# Patient Record
Sex: MALE | Race: WHITE | NOT HISPANIC OR LATINO | Employment: OTHER | URBAN - METROPOLITAN AREA
[De-identification: names, ages, dates, MRNs, and addresses within clinical notes are randomized per-mention and may not be internally consistent; named-entity substitution may affect disease eponyms.]

---

## 2018-07-22 ENCOUNTER — HOSPITAL ENCOUNTER (EMERGENCY)
Facility: HOSPITAL | Age: 73
Discharge: HOME | End: 2018-07-22
Attending: INTERNAL MEDICINE | Admitting: INTERNAL MEDICINE
Payer: MEDICARE

## 2018-07-22 VITALS
DIASTOLIC BLOOD PRESSURE: 76 MMHG | HEART RATE: 64 BPM | SYSTOLIC BLOOD PRESSURE: 147 MMHG | BODY MASS INDEX: 17.18 KG/M2 | RESPIRATION RATE: 18 BRPM | HEIGHT: 70 IN | TEMPERATURE: 98.1 F | OXYGEN SATURATION: 98 % | WEIGHT: 120 LBS

## 2018-07-22 DIAGNOSIS — L23.7 POISON IVY: Primary | ICD-10-CM

## 2018-07-22 PROCEDURE — 99282 EMERGENCY DEPT VISIT SF MDM: CPT

## 2018-07-22 PROCEDURE — 96372 THER/PROPH/DIAG INJ SC/IM: CPT

## 2018-07-22 RX ORDER — DEXAMETHASONE SODIUM PHOSPHATE 4 MG/ML
8 INJECTION, SOLUTION INTRA-ARTICULAR; INTRALESIONAL; INTRAMUSCULAR; INTRAVENOUS; SOFT TISSUE ONCE
Status: COMPLETED | OUTPATIENT
Start: 2018-07-22 | End: 2018-07-22

## 2018-07-22 RX ORDER — METHYLPREDNISOLONE 4 MG/1
TABLET ORAL
Qty: 21 TABLET | Refills: 0 | Status: SHIPPED | OUTPATIENT
Start: 2018-07-22 | End: 2018-07-29

## 2018-07-22 RX ADMIN — DEXAMETHASONE SODIUM PHOSPHATE 8 MG: 4 INJECTION, SOLUTION INTRA-ARTICULAR; INTRALESIONAL; INTRAMUSCULAR; INTRAVENOUS; SOFT TISSUE at 18:35

## 2018-07-22 NOTE — DISCHARGE INSTRUCTIONS
Acute Rash   [place]ano H & Shree TL: Approach to the Adult Rash  In: Patrick Hughs, ed  Emergency Medicine: Clinical Essentials, 2nd ed  [address], Tez Spivey, 2013  LEX Hunter, TRAV Hunter, & RaulT: Diagnosis and treatment of atopic dermatitis in children and adults:  Academy of Allergology and Clinical Immunology/American Academy of Allergy, Asthma and Immunology/PRACTALL Consensus Report  J Allergy Clin Immunol, 2006; 118(1):152-169  American Academy of Allergy Asthma and Immunology: Contact dermatitis: a practice parameter  Jessica Allergy Asthma Immunol, 2006; 97(3 Suppl 2):S1-38  NANDA Bailey & CHARITO James: Atopic and non-atopic eczema  BMJ, 2006; 332(8466):714-998  DESTINEE CastanedaJr: Urticaria: selected highlights and recent advances  Med Clin [place] Am, 2006; 90(1):187-209  KIA Perez, VickieF, ТатьянаP, et al: Efficacy of crisis intervention treatment with topical corticosteroid prednicarbat with and without partial wet-wrap dressing in atopic dermatitis  Dermatology, 2006; 212(1):66-69  QASIM Mancia & TRAV Morejon: Clinical practice Antibiotic allergy  [address], 2006; 354(6):601-609  MEL Arevalo, CARRIE Barrientos, SEAN Fuchs, et al: A randomised study of "wet wraps" versus conventional treatment for atopic eczema  Arch Dis Child, 2006; 91(2):164-168  CESAR Jean & OsmanRG: Allergic contact dermatitis  Med Clin [place] Am, 2006; 90(1):169-185  Silvia Vaughn & TAE Chandler: Seborrheic dermatitis: an overview  Am Fam Physician, 2006; 74(1):125-130  EMILIANO Perales & COLEMAN Bolton: Atopic dermatitis  Med Clin [place] Am, 2006; 90(1):149-67, ix  MELANIE Robles, IRON Kaba, ScarlettW, et al: EAACI/GA2LEN/EDF guideline: definition, classification and diagnosis of urticaria  Allergy, 2006; 61(3):316-320  Holten,KB: How should we care for atopic dermatitis  J Fam Pract, 2005; 54(5):426-427    CARRIE Robb, JING Eason, CHLOÉ Nunes, et al: Emollients in a propanol-based hand rub can significantly decrease irritant contact dermatitis  Contact Dermatitis, 2005; 53(6):344-349  TRAV Ma, Kris,S, & CatyT: Childhood eczema  BMJ, 2005; [id number]  FRANCINE Arana: Clinical practice  Atopic dermatitis  [address] [facility] , 2005; 352(22):2707-0790  Emilia,SW, Kalia,N, & JeseRA: Treatment of pemphigus vulgaris: current and emerging options  Am J Clin Dermatol, 2005; 6(5):327-342  KIA Wong, SUSAN Parker, KAUR Alberto, et al: Life-threatening acute adverse cutaneous drug reactions  Clin Dermatol, 2005; 23(2):171-181  CAROLYNE Odom: Urticaria and angioedema: a practical approach  [facility] Physician, 2004; 69(5):0102-3888  TRAV Parker & IRON Taylor: Do steroids help children with acute urticaria  Arch Dis Child, 2004; 89(1):85-86  SADI Eller & SEAN Crowley: Pityriasis rosea  [facility] Physician, 2004; 69(1):87-91  [address] [facility]: Twice-weekly topical corticosteroid therapy may reduce atopic dermatitis relapses  Arch Dermatol, 2004; 140(9):7402-1014  COLEMAN Washburn & Tania,AS: Atopic dermatitis and the atopic march  J Allergy Clin Immunol , 2003; 112(6 Suppl):X846-V616  AB Rao & Grey,RD: Allergic and nonallergic drug reactions  [address] [place] [address], 2003; 96(11):3863-7879  CHANNING Corea, Maryanne,K, & Jennifer,CJ: Atopic dermatitis  [facility], 2003; 96(11):1929-2399  TRAV Hernandez, JESSENIA Enriquez, & Casandra,T: Current management of atopic dermatitis and interruption of the atopic march  J Allergy Clin Immunol, 2003; 112(6 Suppl):L628-H963  AURELIA Kamara: Autoimmune bullous dermatoses in the elderly: diagnosis and management  Drugs Aging, 2003; 20(9):663-681  DAPHNEY Anderson: Xerosis and pruritus in the elderly: recognition and management  Dermatol Ther, 2003; 16(3):254-259  MESERET Morgan: Occupational skin disease  Am Fam Physician, 2002; 66(6):3383-7541  TRAV Holden: Rates of cutaneous reactions to drugs  Arch Dermatol, 2001; 137(6):765-770  SEAN Hunter, MARIBEL Corbin, & SEAN Land: Guidelines for care of contact dermatitis   Br J Dermatol, 2001; 145(6):877-885  TRAV Rubio, JING Ruano, & CARRIE Garcia: Increased risk of bullous pemphigoid in male and very old patients: A population-based study on incidence  [address] [facility]; 41(2 Pt 1):266-268  YUE Gonsales & LIZETH Gonsales: Contact dermatitis  [address] [facility] [address]; 78(2):160-173  MARISSA Edmonds & JonoSF: Allergic reactions to drugs and biologic agents  [facility]; 278(22):2576-0592  ED Colindres: Contact dermatitis and occupational skin disease  Med Dirk Amelia, 1996; 165(1):47-52  RandyS: Managing contact dermatitis  Practitioner, 1996; 240(7552):16-56  ARNULFO Dao & TRAV Stinson: Eczematous dermatitis: a practical review  Am Fam Physician, 6484; 20(0):8914-8610  © 2017 [facility] Information is for End User's use only and may not be sold, redistributed or otherwise used for commercial purposes  All illustrations and images included in CareNotes® are the copyrighted property of A D A M , Inc  or Rainer Barger  The above information is an  only  It is not intended as medical advice for individual conditions or treatments  Talk to your doctor, nurse or pharmacist before following any medical regimen to see if it is safe and effective for you

## 2018-07-22 NOTE — ED PROVIDER NOTES
History  Chief Complaint   Patient presents with   [facility]     According to the patient, he has had a rash for about 1 week and believes it may be poison IVY  71-year-old male comes in complaining of on outbreak of poison ivy on his arms and face  This is day 3 of this outbreak  Complaining of pruritus and spreading poison ivy  None       History reviewed  No pertinent past medical history  History reviewed  No pertinent surgical history  History reviewed  No pertinent family history  I have reviewed and agree with the history as documented  Social History   Substance Use Topics    Smoking status: Never Smoker    Smokeless tobacco: Never Used    Alcohol use No        Review of Systems   Constitutional: Negative  HENT: Negative  Respiratory: Negative  Cardiovascular: Negative  Skin: Positive for rash  Neurological: Negative  Psychiatric/Behavioral: Negative  Physical Exam  Physical Exam   Constitutional: He appears well-developed and well-nourished  Cardiovascular: Normal rate, regular rhythm and normal heart sounds  Pulmonary/Chest: Effort normal and breath sounds normal    Skin: Rash noted  Diffuse rash on both arms up to about the bicep area, and over his left eye  Patient does not appear looks uncomfortable         Vital Signs  ED Triage Vitals [07/22/18 1816]   Temperature Pulse Respirations Blood Pressure SpO2   97 9 °F (36 6 °C) 70 18 157/84 98 %      Temp Source Heart Rate Source Patient Position - Orthostatic VS BP Location FiO2 (%)   Tympanic Monitor Lying Left arm --      Pain Score       No Pain           Vitals:    07/22/18 1816   BP: 157/84   Pulse: 70   Patient Position - Orthostatic VS: Lying       Visual Acuity      ED Medications  Medications   dexamethasone (DECADRON) injection 8 mg (not administered)       Diagnostic Studies  Results Reviewed     None                 No orders to display              Procedures  Procedures Phone Contacts  ED Phone Contact    ED Course                               MDM  CritCare Time    Disposition  Final diagnoses:   Poison ivy     Time reflects when diagnosis was documented in both MDM as applicable and the Disposition within this note     Time User Action Codes Description Comment    7/22/2018  6:28 PM Kayla Lemus Rachele [L23 7] Poison ivy       ED Disposition     ED Disposition Condition Comment    Discharge  Luisa Rivera  discharge to home/self care  Condition at discharge: Good        Follow-up Information    None         Patient's Medications   Discharge Prescriptions    METHYLPREDNISOLONE 4 MG TBPK    Use as directed on package       Start Date: 7/22/2018 End Date: --       Order Dose: --       Quantity: 21 tablet    Refills: 0     No discharge procedures on file      ED Provider  Electronically Signed by           Leticia Rome MD  07/22/18 2889       Leticia Rome MD  07/22/18 6152

## 2018-07-29 ENCOUNTER — HOSPITAL ENCOUNTER (EMERGENCY)
Facility: HOSPITAL | Age: 73
Discharge: HOME | End: 2018-07-29
Attending: EMERGENCY MEDICINE | Admitting: EMERGENCY MEDICINE
Payer: MEDICARE

## 2018-07-29 ENCOUNTER — APPOINTMENT (EMERGENCY)
Dept: CT IMAGING | Facility: HOSPITAL | Age: 73
End: 2018-07-29
Payer: MEDICARE

## 2018-07-29 VITALS
HEART RATE: 58 BPM | OXYGEN SATURATION: 100 % | HEIGHT: 70 IN | WEIGHT: 140 LBS | DIASTOLIC BLOOD PRESSURE: 77 MMHG | SYSTOLIC BLOOD PRESSURE: 160 MMHG | RESPIRATION RATE: 18 BRPM | BODY MASS INDEX: 20.04 KG/M2 | TEMPERATURE: 98.1 F

## 2018-07-29 DIAGNOSIS — R42 DIZZINESS: ICD-10-CM

## 2018-07-29 DIAGNOSIS — T50.905A ADVERSE EFFECT OF DRUG, INITIAL ENCOUNTER: Primary | ICD-10-CM

## 2018-07-29 DIAGNOSIS — L25.9 CONTACT DERMATITIS: ICD-10-CM

## 2018-07-29 DIAGNOSIS — R11.0 NAUSEA: ICD-10-CM

## 2018-07-29 LAB
ALBUMIN SERPL BCP-MCNC: 4.4 G/DL (ref 3.5–5.7)
ALP SERPL-CCNC: 62 U/L (ref 55–165)
ALT SERPL W P-5'-P-CCNC: 15 U/L (ref 7–52)
ANION GAP SERPL CALCULATED.3IONS-SCNC: 9 MMOL/L (ref 4–13)
AST SERPL W P-5'-P-CCNC: 15 U/L (ref 13–39)
ATRIAL RATE: 51 BPM
BACTERIA UR QL AUTO: ABNORMAL /HPF
BASOPHILS # BLD AUTO: 0 THOUSANDS/ΜL (ref 0–0.1)
BASOPHILS NFR BLD AUTO: 0 % (ref 0–1)
BILIRUB SERPL-MCNC: 1.2 MG/DL (ref 0.2–1)
BILIRUB UR QL STRIP: NEGATIVE
BUN SERPL-MCNC: 25 MG/DL (ref 7–25)
CALCIUM SERPL-MCNC: 9.7 MG/DL (ref 8.6–10.5)
CHLORIDE SERPL-SCNC: 102 MMOL/L (ref 98–107)
CLARITY UR: ABNORMAL
CO2 SERPL-SCNC: 28 MMOL/L (ref 21–31)
COLOR UR: YELLOW
CREAT SERPL-MCNC: 0.96 MG/DL (ref 0.7–1.3)
EOSINOPHIL # BLD AUTO: 0.1 THOUSAND/ΜL (ref 0–0.61)
EOSINOPHIL NFR BLD AUTO: 1 % (ref 0–6)
ERYTHROCYTE [DISTWIDTH] IN BLOOD BY AUTOMATED COUNT: 13.9 % (ref 11.6–15.1)
GFR SERPL CREATININE-BSD FRML MDRD: 79 ML/MIN/1.73SQ M
GLUCOSE SERPL-MCNC: 120 MG/DL (ref 65–140)
GLUCOSE UR STRIP-MCNC: NEGATIVE MG/DL
HCT VFR BLD AUTO: 45.5 % (ref 42–52)
HGB BLD-MCNC: 15.7 G/DL (ref 12–17)
HGB UR QL STRIP.AUTO: ABNORMAL
KETONES UR STRIP-MCNC: NEGATIVE MG/DL
LEUKOCYTE ESTERASE UR QL STRIP: ABNORMAL
LYMPHOCYTES # BLD AUTO: 1.3 THOUSANDS/ΜL (ref 0.6–4.47)
LYMPHOCYTES NFR BLD AUTO: 14 % (ref 14–44)
MCH RBC QN AUTO: 30.3 PG (ref 26.8–34.3)
MCHC RBC AUTO-ENTMCNC: 34.5 G/DL (ref 31.4–37.4)
MCV RBC AUTO: 88 FL (ref 82–98)
MONOCYTES # BLD AUTO: 0.8 THOUSAND/ΜL (ref 0.17–1.22)
MONOCYTES NFR BLD AUTO: 9 % (ref 4–12)
MUCOUS THREADS UR QL AUTO: ABNORMAL
NEUTROPHILS # BLD AUTO: 7.6 THOUSANDS/ΜL (ref 1.85–7.62)
NEUTS SEG NFR BLD AUTO: 77 % (ref 43–75)
NITRITE UR QL STRIP: NEGATIVE
NON-SQ EPI CELLS URNS QL MICRO: ABNORMAL /HPF
NRBC BLD AUTO-RTO: 0 /100 WBCS
OTHER STN SPEC: ABNORMAL
P AXIS: 72 DEGREES
PH UR STRIP.AUTO: 5.5 [PH] (ref 5–8)
PLATELET # BLD AUTO: 188 THOUSANDS/UL (ref 149–390)
PMV BLD AUTO: 8.9 FL (ref 8.9–12.7)
POTASSIUM SERPL-SCNC: 4.4 MMOL/L (ref 3.5–5.5)
PR INTERVAL: 160 MS
PROT SERPL-MCNC: 6.6 G/DL (ref 6.4–8.9)
PROT UR STRIP-MCNC: ABNORMAL MG/DL
QRS AXIS: 73 DEGREES
QRSD INTERVAL: 82 MS
QT INTERVAL: 434 MS
QTC INTERVAL: 400 MS
RBC # BLD AUTO: 5.17 MILLION/UL (ref 3.88–5.62)
RBC #/AREA URNS AUTO: ABNORMAL /HPF
SODIUM SERPL-SCNC: 139 MMOL/L (ref 134–143)
SP GR UR STRIP.AUTO: >=1.03 (ref 1–1.03)
T WAVE AXIS: 72 DEGREES
UROBILINOGEN UR QL STRIP.AUTO: 1 E.U./DL
VENTRICULAR RATE: 51 BPM
WBC # BLD AUTO: 9.9 THOUSAND/UL (ref 4.31–10.16)
WBC #/AREA URNS AUTO: ABNORMAL /HPF

## 2018-07-29 PROCEDURE — 80053 COMPREHEN METABOLIC PANEL: CPT | Performed by: EMERGENCY MEDICINE

## 2018-07-29 PROCEDURE — 85025 COMPLETE CBC W/AUTO DIFF WBC: CPT | Performed by: EMERGENCY MEDICINE

## 2018-07-29 PROCEDURE — 36415 COLL VENOUS BLD VENIPUNCTURE: CPT | Performed by: EMERGENCY MEDICINE

## 2018-07-29 PROCEDURE — 96374 THER/PROPH/DIAG INJ IV PUSH: CPT

## 2018-07-29 PROCEDURE — 81001 URINALYSIS AUTO W/SCOPE: CPT | Performed by: EMERGENCY MEDICINE

## 2018-07-29 PROCEDURE — 99284 EMERGENCY DEPT VISIT MOD MDM: CPT

## 2018-07-29 PROCEDURE — 70450 CT HEAD/BRAIN W/O DYE: CPT

## 2018-07-29 PROCEDURE — 96361 HYDRATE IV INFUSION ADD-ON: CPT

## 2018-07-29 PROCEDURE — 93005 ELECTROCARDIOGRAM TRACING: CPT

## 2018-07-29 RX ORDER — SODIUM CHLORIDE 9 MG/ML
125 INJECTION, SOLUTION INTRAVENOUS CONTINUOUS
Status: DISCONTINUED | OUTPATIENT
Start: 2018-07-29 | End: 2018-07-29 | Stop reason: HOSPADM

## 2018-07-29 RX ORDER — DIAPER,BRIEF,INFANT-TODD,DISP
EACH MISCELLANEOUS
Qty: 15 G | Refills: 0 | Status: SHIPPED | OUTPATIENT
Start: 2018-07-29 | End: 2019-10-20 | Stop reason: ALTCHOICE

## 2018-07-29 RX ORDER — ONDANSETRON 4 MG/1
4 TABLET, FILM COATED ORAL EVERY 6 HOURS
Qty: 12 TABLET | Refills: 0 | Status: SHIPPED | OUTPATIENT
Start: 2018-07-29 | End: 2019-10-20 | Stop reason: ALTCHOICE

## 2018-07-29 RX ORDER — ONDANSETRON 2 MG/ML
4 INJECTION INTRAMUSCULAR; INTRAVENOUS ONCE
Status: COMPLETED | OUTPATIENT
Start: 2018-07-29 | End: 2018-07-29

## 2018-07-29 RX ADMIN — SODIUM CHLORIDE 125 ML/HR: 0.9 INJECTION, SOLUTION INTRAVENOUS at 09:29

## 2018-07-29 RX ADMIN — ONDANSETRON 4 MG: 2 INJECTION INTRAMUSCULAR; INTRAVENOUS at 09:29

## 2018-07-29 NOTE — DISCHARGE INSTRUCTIONS
Acute Nausea and Vomiting   WHAT YOU NEED TO KNOW:   Acute nausea and vomiting start suddenly, worsen quickly, and last a short time  DISCHARGE INSTRUCTIONS:   Return to the emergency department if:   · You see blood in your vomit or your bowel movements  · You have sudden, severe pain in your chest and upper abdomen after hard vomiting or retching  · You have swelling in your neck and chest      · You are dizzy, cold, and thirsty and your eyes and mouth are dry  · You are urinating very little or not at all  · You have muscle weakness, leg cramps, and trouble breathing  · Your heart is beating much faster than normal      · You continue to vomit for more than 48 hours  Contact your healthcare provider if:   · You have frequent dry heaves (vomiting but nothing comes out)  · Your nausea and vomiting does not get better or go away after you use medicine  · You have questions or concerns about your condition or treatment  Medicines: You may need any of the following:  · Medicines  may be given to calm your stomach and stop your vomiting  You may also need medicines to help you feel more relaxed or to stop nausea and vomiting caused by motion sickness  · Gastrointestinal stimulants  are used to help empty your stomach and bowels  This may help decrease nausea and vomiting  · Take your medicine as directed  Contact your healthcare provider if you think your medicine is not helping or if you have side effects  Tell him or her if you are allergic to any medicine  Keep a list of the medicines, vitamins, and herbs you take  Include the amounts, and when and why you take them  Bring the list or the pill bottles to follow-up visits  Carry your medicine list with you in case of an emergency  Prevent or manage acute nausea and vomiting:   · Do not drink alcohol  Alcohol may upset or irritate your stomach  Too much alcohol can also cause acute nausea and vomiting  · Control stress    Headaches due to stress may cause nausea and vomiting  Find ways to relax and manage your stress  Get more rest and sleep  · Drink more liquids as directed  Vomiting can lead to dehydration  It is important to drink more liquids to help replace lost body fluids  Ask your healthcare provider how much liquid to drink each day and which liquids are best for you  Your provider may recommend that you drink an oral rehydration solution (ORS)  ORS contains water, salts, and sugar that are needed to replace the lost body fluids  Ask what kind of ORS to use, how much to drink, and where to get it  · Eat smaller meals, more often  Eat small amounts of food every 2 to 3 hours, even if you are not hungry  Food in your stomach may decrease your nausea  · Talk to your healthcare provider before you take over-the-counter (OTC) medicines  These medicines can cause serious problems if you use certain other medicines, or you have a medical condition  You may have problems if you use too much or use them for longer than the label says  Follow directions on the label carefully  Follow up with your healthcare provider as directed:  Write down your questions so you remember to ask them during your follow-up visits  © 2017 [address] Information is for End User's use only and may not be sold, redistributed or otherwise used for commercial purposes  All illustrations and images included in CareNotes® are the copyrighted property of A D A [employer] , [employer]iddig  or Rainer Barger  The above information is an  only  It is not intended as medical advice for individual conditions or treatments  Talk to your doctor, nurse or pharmacist before following any medical regimen to see if it is safe and effective for you  Contact Dermatitis   WHAT YOU NEED TO KNOW:   Contact dermatitis is a skin rash  It develops when you touch something that irritates your skin or causes an allergic reaction          DISCHARGE INSTRUCTIONS:   Call 911 for any of the following:   · You have sudden trouble breathing  · Your throat swells and you have trouble eating  · Your face is swollen  Contact your healthcare provider if:   · You have a fever  · Your blisters are draining pus  · Your rash spreads or does not get better, even after treatment  · You have questions or concerns about your condition or care  Medicines:   · Medicines  help decrease itching and swelling  They will be given as a topical medicine to apply to your rash or as a pill  · Take your medicine as directed  Contact your healthcare provider if you think your medicine is not helping or if you have side effects  Tell him or her if you are allergic to any medicine  Keep a list of the medicines, vitamins, and herbs you take  Include the amounts, and when and why you take them  Bring the list or the pill bottles to follow-up visits  Carry your medicine list with you in case of an emergency  Manage contact dermatitis:   · Take short baths or showers in cool water  Use mild soap or soap-free cleansers  Add oatmeal, baking soda, or cornstarch to the bath water to help decrease skin irritation  · Avoid skin irritants , such as makeup, hair products, soaps, and cleansers  Use products that do not contain perfume or dye  · Apply a cool compress to your rash  This will help soothe your skin  · Keep your skin moist   Rub unscented cream or lotion on your skin to prevent dryness and itching  Do this right after a bath or shower when your skin is still damp  Follow up with your healthcare provider or dermatologist in 2 to 3 days:  Write down your questions so you remember to ask them during your visits  © 2017 [employer] Information is for End User's use only and may not be sold, redistributed or otherwise used for commercial purposes   All illustrations and images included in CareNotes® are the copyrighted property of A D A [employer] , Inc  or Rainer Barger  The above information is an  only  It is not intended as medical advice for individual conditions or treatments  Talk to your doctor, nurse or pharmacist before following any medical regimen to see if it is safe and effective for you

## 2018-07-29 NOTE — ED PROVIDER NOTES
History  Chief Complaint   Patient presents with    Medication Problem     pt feels he may be having side effects from medrol dose pack      Patient is a 73-year-old man who was started on a prednisone taper for treatment of poison ivy  Since that time patient says he has felt lightheaded and dizzy  He also reports some nausea and vomiting  He has had no fevers chills weight is been stable  He has not struck his head patient has this dizziness is more vertigo with the feeling of motion  He is here for further evaluation            None       Past Medical History:   Diagnosis Date    Poison ivy        History reviewed  No pertinent surgical history  History reviewed  No pertinent family history  I have reviewed and agree with the history as documented  Social History   Substance Use Topics    Smoking status: Never Smoker    Smokeless tobacco: Never Used    Alcohol use No        Review of Systems   Constitutional: Negative for chills, fatigue, fever and unexpected weight change  HENT: Negative for congestion and nosebleeds  Eyes: Negative for visual disturbance  Respiratory: Negative for chest tightness and shortness of breath  Cardiovascular: Negative for chest pain, palpitations and leg swelling  Gastrointestinal: Negative for abdominal pain, blood in stool, diarrhea, nausea and vomiting  Endocrine: Negative for cold intolerance and heat intolerance  Genitourinary: Negative for difficulty urinating  Musculoskeletal: Negative for arthralgias, back pain, gait problem, joint swelling and myalgias  Skin: Negative for rash  Neurological: Negative for dizziness, speech difficulty, weakness and headaches  Psychiatric/Behavioral: Negative for behavioral problems, confusion, self-injury and suicidal ideas  All other systems reviewed and are negative  Physical Exam  Physical Exam   Constitutional: He is oriented to person, place, and time   He appears well-developed and well-nourished  HENT:   Head: Normocephalic and atraumatic  Nose: Nose normal    Eyes: EOM are normal  Pupils are equal, round, and reactive to light  Neck: Normal range of motion  Neck supple  Cardiovascular: Normal rate, regular rhythm and normal heart sounds  Exam reveals no gallop and no friction rub  No murmur heard  Pulmonary/Chest: Effort normal and breath sounds normal  No respiratory distress  He has no wheezes  He has no rales  Abdominal: Soft  He exhibits no distension  There is no tenderness  There is no rebound and no guarding  Musculoskeletal: Normal range of motion  He exhibits no edema  Neurological: He is alert and oriented to person, place, and time  Patient did not have any nystagmus   Skin: Skin is warm and dry  Psychiatric: He has a normal mood and affect  His behavior is normal  Judgment and thought content normal    Nursing note and vitals reviewed        Vital Signs  ED Triage Vitals   Temperature Pulse Respirations Blood Pressure SpO2   07/29/18 0851 07/29/18 0851 07/29/18 0851 07/29/18 0851 07/29/18 0851   97 6 °F (36 4 °C) (!) 52 16 164/81 100 %      Temp Source Heart Rate Source Patient Position - Orthostatic VS BP Location FiO2 (%)   07/29/18 1047 07/29/18 0922 07/29/18 0922 07/29/18 0922 --   Tympanic Monitor Lying Left arm       Pain Score       07/29/18 0851       No Pain           Vitals:    07/29/18 0922 07/29/18 0923 07/29/18 0924 07/29/18 1047   BP: 164/74 166/75 148/86 160/77   Pulse: (!) 54 (!) 54 74 58   Patient Position - Orthostatic VS: Lying Sitting Standing Lying       Visual Acuity      ED Medications  Medications   sodium chloride 0 9 % infusion (0 mL/hr Intravenous Stopped 7/29/18 1049)   ondansetron (ZOFRAN) injection 4 mg (4 mg Intravenous Given 7/29/18 0929)       Diagnostic Studies  Results Reviewed     Procedure Component Value Units Date/Time    Urine Microscopic [[id number]]  (Abnormal) Collected:  07/29/18 0940    Lab Status:  Final result Specimen:  Urine from Urine, Clean Catch Updated:  07/29/18 1030     RBC, UA 1-2 (A) /hpf      WBC, UA 30-50 (A) /hpf      Epithelial Cells Moderate (A) /hpf      Bacteria, UA Moderate (A) /hpf      OTHER OBSERVATIONS WBCs Clumped     MUCOUS THREADS Moderate (A)    UA w Reflex to Microscopic [[id number]]  (Abnormal) Collected:  07/29/18 0940    Lab Status:  Final result Specimen:  Urine from Urine, Clean Catch Updated:  07/29/18 1017     Color, UA Yellow     Clarity, UA Cloudy (A)     Specific Gravity, UA >=1 030     pH, UA 5 5     Leukocytes, UA Trace (A)     Nitrite, UA Negative     Protein, UA Trace (A) mg/dl      Glucose, UA Negative mg/dl      Ketones, UA Negative mg/dl      Urobilinogen, UA 1 0 E U /dl      Bilirubin, UA Negative     Blood, UA Trace-Intact (A)    Comprehensive metabolic panel [[id number]]  (Abnormal) Collected:  07/29/18 0914    Lab Status:  Final result Specimen:  Blood from Arm, Right Updated:  07/29/18 0958     Sodium 139 mmol/L      Potassium 4 4 mmol/L      Chloride 102 mmol/L      CO2 28 mmol/L      Anion Gap 9 mmol/L      BUN 25 mg/dL      Creatinine 0 96 mg/dL      Glucose 120 mg/dL      Calcium 9 7 mg/dL      AST 15 U/L      ALT 15 U/L      Alkaline Phosphatase 62 U/L      Total Protein 6 6 g/dL      Albumin 4 4 g/dL      Total Bilirubin 1 20 (H) mg/dL      eGFR 79 ml/min/1 73sq m     Narrative:         National Kidney Disease Education Program recommendations are as follows:  GFR calculation is accurate only with a steady state creatinine  Chronic Kidney disease less than 60 ml/min/1 73 sq  meters  Kidney failure less than 15 ml/min/1 73 sq  meters      CBC and differential [[id number]]  (Abnormal) Collected:  07/29/18 0914    Lab Status:  Final result Specimen:  Blood from Arm, Right Updated:  07/29/18 0931     WBC 9 90 Thousand/uL      RBC 5 17 Million/uL      Hemoglobin 15 7 g/dL      Hematocrit 45 5 %      MCV 88 fL      MCH 30 3 pg      MCHC 34 5 g/dL      RDW 13 9 %      MPV 8 9 fL Platelets 794 Thousands/uL      nRBC 0 /100 WBCs      Neutrophils Relative 77 (H) %      Lymphocytes Relative 14 %      Monocytes Relative 9 %      Eosinophils Relative 1 %      Basophils Relative 0 %      Neutrophils Absolute 7 60 Thousands/µL      Lymphocytes Absolute 1 30 Thousands/µL      Monocytes Absolute 0 80 Thousand/µL      Eosinophils Absolute 0 10 Thousand/µL      Basophils Absolute 0 00 Thousands/µL                  CT head without contrast   Final Result by Antoni Hardy (07/29 0939)   No acute intracranial process  Signed by Stephanie Cobos MD                 Procedures  Procedures       Phone Contacts  ED Phone Contact    ED Course  ED Course as of Jul 29 1240   Sun Jul 29, 2018   1024 Patient says he is feeling better                                MDM  CritCare Time    Disposition  Final diagnoses: Adverse effect of drug, initial encounter   Dizziness   Nausea   Contact dermatitis     Time reflects when diagnosis was documented in both MDM as applicable and the Disposition within this note     Time User Action Codes Description Comment    7/29/2018 10:24 AM Edvin Grit Add [T88  7XXA] Adverse effect of drug, initial encounter     7/29/2018 10:25 AM Edvin Grit Add [R42] Dizziness     7/29/2018 10:25 AM Edvin Grit Add [R11 0] Nausea     7/29/2018 10:26 AM Edvin Grit Add [L25 9] Contact dermatitis       ED Disposition     ED Disposition Condition Comment    Discharge  Robert Fortjenna  discharge to home/self care  Condition at discharge: Good        Follow-up Information    None         Discharge Medication List as of 7/29/2018 10:26 AM      START taking these medications    Details   hydrocortisone 1 % cream Apply to affected area 2 times daily, Print      ondansetron (ZOFRAN) 4 mg tablet Take 1 tablet (4 mg total) by mouth every 6 (six) hours, Starting Sun 7/29/2018, Print           No discharge procedures on file      ED Provider  Electronically Signed by           Porfirio Montes Chencho Burks MD  07/29/18 7437

## 2019-10-20 ENCOUNTER — APPOINTMENT (INPATIENT)
Dept: NON INVASIVE DIAGNOSTICS | Facility: HOSPITAL | Age: 74
DRG: 247 | End: 2019-10-20
Payer: MEDICARE

## 2019-10-20 ENCOUNTER — HOSPITAL ENCOUNTER (INPATIENT)
Facility: HOSPITAL | Age: 74
LOS: 4 days | Discharge: HOME | DRG: 247 | End: 2019-10-24
Attending: EMERGENCY MEDICINE | Admitting: INTERNAL MEDICINE
Payer: MEDICARE

## 2019-10-20 ENCOUNTER — APPOINTMENT (EMERGENCY)
Dept: RADIOLOGY | Facility: HOSPITAL | Age: 74
DRG: 247 | End: 2019-10-20
Payer: MEDICARE

## 2019-10-20 DIAGNOSIS — R05.9 COUGH: ICD-10-CM

## 2019-10-20 DIAGNOSIS — I21.4 NSTEMI (NON-ST ELEVATED MYOCARDIAL INFARCTION) (HCC): ICD-10-CM

## 2019-10-20 DIAGNOSIS — R07.9 CHEST PAIN: Primary | ICD-10-CM

## 2019-10-20 DIAGNOSIS — I31.9 PERICARDITIS: ICD-10-CM

## 2019-10-20 LAB
ALBUMIN SERPL BCP-MCNC: 3.4 G/DL (ref 3.5–5)
ALP SERPL-CCNC: 78 U/L (ref 46–116)
ALT SERPL W P-5'-P-CCNC: 22 U/L (ref 12–78)
ANION GAP SERPL CALCULATED.3IONS-SCNC: 7 MMOL/L (ref 4–13)
APTT PPP: 26 SECONDS (ref 23–37)
APTT PPP: 45 SECONDS (ref 23–37)
AST SERPL W P-5'-P-CCNC: 59 U/L (ref 5–45)
ATRIAL RATE: 69 BPM
BASOPHILS # BLD AUTO: 0.05 THOUSANDS/ΜL (ref 0–0.1)
BASOPHILS NFR BLD AUTO: 0 % (ref 0–1)
BILIRUB DIRECT SERPL-MCNC: 0.16 MG/DL (ref 0–0.2)
BILIRUB SERPL-MCNC: 0.6 MG/DL (ref 0.2–1)
BUN SERPL-MCNC: 22 MG/DL (ref 5–25)
CALCIUM SERPL-MCNC: 9.1 MG/DL (ref 8.3–10.1)
CHLORIDE SERPL-SCNC: 102 MMOL/L (ref 100–108)
CHOLEST SERPL-MCNC: 162 MG/DL (ref 50–200)
CO2 SERPL-SCNC: 28 MMOL/L (ref 21–32)
CREAT SERPL-MCNC: 0.92 MG/DL (ref 0.6–1.3)
D DIMER PPP FEU-MCNC: 0.28 UG/ML FEU
EOSINOPHIL # BLD AUTO: 0.06 THOUSAND/ΜL (ref 0–0.61)
EOSINOPHIL NFR BLD AUTO: 1 % (ref 0–6)
ERYTHROCYTE [DISTWIDTH] IN BLOOD BY AUTOMATED COUNT: 13.1 % (ref 11.6–15.1)
GFR SERPL CREATININE-BSD FRML MDRD: 82 ML/MIN/1.73SQ M
GLUCOSE SERPL-MCNC: 109 MG/DL (ref 65–140)
GLUCOSE SERPL-MCNC: 79 MG/DL (ref 65–140)
HCT VFR BLD AUTO: 45 % (ref 36.5–49.3)
HDLC SERPL-MCNC: 54 MG/DL (ref 40–60)
HGB BLD-MCNC: 14.8 G/DL (ref 12–17)
IMM GRANULOCYTES # BLD AUTO: 0.06 THOUSAND/UL (ref 0–0.2)
IMM GRANULOCYTES NFR BLD AUTO: 1 % (ref 0–2)
INR PPP: 1.08 (ref 0.84–1.19)
LDLC SERPL CALC-MCNC: 96 MG/DL (ref 0–100)
LYMPHOCYTES # BLD AUTO: 1.23 THOUSANDS/ΜL (ref 0.6–4.47)
LYMPHOCYTES NFR BLD AUTO: 11 % (ref 14–44)
MAGNESIUM SERPL-MCNC: 2.3 MG/DL (ref 1.6–2.6)
MCH RBC QN AUTO: 29.1 PG (ref 26.8–34.3)
MCHC RBC AUTO-ENTMCNC: 32.9 G/DL (ref 31.4–37.4)
MCV RBC AUTO: 88 FL (ref 82–98)
MONOCYTES # BLD AUTO: 1.46 THOUSAND/ΜL (ref 0.17–1.22)
MONOCYTES NFR BLD AUTO: 13 % (ref 4–12)
NEUTROPHILS # BLD AUTO: 8.38 THOUSANDS/ΜL (ref 1.85–7.62)
NEUTS SEG NFR BLD AUTO: 74 % (ref 43–75)
NRBC BLD AUTO-RTO: 0 /100 WBCS
NT-PROBNP SERPL-MCNC: 2395 PG/ML
P AXIS: 77 DEGREES
PLATELET # BLD AUTO: 199 THOUSANDS/UL (ref 149–390)
PMV BLD AUTO: 11.1 FL (ref 8.9–12.7)
POTASSIUM SERPL-SCNC: 3.7 MMOL/L (ref 3.5–5.3)
PR INTERVAL: 158 MS
PROT SERPL-MCNC: 7.2 G/DL (ref 6.4–8.2)
PROTHROMBIN TIME: 14.1 SECONDS (ref 11.6–14.5)
QRS AXIS: 92 DEGREES
QRSD INTERVAL: 84 MS
QT INTERVAL: 390 MS
QTC INTERVAL: 417 MS
RBC # BLD AUTO: 5.09 MILLION/UL (ref 3.88–5.62)
SODIUM SERPL-SCNC: 137 MMOL/L (ref 136–145)
T WAVE AXIS: 89 DEGREES
TRIGL SERPL-MCNC: 60 MG/DL
TROPONIN I SERPL-MCNC: 8.37 NG/ML
TROPONIN I SERPL-MCNC: 8.79 NG/ML
TROPONIN I SERPL-MCNC: 9.08 NG/ML
TROPONIN I SERPL-MCNC: 9.92 NG/ML
VENTRICULAR RATE: 69 BPM
WBC # BLD AUTO: 11.24 THOUSAND/UL (ref 4.31–10.16)

## 2019-10-20 PROCEDURE — 99285 EMERGENCY DEPT VISIT HI MDM: CPT

## 2019-10-20 PROCEDURE — 99223 1ST HOSP IP/OBS HIGH 75: CPT | Performed by: FAMILY MEDICINE

## 2019-10-20 PROCEDURE — 83735 ASSAY OF MAGNESIUM: CPT | Performed by: EMERGENCY MEDICINE

## 2019-10-20 PROCEDURE — 80076 HEPATIC FUNCTION PANEL: CPT | Performed by: EMERGENCY MEDICINE

## 2019-10-20 PROCEDURE — 96360 HYDRATION IV INFUSION INIT: CPT

## 2019-10-20 PROCEDURE — 71046 X-RAY EXAM CHEST 2 VIEWS: CPT

## 2019-10-20 PROCEDURE — 93306 TTE W/DOPPLER COMPLETE: CPT

## 2019-10-20 PROCEDURE — 36415 COLL VENOUS BLD VENIPUNCTURE: CPT | Performed by: EMERGENCY MEDICINE

## 2019-10-20 PROCEDURE — 85025 COMPLETE CBC W/AUTO DIFF WBC: CPT | Performed by: EMERGENCY MEDICINE

## 2019-10-20 PROCEDURE — 99222 1ST HOSP IP/OBS MODERATE 55: CPT | Performed by: INTERNAL MEDICINE

## 2019-10-20 PROCEDURE — 93010 ELECTROCARDIOGRAM REPORT: CPT | Performed by: INTERNAL MEDICINE

## 2019-10-20 PROCEDURE — 85730 THROMBOPLASTIN TIME PARTIAL: CPT | Performed by: EMERGENCY MEDICINE

## 2019-10-20 PROCEDURE — 1124F ACP DISCUSS-NO DSCNMKR DOCD: CPT | Performed by: INTERNAL MEDICINE

## 2019-10-20 PROCEDURE — 93005 ELECTROCARDIOGRAM TRACING: CPT

## 2019-10-20 PROCEDURE — 99285 EMERGENCY DEPT VISIT HI MDM: CPT | Performed by: EMERGENCY MEDICINE

## 2019-10-20 PROCEDURE — 84484 ASSAY OF TROPONIN QUANT: CPT | Performed by: EMERGENCY MEDICINE

## 2019-10-20 PROCEDURE — 85379 FIBRIN DEGRADATION QUANT: CPT | Performed by: EMERGENCY MEDICINE

## 2019-10-20 PROCEDURE — 93306 TTE W/DOPPLER COMPLETE: CPT | Performed by: INTERNAL MEDICINE

## 2019-10-20 PROCEDURE — 83880 ASSAY OF NATRIURETIC PEPTIDE: CPT | Performed by: EMERGENCY MEDICINE

## 2019-10-20 PROCEDURE — 80048 BASIC METABOLIC PNL TOTAL CA: CPT | Performed by: EMERGENCY MEDICINE

## 2019-10-20 PROCEDURE — 82948 REAGENT STRIP/BLOOD GLUCOSE: CPT

## 2019-10-20 PROCEDURE — 80061 LIPID PANEL: CPT | Performed by: PHYSICIAN ASSISTANT

## 2019-10-20 PROCEDURE — 84484 ASSAY OF TROPONIN QUANT: CPT | Performed by: FAMILY MEDICINE

## 2019-10-20 PROCEDURE — 85730 THROMBOPLASTIN TIME PARTIAL: CPT | Performed by: FAMILY MEDICINE

## 2019-10-20 PROCEDURE — 85610 PROTHROMBIN TIME: CPT | Performed by: EMERGENCY MEDICINE

## 2019-10-20 RX ORDER — HEPARIN SODIUM 10000 [USP'U]/100ML
3-20 INJECTION, SOLUTION INTRAVENOUS
Status: DISCONTINUED | OUTPATIENT
Start: 2019-10-20 | End: 2019-10-21

## 2019-10-20 RX ORDER — HEPARIN SODIUM 1000 [USP'U]/ML
3600 INJECTION, SOLUTION INTRAVENOUS; SUBCUTANEOUS ONCE
Status: COMPLETED | OUTPATIENT
Start: 2019-10-20 | End: 2019-10-20

## 2019-10-20 RX ORDER — HEPARIN SODIUM 1000 [USP'U]/ML
1800 INJECTION, SOLUTION INTRAVENOUS; SUBCUTANEOUS AS NEEDED
Status: DISCONTINUED | OUTPATIENT
Start: 2019-10-20 | End: 2019-10-21

## 2019-10-20 RX ORDER — NITROGLYCERIN 0.4 MG/1
0.4 TABLET SUBLINGUAL
Status: DISCONTINUED | OUTPATIENT
Start: 2019-10-20 | End: 2019-10-20 | Stop reason: SDUPTHER

## 2019-10-20 RX ORDER — HEPARIN SODIUM 1000 [USP'U]/ML
3600 INJECTION, SOLUTION INTRAVENOUS; SUBCUTANEOUS AS NEEDED
Status: DISCONTINUED | OUTPATIENT
Start: 2019-10-20 | End: 2019-10-21

## 2019-10-20 RX ORDER — ATORVASTATIN CALCIUM 40 MG/1
40 TABLET, FILM COATED ORAL
Status: DISCONTINUED | OUTPATIENT
Start: 2019-10-20 | End: 2019-10-20

## 2019-10-20 RX ORDER — ASPIRIN 81 MG/1
324 TABLET, CHEWABLE ORAL ONCE
Status: COMPLETED | OUTPATIENT
Start: 2019-10-20 | End: 2019-10-20

## 2019-10-20 RX ORDER — NITROGLYCERIN 0.4 MG/1
0.4 TABLET SUBLINGUAL
Status: DISCONTINUED | OUTPATIENT
Start: 2019-10-20 | End: 2019-10-24 | Stop reason: HOSPADM

## 2019-10-20 RX ORDER — ACETAMINOPHEN 325 MG/1
650 TABLET ORAL EVERY 6 HOURS PRN
Status: DISCONTINUED | OUTPATIENT
Start: 2019-10-20 | End: 2019-10-24 | Stop reason: HOSPADM

## 2019-10-20 RX ORDER — ATORVASTATIN CALCIUM 40 MG/1
80 TABLET, FILM COATED ORAL
Status: DISCONTINUED | OUTPATIENT
Start: 2019-10-20 | End: 2019-10-24 | Stop reason: HOSPADM

## 2019-10-20 RX ORDER — ONDANSETRON 2 MG/ML
4 INJECTION INTRAMUSCULAR; INTRAVENOUS EVERY 6 HOURS PRN
Status: DISCONTINUED | OUTPATIENT
Start: 2019-10-20 | End: 2019-10-24 | Stop reason: HOSPADM

## 2019-10-20 RX ADMIN — HEPARIN SODIUM 3600 UNITS: 1000 INJECTION INTRAVENOUS; SUBCUTANEOUS at 11:07

## 2019-10-20 RX ADMIN — METOPROLOL TARTRATE 25 MG: 25 TABLET, FILM COATED ORAL at 11:06

## 2019-10-20 RX ADMIN — HEPARIN SODIUM AND DEXTROSE 12 UNITS/KG/HR: 10000; 5 INJECTION INTRAVENOUS at 11:09

## 2019-10-20 RX ADMIN — HEPARIN SODIUM 1800 UNITS: 1000 INJECTION, SOLUTION INTRAVENOUS; SUBCUTANEOUS at 17:03

## 2019-10-20 RX ADMIN — SODIUM CHLORIDE 500 ML: 0.9 INJECTION, SOLUTION INTRAVENOUS at 09:49

## 2019-10-20 RX ADMIN — ASPIRIN 81 MG 324 MG: 81 TABLET ORAL at 09:50

## 2019-10-20 RX ADMIN — METOPROLOL TARTRATE 25 MG: 25 TABLET, FILM COATED ORAL at 21:47

## 2019-10-20 RX ADMIN — ATORVASTATIN CALCIUM 80 MG: 40 TABLET, FILM COATED ORAL at 17:05

## 2019-10-20 NOTE — ED NOTES
1  CC - left sided chest pain x's 3 days  2  Admission related to injury? - NO  3  Orientation status - a/o x's 3  4  Abnormal labs/abnormal focused assessment/vitals - Trop 9 92; BNP 2,395  5  Medication/drips - Heparin ACS Low @ 12 units/kg/hr  6  Last time narcotics given - N/A  7  IV lines/drains/etc - 20 gauge in left FA  8  Isolation status - none  9  Skin - intact  10  Ambulation -?independent  11   ED nurse's name and phone number - Mulugeta Rosado ext  [phone]     Lizzie Coles, MERE  10/20/19 9860

## 2019-10-20 NOTE — ED PROVIDER NOTES
History  Chief Complaint   Patient presents with    Chest Pain     Patient c/o left-sided chest pain radiating to left arm x's 3 days  Patient notes nausea and lightheadedness  Patient is a 61-year-old male with no significant past medical surgical history, presents to the emergency department for evaluation of chest pain, intermittent, starting Thursday approximately 3-4 days ago  Patient reports he feels pain in the left side of his chest that he describes as pressure  The pain radiates into both of his arms but mostly the left arm  Denies ever having this type of pain before  He denies any worsening with exertion and states the pain does come on at rest as well  He does report feeling more short of breath and that seems to be worsened with exertion  He also reports palpitations, nausea and intermittent lightheadedness especially when walking or standing  He admits that he does not see doctors and although he has no known medical problems he has not been evaluated for any medical issues in many years  He denies ever having a cardiac workup  Patient denies any recent fever, chills, headache, vertigo, neck or jaw pain, back pain, abdominal pain, cough, URI symptoms, vomiting, diarrhea, constipation, blood per rectum or melena, urinary symptoms, skin rash or color change, extremity swelling or pain, lateralizing extremity weakness or paresthesia or other focal neurologic deficits  Patient quit smoking 34 years ago  History provided by:  Patient   used: No    Chest Pain   Associated symptoms: nausea, palpitations and shortness of breath    Associated symptoms: no abdominal pain, no back pain, no cough, no diaphoresis, no dizziness, no fever, no headache, no numbness, not vomiting and no weakness        None       Past Medical History:   Diagnosis Date    Poison ivy        History reviewed  No pertinent surgical history      Family History   Problem Relation Age of Onset    No Known Problems Mother     No Known Problems Father      I have reviewed and agree with the history as documented  Social History     Tobacco Use    Smoking status: Never Smoker    Smokeless tobacco: Never Used   Substance Use Topics    Alcohol use: Never     Frequency: Never     Drinks per session: 1 or 2     Binge frequency: Never    Drug use: No        Review of Systems   Constitutional: Negative for chills, diaphoresis and fever  HENT: Negative for congestion, ear pain, rhinorrhea and sore throat  Respiratory: Positive for shortness of breath  Negative for cough, chest tightness and wheezing  Cardiovascular: Positive for chest pain and palpitations  Negative for leg swelling  Gastrointestinal: Positive for nausea  Negative for abdominal distention, abdominal pain, blood in stool, constipation, diarrhea and vomiting  Genitourinary: Negative for dysuria, flank pain, frequency and hematuria  Musculoskeletal: Negative for back pain, neck pain and neck stiffness  Skin: Negative for color change, rash and wound  Allergic/Immunologic: Negative for immunocompromised state  Neurological: Positive for light-headedness  Negative for dizziness, seizures, syncope, weakness, numbness and headaches  Hematological: Negative for adenopathy  Psychiatric/Behavioral: Negative for confusion and decreased concentration  All other systems reviewed and are negative  Physical Exam  Physical Exam   Constitutional: He is oriented to person, place, and time  He appears well-developed and well-nourished  No distress  HENT:   Head: Normocephalic and atraumatic  Mouth/Throat: Oropharynx is clear and moist    Eyes: Pupils are equal, round, and reactive to light  Conjunctivae and EOM are normal    Neck: Normal range of motion  Neck supple  No JVD present  Cardiovascular: Normal rate, regular rhythm, normal heart sounds and intact distal pulses  Exam reveals no gallop and no friction rub     No murmur heard  Pulmonary/Chest: Effort normal and breath sounds normal  No respiratory distress  He has no wheezes  He has no rales  He exhibits no tenderness  Abdominal: Soft  Bowel sounds are normal  He exhibits no distension  There is no tenderness  There is no rebound and no guarding  Musculoskeletal: Normal range of motion  He exhibits no edema or tenderness  Neurological: He is alert and oriented to person, place, and time  No gross motor or sensory deficits  Skin: Skin is warm and dry  No rash noted  He is not diaphoretic  No pallor  Psychiatric: He has a normal mood and affect  His behavior is normal    Nursing note and vitals reviewed        Vital Signs  ED Triage Vitals   Temperature Pulse Respirations Blood Pressure SpO2   10/20/19 0917 10/20/19 0917 10/20/19 0917 10/20/19 0917 10/20/19 0926   98 1 °F (36 7 °C) 66 18 144/78 100 %      Temp Source Heart Rate Source Patient Position - Orthostatic VS BP Location FiO2 (%)   10/20/19 0917 10/20/19 0917 10/20/19 0917 10/20/19 0917 --   Oral Monitor Lying Right arm       Pain Score       10/20/19 0917       No Pain           Vitals:    10/20/19 1106 10/20/19 1130 10/20/19 1200 10/20/19 1308   BP: 125/68 119/69 114/67 111/64   Pulse: 71 72 64 63   Patient Position - Orthostatic VS:  Lying Lying          Visual Acuity      ED Medications  Medications   heparin (porcine) 25,000 units in 250 mL infusion (premix) (12 Units/kg/hr × 60 kg (Order-Specific) Intravenous Handoff 10/20/19 1221)   heparin (porcine) injection 3,600 Units (has no administration in time range)   heparin (porcine) injection 1,800 Units (has no administration in time range)   metoprolol tartrate (LOPRESSOR) tablet 25 mg (25 mg Oral Given 10/20/19 1106)   atorvastatin (LIPITOR) tablet 80 mg (has no administration in time range)   ondansetron (ZOFRAN) injection 4 mg (has no administration in time range)   acetaminophen (TYLENOL) tablet 650 mg (has no administration in time range) nitroglycerin (NITROSTAT) SL tablet 0 4 mg (has no administration in time range)   sodium chloride 0 9 % bolus 500 mL (0 mL Intravenous Stopped 10/20/19 1105)   aspirin chewable tablet 324 mg (324 mg Oral Given 10/20/19 0950)   heparin (porcine) injection 3,600 Units (3,600 Units Intravenous Given 10/20/19 1107)       Diagnostic Studies  Results Reviewed     Procedure Component Value Units Date/Time    Lipid Panel with Direct LDL reflex [[id number]]  (Normal) Collected:  10/20/19 0936    Lab Status:  Final result Specimen:  Blood from Arm, Left Updated:  10/20/19 1139     Cholesterol 162 mg/dL      Triglycerides 60 mg/dL      HDL, Direct 54 mg/dL      LDL Calculated 96 mg/dL     APTT [[id number]]  (Normal) Collected:  10/20/19 0936    Lab Status:  Final result Specimen:  Blood from Arm, Left Updated:  10/20/19 1041     PTT 26 seconds     Protime-INR [[id number]]  (Normal) Collected:  10/20/19 0936    Lab Status:  Final result Specimen:  Blood from Arm, Left Updated:  10/20/19 1041     Protime 14 1 seconds      INR 1 08    APTT six (6) hours after Heparin bolus/drip initiation or dosing change [[id number]]     Lab Status:  No result Specimen:  Blood     Hepatic function panel [[id number]]  (Abnormal) Collected:  10/20/19 0936    Lab Status:  Final result Specimen:  Blood from Arm, Left Updated:  10/20/19 1006     Total Bilirubin 0 60 mg/dL      Bilirubin, Direct 0 16 mg/dL      Alkaline Phosphatase 78 U/L      AST 59 U/L      ALT 22 U/L      Total Protein 7 2 g/dL      Albumin 3 4 g/dL     Magnesium [[id number]]  (Normal) Collected:  10/20/19 0936    Lab Status:  Final result Specimen:  Blood from Arm, Left Updated:  10/20/19 1006     Magnesium 2 3 mg/dL     Troponin I [[id number]]  (Abnormal) Collected:  10/20/19 0936    Lab Status:  Final result Specimen:  Blood from Arm, Left Updated:  10/20/19 1006     Troponin I 9 92 ng/mL     NT-BNP PRO [[id number]]  (Abnormal) Collected:  10/20/19 0936    Lab Status:  Final result Specimen:  Blood from Arm, Left Updated:  10/20/19 1006     NT-proBNP 2,395 pg/mL     Basic metabolic panel [[id number]] Collected:  10/20/19 0936    Lab Status:  Final result Specimen:  Blood from Arm, Left Updated:  10/20/19 0957     Sodium 137 mmol/L      Potassium 3 7 mmol/L      Chloride 102 mmol/L      CO2 28 mmol/L      ANION GAP 7 mmol/L      BUN 22 mg/dL      Creatinine 0 92 mg/dL      Glucose 109 mg/dL      Calcium 9 1 mg/dL      eGFR 82 ml/min/1 73sq m     Narrative:       [place] guidelines for Chronic Kidney Disease (CKD):     Stage 1 with normal or high GFR (GFR > 90 mL/min/1 73 square meters)    Stage 2 Mild CKD (GFR = 60-89 mL/min/1 73 square meters)    Stage 3A Moderate CKD (GFR = 45-59 mL/min/1 73 square meters)    Stage 3B Moderate CKD (GFR = 30-44 mL/min/1 73 square meters)    Stage 4 Severe CKD (GFR = 15-29 mL/min/1 73 square meters)    Stage 5 End Stage CKD (GFR <15 mL/min/1 73 square meters)  Note: GFR calculation is accurate only with a steady state creatinine    D-dimer, quantitative [[id number]]  (Normal) Collected:  10/20/19 0936    Lab Status:  Final result Specimen:  Blood from Arm, Left Updated:  10/20/19 0956     D-Dimer, Quant 0 28 ug/ml FEU     CBC and differential [[id number]]  (Abnormal) Collected:  10/20/19 0936    Lab Status:  Final result Specimen:  Blood from Arm, Left Updated:  10/20/19 0942     WBC 11 24 Thousand/uL      RBC 5 09 Million/uL      Hemoglobin 14 8 g/dL      Hematocrit 45 0 %      MCV 88 fL      MCH 29 1 pg      MCHC 32 9 g/dL      RDW 13 1 %      MPV 11 1 fL      Platelets 373 Thousands/uL      nRBC 0 /100 WBCs      Neutrophils Relative 74 %      Immat GRANS % 1 %      Lymphocytes Relative 11 %      Monocytes Relative 13 %      Eosinophils Relative 1 %      Basophils Relative 0 %      Neutrophils Absolute 8 38 Thousands/µL      Immature Grans Absolute 0 06 Thousand/uL      Lymphocytes Absolute 1 23 Thousands/µL      Monocytes Absolute 1 46 Thousand/µL      Eosinophils Absolute 0 06 Thousand/µL      Basophils Absolute 0 05 Thousands/µL                  XR chest 2 views   ED Interpretation by Sherryle Mound, DO (10/20 0957)   No acute abnormality in the chest  Possible COPD  Procedures  ECG 12 Lead Documentation Only  Date/Time: 10/20/2019 9:30 AM  Performed by: Sherryle Mound, DO  Authorized by: Sherryle Mound, DO     ECG reviewed by me, the ED Provider: yes    Patient location:  ED  Previous ECG:     Previous ECG:  Compared to current    Comparison ECG info:  7-; T wave inversion now present in lateral leads; Axis has shifted right    Similarity:  Changes noted  Rate:     ECG rate:  69    ECG rate assessment: normal    Rhythm:     Rhythm: sinus rhythm    Ectopy:     Ectopy: none    QRS:     QRS axis:  Right    QRS intervals:  Normal  Conduction:     Conduction: normal    ST segments:     ST segments:  Normal  T waves:     T waves: inverted      Inverted:  I and aVL           ED Course  ED Course as of Oct 20 1532   Sun Oct 20, 2019   1027 Updated patient and daughter about significant elevation in cardiac enzymes the need for admission  Will start heparin drip for NSTEMI        1028 Patient once again denies active pain  Identification of Seniors at Risk      Most Recent Value   (ISAR) Identification of Seniors at Risk   Before the illness or injury that brought you to the Emergency, did you need someone to help you on a regular basis? 0 Filed at: 10/20/2019 0919   In the last 24 hours, have you needed more help than usual?  0 Filed at: 10/20/2019 4937   Have you been hospitalized for one or more nights during the past 6 months? 0 Filed at: 10/20/2019 0919   In general, do you see well?  0 Filed at: 10/20/2019 0919   In general, do you have serious problems with your memory?   0 Filed at: 10/20/2019 2786   Do you take more than three different medications every day?  0 Filed at: 10/20/2019 6737   ISAR Score  0 Filed at: 10/20/2019 9032                          [facility]  Number of Diagnoses or Management Options  Diagnosis management comments: 71-year-old male presents with 3 days of intermittent chest pain radiating to the arms associated with nausea, lightheadedness and dyspnea  He admits he has not seen a doctor in many years  No known medical problems  Patient does have EKG changes  Will do full cardiac workup including D-dimer to rule out PE  If workup unremarkable, will admit for cardiac observation, serial troponin plus or minus inpatient stress test        Amount and/or Complexity of Data Reviewed  Clinical lab tests: ordered and reviewed  Tests in the radiology section of CPT®: ordered and reviewed  Tests in the medicine section of CPT®: ordered and reviewed  Independent visualization of images, tracings, or specimens: yes        Disposition  Final diagnoses:   Chest pain   NSTEMI (non-ST elevated myocardial infarction) ([address] )     Time reflects when diagnosis was documented in both MDM as applicable and the Disposition within this note     Time User Action Codes Description Comment    10/20/2019 10:28 AM Baron Marcella Jeffrey [R07 9] Chest pain     10/20/2019 10:28 AM Baron Marcella Jeffrey [I21 4] NSTEMI (non-ST elevated myocardial infarction) [facility])       ED Disposition     ED Disposition Condition Date/Time Comment    Admit Stable Sun Oct 20, 2019 10:28 AM Case was discussed with JESSENIA and the patient's admission status was agreed to be Admission Status: inpatient status to the service of Dr Rosendo Fuller   Follow-up Information    None         There are no discharge medications for this patient  No discharge procedures on file      ED Provider  Electronically Signed by           Patricia Ribeiro DO  10/20/19 8496

## 2019-10-20 NOTE — ED NOTES
Patient transported to [facility] [facility] [address], [facility]  10/20/19 7074

## 2019-10-20 NOTE — ASSESSMENT & PLAN NOTE
Patient has a troponin of 9 with some EKG changes  Continue on the heparin drip for now  The patient does not have any active chest pain at this time but we will continue to trend troponins  Patient will be NPO after midnight  Continue telemetry peer  Aspirin beta-blocker and statin

## 2019-10-20 NOTE — CONSULTS
Consultation - Cardiology   Jose Carr  69 y o  male MRN: [id number]  Unit/Bed#: ED 15 Encounter: [id number]  10/20/19  11:12 AM    Assessment/ Plan:  1- NSTEMI type 1, troponins 9 92; continue trending  Check TTE, FLP, A1c  Place on IV heparin at this time  Start Lopressor 25 mg BID, atorvastatin 80 mg, ASA 81mg and SL NTG PRN  Serial EKG PRN  Will arrange for cardiac catheterization tomorrow AM to assess coronary anatomy  Spoke to patient and patient's wife regarding risks versus benefits of procedure to include 1% chance of bleeding, infection, stroke and/or MI  Patient is agreeable to proceed  NPO after midnight  Further recommendations will based upon catheterization results  History of Present Illness   Physician Requesting Consult: Ella Hamm DO  Reason for Consult / Principal Problem: Chest pain, NSTEMI    HPI: Jose Carr  is a 69y o  year old male who presents with intermittent centralized chest pain radiating down to his left arm starting since Thursday  Never had this type of pain before  States that he has associated dyspnea, palpitations, lightheadedness and diaphoresis  Reports increased shortness of breath with exertion however denies chest pain or palpitations  Denies any recent illness, fevers or chills  He denies any personal family history of cardiac disease  Has not followed up with PCP in some time  Nonsmoker, very rare EtOH use  Inpatient workup consists of EKG which initially showed nonspecific T-wave abnormalities but on serial EKGs show T-wave inversions in lateral leads  His initial troponin was 9 92  He was given full aspirin with resolution of his symptoms  He denies any active chest pain at this time  Inpatient consult to Cardiology  Consult performed by: Naomie Lema PA-C  Consult ordered by: Ella Hamm DO        Review of Systems   Constitutional: Negative for appetite change, chills, diaphoresis, fatigue and fever     Respiratory: Negative for cough, chest tightness and shortness of breath  Cardiovascular: Positive for chest pain  Negative for palpitations and leg swelling  Gastrointestinal: Negative for diarrhea, nausea and vomiting  Endocrine: Negative for cold intolerance and heat intolerance  Genitourinary: Negative for difficulty urinating, dysuria and enuresis  Musculoskeletal: Negative for arthralgias, back pain and gait problem  Allergic/Immunologic: Negative for environmental allergies and food allergies  Neurological: Negative for dizziness, facial asymmetry and headaches  Hematological: Negative for adenopathy  Does not bruise/bleed easily  Psychiatric/Behavioral: Negative for agitation, behavioral problems and confusion  Historical Information   Past Medical History:   Diagnosis Date    Poison ivy      History reviewed  No pertinent surgical history    Social History     Substance and Sexual Activity   Alcohol Use No     Social History     Substance and Sexual Activity   Drug Use No     Social History     Tobacco Use   Smoking Status Never Smoker   Smokeless Tobacco Never Used       Family History:   Family History   Problem Relation Age of Onset    No Known Problems Mother     No Known Problems Father        Meds/Allergies   current meds:   Current Facility-Administered Medications   Medication Dose Route Frequency    atorvastatin (LIPITOR) tablet 80 mg  80 mg Oral Daily With Dinner    heparin (porcine) 25,000 units in 250 mL infusion (premix)  3-20 Units/kg/hr (Order-Specific) Intravenous Titrated    heparin (porcine) injection 1,800 Units  1,800 Units Intravenous PRN    heparin (porcine) injection 3,600 Units  3,600 Units Intravenous PRN    metoprolol tartrate (LOPRESSOR) tablet 25 mg  25 mg Oral Q12H [address]    nitroglycerin (NITROSTAT) SL tablet 0 4 mg  0 4 mg Sublingual Q5 Min PRN     No Known Allergies    Objective   Vitals: Blood pressure 125/68, pulse 71, temperature 98 1 °F (36 7 °C), temperature source Oral, resp  rate 18, height 5' 10" (1 778 m), weight 60 7 kg (133 lb 13 1 oz), SpO2 99 %  , Body mass index is 19 2 kg/m² ,   Orthostatic Blood Pressures      Most Recent Value   Blood Pressure  125/68 filed at 10/20/2019 1106   Patient Position - Orthostatic VS  Lying filed at 10/20/2019 2494          Systolic (28UNP), REDDY:900 , Min:118 , WZT:906     Diastolic (24YME), ILP:44, Min:63, Max:78        Intake/Output Summary (Last 24 hours) at 10/20/2019 1112  Last data filed at 10/20/2019 1105  Gross per 24 hour   Intake 500 ml   Output    Net 500 ml       Invasive Devices     Peripheral Intravenous Line            Peripheral IV 10/20/19 Left Forearm less than 1 day                    Physical Exam:  GEN: Alert and oriented x 3, in no acute distress  Well appearing and well nourished  HEENT: Sclera anicteric, conjunctivae pink, mucous membranes moist  Oropharynx clear  NECK: Supple, no carotid bruits, no significant JVD  Trachea midline, no thyromegaly  HEART: Regular rhythm, normal S1 and S2, no murmurs, clicks, gallops or rubs  PMI nondisplaced, no thrills  LUNGS: Clear to auscultation bilaterally; no wheezes, rales, or rhonchi  No increased work of breathing or signs of respiratory distress  ABDOMEN: Soft, nontender, nondistended, normoactive bowel sounds  EXTREMITIES: Skin warm and well perfused, no clubbing, cyanosis, or edema  NEURO: No focal findings  Normal speech  Mood and affect normal    SKIN: Normal without suspicious lesions on exposed skin        Lab Results:     Troponins:   Results from last 7 days   Lab Units 10/20/19  0936   TROPONIN I ng/mL 9 92*       CBC with diff:   Results from last 7 days   Lab Units 10/20/19  0936   WBC Thousand/uL 11 24*   HEMOGLOBIN g/dL 14 8   HEMATOCRIT % 45 0   MCV fL 88   PLATELETS Thousands/uL 199   MCH pg 29 1   MCHC g/dL 32 9   RDW % 13 1   MPV fL 11 1   NRBC AUTO /100 WBCs 0         CMP:   Results from last 7 days   Lab Units 10/20/19  0936   POTASSIUM mmol/L 3 7   CHLORIDE mmol/L 102   CO2 mmol/L 28   BUN mg/dL 22   CREATININE mg/dL 0 92   CALCIUM mg/dL 9 1   AST U/L 59*   ALT U/L 22   ALK PHOS U/L 78   EGFR ml/min/1 73sq m 82

## 2019-10-20 NOTE — H&P
H&P- Migdalia Fontaine  [birth year], 69 y o  male MRN: [id number]    Unit/Bed#: ED 15 Encounter: [id number]    Primary Care Provider: No primary care provider on file  Date and time admitted to hospital: 10/20/2019  9:11 AM        Chest pain  Assessment & Plan  Secondary to above  Patient does have some shortness of breath associated with it  This is likely secondary to the NSTEMI  Echocardiogram to be ordered today  * NSTEMI (non-ST elevated myocardial infarction) [facility])  Assessment & Plan  Patient has a troponin of 9 with some EKG changes  Continue on the heparin drip for now  The patient does not have any active chest pain at this time but we will continue to trend troponins  Patient will be NPO after midnight  Continue telemetry peer  Aspirin beta-blocker and statin  VTE Prophylaxis: Heparin Drip  / sequential compression device   Code Status:  DNR level 3  POLST: POLST is not applicable to this patient  Discussion with family:  Daughter at the bedside    Anticipated Length of Stay:  Patient will be admitted on an Inpatient basis with an anticipated length of stay of  greater than 2 midnights  Justification for Hospital Stay:  Patient is going to require greater than 2 midnights secondary to having a probable non ST elevation MI with a troponin of 9 being on heparin drip needing a cardiac catheterization and likely intervention  Total Time for Visit, including Counseling / Coordination of Care: 1 hour  Greater than 50% of this total time spent on direct patient counseling and coordination of care  Chief Complaint:   Chest pain    History of Present Illness:    Migdalia Fontaine  is a 69 y o  male who presents with chest pain  This is a 43-year-old male patient with no past medical history who came in with chest pain  The patient has not seen a family doctor in years    He states on Thursday he started having some chest pain which was going down both arms but more towards the left arm   It was a pressure-like sensation and the patient did have some shortness of breath associated with it  He had no palpitations or diaphoresis     Patient has not seen a family doctor near so he does not know if he has any hyperlipidemia or any other medical problems  The patient thought he may have pulled a muscle since he does heavy lifting at home  Patient was noted to have a troponin of 9  He says the pain was intermittent and comes and goes  Patient had intense pain this morning and brought him to the emergency department  Currently the patient is pain-free  Patient was seen by Cardiology in the emergency department as well  Review of Systems:    Review of Systems   Respiratory: Positive for chest tightness and shortness of breath  Cardiovascular: Positive for chest pain  All other systems reviewed and are negative  Past Medical and Surgical History:     Past Medical History:   Diagnosis Date    Poison ivy        History reviewed  No pertinent surgical history  Meds/Allergies:    Prior to Admission medications    Medication Sig Start Date End Date Taking? Authorizing Provider   hydrocortisone 1 % cream Apply to affected area 2 times daily 7/29/18 10/20/19  Velma Morales MD   ondansetron [facility] 4 mg tablet Take 1 tablet (4 mg total) by mouth every 6 (six) hours 7/29/18 10/20/19  Velma Morales MD     I have reviewed home medications with patient personally  Allergies: No Known Allergies    Social History:     Marital Status:     Occupation:  Manual labor  Patient Pre-hospital Living Situation:  Independent  Patient Pre-hospital Level of Mobility:  Independent  Patient Pre-hospital Diet Restrictions:  None  Substance Use History:   Social History     Substance and Sexual Activity   Alcohol Use No     Social History     Tobacco Use   Smoking Status Never Smoker   Smokeless Tobacco Never Used     Social History     Substance and Sexual Activity   Drug Use No       Family History:    Family History   Problem Relation Age of Onset    No Known Problems Mother     No Known Problems Father        Physical Exam:     Vitals:   Blood Pressure: 125/68 (10/20/19 1106)  Pulse: 71 (10/20/19 1106)  Temperature: 98 1 °F (36 7 °C) (10/20/19 0917)  Temp Source: Oral (10/20/19 0917)  Respirations: 18 (10/20/19 1100)  Height: 5' 10" (177 8 cm) (10/20/19 0917)  Weight - Scale: 60 7 kg (133 lb 13 1 oz) (10/20/19 0917)  SpO2: 99 % (10/20/19 1100)    Physical Exam    (   General Appearance:    Alert, cooperative, no distress, appears stated age   Head:    Normocephalic, without obvious abnormality, atraumatic   Eyes:    PERRL, conjunctiva/corneas clear, EOM's intact,             Nose:   Nares normal, septum midline, mucosa normal   Throat:   Lips, mucosa, and tongue normal; teeth and gums normal   Neck:   Supple, symmetrical, no adenopathy;        thyroid:  No enlargement/tenderness/nodules; no carotid    bruit or JVD   Back:     Symmetric, no curvature, ROM normal, no CVA tenderness   Lungs:     Clear to auscultation bilaterally, respirations unlabored       Heart:    Regular rate and rhythm, S1 and S2 normal, no murmur, rub    or gallop   Abdomen:     Soft, non-tender, bowel sounds active all four quadrants,     no masses, no organomegaly           Extremities:   Extremities normal, atraumatic, no cyanosis or edema   Pulses:   2+ and symmetric all extremities   Skin:   Skin color, texture, turgor normal, no rashes or lesions   Lymph nodes:   Cervical, supraclavicular, and axillary nodes normal   Neurologic:   CNII-XII intact  Normal strength, sensation and reflexes       throughout        Additional Data:     Lab Results: I have personally reviewed pertinent reports        Results from last 7 days   Lab Units 10/20/19  0936   WBC Thousand/uL 11 24*   HEMOGLOBIN g/dL 14 8   HEMATOCRIT % 45 0   PLATELETS Thousands/uL 199   NEUTROS PCT % 74   LYMPHS PCT % 11*   MONOS PCT % 13*   EOS PCT % 1     Results from last 7 days   Lab Units 10/20/19  0936   SODIUM mmol/L 137   POTASSIUM mmol/L 3 7   CHLORIDE mmol/L 102   CO2 mmol/L 28   BUN mg/dL 22   CREATININE mg/dL 0 92   ANION GAP mmol/L 7   CALCIUM mg/dL 9 1   ALBUMIN g/dL 3 4*   TOTAL BILIRUBIN mg/dL 0 60   ALK PHOS U/L 78   ALT U/L 22   AST U/L 59*   GLUCOSE RANDOM mg/dL 109     Results from last 7 days   Lab Units 10/20/19  0936   INR  1 08                   Imaging: I have personally reviewed pertinent reports  XR chest 2 views   ED Interpretation by Dinesh Walker DO (10/20 0957)   No acute abnormality in the chest  Possible COPD           EKG, Pathology, and Other Studies Reviewed on Admission:   · EKG:  Ischemic changes    Allscripts / Epic Records Reviewed: Yes     ** Please Note: This note has been constructed using a voice recognition system   **

## 2019-10-20 NOTE — ASSESSMENT & PLAN NOTE
Secondary to above  Patient does have some shortness of breath associated with it  This is likely secondary to the NSTEMI  Echocardiogram to be ordered today

## 2019-10-20 NOTE — PLAN OF CARE
Problem: PAIN - ADULT  Goal: Verbalizes/displays adequate comfort level or baseline comfort level  Description  Interventions:  - Encourage patient to monitor pain and request assistance  - Assess pain using appropriate pain scale  - Administer analgesics based on type and severity of pain and evaluate response  - Implement non-pharmacological measures as appropriate and evaluate response  - Consider cultural and social influences on pain and pain management  - Notify physician/advanced practitioner if interventions unsuccessful or patient reports new pain  Outcome: Progressing     Problem: INFECTION - ADULT  Goal: Absence or prevention of progression during hospitalization  Description  INTERVENTIONS:  - Assess and monitor for signs and symptoms of infection  - Monitor lab/diagnostic results  - Monitor all insertion sites, i e  indwelling lines, tubes, and drains  - Monitor endotracheal if appropriate and nasal secretions for changes in amount and color  - [place] appropriate cooling/warming therapies per order  - Administer medications as ordered  - Instruct and encourage patient and family to use good hand hygiene technique  - Identify and instruct in appropriate isolation precautions for identified infection/condition  Outcome: Progressing  Goal: Absence of fever/infection during neutropenic period  Description  INTERVENTIONS:  - Monitor WBC    Outcome: Progressing     Problem: SAFETY ADULT  Goal: Patient will remain free of falls  Description  INTERVENTIONS:  - Assess patient frequently for physical needs  -  Identify cognitive and physical deficits and behaviors that affect risk of falls    -  [place] fall precautions as indicated by assessment   - Educate patient/family on patient safety including physical limitations  - Instruct patient to call for assistance with activity based on assessment  - Modify environment to reduce risk of injury  - Consider OT/PT consult to assist with strengthening/mobility  Outcome: Progressing  Goal: Maintain or return to baseline ADL function  Description  INTERVENTIONS:  -  Assess patient's ability to carry out ADLs; assess patient's baseline for ADL function and identify physical deficits which impact ability to perform ADLs (bathing, care of mouth/teeth, toileting, grooming, dressing, etc )  - Assess/evaluate cause of self-care deficits   - Assess range of motion  - Assess patient's mobility; develop plan if impaired  - Assess patient's need for assistive devices and provide as appropriate  - Encourage maximum independence but intervene and supervise when necessary  - Involve family in performance of ADLs  - Assess for home care needs following discharge   - Consider OT consult to assist with ADL evaluation and planning for discharge  - Provide patient education as appropriate  Outcome: Progressing  Goal: Maintain or return mobility status to optimal level  Description  INTERVENTIONS:  - Assess patient's baseline mobility status (ambulation, transfers, stairs, etc )    - Identify cognitive and physical deficits and behaviors that affect mobility  - Identify mobility aids required to assist with transfers and/or ambulation (gait belt, sit-to-stand, lift, walker, cane, etc )  - [place] fall precautions as indicated by assessment  - Record patient progress and toleration of activity level on Mobility SBAR; progress patient to next Phase/Stage  - Instruct patient to call for assistance with activity based on assessment  - Consider rehabilitation consult to assist with strengthening/weightbearing, etc   Outcome: Progressing     Problem: DISCHARGE PLANNING  Goal: Discharge to home or other facility with appropriate resources  Description  INTERVENTIONS:  - Identify barriers to discharge w/patient and caregiver  - Arrange for needed discharge resources and transportation as appropriate  - Identify discharge learning needs (meds, wound care, etc )  - Arrange for interpretive services to assist at discharge as needed  - Refer to Case Management Department for coordinating discharge planning if the patient needs post-hospital services based on physician/advanced practitioner order or complex needs related to functional status, cognitive ability, or social support system  Outcome: Progressing     Problem: Knowledge Deficit  Goal: Patient/family/caregiver demonstrates understanding of disease process, treatment plan, medications, and discharge instructions  Description  Complete learning assessment and assess knowledge base    Interventions:  - Provide teaching at level of understanding  - Provide teaching via preferred learning methods  Outcome: Progressing

## 2019-10-21 ENCOUNTER — APPOINTMENT (INPATIENT)
Dept: INTERVENTIONAL RADIOLOGY/VASCULAR | Facility: HOSPITAL | Age: 74
DRG: 247 | End: 2019-10-21
Payer: MEDICARE

## 2019-10-21 LAB
ANION GAP SERPL CALCULATED.3IONS-SCNC: 10 MMOL/L (ref 4–13)
APTT PPP: 49 SECONDS (ref 23–37)
APTT PPP: 65 SECONDS (ref 23–37)
BASOPHILS # BLD AUTO: 0.04 THOUSANDS/ΜL (ref 0–0.1)
BASOPHILS NFR BLD AUTO: 1 % (ref 0–1)
BUN SERPL-MCNC: 18 MG/DL (ref 5–25)
CALCIUM SERPL-MCNC: 8.8 MG/DL (ref 8.3–10.1)
CHLORIDE SERPL-SCNC: 104 MMOL/L (ref 100–108)
CO2 SERPL-SCNC: 25 MMOL/L (ref 21–32)
CREAT SERPL-MCNC: 0.85 MG/DL (ref 0.6–1.3)
EOSINOPHIL # BLD AUTO: 0.07 THOUSAND/ΜL (ref 0–0.61)
EOSINOPHIL NFR BLD AUTO: 1 % (ref 0–6)
ERYTHROCYTE [DISTWIDTH] IN BLOOD BY AUTOMATED COUNT: 13.2 % (ref 11.6–15.1)
EST. AVERAGE GLUCOSE BLD GHB EST-MCNC: 117 MG/DL
GFR SERPL CREATININE-BSD FRML MDRD: 86 ML/MIN/1.73SQ M
GLUCOSE SERPL-MCNC: 99 MG/DL (ref 65–140)
HBA1C MFR BLD: 5.7 % (ref 4.2–6.3)
HCT VFR BLD AUTO: 41.9 % (ref 36.5–49.3)
HGB BLD-MCNC: 13.2 G/DL (ref 12–17)
IMM GRANULOCYTES # BLD AUTO: 0.04 THOUSAND/UL (ref 0–0.2)
IMM GRANULOCYTES NFR BLD AUTO: 1 % (ref 0–2)
KCT BLD-ACNC: 424 SEC (ref 89–137)
KCT BLD-ACNC: 437 SEC (ref 89–137)
LYMPHOCYTES # BLD AUTO: 1.46 THOUSANDS/ΜL (ref 0.6–4.47)
LYMPHOCYTES NFR BLD AUTO: 18 % (ref 14–44)
MCH RBC QN AUTO: 28.9 PG (ref 26.8–34.3)
MCHC RBC AUTO-ENTMCNC: 31.5 G/DL (ref 31.4–37.4)
MCV RBC AUTO: 92 FL (ref 82–98)
MONOCYTES # BLD AUTO: 0.94 THOUSAND/ΜL (ref 0.17–1.22)
MONOCYTES NFR BLD AUTO: 12 % (ref 4–12)
NEUTROPHILS # BLD AUTO: 5.5 THOUSANDS/ΜL (ref 1.85–7.62)
NEUTS SEG NFR BLD AUTO: 67 % (ref 43–75)
NRBC BLD AUTO-RTO: 0 /100 WBCS
PLATELET # BLD AUTO: 164 THOUSANDS/UL (ref 149–390)
PMV BLD AUTO: 10.9 FL (ref 8.9–12.7)
POTASSIUM SERPL-SCNC: 3.8 MMOL/L (ref 3.5–5.3)
RBC # BLD AUTO: 4.56 MILLION/UL (ref 3.88–5.62)
SODIUM SERPL-SCNC: 139 MMOL/L (ref 136–145)
SPECIMEN SOURCE: ABNORMAL
SPECIMEN SOURCE: ABNORMAL
WBC # BLD AUTO: 8.05 THOUSAND/UL (ref 4.31–10.16)

## 2019-10-21 PROCEDURE — C1725 CATH, TRANSLUMIN NON-LASER: HCPCS | Performed by: PHYSICIAN ASSISTANT

## 2019-10-21 PROCEDURE — C1874 STENT, COATED/COV W/DEL SYS: HCPCS

## 2019-10-21 PROCEDURE — C1894 INTRO/SHEATH, NON-LASER: HCPCS | Performed by: PHYSICIAN ASSISTANT

## 2019-10-21 PROCEDURE — 02713ZZ DILATION OF CORONARY ARTERY, TWO ARTERIES, PERCUTANEOUS APPROACH: ICD-10-PCS | Performed by: INTERNAL MEDICINE

## 2019-10-21 PROCEDURE — C1887 CATHETER, GUIDING: HCPCS | Performed by: PHYSICIAN ASSISTANT

## 2019-10-21 PROCEDURE — 85347 COAGULATION TIME ACTIVATED: CPT

## 2019-10-21 PROCEDURE — 85730 THROMBOPLASTIN TIME PARTIAL: CPT | Performed by: FAMILY MEDICINE

## 2019-10-21 PROCEDURE — 027034Z DILATION OF CORONARY ARTERY, ONE ARTERY WITH DRUG-ELUTING INTRALUMINAL DEVICE, PERCUTANEOUS APPROACH: ICD-10-PCS | Performed by: INTERNAL MEDICINE

## 2019-10-21 PROCEDURE — 83036 HEMOGLOBIN GLYCOSYLATED A1C: CPT | Performed by: FAMILY MEDICINE

## 2019-10-21 PROCEDURE — C9600 PERC DRUG-EL COR STENT SING: HCPCS | Performed by: PHYSICIAN ASSISTANT

## 2019-10-21 PROCEDURE — 92928 PRQ TCAT PLMT NTRAC ST 1 LES: CPT | Performed by: INTERNAL MEDICINE

## 2019-10-21 PROCEDURE — C9601 PERC DRUG-EL COR STENT BRAN: HCPCS | Performed by: PHYSICIAN ASSISTANT

## 2019-10-21 PROCEDURE — 85025 COMPLETE CBC W/AUTO DIFF WBC: CPT | Performed by: INTERNAL MEDICINE

## 2019-10-21 PROCEDURE — 99153 MOD SED SAME PHYS/QHP EA: CPT | Performed by: PHYSICIAN ASSISTANT

## 2019-10-21 PROCEDURE — 99232 SBSQ HOSP IP/OBS MODERATE 35: CPT | Performed by: FAMILY MEDICINE

## 2019-10-21 PROCEDURE — 93005 ELECTROCARDIOGRAM TRACING: CPT

## 2019-10-21 PROCEDURE — C1769 GUIDE WIRE: HCPCS | Performed by: PHYSICIAN ASSISTANT

## 2019-10-21 PROCEDURE — 93458 L HRT ARTERY/VENTRICLE ANGIO: CPT | Performed by: PHYSICIAN ASSISTANT

## 2019-10-21 PROCEDURE — 93458 L HRT ARTERY/VENTRICLE ANGIO: CPT | Performed by: INTERNAL MEDICINE

## 2019-10-21 PROCEDURE — 80048 BASIC METABOLIC PNL TOTAL CA: CPT | Performed by: FAMILY MEDICINE

## 2019-10-21 PROCEDURE — 99152 MOD SED SAME PHYS/QHP 5/>YRS: CPT | Performed by: PHYSICIAN ASSISTANT

## 2019-10-21 PROCEDURE — B2111ZZ FLUOROSCOPY OF MULTIPLE CORONARY ARTERIES USING LOW OSMOLAR CONTRAST: ICD-10-PCS | Performed by: INTERNAL MEDICINE

## 2019-10-21 PROCEDURE — 99232 SBSQ HOSP IP/OBS MODERATE 35: CPT | Performed by: INTERNAL MEDICINE

## 2019-10-21 PROCEDURE — 4A023N7 MEASUREMENT OF CARDIAC SAMPLING AND PRESSURE, LEFT HEART, PERCUTANEOUS APPROACH: ICD-10-PCS | Performed by: INTERNAL MEDICINE

## 2019-10-21 PROCEDURE — 99152 MOD SED SAME PHYS/QHP 5/>YRS: CPT | Performed by: INTERNAL MEDICINE

## 2019-10-21 RX ORDER — HEPARIN SODIUM 1000 [USP'U]/ML
INJECTION, SOLUTION INTRAVENOUS; SUBCUTANEOUS CODE/TRAUMA/SEDATION MEDICATION
Status: COMPLETED | OUTPATIENT
Start: 2019-10-21 | End: 2019-10-21

## 2019-10-21 RX ORDER — NITROGLYCERIN 20 MG/100ML
INJECTION INTRAVENOUS CODE/TRAUMA/SEDATION MEDICATION
Status: COMPLETED | OUTPATIENT
Start: 2019-10-21 | End: 2019-10-21

## 2019-10-21 RX ORDER — ASPIRIN 81 MG/1
81 TABLET, CHEWABLE ORAL DAILY
Status: DISCONTINUED | OUTPATIENT
Start: 2019-10-21 | End: 2019-10-21

## 2019-10-21 RX ORDER — LIDOCAINE WITH 8.4% SOD BICARB 0.9%(10ML)
SYRINGE (ML) INJECTION CODE/TRAUMA/SEDATION MEDICATION
Status: COMPLETED | OUTPATIENT
Start: 2019-10-21 | End: 2019-10-21

## 2019-10-21 RX ORDER — VERAPAMIL HCL 2.5 MG/ML
AMPUL (ML) INTRAVENOUS CODE/TRAUMA/SEDATION MEDICATION
Status: COMPLETED | OUTPATIENT
Start: 2019-10-21 | End: 2019-10-21

## 2019-10-21 RX ORDER — ASPIRIN 81 MG/1
81 TABLET ORAL DAILY
Status: DISCONTINUED | OUTPATIENT
Start: 2019-10-21 | End: 2019-10-24 | Stop reason: HOSPADM

## 2019-10-21 RX ORDER — MIDAZOLAM HYDROCHLORIDE 1 MG/ML
INJECTION INTRAMUSCULAR; INTRAVENOUS CODE/TRAUMA/SEDATION MEDICATION
Status: COMPLETED | OUTPATIENT
Start: 2019-10-21 | End: 2019-10-21

## 2019-10-21 RX ORDER — SODIUM CHLORIDE 9 MG/ML
100 INJECTION, SOLUTION INTRAVENOUS CONTINUOUS
Status: DISCONTINUED | OUTPATIENT
Start: 2019-10-21 | End: 2019-10-22

## 2019-10-21 RX ORDER — FENTANYL CITRATE 50 UG/ML
INJECTION, SOLUTION INTRAMUSCULAR; INTRAVENOUS CODE/TRAUMA/SEDATION MEDICATION
Status: COMPLETED | OUTPATIENT
Start: 2019-10-21 | End: 2019-10-21

## 2019-10-21 RX ORDER — EPTIFIBATIDE 20 MG/10ML
INJECTION INTRAVENOUS CODE/TRAUMA/SEDATION MEDICATION
Status: COMPLETED | OUTPATIENT
Start: 2019-10-21 | End: 2019-10-21

## 2019-10-21 RX ADMIN — FENTANYL CITRATE 25 MCG: 50 INJECTION, SOLUTION INTRAMUSCULAR; INTRAVENOUS at 14:01

## 2019-10-21 RX ADMIN — MIDAZOLAM HYDROCHLORIDE 0.5 MG: 1 INJECTION, SOLUTION INTRAMUSCULAR; INTRAVENOUS at 13:25

## 2019-10-21 RX ADMIN — MIDAZOLAM HYDROCHLORIDE 0.5 MG: 1 INJECTION, SOLUTION INTRAMUSCULAR; INTRAVENOUS at 12:48

## 2019-10-21 RX ADMIN — ASPIRIN 81 MG: 81 TABLET, COATED ORAL at 10:29

## 2019-10-21 RX ADMIN — EPTIFIBATIDE 5.6 ML: 2 INJECTION, SOLUTION INTRAVENOUS at 14:16

## 2019-10-21 RX ADMIN — NITROGLYCERIN 0.4 MG: 0.4 TABLET SUBLINGUAL at 10:34

## 2019-10-21 RX ADMIN — HEPARIN SODIUM 2000 UNITS: 1000 INJECTION INTRAVENOUS; SUBCUTANEOUS at 12:52

## 2019-10-21 RX ADMIN — ONDANSETRON 4 MG: 2 INJECTION INTRAMUSCULAR; INTRAVENOUS at 18:47

## 2019-10-21 RX ADMIN — IOHEXOL 375 ML: 350 INJECTION, SOLUTION INTRAVENOUS at 14:20

## 2019-10-21 RX ADMIN — ATORVASTATIN CALCIUM 80 MG: 40 TABLET, FILM COATED ORAL at 16:25

## 2019-10-21 RX ADMIN — ACETAMINOPHEN 650 MG: 325 TABLET, FILM COATED ORAL at 20:33

## 2019-10-21 RX ADMIN — NITROGLYCERIN 0.4 MG: 0.4 TABLET SUBLINGUAL at 10:29

## 2019-10-21 RX ADMIN — SODIUM CHLORIDE 100 ML/HR: 0.9 INJECTION, SOLUTION INTRAVENOUS at 14:51

## 2019-10-21 RX ADMIN — FENTANYL CITRATE 25 MCG: 50 INJECTION, SOLUTION INTRAMUSCULAR; INTRAVENOUS at 12:48

## 2019-10-21 RX ADMIN — MIDAZOLAM HYDROCHLORIDE 0.5 MG: 1 INJECTION, SOLUTION INTRAMUSCULAR; INTRAVENOUS at 14:01

## 2019-10-21 RX ADMIN — HEPARIN SODIUM 2000 UNITS: 1000 INJECTION INTRAVENOUS; SUBCUTANEOUS at 13:53

## 2019-10-21 RX ADMIN — HEPARIN SODIUM 3000 UNITS: 1000 INJECTION INTRAVENOUS; SUBCUTANEOUS at 13:25

## 2019-10-21 RX ADMIN — HEPARIN SODIUM 3000 UNITS: 1000 INJECTION INTRAVENOUS; SUBCUTANEOUS at 12:46

## 2019-10-21 RX ADMIN — NITROGLYCERIN 200 MCG: 20 INJECTION INTRAVENOUS at 12:39

## 2019-10-21 RX ADMIN — FENTANYL CITRATE 25 MCG: 50 INJECTION, SOLUTION INTRAMUSCULAR; INTRAVENOUS at 12:23

## 2019-10-21 RX ADMIN — Medication 2 ML: at 12:30

## 2019-10-21 RX ADMIN — HEPARIN SODIUM 2000 UNITS: 1000 INJECTION INTRAVENOUS; SUBCUTANEOUS at 12:38

## 2019-10-21 RX ADMIN — HEPARIN SODIUM 1800 UNITS: 1000 INJECTION, SOLUTION INTRAVENOUS; SUBCUTANEOUS at 00:43

## 2019-10-21 RX ADMIN — VERAPAMIL HYDROCHLORIDE 200 MCG: 2.5 INJECTION, SOLUTION INTRAVENOUS at 14:02

## 2019-10-21 RX ADMIN — MIDAZOLAM HYDROCHLORIDE 1 MG: 1 INJECTION, SOLUTION INTRAMUSCULAR; INTRAVENOUS at 12:23

## 2019-10-21 RX ADMIN — TICAGRELOR 180 MG: 90 TABLET ORAL at 14:18

## 2019-10-21 RX ADMIN — FENTANYL CITRATE 50 MCG: 50 INJECTION, SOLUTION INTRAMUSCULAR; INTRAVENOUS at 13:25

## 2019-10-21 RX ADMIN — NITROGLYCERIN 0.4 MG: 0.4 TABLET SUBLINGUAL at 10:39

## 2019-10-21 RX ADMIN — METOPROLOL TARTRATE 25 MG: 25 TABLET, FILM COATED ORAL at 20:33

## 2019-10-21 RX ADMIN — METOPROLOL TARTRATE 25 MG: 25 TABLET, FILM COATED ORAL at 08:31

## 2019-10-21 RX ADMIN — NITROGLYCERIN 400 MCG: 20 INJECTION INTRAVENOUS at 14:02

## 2019-10-21 RX ADMIN — VERAPAMIL HYDROCHLORIDE 200 MCG: 2.5 INJECTION, SOLUTION INTRAVENOUS at 14:01

## 2019-10-21 RX ADMIN — VERAPAMIL HYDROCHLORIDE 2.5 MG: 2.5 INJECTION, SOLUTION INTRAVENOUS at 12:39

## 2019-10-21 NOTE — PROGRESS NOTES
Progress Note - Gulshan Ellisocks , 69 y o  male MRN: [id number]    Unit/Bed#: -01 Encounter: [id number]    Primary Care Provider: No primary care provider on file  Date and time admitted to hospital: 10/20/2019  9:11 AM        Chest pain  Assessment & Plan  Secondary to above  Patient does have some shortness of breath associated with it  This is likely secondary to the NSTEMI  Patient for cardiac catheterization today  Continue heparin drip and medical management for now  * NSTEMI (non-ST elevated myocardial infarction) [facility])  Assessment & Plan  Patient has a troponin of 9 with some EKG changes  Continue on the heparin drip for now  Patient still has chest pain on off  Going for a cardiac catheterization this morning  The rest of plan will depend on the cardiac catheterization  VTE Pharmacologic Prophylaxis:   Pharmacologic: Heparin Drip  Mechanical VTE Prophylaxis in Place: Yes    Patient Centered Rounds: I have performed bedside rounds with nursing staff today  Discussions with Specialists or Other Care Team Provider:  Not anticipated    Education and Discussions with Family / Patient:  Patient    Time Spent for Care: 20 minutes  More than 50% of total time spent on counseling and coordination of care as described above  Current Length of Stay: 1 day(s)    Current Patient Status: Inpatient   Certification Statement: The patient will continue to require additional inpatient hospital stay due to Needing a cardiac catheterization    Discharge Plan:  Discharge will depend on cardiac catheterization but likely will not be today secondary to having ongoing chest pain in need of cardiac catheterization    Code Status: Level 3 - DNAR and DNI      Subjective:   Patient seen examined    Still having intermittent chest pain    Objective:     Vitals:   Temp (24hrs), Av 4 °F (36 9 °C), Min:97 9 °F (36 6 °C), Max:98 8 °F (37 1 °C)    Temp:  [97 9 °F (36 6 °C)-98 8 °F (37 1 °C)] 98 5 °F (36 9 °C)  HR:  [63-78] 72  Resp:  [16-19] 18  BP: ()/(57-69) 112/68  SpO2:  [97 %-100 %] 98 %  Body mass index is 19 2 kg/m²  Input and Output Summary (last 24 hours): Intake/Output Summary (Last 24 hours) at 10/21/2019 0918  Last data filed at 10/20/2019 1911  Gross per 24 hour   Intake 740 ml   Output    Net 740 ml       Physical Exam:     Physical Exam  (   General Appearance:    Alert, cooperative, no distress, appears stated age                               Lungs:     Clear to auscultation bilaterally, respirations unlabored       Heart:    Regular rate and rhythm, S1 and S2 normal, no murmur, rub    or gallop   Abdomen:     Soft, non-tender, bowel sounds active all four quadrants,     no masses, no organomegaly           Extremities:   Extremities normal, atraumatic, no cyanosis or edema       Additional Data:     Labs:    Results from last 7 days   Lab Units 10/20/19  0936   WBC Thousand/uL 11 24*   HEMOGLOBIN g/dL 14 8   HEMATOCRIT % 45 0   PLATELETS Thousands/uL 199   NEUTROS PCT % 74   LYMPHS PCT % 11*   MONOS PCT % 13*   EOS PCT % 1     Results from last 7 days   Lab Units 10/21/19  0638 10/20/19  0936   SODIUM mmol/L 139 137   POTASSIUM mmol/L 3 8 3 7   CHLORIDE mmol/L 104 102   CO2 mmol/L 25 28   BUN mg/dL 18 22   CREATININE mg/dL 0 85 0 92   ANION GAP mmol/L 10 7   CALCIUM mg/dL 8 8 9 1   ALBUMIN g/dL  --  3 4*   TOTAL BILIRUBIN mg/dL  --  0 60   ALK PHOS U/L  --  78   ALT U/L  --  22   AST U/L  --  59*   GLUCOSE RANDOM mg/dL 99 109     Results from last 7 days   Lab Units 10/20/19  0936   INR  1 08     Results from last 7 days   Lab Units 10/20/19  1600   POC GLUCOSE mg/dl 79                   * I Have Reviewed All Lab Data Listed Above  * Additional Pertinent Lab Tests Reviewed:  All [place] Admission Reviewed        Recent Cultures (last 7 days):           Last 24 Hours Medication List:     Current Facility-Administered Medications:  acetaminophen 650 mg Oral Q6H PRN Kristy Ro MD    atorvastatin 80 mg Oral Daily With Gomez Posey MD    heparin (porcine) 3-20 Units/kg/hr (Order-Specific) Intravenous Titrated Kristy Ro MD Last Rate: 16 Units/kg/hr (10/21/19 0043)   heparin (porcine) 1,800 Units Intravenous PRN Kristy Ro MD    heparin (porcine) 3,600 Units Intravenous PRN Kristy Ro MD    metoprolol tartrate 25 mg Oral Q12H [facility] Kristy Ro MD    nitroglycerin 0 4 mg Sublingual Q5 Min PRN Kristy Ro MD    ondansetron 4 mg Intravenous Q6H PRN Kristy Ro MD         Today, Patient Was Seen By: Kristy Ro MD    ** Please Note: Dictation voice to text software may have been used in the creation of this document   **

## 2019-10-21 NOTE — PROGRESS NOTES
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, with EF 40% found on TTE with inferior, apex, apical septum and lateral wall which are hypokinetic, trace MR, trace TR  Continue above medications  Plan to initiate low-dose ACEi tomorrow if able  Subjective:   Patient seen and examined  Having mild diffuse chest pain, currently NSR on telemetry  SL NTG given, ordered PRN morphine for additional pain relief  Scheduled for cardiac cath this AM      Objective:     Vitals: Blood pressure 112/68, pulse 72, temperature 98 5 °F (36 9 °C), resp  rate 18, height 5' 10" (1 778 m), weight 60 7 kg (133 lb 13 1 oz), SpO2 98 %  , Body mass index is 19 2 kg/m² ,   Orthostatic Blood Pressures      Most Recent Value   Blood Pressure  112/68 filed at 10/21/2019 7009   Patient Position - Orthostatic VS  Lying filed at 10/20/2019 1911            Intake/Output Summary (Last 24 hours) at 10/21/2019 1332  Last data filed at 10/20/2019 1911  Gross per 24 hour   Intake 240 ml   Output    Net 240 ml         Physical Exam:    GEN: Parvin Valdivia   appears well, alert and oriented x 3, pleasant and cooperative   HEENT: pupils equal, round, and reactive to light; extraocular muscles intact  NECK: supple, no carotid bruits   HEART: regular rhythm, normal S1 and S2, no murmurs, clicks, gallops or rubs   LUNGS: clear to auscultation bilaterally; no wheezes, rales, or rhonchi   ABDOMEN: normal bowel sounds, soft, no tenderness, no distention  EXTREMITIES: peripheral pulses normal; no clubbing, cyanosis, or edema      Medications:      Current Facility-Administered Medications:     acetaminophen (TYLENOL) tablet 650 mg, 650 mg, Oral, Q6H PRN, MD Bridger Rose aspirin (ECOTRIN LOW STRENGTH) EC tablet 81 mg, 81 mg, Oral, Daily, Milton Oliveira PA-C, 81 mg at 10/21/19 1029    atorvastatin (LIPITOR) tablet 80 mg, 80 mg, Oral, Daily With Contreras Haley MD, 80 mg at 10/20/19 1705    fentanyl citrate (PF) 100 MCG/2ML, , Intravenous, Code/Trauma/Sedation Med, Kelin Cosby MD, 50 mcg at 10/21/19 1325    heparin (porcine) 25,000 units in 250 mL infusion (premix), 3-20 Units/kg/hr (Order-Specific), Intravenous, Titrated, Cammie Cantu MD, Last Rate: 9 6 mL/hr at 10/21/19 0043, 16 Units/kg/hr at 10/21/19 0043    heparin (porcine) injection 1,800 Units, 1,800 Units, Intravenous, PRN, Cammie Cantu MD, 1,800 Units at 10/21/19 0043    heparin (porcine) injection 3,600 Units, 3,600 Units, Intravenous, PRN, Cammie Cantu MD    heparin (porcine) injection, , Intra-arterial, Code/Trauma/Sedation Med, Kelin Cosby MD, 2,000 Units at 10/21/19 1238    heparin (porcine) injection, , , Code/Trauma/Sedation Med, Kelin Cosby MD, 3,000 Units at 10/21/19 1325    lidocaine 1% buffered, , , Code/Trauma/Sedation Med, Kelin Cosby MD, 2 mL at 10/21/19 1230    metoprolol tartrate (LOPRESSOR) tablet 25 mg, 25 mg, Oral, Q12H [address], Cammie Cantu MD, 25 mg at 10/21/19 0831    midazolam (VERSED) injection, , , Code/Trauma/Sedation Med, Kelin Cosby MD, 0 5 mg at 10/21/19 1325    morphine injection 2 mg, 2 mg, Intravenous, Q4H PRN, Milton Oliveira PA-C    nitroglycerin (NITROSTAT) SL tablet 0 4 mg, 0 4 mg, Sublingual, Q5 Min PRN, Cammie Cantu MD, 0 4 mg at 10/21/19 1039    nitroGLYcerin (TRIDIL) 50 mg in 250 mL infusion (premix), , Intra-arterial, Code/Trauma/Sedation Med, Kelin Cosby MD, 200 mcg at 10/21/19 1239    ondansetron (ZOFRAN) injection 4 mg, 4 mg, Intravenous, Q6H PRN, Cammie Cantu MD    verapamil (ISOPTIN) injection, , Intra-arterial, Code/Trauma/Sedation Med, Kelin Cosby MD, 2 5 mg at 10/21/19 1239     Labs & Results:    Results from last 7 days   Lab Units 10/20/19  1951 10/20/19  1627 10/20/19  1401   TROPONIN I ng/mL 9 08* 8 37* 8 79*     Results from last 7 days   Lab Units 10/20/19  0936   WBC Thousand/uL 11 24*   HEMOGLOBIN g/dL 14 8   HEMATOCRIT % 45 0   PLATELETS Thousands/uL 199     Results from last 7 days   Lab Units 10/20/19  0936   TRIGLYCERIDES mg/dL 60   HDL mg/dL 54     Results from last 7 days   Lab Units 10/21/19  0638 10/20/19  0936   POTASSIUM mmol/L 3 8 3 7   CHLORIDE mmol/L 104 102   CO2 mmol/L 25 28   BUN mg/dL 18 22   CREATININE mg/dL 0 85 0 92   CALCIUM mg/dL 8 8 9 1   ALK PHOS U/L  --  78   ALT U/L  --  22   AST U/L  --  59*     Results from last 7 days   Lab Units 10/21/19  0638 10/21/19  0004 10/20/19  1627 10/20/19  0936   INR   --   --   --  1 08   PTT seconds 65* 49* 45* 26     Results from last 7 days   Lab Units 10/20/19  0936   MAGNESIUM mg/dL 2 3

## 2019-10-21 NOTE — SOCIAL WORK
CM met with pt and daughter at bedside  Pt lives in [facility] with his daughter and grandchildren  Pt lives in mobile home that has 2 [address] without railings  Pt denies DME and completes ADL's independently  Pt denies hx of rehab and [facility]  Pt denies hx of mh and sa  Pt reports hcp is daughterZenia  Pt works as  and drives  CM reviewed discharge planning process including the following: identifying help at home, patient preference for discharge planning needs, pharmacy preference, and availability of treatment team to discuss questions or concerns patient and/or family may have regarding understanding medications and recognizing signs and symptoms once discharged  CM also encouraged patient to follow up with all recommended appointments after discharge  Patient advised of importance for patient and family to participate in managing patients medical well being  CM discussed dcp  Pt requested CM Dept cost check medications at [facility] prior to dc  CM provided warm hand off to Yelitza-ADILENE  Pt has no other needs at this time  CM dept to follow pt through dc

## 2019-10-21 NOTE — ASSESSMENT & PLAN NOTE
Patient has a troponin of 9 with some EKG changes  Continue on the heparin drip for now  Patient still has chest pain on off  Going for a cardiac catheterization this morning  The rest of plan will depend on the cardiac catheterization

## 2019-10-21 NOTE — ASSESSMENT & PLAN NOTE
Secondary to above  Patient does have some shortness of breath associated with it  This is likely secondary to the NSTEMI  Patient for cardiac catheterization today  Continue heparin drip and medical management for now

## 2019-10-22 ENCOUNTER — APPOINTMENT (INPATIENT)
Dept: NON INVASIVE DIAGNOSTICS | Facility: HOSPITAL | Age: 74
DRG: 247 | End: 2019-10-22
Payer: MEDICARE

## 2019-10-22 ENCOUNTER — APPOINTMENT (INPATIENT)
Dept: RADIOLOGY | Facility: HOSPITAL | Age: 74
DRG: 247 | End: 2019-10-22
Payer: MEDICARE

## 2019-10-22 LAB
ATRIAL RATE: 72 BPM
ATRIAL RATE: 75 BPM
P AXIS: 78 DEGREES
P AXIS: 81 DEGREES
PR INTERVAL: 162 MS
PR INTERVAL: 162 MS
QRS AXIS: 92 DEGREES
QRS AXIS: 93 DEGREES
QRSD INTERVAL: 78 MS
QRSD INTERVAL: 86 MS
QT INTERVAL: 374 MS
QT INTERVAL: 390 MS
QTC INTERVAL: 417 MS
QTC INTERVAL: 427 MS
T WAVE AXIS: 84 DEGREES
T WAVE AXIS: 87 DEGREES
TROPONIN I SERPL-MCNC: 5.69 NG/ML
TROPONIN I SERPL-MCNC: 5.72 NG/ML
TROPONIN I SERPL-MCNC: 6.24 NG/ML
VENTRICULAR RATE: 72 BPM
VENTRICULAR RATE: 75 BPM

## 2019-10-22 PROCEDURE — 99233 SBSQ HOSP IP/OBS HIGH 50: CPT | Performed by: NURSE PRACTITIONER

## 2019-10-22 PROCEDURE — 93010 ELECTROCARDIOGRAM REPORT: CPT | Performed by: INTERNAL MEDICINE

## 2019-10-22 PROCEDURE — 84484 ASSAY OF TROPONIN QUANT: CPT | Performed by: PHYSICIAN ASSISTANT

## 2019-10-22 PROCEDURE — 99232 SBSQ HOSP IP/OBS MODERATE 35: CPT | Performed by: INTERNAL MEDICINE

## 2019-10-22 PROCEDURE — 93308 TTE F-UP OR LMTD: CPT | Performed by: INTERNAL MEDICINE

## 2019-10-22 PROCEDURE — 84484 ASSAY OF TROPONIN QUANT: CPT | Performed by: NURSE PRACTITIONER

## 2019-10-22 PROCEDURE — 71045 X-RAY EXAM CHEST 1 VIEW: CPT

## 2019-10-22 PROCEDURE — 93005 ELECTROCARDIOGRAM TRACING: CPT

## 2019-10-22 PROCEDURE — 93308 TTE F-UP OR LMTD: CPT

## 2019-10-22 PROCEDURE — 93325 DOPPLER ECHO COLOR FLOW MAPG: CPT | Performed by: INTERNAL MEDICINE

## 2019-10-22 PROCEDURE — 93321 DOPPLER ECHO F-UP/LMTD STD: CPT | Performed by: INTERNAL MEDICINE

## 2019-10-22 RX ORDER — COLCHICINE 0.6 MG/1
0.6 TABLET ORAL 2 TIMES DAILY
Status: DISCONTINUED | OUTPATIENT
Start: 2019-10-22 | End: 2019-10-24 | Stop reason: HOSPADM

## 2019-10-22 RX ORDER — CLOPIDOGREL BISULFATE 75 MG/1
75 TABLET ORAL DAILY
Status: DISCONTINUED | OUTPATIENT
Start: 2019-10-23 | End: 2019-10-24 | Stop reason: HOSPADM

## 2019-10-22 RX ADMIN — METOPROLOL TARTRATE 25 MG: 25 TABLET, FILM COATED ORAL at 20:53

## 2019-10-22 RX ADMIN — ASPIRIN 81 MG: 81 TABLET, COATED ORAL at 08:24

## 2019-10-22 RX ADMIN — TICAGRELOR 90 MG: 90 TABLET ORAL at 08:28

## 2019-10-22 RX ADMIN — ATORVASTATIN CALCIUM 80 MG: 40 TABLET, FILM COATED ORAL at 15:51

## 2019-10-22 RX ADMIN — MORPHINE SULFATE 2 MG: 2 INJECTION, SOLUTION INTRAMUSCULAR; INTRAVENOUS at 15:48

## 2019-10-22 RX ADMIN — COLCHICINE 0.6 MG: 0.6 TABLET, FILM COATED ORAL at 20:53

## 2019-10-22 RX ADMIN — COLCHICINE 0.6 MG: 0.6 TABLET, FILM COATED ORAL at 13:07

## 2019-10-22 RX ADMIN — SODIUM CHLORIDE 100 ML/HR: 0.9 INJECTION, SOLUTION INTRAVENOUS at 11:13

## 2019-10-22 RX ADMIN — ACETAMINOPHEN 650 MG: 325 TABLET, FILM COATED ORAL at 05:47

## 2019-10-22 RX ADMIN — MORPHINE SULFATE 2 MG: 2 INJECTION, SOLUTION INTRAMUSCULAR; INTRAVENOUS at 00:15

## 2019-10-22 NOTE — PROGRESS NOTES
Cardiology Progress Note - Migdalia Fontaine  69 y o  male MRN: [id number]    Unit/Bed#: -01 Encounter: [id number]      Assessment/Plan:  1- Small to moderate pericardial effusion/focal pericarditis  Patient was started on colchicine today and Les Blue was discontinued for Plavix starting tomorrow  We will continue to monitor effusion with limited TTE tomorrow morning  Continue IVF per [facility]  2- CAD s/p LUDIVINA x2 to CFx  Switched Brillinta with Plavix  Continue ASA, statin and BB  Official cath report pending  3- Ischemic cardiomyopathy, EF 40%  Continue ASA, plavix, BB, statin  ACEi/ARB when BP allows  Subjective:   Patient continued to have chest pain overnight, serial EKGs taken overnight did show gradual ST elevation to inferior leads without reciprocal changes  STAT troponin was ordered and found to be consistent with downtrending prior to PCI  Limited STAT TTE was ordered and showed small to moderate circumferential pericardial effusion without hemodynamic compromise  Objective:     Vitals: Blood pressure 124/78, pulse 86, temperature 98 5 °F (36 9 °C), resp  rate 16, height 5' 10" (1 778 m), weight 60 7 kg (133 lb 13 1 oz), SpO2 99 %  , Body mass index is 19 2 kg/m² ,   Orthostatic Blood Pressures      Most Recent Value   Blood Pressure  124/78 filed at 10/22/2019 1242   Patient Position - Orthostatic VS  Lying filed at 10/22/2019 0725            Intake/Output Summary (Last 24 hours) at 10/22/2019 1440  Last data filed at 10/21/2019 2001  Gross per 24 hour   Intake 100 ml   Output    Net 100 ml         Physical Exam:    GEN: Migdalia Fontaine  appears well, alert and oriented x 3, pleasant and cooperative   HEENT: pupils equal, round, and reactive to light; extraocular muscles intact  NECK: +JVD   supple, no carotid bruits   HEART: regular rhythm, normal S1 and S2, no murmurs, clicks, gallops or rubs   LUNGS: clear to auscultation bilaterally; no wheezes, rales, or rhonchi ABDOMEN: normal bowel sounds, soft, no tenderness, no distention  EXTREMITIES: peripheral pulses normal; no clubbing, cyanosis, or edema      Medications:      Current Facility-Administered Medications:     acetaminophen (TYLENOL) tablet 650 mg, 650 mg, Oral, Q6H PRN, Renae Pierre MD, 650 mg at 10/22/19 0547    aspirin (ECOTRIN LOW STRENGTH) EC tablet 81 mg, 81 mg, Oral, Daily, Milton Oliveira PA-C, 81 mg at 10/22/19 4191    atorvastatin (LIPITOR) tablet 80 mg, 80 mg, Oral, Daily With Dwayne Padilla MD, 80 mg at 10/21/19 1625    [START ON 10/23/2019] clopidogrel (PLAVIX) tablet 75 mg, 75 mg, Oral, Daily, Milton Oliveira PA-C    colchicine (COLCRYS) tablet 0 6 mg, 0 6 mg, Oral, BID, An Millard MD, 0 6 mg at 10/22/19 1307    metoprolol tartrate (LOPRESSOR) tablet 25 mg, 25 mg, Oral, Q12H [facility], Renae Pierre MD, 25 mg at 10/21/19 2033    morphine injection 2 mg, 2 mg, Intravenous, Q4H PRN, Kayla Shannon PA-C, 2 mg at 10/22/19 0015    nitroglycerin (NITROSTAT) SL tablet 0 4 mg, 0 4 mg, Sublingual, Q5 Min PRN, Renae Pierre MD, 0 4 mg at 10/21/19 1039    ondansetron (ZOFRAN) injection 4 mg, 4 mg, Intravenous, Q6H PRN, Renae Pierre MD, 4 mg at 10/21/19 1847    sodium chloride 0 9 % infusion, 100 mL/hr, Intravenous, Continuous, Gladys Jimenez MD, Last Rate: 100 mL/hr at 10/22/19 1113, 100 mL/hr at 10/22/19 1113     Labs & Results:    Results from last 7 days   Lab Units 10/22/19  1407 10/22/19  0952 10/20/19  1951   TROPONIN I ng/mL 5 72* 5 69* 9 08*     Results from last 7 days   Lab Units 10/21/19  1505 10/20/19  0936   WBC Thousand/uL 8 05 11 24*   HEMOGLOBIN g/dL 13 2 14 8   HEMATOCRIT % 41 9 45 0   PLATELETS Thousands/uL 164 199     Results from last 7 days   Lab Units 10/20/19  0936   TRIGLYCERIDES mg/dL 60   HDL mg/dL 54     Results from last 7 days   Lab Units 10/21/19  0638 10/20/19  0936   POTASSIUM mmol/L 3 8 3 7   CHLORIDE mmol/L 104 102   CO2 mmol/L 25 28   BUN mg/dL 18 22   CREATININE mg/dL 0 85 0 92   CALCIUM mg/dL 8 8 9 1   ALK PHOS U/L  --  78   ALT U/L  --  22   AST U/L  --  59*     Results from last 7 days   Lab Units 10/21/19  0638 10/21/19  0004 10/20/19  1627 10/20/19  0936   INR   --   --   --  1 08   PTT seconds 65* 49* 45* 26     Results from last 7 days   Lab Units 10/20/19  0936   MAGNESIUM mg/dL 2 3

## 2019-10-22 NOTE — ASSESSMENT & PLAN NOTE
· Secondary to above  Patient does have some shortness of breath associated with it      · This is likely secondary to the NSTEMI type 1

## 2019-10-22 NOTE — PROGRESS NOTES
Pt c/o mid chest pain 7/10 which increased with inspiration  Pt states that this a little different than when he arrived to the hospital  EKG done, /65, HR 79, O2 98%  Morphine given for the pain  SLIM notified  Orders to repeat EKG at 0200am, and do hourly vitals sign x2  Will continue to monitor     Augustine Conklin RN  12:55 AM   10/22/19

## 2019-10-22 NOTE — PROGRESS NOTES
Progress Note - Moustapha Ramirez [birth year], 69 y o  male MRN: [id number]    Unit/Bed#: -01 Encounter: [id number]    Primary Care Provider: No primary care provider on file  Date and time admitted to hospital: 10/20/2019  9:11 AM        * NSTEMI (non-ST elevated myocardial infarction) [facility])  Assessment & Plan  · Patient has a troponin of 9 with some EKG changes  · Patient went for cardiac catheterization that was extensive  · Patient had some telemetry changes today nursing notify Cardiology there was concern for ongoing T-wave changes  · Stat limited echo was obtained that showed evidence of consolidated pericarditis and a moderate pericardial infusion without evidence of tamponade  · Recommendations per Cardiology continue to trend troponins which have been clinically for the most part trending down  · Patient would show any systolic drop in blood pressure to 70 a stat echocardiogram and notification to cardiology should be done  · Patient reporting with a deep significant breath that he is having left chest wall pain will obtain a chest x-ray    Chest pain  Assessment & Plan  · Secondary to above  Patient does have some shortness of breath associated with it  · This is likely secondary to the NSTEMI type 1      VTE Pharmacologic Prophylaxis:   Pharmacologic: Currently off anticoagulation status post cardiac catheterization  Mechanical VTE Prophylaxis in Place: Yes    Patient Centered Rounds: I have performed bedside rounds with nursing staff today  Discussions with Specialists or Other Care Team Provider:  Cardiology    Education and Discussions with Family / Patient:  Patient, daughter, and granddaughter at bedside    Time Spent for Care: 30 minutes  More than 50% of total time spent on counseling and coordination of care as described above      Current Length of Stay: 2 day(s)    Current Patient Status: Inpatient   Certification Statement: The patient will continue to require additional inpatient hospital stay due to Ongoing treatment in setting of NSTEMI type 1 now with pericarditis and moderate pericardial effusion    Discharge Plan:  Not medically cleared    Code Status: Level 3 - DNAR and DNI      Subjective:   Patient reports his only complaint is having some left chest wall pain with a deep breath  Patient denies any chest pain at rest was not reproduced with palpitation of the left chest wall  Patient denies headache dizziness abdominal pain and has no other generalized complaints at this time  Objective:     Vitals:   Temp (24hrs), Av 3 °F (36 8 °C), Min:97 9 °F (36 6 °C), Max:98 8 °F (37 1 °C)    Temp:  [97 9 °F (36 6 °C)-98 8 °F (37 1 °C)] 98 8 °F (37 1 °C)  HR:  [66-91] 91  Resp:  [16-19] 18  BP: ()/(57-82) 127/82  SpO2:  [93 %-100 %] 99 %  Body mass index is 19 2 kg/m²  Input and Output Summary (last 24 hours): Intake/Output Summary (Last 24 hours) at 10/22/2019 1524  Last data filed at 10/21/2019 2001  Gross per 24 hour   Intake 100 ml   Output    Net 100 ml       Physical Exam:     Physical Exam   Constitutional: He is oriented to person, place, and time  HENT:   Head: Normocephalic and atraumatic  Eyes: Conjunctivae and EOM are normal    Neck: Normal range of motion  Cardiovascular: Normal rate, regular rhythm and normal heart sounds  Pulmonary/Chest: Effort normal and breath sounds normal    Abdominal: Soft  Bowel sounds are normal    Musculoskeletal: Normal range of motion  Neurological: He is alert and oriented to person, place, and time  Skin: Skin is warm and dry  Psychiatric: He has a normal mood and affect   His behavior is normal          Additional Data:     Labs:    Results from last 7 days   Lab Units 10/21/19  1505   WBC Thousand/uL 8 05   HEMOGLOBIN g/dL 13 2   HEMATOCRIT % 41 9   PLATELETS Thousands/uL 164   NEUTROS PCT % 67   LYMPHS PCT % 18   MONOS PCT % 12   EOS PCT % 1     Results from last 7 days   Lab Units 10/21/19  3091 10/20/19  0936   SODIUM mmol/L 139 137   POTASSIUM mmol/L 3 8 3 7   CHLORIDE mmol/L 104 102   CO2 mmol/L 25 28   BUN mg/dL 18 22   CREATININE mg/dL 0 85 0 92   ANION GAP mmol/L 10 7   CALCIUM mg/dL 8 8 9 1   ALBUMIN g/dL  --  3 4*   TOTAL BILIRUBIN mg/dL  --  0 60   ALK PHOS U/L  --  78   ALT U/L  --  22   AST U/L  --  59*   GLUCOSE RANDOM mg/dL 99 109     Results from last 7 days   Lab Units 10/20/19  0936   INR  1 08     Results from last 7 days   Lab Units 10/20/19  1600   POC GLUCOSE mg/dl 79     Results from last 7 days   Lab Units 10/21/19  0638   HEMOGLOBIN A1C % 5 7               * I Have Reviewed All Lab Data Listed Above  * Additional [facility]ertinent Lab Tests Reviewed: [facility]        Recent Cultures (last 7 days):           Last 24 Hours Medication List:     Current Facility-Administered Medications:  acetaminophen 650 mg Oral Q6H PRN Chris Harris MD    aspirin 81 mg Oral Daily Milton Oliveira PA-C    atorvastatin 80 mg Oral Daily With Mohini Bar MD    [START ON 10/23/2019] clopidogrel 75 mg Oral Daily Milton Oliveira PA-C    colchicine 0 6 mg Oral BID Bev Sepulveda MD    metoprolol tartrate 25 mg Oral Q12H [address] Chris Harris MD    morphine injection 2 mg Intravenous Q4H PRN Milton Oliveira PA-C    nitroglycerin 0 4 mg Sublingual Q5 Min PRN Chris Harris MD    ondansetron 4 mg Intravenous Q6H PRN Chris Harris MD    sodium chloride 100 mL/hr Intravenous Continuous Erlin Rendon MD Last Rate: 100 mL/hr (10/22/19 1113)        Today, Patient Was Seen By: HUSSAIN Vasquez    ** Please Note: Dictation voice to text software may have been used in the creation of this document   **

## 2019-10-22 NOTE — ASSESSMENT & PLAN NOTE
· Patient has a troponin of 9 with some EKG changes  · Patient went for cardiac catheterization that was extensive  · Patient had some telemetry changes today nursing notify Cardiology there was concern for ongoing T-wave changes  · Stat limited echo was obtained that showed evidence of consolidated pericarditis and a moderate pericardial infusion without evidence of tamponade  · Recommendations per Cardiology continue to trend troponins which have been clinically for the most part trending down  · Patient would show any systolic drop in blood pressure to 70 a stat echocardiogram and notification to cardiology should be done    · Patient reporting with a deep significant breath that he is having left chest wall pain will obtain a chest x-ray

## 2019-10-23 ENCOUNTER — APPOINTMENT (INPATIENT)
Dept: NON INVASIVE DIAGNOSTICS | Facility: HOSPITAL | Age: 74
DRG: 247 | End: 2019-10-23
Payer: MEDICARE

## 2019-10-23 PROBLEM — R05.9 COUGH: Status: ACTIVE | Noted: 2019-10-23

## 2019-10-23 PROCEDURE — 99232 SBSQ HOSP IP/OBS MODERATE 35: CPT | Performed by: NURSE PRACTITIONER

## 2019-10-23 PROCEDURE — 93308 TTE F-UP OR LMTD: CPT | Performed by: INTERNAL MEDICINE

## 2019-10-23 PROCEDURE — 99232 SBSQ HOSP IP/OBS MODERATE 35: CPT | Performed by: INTERNAL MEDICINE

## 2019-10-23 PROCEDURE — 93005 ELECTROCARDIOGRAM TRACING: CPT

## 2019-10-23 PROCEDURE — 93308 TTE F-UP OR LMTD: CPT

## 2019-10-23 PROCEDURE — 84484 ASSAY OF TROPONIN QUANT: CPT | Performed by: INTERNAL MEDICINE

## 2019-10-23 PROCEDURE — 93321 DOPPLER ECHO F-UP/LMTD STD: CPT | Performed by: INTERNAL MEDICINE

## 2019-10-23 PROCEDURE — 93325 DOPPLER ECHO COLOR FLOW MAPG: CPT | Performed by: INTERNAL MEDICINE

## 2019-10-23 RX ORDER — BENZONATATE 100 MG/1
100 CAPSULE ORAL 3 TIMES DAILY PRN
Status: DISCONTINUED | OUTPATIENT
Start: 2019-10-23 | End: 2019-10-24 | Stop reason: HOSPADM

## 2019-10-23 RX ADMIN — MORPHINE SULFATE 2 MG: 2 INJECTION, SOLUTION INTRAMUSCULAR; INTRAVENOUS at 23:31

## 2019-10-23 RX ADMIN — CLOPIDOGREL BISULFATE 75 MG: 75 TABLET ORAL at 09:38

## 2019-10-23 RX ADMIN — NITROGLYCERIN 0.4 MG: 0.4 TABLET SUBLINGUAL at 23:20

## 2019-10-23 RX ADMIN — ATORVASTATIN CALCIUM 80 MG: 40 TABLET, FILM COATED ORAL at 17:46

## 2019-10-23 RX ADMIN — METOPROLOL TARTRATE 25 MG: 25 TABLET, FILM COATED ORAL at 21:10

## 2019-10-23 RX ADMIN — BENZONATATE 100 MG: 100 CAPSULE ORAL at 17:47

## 2019-10-23 RX ADMIN — COLCHICINE 0.6 MG: 0.6 TABLET, FILM COATED ORAL at 21:10

## 2019-10-23 RX ADMIN — METOPROLOL TARTRATE 25 MG: 25 TABLET, FILM COATED ORAL at 09:38

## 2019-10-23 RX ADMIN — COLCHICINE 0.6 MG: 0.6 TABLET, FILM COATED ORAL at 09:37

## 2019-10-23 RX ADMIN — ASPIRIN 81 MG: 81 TABLET, COATED ORAL at 09:37

## 2019-10-23 NOTE — PROGRESS NOTES
Progress Note - Denisse Palmer [birth year], 69 y o  male MRN: [id number]    Unit/Bed#: -01 Encounter: [id number]    Primary Care Provider: No primary care provider on file  Date and time admitted to hospital: 10/20/2019  9:11 AM        * NSTEMI (non-ST elevated myocardial infarction) [facility])  Assessment & Plan  · Patient went for cardiac catheterization that was extensive  · Patient had some telemetry changes yesterday  Nursing notified Cardiology that there was concern for ongoing T-wave changes  · Stat limited echo yesterday was obtained that showed evidence of consolidated pericarditis and a moderate pericardial infusion without evidence of tamponade  · Repeat echo today pending  · Continue telemetry monitoring  · Troponins trended yesterday trended up to 6 42 with repeat trended down to 6 24  · Chest x-ray with no acute consolidation or finding  · Appreciate ongoing recommendations from Cardiology for repeat echo in the morning, patient with new complaint of cough  Will initiate Tessalon Perles if no relief can add Robitussin    Chest pain  Assessment & Plan  · Secondary to above  Patient does have some shortness of breath associated with it  · This is likely secondary to the NSTEMI type 1 and pericardial effusion  VTE Pharmacologic Prophylaxis:   Pharmacologic: Plavix 75 milligrams  Mechanical VTE Prophylaxis in Place: Yes    Patient Centered Rounds: I have performed bedside rounds with nursing staff today  Discussions with Specialists or Other Care Team Provider:  Cardiology    Education and Discussions with Family / Patient:  Chest x-ray result and plans for repeat echo    Time Spent for Care: 30 minutes  More than 50% of total time spent on counseling and coordination of care as described above      Current Length of Stay: 3 day(s)    Current Patient Status: Inpatient   Certification Statement: The patient will continue to require additional inpatient hospital stay due to Pericardial effusions    Discharge Plan:  Not medically cleared    Code Status: Level 3 - DNAR and DNI      Subjective:   Patient reports continued left chest wall pain with deep breath  Patient denies chest pain at rest and is not reproducible with palpation to left chest wall  Patient complains of new onset of cough  Patient denies headache dizziness abdominal pain and has no other generalized complaints of the start  Objective:       Vitals:   Temp (24hrs), Av °F (37 2 °C), Min:98 °F (36 7 °C), Max:100 6 °F (38 1 °C)    Temp:  [98 °F (36 7 °C)-100 6 °F (38 1 °C)] 99 8 °F (37 7 °C)  HR:  [71-95] 87  Resp:  [16-18] 18  BP: ()/(57-82) 124/76  SpO2:  [94 %-100 %] 97 %  Body mass index is 19 2 kg/m²  Input and Output Summary (last 24 hours): Intake/Output Summary (Last 24 hours) at 10/23/2019 1100  Last data filed at 10/23/2019 0936  Gross per 24 hour   Intake 360 ml   Output 300 ml   Net 60 ml       Physical Exam:     Physical Exam   Constitutional: He is oriented to person, place, and time  He appears well-developed and well-nourished  No distress  HENT:   Head: Normocephalic and atraumatic  Eyes: Pupils are equal, round, and reactive to light  Neck: Normal range of motion  Cardiovascular: Normal rate, regular rhythm, normal heart sounds and intact distal pulses  Pulmonary/Chest: Effort normal and breath sounds normal    Nonproductive cough present   Abdominal: Soft  Bowel sounds are normal    Musculoskeletal: Normal range of motion  Neurological: He is oriented to person, place, and time  Skin: Skin is warm and dry  Psychiatric: He has a normal mood and affect   His behavior is normal  Thought content normal      Additional Data:       Labs:    Results from last 7 days   Lab Units 10/21/19  1505   WBC Thousand/uL 8 05   HEMOGLOBIN g/dL 13 2   HEMATOCRIT % 41 9   PLATELETS Thousands/uL 164   NEUTROS PCT % 67   LYMPHS PCT % 18   MONOS PCT % 12   EOS PCT % 1     Results from last 7 days   Lab Units 10/21/19  0638 10/20/19  0936   SODIUM mmol/L 139 137   POTASSIUM mmol/L 3 8 3 7   CHLORIDE mmol/L 104 102   CO2 mmol/L 25 28   BUN mg/dL 18 22   CREATININE mg/dL 0 85 0 92   ANION GAP mmol/L 10 7   CALCIUM mg/dL 8 8 9 1   ALBUMIN g/dL  --  3 4*   TOTAL BILIRUBIN mg/dL  --  0 60   ALK PHOS U/L  --  78   ALT U/L  --  22   AST U/L  --  59*   GLUCOSE RANDOM mg/dL 99 109     Results from last 7 days   Lab Units 10/20/19  0936   INR  1 08     Results from last 7 days   Lab Units 10/20/19  1600   POC GLUCOSE mg/dl 79     Results from last 7 days   Lab Units 10/21/19  0638   HEMOGLOBIN A1C % 5 7               * I Have Reviewed All Lab Data Listed Above  * Additional Pertinent Lab Tests Reviewed: Chris Harris Admission Reviewed    Imaging:    Imaging Reports Reviewed Today Include:  None  Imaging Personally Reviewed by Myself Includes:  None    Recent Cultures (last 7 days):           Last 24 Hours Medication List:     Current Facility-Administered Medications:  acetaminophen 650 mg Oral Q6H PRN Ivy Chaparro MD   aspirin 81 mg Oral Daily Milton Oliveira PA-C   atorvastatin 80 mg Oral Daily With Anaid Elkins MD   benzonatate 100 mg Oral TID PRN HUSSAIN Escobar   clopidogrel 75 mg Oral Daily Milton Oliveira PA-C   colchicine 0 6 mg Oral BID Isidoro Villarreal MD   metoprolol tartrate 25 mg Oral Q12H [address] Amilcar Dutta MD   morphine injection 2 mg Intravenous Q4H PRN Leepiedad Philippe PA-C   nitroglycerin 0 4 mg Sublingual Q5 Min PRN Ivy Chaparro MD   ondansetron 4 mg Intravenous Q6H PRN Ivy Chaparro MD        Today, Patient Was Seen By: HUSSAIN Escobar    ** Please Note: Dictation voice to text software may have been used in the creation of this document   **

## 2019-10-23 NOTE — ASSESSMENT & PLAN NOTE
· Secondary to above  Patient does have some shortness of breath associated with it  · This is likely secondary to the NSTEMI type 1 and pericardial effusion

## 2019-10-23 NOTE — ASSESSMENT & PLAN NOTE
· Patient went for cardiac catheterization that was extensive  · Patient had some telemetry changes yesterday  Nursing notified Cardiology that there was concern for ongoing T-wave changes  · Stat limited echo yesterday was obtained that showed evidence of consolidated pericarditis and a moderate pericardial infusion without evidence of tamponade  · Repeat echo today pending  · Continue telemetry monitoring  · Troponins trended yesterday trended up to 6 42 with repeat trended down to 6 24  · Chest x-ray with no acute consolidation or finding  · Appreciate ongoing recommendations from Cardiology for repeat echo in the morning, patient with new complaint of cough    Will initiate Tessalon Perles if no relief can add Robitussin

## 2019-10-23 NOTE — PROGRESS NOTES
General Cardiology   Progress Note   Osmani Pimentel  69 y o  male MRN: [id number]  Unit/Bed#: -01 Encounter: [id number]      Assessment/Plan:    1  Small to moderate pericardial effusion with pericarditis  -continue colchicine and Plavix  -repeat limited TTE today and tomorrow  -IV fluid discontinued    2  CAD S/P LUDIVINA x1 to distal LAD and angioplasty to mid LAD  -continue Brilinta, aspirin, statin and beta-blocker  -cardiac diet    3  Ischemic cardiomyopathy with an EF 40%  -continue aspirin, beta-blocker, statin  -will add Ace/ARB likely tomorrow    Subjective:   Patient seen and examined  No significant events since the last encounter  REVIEW OF SYSTEMS:  Constitutional:  Denies fever or chills   Eyes:  Denies change in visual acuity   HENT:  Denies nasal congestion or sore throat   Respiratory:  Denies cough or shortness of breath   Cardiovascular:  Denies chest pain or edema   GI:  Denies abdominal pain, nausea, vomiting, bloody stools, constipation or diarrhea   :  Denies dysuria, frequency, difficulty in urination or nocturia  Musculoskeletal:  Denies back pain or joint pain   Neurologic:  Denies headache, focal weakness or sensory changes   Endocrine:  Denies polyuria or polydipsia   Lymphatic:  Denies swollen glands   Psychiatric:  Denies depression or anxiety     Objective:   Vitals:  Vitals:    10/23/19 0936   BP: 124/76   Pulse: 87   Resp:    Temp:    SpO2: 97%       Body mass index is 19 2 kg/m²    Systolic (26QOA), JMA:640 , Min:89 , JMR:949     Diastolic (28MAV), ORX:73, Min:57, Max:82      Intake/Output Summary (Last 24 hours) at 10/23/2019 0939  Last data filed at 10/23/2019 0554  Gross per 24 hour   Intake    Output 300 ml   Net -300 ml     Weight (last 2 days)     None          Telemetry Review: No significant arrhythmias seen on telemetry review,  Sinus rhythm in the 80s      PHYSICAL EXAMS  General:  Patient is not in acute distress, laying in the bed comfortably, awake, alert responding to commands  Head: Normocephalic, Atraumatic     HEENT: White sclera, pink conjunctiva  Neck:  Supple, no neck vein distention  Respiratory: clear to P/A  Cardiovascular: S1-S2 normal, no murmurs, thrills, gallops, rubs, regular rhythm  GI:  Abdomen soft, nontender, non-distended  Extremities: No edema, normal pulses  Integument:  No skin rashes or ulceration  Neurologic:  Patient is awake alert, responding to command, oriented to person, place and time    Nd time   LABORATORY RESULTS:  Results from last 7 days   Lab Units 10/22/19  1743 10/22/19  1407 10/22/19  0952   TROPONIN I ng/mL 6 24* 5 72* 5 69*     CBC with diff:   Results from last 7 days   Lab Units 10/21/19  1505 10/20/19  0936   WBC Thousand/uL 8 05 11 24*   HEMOGLOBIN g/dL 13 2 14 8   HEMATOCRIT % 41 9 45 0   MCV fL 92 88   PLATELETS Thousands/uL 164 199   MCH pg 28 9 29 1   MCHC g/dL 31 5 32 9   RDW % 13 2 13 1   MPV fL 10 9 11 1   NRBC AUTO /100 WBCs 0 0       CMP:  Results from last 7 days   Lab Units 10/21/19  0638 10/20/19  0936   POTASSIUM mmol/L 3 8 3 7   CHLORIDE mmol/L 104 102   CO2 mmol/L 25 28   BUN mg/dL 18 22   CREATININE mg/dL 0 85 0 92   CALCIUM mg/dL 8 8 9 1   AST U/L  --  59*   ALT U/L  --  22   ALK PHOS U/L  --  78   EGFR ml/min/1 73sq m 86 82       BMP:  Results from last 7 days   Lab Units 10/21/19  0638 10/20/19  0936   POTASSIUM mmol/L 3 8 3 7   CHLORIDE mmol/L 104 102   CO2 mmol/L 25 28   BUN mg/dL 18 22   CREATININE mg/dL 0 85 0 92   CALCIUM mg/dL 8 8 9 1         Results from last 7 days   Lab Units 10/20/19  0936   NT-PRO BNP pg/mL 2,395*      Results from last 7 days   Lab Units 10/20/19  0936   MAGNESIUM mg/dL 2 3     Results from last 7 days   Lab Units 10/21/19  0638   HEMOGLOBIN A1C % 5 7         Results from last 7 days   Lab Units 10/20/19  0936   INR  1 08       Lipid Profile:   No results found for: CHOL  Lab Results   Component Value Date    HDL 54 10/20/2019     Lab Results   Component Value Date    LDLCALC 96 10/20/2019     Lab Results   Component Value Date    TRIG 60 10/20/2019       Cardiac testing:   Results for orders placed during the hospital encounter of 10/20/19   Echo complete with contrast if indicated    Narrative [facility] [place], [place] [address]  [phone]    Transthoracic Echocardiogram  2D, M-mode, Doppler, and Color Doppler    Study date:  20-Oct-2019    Patient: Genesis Lacey  MR number: [id number]  Account number: [de-identified]  : 1945  Age: 69 years  Gender: Male  Status: Inpatient  Location: Bedside  Height: 70 in  Weight: 133 lb  BP: 119/ 69 mmHg    Indications: Evaluate suspected myocardial infarction  Diagnoses: I21 4 - Non-ST elevation (NSTEMI) myocardial infarction    Sonographer:  Hannah Jc RDCS  Referring Physician:  Mara Kayser, PA-C  Group:  [facility]  Interpreting Physician:  Maximino Hernandez MD    SUMMARY    LEFT VENTRICLE:  Ejection fraction was estimated to be 40 %  Inferior wall,apex, apical septum and lateral wall are hypokinetic  MITRAL VALVE:  There was trace regurgitation  TRICUSPID VALVE:  There was trace regurgitation  HISTORY: Symptoms: chest pain  PRIOR HISTORY: Shortness of breath, NSTEMI,    PROCEDURE: The procedure was performed at the bedside  This was a routine study  The transthoracic approach was used  The study included complete 2D imaging, M-mode, complete spectral Doppler, and color Doppler  The heart rate was 58 bpm,  at the start of the study  Images were obtained from the parasternal, apical, subcostal, and suprasternal notch acoustic windows  Echocardiographic views were limited due to low windows and lung interference  Image quality was adequate  LEFT VENTRICLE: Size was normal  Ejection fraction was estimated to be 40 %  Inferior wall,apex, apical septum and lateral wall are hypokinetic      RIGHT VENTRICLE: The size was normal  Systolic function was normal  Wall thickness was normal     LEFT ATRIUM: Size was normal     RIGHT ATRIUM: Size was normal     MITRAL VALVE: There was annular calcification  DOPPLER: There was trace regurgitation  AORTIC VALVE: The valve was not well visualized  DOPPLER: There was no evidence for stenosis  TRICUSPID VALVE: The valve structure was normal  There was normal leaflet separation  DOPPLER: The transtricuspid velocity was within the normal range  There was no evidence for stenosis  There was trace regurgitation  PULMONIC VALVE: Leaflets exhibited normal thickness, no calcification, and normal cuspal separation  DOPPLER: The transpulmonic velocity was within the normal range  There was no regurgitation  PERICARDIUM: There was no pericardial effusion  The pericardium was normal in appearance  AORTA: The root exhibited upper limit of normal size  SYSTEM MEASUREMENT TABLES    2D  %FS: 20 8 %  Ao Diam: 3 8 cm  EDV(Teich): 85 5 ml  EF(Teich): 42 6 %  ESV(Teich): 49 1 ml  IVSd: 0 8 cm  LA Area: 13 4 cm2  LA Diam: 3 5 cm  LVEDV MOD A4C: 104 ml  LVEF MOD A4C: 45 6 %  LVESV MOD A4C: 56 6 ml  LVIDd: 4 4 cm  LVIDs: 3 4 cm  LVLd A4C: 8 7 cm  LVLs A4C: 7 cm  LVPWd: 0 8 cm  RA Area: 12 cm2  RVIDd: 3 1 cm  SV MOD A4C: 47 5 ml  SV(Teich): 36 5 ml    MM  TAPSE: 1 6 cm    PW  E': 0 1 m/s  E/E': 6 6  MV A Antolin: 0 6 m/s  MV Dec [place]: 2 4 m/s2  MV DecT: 230 4 ms  MV E Antolin: 0 6 m/s  MV E/A Ratio: 0 9  MV PHT: 66 8 ms  MVA By PHT: 3 3 cm2    [employer] Accredited Echocardiography Laboratory    Prepared and electronically signed by    Wade Holliday MD  Signed 20-Oct-2019 19:40:30         Meds/Allergies   all current active meds have been reviewed  No medications prior to admission  Counseling / Coordination of Care  Total floor / unit time spent today 20 minutes  Greater than 50% of total time was spent with the patient and / or family counseling and / or coordination of care        ** Please Note: Dragon 360 Dictation voice to text software may have been used in the creation of this document   **

## 2019-10-24 ENCOUNTER — APPOINTMENT (INPATIENT)
Dept: NON INVASIVE DIAGNOSTICS | Facility: HOSPITAL | Age: 74
DRG: 247 | End: 2019-10-24
Payer: MEDICARE

## 2019-10-24 VITALS
HEART RATE: 78 BPM | DIASTOLIC BLOOD PRESSURE: 74 MMHG | OXYGEN SATURATION: 99 % | SYSTOLIC BLOOD PRESSURE: 138 MMHG | RESPIRATION RATE: 18 BRPM | TEMPERATURE: 97.7 F | BODY MASS INDEX: 19.16 KG/M2 | HEIGHT: 70 IN | WEIGHT: 133.82 LBS

## 2019-10-24 PROBLEM — I31.9 PERICARDITIS: Status: ACTIVE | Noted: 2019-10-24

## 2019-10-24 LAB
ATRIAL RATE: 79 BPM
ATRIAL RATE: 83 BPM
P AXIS: 65 DEGREES
P AXIS: 69 DEGREES
PR INTERVAL: 152 MS
PR INTERVAL: 160 MS
QRS AXIS: 87 DEGREES
QRS AXIS: 93 DEGREES
QRSD INTERVAL: 84 MS
QRSD INTERVAL: 88 MS
QT INTERVAL: 358 MS
QT INTERVAL: 360 MS
QTC INTERVAL: 412 MS
QTC INTERVAL: 420 MS
T WAVE AXIS: 81 DEGREES
T WAVE AXIS: 86 DEGREES
TROPONIN I SERPL-MCNC: 3.18 NG/ML
TROPONIN I SERPL-MCNC: 3.77 NG/ML
VENTRICULAR RATE: 79 BPM
VENTRICULAR RATE: 83 BPM

## 2019-10-24 PROCEDURE — 93308 TTE F-UP OR LMTD: CPT | Performed by: INTERNAL MEDICINE

## 2019-10-24 PROCEDURE — 99239 HOSP IP/OBS DSCHRG MGMT >30: CPT | Performed by: NURSE PRACTITIONER

## 2019-10-24 PROCEDURE — 84484 ASSAY OF TROPONIN QUANT: CPT | Performed by: INTERNAL MEDICINE

## 2019-10-24 PROCEDURE — 99232 SBSQ HOSP IP/OBS MODERATE 35: CPT | Performed by: INTERNAL MEDICINE

## 2019-10-24 PROCEDURE — 93010 ELECTROCARDIOGRAM REPORT: CPT | Performed by: INTERNAL MEDICINE

## 2019-10-24 PROCEDURE — 93308 TTE F-UP OR LMTD: CPT

## 2019-10-24 PROCEDURE — 93321 DOPPLER ECHO F-UP/LMTD STD: CPT | Performed by: INTERNAL MEDICINE

## 2019-10-24 PROCEDURE — 93325 DOPPLER ECHO COLOR FLOW MAPG: CPT | Performed by: INTERNAL MEDICINE

## 2019-10-24 RX ORDER — CLOPIDOGREL BISULFATE 75 MG/1
300 TABLET ORAL ONCE
Status: COMPLETED | OUTPATIENT
Start: 2019-10-24 | End: 2019-10-24

## 2019-10-24 RX ORDER — CLOPIDOGREL BISULFATE 75 MG/1
75 TABLET ORAL DAILY
Qty: 30 TABLET | Refills: 0 | Status: ON HOLD | OUTPATIENT
Start: 2019-10-25 | End: 2019-11-19 | Stop reason: SDUPTHER

## 2019-10-24 RX ORDER — NITROGLYCERIN 0.4 MG/1
0.4 TABLET SUBLINGUAL
Qty: 30 TABLET | Refills: 0 | Status: ON HOLD | OUTPATIENT
Start: 2019-10-24 | End: 2019-11-19 | Stop reason: SDUPTHER

## 2019-10-24 RX ORDER — LOPERAMIDE HYDROCHLORIDE 2 MG/1
2 CAPSULE ORAL EVERY 4 HOURS PRN
Status: DISCONTINUED | OUTPATIENT
Start: 2019-10-24 | End: 2019-10-24 | Stop reason: HOSPADM

## 2019-10-24 RX ORDER — ATORVASTATIN CALCIUM 80 MG/1
80 TABLET, FILM COATED ORAL
Qty: 30 TABLET | Refills: 0 | Status: ON HOLD | OUTPATIENT
Start: 2019-10-24 | End: 2019-11-19 | Stop reason: SDUPTHER

## 2019-10-24 RX ORDER — CLOPIDOGREL BISULFATE 75 MG/1
75 TABLET ORAL DAILY
Qty: 30 TABLET | Refills: 0 | Status: SHIPPED | OUTPATIENT
Start: 2019-10-25 | End: 2019-10-24

## 2019-10-24 RX ORDER — COLCHICINE 0.6 MG/1
0.6 TABLET ORAL 2 TIMES DAILY
Qty: 90 TABLET | Refills: 0 | Status: SHIPPED | OUTPATIENT
Start: 2019-10-24 | End: 2019-10-24

## 2019-10-24 RX ORDER — BENZONATATE 100 MG/1
100 CAPSULE ORAL 3 TIMES DAILY PRN
Qty: 20 CAPSULE | Refills: 0 | Status: ON HOLD | OUTPATIENT
Start: 2019-10-24 | End: 2019-11-19 | Stop reason: SDUPTHER

## 2019-10-24 RX ORDER — NITROGLYCERIN 0.4 MG/1
0.4 TABLET SUBLINGUAL
Qty: 30 TABLET | Refills: 0 | Status: SHIPPED | OUTPATIENT
Start: 2019-10-24 | End: 2019-10-24

## 2019-10-24 RX ORDER — ATORVASTATIN CALCIUM 80 MG/1
80 TABLET, FILM COATED ORAL
Qty: 30 TABLET | Refills: 0 | Status: SHIPPED | OUTPATIENT
Start: 2019-10-24 | End: 2019-10-24

## 2019-10-24 RX ORDER — ASPIRIN 81 MG/1
81 TABLET ORAL DAILY
Refills: 0
Start: 2019-10-25 | End: 2019-10-24

## 2019-10-24 RX ORDER — COLCHICINE 0.6 MG/1
0.6 TABLET ORAL 2 TIMES DAILY
Qty: 90 TABLET | Refills: 0 | Status: SHIPPED | OUTPATIENT
Start: 2019-10-24 | End: 2019-12-06

## 2019-10-24 RX ORDER — ASPIRIN 81 MG/1
81 TABLET ORAL DAILY
Refills: 0 | Status: ON HOLD
Start: 2019-10-25 | End: 2019-11-19 | Stop reason: SDUPTHER

## 2019-10-24 RX ORDER — BENZONATATE 100 MG/1
100 CAPSULE ORAL 3 TIMES DAILY PRN
Qty: 20 CAPSULE | Refills: 0 | Status: SHIPPED | OUTPATIENT
Start: 2019-10-24 | End: 2019-10-24

## 2019-10-24 RX ADMIN — CLOPIDOGREL BISULFATE 75 MG: 75 TABLET ORAL at 09:23

## 2019-10-24 RX ADMIN — ASPIRIN 81 MG: 81 TABLET, COATED ORAL at 09:23

## 2019-10-24 RX ADMIN — CLOPIDOGREL BISULFATE 300 MG: 75 TABLET ORAL at 16:49

## 2019-10-24 RX ADMIN — BENZONATATE 100 MG: 100 CAPSULE ORAL at 09:23

## 2019-10-24 RX ADMIN — METOPROLOL TARTRATE 25 MG: 25 TABLET, FILM COATED ORAL at 09:24

## 2019-10-24 RX ADMIN — COLCHICINE 0.6 MG: 0.6 TABLET, FILM COATED ORAL at 09:23

## 2019-10-24 RX ADMIN — ATORVASTATIN CALCIUM 80 MG: 40 TABLET, FILM COATED ORAL at 16:49

## 2019-10-24 RX ADMIN — LOPERAMIDE HYDROCHLORIDE 2 MG: 2 CAPSULE ORAL at 14:29

## 2019-10-24 NOTE — DISCHARGE SUMMARY
Discharge- Partha Trejo [birth year], 69 y o  male MRN: [id number]    Unit/Bed#: -01 Encounter: [id number]    Primary Care Provider: No primary care provider on file  Date and time admitted to hospital: 10/20/2019  9:11 AM        * NSTEMI (non-ST elevated myocardial infarction) [facility])  Assessment & Plan  · Patient went for cardiac catheterization that was extensive disease receive stent x2  · Patient  will require ASA, statin, beta-blocker, Plavix, and colchicine  · P r n  Nitro chest pain  · Patient status post catheterization with the pericarditis and pericardial effusion daily echos have been completed showing improvement  · Patient cleared from cardiology perspective for discharge      Pericarditis  Assessment & Plan  · Status post cardiac catheterization with mild to moderate pericardial effusion  · Patient showing clinical improvement via echocardiogram  · Patient require 6 weeks of treatment with colchicine 0 6 mg b i d  Chest pain  Assessment & Plan  · Secondary to above  · Patient showing improvement of his symptoms only a mild discomfort of 1/10  · Discussion with Cardiology they felt patient was stable from their perspective given his improvement and minimal symptoms  · This is likely secondary to the NSTEMI type 1    Cough  Assessment & Plan  · Presented a little over 24 hours ago  · Improved with the addition of Tessalon Perles  · Chest x-ray negative         Discharging Physician / Practitioner: HUSSAIN Blood  PCP: No primary care provider on file    Admission Date:   Admission Orders (From admission, onward)     Ordered        10/20/19 1029  Inpatient Admission  Once                   Discharge Date: 10/24/19    Resolved Problems  Date Reviewed: 10/24/2019    None          Consultations During Hospital Stay:  · Cardiology    Procedures Performed:   · Cardiac catheterization    Significant Findings / Test Results:   · Small to moderate pericardial effusion with pericarditis, angioplasty to mid LAD with stent to distal LAD, and ischemic cardiomyopathy with an EF of 40%    Incidental Findings:   · None    Test Results Pending at Discharge (will require follow up): · None     Outpatient Tests Requested:  · None    Complications:  Pericardial effusion    Reason for Admission:  Chest pain, NSTEMI, and post catheterization pericardial effusion  Hospital Course:     Wade Rausch  is a 69 y o  male patient who originally presented to the hospital on 10/20/2019 due to chest pain showed evidence of NSTEMI had a peak troponin of 9 that trended down to 3 by the end of the admission  Patient had a complicated cardiac catheterization with significant disease found throughout his vascular patient is Mandaen approximately 1 hours status post catheterization patient started having chest pain a stat echocardiogram given no changes in his EKG and was still showing ST abnormalities was obtained showing the patient had evidence of pericarditis and mild to moderate pericardial effusion  Patient's admission was complicated by the pericarditis and pericardial effusion needing serial echocardiogram monitoring daily for 3 days on the 3rd day he was showing improvement of his effusion resolution of his chest pain  Patient was overall medically cleared by Cardiology for discharge home patient with need to complete a 6 week course of colchicine 0 6 mg b i d  If he was started on further metoprolol, aspirin daily, atorvastatin 80 mg and Plavix  Patient's discharge was complicated by the fact that he has no prescription drug coverage pharmacy worked diligently to get him at the lowest cost and patient agreeable and had a total cause of 122 dilators for his prescriptions which patient was agreeable upon discharge  Patient understands the need for follow-up of cardiology and was overall cleared for discharge home  Please see above list of diagnoses and related plan for additional information  Condition at Discharge: stable     Discharge Day Visit / Exam:     Subjective:  Patient reporting some minimal chest discomfort and 1 attendant reports no worsening of the pain with walking and overall generally states he feels well to go home  Daughter at bedside understands  Vitals: Blood Pressure: 138/74 (10/24/19 1500)  Pulse: 78 (10/24/19 1500)  Temperature: 97 7 °F (36 5 °C) (10/24/19 1500)  Temp Source: Oral (10/24/19 1500)  Respirations: 18 (10/24/19 1500)  Height: 5' 10" (177 8 cm) (10/20/19 0917)  Weight - Scale: 60 7 kg (133 lb 13 1 oz) (10/20/19 0917)  SpO2: 99 % (10/24/19 1500)  Exam:   Physical Exam   Constitutional: He is oriented to person, place, and time  He appears well-developed and well-nourished  HENT:   Head: Normocephalic and atraumatic  Eyes: EOM are normal    Neck: Normal range of motion  Cardiovascular: Normal rate, regular rhythm, normal heart sounds and intact distal pulses  Pulmonary/Chest: Effort normal and breath sounds normal    Abdominal: Soft  Bowel sounds are normal    Musculoskeletal: Normal range of motion  Neurological: He is alert and oriented to person, place, and time  Skin: Skin is warm and dry  Capillary refill takes less than 2 seconds  Psychiatric: He has a normal mood and affect  His behavior is normal  Judgment and thought content normal      Discussion with Family:  Regarding follow-up care, appointments, and obtaining prescriptions    Discharge instructions/Information to patient and family:   See after visit summary for information provided to patient and family  Provisions for Follow-Up Care:  See after visit summary for information related to follow-up care and any pertinent home health orders  Disposition:     Home      Planned Readmission:  None     Discharge Statement:  I spent 30 minutes discharging the patient  This time was spent on the day of discharge  I had direct contact with the patient on the day of discharge   Greater than 50% of the total time was spent examining patient, answering all patient questions, arranging and discussing plan of care with patient as well as directly providing post-discharge instructions  Additional time then spent on discharge activities  Discharge Medications:  See after visit summary for reconciled discharge medications provided to patient and family        ** Please Note: This note has been constructed using a voice recognition

## 2019-10-24 NOTE — ASSESSMENT & PLAN NOTE
Progress Notes by Denys Mares MD at 06/04/18 11:09 AM     Author:  Denys Mares MD Service:  (none) Author Type:  Physician     Filed:  06/04/18 01:31 PM Encounter Date:  6/4/2018 Status:  Signed     :  Denys Mares MD (Physician)            HERIBERTO Merchant Sr. is a 73year FAMILY HISTORY:   Father passed away at the age of 80 Patient went for cardiac catheterization that was extensive disease receive stent x2  · Patient  will require ASA, statin, beta-blocker, Plavix, and colchicine  · P r n   Nitro chest pain  · Patient status post catheterization with the pericarditis and pericardial effusion daily echos have been completed showing improvement  · Patient cleared from cardiology perspective for discharge 

## 2019-10-24 NOTE — NURSING NOTE
Pt is  c/o mid sternal chest pain that  is radiating to both upper arms and rates his pain as a 3/10  Nitro given at 2320,Ekg obtained and Dr Dimitry Hooker notified  Dr  ordered a one time troponin and morphine q4hr for chest pain  Will cont to monitor

## 2019-10-24 NOTE — ASSESSMENT & PLAN NOTE
· Secondary to above      · Patient showing improvement of his symptoms only a mild discomfort of 1/10  · Discussion with Cardiology they felt patient was stable from their perspective given his improvement and minimal symptoms  · This is likely secondary to the NSTEMI type 1

## 2019-10-24 NOTE — SOCIAL WORK
CM price checking medications as pt [place] snot have Medicare D prescription coverage  CM faxed scripts to [facility]  All scripts but colchicine were affordable for pt to pay out of pocket at $67  Colchicine costs $225 out of pocket  Pt unable to afford  CM applied for discount card through medication manufacturers website  Faxed to [facility]  Per pharmacist pt not eligible for same after running card  CM printed Good Rx discount card for [facility] as same was lowest price at $122 for one month supply  Faxed script and discount card to [address] [facility] to check  Per pharmacist discount coupon works  Pt aware and is able to pay same  Aware to  all other medications at [facility]  Fiona RUELAS aware and will discharge  Pt will follow up with cardiology going forward  Family to transport  No other needs

## 2019-10-24 NOTE — PROGRESS NOTES
Cardiology Progress Note - Alexus Brown  69 y o  male MRN: [id number]    Unit/Bed#: -01 Encounter: [id number]      Assessment:  Small to moderate pericardial effusion  Coronary artery disease status post PCI  Ischemic cardiomyopathy  Plan:  I reviewed the echocardiogram today  Further reduction in pericardial effusion  Mild chest discomfort pleuritic  In nature continues  Continue dual anti-platelet treatment patient is ambulating well  Discussed with the family patient had any chest pain shortness of breath worsening of the symptom patient will be brought back to the  Hospital continue medical management  As pericardial effusion is stable am going to give extra dose of Plavix today    Subjective:   Patient seen and examined  No significant events overnight   negative,    Objective:     Vitals: Blood pressure 122/62, pulse 86, temperature 98 4 °F (36 9 °C), resp  rate 18, height 5' 10" (1 778 m), weight 60 7 kg (133 lb 13 1 oz), SpO2 99 %  , Body mass index is 19 2 kg/m² ,   Orthostatic Blood Pressures      Most Recent Value   Blood Pressure  122/62 filed at 10/24/2019 0915   Patient Position - Orthostatic VS  Lying filed at 10/23/2019 1900            Intake/Output Summary (Last 24 hours) at 10/24/2019 1425  Last data filed at 10/24/2019 0915  Gross per 24 hour   Intake 520 ml   Output    Net 520 ml         Physical Exam:    GEN: Alexus Brown   appears well, alert and oriented x 3, pleasant and cooperative   HEENT: pupils equal, round, and reactive to light; extraocular muscles intact  NECK: supple, no carotid bruits   HEART: regular rhythm, normal S1 and S2, no murmurs, clicks, gallops or rubs   LUNGS: clear to auscultation bilaterally; no wheezes, rales, or rhonchi   ABDOMEN: normal bowel sounds, soft, no tenderness, no distention  EXTREMITIES: peripheral pulses normal; no clubbing, cyanosis, or edema    Musculoskeletal examination full range of motion  In all the 4 extremity no deformity  Skin is warm  And dry Pleasant gentleman awake alert oriented x3 no focal motor sensory deficit  Medications:      Current Facility-Administered Medications:     acetaminophen (TYLENOL) tablet 650 mg, 650 mg, Oral, Q6H PRN, Renae Pierre MD, 650 mg at 10/22/19 0547    aspirin (ECOTRIN LOW STRENGTH) EC tablet 81 mg, 81 mg, Oral, Daily, Milton Oliveira PA-C, 81 mg at 10/24/19 1103    atorvastatin (LIPITOR) tablet 80 mg, 80 mg, Oral, Daily With Dwayne Padilla MD, 80 mg at 10/23/19 1746    benzonatate (TESSALON PERLES) capsule 100 mg, 100 mg, Oral, TID PRN, HUSSAIN Boyle, 100 mg at 10/24/19 0923    clopidogrel (PLAVIX) tablet 75 mg, 75 mg, Oral, Daily, Milton Oliveira PA-C, 75 mg at 10/24/19 6418    colchicine (COLCRYS) tablet 0 6 mg, 0 6 mg, Oral, BID, Gisela Ann MD, 0 6 mg at 10/24/19 7221    loperamide (IMODIUM) capsule 2 mg, 2 mg, Oral, Q4H PRN, Freddy Duttaal, CRNP    metoprolol tartrate (LOPRESSOR) tablet 25 mg, 25 mg, Oral, Q12H [address], Renae Pierre MD, 25 mg at 10/24/19 0924    morphine injection 2 mg, 2 mg, Intravenous, Q4H PRN, Mi Sutton MD, 2 mg at 10/23/19 2331    nitroglycerin (NITROSTAT) SL tablet 0 4 mg, 0 4 mg, Sublingual, Q5 Min PRN, Renae Pierre MD, 0 4 mg at 10/23/19 2320    ondansetron (ZOFRAN) injection 4 mg, 4 mg, Intravenous, Q6H PRN, Renae Pierre MD, 4 mg at 10/21/19 1847     Labs & Results:    Results from last 7 days   Lab Units 10/24/19  0310 10/23/19  2335 10/22/19  1743   TROPONIN I ng/mL 3 18* 3 77* 6 24*     Results from last 7 days   Lab Units 10/21/19  1505 10/20/19  0936   WBC Thousand/uL 8 05 11 24*   HEMOGLOBIN g/dL 13 2 14 8   HEMATOCRIT % 41 9 45 0   PLATELETS Thousands/uL 164 199     Results from last 7 days   Lab Units 10/20/19  0936   TRIGLYCERIDES mg/dL 60   HDL mg/dL 54     Results from last 7 days   Lab Units 10/21/19  0638 10/20/19  0936   POTASSIUM mmol/L 3 8 3 7   CHLORIDE mmol/L 104 102   CO2 mmol/L 25 28   BUN mg/dL 18 22   CREATININE mg/dL 0 85 0 92   CALCIUM mg/dL 8 8 9 1   ALK PHOS U/L  --  78   ALT U/L  --  22   AST U/L  --  59*     Results from last 7 days   Lab Units 10/21/19  0638 10/21/19  0004 10/20/19  1627 10/20/19  0936   INR   --   --   --  1 08   PTT seconds 65* 49* 45* 26     Results from last 7 days   Lab Units 10/20/19  0936   MAGNESIUM mg/dL 2 3

## 2019-10-24 NOTE — ASSESSMENT & PLAN NOTE
· Presented a little over 24 hours ago  · Improved with the addition of Tessalon Perles  · Chest x-ray negative

## 2019-10-24 NOTE — ASSESSMENT & PLAN NOTE
· Status post cardiac catheterization with mild to moderate pericardial effusion  · Patient showing clinical improvement via echocardiogram  · Patient require 6 weeks of treatment with colchicine 0 6 mg b i d

## 2019-10-25 ENCOUNTER — TELEPHONE (OUTPATIENT)
Dept: CARDIOLOGY CLINIC | Facility: CLINIC | Age: 74
End: 2019-10-25

## 2019-10-25 NOTE — SOCIAL WORK
LOS 4 GMLOS none  Patient's son-Salvatore and Dtr- Erendira visited this am to confirm patient's scripts are at [facility] and one script - Colchicin   6mg  Tabs is at [facility], Jett Morales CM phoned both pharmacies and they both have patient's scripts waiting to be picked up  ADILENE spoke to Hoang Hanson at [facility] who states patient's scripts are there waiting to be picked up at cost for $67 50 and CM spoke to Erendira at [address] who states the one script is there for  at cost for $122 92  Cielo Franco states they will go to  patient's scripts now  Cielo Franco came in today because they were [facility]  They went there to  scripts last night and it was too costly  They came back  here to confirm what Delmi had told them to do is to go to [facility] and [facility] in [place]        10/25/19  10:39 AM

## 2019-10-25 NOTE — PROGRESS NOTES
Charge nurse got a call from [employer] in the ED that patient's son, Rojelio Pinon, was there asking to speak to someone regarding his father's medication prescriptions  Son stated that he went to the pharmacy to  patient's medications and pricing for them all was not the price they were told by case management and they are unable to afford the medications  According to the son, some medications were to be sent electronically to [facility] and one other to [facility] in [facility], however all of them were sent to [facility] [place] (which was the pharmacy on file for the patient)    Charge nurse contacted [facility] [place] regarding the situation, spoke to the pharmacist whom explained that all 6 prescriptions were sent to [facility] in [place] and gave the prescription names (as son did not know the names of the medications) and the pricing for each medication  She also explained that she explained all of this to the family as well  Charge nurse then went to the ED waiting room where son & daughter were to explain the situation  Both son & daughter were extremely upset regarding the entire situation, stating that with those prices their father would not get his medications because they could not afford them & they they were told a certain price for all the medications and that was not the case  Unfortunately, [address] [place] [address] were both closed so charge nurse was unable to contact the pharmacist at those pharmacies  Charge nurse apologized for the situation, explained that case management was gone for the day as well as the attending physician, however she would do whatever she could to help with the situation  Son & daughter thanked charge nurse for the help but states that they will come back in the morning to speak to case management regarding of pricing of the medications  Case management will be notified in AM regarding the situation

## 2019-10-25 NOTE — TELEPHONE ENCOUNTER
Pt sched hosp f/u on 11/06  Pt daughter Madina Michaud wants to know if pt should schedule cardiac rehab after he sees Dr Anitra Laguna  Please call Erendira   Thank you

## 2019-10-25 NOTE — TELEPHONE ENCOUNTER
Pt daughter called to sched a hosp f/u appt with Dr Lorena Dior  All of his on call appts are taken for next week  It is for NSTEMI  Pt was discharged yesterday 10/24/19  Where can we schedule pt?  Thank you

## 2019-11-06 ENCOUNTER — OFFICE VISIT (OUTPATIENT)
Dept: CARDIOLOGY CLINIC | Facility: CLINIC | Age: 74
End: 2019-11-06
Payer: MEDICARE

## 2019-11-06 VITALS
SYSTOLIC BLOOD PRESSURE: 118 MMHG | DIASTOLIC BLOOD PRESSURE: 60 MMHG | BODY MASS INDEX: 18.47 KG/M2 | OXYGEN SATURATION: 97 % | HEART RATE: 74 BPM | WEIGHT: 129 LBS | HEIGHT: 70 IN

## 2019-11-06 DIAGNOSIS — I25.119 CORONARY ARTERY DISEASE INVOLVING NATIVE CORONARY ARTERY OF NATIVE HEART WITH ANGINA PECTORIS (HCC): ICD-10-CM

## 2019-11-06 DIAGNOSIS — Z95.5 S/P PRIMARY ANGIOPLASTY WITH CORONARY STENT: ICD-10-CM

## 2019-11-06 DIAGNOSIS — I25.5 ISCHEMIC CARDIOMYOPATHY: ICD-10-CM

## 2019-11-06 DIAGNOSIS — R06.00 DOE (DYSPNEA ON EXERTION): ICD-10-CM

## 2019-11-06 DIAGNOSIS — I21.4 NSTEMI (NON-ST ELEVATED MYOCARDIAL INFARCTION) (HCC): Primary | ICD-10-CM

## 2019-11-06 PROCEDURE — 99214 OFFICE O/P EST MOD 30 MIN: CPT | Performed by: INTERNAL MEDICINE

## 2019-11-06 RX ORDER — FUROSEMIDE 20 MG/1
20 TABLET ORAL 2 TIMES DAILY
Qty: 90 TABLET | Refills: 11 | Status: ON HOLD | OUTPATIENT
Start: 2019-11-06 | End: 2019-11-19 | Stop reason: SDUPTHER

## 2019-11-06 NOTE — PATIENT INSTRUCTIONS
An echocardiogram has been ordered  You will be called to schedule this appointment  Please start taking furosemide (Lasix) 20 mg daily  A prescription has been sent to pharmacy  Please have your blood drawn in 1 week to monitor your kidney function as well as her potassium levels  Please follow up with us in 1 month, sooner if any acute issues arise  We will be in contact with you regarding her laboratory results before then  You may attend assessment for cardiac rehab prior to our next visit

## 2019-11-06 NOTE — LETTER
November 6, 2019     Marisol Rendon DO  [address] [facility] [address]    Patient: Dalia Quevedo  YOB: 1945   Date of Visit: 11/6/2019       Dear Dr Barber Fernandez:    Thank you for referring Meli Koch to me for evaluation  Below are my notes for this consultation  If you have questions, please do not hesitate to call me  I look forward to following your patient along with you  Sincerely,        Satish Angulo DO        CC: No Recipients  Satish Angulo DO  11/6/2019 10:49 AM  Sign at close encounter                                             Cardiology Consultation     Dalia Quevedo   [id number]  [id number]  [address]    Dear Dr Lobito Nassar is a 73-year-old gentleman who has been referred to the [address] [place] [address] Place of Cardiology in [place] with the following issues:    1  Non ST-elevation myocardial infarction  2  Status post PCI to the distal LAD (resolute on X 2 5 x 18 LUDIVINA) and to the ostial 1st diagonal as well as the mid vessel  3  PCI complicated by difficult stent delivery requiring crush technique of the ostial diagonal stent  4  Ischemic cardiomyopathy, ejection fraction 40%  5  Post PCI pericarditis    Mr Андрей Corbett is not 1 for doctors  This is his 1st forey into the medical system  He has no other documented medical history  Point of fact, he tells me that he continue to work through his myocardial infarction  He continues to complain of some shortness of breath  This does not appear to be positional   He does have some occasional chest pain, but he does not take his nitroglycerin for it  He was monitored in the hospital after his complex PCI  Serial echocardiograms revealed stability and initial resolution of his pericardial effusion    He was started on guideline directed medical therapy and discharged  Patient Active Problem List   Diagnosis    Chest pain    NSTEMI (non-ST elevated myocardial infarction) ([facility] [address] )    Cough    Pericarditis     Past Medical History:   Diagnosis Date    Poison ivy      Social History     Socioeconomic History    Marital status:      Spouse name: Not on file    Number of children: Not on file    Years of education: Not on file    Highest education level: Not on file   Occupational History    Not on file   Social Needs    Financial resource strain: Not on file    Food insecurity:     Worry: Not on file     Inability: Not on file    Transportation needs:     Medical: Not on file     Non-medical: Not on file   Tobacco Use    Smoking status: Never Smoker    Smokeless tobacco: Never Used   Substance and Sexual Activity    Alcohol use: Never     Frequency: Never     Drinks per session: 1 or 2     Binge frequency: Never    Drug use: No    Sexual activity: Not on file   Lifestyle    Physical activity:     Days per week: Not on file     Minutes per session: Not on file    Stress: Not on file   Relationships    Social connections:     Talks on phone: Not on file     Gets together: Not on file     Attends Synagogue service: Not on file     Active member of club or organization: Not on file     Attends meetings of clubs or organizations: Not on file     Relationship status: Not on file    Intimate partner violence:     Fear of current or ex partner: Not on file     Emotionally abused: Not on file     Physically abused: Not on file     Forced sexual activity: Not on file   Other Topics Concern    Not on file   Social History Narrative    Not on file      Family History   Problem Relation Age of Onset    No Known Problems Mother     No Known Problems Father      History reviewed  No pertinent surgical history      Current Outpatient Medications:     aspirin (ECOTRIN LOW STRENGTH) 81 mg EC tablet, Take 1 tablet (81 mg total) by mouth daily Please purchase over the counter at her local pharmacy, Disp: , Rfl: 0    atorvastatin (LIPITOR) 80 mg tablet, Take 1 tablet (80 mg total) by mouth daily with dinner, Disp: 30 tablet, Rfl: 0    clopidogrel (PLAVIX) 75 mg tablet, Take 1 tablet (75 mg total) by mouth daily, Disp: 30 tablet, Rfl: 0    colchicine (COLCRYS) 0 6 mg tablet, Take 1 tablet (0 6 mg total) by mouth 2 (two) times a day, Disp: 90 tablet, Rfl: 0    metoprolol tartrate (LOPRESSOR) 25 mg tablet, Take 1 tablet (25 mg total) by mouth every 12 (twelve) hours, Disp: 60 tablet, Rfl: 0    nitroglycerin (NITROSTAT) 0 4 mg SL tablet, Place 1 tablet (0 4 mg total) under the tongue every 5 (five) minutes as needed for chest pain, Disp: 30 tablet, Rfl: 0    benzonatate (TESSALON PERLES) 100 mg capsule, Take 1 capsule (100 mg total) by mouth 3 (three) times a day as needed for cough (Patient not taking: Reported on 11/6/2019), Disp: 20 capsule, Rfl: 0    furosemide (LASIX) 20 mg tablet, Take 1 tablet (20 mg total) by mouth 2 (two) times a day, Disp: 90 tablet, Rfl: 11     No Known Allergies       Vitals:    11/06/19 0954   BP: 118/60   Pulse: 74   SpO2: 97%   Weight: 58 5 kg (129 lb)   Height: 5' 10" (1 778 m)       Labs:  Results for Sony Reid (MRN [id number]) as of 11/6/2019 10:43   10/21/2019 06:38   Sodium 139   Potassium 3 8   Chloride 104   CO2 25   Anion Gap 10   BUN 18   Creatinine 0 85   Glucose, Random 99   Calcium 8 8   eGFR 86     Results for Sony Reid (MRN [id number]) as of 11/6/2019 10:43   10/21/2019 15:05   WBC 8 05   Red Blood Cell Count 4 56   Hemoglobin 13 2   HCT 41 9   MCV 92   MCH 28 9   MCHC 31 5   RDW 13 2   Platelet Count 531       Imaging:   Limited transthoracic echocardiogram, 10/23/2019  SUMMARY  LEFT VENTRICLE:  Ejection fraction was estimated to be 40 %      PERICARDIUM:  A small to moderate pericardial effusion was identified  There was no evidence of hemodynamic compromise   When compared to previous echo done yesterday, the pericardial effusion size appears slightly reduced  Review of Systems:  Review of Systems   Constitutional: Negative  Respiratory: Positive for shortness of breath  Cardiovascular: Positive for chest pain (Intermittent)  Gastrointestinal: Negative  Endocrine: Negative  Musculoskeletal: Negative  Skin: Negative  Neurological: Negative  Hematological: Negative  Physical Exam:  Physical Exam   Constitutional: He is oriented to person, place, and time  He appears well-developed and well-nourished  HENT:   Head: Normocephalic and atraumatic  Eyes: Conjunctivae and EOM are normal    Neck: No hepatojugular reflux and no JVD present  Carotid bruit is not present  No tracheal deviation present  No thyromegaly present  Cardiovascular: Normal rate, regular rhythm, S1 normal and S2 normal  PMI is not displaced  Exam reveals no gallop  No murmur heard  Pulses:       Carotid pulses are 2+ on the right side, and 2+ on the left side  Radial pulses are 2+ on the right side, and 2+ on the left side  Femoral pulses are 2+ on the right side, and 2+ on the left side  Dorsalis pedis pulses are 2+ on the right side, and 2+ on the left side  Posterior tibial pulses are 2+ on the right side, and 2+ on the left side  Pulmonary/Chest: Breath sounds normal  No accessory muscle usage  No tachypnea  No respiratory distress  Abdominal: Soft  Bowel sounds are normal  He exhibits no distension  There is no tenderness  Genitourinary:   Genitourinary Comments: There is a large left inguinal hernia   Musculoskeletal: He exhibits no edema  Lymphadenopathy:        Head (right side): No submental, no submandibular, no tonsillar, no preauricular, no posterior auricular and no occipital adenopathy present  Head (left side): No submental, no submandibular, no tonsillar, no preauricular, no posterior auricular and no occipital adenopathy present       He has no cervical adenopathy  Neurological: He is alert and oriented to person, place, and time  Skin: Skin is warm and dry  Psychiatric: He has a normal mood and affect  His behavior is normal  Judgment and thought content normal        Discussion/Summary:  Non ST-elevation myocardial infarction  PCI of the distal LAD  PTCA of the 1st diagonal occlusion  Difficult stent delivery to the ostial diagonal, resulting in partial deployment with crush technique within the proximal LAD lumen  Persistent shortness of breath, particularly with exertion  Ischemic cardiomyopathy, ejection fraction 40%  Post PCI pericarditis    Mr Meryle Catalina had a very technically challenging case  He continues to be short of breath with an ejection fraction of 40%  He does not bear all of the hallmarks of volume overload, though his ejection fraction with shortness of breath is somewhat compelling  We will start low-dose furosemide, 20 mg daily, with repeat BMP, magnesium and phosphorus in 1 week  Repeat echocardiogram has been ordered to re-evaluate his pericardial effusion as well as systolic function  Pulmonary function testing was also ordered  He should be referred to cardiac rehab, though I would like him to be less short of breath at baseline prior to this referral     He should continue on dual antiplatelet therapy for at least 1 year, possibly longer given the complexity of the intervention  He will remain on atorvastatin as well as metoprolol  He is a need for primary care physician  He has been referred to Internal Medicine to establish care  I would like him to return to our office in 1 month, sooner if any acute issues arise  This will serve as surveillance of his cardiac status  Thank you for the opportunity to consult on this patient  If you have any questions, please feel free to call my office

## 2019-11-06 NOTE — PROGRESS NOTES
Cardiology Consultation     Masood Cordero   [id number]  0/59/8105  [address] Dennie Erika    Dear Dr Susanne Martin is a 21-year-old gentleman who has been referred to the [address] [place] [address] Place of Cardiology in [facility] with the following issues:    1  Non ST-elevation myocardial infarction  2  Status post PCI to the distal LAD (resolute on X 2 5 x 18 LUDIVINA) and to the ostial 1st diagonal as well as the mid vessel  3  PCI complicated by difficult stent delivery requiring crush technique of the ostial diagonal stent  4  Ischemic cardiomyopathy, ejection fraction 40%  5  Post PCI pericarditis    Mr Frances Riley is not 1 for doctors  This is his 1st forey into the medical system  He has no other documented medical history  Point of fact, he tells me that he continue to work through his myocardial infarction  He continues to complain of some shortness of breath  This does not appear to be positional   He does have some occasional chest pain, but he does not take his nitroglycerin for it  He was monitored in the hospital after his complex PCI  Serial echocardiograms revealed stability and initial resolution of his pericardial effusion  He was started on guideline directed medical therapy and discharged  Patient Active Problem List   Diagnosis    Chest pain    NSTEMI (non-ST elevated myocardial infarction) ([facility] [address] [facility]    Cough    Pericarditis     Past Medical History:   Diagnosis Date    Poison ivy      Social History     Socioeconomic History    Marital status:       Spouse name: Not on file    Number of children: Not on file    Years of education: Not on file    Highest education level: Not on file   Occupational History    Not on file   Social Needs    Financial resource strain: Not on file    Food insecurity:     Worry: Not on file Inability: Not on file    Transportation needs:     Medical: Not on file     Non-medical: Not on file   Tobacco Use    Smoking status: Never Smoker    Smokeless tobacco: Never Used   Substance and Sexual Activity    Alcohol use: Never     Frequency: Never     Drinks per session: 1 or 2     Binge frequency: Never    Drug use: No    Sexual activity: Not on file   Lifestyle    Physical activity:     Days per week: Not on file     Minutes per session: Not on file    Stress: Not on file   Relationships    Social connections:     Talks on phone: Not on file     Gets together: Not on file     Attends Alevism service: Not on file     Active member of club or organization: Not on file     Attends meetings of clubs or organizations: Not on file     Relationship status: Not on file    Intimate partner violence:     Fear of current or ex partner: Not on file     Emotionally abused: Not on file     Physically abused: Not on file     Forced sexual activity: Not on file   Other Topics Concern    Not on file   Social History Narrative    Not on file      Family History   Problem Relation Age of Onset    No Known Problems Mother     No Known Problems Father      History reviewed  No pertinent surgical history      Current Outpatient Medications:     aspirin (ECOTRIN LOW STRENGTH) 81 mg EC tablet, Take 1 tablet (81 mg total) by mouth daily Please purchase over the counter at her local pharmacy, Disp: , Rfl: 0    atorvastatin (LIPITOR) 80 mg tablet, Take 1 tablet (80 mg total) by mouth daily with dinner, Disp: 30 tablet, Rfl: 0    clopidogrel (PLAVIX) 75 mg tablet, Take 1 tablet (75 mg total) by mouth daily, Disp: 30 tablet, Rfl: 0    colchicine (COLCRYS) 0 6 mg tablet, Take 1 tablet (0 6 mg total) by mouth 2 (two) times a day, Disp: 90 tablet, Rfl: 0    metoprolol tartrate (LOPRESSOR) 25 mg tablet, Take 1 tablet (25 mg total) by mouth every 12 (twelve) hours, Disp: 60 tablet, Rfl: 0    nitroglycerin (NITROSTAT) 0 4 mg SL tablet, Place 1 tablet (0 4 mg total) under the tongue every 5 (five) minutes as needed for chest pain, Disp: 30 tablet, Rfl: 0    benzonatate (TESSALON PERLES) 100 mg capsule, Take 1 capsule (100 mg total) by mouth 3 (three) times a day as needed for cough (Patient not taking: Reported on 11/6/2019), Disp: 20 capsule, Rfl: 0    furosemide (LASIX) 20 mg tablet, Take 1 tablet (20 mg total) by mouth 2 (two) times a day, Disp: 90 tablet, Rfl: 11     No Known Allergies       Vitals:    11/06/19 0954   BP: 118/60   Pulse: 74   SpO2: 97%   Weight: 58 5 kg (129 lb)   Height: 5' 10" (1 778 m)       Labs:  Results for Stephanie White (MRN [id number]) as of 11/6/2019 10:43   10/21/2019 06:38   Sodium 139   Potassium 3 8   Chloride 104   CO2 25   Anion Gap 10   BUN 18   Creatinine 0 85   Glucose, Random 99   Calcium 8 8   eGFR 86     Results for Stephanie White (MRN [id number]) as of 11/6/2019 10:43   10/21/2019 15:05   WBC 8 05   Red Blood Cell Count 4 56   Hemoglobin 13 2   HCT 41 9   MCV 92   MCH 28 9   MCHC 31 5   RDW 13 2   Platelet Count 359       Imaging:   Limited transthoracic echocardiogram, 10/23/2019  SUMMARY  LEFT VENTRICLE:  Ejection fraction was estimated to be 40 %      PERICARDIUM:  A small to moderate pericardial effusion was identified  There was no evidence of hemodynamic compromise  When compared to previous echo done yesterday, the pericardial effusion size appears slightly reduced  Review of Systems:  Review of Systems   Constitutional: Negative  Respiratory: Positive for shortness of breath  Cardiovascular: Positive for chest pain (Intermittent)  Gastrointestinal: Negative  Endocrine: Negative  Musculoskeletal: Negative  Skin: Negative  Neurological: Negative  Hematological: Negative  Physical Exam:  Physical Exam   Constitutional: He is oriented to person, place, and time  He appears well-developed and well-nourished     HENT:   Head: Normocephalic and atraumatic  Eyes: Conjunctivae and EOM are normal    Neck: No hepatojugular reflux and no JVD present  Carotid bruit is not present  No tracheal deviation present  No thyromegaly present  Cardiovascular: Normal rate, regular rhythm, S1 normal and S2 normal  PMI is not displaced  Exam reveals no gallop  No murmur heard  Pulses:       Carotid pulses are 2+ on the right side, and 2+ on the left side  Radial pulses are 2+ on the right side, and 2+ on the left side  Femoral pulses are 2+ on the right side, and 2+ on the left side  Dorsalis pedis pulses are 2+ on the right side, and 2+ on the left side  Posterior tibial pulses are 2+ on the right side, and 2+ on the left side  Pulmonary/Chest: Breath sounds normal  No accessory muscle usage  No tachypnea  No respiratory distress  Abdominal: Soft  Bowel sounds are normal  He exhibits no distension  There is no tenderness  Genitourinary:   Genitourinary Comments: There is a large left inguinal hernia   Musculoskeletal: He exhibits no edema  Lymphadenopathy:        Head (right side): No submental, no submandibular, no tonsillar, no preauricular, no posterior auricular and no occipital adenopathy present  Head (left side): No submental, no submandibular, no tonsillar, no preauricular, no posterior auricular and no occipital adenopathy present  He has no cervical adenopathy  Neurological: He is alert and oriented to person, place, and time  Skin: Skin is warm and dry  Psychiatric: He has a normal mood and affect   His behavior is normal  Judgment and thought content normal        Discussion/Summary:  Non ST-elevation myocardial infarction  PCI of the distal LAD  PTCA of the 1st diagonal occlusion  Difficult stent delivery to the ostial diagonal, resulting in partial deployment with crush technique within the proximal LAD lumen  Persistent shortness of breath, particularly with exertion  Ischemic cardiomyopathy, ejection fraction 40%  Post PCI pericarditis    Mr Frances Riley had a very technically challenging case  He continues to be short of breath with an ejection fraction of 40%  He does not bear all of the hallmarks of volume overload, though his ejection fraction with shortness of breath is somewhat compelling  We will start low-dose furosemide, 20 mg daily, with repeat BMP, magnesium and phosphorus in 1 week  Repeat echocardiogram has been ordered to re-evaluate his pericardial effusion as well as systolic function  Pulmonary function testing was also ordered  He should be referred to cardiac rehab, though I would like him to be less short of breath at baseline prior to this referral     He should continue on dual antiplatelet therapy for at least 1 year, possibly longer given the complexity of the intervention  He will remain on atorvastatin as well as metoprolol  He is a need for primary care physician  He has been referred to Internal Medicine to establish care  I would like him to return to our office in 1 month, sooner if any acute issues arise  This will serve as surveillance of his cardiac status  Thank you for the opportunity to consult on this patient  If you have any questions, please feel free to call my office

## 2019-11-13 ENCOUNTER — APPOINTMENT (OUTPATIENT)
Dept: LAB | Facility: CLINIC | Age: 74
DRG: 312 | End: 2019-11-13
Payer: MEDICARE

## 2019-11-13 DIAGNOSIS — I21.4 NSTEMI (NON-ST ELEVATED MYOCARDIAL INFARCTION) (HCC): ICD-10-CM

## 2019-11-13 LAB
ANION GAP SERPL CALCULATED.3IONS-SCNC: 6 MMOL/L (ref 4–13)
BUN SERPL-MCNC: 20 MG/DL (ref 5–25)
CALCIUM SERPL-MCNC: 9.3 MG/DL (ref 8.3–10.1)
CHLORIDE SERPL-SCNC: 100 MMOL/L (ref 100–108)
CO2 SERPL-SCNC: 28 MMOL/L (ref 21–32)
CREAT SERPL-MCNC: 1.1 MG/DL (ref 0.6–1.3)
GFR SERPL CREATININE-BSD FRML MDRD: 66 ML/MIN/1.73SQ M
GLUCOSE P FAST SERPL-MCNC: 79 MG/DL (ref 65–99)
MAGNESIUM SERPL-MCNC: 2.1 MG/DL (ref 1.6–2.6)
PHOSPHATE SERPL-MCNC: 3.4 MG/DL (ref 2.3–4.1)
POTASSIUM SERPL-SCNC: 4.4 MMOL/L (ref 3.5–5.3)
SODIUM SERPL-SCNC: 134 MMOL/L (ref 136–145)

## 2019-11-13 PROCEDURE — 83735 ASSAY OF MAGNESIUM: CPT

## 2019-11-13 PROCEDURE — 80048 BASIC METABOLIC PNL TOTAL CA: CPT

## 2019-11-13 PROCEDURE — 36415 COLL VENOUS BLD VENIPUNCTURE: CPT

## 2019-11-13 PROCEDURE — 84100 ASSAY OF PHOSPHORUS: CPT

## 2019-11-14 ENCOUNTER — APPOINTMENT (EMERGENCY)
Dept: CT IMAGING | Facility: HOSPITAL | Age: 74
DRG: 312 | End: 2019-11-14
Payer: MEDICARE

## 2019-11-14 ENCOUNTER — HOSPITAL ENCOUNTER (INPATIENT)
Facility: HOSPITAL | Age: 74
LOS: 5 days | Discharge: HOME HEALTH | DRG: 312 | End: 2019-11-19
Attending: EMERGENCY MEDICINE | Admitting: INTERNAL MEDICINE
Payer: MEDICARE

## 2019-11-14 ENCOUNTER — APPOINTMENT (EMERGENCY)
Dept: RADIOLOGY | Facility: HOSPITAL | Age: 74
DRG: 312 | End: 2019-11-14
Payer: MEDICARE

## 2019-11-14 DIAGNOSIS — I25.5 ISCHEMIC CARDIOMYOPATHY: ICD-10-CM

## 2019-11-14 DIAGNOSIS — I63.9 CVA (CEREBRAL VASCULAR ACCIDENT) (HCC): Primary | ICD-10-CM

## 2019-11-14 DIAGNOSIS — I21.4 NSTEMI (NON-ST ELEVATED MYOCARDIAL INFARCTION) (HCC): ICD-10-CM

## 2019-11-14 DIAGNOSIS — R55 NEAR SYNCOPE: ICD-10-CM

## 2019-11-14 DIAGNOSIS — R20.2 PARESTHESIAS: ICD-10-CM

## 2019-11-14 DIAGNOSIS — R05.9 COUGH: ICD-10-CM

## 2019-11-14 DIAGNOSIS — R07.9 CHEST PAIN: ICD-10-CM

## 2019-11-14 DIAGNOSIS — R42 DIZZINESS: ICD-10-CM

## 2019-11-14 LAB
ALBUMIN SERPL BCP-MCNC: 3.5 G/DL (ref 3.5–5)
ALP SERPL-CCNC: 86 U/L (ref 46–116)
ALT SERPL W P-5'-P-CCNC: 12 U/L (ref 12–78)
ANION GAP SERPL CALCULATED.3IONS-SCNC: 12 MMOL/L (ref 4–13)
APTT PPP: 25 SECONDS (ref 23–37)
AST SERPL W P-5'-P-CCNC: 18 U/L (ref 5–45)
BACTERIA UR QL AUTO: ABNORMAL /HPF
BASOPHILS # BLD AUTO: 0.05 THOUSANDS/ΜL (ref 0–0.1)
BASOPHILS NFR BLD AUTO: 1 % (ref 0–1)
BILIRUB SERPL-MCNC: 0.7 MG/DL (ref 0.2–1)
BILIRUB UR QL STRIP: NEGATIVE
BUN SERPL-MCNC: 24 MG/DL (ref 5–25)
CALCIUM SERPL-MCNC: 9.1 MG/DL (ref 8.3–10.1)
CHLORIDE SERPL-SCNC: 99 MMOL/L (ref 100–108)
CLARITY UR: CLEAR
CO2 SERPL-SCNC: 24 MMOL/L (ref 21–32)
COLOR UR: YELLOW
CREAT SERPL-MCNC: 1.19 MG/DL (ref 0.6–1.3)
EOSINOPHIL # BLD AUTO: 0.1 THOUSAND/ΜL (ref 0–0.61)
EOSINOPHIL NFR BLD AUTO: 2 % (ref 0–6)
ERYTHROCYTE [DISTWIDTH] IN BLOOD BY AUTOMATED COUNT: 13.2 % (ref 11.6–15.1)
GFR SERPL CREATININE-BSD FRML MDRD: 60 ML/MIN/1.73SQ M
GLUCOSE SERPL-MCNC: 96 MG/DL (ref 65–140)
GLUCOSE UR STRIP-MCNC: NEGATIVE MG/DL
HCT VFR BLD AUTO: 43.3 % (ref 36.5–49.3)
HGB BLD-MCNC: 14.2 G/DL (ref 12–17)
HGB UR QL STRIP.AUTO: NEGATIVE
IMM GRANULOCYTES # BLD AUTO: 0.04 THOUSAND/UL (ref 0–0.2)
IMM GRANULOCYTES NFR BLD AUTO: 1 % (ref 0–2)
INR PPP: 1.11 (ref 0.84–1.19)
KETONES UR STRIP-MCNC: NEGATIVE MG/DL
LEUKOCYTE ESTERASE UR QL STRIP: ABNORMAL
LYMPHOCYTES # BLD AUTO: 1.43 THOUSANDS/ΜL (ref 0.6–4.47)
LYMPHOCYTES NFR BLD AUTO: 24 % (ref 14–44)
MCH RBC QN AUTO: 28.5 PG (ref 26.8–34.3)
MCHC RBC AUTO-ENTMCNC: 32.8 G/DL (ref 31.4–37.4)
MCV RBC AUTO: 87 FL (ref 82–98)
MONOCYTES # BLD AUTO: 0.77 THOUSAND/ΜL (ref 0.17–1.22)
MONOCYTES NFR BLD AUTO: 13 % (ref 4–12)
NEUTROPHILS # BLD AUTO: 3.49 THOUSANDS/ΜL (ref 1.85–7.62)
NEUTS SEG NFR BLD AUTO: 59 % (ref 43–75)
NITRITE UR QL STRIP: NEGATIVE
NON-SQ EPI CELLS URNS QL MICRO: ABNORMAL /HPF
NRBC BLD AUTO-RTO: 0 /100 WBCS
NT-PROBNP SERPL-MCNC: 1723 PG/ML
PH UR STRIP.AUTO: 5.5 [PH]
PLATELET # BLD AUTO: 186 THOUSANDS/UL (ref 149–390)
PMV BLD AUTO: 12.7 FL (ref 8.9–12.7)
POTASSIUM SERPL-SCNC: 3.8 MMOL/L (ref 3.5–5.3)
PROT SERPL-MCNC: 7 G/DL (ref 6.4–8.2)
PROT UR STRIP-MCNC: NEGATIVE MG/DL
PROTHROMBIN TIME: 14.3 SECONDS (ref 11.6–14.5)
RBC # BLD AUTO: 4.99 MILLION/UL (ref 3.88–5.62)
RBC #/AREA URNS AUTO: ABNORMAL /HPF
SODIUM SERPL-SCNC: 135 MMOL/L (ref 136–145)
SP GR UR STRIP.AUTO: <=1.005 (ref 1–1.03)
TROPONIN I SERPL-MCNC: <0.02 NG/ML
UROBILINOGEN UR QL STRIP.AUTO: 0.2 E.U./DL
WBC # BLD AUTO: 5.88 THOUSAND/UL (ref 4.31–10.16)
WBC #/AREA URNS AUTO: ABNORMAL /HPF

## 2019-11-14 PROCEDURE — 96374 THER/PROPH/DIAG INJ IV PUSH: CPT

## 2019-11-14 PROCEDURE — 85610 PROTHROMBIN TIME: CPT | Performed by: EMERGENCY MEDICINE

## 2019-11-14 PROCEDURE — 84484 ASSAY OF TROPONIN QUANT: CPT | Performed by: EMERGENCY MEDICINE

## 2019-11-14 PROCEDURE — 81001 URINALYSIS AUTO W/SCOPE: CPT | Performed by: EMERGENCY MEDICINE

## 2019-11-14 PROCEDURE — 85730 THROMBOPLASTIN TIME PARTIAL: CPT | Performed by: EMERGENCY MEDICINE

## 2019-11-14 PROCEDURE — 99285 EMERGENCY DEPT VISIT HI MDM: CPT

## 2019-11-14 PROCEDURE — 36415 COLL VENOUS BLD VENIPUNCTURE: CPT | Performed by: EMERGENCY MEDICINE

## 2019-11-14 PROCEDURE — 83880 ASSAY OF NATRIURETIC PEPTIDE: CPT | Performed by: EMERGENCY MEDICINE

## 2019-11-14 PROCEDURE — 71045 X-RAY EXAM CHEST 1 VIEW: CPT

## 2019-11-14 PROCEDURE — 80053 COMPREHEN METABOLIC PANEL: CPT | Performed by: EMERGENCY MEDICINE

## 2019-11-14 PROCEDURE — 99285 EMERGENCY DEPT VISIT HI MDM: CPT | Performed by: EMERGENCY MEDICINE

## 2019-11-14 PROCEDURE — 70498 CT ANGIOGRAPHY NECK: CPT

## 2019-11-14 PROCEDURE — 85025 COMPLETE CBC W/AUTO DIFF WBC: CPT | Performed by: EMERGENCY MEDICINE

## 2019-11-14 PROCEDURE — 99223 1ST HOSP IP/OBS HIGH 75: CPT | Performed by: PHYSICIAN ASSISTANT

## 2019-11-14 PROCEDURE — 70496 CT ANGIOGRAPHY HEAD: CPT

## 2019-11-14 PROCEDURE — 93005 ELECTROCARDIOGRAM TRACING: CPT

## 2019-11-14 RX ORDER — DIAZEPAM 5 MG/ML
2.5 INJECTION, SOLUTION INTRAMUSCULAR; INTRAVENOUS ONCE
Status: COMPLETED | OUTPATIENT
Start: 2019-11-14 | End: 2019-11-14

## 2019-11-14 RX ADMIN — DIAZEPAM 2.5 MG: 10 INJECTION, SOLUTION INTRAMUSCULAR; INTRAVENOUS at 21:59

## 2019-11-14 RX ADMIN — IOHEXOL 85 ML: 350 INJECTION, SOLUTION INTRAVENOUS at 22:06

## 2019-11-15 ENCOUNTER — APPOINTMENT (INPATIENT)
Dept: NEUROLOGY | Facility: HOSPITAL | Age: 74
DRG: 312 | End: 2019-11-15
Attending: PSYCHIATRY & NEUROLOGY
Payer: MEDICARE

## 2019-11-15 ENCOUNTER — APPOINTMENT (INPATIENT)
Dept: NON INVASIVE DIAGNOSTICS | Facility: HOSPITAL | Age: 74
DRG: 312 | End: 2019-11-15
Payer: MEDICARE

## 2019-11-15 ENCOUNTER — APPOINTMENT (INPATIENT)
Dept: MRI IMAGING | Facility: HOSPITAL | Age: 74
DRG: 312 | End: 2019-11-15
Payer: MEDICARE

## 2019-11-15 LAB
ANION GAP SERPL CALCULATED.3IONS-SCNC: 10 MMOL/L (ref 4–13)
BASOPHILS # BLD AUTO: 0.05 THOUSANDS/ΜL (ref 0–0.1)
BASOPHILS NFR BLD AUTO: 1 % (ref 0–1)
BUN SERPL-MCNC: 25 MG/DL (ref 5–25)
CALCIUM SERPL-MCNC: 8.7 MG/DL (ref 8.3–10.1)
CHLORIDE SERPL-SCNC: 102 MMOL/L (ref 100–108)
CO2 SERPL-SCNC: 26 MMOL/L (ref 21–32)
CREAT SERPL-MCNC: 1.26 MG/DL (ref 0.6–1.3)
EOSINOPHIL # BLD AUTO: 0.12 THOUSAND/ΜL (ref 0–0.61)
EOSINOPHIL NFR BLD AUTO: 2 % (ref 0–6)
ERYTHROCYTE [DISTWIDTH] IN BLOOD BY AUTOMATED COUNT: 13.2 % (ref 11.6–15.1)
EST. AVERAGE GLUCOSE BLD GHB EST-MCNC: 114 MG/DL
GFR SERPL CREATININE-BSD FRML MDRD: 56 ML/MIN/1.73SQ M
GLUCOSE SERPL-MCNC: 89 MG/DL (ref 65–140)
HBA1C MFR BLD: 5.6 % (ref 4.2–6.3)
HCT VFR BLD AUTO: 38.2 % (ref 36.5–49.3)
HGB BLD-MCNC: 12.7 G/DL (ref 12–17)
IMM GRANULOCYTES # BLD AUTO: 0.03 THOUSAND/UL (ref 0–0.2)
IMM GRANULOCYTES NFR BLD AUTO: 1 % (ref 0–2)
LYMPHOCYTES # BLD AUTO: 0.92 THOUSANDS/ΜL (ref 0.6–4.47)
LYMPHOCYTES NFR BLD AUTO: 14 % (ref 14–44)
MAGNESIUM SERPL-MCNC: 1.9 MG/DL (ref 1.6–2.6)
MCH RBC QN AUTO: 28.9 PG (ref 26.8–34.3)
MCHC RBC AUTO-ENTMCNC: 33.2 G/DL (ref 31.4–37.4)
MCV RBC AUTO: 87 FL (ref 82–98)
MONOCYTES # BLD AUTO: 0.84 THOUSAND/ΜL (ref 0.17–1.22)
MONOCYTES NFR BLD AUTO: 13 % (ref 4–12)
NEUTROPHILS # BLD AUTO: 4.68 THOUSANDS/ΜL (ref 1.85–7.62)
NEUTS SEG NFR BLD AUTO: 69 % (ref 43–75)
NRBC BLD AUTO-RTO: 0 /100 WBCS
PLATELET # BLD AUTO: 151 THOUSANDS/UL (ref 149–390)
PMV BLD AUTO: 12.3 FL (ref 8.9–12.7)
POTASSIUM SERPL-SCNC: 4.1 MMOL/L (ref 3.5–5.3)
RBC # BLD AUTO: 4.4 MILLION/UL (ref 3.88–5.62)
SODIUM SERPL-SCNC: 138 MMOL/L (ref 136–145)
WBC # BLD AUTO: 6.64 THOUSAND/UL (ref 4.31–10.16)

## 2019-11-15 PROCEDURE — 93308 TTE F-UP OR LMTD: CPT | Performed by: INTERNAL MEDICINE

## 2019-11-15 PROCEDURE — 83036 HEMOGLOBIN GLYCOSYLATED A1C: CPT | Performed by: PHYSICIAN ASSISTANT

## 2019-11-15 PROCEDURE — 97163 PT EVAL HIGH COMPLEX 45 MIN: CPT

## 2019-11-15 PROCEDURE — 93321 DOPPLER ECHO F-UP/LMTD STD: CPT | Performed by: INTERNAL MEDICINE

## 2019-11-15 PROCEDURE — 99223 1ST HOSP IP/OBS HIGH 75: CPT | Performed by: PSYCHIATRY & NEUROLOGY

## 2019-11-15 PROCEDURE — 83735 ASSAY OF MAGNESIUM: CPT | Performed by: PHYSICIAN ASSISTANT

## 2019-11-15 PROCEDURE — 36415 COLL VENOUS BLD VENIPUNCTURE: CPT | Performed by: PHYSICIAN ASSISTANT

## 2019-11-15 PROCEDURE — G8979 MOBILITY GOAL STATUS: HCPCS

## 2019-11-15 PROCEDURE — 95816 EEG AWAKE AND DROWSY: CPT | Performed by: PSYCHIATRY & NEUROLOGY

## 2019-11-15 PROCEDURE — 97167 OT EVAL HIGH COMPLEX 60 MIN: CPT

## 2019-11-15 PROCEDURE — 93325 DOPPLER ECHO COLOR FLOW MAPG: CPT | Performed by: INTERNAL MEDICINE

## 2019-11-15 PROCEDURE — G8988 SELF CARE GOAL STATUS: HCPCS

## 2019-11-15 PROCEDURE — 70551 MRI BRAIN STEM W/O DYE: CPT

## 2019-11-15 PROCEDURE — G8987 SELF CARE CURRENT STATUS: HCPCS

## 2019-11-15 PROCEDURE — 85025 COMPLETE CBC W/AUTO DIFF WBC: CPT | Performed by: PHYSICIAN ASSISTANT

## 2019-11-15 PROCEDURE — 99223 1ST HOSP IP/OBS HIGH 75: CPT | Performed by: INTERNAL MEDICINE

## 2019-11-15 PROCEDURE — 93308 TTE F-UP OR LMTD: CPT

## 2019-11-15 PROCEDURE — 80048 BASIC METABOLIC PNL TOTAL CA: CPT | Performed by: PHYSICIAN ASSISTANT

## 2019-11-15 PROCEDURE — G8978 MOBILITY CURRENT STATUS: HCPCS

## 2019-11-15 PROCEDURE — 92610 EVALUATE SWALLOWING FUNCTION: CPT

## 2019-11-15 PROCEDURE — 99232 SBSQ HOSP IP/OBS MODERATE 35: CPT | Performed by: INTERNAL MEDICINE

## 2019-11-15 PROCEDURE — 95816 EEG AWAKE AND DROWSY: CPT

## 2019-11-15 RX ORDER — SODIUM CHLORIDE 9 MG/ML
125 INJECTION, SOLUTION INTRAVENOUS CONTINUOUS
Status: DISPENSED | OUTPATIENT
Start: 2019-11-15 | End: 2019-11-15

## 2019-11-15 RX ORDER — ACETAMINOPHEN 325 MG/1
650 TABLET ORAL EVERY 6 HOURS PRN
Status: DISCONTINUED | OUTPATIENT
Start: 2019-11-15 | End: 2019-11-19 | Stop reason: HOSPADM

## 2019-11-15 RX ORDER — CLOPIDOGREL BISULFATE 75 MG/1
75 TABLET ORAL DAILY
Status: DISCONTINUED | OUTPATIENT
Start: 2019-11-15 | End: 2019-11-19 | Stop reason: HOSPADM

## 2019-11-15 RX ORDER — ASPIRIN 81 MG/1
81 TABLET ORAL DAILY
Status: DISCONTINUED | OUTPATIENT
Start: 2019-11-15 | End: 2019-11-19 | Stop reason: HOSPADM

## 2019-11-15 RX ORDER — LOPERAMIDE HYDROCHLORIDE 2 MG/1
2 CAPSULE ORAL EVERY 4 HOURS PRN
Status: DISCONTINUED | OUTPATIENT
Start: 2019-11-15 | End: 2019-11-19 | Stop reason: HOSPADM

## 2019-11-15 RX ORDER — ONDANSETRON 2 MG/ML
4 INJECTION INTRAMUSCULAR; INTRAVENOUS EVERY 6 HOURS PRN
Status: DISCONTINUED | OUTPATIENT
Start: 2019-11-15 | End: 2019-11-19 | Stop reason: HOSPADM

## 2019-11-15 RX ORDER — NITROGLYCERIN 0.4 MG/1
0.4 TABLET SUBLINGUAL
Status: DISCONTINUED | OUTPATIENT
Start: 2019-11-15 | End: 2019-11-19 | Stop reason: HOSPADM

## 2019-11-15 RX ORDER — ATORVASTATIN CALCIUM 40 MG/1
80 TABLET, FILM COATED ORAL
Status: DISCONTINUED | OUTPATIENT
Start: 2019-11-15 | End: 2019-11-19 | Stop reason: HOSPADM

## 2019-11-15 RX ORDER — COLCHICINE 0.6 MG/1
0.6 TABLET ORAL 2 TIMES DAILY
Status: DISCONTINUED | OUTPATIENT
Start: 2019-11-15 | End: 2019-11-19 | Stop reason: HOSPADM

## 2019-11-15 RX ADMIN — SODIUM CHLORIDE 500 ML: 0.9 INJECTION, SOLUTION INTRAVENOUS at 01:31

## 2019-11-15 RX ADMIN — LOPERAMIDE HYDROCHLORIDE 2 MG: 2 CAPSULE ORAL at 16:24

## 2019-11-15 RX ADMIN — ASPIRIN 81 MG: 81 TABLET, COATED ORAL at 11:13

## 2019-11-15 RX ADMIN — CLOPIDOGREL 75 MG: 75 TABLET, FILM COATED ORAL at 11:13

## 2019-11-15 RX ADMIN — COLCHICINE 0.6 MG: 0.6 TABLET, FILM COATED ORAL at 11:13

## 2019-11-15 RX ADMIN — ENOXAPARIN SODIUM 40 MG: 40 INJECTION SUBCUTANEOUS at 11:14

## 2019-11-15 RX ADMIN — SODIUM CHLORIDE 125 ML/HR: 0.9 INJECTION, SOLUTION INTRAVENOUS at 01:58

## 2019-11-15 RX ADMIN — LOPERAMIDE HYDROCHLORIDE 2 MG: 2 CAPSULE ORAL at 20:44

## 2019-11-15 RX ADMIN — ATORVASTATIN CALCIUM 80 MG: 40 TABLET, FILM COATED ORAL at 16:25

## 2019-11-15 RX ADMIN — COLCHICINE 0.6 MG: 0.6 TABLET, FILM COATED ORAL at 18:00

## 2019-11-15 NOTE — ED NOTES
Patient transported to [address] [place] [address] [facility]  11/14/19 8122

## 2019-11-15 NOTE — SPEECH THERAPY NOTE
Speech-Language Pathology Bedside Swallow Evaluation        Patient Name: Osmani Pimentel  Today's Date: 11/15/2019     Problem List  Patient Active Problem List   Diagnosis    Chest pain    NSTEMI (non-ST elevated myocardial infarction) ([facility] [address] [facility]    Cough    Pericarditis    Coronary artery disease involving native coronary artery of native heart with angina pectoris ([facility] [address] )    S/P primary angioplasty with coronary stent    Ischemic cardiomyopathy    Dizziness    Paresthesias    Near syncope       Past Medical History  Past Medical History:   Diagnosis Date    Non-ST elevated myocardial infarction ([facility] [address] )     Poison ivy        Past Surgical History  Past Surgical History:   Procedure Laterality Date    CORONARY STENT PLACEMENT           Current Medical Status  Pt is a 69 y o  male who presented to [facility] with dizziness with numbness and tingling to upper and lower extremities  Patient was recently hospitalized for MI with 2 stents placed 2 weeks ago  ST consult was requested to further assess swallow function  Upon my arrival patient denies dysphagia or aphasia  He admits people have a difficult time understanding him sometimes but that this is premorbid  He admits to a 20 pound weight loss over the last month due to change in diet during last admission which family has reportedly been compliant with  He reports he does not like the food that he is being given at home  He reports his dentures are loose because of the weight loss  Per RN patient has been tolerating current diet without difficulty  Family to bring dentures in today  Patient reported "I know what I can and cant eat "    Past medical history:   Please see H&P for details    Special Studies:  MRI    11/14 CTA Head/neck: 1  No evidence of acute intracranial hemorrhage  2  Focus new low-attenuation left frontal centrum semiovale measuring approximately 8 mm, age indeterminate ischemic event   Follow-up noncontrast brain MRI recommended for further evaluation  3  Old infarcts and microangiopathy  4   Right posterior apical spiculated 9 mm density could represent a nodule or scarring  Follow-up noncontrast chest CT recommended in 3 months  11/14 CXR: No acute cardiopulmonary disease  Social/Education/Vocational Hx:  Pt lives with family    Swallow Information   Current Risks for Dysphagia & Aspiration: neuro symptoms     Current Symptoms/Concerns: none reported    Current Diet: regular diet and thin liquids      Baseline Diet: regular diet and thin liquids      Baseline Assessment   Behavior/Cognition: alert    Speech/Language Status: able to participate in conversation and able to follow commands- no dysarthria noted- speech 100% intelligible    Patient Positioning: upright in bed      Swallow Mechanism Exam   Facial: symmetrical  Labial: WFL  Lingual: WFL  Velum: symmetrical  Mandible: adequate ROM  Dentition: edentulous- full dentures at home which will be brought in today  Vocal quality:reduced vocal volume   Volitional Cough: strong/productive   Resp: 2L NC  Swallow Mechanics: WFL upon dry swallow     Consistencies Assessed and Performance   Consistencies Administered: thin liquids, puree and hard solids  Specific materials administered included: open face turkey sandwich, zucchini, mashed potatoes and thin liquids via straw sips    Oral Stage: WFL  Mastication was mildly prolonged yet adequate with the materials administered today  Bolus formation and transfer were functional with nosignificant oral residue noted  No overt s/s reduced oral control  Pharyngeal Stage: [facility]  Swallowing initiation appeared prompt  Laryngeal rise was palpated and judged to be within functional limits  No coughing, throat clearing, change in vocal quality or respiratory status noted today       Esophageal Concerns: none reported    Summary   Pts oropharyngeal swallow function appears generally WFL at this time with the materials administered today  He does not appear to be at risk for aspiration at this time  Patient reported he plans to order softer items from the menu pending arrival of dentures  Recommendations: regular diet and thin liquids     Recommended Form of Meds: no restrictions     Results Reviewed with: patient, RN, family and dietician     Plan  No further ST indicated at this time      TRAV Knott S , [id number] [facility]  Speech Language Pathologist   Available via [address] [place] [address] #[id number]  [place] #[id number]

## 2019-11-15 NOTE — OCCUPATIONAL THERAPY NOTE
Occupational Therapy Evaluation        Patient Name: Geoff Silva  Today's Date: 11/15/2019         11/15/19 4096   Note Type   Note type Eval only   Restrictions/Precautions   Weight Bearing Precautions Per Order No   Braces or Orthoses Other (Comment)  (elastic back support wron PRN when working)   Other Precautions Chair Alarm; Bed Alarm;O2;Fall Risk;Telemetry  (orthostatic hypotension)   Pain Assessment   Pain Assessment No/denies pain   Pain Score No Pain   Home Living   Type of Home Mobile home   Home Layout One level; Other (Comment)  (2-3 TAI with post)   Bathroom Shower/Tub Tub/shower unit   Bathroom Toilet Standard   Bathroom Equipment Other (Comment)  (no DME at baseline)   [address] Other (Comment)  (No AD at baseline)   Prior Function   Level of [place] Independent with ADLs and functional mobility   Lives With Daughter;Family   Receives Help From Family   ADL Assistance Independent   IADLs Independent   Falls in the last 6 months 0   Vocational Retired   Comments patient usually drives   Lifestyle   Autonomy Patient reported independent with ADLs/ IADLs, lives with daughter and family in a Mobile home with 2-3 steps to enter with post  Patient ambulates independently with no AD      Reciprocal Relationships Supportive family   Service to Others Retired heavy lifting   Intrinsic Gratification Enjoys working and being active    Psychosocial   Psychosocial (WDL) X   Patient Behaviors/Mood Flat affect   Ability to Express Feelings Able to express   Ability to Express Needs Able to express   Ability to Express Thoughts Able to express   Ability to Understand Others Understands   ADL   Eating Assistance 6  Modified independent   Grooming Assistance 5  Supervision/Setup   UB Bathing Assistance 5  Supervision/Setup    [address] 5  Supervision/Setup   LB Dressing Assistance 4  Minimal Assistance Toileting Assistance  5  Supervision/Setup   Functional Assistance 4  Minimal Assistance   Additional Comments sitting- 109/ 68, standing- 99/66, supine 121/80  02 at 2 liters  Bed Mobility   Sit to Supine 5  Supervision   Additional items Assist x 1;HOB elevated;Verbal cues   Transfers   Sit to Stand 4  Minimal assistance   Additional items Assist x 1;Verbal cues   Stand to Sit 4  Minimal assistance   Additional items Assist x 1;Verbal cues   Functional Mobility   Functional Mobility 4  Minimal assistance   Additional items Hand hold assistance   Balance   Static Sitting Fair +   Dynamic Sitting Fair   Static Standing Fair   Dynamic Standing Poor +   Activity Tolerance   Activity Tolerance Patient limited by fatigue  (O2 at 2 liters, c/o of lightheadedness (+) orthostatic)   Nurse Made Aware MERE Still made aware of session outcomes  Pt supine in bed with call bell and bed alarm on at end of session  RUE Assessment   RUE Assessment WFL   LUE Assessment   LUE Assessment WFL   Hand Function   Gross Motor Coordination Impaired   Fine Motor Coordination Functional   Sensation   Light Touch Partial deficits in the RUE;Partial deficits in the RLE  (lower leg and foot all the time/ right hand comes and goes)   Proprioception   Proprioception No apparent deficits   Vision-Basic Assessment   Current Vision Wears glasses only for reading   Patient Visual Report Other (Comment)  (improved vision of left eye post stroke)   Cognition   Overall Cognitive Status [facility]   Arousal/Participation Alert; Responsive; Cooperative   Attention Within functional limits   Orientation Level Oriented X4   Memory Within functional limits   Following Commands Follows all commands and directions without difficulty   Comments Neurologist impression: Near syncope, most likely secondary related to hypotension, possibly cardiac in origin      Tsering Kim is a 69 y o  male seen for OT evaluation s/p admit to [address] [facility] on 11/14/2019 w/Dizziness  Commorbidities affecting patient's functional performance at time of assessment include: Near syncope, paresthesias to bilateral hands and feet, S/P primary angioplasty with coronary stent  Neurologist impression: Near syncope, most likely secondary related to hypotension, possibly cardiac in origin  Orders placed for OT evaluation and treatment  Performed at least two patient identifiers during session including name and wristband  Prior to admission, Patient reported independent with ADLs/ IADLs, lives with daughter and family in a Mobile home with 2-3 steps to enter with post  Patient ambulates independently with no AD  Personal factors affecting patient at time of initial evaluation include: steps to enter, limited insight into deficits, difficulty performing ADLs and difficulty performing IADLs  Upon evaluation, patient requires supervision, set up and contact guard assist for UB ADLs, minimal  assist for LB ADLs  Occupational performance is affected by the following deficits: flat affect, impulsive behavior, decreased functional use of BUEs, degenerative arthritic joint changes, impaired gross motor coordination, dynamic sit/ stand balance deficit with poor standing tolerance time for self care and functional mobility, decreased activity tolerance, decreased safety awareness, increased pain and postural control and postural alignment deficit, requiring external assistance to complete transitional movements  Therapist completed  extensive additional review of medical records and additional review of physical, cognitive or psychosocial history, clinical examination identifying 5 or more performance deficits, clinical decision making of a high complexity , consistent with a high complexity level evaluation   Patient to benefit from continued Occupational Therapy treatment while in the hospital to address deficits as defined above and maximize level of functional independence with ADLs and functional mobility  Occupational Performance areas to address include: grooming , bathing/ shower, dressing, toilet hygiene, transfer to all surfaces, functional ambulation, functional mobility, health maintenance, medication routine/ management, IADLS: Household maintenance, IADLs: safety procedures, IADLs: meal prep/ clean up, Leisure Participation and Social participation  From OT standpoint, recommendation at time of d/c would be Short Term Rehab/ consider home with services if symptoms subside and work up gives us more information on the etiology  Limitation Decreased ADL status; Decreased Safe judgement during ADL;Decreased endurance;Decreased self-care trans;Decreased high-level ADLs   Prognosis Good   Goals   Patient Goals "to not feel lightheaded"   Plan   Treatment Interventions ADL retraining;Functional transfer training; Endurance training;Patient/family training;Neuromuscular reeducation;Cardiac education; Energy conservation; Activityengagement; Compensatory technique education   Goal Expiration Date 11/22/19   OT Frequency 3-5x/wk   Recommendation   OT Discharge Recommendation Short Term Rehab   Barthel Index   Feeding 10   Bathing 0   Grooming Score 5   Dressing Score 5   Bladder Score 10   Bowels Score 10   Toilet Use Score 5   Transfers (Bed/Chair) Score 10   Mobility (Level Surface) Score 0   Stairs Score 0   Barthel Index Score 55   Modified Sinan Scale   Modified Sinan Scale 4     Occupational therapy Goals: In 2-4 days    1- Patient will verbalize and demonstrate use of energy conservation/ deep breathing technique and work simplification skills during functional activity with no verbal cues  2- Patient will verbalize and demonstrate good body mechanics and joint protection techniques during ADLs/ IADLs with no verbal cues   3- Patient will increase OOB/ sitting tolerance to 4-6 hours per day for increased participation in self care and leisure tasks with no s/s of exertion    4- Patient will identify s/s of exertion during ADL and functional mobility with no verbal cues    5-Patient will verbalize/ demonstrate compensatory strategies to recover from exertion with no verbal cues  6-Patient will increase standing tolerance time to 10 minutes with No UE support to complete sink level ADLs @ Mod I level   7- Patient will increase sitting tolerance at edge of bed to 30 minutes to complete UB ADLs @ Indep  level     8-- Patient/ Family will demonstrate competency with UE Home Exercise Program

## 2019-11-15 NOTE — PLAN OF CARE
Problem: PHYSICAL THERAPY ADULT  Goal: Performs mobility at highest level of function for planned discharge setting  See evaluation for individualized goals  Description  Treatment/Interventions: Functional transfer training, LE strengthening/ROM, Elevations, Therapeutic exercise, Endurance training, Patient/family training, Bed mobility, Gait training, Spoke to nursing, OT, Equipment eval/education          See flowsheet documentation for full assessment, interventions and recommendations  Note:   Prognosis: Good  Problem List: Decreased strength, Decreased endurance, Impaired balance, Decreased mobility  Assessment: Pt is a 69 y o  male presenting to [facility] with c/o dizziness and lightheadedness over the last few days  Pt has PMH significant for NSTEMI, paresthesias, CAD, and previous coronary stenting  Pt reports living in a mobile home with his daughter and family with 2-3 TAI and a post to hold onto for support  Pt states he is independent with ADLs/IADLs at baseline and does not use any AD in the home or in the community  He has never fallen, however states that when he gets lightheaded, he feels that he may "pass out" if he doesn't get to a chair  Pt agreeable to PT eval  PT examination findings revealed intact B/L LE sensation and gross MMT 4+/5  Pt able to perform sit>supine with supervision, however required increased time 2* to symptoms  Pt required min (A)x1 for sit<>stand transfers and ambulation tasks  Pt ambulated 3'x2 at bedside by side stepping to L/R  Pt reported feeling not as lightheaded as when he came in  Sitting BP at beginning of session was 109/68  Standing BP during session was 99/66  Supine BP at end of session was 121/80  Barthel Index 55/100 and Modified [place] 4  Pt is a good candidate for skilled PT while here to address to above deficits and facilitate return to PLOF  PT eval of high complexity due to PMH, symptomatic orthostatic hypotension, and current status affecting his POC  Upon d/c, recommend HHPT  Barriers to Discharge: [address] home environment     Recommendation: Home PT     PT - OK to Discharge: Yes(when medically cleared)    See flowsheet documentation for full assessment

## 2019-11-15 NOTE — ED PROVIDER NOTES
History  Chief Complaint   Patient presents with    Dizziness     Pt presents to the ED via EMS reporting near syncope after qetting up from bed  Pt reported dizziness while walking to the kitchen and numbness and tingling to upper and lower extremities  Pt reports an MI with 2 stents placed two weeks ago  HPI  68 yo M with PMH CAD, recent admission for NSTEMI and s/p PCI with post cath pericarditis, discharged from hospital 10/24/19, ischemic cardiomyopathy, presents with dizziness/near syncope  He was walking to kitchen today when he felt lightheaded like he was going to pass out  According to family patient has been complaining of intermittent dizziness, tingling in his arms and legs, shortness of breath and chest pain since he left the hospital   He has an echocardiogram scheduled for tomorrow  Patient is unable to describe the dizziness  Denies confusion, weakness  Reports that he did have blurred vision in left eye but had history of cataract surgery  Prior to Admission Medications   Prescriptions Last Dose Informant Patient Reported?  Taking?   aspirin (ECOTRIN LOW STRENGTH) 81 mg EC tablet  Child No No   Sig: Take 1 tablet (81 mg total) by mouth daily Please purchase over the counter at her local pharmacy   atorvastatin (LIPITOR) 80 mg tablet  Child No No   Sig: Take 1 tablet (80 mg total) by mouth daily with dinner   benzonatate (TESSALON PERLES) 100 mg capsule  Child No No   Sig: Take 1 capsule (100 mg total) by mouth 3 (three) times a day as needed for cough   Patient not taking: Reported on 11/6/2019   clopidogrel (PLAVIX) 75 mg tablet  Child No No   Sig: Take 1 tablet (75 mg total) by mouth daily   colchicine (COLCRYS) 0 6 mg tablet  Child No No   Sig: Take 1 tablet (0 6 mg total) by mouth 2 (two) times a day   furosemide (LASIX) 20 mg tablet   No No   Sig: Take 1 tablet (20 mg total) by mouth 2 (two) times a day   metoprolol tartrate (LOPRESSOR) 25 mg tablet  Child No No   Sig: Take 1 tablet (25 mg total) by mouth every 12 (twelve) hours   nitroglycerin (NITROSTAT) 0 4 mg SL tablet  Child No No   Sig: Place 1 tablet (0 4 mg total) under the tongue every 5 (five) minutes as needed for chest pain      Facility-Administered Medications: None       Past Medical History:   Diagnosis Date    Non-ST elevated myocardial infarction ([facility] [address] )     Poison ivy        Past Surgical History:   Procedure Laterality Date    CORONARY STENT PLACEMENT         Family History   Problem Relation Age of Onset    No Known Problems Mother     No Known Problems Father      I have reviewed and agree with the history as documented  Social History     Tobacco Use    Smoking status: Never Smoker    Smokeless tobacco: Never Used   Substance Use Topics    Alcohol use: Never     Frequency: Never     Drinks per session: 1 or 2     Binge frequency: Never    Drug use: No        Review of Systems   Constitutional: Negative for chills and fever  HENT: Negative for dental problem and ear pain  Eyes: Positive for visual disturbance  Negative for pain and redness  Respiratory: Positive for shortness of breath  Negative for cough  Cardiovascular: Positive for chest pain  Negative for palpitations  Gastrointestinal: Negative for abdominal pain and nausea  Endocrine: Negative for polydipsia and polyphagia  Genitourinary: Negative for dysuria and frequency  Musculoskeletal: Negative for arthralgias and joint swelling  Skin: Negative for color change and rash  Neurological: Positive for dizziness, light-headedness and numbness  Negative for headaches  Psychiatric/Behavioral: Negative for behavioral problems and confusion  All other systems reviewed and are negative  Physical Exam  Physical Exam   Constitutional: He is oriented to person, place, and time  He appears well-developed and well-nourished  No distress  HENT:   Head: Atraumatic     Right Ear: External ear normal    Left Ear: External ear normal  Nose: Nose normal    Eyes: Pupils are equal, round, and reactive to light  Conjunctivae and EOM are normal    Neck: Normal range of motion  Neck supple  No JVD present  Cardiovascular: Normal rate, regular rhythm and normal heart sounds  No murmur heard  Pulmonary/Chest: Effort normal and breath sounds normal  No respiratory distress  He has no wheezes  Abdominal: Soft  Bowel sounds are normal  He exhibits no distension  There is no tenderness  Musculoskeletal: Normal range of motion  He exhibits no edema  Neurological: He is alert and oriented to person, place, and time  No cranial nerve deficit  CN II-XII intact grossly, no focal deficits  Normal strength and sensation in b/l upper and lower extremities  Unable to complete finger to nose testing   Skin: Skin is warm and dry  Capillary refill takes less than 2 seconds  He is not diaphoretic  Psychiatric: He has a normal mood and affect  His behavior is normal    Nursing note and vitals reviewed        Vital Signs  ED Triage Vitals   Temperature Pulse Respirations Blood Pressure SpO2   11/14/19 2118 11/14/19 2118 11/14/19 2118 11/14/19 2118 11/14/19 2118   97 5 °F (36 4 °C) 60 20 109/74 100 %      Temp Source Heart Rate Source Patient Position - Orthostatic VS BP Location FiO2 (%)   11/14/19 2118 11/14/19 2118 11/14/19 2130 11/14/19 2118 --   Oral Monitor Lying Right arm       Pain Score       11/14/19 2118       1           Vitals:    11/14/19 2118 11/14/19 2130 11/14/19 2230   BP: 109/74 105/61 102/58   Pulse: 60 60 65   Patient Position - Orthostatic VS:  Lying          Visual Acuity      ED Medications  Medications   diazepam (VALIUM) injection 2 5 mg (2 5 mg Intravenous Given 11/14/19 2159)   iohexol (OMNIPAQUE) 350 MG/ML injection (MULTI-DOSE) 85 mL (85 mL Intravenous Given 11/14/19 2206)       Diagnostic Studies  Results Reviewed     Procedure Component Value Units Date/Time    UA w Reflex to Microscopic w Reflex to Culture [[id number]] Collected:  11/14/19 2256    Lab Status:  No result Specimen:  Urine, Clean Catch     NT-BNP PRO [[id number]]  (Abnormal) Collected:  11/14/19 2158    Lab Status:  Final result Specimen:  Blood from Arm, Left Updated:  11/14/19 2236     NT-proBNP 1,723 pg/mL     Troponin I [[id number]]  (Normal) Collected:  11/14/19 2158    Lab Status:  Final result Specimen:  Blood from Arm, Left Updated:  11/14/19 2231     Troponin I <0 02 ng/mL     Comprehensive metabolic panel [[id number]]  (Abnormal) Collected:  11/14/19 2158    Lab Status:  Final result Specimen:  Blood from Arm, Left Updated:  11/14/19 2229     Sodium 135 mmol/L      Potassium 3 8 mmol/L      Chloride 99 mmol/L      CO2 24 mmol/L      ANION GAP 12 mmol/L      BUN 24 mg/dL      Creatinine 1 19 mg/dL      Glucose 96 mg/dL      Calcium 9 1 mg/dL      AST 18 U/L      ALT 12 U/L      Alkaline Phosphatase 86 U/L      Total Protein 7 0 g/dL      Albumin 3 5 g/dL      Total Bilirubin 0 70 mg/dL      eGFR 60 ml/min/1 73sq m     Narrative:       [place] guidelines for Chronic Kidney Disease (CKD):     Stage 1 with normal or high GFR (GFR > 90 mL/min/1 73 square meters)    Stage 2 Mild CKD (GFR = 60-89 mL/min/1 73 square meters)    Stage 3A Moderate CKD (GFR = 45-59 mL/min/1 73 square meters)    Stage 3B Moderate CKD (GFR = 30-44 mL/min/1 73 square meters)    Stage 4 Severe CKD (GFR = 15-29 mL/min/1 73 square meters)    Stage 5 End Stage CKD (GFR <15 mL/min/1 73 square meters)  Note: GFR calculation is accurate only with a steady state creatinine    APTT [[id number]]  (Normal) Collected:  11/14/19 2158    Lab Status:  Final result Specimen:  Blood from Arm, Left Updated:  11/14/19 2224     PTT 25 seconds     Protime-INR [[id number]]  (Normal) Collected:  11/14/19 2158    Lab Status:  Final result Specimen:  Blood from Arm, Left Updated:  11/14/19 2224     Protime 14 3 seconds      INR 1 11    CBC and differential [[id number]]  (Abnormal) Collected:  11/14/19 2158    Lab Status:  Final result Specimen:  Blood from Arm, Left Updated:  11/14/19 2211     WBC 5 88 Thousand/uL      RBC 4 99 Million/uL      Hemoglobin 14 2 g/dL      Hematocrit 43 3 %      MCV 87 fL      MCH 28 5 pg      MCHC 32 8 g/dL      RDW 13 2 %      MPV 12 7 fL      Platelets 076 Thousands/uL      nRBC 0 /100 WBCs      Neutrophils Relative 59 %      Immat GRANS % 1 %      Lymphocytes Relative 24 %      Monocytes Relative 13 %      Eosinophils Relative 2 %      Basophils Relative 1 %      Neutrophils Absolute 3 49 Thousands/µL      Immature Grans Absolute 0 04 Thousand/uL      Lymphocytes Absolute 1 43 Thousands/µL      Monocytes Absolute 0 77 Thousand/µL      Eosinophils Absolute 0 10 Thousand/µL      Basophils Absolute 0 05 Thousands/µL                  CTA head and neck with and without contrast   Final Result by Vashti Cantu MD (11/14 2230)         1  No evidence of acute intracranial hemorrhage  2  Focus new low-attenuation left frontal centrum semiovale measuring approximately 8 mm, age indeterminate ischemic event  Follow-up noncontrast brain MRI recommended for further evaluation  3  Old infarcts and microangiopathy  4   Right posterior apical spiculated 9 mm density could represent a nodule or scarring  Follow-up noncontrast chest CT recommended in 3 months                       Workstation performed: [id number]         XR chest 1 view portable    (Results Pending)              Procedures  ECG 12 Lead Documentation Only  Date/Time: 11/14/2019 9:33 PM  Performed by: Summer Mullins MD  Authorized by: Summer Mullins MD     Comments:      NSR rate of 61, normal axis and intervals, t wave inversions V2 through V5 which are new compared to prior 23 october           ED Course           Identification of Seniors at Risk      Most Recent Value   (ISAR) Identification of Seniors at Risk   Before the illness or injury that brought you to the Emergency, did you need someone to help you on a regular basis? 0 Filed at: 11/14/2019 2124   In the last 24 hours, have you needed more help than usual?  0 Filed at: 11/14/2019 2124   Have you been hospitalized for one or more nights during the past 6 months? 1 Filed at: 11/14/2019 2124   In general, do you see well?  0 Filed at: 11/14/2019 2124   In general, do you have serious problems with your memory? 0 Filed at: 11/14/2019 2124   Do you take more than three different medications every day? 1 Filed at: 11/14/2019 2124   ISAR Score  2 Filed at: 11/14/2019 2124                          MDM  Patient with past medical history of CAD status post PCI less than a month ago presents with dizziness, arm and leg numbness, shortness of breath that has been ongoing since he left the hospital   Had echo scheduled for tomorrow, however family persuaded patient to come to hospital   CTA head and neck shows left-sided ischemic event, new from prior imaging  Troponin negative, EKG with T-wave inversions which are new anteriolaterally  Chest x-ray without acute changes per my read  Will admit for MRI, echo, stroke workup  He is not a tPA candidate at this time  Disposition  Final diagnoses:   CVA (cerebral vascular accident) [facility])     Time reflects when diagnosis was documented in both MDM as applicable and the Disposition within this note     Time User Action Codes Description Comment    11/14/2019 10:54 PM [place] Cluster Add [I63 9] CVA (cerebral vascular accident) [facility])       ED Disposition     ED Disposition Condition Date/Time Comment    Admit Stable Thu Nov 14, 2019 10:54 PM Case was discussed with Raheem Cooper and the patient's admission status was agreed to be Admission Status: inpatient status to the service of Dr Lanora Koyanagi   Follow-up Information    None         Patient's Medications   Discharge Prescriptions    No medications on file     No discharge procedures on file      ED Provider  Electronically Signed by           Chavez Rivera MD  11/14/19 [address] [facility]

## 2019-11-15 NOTE — PROGRESS NOTES
[facility] [facility] Internal Medicine Progress Note  Patient: Mylene Aguirre  69 y o  male   MRN: [id number]  PCP: Mary Burks DO  Unit/Bed#: -01 Encounter: [id number]  Date Of Visit: 11/15/19    Assessment:    Principal Problem:    Dizziness  Active Problems:    S/P primary angioplasty with coronary stent    Paresthesias    Near syncope      Plan:    · 1  Near syncope- possibly secondary to orthostatic hypotension  Cardiology to evaluate  Patient also had blurry vision  Neurology following  Will f/u MRI brain and EEG   · 2  History of CAD s/p 2 stent placed recently 10/21/19  · 3  HTN- on lopressor       VTE Pharmacologic Prophylaxis:   Pharmacologic: Enoxaparin (Lovenox)  Mechanical VTE Prophylaxis in Place: Yes    Patient Centered Rounds: I have performed bedside rounds with nursing staff today  Discussions with Specialists or Other Care Team Provider:     Education and Discussions with Family / Patient:     Time Spent for Care: 20 minutes  More than 50% of total time spent on counseling and coordination of care as described above  Current Length of Stay: 1 day(s)    Current Patient Status: Inpatient   Certification Statement: The patient will continue to require additional inpatient hospital stay due to dizziness    Discharge Plan / Estimated Discharge Date: once cardiology and neurology workup finished    Code Status: Level 3 - DNAR and DNI      Subjective:   Patient seen and examined at bedside  Patient has no new complaints  Objective:     Vitals:   Temp (24hrs), Av 9 °F (36 6 °C), Min:97 5 °F (36 4 °C), Max:98 1 °F (36 7 °C)    Temp:  [97 5 °F (36 4 °C)-98 1 °F (36 7 °C)] 98 1 °F (36 7 °C)  HR:  [] 76  Resp:  [15-32] 15  BP: ()/(51-74) 105/64  SpO2:  [85 %-100 %] 100 %  Body mass index is 18 51 kg/m²  Input and Output Summary (last 24 hours):        Intake/Output Summary (Last 24 hours) at 11/15/2019 1109  Last data filed at 11/15/2019 1001  Gross per 24 hour   Intake 1000 ml   Output 100 ml   Net 900 ml       Physical Exam:     Physical Exam   Constitutional: He is oriented to person, place, and time  He appears well-developed and well-nourished  HENT:   Head: Normocephalic and atraumatic  Eyes: Pupils are equal, round, and reactive to light  EOM are normal    Neck: Normal range of motion  Neck supple  No JVD present  No tracheal deviation present  No thyromegaly present  Cardiovascular: Normal rate, regular rhythm and normal heart sounds  Exam reveals no gallop and no friction rub  No murmur heard  Pulmonary/Chest: Effort normal and breath sounds normal  No stridor  No respiratory distress  He has no wheezes  Abdominal: Soft  Bowel sounds are normal  He exhibits no distension  There is no tenderness  There is no guarding  Musculoskeletal: Normal range of motion  He exhibits no edema  Neurological: He is alert and oriented to person, place, and time  Skin: Skin is warm and dry  Vitals reviewed  Additional Data:     Labs:    Results from last 7 days   Lab Units 11/15/19  0531   WBC Thousand/uL 6 64   HEMOGLOBIN g/dL 12 7   HEMATOCRIT % 38 2   PLATELETS Thousands/uL 151   NEUTROS PCT % 69   LYMPHS PCT % 14   MONOS PCT % 13*   EOS PCT % 2     Results from last 7 days   Lab Units 11/15/19  0531 11/14/19  2158   POTASSIUM mmol/L 4 1 3 8   CHLORIDE mmol/L 102 99*   CO2 mmol/L 26 24   BUN mg/dL 25 24   CREATININE mg/dL 1 26 1 19   CALCIUM mg/dL 8 7 9 1   ALK PHOS U/L  --  86   ALT U/L  --  12   AST U/L  --  18     Results from last 7 days   Lab Units 11/14/19  2158   INR  1 11       * I Have Reviewed All Lab Data Listed Above  * Additional Pertinent Lab Tests Reviewed:  [facility] Admission Reviewed    Imaging:    Imaging Reports Reviewed Today Include:   Imaging Personally Reviewed by Myself Includes:      Recent Cultures (last 7 days):           Last 24 Hours Medication List:     Current Facility-Administered Medications:  acetaminophen 650 mg Oral Q6H PRN Osmany English, CL[facility]   aspirin 81 mg Oral Daily Osmany English, [place]   atorvastatin 80 mg Oral Daily With [address], [place]   clopidogrel 75 mg Oral Daily Osmany English, [place]   colchicine 0 6 mg Oral BID Osmany English, [facility]   enoxaparin 40 mg Subcutaneous Daily Osmany English, [facility]   magnesium hydroxide 30 mL Oral Daily PRN Osmany English, CL[facility]   metoprolol tartrate 12 5 mg Oral Q12H [facility] [place]   nitroglycerin 0 4 mg Sublingual Q5 Min PRN Osmany English, CL[facility]   ondansetron 4 mg Intravenous Q6H PRN Osmany , [facility]        Today, Patient Was Seen By: Chloe Echevarria MD    ** Please Note: This note has been constructed using a voice recognition system   **

## 2019-11-15 NOTE — CONSULTS
Consultation - Neurology   Mu Juarez  69 y o  male MRN: [id number]  Unit/Bed#: ED 18 Encounter: [id number]        Assessment:  Near syncope, most likely secondary related to hypotension, possibly cardiac in origin  Posterior circulation TIA characterized by blurred vision and paresthesias affecting the right side most likely secondary to cerebrovascular disease  Plan:  Patient's CT scan of the brain and CT angiogram was reviewed by myself, showed no evidence of any significant stenosis, and a small 8 mm indeterminate focus in the left frontal centrum semiovale region was reported, an MRI of the brain will be useful  Will also get an EEG done, patient will need cardiology evaluation was noted to have orthostasis last night and his symptoms initially started off with chest pain, will advice to continue Plavix and aspirin along with lipid-lowering agents at this time, with frequent neuro checks, and will follow up this patient with you  HPI: Mu Juarez  is a 69 y o  right handed male who was in his usual state of health and claims yesterday when he woke up to go to the bathroom he started experiencing chest pains radiating to the left shoulder subsequently went back into bed, when he is woke up again for dinner the patient felt extremely lightheaded and dizzy felt he was going to faint when he decided to sit on a chair was witnessed by his daughter and apparently turn extremely pale and was brought into the emergency room  Patient describes blurriness in the right eye during that time and at other times has noted tingling and numbness of the right hand and the right foot  Patient describes these symptoms of tingling numbness have been ongoing for a few months with the dizziness and blurred vision in the right eye was bothersome, patient was brought into the emergency room and claims he had no recollection of the entire course of events till the working him up to the monitor    Subsequent to that the patient was made to  the emergency room to check for orthostatics and felt extremely lightheaded and noted to be hypotensive  Patient denies any recurrent chest pains claims he had he had an MRI 1 month ago and had 2 stents inserted and since then has been on Plavix and aspirin  Patient denies any other central neurological symptoms associated with the symptoms mentioned above such as headaches, speech dysfunction, gait dysfunction or any motor or sensory symptoms on the left side  No history of CVA or seizure disorder  Patient did have a CT scan of the brain upon arrival to the emergency room, which showed evidence of a new 8 mm focus in the left frontal centrum semiovale region suspicious for a small lacune  Review of Systems   Constitutional: Positive for fatigue  HENT: Negative  Eyes: Positive for visual disturbance  Respiratory: Negative  Cardiovascular: Positive for chest pain  Gastrointestinal: Negative  Endocrine: Negative  Genitourinary: Negative  Musculoskeletal: Negative  Allergic/Immunologic: Negative  Neurological: Positive for dizziness, light-headedness and numbness  Hematological: Negative  Psychiatric/Behavioral: Negative          Historical Information   Past Medical History:   Diagnosis Date    Non-ST elevated myocardial infarction ([facility] [address] )     Poison ivy      Past Surgical History:   Procedure Laterality Date    CORONARY STENT PLACEMENT       Social History   Social History     Substance and Sexual Activity   Alcohol Use Never    Frequency: Never    Binge frequency: Never     Social History     Substance and Sexual Activity   Drug Use No     Social History     Tobacco Use   Smoking Status Former Smoker   Smokeless Tobacco Never Used   Tobacco Comment    Quit 50 years ago     Family History: non-contributory      Meds/Allergies   all current active meds have been reviewed    No Known Allergies    Objective   Vitals:Blood pressure 93/57, pulse 85, temperature 97 5 °F (36 4 °C), temperature source Oral, resp  rate 18, weight 58 5 kg (128 lb 15 5 oz), SpO2 100 %  ,Body mass index is 18 51 kg/m²  Intake/Output Summary (Last 24 hours) at 11/15/2019 0854  Last data filed at 11/15/2019 0158  Gross per 24 hour   Intake 1000 ml   Output    Net 1000 ml       Invasive Devices: Invasive Devices     Peripheral Intravenous Line            Peripheral IV 11/14/19 Left Forearm 1 day    Peripheral IV 11/14/19 Ventral (anterior); Left;Medial Forearm less than 1 day                Physical Exam   Constitutional: He appears well-developed and well-nourished  HENT:   Head: Normocephalic and atraumatic  Eyes: Pupils are equal, round, and reactive to light  Conjunctivae and EOM are normal    Neck: Normal range of motion  Neck supple  Cardiovascular: Regular rhythm  Pulmonary/Chest: Effort normal and breath sounds normal    Abdominal: Soft  Bowel sounds are normal    Musculoskeletal: Normal range of motion  Skin: Skin is warm  Patient is alert awake oriented, high functions are intact, speech is fluent  No evidence of any aphasia or dysarthria  Cranial nerve examination reveals visual fields are full to threat, pupils equal and reactive, extraocular movements intact, fundi showed sharp disc margins, sensation in the V1 V2 V3 distribution is symmetric, no obvious facial asymmetry noted,Hearing is preserved, tongue is midline and gag is adequate, shoulder shrug is symmetric bilaterally  Motor examination reveals normal tone and bulk, no evidence of any drift to the outstretched extremities, strength is 5/5 preserved bilaterally in both upper and lower extremities, deep tendon reflexes are intact, toes are downgoing  Sensory examination to pinprick light touch proprioception and vibration is preserved bilaterally except in the dorsum of the right foot with diminished sensation to pinprick and light touch noted  , patient does not extinguish double simultaneous stimuli  Coordination no evidence of any finger-to-nose dysmetria  Gait could not be evaluated  No evidence of any spine tenderness noted, no bruits were appreciable in the neck  Lab Results:   I have personally reviewed pertinent reports  , CBC:   Results from last 7 days   Lab Units 11/15/19  0531 11/14/19  2158   WBC Thousand/uL 6 64 5 88   RBC Million/uL 4 40 4 99   HEMOGLOBIN g/dL 12 7 14 2   HEMATOCRIT % 38 2 43 3   MCV fL 87 87   PLATELETS Thousands/uL 151 186   , BMP/CMP:   Results from last 7 days   Lab Units 11/15/19  0531 11/14/19 2158 11/13/19  0753   SODIUM mmol/L 138 135* 134*   POTASSIUM mmol/L 4 1 3 8 4 4   CHLORIDE mmol/L 102 99* 100   CO2 mmol/L 26 24 28   BUN mg/dL 25 24 20   CREATININE mg/dL 1 26 1 19 1 10   CALCIUM mg/dL 8 7 9 1 9 3   AST U/L  --  18  --    ALT U/L  --  12  --    ALK PHOS U/L  --  86  --    EGFR ml/min/1 73sq m 56 60 66   , HgBA1C:   Results from last 7 days   Lab Units 11/15/19  0531   HEMOGLOBIN A1C % 5 6     Imaging Studies: I have personally reviewed pertinent films in PACS  EKG, Pathology, and Other Studies: I have personally reviewed pertinent reports      VTE Prophylaxis: Sequential compression device Aggie Ordoñez     Code Status: Level 3 - DNAR and DNI  Advance Directive and Living Will:      Power of :    POLST:      Counseling / Coordination of Care

## 2019-11-15 NOTE — ASSESSMENT & PLAN NOTE
· Reports intermittent dizziness and blurred vision to Lt eye since discharged on 10/24  Reports increased dizziness for the past 2 days with intermittent slurred speech, numbness to bilateral hands and feet Rt > Lt, and blurred vision in Rt eye on evening of admission  States that above symptoms usually occur with activity and resolve after resting  · CTA head/neck obtained in ED revealed no large vessel occlusion  Left frontal infarct, age indeterminate  · Echo10/20/19 revealed LVEF 40% with no aortic stenosis  Developed pericarditis and pericardial effusion s/p cardiac cath that was performed on 10/21/2019  F/U echocardiogram showed improved and was cleared for discharged home  · Was scheduled to have echocardiogram on 11/15/2019 due to current symptoms   Will complete while inpt  · MRI brain pending  · Check orthostatic VS

## 2019-11-15 NOTE — CONSULTS
Consultation - Cardiology Team One  Elgie Simmonds  69 y o  male MRN: [id number]  Unit/Bed#: -01 Encounter: [id number]    Consults    Physician Requesting Consult: Dutch Mason DO  Reason for Consult / Principal Problem:  Near syncope    Assessment/Plan:    1  Near-syncope  -patient with blood pressures on the lower side  -limited TTE ordered and pending  -continue telemetry monitoring  - metoprolol tartrate decreased to 12 5 mg b i d   -neurology consult  -MRI of the brain pending    2  CAD S/P LUDIVINA x2  -continue aspirin, statin, Plavix, metoprolol tartrate  -cardiac diet    3  Ischemic cardiomyopathy with an EF of 40%  -continue beta-blocker  -continue outpatient follow-up as previously planned    4  Post PCI pericarditis   -continue colchicine  -limited TTE to evaluate    HPI: Cardiologist Dr Stephan Agosto  is a 69y o  year old male who has a history of NSTEMI, CAD S/P LUDIVINA x2 to the distal LAD and ostial and mid 1st diagonal, ischemic cardiomyopathy with an EF 40%, and post PCI pericarditis who presents to the emergency room yesterday evening with complaints of severe dizziness and near-syncope  He is also complaining of intermittent bilateral hand any numbness, shoulder pain, slurred speech and blurred vision  [age] and when he got dizzy fell back into a chair and his family states that he became very pale  CT of the head revealed old infarcts, no no findings  Her pressure is on the lower side some down to the 90s over 50s, troponin less than 0 02 x 1, NT-proBNP elevated at 1723         REVIEW OF SYSTEMS:  Constitutional:  Denies fever or chills, +dizziness and weakness   Eyes:  Denies change in visual acuity   HENT:  Denies nasal congestion or sore throat   Respiratory:  Denies cough or shortness of breath   Cardiovascular:  Denies chest pain or edema   GI:  Denies abdominal pain, nausea, vomiting, bloody stools or diarrhea   :  Denies dysuria, frequency, difficulty in micturition and nocturia  Musculoskeletal:  Denies back pain or joint pain   Neurologic:  Denies headache, focal weakness or sensory changes   Endocrine:  Denies polyuria or polydipsia   Lymphatic:  Denies swollen glands   Psychiatric:  Denies depression or anxiety     Historical Information   Past Medical History:   Diagnosis Date    Non-ST elevated myocardial infarction ([address] )     Poison ivy      Past Surgical History:   Procedure Laterality Date    CORONARY STENT PLACEMENT       Social History     Substance and Sexual Activity   Alcohol Use Never    Frequency: Never    Binge frequency: Never     Social History     Substance and Sexual Activity   Drug Use No     Social History     Tobacco Use   Smoking Status Former Smoker   Smokeless Tobacco Never Used   Tobacco Comment    Quit 50 years ago       Family History:   Family History   Problem Relation Age of Onset    No Known Problems Mother     No Known Problems Father        MEDS & ALLERGIES:  all current active meds have been reviewed and current meds: Current Facility-Administered Medications   Medication Dose Route Frequency    acetaminophen (TYLENOL) tablet 650 mg  650 mg Oral Q6H PRN    aspirin (ECOTRIN LOW STRENGTH) EC tablet 81 mg  81 mg Oral Daily    atorvastatin (LIPITOR) tablet 80 mg  80 mg Oral Daily With Dinner    clopidogrel (PLAVIX) tablet 75 mg  75 mg Oral Daily    colchicine (COLCRYS) tablet 0 6 mg  0 6 mg Oral BID    enoxaparin (LOVENOX) subcutaneous injection 40 mg  40 mg Subcutaneous Daily    magnesium hydroxide (MILK OF MAGNESIA) 400 mg/5 mL oral suspension 30 mL  30 mL Oral Daily PRN    metoprolol tartrate (LOPRESSOR) tablet 25 mg  25 mg Oral Q12H [address]    nitroglycerin (NITROSTAT) SL tablet 0 4 mg  0 4 mg Sublingual Q5 Min PRN    ondansetron (ZOFRAN) injection 4 mg  4 mg Intravenous Q6H PRN    sodium chloride 0 9 % infusion  125 mL/hr Intravenous Continuous       sodium chloride 125 mL/hr Last Rate: 125 mL/hr (11/15/19 0158) No Known Allergies    OBJECTIVE:  Vitals:   Vitals:    11/15/19 0929   BP: 106/64   Pulse: 81   Resp:    Temp:    SpO2: 99%     Body mass index is 18 51 kg/m²  Systolic (56KHW), YXL:672 , Min:69 , LSQ:963     Diastolic (49ULJ), HPS:85, Min:51, Max:74      Intake/Output Summary (Last 24 hours) at 11/15/2019 0953  Last data filed at 11/15/2019 0158  Gross per 24 hour   Intake 1000 ml   Output    Net 1000 ml     Weight (last 2 days)     Date/Time   Weight    11/14/19 2118   58 5 (128 97)            Invasive Devices     Peripheral Intravenous Line            Peripheral IV 11/14/19 Left Forearm 1 day    Peripheral IV 11/14/19 Ventral (anterior); Left;Medial Forearm less than 1 day                PHYSICAL EXAMS:  General:  Patient is not in acute distress, laying in the bed comfortably, awake, alert responding to commands  Head: Normocephalic, Atraumatic     HEENT: White sclera, pink conjunctiva  Neck:  Supple, no neck vein distention  Respiratory: clear to P/A  Cardiovascular:  PMI normal, S1-S2 normal, No  Murmurs, thrills, gallops, rubs, regular rhythm  GI:  Abdomen soft, non-tender, non-distended  Extremities: No edema, normal pulses  Integument:  No skin rashes or ulceration  Lymphatic:  No cervical or inguinal lymphadenopathy  Neurologic:  Patient is awake alert, responding to command, oriented to person, place and time     LABORATORY RESULTS:  Results from last 7 days   Lab Units 11/14/19 2158   TROPONIN I ng/mL <0 02     CBC with diff: Results from last 7 days   Lab Units 11/15/19  0531 11/14/19  2158   WBC Thousand/uL 6 64 5 88   HEMOGLOBIN g/dL 12 7 14 2   HEMATOCRIT % 38 2 43 3   MCV fL 87 87   PLATELETS Thousands/uL 151 186   MCH pg 28 9 28 5   MCHC g/dL 33 2 32 8   RDW % 13 2 13 2   MPV fL 12 3 12 7   NRBC AUTO /100 WBCs 0 0       CMP:  Results from last 7 days   Lab Units 11/15/19  0531 11/14/19 2158 11/13/19  0753   POTASSIUM mmol/L 4 1 3 8 4 4   CHLORIDE mmol/L 102 99* 100   CO2 mmol/L 26 24 28 BUN mg/dL 25 24 20   CREATININE mg/dL 1 26 1 19 1 10   CALCIUM mg/dL 8 7 9 1 9 3   AST U/L  --  18  --    ALT U/L  --  12  --    ALK PHOS U/L  --  86  --    EGFR ml/min/1 73sq m 56 60 66       BMP:  Results from last 7 days   Lab Units 11/15/19  0531 19  0753   POTASSIUM mmol/L 4 1 3 8 4 4   CHLORIDE mmol/L 102 99* 100   CO2 mmol/L 26 24 28   BUN mg/dL 25 24 20   CREATININE mg/dL 1 26 1 19 1 10   CALCIUM mg/dL 8 7 9 1 9 3       Results from last 7 days   Lab Units 19   NT-PRO BNP pg/mL 1,723*      Results from last 7 days   Lab Units 11/15/19  0531 19  0753   MAGNESIUM mg/dL 1 9 2 1     Results from last 7 days   Lab Units 11/15/19  0531   HEMOGLOBIN A1C % 5 6         Results from last 7 days   Lab Units 19   INR  1 11       Lipid Profile:   No results found for: CHOL  Lab Results   Component Value Date    HDL 54 10/20/2019     Lab Results   Component Value Date    LDLCALC 96 10/20/2019     Lab Results   Component Value Date    TRIG 60 10/20/2019       Cardiac testing:   Results for orders placed during the hospital encounter of 10/20/19   Echo complete with contrast if indicated    Narrative [address] [place] [address]  [phone]    Transthoracic Echocardiogram  2D, M-mode, Doppler, and Color Doppler    Study date:  20-Oct-2019    Patient: Freeman Valerio  MR number: [id number]  Account number: [de-identified]  : 1945  Age: 69 years  Gender: Male  Status: Inpatient  Location: Bedside  Height: 70 in  Weight: 133 lb  BP: 119/ 69 mmHg    Indications: Evaluate suspected myocardial infarction  Diagnoses: I21 4 - Non-ST elevation (NSTEMI) myocardial infarction    Sonographer:  Hannah Isaac RDCS  Referring Physician:  Marisol Cobb PA-C  Group:  [facility]  Interpreting Physician:  Bertha Flower MD    SUMMARY    LEFT VENTRICLE:  Ejection fraction was estimated to be 40 %  Inferior wall,apex, apical septum and lateral wall are hypokinetic  MITRAL VALVE:  There was trace regurgitation  TRICUSPID VALVE:  There was trace regurgitation  HISTORY: Symptoms: chest pain  PRIOR HISTORY: Shortness of breath, NSTEMI,    PROCEDURE: The procedure was performed at the bedside  This was a routine study  The transthoracic approach was used  The study included complete 2D imaging, M-mode, complete spectral Doppler, and color Doppler  The heart rate was 58 bpm,  at the start of the study  Images were obtained from the parasternal, apical, subcostal, and suprasternal notch acoustic windows  Echocardiographic views were limited due to low windows and lung interference  Image quality was adequate  LEFT VENTRICLE: Size was normal  Ejection fraction was estimated to be 40 %  Inferior wall,apex, apical septum and lateral wall are hypokinetic  RIGHT VENTRICLE: The size was normal  Systolic function was normal  Wall thickness was normal     LEFT ATRIUM: Size was normal     RIGHT ATRIUM: Size was normal     MITRAL VALVE: There was annular calcification  DOPPLER: There was trace regurgitation  AORTIC VALVE: The valve was not well visualized  DOPPLER: There was no evidence for stenosis  TRICUSPID VALVE: The valve structure was normal  There was normal leaflet separation  DOPPLER: The transtricuspid velocity was within the normal range  There was no evidence for stenosis  There was trace regurgitation  PULMONIC VALVE: Leaflets exhibited normal thickness, no calcification, and normal cuspal separation  DOPPLER: The transpulmonic velocity was within the normal range  There was no regurgitation  PERICARDIUM: There was no pericardial effusion  The pericardium was normal in appearance  AORTA: The root exhibited upper limit of normal size      SYSTEM MEASUREMENT TABLES    2D  %FS: 20 8 %  Ao Diam: 3 8 cm  EDV(Teich): 85 5 ml  EF(Teich): 42 6 %  ESV(Teich): 49 1 ml  IVSd: 0 8 cm  LA Area: 13 4 cm2  LA Diam: 3 5 cm  LVEDV MOD A4C: 104 ml  LVEF MOD A4C: 45 6 %  LVESV MOD A4C: 56 6 ml  LVIDd: 4 4 cm  LVIDs: 3 4 cm  LVLd A4C: 8 7 cm  LVLs A4C: 7 cm  LVPWd: 0 8 cm  RA Area: 12 cm2  RVIDd: 3 1 cm  SV MOD A4C: 47 5 ml  SV(Teich): 36 5 ml    MM  TAPSE: 1 6 cm    PW  E': 0 1 m/s  E/E': 6 6  MV A Antolin: 0 6 m/s  MV Dec [place]: 2 4 m/s2  MV DecT: 230 4 ms  MV E Antolin: 0 6 m/s  MV E/A Ratio: 0 9  MV PHT: 66 8 ms  MVA By PHT: 3 3 cm2    [employer] Accredited Echocardiography Laboratory    Prepared and electronically signed by    Mary Rollins MD  Signed 20-Oct-2019 19:40:30         Imaging:   I have personally reviewed pertinent reports  EKG reviewed personally:  None currently available for my review    Telemetry reviewed personally:   Sinus rhythm with a rate in the 70s      Code Status: Level 3 - DNAR and DNI    Counseling / Coordination of Care  Total floor / unit time spent today 20 minutes  Greater than 50% of total time was spent with the patient and / or family counseling and / or coordination of care  A description of the counseling / coordination of care: Review of history, current assessment, development of a plan      [address] [place] [address]  11/15/2019,9:53 AM

## 2019-11-15 NOTE — ED NOTES
1  Cc: CVA  2  Admission related to injury?: no  3  Orientation status: alert and oriented x4  4  Abnormal labs/abnormal focused assessment/vitals: See CT report, proBNP 1723  BP ranging in the 90s SLIM aware  5  Medication/drips: sodium chloride 125mL/hr  6  Time of last narcotics given: none  7  IV lines, drains, tubes: 20 L lower forearm and prehospital 20 upper L forearm   8  Isolation status: standard  9  Skin check: WNL  10  Ambulation status: assist x2, pt becomes dizzy upon standing  11   ED RN name and phone number: Sanjuanita Aguilera [phone] Regina Wang RN  11/15/19 6651

## 2019-11-15 NOTE — H&P
H&P- Sasha Hunting [id number], 69 y o  male MRN: [id number]    Unit/Bed#: ED 15 Encounter: [id number]    Primary Care Provider: Reese Velez DO   Date and time admitted to hospital: 11/14/2019  9:15 PM        * Dizziness  Assessment & Plan  · Reports intermittent dizziness and blurred vision to Lt eye since discharged on 10/24  Reports increased dizziness for the past 2 days with intermittent slurred speech, numbness to bilateral hands and feet Rt > Lt, and blurred vision in Rt eye on evening of admission  States that above symptoms usually occur with activity and resolve after resting  · CTA head/neck obtained in ED revealed no large vessel occlusion  Left frontal infarct, age indeterminate  · Echo10/20/19 revealed LVEF 40% with no aortic stenosis  Developed pericarditis and pericardial effusion s/p cardiac cath that was performed on 10/21/2019  F/U echocardiogram showed improved and was cleared for discharged home  · Was scheduled to have echocardiogram on 11/15/2019 due to current symptoms  Will complete while inpt  · MRI brain pending  · Check orthostatic VS    Near syncope  Assessment & Plan  · Reports severe dizziness on evening of admission and fell back into chair 2/2 associated weakness  Family reports became pale  · See A/P above    Paresthesias  Assessment & Plan  · Reports intermittent numbness in bilateral hands and feet Rt > Lt with inability to  pencil  · See A/P above    S/P primary angioplasty with coronary stent  Assessment & Plan  · Underwent cardiac cath with stent placement x 2 on 10/21/2019   Developed pericarditis and pericardial effusion post procedure  · Continue home dose Colchicine x 6 weeks total pre cardiology recommendation  · Continue home dose ASA, Lipitor, Plavix and Lopressor  · Echo pending as above          VTE Prophylaxis: Enoxaparin (Lovenox)  / sequential compression device   Code Status: Level 3 DNR/DNI  POLST: POLST form is not discussed and not completed at this time  Discussion with family: NA    Anticipated Length of Stay:  Patient will be admitted on an Inpatient basis with an anticipated length of stay of  Greater than 2 midnights  Justification for Hospital Stay: See AP above    Total Time for Visit, including Counseling / Coordination of Care: 30 minutes  Greater than 50% of this total time spent on direct patient counseling and coordination of care  Chief Complaint:   Dizziness with near syncope  Bilateral hand and feet numbness    History of Present Illness:    Adelia Barton  is a 69 y o  male who presents with severe dizziness and near syncope at 200 tonight  Also with c/o intermittent bilateral hand and feet numbness Rt > Lt  States also has intermittent slurred speech and blurred vision in Rt ear earlier tonight  Reports that prior to cardiac cath on 10/21/2019 had been having intermittent blurred vision Lt eye, but that has resolved  States all of the above symptoms occur during periods of activity and resolve with rest  States that when he became dizzy earlier he fell back into chair and family states he became very pale  Denies HA  Denies focal weakness, but states that when Rt hand becomes numb he is unable to  a pencil  Review of Systems:    Review of Systems   Constitutional: Positive for chills  Negative for activity change, appetite change and fever  HENT: Negative for congestion, ear pain, sinus pressure and sore throat  Eyes: Positive for visual disturbance  Respiratory: Negative for cough, shortness of breath and wheezing  Cardiovascular: Positive for chest pain  Negative for palpitations and leg swelling  Gastrointestinal: Negative for abdominal pain, constipation, diarrhea, nausea and vomiting  Genitourinary: Negative for difficulty urinating, dysuria and frequency  Musculoskeletal: Negative for neck pain and neck stiffness  Neurological: Positive for dizziness, weakness and numbness   Negative for syncope and headaches  All other systems reviewed and are negative  Past Medical and Surgical History:     Past Medical History:   Diagnosis Date    Non-ST elevated myocardial infarction ([facility] [address] )     Poison ivy        Past Surgical History:   Procedure Laterality Date    CORONARY STENT PLACEMENT         Meds/Allergies:    Prior to Admission medications    Medication Sig Start Date End Date Taking? Authorizing Provider   aspirin (ECOTRIN LOW STRENGTH) 81 mg EC tablet Take 1 tablet (81 mg total) by mouth daily Please purchase over the counter at her local pharmacy 10/25/19   HUSSAIN Kent   atorvastatin (LIPITOR) 80 mg tablet Take 1 tablet (80 mg total) by mouth daily with dinner 10/24/19   HUSSAIN Kent   benzonatate (TESSALON PERLES) 100 mg capsule Take 1 capsule (100 mg total) by mouth 3 (three) times a day as needed for cough  Patient not taking: Reported on 11/6/2019 10/24/19   HUSSAIN Kent   clopidogrel (PLAVIX) 75 mg tablet Take 1 tablet (75 mg total) by mouth daily 10/25/19   HUSSAIN Kent   colchicine (COLCRYS) 0 6 mg tablet Take 1 tablet (0 6 mg total) by mouth 2 (two) times a day 10/24/19   HUSSAIN Kent   furosemide (LASIX) 20 mg tablet Take 1 tablet (20 mg total) by mouth 2 (two) times a day 11/6/19   Elena Ambrose DO   metoprolol tartrate (LOPRESSOR) 25 mg tablet Take 1 tablet (25 mg total) by mouth every 12 (twelve) hours 10/24/19   HUSSAIN Kent   nitroglycerin (NITROSTAT) 0 4 mg SL tablet Place 1 tablet (0 4 mg total) under the tongue every 5 (five) minutes as needed for chest pain 10/24/19   HUSSAIN Kent     I have reviewed home medications with patient personally  Allergies: No Known Allergies    Social History:     Marital Status:     Patient Pre-hospital Living Situation: Lives with daughter  Patient Pre-hospital Level of Mobility: Ambulatory w/o assistive device  Patient Pre-hospital Diet Restrictions: Low Na  Substance Use History: Social History     Substance and Sexual Activity   Alcohol Use Never    Frequency: Never    Binge frequency: Never     Social History     Tobacco Use   Smoking Status Former Smoker   Smokeless Tobacco Never Used   Tobacco Comment    Quit 50 years ago     Social History     Substance and Sexual Activity   Drug Use No       Family History:    Family History   Problem Relation Age of Onset    No Known Problems Mother     No Known Problems Father        Physical Exam:     Vitals:   Blood Pressure: 107/60 (11/15/19 0230)  Pulse: 72 (11/15/19 0230)  Temperature: 97 5 °F (36 4 °C) (11/14/19 2118)  Temp Source: Oral (11/14/19 2118)  Respirations: 20 (11/15/19 0230)  Weight - Scale: 58 5 kg (128 lb 15 5 oz) (11/14/19 2118)  SpO2: 98 % (11/15/19 0230)    Physical Exam   Constitutional: He is oriented to person, place, and time  He appears well-developed and well-nourished  No distress  HENT:   Head: Normocephalic and atraumatic  Eyes: Pupils are equal, round, and reactive to light  EOM are normal    Neck: Normal range of motion  Neck supple  Cardiovascular: Normal rate, regular rhythm, normal heart sounds and intact distal pulses  Exam reveals no gallop and no friction rub  No murmur heard  Pulmonary/Chest: Effort normal and breath sounds normal  No respiratory distress  He has no wheezes  He has no rales  Abdominal: Soft  Bowel sounds are normal  There is no tenderness  There is no rebound and no guarding  Musculoskeletal: Normal range of motion  He exhibits no edema or tenderness  Neurological: He is alert and oriented to person, place, and time  Skin: Skin is warm and dry  Psychiatric: He has a normal mood and affect  His behavior is normal  Thought content normal          Additional Data:     Lab Results: I have personally reviewed pertinent reports        Results from last 7 days   Lab Units 11/14/19  2158   WBC Thousand/uL 5 88   HEMOGLOBIN g/dL 14 2   HEMATOCRIT % 43 3   PLATELETS Thousands/uL 186   NEUTROS PCT % 59   LYMPHS PCT % 24   MONOS PCT % 13*   EOS PCT % 2     Results from last 7 days   Lab Units 11/14/19  2158   SODIUM mmol/L 135*   POTASSIUM mmol/L 3 8   CHLORIDE mmol/L 99*   CO2 mmol/L 24   BUN mg/dL 24   CREATININE mg/dL 1 19   ANION GAP mmol/L 12   CALCIUM mg/dL 9 1   ALBUMIN g/dL 3 5   TOTAL BILIRUBIN mg/dL 0 70   ALK PHOS U/L 86   ALT U/L 12   AST U/L 18   GLUCOSE RANDOM mg/dL 96     Results from last 7 days   Lab Units 11/14/19  2158   INR  1 11                   Imaging: I have personally reviewed pertinent reports  and I have personally reviewed pertinent films in PACS    CTA head and neck with and without contrast   Final Result by Mary Davalos MD (11/14 2230)         1  No evidence of acute intracranial hemorrhage  2  Focus new low-attenuation left frontal centrum semiovale measuring approximately 8 mm, age indeterminate ischemic event  Follow-up noncontrast brain MRI recommended for further evaluation  3  Old infarcts and microangiopathy  4   Right posterior apical spiculated 9 mm density could represent a nodule or scarring  Follow-up noncontrast chest CT recommended in 3 months  Workstation performed: [id number]         XR chest 1 view portable    (Results Pending)   MRI Inpatient Order    (Results Pending)       EKG, Pathology, and Other Studies Reviewed on Admission:   · EKG: NA    Allscripts / Epic Records Reviewed: Yes     ** Please Note: This note has been constructed using a voice recognition system   **

## 2019-11-15 NOTE — ED NOTES
Pt provided incentive spirometry and re-educated on proper use       Mathieu Montejo RN  11/15/19 1208

## 2019-11-15 NOTE — ASSESSMENT & PLAN NOTE
· Reports severe dizziness on evening of admission and fell back into chair 2/2 associated weakness  Family reports became pale    · See A/P above

## 2019-11-15 NOTE — ASSESSMENT & PLAN NOTE
· Underwent cardiac cath with stent placement x 2 on 10/21/2019   Developed pericarditis and pericardial effusion post procedure  · Continue home dose Colchicine x 6 weeks total pre cardiology recommendation  · Continue home dose ASA, Lipitor, Plavix and Lopressor  · Echo pending as above

## 2019-11-15 NOTE — PLAN OF CARE
Problem: Potential for Falls  Goal: Patient will remain free of falls  Description  INTERVENTIONS:  - Assess patient frequently for physical needs  -  Identify cognitive and physical deficits and behaviors that affect risk of falls    -  Price fall precautions as indicated by assessment   - Educate patient/family on patient safety including physical limitations  - Instruct patient to call for assistance with activity based on assessment  - Modify environment to reduce risk of injury  - Consider OT/PT consult to assist with strengthening/mobility  Outcome: Progressing

## 2019-11-15 NOTE — PHYSICAL THERAPY NOTE
PT Evaluation  09:06-09:21 (15 min)    Past Medical History:   Diagnosis Date    Non-ST elevated myocardial infarction [facility])     Poison ivy           11/15/19 0906   Note Type   Note type Eval only   Pain Assessment   Pain Assessment No/denies pain   Pain Score No Pain   Home Living   Type of Home Mobile home   Home Layout One level  (2-3 TAI with post)   Bathroom Shower/Tub Tub/shower unit   Bathroom Toilet Standard   Bathroom Equipment   (None)   Bathroom Accessibility Accessible   Additional Comments Sitting BP at beginning of session 109/68  Standing BP 99/106  Supine BP at end of session 121/80   Prior Function   Level of [place] Independent with ADLs and functional mobility   Lives With Daughter;Family   Receives Help From Family   ADL Assistance Independent   IADLs Independent   Falls in the last 6 months 0   Vocational Retired   Comments (+) drives sometimes; dtr doesn't want him to   Restrictions/Precautions   Weight Bearing Precautions Per Order No   Other Precautions Bed Alarm;Multiple lines;O2;Fall Risk   General   Additional Pertinent History Pt is a 69 y o  male presenting to [facility] with c/o dizziness and lightheadedness  Pt states that he has been feeling this way over the last few days and states he "feels like he could passout" if he doesn't reach a chair to sit down in time  PT consulted for mobility + d/c planning   Pt agreeable to PT eval     Family/Caregiver Present No   Cognition   Orientation Level Oriented to person;Oriented to place;Oriented to situation;Disoriented to time  (knows month/year, not day)   RUE Assessment   RUE Assessment   (defer to OT eval)   LUE Assessment   LUE Assessment   (defer to OT eval)   RLE Assessment   RLE Assessment WFL  (gross MMT 4+/5)   LLE Assessment   LLE Assessment WFL  (gross MMT 4+/5)   Light Touch   RLE Light Touch Grossly intact   LLE Light Touch Grossly intact   Bed Mobility   Supine to Sit Unable to assess  (Pt seated at EOB upon arrival)   Sit to Supine 5  Supervision   Additional items Assist x 1; Increased time required;Verbal cues   Transfers   Sit to Stand 4  Minimal assistance   Additional items Assist x 1; Increased time required;Verbal cues   Stand to Sit 4  Minimal assistance   Additional items Assist x 1; Increased time required;Verbal cues   Ambulation/Elevation   Gait pattern Forward Flexion;Decreased foot clearance; Short stride; Excessively slow   Gait Assistance 4  Minimal assist   Additional items Assist x 1;Verbal cues   Assistive Device None   Distance 3'x2   (side stepping at bedside; limited by dizziness)   Balance   Static Sitting Fair +   Dynamic Sitting Fair   Static Standing Fair   Dynamic Standing Poor +   Ambulatory Poor +   Higher level balance Side stepping   Higher level balance rep range to the L and to the R  (3')   Endurance Deficit   Endurance Deficit Yes   Activity Tolerance   Activity Tolerance Patient limited by fatigue   Medical Staff Made Aware OT Zuleima Joy made aware of session outcomes  Pt supine in bed with call bell and bed alarm on at end of session  Assessment   Prognosis Good   Problem List Decreased strength;Decreased endurance; Impaired balance;Decreased mobility   Assessment Pt is a 69 y o  male presenting to [facility] with c/o dizziness and lightheadedness over the last few days  Pt has PMH significant for NSTEMI, paresthesias, CAD, and previous coronary stenting  Pt reports living in a mobile home with his daughter and family with 2-3 TAI and a post to hold onto for support  Pt states he is independent with ADLs/IADLs at baseline and does not use any AD in the home or in the community  He has never fallen, however states that when he gets lightheaded, he feels that he may "pass out" if he doesn't get to a chair  Pt agreeable to PT eval  PT examination findings revealed intact B/L LE sensation and gross MMT 4+/5   Pt able to perform sit>supine with supervision, however required increased time 2* to symptoms  Pt required min (A)x1 for sit<>stand transfers and ambulation tasks  Pt ambulated 3'x2 at bedside by side stepping to L/R  Pt reported feeling not as lightheaded as when he came in  Sitting BP at beginning of session was 109/68  Standing BP during session was 99/66  Supine BP at end of session was 121/80  Barthel Index 55/100 and Modified [place] 4  Pt is a good candidate for skilled PT while here to address to above deficits and facilitate return to PLOF  PT eval of high complexity due to PMH, symptomatic orthostatic hypotension, and current status affecting his POC  Upon d/c, recommend HHPT   Barriers to Discharge Inaccessible home environment   Goals   Patient Goals "to not feel lightheaded"   STG Expiration Date 11/25/19   Short Term Goal #1 1  increase strength 1/2 grade to assist functional mobility  2  perform bed mobility and transfer tasks mod (I)/(I) with no or least restrictive device to decrease caregiver burden  3  ambulate 300' mod (I)/(I) with no or least restrictive device for household and community ambulation  4  navigate 3 stairs mod (I)/(I) with no or least restrictive device to safely enter and exit the home    PT Treatment Day 0   Plan   Treatment/Interventions Functional transfer training;LE strengthening/ROM; Elevations; Therapeutic exercise; Endurance training;Patient/family training;Bed mobility;Gait training;Spoke to nursing;OT;Equipment eval/education   PT Frequency Other (Comment)  (3-5x/week)   Recommendation   Recommendation Home PT   PT - OK to Discharge Yes  (when medically cleared)   Modified [place] Scale   Modified [place] Scale 4   Barthel Index   Feeding 10   Bathing 0   Grooming Score 5   Dressing Score 5   Bladder Score 10   Bowels Score 10   Toilet Use Score 5   Transfers (Bed/Chair) Score 10   Mobility (Level Surface) Score 0   Stairs Score 0   Barthel Index Score 55       Shahla Nava,SPT

## 2019-11-15 NOTE — ED NOTES
Pt became lightheaded and dizzy while obtaining standing orthostatic vitals        Pily Osuna RN  11/15/19 9076

## 2019-11-15 NOTE — ASSESSMENT & PLAN NOTE
· Reports intermittent numbness in bilateral hands and feet Rt > Lt with inability to  pencil    · See A/P above

## 2019-11-15 NOTE — PLAN OF CARE
Problem: OCCUPATIONAL THERAPY ADULT  Goal: Performs self-care activities at highest level of function for planned discharge setting  See evaluation for individualized goals  Description  Treatment Interventions: ADL retraining, Functional transfer training, Endurance training, Patient/family training, Neuromuscular reeducation, Cardiac education, Energy conservation, Activityengagement, Compensatory technique education          See flowsheet documentation for full assessment, interventions and recommendations  Note:   Limitation: Decreased ADL status, Decreased Safe judgement during ADL, Decreased endurance, Decreased self-care trans, Decreased high-level ADLs  Prognosis: Good  Assessment: Patient is a 69 y o  male seen for OT evaluation s/p admit to [address] [place] [address] on 11/14/2019 w/Dizziness  Commorbidities affecting patient's functional performance at time of assessment include: Near syncope, paresthesias to bilateral hands and feet, S/P primary angioplasty with coronary stent  Neurologist impression: Near syncope, most likely secondary related to hypotension, possibly cardiac in origin  Orders placed for OT evaluation and treatment  Performed at least two patient identifiers during session including name and wristband  Prior to admission, Patient reported independent with ADLs/ IADLs, lives with daughter and family in a Mobile home with 2-3 steps to enter with post  Patient ambulates independently with no AD  Personal factors affecting patient at time of initial evaluation include: steps to enter, limited insight into deficits, difficulty performing ADLs and difficulty performing IADLs  Upon evaluation, patient requires supervision, set up and contact guard assist for UB ADLs, minimal  assist for LB ADLs   Occupational performance is affected by the following deficits: flat affect, impulsive behavior, decreased functional use of BUEs, degenerative arthritic joint changes, impaired gross motor coordination, dynamic sit/ stand balance deficit with poor standing tolerance time for self care and functional mobility, decreased activity tolerance, decreased safety awareness, increased pain and postural control and postural alignment deficit, requiring external assistance to complete transitional movements  Therapist completed  extensive additional review of medical records and additional review of physical, cognitive or psychosocial history, clinical examination identifying 5 or more performance deficits, clinical decision making of a high complexity , consistent with a high complexity level evaluation  Patient to benefit from continued Occupational Therapy treatment while in the hospital to address deficits as defined above and maximize level of functional independence with ADLs and functional mobility  Occupational Performance areas to address include: grooming , bathing/ shower, dressing, toilet hygiene, transfer to all surfaces, functional ambulation, functional mobility, health maintenance, medication routine/ management, IADLS: Household maintenance, IADLs: safety procedures, IADLs: meal prep/ clean up, Leisure Participation and Social participation        OT Discharge Recommendation: Short Term Rehab

## 2019-11-16 LAB
ANION GAP SERPL CALCULATED.3IONS-SCNC: 7 MMOL/L (ref 4–13)
ATRIAL RATE: 61 BPM
BASOPHILS # BLD AUTO: 0.02 THOUSANDS/ΜL (ref 0–0.1)
BASOPHILS NFR BLD AUTO: 0 % (ref 0–1)
BUN SERPL-MCNC: 24 MG/DL (ref 5–25)
CALCIUM SERPL-MCNC: 8.9 MG/DL (ref 8.3–10.1)
CHLORIDE SERPL-SCNC: 103 MMOL/L (ref 100–108)
CHOLEST SERPL-MCNC: 68 MG/DL (ref 50–200)
CO2 SERPL-SCNC: 27 MMOL/L (ref 21–32)
CREAT SERPL-MCNC: 1.1 MG/DL (ref 0.6–1.3)
EOSINOPHIL # BLD AUTO: 0.27 THOUSAND/ΜL (ref 0–0.61)
EOSINOPHIL NFR BLD AUTO: 6 % (ref 0–6)
ERYTHROCYTE [DISTWIDTH] IN BLOOD BY AUTOMATED COUNT: 13.5 % (ref 11.6–15.1)
GFR SERPL CREATININE-BSD FRML MDRD: 66 ML/MIN/1.73SQ M
GLUCOSE SERPL-MCNC: 88 MG/DL (ref 65–140)
HCT VFR BLD AUTO: 36.3 % (ref 36.5–49.3)
HDLC SERPL-MCNC: 31 MG/DL
HGB BLD-MCNC: 11.8 G/DL (ref 12–17)
IMM GRANULOCYTES # BLD AUTO: 0.01 THOUSAND/UL (ref 0–0.2)
IMM GRANULOCYTES NFR BLD AUTO: 0 % (ref 0–2)
LDLC SERPL CALC-MCNC: 22 MG/DL (ref 0–100)
LYMPHOCYTES # BLD AUTO: 0.77 THOUSANDS/ΜL (ref 0.6–4.47)
LYMPHOCYTES NFR BLD AUTO: 17 % (ref 14–44)
MCH RBC QN AUTO: 28.4 PG (ref 26.8–34.3)
MCHC RBC AUTO-ENTMCNC: 32.5 G/DL (ref 31.4–37.4)
MCV RBC AUTO: 88 FL (ref 82–98)
MONOCYTES # BLD AUTO: 0.69 THOUSAND/ΜL (ref 0.17–1.22)
MONOCYTES NFR BLD AUTO: 15 % (ref 4–12)
NEUTROPHILS # BLD AUTO: 2.79 THOUSANDS/ΜL (ref 1.85–7.62)
NEUTS SEG NFR BLD AUTO: 62 % (ref 43–75)
NONHDLC SERPL-MCNC: 37 MG/DL
NRBC BLD AUTO-RTO: 0 /100 WBCS
P AXIS: 69 DEGREES
PLATELET # BLD AUTO: 113 THOUSANDS/UL (ref 149–390)
PMV BLD AUTO: 12.2 FL (ref 8.9–12.7)
POTASSIUM SERPL-SCNC: 4.1 MMOL/L (ref 3.5–5.3)
PR INTERVAL: 164 MS
QRS AXIS: 88 DEGREES
QRSD INTERVAL: 80 MS
QT INTERVAL: 424 MS
QTC INTERVAL: 426 MS
RBC # BLD AUTO: 4.15 MILLION/UL (ref 3.88–5.62)
SODIUM SERPL-SCNC: 137 MMOL/L (ref 136–145)
T WAVE AXIS: 108 DEGREES
TRIGL SERPL-MCNC: 76 MG/DL
VENTRICULAR RATE: 61 BPM
WBC # BLD AUTO: 4.55 THOUSAND/UL (ref 4.31–10.16)

## 2019-11-16 PROCEDURE — 80061 LIPID PANEL: CPT | Performed by: PSYCHIATRY & NEUROLOGY

## 2019-11-16 PROCEDURE — 99232 SBSQ HOSP IP/OBS MODERATE 35: CPT | Performed by: INTERNAL MEDICINE

## 2019-11-16 PROCEDURE — 85025 COMPLETE CBC W/AUTO DIFF WBC: CPT | Performed by: INTERNAL MEDICINE

## 2019-11-16 PROCEDURE — 93010 ELECTROCARDIOGRAM REPORT: CPT | Performed by: INTERNAL MEDICINE

## 2019-11-16 PROCEDURE — 80048 BASIC METABOLIC PNL TOTAL CA: CPT | Performed by: INTERNAL MEDICINE

## 2019-11-16 PROCEDURE — 99232 SBSQ HOSP IP/OBS MODERATE 35: CPT | Performed by: PSYCHIATRY & NEUROLOGY

## 2019-11-16 RX ADMIN — CLOPIDOGREL 75 MG: 75 TABLET, FILM COATED ORAL at 09:44

## 2019-11-16 RX ADMIN — COLCHICINE 0.6 MG: 0.6 TABLET, FILM COATED ORAL at 17:06

## 2019-11-16 RX ADMIN — ENOXAPARIN SODIUM 40 MG: 40 INJECTION SUBCUTANEOUS at 09:46

## 2019-11-16 RX ADMIN — COLCHICINE 0.6 MG: 0.6 TABLET, FILM COATED ORAL at 09:45

## 2019-11-16 RX ADMIN — ATORVASTATIN CALCIUM 80 MG: 40 TABLET, FILM COATED ORAL at 17:06

## 2019-11-16 RX ADMIN — ASPIRIN 81 MG: 81 TABLET, COATED ORAL at 09:45

## 2019-11-16 NOTE — PROGRESS NOTES
Progress Note - Neurology   Osmani Prude  69 y o  male MRN: [id number]  Unit/Bed#: -01 Encounter: [id number]      Subjective:   Patient seen at bedside, alert awake oriented, family at bedside, patient describes feeling extremely fatigued and wiped out had 2 bouts of diarrhea last night, feels he has nasal stuffiness and has difficulty breathing, and denies any recurrent episodes of slurred speech, blurred vision or right-sided numbness  MRI of the brain was done which reveals evidence of chronic to subacute left frontal subcortical CVA, but no evidence of any acute CVA  EEG done was also normal     ROS: 12 system cued query was unchanged from org  consult note  Vitals:   Vitals:    11/16/19 1115   BP: 105/59   Pulse:    Resp:    Temp:    SpO2:    ,Body mass index is 18 69 kg/m²      MEDS:    Current Facility-Administered Medications:     acetaminophen (TYLENOL) tablet 650 mg, 650 mg, Oral, Q6H PRN, Swapna Powell PA-C    aspirin (ECOTRIN LOW STRENGTH) EC tablet 81 mg, 81 mg, Oral, Daily, Maximilian Evans PA-C, 81 mg at 11/16/19 0945    atorvastatin (LIPITOR) tablet 80 mg, 80 mg, Oral, Daily With Dinner, Swapna Powell PA-C, 80 mg at 11/15/19 1625    clopidogrel (PLAVIX) tablet 75 mg, 75 mg, Oral, Daily, Maximilian Evans PA-C, 75 mg at 11/16/19 0944    colchicine (COLCRYS) tablet 0 6 mg, 0 6 mg, Oral, BID, Maximilian Evans PA-C, 0 6 mg at 11/16/19 0945    enoxaparin (LOVENOX) subcutaneous injection 40 mg, 40 mg, Subcutaneous, Daily, Maximilian Evans PA-C, 40 mg at 11/16/19 4727    loperamide (IMODIUM) capsule 2 mg, 2 mg, Oral, Q4H PRN, Chuy Pagan MD, 2 mg at 11/15/19 2044    magnesium hydroxide (MILK OF MAGNESIA) 400 mg/5 mL oral suspension 30 mL, 30 mL, Oral, Daily PRN, Swapna Powell PA-C    metoprolol tartrate (LOPRESSOR) partial tablet 12 5 mg, 12 5 mg, Oral, Q12H Nora YANEZ CRNP    nitroglycerin (NITROSTAT) SL tablet 0 4 mg, 0 4 mg, Sublingual, Q5 Min PRN, Trenda Moder Thad Reynolds PA-C    ondansetron [facility] injection 4 mg, 4 mg, Intravenous, Q6H PRN, Luis Unger PA-C    Physical Exam:  General appearance: alert, appears stated age and cooperative  Head: Normocephalic, without obvious abnormality, atraumatic    Neurologic:  On examination patient has no evidence of any speech dysfunction, cranial nerves 2-12 intact, motor and sensory exam reveals no evidence of any drift, or focal weakness in the upper or lower extremities, deep tendon reflexes are bilaterally 1+, toes are silent, and on sensory exam patient has diminished sensation in the right hand to pinprick and light touch as compared to the left  No evidence of any dysmetria, gait was normal based  No bruits were appreciable in the neck  Lab Results: I have personally reviewed pertinent reports  Imaging Studies: I have personally reviewed pertinent reports  Assessment:  1  Near syncope with hypotension, probably related to his medications, patient also was seen by Cardiology and metoprolol is on hold at this time  2  Posterior circulation TIA  Plan: Will advised to continue Plavix and aspirin at this time, along with lipid-lowering agents, and case was discussed with [facility] team regarding his severe fatigue, and hypotension  Will follow up this patient with you  11/16/2019,11:48 AM    Dictation voice to text software has been used in the creation of this document  Please consider this in light of any contextual or grammatical errors

## 2019-11-16 NOTE — PROGRESS NOTES
[facility] Progress Note  Patient: Wade Rausch  69 y o  male   MRN: [id number]  PCP: Mayra Paula DO  Unit/Bed#: MS 209[facility] Encounter: [id number]  Date Of Visit: 19    Assessment:    Principal Problem:    Dizziness  Active Problems:    S/P primary angioplasty with coronary stent    Paresthesias    Near syncope      Plan:    · 1  Near syncope- possibly secondary to orthostatic hypotension  Metoprolol was decreased to 12 5mg BID as per cardiology  Patient blood pressure still on lower side  Will f/u with cardiology recommendation  · 2  Blurry vision and right sided numbness- neurology following  Patient MRI brain shows chronic to subacute left frontal subcortical CVA  No evidence of acute CVA  Patient advised to continue to continue aspirin and plavix  · 3  History of CAD s/p 2 stent placed recently 10/21/19  · 4  Borderline low bp- patient on metoprolol for recent CAD s/p stent  Will f/u with cardiology recommendations  VTE Pharmacologic Prophylaxis:   Pharmacologic: Enoxaparin (Lovenox)  Mechanical VTE Prophylaxis in Place: Yes    Patient Centered Rounds: I have performed bedside rounds with nursing staff today  Discussions with Specialists or Other Care Team Provider:     Education and Discussions with Family / Patient:     Time Spent for Care: 20 minutes  More than 50% of total time spent on counseling and coordination of care as described above  Current Length of Stay: 2 day(s)    Current Patient Status: Inpatient   Certification Statement: The patient will continue to require additional inpatient hospital stay due to dizziness    Discharge Plan / Estimated Discharge Date: once cardiology and neurology workup finished    Code Status: Level 3 - DNAR and DNI      Subjective:   Patient seen and examined at bedside  Patient has no new complaints      Objective:     Vitals:   Temp (24hrs), Av 3 °F (36 8 °C), Min:97 8 °F (36 6 °C), Max:99 1 °F (37 3 °C)    Temp:  [97 8 °F (36 6 °C)-99 1 °F (37 3 °C)] 97 8 °F (36 6 °C)  HR:  [75-95] 75  Resp:  [16-18] 18  BP: ()/(55-80) 105/59  SpO2:  [98 %-99 %] 99 %  Body mass index is 18 69 kg/m²  Input and Output Summary (last 24 hours): Intake/Output Summary (Last 24 hours) at 11/16/2019 1338  Last data filed at 11/16/2019 0201  Gross per 24 hour   Intake    Output 600 ml   Net -600 ml       Physical Exam:     Physical Exam   Constitutional: He is oriented to person, place, and time  He appears well-developed and well-nourished  HENT:   Head: Normocephalic and atraumatic  Eyes: Pupils are equal, round, and reactive to light  EOM are normal    Neck: Normal range of motion  Neck supple  No JVD present  No tracheal deviation present  No thyromegaly present  Cardiovascular: Normal rate, regular rhythm and normal heart sounds  Exam reveals no gallop and no friction rub  No murmur heard  Pulmonary/Chest: Effort normal and breath sounds normal  No stridor  No respiratory distress  He has no wheezes  Abdominal: Soft  Bowel sounds are normal  He exhibits no distension  There is no tenderness  There is no guarding  Musculoskeletal: Normal range of motion  He exhibits no edema  Neurological: He is alert and oriented to person, place, and time  Skin: Skin is warm and dry  Vitals reviewed            Additional Data:     Labs:    Results from last 7 days   Lab Units 11/16/19  0454   WBC Thousand/uL 4 55   HEMOGLOBIN g/dL 11 8*   HEMATOCRIT % 36 3*   PLATELETS Thousands/uL 113*   NEUTROS PCT % 62   LYMPHS PCT % 17   MONOS PCT % 15*   EOS PCT % 6     Results from last 7 days   Lab Units 11/16/19  0454  11/14/19  2158   POTASSIUM mmol/L 4 1   < > 3 8   CHLORIDE mmol/L 103   < > 99*   CO2 mmol/L 27   < > 24   BUN mg/dL 24   < > 24   CREATININE mg/dL 1 10   < > 1 19   CALCIUM mg/dL 8 9   < > 9 1   ALK PHOS U/L  --   --  86   ALT U/L  --   --  12   AST U/L  --   --  18    < > = values in this interval not displayed  Results from last 7 days   Lab Units 11/14/19  2158   INR  1 11       * I Have Reviewed All Lab Data Listed Above  * Additional Pertinent Lab Tests Reviewed: Chris [facility] Admission Reviewed    Imaging:    Imaging Reports Reviewed Today Include:   Imaging Personally Reviewed by Myself Includes:      Recent Cultures (last 7 days):           Last 24 Hours Medication List:     Current Facility-Administered Medications:  acetaminophen 650 mg Oral Q6H PRN Francis Carlson PA-C   aspirin 81 mg Oral Daily DAVID Redd-IRON   atorvastatin 80 mg Oral Daily With [address], [facility]   clopidogrel 75 mg Oral Daily Francis Carlson PA-C   colchicine 0 6 mg Oral BID Maximilian Evans PA-C   enoxaparin 40 mg Subcutaneous Daily Francis Carlson PA-C   loperamide 2 mg Oral Q4H PRN Elida Dobbins MD   magnesium hydroxide 30 mL Oral Daily PRN Francis Carlson PA-C   metoprolol tartrate 12 5 mg Oral Q12H [address] [place]   nitroglycerin 0 4 mg Sublingual Q5 Min PRN Francis Carlson PA-C   ondansetron 4 mg Intravenous Q6H PRN Francis Carlson PA-C        Today, Patient Was Seen By: Elida Dobbins MD    ** Please Note: This note has been constructed using a voice recognition system   **

## 2019-11-16 NOTE — PROGRESS NOTES
General Cardiology   Progress Note   Jenae Dale  69 y o  male MRN: [id number]  Unit/Bed#: -01 Encounter: [id number]    Assessment/Plan:  1  Near syncope  -patient continues to blood pressures on the lower side and still with complaints of double vision and dizziness  -limited TTE revealed an improved EF of 50-55%, pericardial effusion noted to be trace  -continue telemetry monitoring  -continue metoprolol tartrate at decreased dosage of 12 5 mg b i d   -Neurology on board    2  CAD S/P LUDIVINA x2 to distal LAD and ostial 1st diagonal branch  -continue aspirin, statin, Plavix, metoprolol tartrate  -cardiac diet  -telemetry monitoring    3  Ischemic cardiomyopathy with a recovered EF of 50-55%  -continue beta-blocker  -continue outpatient follow-up as previously planned    4  Post PCI pericarditis  -TTE yesterday with trace pericardial effusion  -continue colchicine  -continue outpatient follow-up as previously planned    Subjective:   Patient seen and examined  No significant events since the last encounter  He is still complaining of double vision and dizziness both with lying at and moving    REVIEW OF SYSTEMS:  Constitutional:  Denies fever or chills, + dizziness   Eyes:  Denies change in visual acuity   HENT:  Denies nasal congestion or sore throat   Respiratory:  Denies cough or shortness of breath   Cardiovascular:  Denies chest pain or edema   GI:  Denies abdominal pain, nausea, vomiting, bloody stools, constipation or diarrhea   :  Denies dysuria, frequency, difficulty in urination or nocturia  Musculoskeletal:  Denies back pain or joint pain   Neurologic:  Denies headache, focal weakness or sensory changes , +blurred vision  Endocrine:  Denies polyuria or polydipsia   Lymphatic:  Denies swollen glands   Psychiatric:  Denies depression or anxiety     Objective:   Vitals:  Vitals:    11/16/19 1115   BP: 105/59   Pulse:    Resp:    Temp:    SpO2:        Body mass index is 18 69 kg/m²    Systolic (24hrs), Av , Min:99 , [id number]     Diastolic (08ZOX), CDU:06, Min:55, Max:99      Intake/Output Summary (Last 24 hours) at 2019 1231  Last data filed at 2019 0201  Gross per 24 hour   Intake    Output 900 ml   Net -900 ml     Weight (last 2 days)     Date/Time   Weight    19 0600   57 4 (126 54)    19 2118   58 5 (128 97)              Telemetry Review: No significant arrhythmias seen on telemetry review  Sinus rhythm in the 70s to 80s        PHYSICAL EXAMS  General:  Patient is not in acute distress, laying in the bed comfortably, awake, alert responding to commands  Head: Normocephalic, Atraumatic     HEENT: White sclera, pink conjunctiva  Neck:  Supple, no neck vein distention  Respiratory: clear to P/A  Cardiovascular: S1-S2 normal, no murmurs, thrills, gallops, rubs, regular rhythm  GI:  Abdomen soft, nontender, non-distended  Extremities: No edema, normal pulses  Integument:  No skin rashes or ulceration  Neurologic:  Patient is awake alert, responding to command, oriented to person, place and time    Nd time   LABORATORY RESULTS:  Results from last 7 days   Lab Units 19   TROPONIN I ng/mL <0 02     CBC with diff:   Results from last 7 days   Lab Units 11/16/19  0454 11/15/19  0531 11/14/19  2158   WBC Thousand/uL 4 55 6 64 5 88   HEMOGLOBIN g/dL 11 8* 12 7 14 2   HEMATOCRIT % 36 3* 38 2 43 3   MCV fL 88 87 87   PLATELETS Thousands/uL 113* 151 186   MCH pg 28 4 28 9 28 5   MCHC g/dL 32 5 33 2 32 8   RDW % 13 5 13 2 13 2   MPV fL 12 2 12 3 12 7   NRBC AUTO /100 WBCs 0 0 0       CMP:  Results from last 7 days   Lab Units 11/16/19  0454 11/15/19  0531 11/14/19  2158   POTASSIUM mmol/L 4 1 4 1 3 8   CHLORIDE mmol/L 103 102 99*   CO2 mmol/L 27 26 24   BUN mg/dL 24 25 24   CREATININE mg/dL 1 10 1 26 1 19   CALCIUM mg/dL 8 9 8 7 9 1   AST U/L  --   --  18   ALT U/L  --   --  12   ALK PHOS U/L  --   --  86   EGFR ml/min/1 73sq m 66 56 60       BMP:  Results from last 7 days Lab Units 19  0454 11/15/19  0531 19  2158   POTASSIUM mmol/L 4 1 4 1 3 8   CHLORIDE mmol/L 103 102 99*   CO2 mmol/L 27 26 24   BUN mg/dL 24 25 24   CREATININE mg/dL 1 10 1 26 1 19   CALCIUM mg/dL 8 9 8 7 9 1         Results from last 7 days   Lab Units 19  2158   NT-PRO BNP pg/mL 1,723*      Results from last 7 days   Lab Units 11/15/19  0531 19  0753   MAGNESIUM mg/dL 1 9 2 1     Results from last 7 days   Lab Units 11/15/19  0531   HEMOGLOBIN A1C % 5 6         Results from last 7 days   Lab Units 19  2158   INR  1 11       Lipid Profile:   No results found for: CHOL  Lab Results   Component Value Date    HDL 31 (L) 2019    HDL 54 10/20/2019     Lab Results   Component Value Date    LDLCALC 22 2019    LDLCALC 96 10/20/2019     Lab Results   Component Value Date    TRIG 76 2019    TRIG 60 10/20/2019       Cardiac testing:   Results for orders placed during the hospital encounter of 10/20/19   Echo complete with contrast if indicated    Narrative [address] [place] [address]  [phone]    Transthoracic Echocardiogram  2D, M-mode, Doppler, and Color Doppler    Study date:  20-Oct-2019    Patient: Serene Brumfield  MR number: [id number]  Account number: [de-identified]  : 1945  Age: 69 years  Gender: Male  Status: Inpatient  Location: Bedside  Height: 70 in  Weight: 133 lb  BP: 119/ 69 mmHg    Indications: Evaluate suspected myocardial infarction  Diagnoses: I21 4 - Non-ST elevation (NSTEMI) myocardial infarction    Sonographer:  Hannah Phillip RDCS  Referring Physician:  Leeanne Plata PA-C  Group:  [facility]  Interpreting Physician:  Yung Steen MD    SUMMARY    LEFT VENTRICLE:  Ejection fraction was estimated to be 40 %  Inferior wall,apex, apical septum and lateral wall are hypokinetic  MITRAL VALVE:  There was trace regurgitation      TRICUSPID VALVE:  There was trace regurgitation  HISTORY: Symptoms: chest pain  PRIOR HISTORY: Shortness of breath, NSTEMI,    PROCEDURE: The procedure was performed at the bedside  This was a routine study  The transthoracic approach was used  The study included complete 2D imaging, M-mode, complete spectral Doppler, and color Doppler  The heart rate was 58 bpm,  at the start of the study  Images were obtained from the parasternal, apical, subcostal, and suprasternal notch acoustic windows  Echocardiographic views were limited due to low windows and lung interference  Image quality was adequate  LEFT VENTRICLE: Size was normal  Ejection fraction was estimated to be 40 %  Inferior wall,apex, apical septum and lateral wall are hypokinetic  RIGHT VENTRICLE: The size was normal  Systolic function was normal  Wall thickness was normal     LEFT ATRIUM: Size was normal     RIGHT ATRIUM: Size was normal     MITRAL VALVE: There was annular calcification  DOPPLER: There was trace regurgitation  AORTIC VALVE: The valve was not well visualized  DOPPLER: There was no evidence for stenosis  TRICUSPID VALVE: The valve structure was normal  There was normal leaflet separation  DOPPLER: The transtricuspid velocity was within the normal range  There was no evidence for stenosis  There was trace regurgitation  PULMONIC VALVE: Leaflets exhibited normal thickness, no calcification, and normal cuspal separation  DOPPLER: The transpulmonic velocity was within the normal range  There was no regurgitation  PERICARDIUM: There was no pericardial effusion  The pericardium was normal in appearance  AORTA: The root exhibited upper limit of normal size      SYSTEM MEASUREMENT TABLES    2D  %FS: 20 8 %  Ao Diam: 3 8 cm  EDV(Teich): 85 5 ml  EF(Teich): 42 6 %  ESV(Teich): 49 1 ml  IVSd: 0 8 cm  LA Area: 13 4 cm2  LA Diam: 3 5 cm  LVEDV MOD A4C: 104 ml  LVEF MOD A4C: 45 6 %  LVESV MOD A4C: 56 6 ml  LVIDd: 4 4 cm  LVIDs: 3 4 cm  LVLd A4C: 8 7 cm  LVLs A4C: 7 cm  LVPWd: 0 8 cm  RA Area: 12 cm2  RVIDd: 3 1 cm  SV MOD A4C: 47 5 ml  SV(Teich): 36 5 ml    MM  TAPSE: 1 6 cm    PW  E': 0 1 m/s  E/E': 6 6  MV A Antolin: 0 6 m/s  MV Dec Rosebud: 2 4 m/s2  MV DecT: 230 4 ms  MV E Antolin: 0 6 m/s  MV E/A Ratio: 0 9  MV PHT: 66 8 ms  MVA By PHT: 3 3 cm2    [facility] Accredited Echocardiography [facility]    Prepared and electronically signed by    Ya Chavez MD  Signed 20-Oct-2019 19:40:30       No results found for this or any previous visit  No results found for this or any previous visit  No procedure found  No results found for this or any previous visit  Meds/Allergies   all current active meds have been reviewed  Medications Prior to Admission   Medication    aspirin (ECOTRIN LOW STRENGTH) 81 mg EC tablet    atorvastatin (LIPITOR) 80 mg tablet    benzonatate (TESSALON PERLES) 100 mg capsule    clopidogrel (PLAVIX) 75 mg tablet    colchicine (COLCRYS) 0 6 mg tablet    furosemide (LASIX) 20 mg tablet    metoprolol tartrate (LOPRESSOR) 25 mg tablet    nitroglycerin (NITROSTAT) 0 4 mg SL tablet            Counseling / Coordination of Care  Total floor / unit time spent today 15 minutes  Greater than 50% of total time was spent with the patient and / or family counseling and / or coordination of care  ** Please Note: Dragon 360 Dictation voice to text software may have been used in the creation of this document   **

## 2019-11-17 ENCOUNTER — APPOINTMENT (INPATIENT)
Dept: CT IMAGING | Facility: HOSPITAL | Age: 74
DRG: 312 | End: 2019-11-17
Payer: MEDICARE

## 2019-11-17 PROCEDURE — 99232 SBSQ HOSP IP/OBS MODERATE 35: CPT | Performed by: PSYCHIATRY & NEUROLOGY

## 2019-11-17 PROCEDURE — 71275 CT ANGIOGRAPHY CHEST: CPT

## 2019-11-17 PROCEDURE — 99232 SBSQ HOSP IP/OBS MODERATE 35: CPT | Performed by: INTERNAL MEDICINE

## 2019-11-17 RX ADMIN — ENOXAPARIN SODIUM 40 MG: 40 INJECTION SUBCUTANEOUS at 09:39

## 2019-11-17 RX ADMIN — COLCHICINE 0.6 MG: 0.6 TABLET, FILM COATED ORAL at 09:39

## 2019-11-17 RX ADMIN — ATORVASTATIN CALCIUM 80 MG: 40 TABLET, FILM COATED ORAL at 17:01

## 2019-11-17 RX ADMIN — ASPIRIN 81 MG: 81 TABLET, COATED ORAL at 09:39

## 2019-11-17 RX ADMIN — CLOPIDOGREL 75 MG: 75 TABLET, FILM COATED ORAL at 09:39

## 2019-11-17 RX ADMIN — COLCHICINE 0.6 MG: 0.6 TABLET, FILM COATED ORAL at 17:01

## 2019-11-17 RX ADMIN — IOHEXOL 85 ML: 350 INJECTION, SOLUTION INTRAVENOUS at 09:58

## 2019-11-17 NOTE — PROGRESS NOTES
Progress Note - Neurology   Angelo Espinoza  69 y o  male MRN: [id number]  Unit/Bed#: -01 Encounter: [id number]      Subjective:   Patient seen at bedside, alert awake oriented, denies any new neurological symptoms, MRI of the brain showed subacute chronic lacunar CVA, patient complains of shortness of breath upon ambulation, otherwise denies any new neurological symptoms  His symptoms were discussed with Cardiology and a CTA to rule out pulmonary embolism was recommended  ROS: 12 system cued query was unchanged from org  consult note  Vitals:   Vitals:    11/17/19 0720   BP: 105/68   Pulse:    Resp: 18   Temp: 97 8 °F (36 6 °C)   SpO2:    ,Body mass index is 18 65 kg/m²      MEDS:    Current Facility-Administered Medications:     acetaminophen (TYLENOL) tablet 650 mg, 650 mg, Oral, Q6H PRN, Janey Garcia PA-C    aspirin (ECOTRIN LOW STRENGTH) EC tablet 81 mg, 81 mg, Oral, Daily, MAURISIO ZamanC, 81 mg at 11/17/19 3471    atorvastatin (LIPITOR) tablet 80 mg, 80 mg, Oral, Daily With Dinner, Janey Garcia PA-C, 80 mg at 11/16/19 1706    clopidogrel (PLAVIX) tablet 75 mg, 75 mg, Oral, Daily, DAVID Zaman-C, 75 mg at 11/17/19 9222    colchicine (COLCRYS) tablet 0 6 mg, 0 6 mg, Oral, BID, DAVID Zaman-C, 0 6 mg at 11/17/19 3900    enoxaparin (LOVENOX) subcutaneous injection 40 mg, 40 mg, Subcutaneous, Daily, DAVID Zaman-C, 40 mg at 11/17/19 9725    loperamide (IMODIUM) capsule 2 mg, 2 mg, Oral, Q4H PRN, Sonny Ron MD, 2 mg at 11/15/19 2044    magnesium hydroxide (MILK OF MAGNESIA) 400 mg/5 mL oral suspension 30 mL, 30 mL, Oral, Daily PRN, Janey Radha, PA-C    metoprolol tartrate (LOPRESSOR) partial tablet 12 5 mg, 12 5 mg, Oral, Q12H BEAU, Nora A Timmons, CRNP    nitroglycerin (NITROSTAT) SL tablet 0 4 mg, 0 4 mg, Sublingual, Q5 Min PRN, Maximilian Evans PA-C    ondansetron [facility]) injection 4 mg, 4 mg, Intravenous, Q6H PRN, Janey Garcia, CLIVE    Physical Exam:  General appearance: alert, appears stated age and cooperative  Head: Normocephalic, without obvious abnormality, atraumatic    Neurologic:  On examination patient is alert awake oriented, speech is fluent, cranial nerves 2-12 intact, on motor and sensory exam there is no evidence of any focal weakness or sensory loss, no evidence of any dysmetria was noted and his gait is normal based  Lab Results: I have personally reviewed pertinent reports  Imaging Studies: I have personally reviewed pertinent reports  Assessment:  1  Near syncope, secondary to hypertension  2  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

## 2019-11-17 NOTE — PROGRESS NOTES
[facility] Progress Note  Patient: Padmini Snow  69 y o  male   MRN: [id number]  PCP: Pratima Barrientos DO  Unit/Bed#: -01 Encounter: [id number]  Date Of Visit: 19    Assessment:    Principal Problem:    Dizziness  Active Problems:    S/P primary angioplasty with coronary stent    Paresthesias    Near syncope      Plan:    · 1  Near syncope- possibly secondary to orthostatic hypotension  Metoprolol was decreased to 12 5mg BID as per cardiology  Patient blood pressure still on lower side  · 2  Blurry vision and right sided numbness- neurology following  Patient MRI brain shows chronic to subacute left frontal subcortical CVA  No evidence of acute CVA  Patient advised to continue to continue aspirin and plavix  · 3  History of CAD s/p 2 stent placed recently 10/21/19  · 4  Borderline low bp- patient on metoprolol for recent CAD s/p stent  Will f/u with cardiology recommendations  · 5  Patient still SOB- CTA chest was ordered  No PE was seen  VTE Pharmacologic Prophylaxis:   Pharmacologic: Enoxaparin (Lovenox)  Mechanical VTE Prophylaxis in Place: Yes    Patient Centered Rounds: I have performed bedside rounds with nursing staff today  Discussions with Specialists or Other Care Team Provider:     Education and Discussions with Family / Patient:     Time Spent for Care: 20 minutes  More than 50% of total time spent on counseling and coordination of care as described above  Current Length of Stay: 3 day(s)    Current Patient Status: Inpatient   Certification Statement: The patient will continue to require additional inpatient hospital stay due to dizziness    Discharge Plan / Estimated Discharge Date: once cardiology and neurology workup finished    Code Status: Level 3 - DNAR and DNI      Subjective:   Patient seen and examined at bedside  Patient has no new complaints      Objective:     Vitals:   Temp (24hrs), Av °F (36 7 °C), Min:97 6 °F (36 4 °C), Max:99 2 °F (37 3 °C)    Temp:  [97 6 °F (36 4 °C)-99 2 °F (37 3 °C)] 97 6 °F (36 4 °C)  HR:  [] 88  Resp:  [17-19] 17  BP: ()/(59-68) 108/66  SpO2:  [97 %-98 %] 98 %  Body mass index is 18 65 kg/m²  Input and Output Summary (last 24 hours): Intake/Output Summary (Last 24 hours) at 11/17/2019 1311  Last data filed at 11/17/2019 0701  Gross per 24 hour   Intake 240 ml   Output 700 ml   Net -460 ml       Physical Exam:     Physical Exam   Constitutional: He is oriented to person, place, and time  He appears well-developed and well-nourished  HENT:   Head: Normocephalic and atraumatic  Eyes: Pupils are equal, round, and reactive to light  EOM are normal    Neck: Normal range of motion  Neck supple  No JVD present  No tracheal deviation present  No thyromegaly present  Cardiovascular: Normal rate, regular rhythm and normal heart sounds  Exam reveals no gallop and no friction rub  No murmur heard  Pulmonary/Chest: Effort normal and breath sounds normal  No stridor  No respiratory distress  He has no wheezes  Abdominal: Soft  Bowel sounds are normal  He exhibits no distension  There is no tenderness  There is no guarding  Musculoskeletal: Normal range of motion  He exhibits no edema  Neurological: He is alert and oriented to person, place, and time  Skin: Skin is warm and dry  Vitals reviewed            Additional Data:     Labs:    Results from last 7 days   Lab Units 11/16/19  0454   WBC Thousand/uL 4 55   HEMOGLOBIN g/dL 11 8*   HEMATOCRIT % 36 3*   PLATELETS Thousands/uL 113*   NEUTROS PCT % 62   LYMPHS PCT % 17   MONOS PCT % 15*   EOS PCT % 6     Results from last 7 days   Lab Units 11/16/19  0454  11/14/19  2158   POTASSIUM mmol/L 4 1   < > 3 8   CHLORIDE mmol/L 103   < > 99*   CO2 mmol/L 27   < > 24   BUN mg/dL 24   < > 24   CREATININE mg/dL 1 10   < > 1 19   CALCIUM mg/dL 8 9   < > 9 1   ALK PHOS U/L  --   --  86   ALT U/L  --   --  12   AST U/L  --   --  18    < > = values in this interval not displayed  Results from last 7 days   Lab Units 11/14/19  2158   INR  1 11       * I Have Reviewed All Lab Data Listed Above  * Additional Pertinent Lab Tests Reviewed: Chris 66 Admission Reviewed    Imaging:    Imaging Reports Reviewed Today Include:   Imaging Personally Reviewed by Myself Includes:      Recent Cultures (last 7 days):           Last 24 Hours Medication List:     Current Facility-Administered Medications:  acetaminophen 650 mg Oral Q6H PRN Unique Corea PA-C   aspirin 81 mg Oral Daily Unique Corea PA-C   atorvastatin 80 mg Oral [facility]   clopidogrel 75 mg Oral Daily Unique Corea PA-C   colchicine 0 6 mg Oral BID Maximilian Evans PA-C   enoxaparin 40 mg Subcutaneous Daily Unique Corea PA-C   loperamide 2 mg Oral Q4H PRN Sheilda Soulier, MD   magnesium hydroxide 30 mL Oral Daily PRN Unique Corea PA-C   metoprolol tartrate 12 5 mg Oral Q12H [facility] Nora Timmons, CRNP   nitroglycerin 0 4 mg Sublingual Q5 Min PRN Unique Corea PA-C   ondansetron 4 mg Intravenous Q6H PRN Unique Corea PA-C        Today, Patient Was Seen By: Sheilda Soulier, MD    ** Please Note: This note has been constructed using a voice recognition system   **

## 2019-11-17 NOTE — PROGRESS NOTES
General Cardiology   Progress Note   Alexus Brown  69 y o  male MRN: [id number]  Unit/Bed#: -01 Encounter: [id number]    Assessment/Plan:    1  Near syncope  -patient continues with blood pressures on the lower side  -patient with complaints of significant shortness of breath upon ambulation to the bathroom this morning, CT of the chest to rule out PE ordered and pending  -limited TTE revealed improved EF of 50-55%, pericardial effusion noted to be trace  -continue telemetry monitor  -continue metoprolol tartrate decreased to 12 5 mg b i d     2  CAD S/P LUDIVINA x2 to distal LAD and ostial 1st diagonal branch  -continue aspirin, statin, Plavix, metoprolol tartrate  -cardiac diet  -patient denies any chest pain    3  Ischemic cardiomyopathy with recovered EF of 50-55%  -continue beta-blocker  -continue outpatient follow-up as previously planned    4  Post PCI pericarditis  -TTE done yesterday showed only trace pericardial effusion  -continue colchicine   -continue outpatient follow-up as previously planned      Subjective:   Patient seen and examined  No significant events since the last encounter  REVIEW OF SYSTEMS:  Constitutional:  Denies fever or chills   Eyes:  Denies change in visual acuity   HENT:  Denies nasal congestion or sore throat   Respiratory:  Denies cough, + shortness of breath   Cardiovascular:  Denies chest pain or edema   GI:  Denies abdominal pain, nausea, vomiting, bloody stools, constipation or diarrhea   :  Denies dysuria, frequency, difficulty in urination or nocturia  Musculoskeletal:  Denies back pain or joint pain   Neurologic:  Denies headache, focal weakness or sensory changes   Endocrine:  Denies polyuria or polydipsia   Lymphatic:  Denies swollen glands   Psychiatric:  Denies depression or anxiety     Objective:   Vitals:  Vitals:    11/17/19 0720   BP: 105/68   Pulse:    Resp: 18   Temp: 97 8 °F (36 6 °C)   SpO2:        Body mass index is 18 65 kg/m²    Systolic (24UQJ), Av , Min:102 , MFS:637     Diastolic (14FAA), FSW:64, Min:59, Max:68      Intake/Output Summary (Last 24 hours) at 2019 0938  Last data filed at 2019 0701  Gross per 24 hour   Intake 240 ml   Output 700 ml   Net -460 ml     Weight (last 2 days)     Date/Time   Weight    19 0600   57 3 (126 32)    19 0600   57 4 (126 54)              Telemetry Review: No significant arrhythmias seen on telemetry review  Sinus rhythm with a rate in the 80s        PHYSICAL EXAMS  General:  Patient is not in acute distress, laying in the bed comfortably, awake, alert responding to commands  Head: Normocephalic, Atraumatic     HEENT: White sclera, pink conjunctiva  Neck:  Supple, no neck vein distention  Respiratory: clear to P/A  Cardiovascular: S1-S2 normal, no murmurs, thrills, gallops, rubs, regular rhythm  GI:  Abdomen soft, nontender, non-distended  Extremities: No edema, normal pulses  Integument:  No skin rashes or ulceration  Neurologic:  Patient is awake alert, responding to command, oriented to person, place and time    Nd time   LABORATORY RESULTS:  Results from last 7 days   Lab Units 19   TROPONIN I ng/mL <0 02     CBC with diff:   Results from last 7 days   Lab Units 11/16/19  0454 11/15/19  0531 11/14/19  2158   WBC Thousand/uL 4 55 6 64 5 88   HEMOGLOBIN g/dL 11 8* 12 7 14 2   HEMATOCRIT % 36 3* 38 2 43 3   MCV fL 88 87 87   PLATELETS Thousands/uL 113* 151 186   MCH pg 28 4 28 9 28 5   MCHC g/dL 32 5 33 2 32 8   RDW % 13 5 13 2 13 2   MPV fL 12 2 12 3 12 7   NRBC AUTO /100 WBCs 0 0 0       CMP:  Results from last 7 days   Lab Units 11/16/19  0454 11/15/19  0531 11/14/19  2158   POTASSIUM mmol/L 4 1 4 1 3 8   CHLORIDE mmol/L 103 102 99*   CO2 mmol/L 27 26 24   BUN mg/dL 24 25 24   CREATININE mg/dL 1 10 1 26 1 19   CALCIUM mg/dL 8 9 8 7 9 1   AST U/L  --   --  18   ALT U/L  --   --  12   ALK PHOS U/L  --   --  86   EGFR ml/min/1 73sq m 66 56 60       BMP:  Results from last 7 days Lab Units 19  0454 11/15/19  0531 19  2158   POTASSIUM mmol/L 4 1 4 1 3 8   CHLORIDE mmol/L 103 102 99*   CO2 mmol/L 27 26 24   BUN mg/dL 24 25 24   CREATININE mg/dL 1 10 1 26 1 19   CALCIUM mg/dL 8 9 8 7 9 1         Results from last 7 days   Lab Units 19  2158   NT-PRO BNP pg/mL 1,723*      Results from last 7 days   Lab Units 11/15/19  0531 19  0753   MAGNESIUM mg/dL 1 9 2 1     Results from last 7 days   Lab Units 11/15/19  0531   HEMOGLOBIN A1C % 5 6         Results from last 7 days   Lab Units 19  2158   INR  1 11       Lipid Profile:   No results found for: CHOL  Lab Results   Component Value Date    HDL 31 (L) 2019    HDL 54 10/20/2019     Lab Results   Component Value Date    LDLCALC 22 2019    LDLCALC 96 10/20/2019     Lab Results   Component Value Date    TRIG 76 2019    TRIG 60 10/20/2019       Cardiac testing:   Results for orders placed during the hospital encounter of 10/20/19   Echo complete with contrast if indicated    Narrative [address] [place], [place] [address] [phone]    Transthoracic Echocardiogram  2D, M-mode, Doppler, and Color Doppler    Study date:  20-Oct-2019    Patient: Brielle Polanco  MR number: [id number]  Account number: [de-identified]  : 1945  Age: 69 years  Gender: Male  Status: Inpatient  Location: Bedside  Height: 70 in  Weight: 133 lb  BP: 119/ 69 mmHg    Indications: Evaluate suspected myocardial infarction  Diagnoses: I21 4 - Non-ST elevation (NSTEMI) myocardial infarction    Sonographer:   [facility] LAUREL Neumann  Referring Physician:  Tabatha Montgomery PA-C  Group:  [facility]  Interpreting Physician:  Puma Vargas MD    SUMMARY    LEFT VENTRICLE:  Ejection fraction was estimated to be 40 %  Inferior wall,apex, apical septum and lateral wall are hypokinetic  MITRAL VALVE:  There was trace regurgitation      TRICUSPID VALVE:  There was trace regurgitation  HISTORY: Symptoms: chest pain  PRIOR HISTORY: Shortness of breath, NSTEMI,    PROCEDURE: The procedure was performed at the bedside  This was a routine study  The transthoracic approach was used  The study included complete 2D imaging, M-mode, complete spectral Doppler, and color Doppler  The heart rate was 58 bpm,  at the start of the study  Images were obtained from the parasternal, apical, subcostal, and suprasternal notch acoustic windows  Echocardiographic views were limited due to low windows and lung interference  Image quality was adequate  LEFT VENTRICLE: Size was normal  Ejection fraction was estimated to be 40 %  Inferior wall,apex, apical septum and lateral wall are hypokinetic  RIGHT VENTRICLE: The size was normal  Systolic function was normal  Wall thickness was normal     LEFT ATRIUM: Size was normal     RIGHT ATRIUM: Size was normal     MITRAL VALVE: There was annular calcification  DOPPLER: There was trace regurgitation  AORTIC VALVE: The valve was not well visualized  DOPPLER: There was no evidence for stenosis  TRICUSPID VALVE: The valve structure was normal  There was normal leaflet separation  DOPPLER: The transtricuspid velocity was within the normal range  There was no evidence for stenosis  There was trace regurgitation  PULMONIC VALVE: Leaflets exhibited normal thickness, no calcification, and normal cuspal separation  DOPPLER: The transpulmonic velocity was within the normal range  There was no regurgitation  PERICARDIUM: There was no pericardial effusion  The pericardium was normal in appearance  AORTA: The root exhibited upper limit of normal size      SYSTEM MEASUREMENT TABLES    2D  %FS: 20 8 %  Ao Diam: 3 8 cm  EDV(Teich): 85 5 ml  EF(Teich): 42 6 %  ESV(Teich): 49 1 ml  IVSd: 0 8 cm  LA Area: 13 4 cm2  LA Diam: 3 5 cm  LVEDV MOD A4C: 104 ml  LVEF MOD A4C: 45 6 %  LVESV MOD A4C: 56 6 ml  LVIDd: 4 4 cm  LVIDs: 3 4 cm  LVLd A4C: 8 7 cm  LVLs A4C: 7 cm  LVPWd: 0 8 cm  RA Area: 12 cm2  RVIDd: 3 1 cm  SV MOD A4C: 47 5 ml  SV(Teich): 36 5 ml    MM  TAPSE: 1 6 cm    PW  E': 0 1 m/s  E/E': 6 6  MV A Antolin: 0 6 m/s  MV Dec [place]: 2 4 m/s2  MV DecT: 230 4 ms  MV E Antolin: 0 6 m/s  MV E/A Ratio: 0 9  MV PHT: 66 8 ms  MVA By PHT: 3 3 cm2    [employer] Accredited Echocardiography Laboratory    Prepared and electronically signed by    Yung Steen MD  Signed 20-Oct-2019 19:40:30       No results found for this or any previous visit  No results found for this or any previous visit  No procedure found  No results found for this or any previous visit  Meds/Allergies   all current active meds have been reviewed  Medications Prior to Admission   Medication    aspirin (ECOTRIN LOW STRENGTH) 81 mg EC tablet    atorvastatin (LIPITOR) 80 mg tablet    benzonatate (TESSALON PERLES) 100 mg capsule    clopidogrel (PLAVIX) 75 mg tablet    colchicine (COLCRYS) 0 6 mg tablet    furosemide (LASIX) 20 mg tablet    metoprolol tartrate (LOPRESSOR) 25 mg tablet    nitroglycerin (NITROSTAT) 0 4 mg SL tablet            Counseling / Coordination of Care  Total floor / unit time spent today 15 minutes  Greater than 50% of total time was spent with the patient and / or family counseling and / or coordination of care  ** Please Note: Dragon 360 Dictation voice to text software may have been used in the creation of this document   **

## 2019-11-18 PROCEDURE — 99232 SBSQ HOSP IP/OBS MODERATE 35: CPT | Performed by: INTERNAL MEDICINE

## 2019-11-18 PROCEDURE — 97535 SELF CARE MNGMENT TRAINING: CPT

## 2019-11-18 PROCEDURE — 97530 THERAPEUTIC ACTIVITIES: CPT

## 2019-11-18 RX ORDER — MIDODRINE HYDROCHLORIDE 5 MG/1
5 TABLET ORAL
Status: DISCONTINUED | OUTPATIENT
Start: 2019-11-18 | End: 2019-11-19 | Stop reason: HOSPADM

## 2019-11-18 RX ADMIN — COLCHICINE 0.6 MG: 0.6 TABLET, FILM COATED ORAL at 18:26

## 2019-11-18 RX ADMIN — ENOXAPARIN SODIUM 40 MG: 40 INJECTION SUBCUTANEOUS at 09:07

## 2019-11-18 RX ADMIN — MIDODRINE HYDROCHLORIDE 5 MG: 5 TABLET ORAL at 16:51

## 2019-11-18 RX ADMIN — COLCHICINE 0.6 MG: 0.6 TABLET, FILM COATED ORAL at 09:07

## 2019-11-18 RX ADMIN — MIDODRINE HYDROCHLORIDE 5 MG: 5 TABLET ORAL at 13:00

## 2019-11-18 RX ADMIN — ATORVASTATIN CALCIUM 80 MG: 40 TABLET, FILM COATED ORAL at 16:51

## 2019-11-18 RX ADMIN — CLOPIDOGREL 75 MG: 75 TABLET, FILM COATED ORAL at 09:06

## 2019-11-18 RX ADMIN — METOPROLOL TARTRATE 12.5 MG: 25 TABLET ORAL at 09:07

## 2019-11-18 RX ADMIN — ASPIRIN 81 MG: 81 TABLET, COATED ORAL at 09:06

## 2019-11-18 NOTE — PLAN OF CARE
Problem: Potential for Falls  Goal: Patient will remain free of falls  Description  INTERVENTIONS:  - Assess patient frequently for physical needs  -  Identify cognitive and physical deficits and behaviors that affect risk of falls    -  [place] fall precautions as indicated by assessment   - Educate patient/family on patient safety including physical limitations  - Instruct patient to call for assistance with activity based on assessment  - Modify environment to reduce risk of injury  - Consider OT/PT consult to assist with strengthening/mobility  Outcome: Progressing

## 2019-11-18 NOTE — PROGRESS NOTES
General Cardiology   Progress Note   Dalia Quevedo  69 y o  male MRN: [id number]  Unit/Bed#: -01 Encounter: [id number]    Assessment/Plan:  1  Near syncope  -likely secondary to orthostatic hypertension  -patient continues with blood pressures on the lower side  -start midodrine 5 mg t i d , discontinued metoprolol tartrate  -limited TTE revealed improved EF of 50-55%, pericardial effusion noted to be trace  -continue telemetry monitoring  -MRI revealed chronic to subacute left frontal lacunar CVA with no residual deficits  -stable from neurology standpoint    2  CAD s/p LUDIVINA x2 to distal LAD and ostial 1st diagonal branch  -continue aspirin, statin, Plavix  -cardiac diet    3  Ischemic cardiomyopathy with recovered EF 50-55%  -discontinue beta-blocker secondary to low blood pressure  -continue outpatient follow-up as previously planned    4  Post PCI pericarditis  -continue colchicine  -ETT done today showed only trace pericardial effusion  -continue outpatient follow-up as previously planned    Subjective:   Patient seen and examined  No significant events since the last encounter  REVIEW OF SYSTEMS:  Constitutional:  Denies fever or chills   Eyes:  Denies change in visual acuity   HENT:  Denies nasal congestion or sore throat   Respiratory:  Denies cough or shortness of breath   Cardiovascular:  Denies chest pain or edema   GI:  Denies abdominal pain, nausea, vomiting, bloody stools, constipation or diarrhea   :  Denies dysuria, frequency, difficulty in urination or nocturia  Musculoskeletal:  Denies back pain or joint pain   Neurologic:  Denies headache, focal weakness or sensory changes   Endocrine:  Denies polyuria or polydipsia   Lymphatic:  Denies swollen glands   Psychiatric:  Denies depression or anxiety     Objective:   Vitals:  Vitals:    11/18/19 0859   BP: 112/67   Pulse:    Resp:    Temp:    SpO2:        Body mass index is 18 65 kg/m²    Systolic (01RCD), MELIDA:076 , Min:101 , Max:112 Diastolic (85OGX), JZW:57, Min:55, Max:68      Intake/Output Summary (Last 24 hours) at 11/18/2019 1136  Last data filed at 11/17/2019 2239  Gross per 24 hour   Intake 360 ml   Output 750 ml   Net -390 ml     Weight (last 2 days)     Date/Time   Weight    11/17/19 0600   57 3 (126 32)    11/16/19 0600   57 4 (126 54)              Telemetry Review: No significant arrhythmias seen on telemetry review  Sinus rhythm with a rate in the 70s        PHYSICAL EXAMS  General:  Patient is not in acute distress, laying in the bed comfortably, awake, alert responding to commands  Head: Normocephalic, Atraumatic     HEENT: White sclera, pink conjunctiva  Neck:  Supple, no neck vein distention  Respiratory: clear to P/A  Cardiovascular: S1-S2 normal, no murmurs, thrills, gallops, rubs, regular rhythm  GI:  Abdomen soft, nontender, non-distended  Extremities: No edema, normal pulses  Integument:  No skin rashes or ulceration  Neurologic:  Patient is awake alert, responding to command, oriented to person, place and time    Nd time   LABORATORY RESULTS:  Results from last 7 days   Lab Units 11/14/19 2158   TROPONIN I ng/mL <0 02     CBC with diff:   Results from last 7 days   Lab Units 11/16/19  0454 11/15/19  0531 11/14/19  2158   WBC Thousand/uL 4 55 6 64 5 88   HEMOGLOBIN g/dL 11 8* 12 7 14 2   HEMATOCRIT % 36 3* 38 2 43 3   MCV fL 88 87 87   PLATELETS Thousands/uL 113* 151 186   MCH pg 28 4 28 9 28 5   MCHC g/dL 32 5 33 2 32 8   RDW % 13 5 13 2 13 2   MPV fL 12 2 12 3 12 7   NRBC AUTO /100 WBCs 0 0 0       CMP:  Results from last 7 days   Lab Units 11/16/19  0454 11/15/19  0531 11/14/19  2158   POTASSIUM mmol/L 4 1 4 1 3 8   CHLORIDE mmol/L 103 102 99*   CO2 mmol/L 27 26 24   BUN mg/dL 24 25 24   CREATININE mg/dL 1 10 1 26 1 19   CALCIUM mg/dL 8 9 8 7 9 1   AST U/L  --   --  18   ALT U/L  --   --  12   ALK PHOS U/L  --   --  86   EGFR ml/min/1 73sq m 66 56 60       BMP:  Results from last 7 days   Lab Units 11/16/19  4533 11/15/19  0531 198   POTASSIUM mmol/L 4 1 4 1 3 8   CHLORIDE mmol/L 103 102 99*   CO2 mmol/L 27 26 24   BUN mg/dL 24 25 24   CREATININE mg/dL 1 10 1 26 1 19   CALCIUM mg/dL 8 9 8 7 9 1         Results from last 7 days   Lab Units 198   NT-PRO BNP pg/mL 1,723*      Results from last 7 days   Lab Units 11/15/19  0531 19  0753   MAGNESIUM mg/dL 1 9 2 1     Results from last 7 days   Lab Units 11/15/19  0531   HEMOGLOBIN A1C % 5 6         Results from last 7 days   Lab Units 19  2158   INR  1 11       Lipid Profile:   No results found for: CHOL  Lab Results   Component Value Date    HDL 31 (L) 2019    HDL 54 10/20/2019     Lab Results   Component Value Date    LDLCALC 22 2019    LDLCALC 96 10/20/2019     Lab Results   Component Value Date    TRIG 76 2019    TRIG 60 10/20/2019       Cardiac testing:   Results for orders placed during the hospital encounter of 10/20/19   Echo complete with contrast if indicated    Narrative [address] [place], [place] [id number] [phone]    Transthoracic Echocardiogram  2D, M-mode, Doppler, and Color Doppler    Study date:  20-Oct-2019    Patient: Loree Herrera  MR number: [id number]  Account number: [de-identified]  : 1945  Age: 69 years  Gender: Male  Status: Inpatient  Location: Bedside  Height: 70 in  Weight: 133 lb  BP: 119/ 69 mmHg    Indications: Evaluate suspected myocardial infarction  Diagnoses: I21 4 - Non-ST elevation (NSTEMI) myocardial infarction    Sonographer:  Hannah Lawson RDCS  Referring Physician:  Monie Mariano PA-C  Group:  [facility]  Interpreting Physician:  Jacob Johns MD    SUMMARY    LEFT VENTRICLE:  Ejection fraction was estimated to be 40 %  Inferior wall,apex, apical septum and lateral wall are hypokinetic  MITRAL VALVE:  There was trace regurgitation  TRICUSPID VALVE:  There was trace regurgitation      HISTORY: Symptoms: chest pain  PRIOR HISTORY: Shortness of breath, NSTEMI,    PROCEDURE: The procedure was performed at the bedside  This was a routine study  The transthoracic approach was used  The study included complete 2D imaging, M-mode, complete spectral Doppler, and color Doppler  The heart rate was 58 bpm,  at the start of the study  Images were obtained from the parasternal, apical, subcostal, and suprasternal notch acoustic windows  Echocardiographic views were limited due to low windows and lung interference  Image quality was adequate  LEFT VENTRICLE: Size was normal  Ejection fraction was estimated to be 40 %  Inferior wall,apex, apical septum and lateral wall are hypokinetic  RIGHT VENTRICLE: The size was normal  Systolic function was normal  Wall thickness was normal     LEFT ATRIUM: Size was normal     RIGHT ATRIUM: Size was normal     MITRAL VALVE: There was annular calcification  DOPPLER: There was trace regurgitation  AORTIC VALVE: The valve was not well visualized  DOPPLER: There was no evidence for stenosis  TRICUSPID VALVE: The valve structure was normal  There was normal leaflet separation  DOPPLER: The transtricuspid velocity was within the normal range  There was no evidence for stenosis  There was trace regurgitation  PULMONIC VALVE: Leaflets exhibited normal thickness, no calcification, and normal cuspal separation  DOPPLER: The transpulmonic velocity was within the normal range  There was no regurgitation  PERICARDIUM: There was no pericardial effusion  The pericardium was normal in appearance  AORTA: The root exhibited upper limit of normal size      SYSTEM MEASUREMENT TABLES    2D  %FS: 20 8 %  Ao Diam: 3 8 cm  EDV(Teich): 85 5 ml  EF(Teich): 42 6 %  ESV(Teich): 49 1 ml  IVSd: 0 8 cm  LA Area: 13 4 cm2  LA Diam: 3 5 cm  LVEDV MOD A4C: 104 ml  LVEF MOD A4C: 45 6 %  LVESV MOD A4C: 56 6 ml  LVIDd: 4 4 cm  LVIDs: 3 4 cm  LVLd A4C: 8 7 cm  LVLs A4C: 7 cm  LVPWd: 0 8 cm  RA Area: 12 cm2  RVIDd: 3 1 cm  SV MOD A4C: 47 5 ml  SV(Teich): 36 5 ml    MM  TAPSE: 1 6 cm    PW  E': 0 1 m/s  E/E': 6 6  MV A Antolin: 0 6 m/s  MV Dec [place]: 2 4 m/s2  MV DecT: 230 4 ms  MV E Antolin: 0 6 m/s  MV E/A Ratio: 0 9  MV PHT: 66 8 ms  MVA By PHT: 3 3 cm2    [address] Accredited Echocardiography Laboratory    Prepared and electronically signed by    Wade Holliday MD  Signed 20-Oct-2019 19:40:30         Meds/Allergies   all current active meds have been reviewed  Medications Prior to Admission   Medication    aspirin (ECOTRIN LOW STRENGTH) 81 mg EC tablet    atorvastatin (LIPITOR) 80 mg tablet    benzonatate (TESSALON PERLES) 100 mg capsule    clopidogrel (PLAVIX) 75 mg tablet    colchicine (COLCRYS) 0 6 mg tablet    furosemide (LASIX) 20 mg tablet    metoprolol tartrate (LOPRESSOR) 25 mg tablet    nitroglycerin (NITROSTAT) 0 4 mg SL tablet            Counseling / Coordination of Care  Total floor / unit time spent today 15 minutes  Greater than 50% of total time was spent with the patient and / or family counseling and / or coordination of care  ** Please Note: Dragon 360 Dictation voice to text software may have been used in the creation of this document   **

## 2019-11-18 NOTE — PROGRESS NOTES
[address] Internal Medicine Progress Note  Patient: Alana Blake  69 y o  male   MRN: [id number]  PCP: Marv Ryan DO  Unit/Bed#: [facility] [place] Encounter: [id number]  Date Of Visit: 11/18/19    Assessment:    Principal Problem:    Dizziness  Active Problems:    S/P primary angioplasty with coronary stent    Paresthesias    Near syncope      Plan:    · 1  Near syncope- possibly secondary to orthostatic hypotension  Metoprolol was decreased to 12 5mg BID as per cardiology  Patient blood pressure still on lower side  · 2  Blurry vision and right sided numbness- neurology following  Patient MRI brain shows chronic to subacute left frontal subcortical CVA  No evidence of acute CVA  Patient advised to continue to continue aspirin and plavix  · 3  History of CAD s/p 2 stent placed recently 10/21/19  · 4  Borderline low bp- patient on metoprolol for recent CAD s/p stent  Will f/u with cardiology recommendations  · 5  Patient still SOB- CTA chest was ordered  No PE was seen  · 6  Dispo- patient for home with PT once SOB and bp improves  Cardiology still evaluating  VTE Pharmacologic Prophylaxis:   Pharmacologic: Enoxaparin (Lovenox)  Mechanical VTE Prophylaxis in Place: Yes    Patient Centered Rounds: I have performed bedside rounds with nursing staff today  Discussions with Specialists or Other Care Team Provider:     Education and Discussions with Family / Patient:     Time Spent for Care: 20 minutes  More than 50% of total time spent on counseling and coordination of care as described above  Current Length of Stay: 4 day(s)    Current Patient Status: Inpatient   Certification Statement: The patient will continue to require additional inpatient hospital stay due to dizziness    Discharge Plan / Estimated Discharge Date: once cardiology and neurology workup finished    Code Status: Level 3 - DNAR and DNI      Subjective:   Patient seen and examined at bedside   Patient has no new complaints  Objective:     Vitals:   Temp (24hrs), Av 8 °F (36 6 °C), Min:97 5 °F (36 4 °C), Max:98 1 °F (36 7 °C)    Temp:  [97 5 °F (36 4 °C)-98 1 °F (36 7 °C)] 98 1 °F (36 7 °C)  HR:  [72-88] 80  Resp:  [17-18] 18  BP: (101-112)/(55-68) 112/67  SpO2:  [98 %-99 %] 98 %  Body mass index is 18 65 kg/m²  Input and Output Summary (last 24 hours): Intake/Output Summary (Last 24 hours) at 2019 1133  Last data filed at 2019 2239  Gross per 24 hour   Intake 360 ml   Output 750 ml   Net -390 ml       Physical Exam:     Physical Exam   Constitutional: He is oriented to person, place, and time  He appears well-developed and well-nourished  HENT:   Head: Normocephalic and atraumatic  Eyes: Pupils are equal, round, and reactive to light  EOM are normal    Neck: Normal range of motion  Neck supple  No JVD present  No tracheal deviation present  No thyromegaly present  Cardiovascular: Normal rate, regular rhythm and normal heart sounds  Exam reveals no gallop and no friction rub  No murmur heard  Pulmonary/Chest: Effort normal and breath sounds normal  No stridor  No respiratory distress  He has no wheezes  Abdominal: Soft  Bowel sounds are normal  He exhibits no distension  There is no tenderness  There is no guarding  Musculoskeletal: Normal range of motion  He exhibits no edema  Neurological: He is alert and oriented to person, place, and time  Skin: Skin is warm and dry  Vitals reviewed            Additional Data:     Labs:    Results from last 7 days   Lab Units 19  0454   WBC Thousand/uL 4 55   HEMOGLOBIN g/dL 11 8*   HEMATOCRIT % 36 3*   PLATELETS Thousands/uL 113*   NEUTROS PCT % 62   LYMPHS PCT % 17   MONOS PCT % 15*   EOS PCT % 6     Results from last 7 days   Lab Units 19  0454  19  2158   POTASSIUM mmol/L 4 1   < > 3 8   CHLORIDE mmol/L 103   < > 99*   CO2 mmol/L 27   < > 24   BUN mg/dL 24   < > 24   CREATININE mg/dL 1 10   < > 1 19   CALCIUM mg/dL 8 9 < > 9 1   ALK PHOS U/L  --   --  86   ALT U/L  --   --  12   AST U/L  --   --  18    < > = values in this interval not displayed  Results from last 7 days   Lab Units 11/14/19  2158   INR  1 11       * I Have Reviewed All Lab Data Listed Above  * Additional Pertinent Lab Tests Reviewed: [address] Admission Reviewed    Imaging:    Imaging Reports Reviewed Today Include:   Imaging Personally Reviewed by Myself Includes:      Recent Cultures (last 7 days):           Last 24 Hours Medication List:     Current Facility-Administered Medications:  acetaminophen 650 mg Oral Q6H PRN Apolinaryle Brittle, PA-C   aspirin 81 mg Oral Daily Beryle Brittle, PA-C   atorvastatin 80 mg Oral Daily With [facility], PA-IRON   clopidogrel 75 mg Oral Daily Beryle Brittle, PA-IRON   colchicine 0 6 mg Oral BID DAVID Zaman-IRON   enoxaparin 40 mg Subcutaneous Daily Beryle Britvanessa, PA-IRON   loperamide 2 mg Oral Q4H PRN Anthony Gonzalez MD   magnesium hydroxide 30 mL Oral Daily PRN Beryle Brittle, PA-IRON   metoprolol tartrate 12 5 mg Oral Q12H [address] [place]   nitroglycerin 0 4 mg Sublingual Q5 Min PRN Beryle Brittle, PA-IRON   ondansetron 4 mg Intravenous Q6H PRN Beryle Brittle, PA-C        Today, Patient Was Seen By: Anthony Gonzalez MD    ** Please Note: This note has been constructed using a voice recognition system   **

## 2019-11-18 NOTE — PLAN OF CARE
Problem: OCCUPATIONAL THERAPY ADULT  Goal: Performs self-care activities at highest level of function for planned discharge setting  See evaluation for individualized goals  Description  Treatment Interventions: ADL retraining, Functional transfer training, Endurance training, Patient/family training, Neuromuscular reeducation, Cardiac education, Energy conservation, Activityengagement, Compensatory technique education          See flowsheet documentation for full assessment, interventions and recommendations  Outcome: Progressing  Note:   Limitation: Decreased ADL status, Decreased Safe judgement during ADL, Decreased endurance, Decreased self-care trans, Decreased high-level ADLs  Prognosis: Good  Assessment: Pt cooperative and agreeable to skilled OT services with focus on improving safety and independence during functional mobility and self care skills  Pt supine in bed upon start of session with no report of pain  Pt stated that his BP had been flucuating earlier in day  Pt educated on and performed log roll technique requiring S and vc's fro technique  Pt supine >sit with S  Pt sitting EOB approx 10 reporting with c/o slight lightheadedness  Pt BP @ 113 /66   Pt performed simulated ADL seated EOB  See note for details  Ed  provided on use of LH AE for LB ADL  Pt demonstrated good activity tolerance requiring few rest breaks during presented tasks  Pt STS with S, and no AD needed  Pt  educated in energy conservation techniques and bed mobility/ log roll  technique  Pt performance demonstrated fair carryover of learned techniques and strategies to facilitate safety during self care and daily routine, however pt continues to demonstrate deficits in the areas of self care, safety awareness and functional mobility  Pt would continue to benefit from skilled OT POC to facilitate return to PLOF        OT Discharge Recommendation: Short Term Rehab  OT - OK to Discharge: Yes(when medically cleared)

## 2019-11-18 NOTE — OCCUPATIONAL THERAPY NOTE
OCCUPATIONAL THERAPY TREATMENT  NOTE         11/18/19 1250   Restrictions/Precautions   Weight Bearing Precautions Per Order No   Other Precautions Bed Alarm;Multiple lines; Fall Risk   Pain Assessment   Pain Assessment No/denies pain   Pain Score No Pain   ADL   Where Assessed Edge of bed  (all ADL assessed through OT observation )   Grooming Assistance 5  Supervision/Setup  (simulation of ADLs)   Grooming Deficit Setup;Verbal cueing;Supervision/safety; Increased time to complete   UB Bathing Assistance 5  Supervision/Setup   UB Bathing Deficit Setup;Steadying;Verbal cueing;Supervision/safety; Increased time to complete   LB Bathing Assistance 4  Minimal Assistance   LB Bathing Deficit Setup;Steadying;Verbal cueing;Supervision/safety; Increased time to complete   Functional Standing Tolerance   Time 2  mins   Activity txfer training   Comments no AD   Bed Mobility   Rolling R 5  Supervision   Additional items Bedrails; Increased time required;Verbal cues   Rolling L 5  Supervision   Additional items Increased time required;Verbal cues   Supine to Sit 5  Supervision   Additional items Assist x 1; Increased time required;Verbal cues   Sit to Supine 5  Supervision   Additional items Assist x 1; Increased time required;Verbal cues   Transfers   Sit to Stand 4  Minimal assistance   Additional items Assist x 1; Increased time required;Verbal cues  (CGA)   Stand to Sit 5  Supervision   Additional items Assist x 1; Increased time required;Verbal cues   Functional Mobility   Functional Mobility 4  Minimal assistance   Additional Comments CGA   Cognition   Overall Cognitive Status WFL   Arousal/Participation Alert; Cooperative   Attention Within functional limits   Orientation Level Oriented X4   Memory Within functional limits   Following Commands Follows all commands and directions without difficulty   Additional Activities   Additional Activities Other (Comment)  (Ed  log roll technique) Additional Activities Comments Ed  provided on EC/ use of [address] AE for LB ADL   Activity Tolerance   Activity Tolerance Patient tolerated treatment well   Medical Staff Made Aware Yes  RN Josie made aware   Assessment   Assessment Pt cooperative and agreeable to skilled OT services with focus on improving safety and independence during functional mobility and self care skills  Pt supine in bed upon start of session with no report of pain  Pt stated that his BP had been flucuating earlier in day  Pt educated on and performed log roll technique requiring S and vc's fro technique  Pt supine >sit with S  Pt sitting EOB approx 10 reporting with c/o slight lightheadedness  Pt BP @ 113 /66   Pt performed simulated ADL seated EOB  See note for details  Ed  provided on use of LH AE for LB ADL  Pt demonstrated good activity tolerance requiring few rest breaks during presented tasks  Pt STS with S, and no AD needed  Pt  educated in energy conservation techniques and bed mobility/ log roll  technique  Pt performance demonstrated fair carryover of learned techniques and strategies to facilitate safety during self care and daily routine, however pt continues to demonstrate deficits in the areas of self care, safety awareness and functional mobility  Pt would continue to benefit from skilled OT POC to facilitate return to PLOF  Plan   Treatment Interventions ADL retraining;Visual perceptual retraining;Functional transfer training;UE strengthening/ROM; Endurance training;Patient/family training;Equipment evaluation/education; Fine motor coordination activities; Compensatory technique education;Continued evaluation; Energy conservation; Activityengagement   Goal Expiration Date 11/22/19   OT Frequency 3-5x/wk   Recommendation   OT Discharge Recommendation Short Term Rehab   OT - OK to Discharge Yes  (when medically cleared)                                                     ESTEBAN Mederos/ARNULFO

## 2019-11-19 VITALS
SYSTOLIC BLOOD PRESSURE: 113 MMHG | BODY MASS INDEX: 17.9 KG/M2 | TEMPERATURE: 97.9 F | DIASTOLIC BLOOD PRESSURE: 65 MMHG | HEIGHT: 70 IN | OXYGEN SATURATION: 99 % | WEIGHT: 125 LBS | HEART RATE: 71 BPM | RESPIRATION RATE: 16 BRPM

## 2019-11-19 PROBLEM — H53.8 BLURRY VISION: Status: RESOLVED | Noted: 2019-11-19 | Resolved: 2019-11-19

## 2019-11-19 PROBLEM — R42 DIZZINESS: Status: RESOLVED | Noted: 2019-11-14 | Resolved: 2019-11-19

## 2019-11-19 PROBLEM — H53.8 BLURRY VISION: Status: ACTIVE | Noted: 2019-11-19

## 2019-11-19 PROBLEM — Z95.5 S/P PRIMARY ANGIOPLASTY WITH CORONARY STENT: Status: RESOLVED | Noted: 2019-11-06 | Resolved: 2019-11-19

## 2019-11-19 PROBLEM — R20.2 PARESTHESIAS: Status: RESOLVED | Noted: 2019-11-14 | Resolved: 2019-11-19

## 2019-11-19 PROBLEM — R55 NEAR SYNCOPE: Status: RESOLVED | Noted: 2019-11-14 | Resolved: 2019-11-19

## 2019-11-19 PROCEDURE — 99232 SBSQ HOSP IP/OBS MODERATE 35: CPT | Performed by: INTERNAL MEDICINE

## 2019-11-19 PROCEDURE — 99239 HOSP IP/OBS DSCHRG MGMT >30: CPT | Performed by: STUDENT IN AN ORGANIZED HEALTH CARE EDUCATION/TRAINING PROGRAM

## 2019-11-19 RX ORDER — BLOOD PRESSURE TEST KIT
KIT MISCELLANEOUS DAILY
Qty: 1 EACH | Refills: 0 | Status: SHIPPED | OUTPATIENT
Start: 2019-11-19

## 2019-11-19 RX ORDER — NITROGLYCERIN 0.4 MG/1
0.4 TABLET SUBLINGUAL
Qty: 30 TABLET | Refills: 0 | Status: SHIPPED | OUTPATIENT
Start: 2019-11-19 | End: 2020-09-30

## 2019-11-19 RX ORDER — MIDODRINE HYDROCHLORIDE 5 MG/1
5 TABLET ORAL
Qty: 90 TABLET | Refills: 1 | Status: SHIPPED | OUTPATIENT
Start: 2019-11-19 | End: 2019-12-06

## 2019-11-19 RX ORDER — ATORVASTATIN CALCIUM 80 MG/1
80 TABLET, FILM COATED ORAL
Qty: 30 TABLET | Refills: 1 | Status: SHIPPED | OUTPATIENT
Start: 2019-11-19 | End: 2019-12-06 | Stop reason: SDUPTHER

## 2019-11-19 RX ORDER — FUROSEMIDE 20 MG/1
20 TABLET ORAL 2 TIMES DAILY
Qty: 90 TABLET | Refills: 0 | Status: SHIPPED | OUTPATIENT
Start: 2019-11-19 | End: 2019-12-06

## 2019-11-19 RX ORDER — CLOPIDOGREL BISULFATE 75 MG/1
75 TABLET ORAL DAILY
Qty: 30 TABLET | Refills: 0 | Status: SHIPPED | OUTPATIENT
Start: 2019-11-19 | End: 2019-12-06 | Stop reason: SDUPTHER

## 2019-11-19 RX ORDER — BENZONATATE 100 MG/1
100 CAPSULE ORAL 3 TIMES DAILY PRN
Qty: 20 CAPSULE | Refills: 0 | Status: SHIPPED | OUTPATIENT
Start: 2019-11-19 | End: 2019-11-20 | Stop reason: ALTCHOICE

## 2019-11-19 RX ORDER — ASPIRIN 81 MG/1
81 TABLET ORAL DAILY
Qty: 30 TABLET | Refills: 1 | Status: SHIPPED | OUTPATIENT
Start: 2019-11-19 | End: 2021-10-14

## 2019-11-19 RX ADMIN — MIDODRINE HYDROCHLORIDE 5 MG: 5 TABLET ORAL at 06:15

## 2019-11-19 RX ADMIN — CLOPIDOGREL 75 MG: 75 TABLET, FILM COATED ORAL at 10:48

## 2019-11-19 RX ADMIN — ASPIRIN 81 MG: 81 TABLET, COATED ORAL at 10:49

## 2019-11-19 RX ADMIN — ATORVASTATIN CALCIUM 80 MG: 40 TABLET, FILM COATED ORAL at 16:18

## 2019-11-19 RX ADMIN — MIDODRINE HYDROCHLORIDE 5 MG: 5 TABLET ORAL at 16:18

## 2019-11-19 RX ADMIN — COLCHICINE 0.6 MG: 0.6 TABLET, FILM COATED ORAL at 10:49

## 2019-11-19 RX ADMIN — ENOXAPARIN SODIUM 40 MG: 40 INJECTION SUBCUTANEOUS at 10:48

## 2019-11-19 RX ADMIN — MIDODRINE HYDROCHLORIDE 5 MG: 5 TABLET ORAL at 10:51

## 2019-11-19 NOTE — ASSESSMENT & PLAN NOTE
Likely orthostatic secondary to metoprolol   -Metoprolol discontinued per cardiology, continue with midodrine 10 tid

## 2019-11-19 NOTE — ASSESSMENT & PLAN NOTE
Likely multifactorial from subacute frontal subcortical CVA with orthostatic hypotension   Now improved  Will need home PT on discharge

## 2019-11-19 NOTE — ASSESSMENT & PLAN NOTE
With right sided numbness, MRI brain showing chronic to subacute frontal subcortical CVA   -Continue with aspirin/plavix, status post PT evaluation recommending home PT

## 2019-11-19 NOTE — DISCHARGE SUMMARY
Discharge- Gulshan Carranza [birth year], 69 y o  male MRN: [id number]    Unit/Bed#: -01 Encounter: [id number]    Primary Care Provider: Boris Elliott DO   Date and time admitted to hospital: 11/14/2019  9:15 PM        Near syncope  Assessment & Plan  Likely orthostatic secondary to metoprolol   -Metoprolol discontinued per cardiology, continue with midodrine 10 tid         * Dizziness  Assessment & Plan  Likely multifactorial from subacute frontal subcortical CVA with orthostatic hypotension   Now improved  Will need home PT on discharge    Blurry vision  Assessment & Plan  With right sided numbness, MRI brain showing chronic to subacute frontal subcortical CVA   -Continue with aspirin/plavix, status post PT evaluation recommending home PT       Pericarditis  Assessment & Plan  Status post recent cardiac cath on 10/21/19, pericarditis likely post procedural complication  On 6 week course of colchicine, complete as outaptient          Paresthesiasresolved as of 11/19/2019  Assessment & Plan  Neurology consulted, see note for recommendations  MRI brain confirming subacute CVA  Continue aspirin/plavix    S/P primary angioplasty with coronary stentresolved as of 11/19/2019  Assessment & Plan  EF 50-55%, improved from prior studies  On aspirin, plavix, lipitor  On colchicine for post procedural pericarditis   Lopressor discontinued due to intermittent hypotension   Cardiology was consulted, see recommendations               Discharging Physician / Practitioner: Annabelle Naidu MD  PCP: Boris Elliott DO  Admission Date:   Admission Orders (From admission, onward)     Ordered        11/14/19 2253  Inpatient Admission (expected length of stay for this patient Order details is greater than two midnights)  Once                   Discharge Date: 11/19/19    Disposition:      Other: Home    For Discharges to [facility]:   · Not Applicable to this Patient - Not Applicable to this Patient    Reason for Admission: Dizziness, near syncope    Discharge Diagnoses:     Please see assessment and plan section above for further details regarding discharge diagnoses  Resolved Problems  Date Reviewed: 11/14/2019          Resolved    S/P primary angioplasty with coronary stent 11/19/2019     Resolved by  Pawan Enriquez MD    Paresthesias 11/19/2019     Resolved by  Pawan Enriquez MD          Consultations During Hospital Stay:  · Cardiology    Procedures Performed:   · None     Medication Adjustments and Discharge Medications:  · Summary of Medication Adjustments made as a result of this hospitalization: See medication reconciliation- discontinued metoprolol, started midodrine   · Medication Dosing Tapers - Please refer to Discharge Medication List for details on any medication dosing tapers (if applicable to patient)  · Medications being temporarily held (include recommended restart time): See above  · Discharge Medication List: See after visit summary for reconciled discharge medications  Wound Care Recommendations:  When applicable, please see wound care section of After Visit Summary  Diet Recommendations at Discharge:  Diet -        Diet Orders   (From admission, onward)             Start     Ordered    11/15/19 0046  Diet Cardiovascular; Sodium 2 GM  Diet effective now     Question Answer Comment   Diet Type Cardiovascular    Cardiac Sodium 2 GM    RD to adjust diet per protocol? Yes        11/15/19 0046                Instructions for any Catheters / Lines Present at Discharge (including removal date, if applicable): NA    Significant Findings / Test Results:   · See MRI Brain, See TTE    Incidental Findings:   · None     Test Results Pending at Discharge (will require follow up): · None     Outpatient Tests Requested:  · None    Complications:  None    Hospital Course:     John Fishman  is a 69 y o  male patient who originally presented to the hospital on 11/14/2019 due to near syncope   Found to have subacute cva but not in window for TPA  Near syncope attributed to low blood pressure and orthostatic hypotension, medications adjusted  Recommended for home PT  Shortness of breath secondary to underlying pericarditis and deconditioning  Plan for discharge with outpatient cardiology and primary care follow up  Condition at Discharge: good     Discharge Day Visit / Exam:     Subjective:  Patient feels well today  Vitals: Blood Pressure: 113/65 (11/19/19 1546)  Pulse: 71 (11/19/19 1546)  Temperature: 97 9 °F (36 6 °C) (11/19/19 1546)  Temp Source: Oral (11/19/19 1100)  Respirations: 16 (11/19/19 1546)  Height: 5' 10" (177 8 cm) (11/15/19 1000)  Weight - Scale: 56 7 kg (125 lb) (11/19/19 1100)  SpO2: 99 % (11/19/19 1546)  Exam:   Physical Exam   Constitutional: He is oriented to person, place, and time  He appears well-developed and well-nourished  Cardiovascular: Normal rate and regular rhythm  Pulmonary/Chest: Effort normal and breath sounds normal    Abdominal: Soft  Bowel sounds are normal    Neurological: He is alert and oriented to person, place, and time  Skin: Skin is warm and dry  Psychiatric: He has a normal mood and affect  His behavior is normal        Discussion with Family: Dizziness, near syncope     Goals of Care Discussions:  · Code Status at Discharge: Level 3 - DNAR and DNI  · Were there any Goals of Care Discussions during Hospitalization?: No  · Results of any General Goals of Care Discussions: NA   · POLST Completed: No   · If POLST Completed, Summary of POLST Agreement Provided Here: NA   · OK to Rehospitalize if Needed? No    Discharge instructions/Information to patient and family:   See after visit summary section titled Discharge Instructions for information provided to patient and family  Planned Readmission: None      Discharge Statement:  I spent 30 minutes discharging the patient  This time was spent on the day of discharge   I had direct contact with the patient on the day of discharge  Greater than 50% of the total time was spent examining patient, answering all patient questions, arranging and discussing plan of care with patient as well as directly providing post-discharge instructions  Additional time then spent on discharge activities      ** Please Note: This note has been constructed using a voice recognition system **

## 2019-11-19 NOTE — SOCIAL WORK
LOS 5 GMLOS 2 3 CM met with patient at bedside  Patient states he lives with his dtr-Hanane, Baron Mayen- Rosalia Cordero and grand-sons willie in a mobile home  There are three stairs to enter from outside  Patient states he gets assistance from his dtr-Hanane for ADLs  Patient denies rehab and hhc hx  A post acute care recommendation was made by your care team for [facility] 79  Discussed Freedom of Choice with both patient and caregiver  List of agencies given to both patient and caregiver via in person  both patient and caregiver aware the list is custom filtered for them by preference  and that [facility] post acute providers are designated  Patient states he fills prescriptions with [facility]  Patient deinies mental health dx hx  Patient states his dtr-hanane is his health representative  Patient does not drive  Upon clearance for discharge patient will be transported home via [address]  [facility] and IMM is in the chart  Hanane also states she will transport patient home today between 4:30Pm and 5PM    Nurse and SLIM informed  CM reviewed discharge planning process including the following: identifying help at home, patient preference for discharge planning needs, pharmacy preference, and availability of treatment team to discuss questions or concerns patient and/or family may have regarding understanding medications and recognizing signs and symptoms once discharged  CM also encouraged patient to follow up with all recommended appointments after discharge  Patient advised of importance for patient and family to participate in managing patients medical well being  CM name and role reviewed  Discharge Checklist reviewed and CM will continue to monitor for progress toward discharge goals in nursing and provider rounds

## 2019-11-19 NOTE — ASSESSMENT & PLAN NOTE
Status post recent cardiac cath on 10/21/19, pericarditis likely post procedural complication  On 6 week course of colchicine, complete as outaptient

## 2019-11-19 NOTE — PLAN OF CARE
Problem: Potential for Falls  Goal: Patient will remain free of falls  Description  INTERVENTIONS:  - Assess patient frequently for physical needs  -  Identify cognitive and physical deficits and behaviors that affect risk of falls    -  [place] fall precautions as indicated by assessment   - Educate patient/family on patient safety including physical limitations  - Instruct patient to call for assistance with activity based on assessment  - Modify environment to reduce risk of injury  - Consider OT/PT consult to assist with strengthening/mobility  Outcome: Progressing     Problem: Prexisting or High Potential for Compromised Skin Integrity  Goal: Skin integrity is maintained or improved  Description  INTERVENTIONS:  - Identify patients at risk for skin breakdown  - Assess and monitor skin integrity  - Assess and monitor nutrition and hydration status  - Monitor labs   - Assess for incontinence   - Turn and reposition patient  - Assist with mobility/ambulation  - Relieve pressure over bony prominences  - Avoid friction and shearing  - Provide appropriate hygiene as needed including keeping skin clean and dry  - Evaluate need for skin moisturizer/barrier cream  - Collaborate with interdisciplinary team   - Patient/family teaching  - Consider wound care consult   Outcome: Progressing

## 2019-11-19 NOTE — ASSESSMENT & PLAN NOTE
Neurology consulted, see note for recommendations  MRI brain confirming subacute CVA  Continue aspirin/plavix

## 2019-11-19 NOTE — PLAN OF CARE
Problem: Potential for Falls  Goal: Patient will remain free of falls  Description  INTERVENTIONS:  - Assess patient frequently for physical needs  -  Identify cognitive and physical deficits and behaviors that affect risk of falls    -  [place] fall precautions as indicated by assessment   - Educate patient/family on patient safety including physical limitations  - Instruct patient to call for assistance with activity based on assessment  - Modify environment to reduce risk of injury  - Consider OT/PT consult to assist with strengthening/mobility  11/19/2019 1619 by John Peralta RN  Outcome: Adequate for Discharge  11/19/2019 1321 by John Peralta RN  Outcome: Progressing     Problem: Prexisting or High Potential for Compromised Skin Integrity  Goal: Skin integrity is maintained or improved  Description  INTERVENTIONS:  - Identify patients at risk for skin breakdown  - Assess and monitor skin integrity  - Assess and monitor nutrition and hydration status  - Monitor labs   - Assess for incontinence   - Turn and reposition patient  - Assist with mobility/ambulation  - Relieve pressure over bony prominences  - Avoid friction and shearing  - Provide appropriate hygiene as needed including keeping skin clean and dry  - Evaluate need for skin moisturizer/barrier cream  - Collaborate with interdisciplinary team   - Patient/family teaching  - Consider wound care consult   11/19/2019 1619 by John Peralta RN  Outcome: Adequate for Discharge  11/19/2019 1321 by John Peralta RN  Outcome: Progressing

## 2019-11-19 NOTE — ASSESSMENT & PLAN NOTE
EF 50-55%, improved from prior studies  On aspirin, plavix, lipitor  On colchicine for post procedural pericarditis   Lopressor discontinued due to intermittent hypotension   Cardiology was consulted, see recommendations

## 2019-11-19 NOTE — PROGRESS NOTES
Progress Note - Cardiology     Dalila Sherwood  69 y o  male MRN: [id number]  Unit/Bed#: -01 Encounter: [id number]      Assessment and Plan:  1  Lightheadedness  -likely secondary to orthostatic hypertension  BP (and symptoms) have improved with Midodrine and WENDY stockings  Patient asked to keep himself well hydrated all the time and to changes postures slowly  -Metoprolol tartrate had to be d/adrienne  -no arrhythmia on telemetry     2  CAD s/p LUDIVINA x 2 to distal LAD and ostial 1st diagonal branch  -stable   -continue aspirin, statin, Plavix  -cardiac diet     3  Ischemic cardiomyopathy with recovered EF 50-55%  -no BB or ACE-I sec to low BP    4  Post PCI pericarditis  -no more pain  Continue colchicine  -latest 2D echo showed only trace pericardial effusion  -continue outpatient follow-up as previously planned    Stable to be discharged from cardiac standpoint    Subjective:   Patient feeling better today  No lightheadedness  He also denies chest pain, SOB, palpitations      REVIEW OF SYSTEMS:  Constitutional: Denies fever or chills  Eyes: Denies eye discharge or change in visual acuity   HENT: Denies sneezing, nasal congestion or sore throat   Respiratory: Denies cough, expectoration or shortness of breath   Cardiovascular: Denies chest pain, palpitations or lower extremity swelling   GI: Denies abdominal pain, nausea, vomiting, bloody stools or diarrhea   : Denies dysuria, frequency, difficulty in micturition or nocturia  Musculoskeletal: Denies back pain or joint pain   Neurologic: Denies syncope, headache, sensory changes   Endocrine: Denies polyuria or polydipsia   Lymphatic: Denies swollen glands   Psychiatric: Denies depression, anxiety or panic       Objective:   Vitals:  Vitals:    11/19/19 1100   BP: 113/64   Pulse: 75   Resp: 18   Temp: 98 °F (36 7 °C)   SpO2: 97%       Body mass index is 18 46 kg/m²    Systolic (75MAQ), YGY:409 , Min:96 , CNA:835     Diastolic (04XRX), UWJ:41, Min:54, Max:75      Intake/Output Summary (Last 24 hours) at 11/19/2019 1213  Last data filed at 11/19/2019 0300  Gross per 24 hour   Intake 200 ml   Output 1100 ml   Net -900 ml     Weight (last 2 days)     Date/Time   Weight    11/19/19 1100   56 7 (125)    11/19/19 0600   56 (123 46)    11/17/19 0600   57 3 (126 32)              PHYSICAL EXAM:  General: Patient is not in acute distress  Laying comfortably in the bed  Awake, alert, responding to commands  Head: Normocephalic  Atraumatic  HEENT: Both pupils normal sized, round and reactive to light  Nonicteric  Neck: JVP not raised  Trachea central  No thyromegaly  Respiratory: Bilateral bronchovascular breath sounds with no crackles or rhonchi  Cardiovascular: RRR  S1 and S2 normal  No murmur, rub or gallop  GI: Abdomen soft, nontender  No guarding or rigidity  Liver and spleen not palpable  Bowel sounds present  Neurologic: Patient is awake, alert, responding to command   Moving all extremities  Integumentary:  No skin rash  Lymphatic: No cervical lymphadenopathy  Extremities: No clubbing, cyanosis or edema      LABORATORY RESULTS:  Results from last 7 days   Lab Units 11/14/19  2158   TROPONIN I ng/mL <0 02     CBC with diff:   Results from last 7 days   Lab Units 11/16/19  0454 11/15/19  0531 11/14/19  2158   WBC Thousand/uL 4 55 6 64 5 88   HEMOGLOBIN g/dL 11 8* 12 7 14 2   HEMATOCRIT % 36 3* 38 2 43 3   MCV fL 88 87 87   PLATELETS Thousands/uL 113* 151 186   MCH pg 28 4 28 9 28 5   MCHC g/dL 32 5 33 2 32 8   RDW % 13 5 13 2 13 2   MPV fL 12 2 12 3 12 7   NRBC AUTO /100 WBCs 0 0 0       CMP:  Results from last 7 days   Lab Units 11/16/19  0454 11/15/19  0531 11/14/19  2158   POTASSIUM mmol/L 4 1 4 1 3 8   CHLORIDE mmol/L 103 102 99*   CO2 mmol/L 27 26 24   BUN mg/dL 24 25 24   CREATININE mg/dL 1 10 1 26 1 19   CALCIUM mg/dL 8 9 8 7 9 1   AST U/L  --   --  18   ALT U/L  --   --  12   ALK PHOS U/L  --   --  86   EGFR ml/min/1 73sq m 66 56 60       BMP:  Results from last 7 days   Lab Units 19  0454 11/15/19  0531 19  2158   POTASSIUM mmol/L 4 1 4 1 3 8   CHLORIDE mmol/L 103 102 99*   CO2 mmol/L 27 26 24   BUN mg/dL 24 25 24   CREATININE mg/dL 1 10 1 26 1 19   CALCIUM mg/dL 8 9 8 7 9 1       Results from last 7 days   Lab Units 19  2158   NT-PRO BNP pg/mL 1,723*        Results from last 7 days   Lab Units 11/15/19  0531 19  0753   MAGNESIUM mg/dL 1 9 2 1       Results from last 7 days   Lab Units 11/15/19  0531   HEMOGLOBIN A1C % 5 6             Results from last 7 days   Lab Units 19  2158   INR  1 11       Lipid Profile:   No results found for: CHOL  Lab Results   Component Value Date    HDL 31 (L) 2019    HDL 54 10/20/2019     Lab Results   Component Value Date    LDLCALC 22 2019    LDLCALC 96 10/20/2019     Lab Results   Component Value Date    TRIG 76 2019    TRIG 60 10/20/2019       Cardiac testing:   Results for orders placed during the hospital encounter of 10/20/19   Echo complete with contrast if indicated    Narrative [address] [place] [id number] [phone]    Transthoracic Echocardiogram  2D, M-mode, Doppler, and Color Doppler    Study date:  20-Oct-2019    Patient: Freeman Valerio  MR number: [id number]  Account number: [de-identified]  : 1945  Age: 69 years  Gender: Male  Status: Inpatient  Location: Bedside  Height: 70 in  Weight: 133 lb  BP: 119/ 69 mmHg    Indications: Evaluate suspected myocardial infarction  Diagnoses: I21 4 - Non-ST elevation (NSTEMI) myocardial infarction    Sonographer:  Hannah Isaac RDCS  Referring Physician:  Marisol Cobb PA-C  Group:  [facility]  Interpreting Physician:  Bertha Flower MD    SUMMARY    LEFT VENTRICLE:  Ejection fraction was estimated to be 40 %  Inferior wall,apex, apical septum and lateral wall are hypokinetic  MITRAL VALVE:  There was trace regurgitation      TRICUSPID VALVE:  There was trace regurgitation  HISTORY: Symptoms: chest pain  PRIOR HISTORY: Shortness of breath, NSTEMI,    PROCEDURE: The procedure was performed at the bedside  This was a routine study  The transthoracic approach was used  The study included complete 2D imaging, M-mode, complete spectral Doppler, and color Doppler  The heart rate was 58 bpm,  at the start of the study  Images were obtained from the parasternal, apical, subcostal, and suprasternal notch acoustic windows  Echocardiographic views were limited due to low windows and lung interference  Image quality was adequate  LEFT VENTRICLE: Size was normal  Ejection fraction was estimated to be 40 %  Inferior wall,apex, apical septum and lateral wall are hypokinetic  RIGHT VENTRICLE: The size was normal  Systolic function was normal  Wall thickness was normal     LEFT ATRIUM: Size was normal     RIGHT ATRIUM: Size was normal     MITRAL VALVE: There was annular calcification  DOPPLER: There was trace regurgitation  AORTIC VALVE: The valve was not well visualized  DOPPLER: There was no evidence for stenosis  TRICUSPID VALVE: The valve structure was normal  There was normal leaflet separation  DOPPLER: The transtricuspid velocity was within the normal range  There was no evidence for stenosis  There was trace regurgitation  PULMONIC VALVE: Leaflets exhibited normal thickness, no calcification, and normal cuspal separation  DOPPLER: The transpulmonic velocity was within the normal range  There was no regurgitation  PERICARDIUM: There was no pericardial effusion  The pericardium was normal in appearance  AORTA: The root exhibited upper limit of normal size      SYSTEM MEASUREMENT TABLES    2D  %FS: 20 8 %  Ao Diam: 3 8 cm  EDV(Teich): 85 5 ml  EF(Teich): 42 6 %  ESV(Teich): 49 1 ml  IVSd: 0 8 cm  LA Area: 13 4 cm2  LA Diam: 3 5 cm  LVEDV MOD A4C: 104 ml  LVEF MOD A4C: 45 6 %  LVESV MOD A4C: 56 6 ml  LVIDd: 4 4 cm  LVIDs: 3 4 cm  LVLd A4C: 8 7 cm  LVLs A4C: 7 cm  LVPWd: 0 8 cm  RA Area: 12 cm2  RVIDd: 3 1 cm  SV MOD A4C: 47 5 ml  SV(Teich): 36 5 ml    MM  TAPSE: 1 6 cm    PW  E': 0 1 m/s  E/E': 6 6  MV A Antolin: 0 6 m/s  MV Dec Cass: 2 4 m/s2  MV DecT: 230 4 ms  MV E Antolin: 0 6 m/s  MV E/A Ratio: 0 9  MV PHT: 66 8 ms  MVA By PHT: 3 3 cm2    [employer] Accredited Echocardiography Laboratory    Prepared and electronically signed by    Nava Berry MD  Signed 20-Oct-2019 19:40:30         Imaging: I have personally reviewed pertinent reports  Procedure: Cta Chest Pe Study    Result Date: 11/17/2019  Narrative: CTA - CHEST WITH IV CONTRAST - PULMONARY ANGIOGRAM INDICATION:   Shortness of breath  COMPARISON: None  TECHNIQUE: CTA examination of the chest was performed using angiographic technique according to a protocol specifically tailored to evaluate for pulmonary embolism  Axial, sagittal, and coronal 2D reformatted images were created from the source data and  submitted for interpretation  In addition, coronal 3D MIP postprocessing was performed on the acquisition scanner  Radiation dose length product (DLP) for this visit:  174 mGy-cm   This examination, like all CT scans performed in the [facility], was performed utilizing techniques to minimize radiation dose exposure, including the use of iterative reconstruction and automated exposure control  IV Contrast:  85 mL of iohexol (OMNIPAQUE)  FINDINGS: PULMONARY ARTERIAL TREE:  No pulmonary embolus is seen  LUNGS:  Lungs are clear  There is no tracheal or endobronchial lesion  PLEURA:  Unremarkable  HEART/GREAT VESSELS:  Coronary atherosclerosis  MEDIASTINUM AND NANCY:  Unremarkable  Subcentimeter right hilar node  CHEST WALL AND LOWER NECK:   Unremarkable  VISUALIZED STRUCTURES IN THE UPPER ABDOMEN:  Unremarkable  OSSEOUS STRUCTURES:  No acute fracture or destructive osseous lesion  Impression: 1  No pulmonary artery emboli   Workstation performed: [id number] Meds/Allergies   current meds:   Current Facility-Administered Medications   Medication Dose Route Frequency    acetaminophen (TYLENOL) tablet 650 mg  650 mg Oral Q6H PRN    aspirin (ECOTRIN LOW STRENGTH) EC tablet 81 mg  81 mg Oral Daily    atorvastatin (LIPITOR) tablet 80 mg  80 mg Oral Daily With Dinner    clopidogrel (PLAVIX) tablet 75 mg  75 mg Oral Daily    colchicine (COLCRYS) tablet 0 6 mg  0 6 mg Oral BID    enoxaparin (LOVENOX) subcutaneous injection 40 mg  40 mg Subcutaneous Daily    loperamide (IMODIUM) capsule 2 mg  2 mg Oral Q4H PRN    magnesium hydroxide (MILK OF MAGNESIA) 400 mg/5 mL oral suspension 30 mL  30 mL Oral Daily PRN    midodrine (PROAMATINE) tablet 5 mg  5 mg Oral TID AC    nitroglycerin (NITROSTAT) SL tablet 0 4 mg  0 4 mg Sublingual Q5 Min PRN    ondansetron (ZOFRAN) injection 4 mg  4 mg Intravenous Q6H PRN     Medications Prior to Admission   Medication    aspirin (ECOTRIN LOW STRENGTH) 81 mg EC tablet    atorvastatin (LIPITOR) 80 mg tablet    benzonatate (TESSALON PERLES) 100 mg capsule    clopidogrel (PLAVIX) 75 mg tablet    colchicine (COLCRYS) 0 6 mg tablet    furosemide (LASIX) 20 mg tablet    metoprolol tartrate (LOPRESSOR) 25 mg tablet    nitroglycerin (NITROSTAT) 0 4 mg SL tablet       Elaine Guillory MD  11/19/2019,12:13 PM      Portions of the record may have been created with voice recognition software  Occasional wrong words or sound alike substitutions may have occurred due to the inherent limitations of voice recognition software  Please read the chart carefully and recognize, using context, where substitutions may have occurred

## 2019-11-20 ENCOUNTER — TRANSITIONAL CARE MANAGEMENT (OUTPATIENT)
Dept: INTERNAL MEDICINE CLINIC | Facility: CLINIC | Age: 74
End: 2019-11-20

## 2019-11-20 DIAGNOSIS — I31.9 PERICARDITIS: ICD-10-CM

## 2019-11-20 RX ORDER — COLCHICINE 0.6 MG/1
0.6 TABLET ORAL 2 TIMES DAILY
Qty: 90 TABLET | Refills: 0 | Status: CANCELLED | OUTPATIENT
Start: 2019-11-20

## 2019-11-20 NOTE — PROGRESS NOTES
1st attempt-Unable to LVM asking pt to return call for TCM documentation    TCM appt has been scheduled      By Anjelica Ramirez

## 2019-11-21 ENCOUNTER — HOSPITAL ENCOUNTER (OUTPATIENT)
Dept: PULMONOLOGY | Facility: HOSPITAL | Age: 74
Discharge: HOME | End: 2019-11-21
Attending: INTERNAL MEDICINE
Payer: MEDICARE

## 2019-11-21 DIAGNOSIS — I21.4 NSTEMI (NON-ST ELEVATED MYOCARDIAL INFARCTION) (HCC): ICD-10-CM

## 2019-11-21 DIAGNOSIS — R06.00 DOE (DYSPNEA ON EXERTION): ICD-10-CM

## 2019-11-21 DIAGNOSIS — R06.00 DYSPNEA ON EXERTION: Primary | ICD-10-CM

## 2019-11-21 PROCEDURE — 94726 PLETHYSMOGRAPHY LUNG VOLUMES: CPT

## 2019-11-21 PROCEDURE — 94729 DIFFUSING CAPACITY: CPT

## 2019-11-21 PROCEDURE — 94760 N-INVAS EAR/PLS OXIMETRY 1: CPT

## 2019-11-21 PROCEDURE — 94060 EVALUATION OF WHEEZING: CPT

## 2019-11-21 PROCEDURE — 94060 EVALUATION OF WHEEZING: CPT | Performed by: INTERNAL MEDICINE

## 2019-11-21 PROCEDURE — 94726 PLETHYSMOGRAPHY LUNG VOLUMES: CPT | Performed by: INTERNAL MEDICINE

## 2019-11-21 PROCEDURE — 94729 DIFFUSING CAPACITY: CPT | Performed by: INTERNAL MEDICINE

## 2019-11-21 RX ORDER — ALBUTEROL SULFATE 2.5 MG/3ML
2.5 SOLUTION RESPIRATORY (INHALATION) ONCE
Status: COMPLETED | OUTPATIENT
Start: 2019-11-21 | End: 2019-11-21

## 2019-11-21 RX ADMIN — ALBUTEROL SULFATE 2.5 MG: 2.5 SOLUTION RESPIRATORY (INHALATION) at 12:53

## 2019-11-22 ENCOUNTER — TELEPHONE (OUTPATIENT)
Dept: CARDIOLOGY CLINIC | Facility: CLINIC | Age: 74
End: 2019-11-22

## 2019-11-22 NOTE — TELEPHONE ENCOUNTER
DO GALILEO Mccoy [facility]             Please call the patient and inform him that his pulmonary function testing showed good lung volumes and good lung function  Gudelia Caballero was one abnormality in how efficiency oxygen gets from the air in his lungs into his blood stream   I would like him to see the pulmonologists and a referral has been placed  [address]    Previous Messages            Result Notes for Complete pulmonary function test     Notes recorded by Laura Espinoza MA on 11/22/2019 at 10:37 AM EST  S/w pts daughter  She verbally understood per Dr Diane Laguna- pulmonary function testing showed good lung volumes and good lung function  There was one abnormality in how efficiency oxygen gets from the air in his lungs into his blood stream  I informed her a referral has been placed in his chart to f/u with pulmonologist  Central scheduling number was provided to her

## 2019-11-26 ENCOUNTER — TELEPHONE (OUTPATIENT)
Dept: CARDIOLOGY CLINIC | Facility: CLINIC | Age: 74
End: 2019-11-26

## 2019-11-26 NOTE — TELEPHONE ENCOUNTER
Pt's daughter called (Erendira) and stated that pt was told he has to go see a pulmonologist  Pt's daughter would like to know should she still take pt to cardiac rehab tommorow or wait till pt goes to see the pulmonologist and fup after       Please give Erendira a call back at [phone]

## 2019-11-27 ENCOUNTER — CLINICAL SUPPORT (OUTPATIENT)
Dept: CARDIAC REHAB | Facility: CLINIC | Age: 74
End: 2019-11-27
Payer: MEDICARE

## 2019-11-27 DIAGNOSIS — I25.5 ISCHEMIC CARDIOMYOPATHY: ICD-10-CM

## 2019-11-27 DIAGNOSIS — I21.4 NSTEMI (NON-ST ELEVATED MYOCARDIAL INFARCTION) (HCC): ICD-10-CM

## 2019-11-27 DIAGNOSIS — I31.9 PERICARDITIS: ICD-10-CM

## 2019-11-27 PROCEDURE — 93797 PHYS/QHP OP CAR RHAB WO ECG: CPT

## 2019-11-27 NOTE — PROGRESS NOTES
CARDIAC REHAB ASSESSMENT    Today's date: 2019  Patient name: Dalia Quevedo  : 1945       MRN: [id number]  PCP: Marisol Rendon DO  Referring Physician: Luis Carlos Blanton  Cardiologist: Susan Estrada DO  Surgeon:   Dx:   Encounter Diagnoses   Name Primary?  NSTEMI (non-ST elevated myocardial infarction) ([facility] [address] )     Pericarditis     Ischemic cardiomyopathy        Date of onset: 10/20/19  Cultural needs:     Height:    Wt Readings from Last 1 Encounters:   19 56 7 kg (125 lb)      Weight:   Ht Readings from Last 1 Encounters:   11/15/19 5' 10" (1 778 m)     Medical History:   Past Medical History:   Diagnosis Date    Non-ST elevated [facility] infarction ([facility] )     Poison ivy          Physical Limitations: SOB    Risk Factors   Cholesterol: No  Smoking: Former user quit 50-60 years ago   HTN: No  DM: No  Obesity: No   Inactivity: No  Stress:  perceived  stress: 4/10   Stressors: not working    Goals for Stress Management: learn to cope with stress     Family History:  Family History   Problem Relation Age of Onset    No Known Problems Mother     No Known Problems Father        Allergies: Patient has no known allergies    ETOH:   Social History     Substance and Sexual Activity   Alcohol Use Never    Frequency: Never    Binge frequency: Never         Current Medications:   Current Outpatient Medications   Medication Sig Dispense Refill    aspirin (ECOTRIN LOW STRENGTH) 81 mg EC tablet Take 1 tablet (81 mg total) by mouth daily Please purchase over the counter at her local pharmacy 30 tablet 1    atorvastatin (LIPITOR) 80 mg tablet Take 1 tablet (80 mg total) by mouth daily with dinner 30 tablet 1    Blood Pressure KIT by Does not apply route daily (Patient not taking: Reported on 2019) 1 each 0    clopidogrel (PLAVIX) 75 mg tablet Take 1 tablet (75 mg total) by mouth daily 30 tablet 0    colchicine (COLCRYS) 0 6 mg tablet Take 1 tablet (0 6 mg total) by mouth 2 (two) times a day 90 tablet 0    furosemide (LASIX) 20 mg tablet Take 1 tablet (20 mg total) by mouth 2 (two) times a day 90 tablet 0    midodrine (PROAMATINE) 5 mg tablet Take 1 tablet (5 mg total) by mouth 3 (three) times a day before meals 90 tablet 1    nitroglycerin (NITROSTAT) 0 4 mg SL tablet Place 1 tablet (0 4 mg total) under the tongue every 5 (five) minutes as needed for chest pain 30 tablet 0     No current facility-administered medications for this visit  Functional Status Prior to Diagnosis for Treatment   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 [employer]   Recreation: like to work   [employer]: resumed all ADLs  [place]: No limitations  Exercise: none  Other:     Current Functional Status  Occupation: reports that he will never get back to what he was doing   Recreation: work around the house   ADLs:resumed all ADLs  Martin: No limitations  Exercise: none  Other:     Patient Specific Goals:  Get back to doing what he was doing, get blood pressure under control, decrease SOB,     Short Term Program Goals: dietary modifications increased strength improved energy/stamina with ADLs exercise 120-150 mins/wk    Long Term Goals: increased intial training workload  Improved [facility] Activity Status score  Improved functional capacity  Improved Quality of Life - [facility] score reduced  Improved lipid profile  Reduced body fat%  improved Rate Your Plate Score    Ability to reach goals/rehabilitation potential:  Very Good     Projected return to function: 12 weeks  Objective tests: sub-max TM ETT      Nutritional   Reviewed details of Rate your Plate  Discussed key elements of heart healthy eating  Reviewed patient goals for dietary modifications and their clinical implications  Reviewed most recent lipid profile   No added salt, low-sodium everything, decaf tea, 3 eggs/wk, jello, bread    Goals for dietary modification: choose lean cuts of meat  poultry without the skin  low fat ground meat and poultry  eliminate processed meats  reduce portions of meat to 3 oz  increase fish intake  more meatless meals  low fat dairy   reduced fat cheese  increase whole grains  increase fruits and vegetables  eliminate butter  low sodium  improved snack choices  more nuts/seeds  reduce sweets/frozen desserts  heathier choices while dining out      Emotional/Social  Patient reports feeling some depression since d/c    SOCIAL SUPPORT NETWORK    Marital status:      Rate 1-5:    Marriage: 5   Family: 5   Financial: 5   Relationships: 5   Spirituality: 5   Intellectual: 5      Domestic Violence Screening: No    Comments: Pt reports that he was working started to get pain but thought he pulled a muscle  Pt has NSTEMI PCI x2 10/20/19  Pt reports that he was turning white and blue and thought he was having a stroke went back into the hospital and was having trouble with his blood pressure dropping when he stands  Pt reports that since the heart attack he is really SOB  Grandfather  form a heart attack  Pt will begin cardiac rehab

## 2019-11-29 NOTE — PROGRESS NOTES
William Lee        Dear Dr Ford Hein,    Emotional well-being and depression is addressed in the Cardiac  rehab evaluation  To assess the severity of depression, patients are given the PHQ-9 Depression Questionnaire  This is to inform you that your patient Denilson Noel scored a 25 which is interpreted as 20-27 = Severe Depression  Your patient was provided contact information for [facility]  A repeat PHQ -9 will be administered in 30 days to assess improvement and/or need to explore other mental health resources  Please advise if you recommend additional mental health services or initiation of medical therapy  Thank you for your continued support of cardiac rehabilitation  Sincerely,      Jake Thorne MS, CEP  Exercise Physiologist

## 2019-11-29 NOTE — PROGRESS NOTES
Cardiac Rehabilitation Plan of Care   Care Plan       Today's date: 2019   Visits: 1  Patient name: Osmani Pimentel  : 1945  Age: 69 y o  MRN: [id number]  Referring Physician: Luis Carlos Rapp  Cardiologist: Mary Benitez DO  Provider: [facility]   Clinician: Juany Alicea, MS, CEP     Dx:   Encounter Diagnoses   Name Primary?  NSTEMI (non-ST elevated myocardial infarction) ([facility] [address] )     Pericarditis     Ischemic cardiomyopathy      Date of onset: 10/20/19      SUMMARY OF PROGRESS:  Today was Minesh's initial evaluation for cardiac rehab  Andrea Aguilar is coming to use due to having a NSTEMI PCI x2 10/20/19 after feeling chest pain where is thought he pulled a muscle  Andrea Aguilar reports that two weeks later he went back to the hospital and he ended up having a mini stroke that they believe was from his cardiovascular problems  Andrea Aguilar reports that since the heart attack he has a lot of shortness of breath, his O2 was monitored during testing today and stayed above 90%, he reports that he has an appointment with a pulmonologist soon  Andrea Aguilar was able to completed a sub-maximal treadmill test walking a total of 3 minutes at 2 2 METs  Minesh's test was terminated when he achieved 30 bpm above his resting HR  Andrea Aguilar had normal hemodynamic responses to exercise and his telemetry read NSR throughout rest and exercise  Andrea Aguilar reports to not have any cardiac complications during exercise  Andrea Aguilar reports form his rate your plate that he is following a heart healthy diet, but would not talk to me much about it  Andrea Aguilar reports his biggest goals for cardiac rehab include getting back to what he was doing, get his blood pressure under control, and decrease his SOB  Andrea Aguilar reports to have excellent social support from friends and family  Andrea Aguilar according to the evaluation and his PHQ-9 depression scale and CARLA-7 anxiety scale he has severe depression and severe anxiety  Minesh's PCP will be notified today due to suggesting that he thinks that he would be better off day or hurting himself nearly everyday  Toña Amelia reports that he is very frustrated with not being able to do anything and the fact that he is not working right not  Toña Cisneros reports that work is his life and he needs to get back to it  Minesh's PHQ-9 and CARLA-7 will be reassessed in 30 days to monitor his emotional health  Toña Cisneros is going to wait to start cardiac rehab until  the new year due to his insurance changing and that cardiac rehab will be fully covered  Medication compliance: Yes   Comments:   Fall Risk: Low   Comments:     EKG changes: NSR      EXERCISE ASSESSMENT and PLAN    Current Exercise Program in Rehab:       Frequency: 3 days/week        Minutes: 30-40         METS: 2-4            HR:    RPE: 4-6         Modalities: UBE, NuStep and Recumbent bike      Exercise Progression 30 Day Goals :    Frequency: 4 days/week   Minutes: 35-45   METS: 2 5-4 5   HR:     RPE: 4-6   Modalities: Treadmill, UBE, NuStep and Recumbent bike    Strength training: Will be added following at least 8 weeks post surgery and 8-10 monitored sessions   Modalities: Leg Press, Chest Press, Pull Downs, Arm Curl and Seated Row    Progressing:   In Progress    Home Exercise: None    Goals: 10% improvement in functional capacity, improved DASI score by 10%, Increase in peak CR METs by 40%, Resume ADLs with increased strength, Return to work unrestricted and Exercise 5 days/wk, >150mins/wk  Education: Benefit of exercise for CAD risk factors, home exercise guidelines, signs and sxs, RPE scale, class: Risk Factors for Heart Disease and Exercise instructions/guidelines for discharge    Plan:home exercise 30+ mins 2 days opposite CR  Readiness to change: Preparation      NUTRITION ASSESSMENT AND PLAN    Weight control:    Starting weight: 126 4 lbs   Current weight:     Waist circumference:    Startin in   Current: Diabetes: N/A  Lipid management: Discussed diet and lipid management, Last lipid profile 19  Chol 68  TRG 76  HDL 31  LDL 22 and 11/15/19 A1c%: 5 6, Fasting B  Goals:HDL >40  Education: heart healthy eating  low sodium diet  hydration  healthy choices while dining out  class: Heart Healthy Eating  class:  Label Reading  Progressing: In Progress  Plan: Increase PUFA and MUFA, Decrease SFA, Increase whole grains, increase fruits/vegs, eliminate processed meats, reduce red meat 1x/wk, swtich to low fat dairy and Reduce added sugars <25g/day  Readiness to change: Preparation      PSYCHOSOCIAL ASSESSMENT AND PLAN    Emotional: pt reports to have depression and anxiety  PHQ-9 = 20-27 = Severe Depression  Self-reported stress level: 8 - frustrated not being able to work and is extremely SOB   Social support: Excellent  Goals:  Reduce perceived stress to 1-3/10, improved [facility] QOL < 27, PHQ-9 - reduced severity by one level, behavioral health consult, Feelings in [facility] Score < 3, Physical Fitness in [facility] Score < 3, Daily Activity in [facility] Score < 3, Social Activities in [facility] Score < 3, Overall Health in [facility] Score < 3, Quality of Life in [facility] Score < 3  and Change in Health in [facility] Score < 3   Education: class:  Stress and Your Health  and class:  Relaxation  Progressing: In Progress  Plan: Refer to Miryam Goodman, PHQ-9 >5 will refer to MD and Practice relaxation techniques  Readiness to change: Preparation      OTHER CORE COMPONENTS     Tobacco:   Social History     Tobacco Use   Smoking Status Former Smoker   Smokeless Tobacco Never Used   Tobacco Comment    Quit 50 years ago       Tobacco Use Intervention: Referral to tobacco expert:   Brief counseling by cardiac rehab professional  Date: 19    Blood pressure:    Restin/60   Exercise: 110/60    Goals: consistent BP < 130/80, reduced dietary sodium <2300mg and consistent exercise >150 mins/wk  Education: understanding HTN and CAD, low sodium diet and HTN, Education class:  Common Heart Medications and Education class: Understanding Heart Disease  Progressing: In Progress  Plan: Class: Understanding Heart Disease, Class: Common Heart Medications and Monitor BP daily  Readiness to change: Preparation

## 2019-12-06 ENCOUNTER — OFFICE VISIT (OUTPATIENT)
Dept: INTERNAL MEDICINE CLINIC | Facility: CLINIC | Age: 74
End: 2019-12-06
Payer: MEDICARE

## 2019-12-06 ENCOUNTER — OFFICE VISIT (OUTPATIENT)
Dept: CARDIOLOGY CLINIC | Facility: CLINIC | Age: 74
End: 2019-12-06
Payer: MEDICARE

## 2019-12-06 VITALS
OXYGEN SATURATION: 92 % | BODY MASS INDEX: 19.13 KG/M2 | HEART RATE: 77 BPM | WEIGHT: 129.2 LBS | SYSTOLIC BLOOD PRESSURE: 134 MMHG | HEIGHT: 69 IN | DIASTOLIC BLOOD PRESSURE: 80 MMHG

## 2019-12-06 VITALS
BODY MASS INDEX: 18.18 KG/M2 | DIASTOLIC BLOOD PRESSURE: 70 MMHG | WEIGHT: 127 LBS | HEART RATE: 75 BPM | SYSTOLIC BLOOD PRESSURE: 122 MMHG | OXYGEN SATURATION: 98 % | HEIGHT: 70 IN

## 2019-12-06 DIAGNOSIS — I25.5 ISCHEMIC CARDIOMYOPATHY: ICD-10-CM

## 2019-12-06 DIAGNOSIS — I21.4 NSTEMI (NON-ST ELEVATED MYOCARDIAL INFARCTION) (HCC): ICD-10-CM

## 2019-12-06 DIAGNOSIS — Z86.73 HISTORY OF LACUNAR CEREBROVASCULAR ACCIDENT (CVA): Chronic | ICD-10-CM

## 2019-12-06 DIAGNOSIS — I25.119 CORONARY ARTERY DISEASE INVOLVING NATIVE CORONARY ARTERY OF NATIVE HEART WITH ANGINA PECTORIS (HCC): Primary | ICD-10-CM

## 2019-12-06 DIAGNOSIS — R55 NEAR SYNCOPE: Primary | ICD-10-CM

## 2019-12-06 PROBLEM — R07.9 CHEST PAIN: Status: RESOLVED | Noted: 2019-10-20 | Resolved: 2019-12-06

## 2019-12-06 PROBLEM — I31.9 PERICARDITIS: Status: RESOLVED | Noted: 2019-10-24 | Resolved: 2019-12-06

## 2019-12-06 PROBLEM — R42 DIZZINESS: Status: RESOLVED | Noted: 2019-11-14 | Resolved: 2019-12-06

## 2019-12-06 PROBLEM — R05.9 COUGH: Status: RESOLVED | Noted: 2019-10-23 | Resolved: 2019-12-06

## 2019-12-06 PROCEDURE — 99213 OFFICE O/P EST LOW 20 MIN: CPT | Performed by: INTERNAL MEDICINE

## 2019-12-06 PROCEDURE — 99204 OFFICE O/P NEW MOD 45 MIN: CPT | Performed by: INTERNAL MEDICINE

## 2019-12-06 RX ORDER — ATORVASTATIN CALCIUM 80 MG/1
80 TABLET, FILM COATED ORAL
Qty: 90 TABLET | Refills: 3 | Status: SHIPPED | OUTPATIENT
Start: 2019-12-06 | End: 2020-01-22 | Stop reason: SDUPTHER

## 2019-12-06 RX ORDER — FUROSEMIDE 20 MG/1
20 TABLET ORAL DAILY
Qty: 90 TABLET | Refills: 0 | Status: SHIPPED | OUTPATIENT
Start: 2019-12-06 | End: 2020-01-22 | Stop reason: SDUPTHER

## 2019-12-06 RX ORDER — CLOPIDOGREL BISULFATE 75 MG/1
75 TABLET ORAL DAILY
Qty: 90 TABLET | Refills: 3 | Status: SHIPPED | OUTPATIENT
Start: 2019-12-06 | End: 2020-10-19 | Stop reason: ALTCHOICE

## 2019-12-06 RX ORDER — MIDODRINE HYDROCHLORIDE 5 MG/1
2.5 TABLET ORAL
Qty: 90 TABLET | Refills: 0 | Status: SHIPPED | OUTPATIENT
Start: 2019-12-06 | End: 2020-08-12 | Stop reason: CLARIF

## 2019-12-06 NOTE — PATIENT INSTRUCTIONS
Please reduce midodrine to 2 5 mg 3 times a day  Please reduce furosemide (Lasix) to 20 mg once a day  In 8:00 a m  Cortisol level has been ordered  This is a blood test that must be drawn around a m  Sigrid Momin Please had this blood test drawn at your next earliest convenience  Please continue to take all of her other cardiac medications as previously prescribed  Please continue to attend cardiac rehab  Please follow up in our office in 3 months, sooner if any acute issues arise  We will be in contact with you regarding her blood work before then

## 2019-12-06 NOTE — PATIENT INSTRUCTIONS
Coronary Artery Disease   WHAT YOU SHOULD KNOW:   Coronary artery disease (CAD) is narrowing of the arteries to your heart caused by a buildup of plaque  Plaque is made up of cholesterol and other substances  The narrowing in your arteries decreases the amount of blood that can flow to your heart  This causes your heart to get less oxygen  INSTRUCTIONS:   Medicines: You may  need any of the following:  · Blood pressure medicines  are given to lower your blood pressure  These medicines may include ACE inhibitors and beta-blockers  ACE inhibitors help keep your blood vessels relaxed and open, which helps keep blood flowing into your heart  Beta-blockers keep your heart pumping strongly and regularly  This helps keep your heart from working too hard to get oxygen  · Cholesterol-lowering medicines  help lower high blood cholesterol levels  · Nitrates , such as nitroglycerin, relax the arteries of your heart so it gets more oxygen  They help to relieve your chest pain  · Antiplatelet medicines , such as aspirin, keep platelets from sticking to a damaged part of your artery  Platelets are a part of your blood that stick together to help heal injuries  They may cause a blockage in your artery and keep blood from flowing to your heart  · Anticoagulants    are a type of blood thinner medicine that helps prevent clots  Clots can cause strokes, heart attacks, and death  These medicines may cause you to bleed or bruise more easily  ¨ Watch for bleeding from your gums or nose  Watch for blood in your urine and bowel movements  Use a soft washcloth and a soft toothbrush  If you shave, use an electric razor  Avoid activities that can cause bruising or bleeding  ¨ Tell your healthcare provider about all medicines you take because many medicines cannot be used with anticoagulants  Do not start or stop any medicines unless your healthcare provider tells you to   Tell your dentist and other healthcare providers that you take anticoagulants  Wear a bracelet or necklace that says you take this medicine  ¨ You will need regular blood tests so your healthcare provider can decide how much medicine you need  Take anticoagulants exactly as directed  Tell your healthcare provider right away if you forget to take the medicine, or if you take too much  ¨ If you take warfarin, some foods can change how your blood clots  Do not make major changes to your diet while you take warfarin  Warfarin works best when you eat about the same amount of vitamin K every day  Vitamin K is found in green leafy vegetables, broccoli, grapes, and other foods  Ask for more information about what to eat when you take warfarin  · Take your medicine as directed  Call your healthcare provider if you think your medicine is not helping or if you have side effects  Tell him if you are allergic to any medicine  Keep a list of the medicines, vitamins, and herbs you take  Include the amounts, and when and why you take them  Bring the list or the pill bottles to follow-up visits  Carry your medicine list with you in case of an emergency  Follow up with your primary healthcare provider or cardiologist as directed: You may need to return for other tests  You may also be referred to a cardiac surgeon  Write down your questions so you remember to ask them during your visits  Cardiac rehabilitation:  Your primary healthcare provider or cardiologist may recommend that you attend cardiac rehabilitation (rehab)  This is a program run by specialists who will help you safely strengthen your heart and reduce the risk of more heart disease  The plan includes exercise, relaxation, stress management, and heart-healthy nutrition  Caregivers will also check to make sure any medicines you are taking are working  Manage CAD:   · Exercise regularly  Exercise at least 30 minutes per day, on most days of the week   Exercise helps to lower high cholesterol and high blood pressure  It can also help you to maintain a healthy weight  Ask your primary healthcare provider about the kind of exercise you should do and how to get started  · Maintain a healthy weight  If you are overweight, talk to your primary healthcare provider about how to lose weight  A weight loss of 10% can improve your heart health  · Eat heart-healthy foods  Include fresh fruits and vegetables in your meal plan  Choose low-fat foods, such as skim or 1% fat milk, low-fat cheese and yogurt, fish, chicken (without skin), and lean meats  Eat two 4-ounce servings of fish high in omega-3 fats each week, such as salmon, fresh tuna, and herring  Avoid foods that are high in sodium, such as canned foods, potato chips, salty snacks, and cold cuts  Put less table salt on your food  · Do not smoke  Smoking increases your risk of a heart attack  If you smoke, it is never too late to quit  Ask primary healthcare provider for information if you need help quitting  · Limit alcohol  Women should limit alcohol to 1 drink a day  Men should limit alcohol to 2 drinks a day  A drink of alcohol is 12 ounces of beer, 5 ounces of wine, or 1½ ounces of liquor  · Manage other health conditions  Follow your primary healthcare provider's advice on how to manage other conditions that can affect your heart health  These include diabetes, high blood pressure, and high cholesterol  You may need to take medicines for these conditions and make other lifestyle changes  Talk to your primary healthcare provider if you are depressed  He may recommend treatment for your depression  · Ask if you should have a flu vaccine  The flu can be dangerous for a person who has CAD  The flu vaccine is available every year in the fall         Contact your primary healthcare provider if:   · You have chest pain that is more frequent, or you have chest pain at rest      · You have questions or concerns about your condition or care   Return to the emergency department if:  You have any of the following signs of a heart attack:  · Squeezing, pressure, fullness, or pain in your chest that lasts longer than a few minutes or returns     · Discomfort or pain in your back, neck, jaw, stomach, or arm    · Shortness of breath or breathing problems    · A sudden cold sweat, lightheadedness, dizziness, or nausea, especially with chest pain or trouble breathing  © 2014 [address] is for End User's use only and may not be sold, redistributed or otherwise used for commercial purposes  All illustrations and images included in CareNotes® are the copyrighted property of [employer]  or [employer]ainer [employer]  The above information is an  only  It is not intended as medical advice for individual conditions or treatments  Talk to your doctor, nurse or pharmacist before following any medical regimen to see if it is safe and effective for you

## 2019-12-06 NOTE — LETTER
December 6, 2019     Frieda Valencia DO  [address] [place] [id number]    Patient: Jose Carr  YOB: 1945   Date of Visit: 12/6/2019       Dear Dr Lina Deluna:    Thank you for referring Maura Sutton to me for evaluation  Below are my notes for this consultation  If you have questions, please do not hesitate to call me  I look forward to following your patient along with you  Sincerely,        Gabby Haas DO        CC: No Recipients  Gabby Haas DO  12/6/2019 12:15 PM  Sign at close encounter                                             Cardiology Follow Up    Jose Carr  [birth year]  [id number]  [address] [place] [address]    Dear Dr Lina Deluna,  Maura Sutton is a 52-year-old gentleman who has been referred to the [address] [place] [address] Place of Cardiology in [facility] with the following issues:     1  Non ST-elevation myocardial infarction  2  Status post PCI to the distal LAD (resolute on X 2 5 x 18 LUDIVINA) and to the ostial 1st diagonal as well as the mid vessel  3  PCI complicated by difficult stent delivery requiring crush technique of the ostial diagonal stent  4  Ischemic cardiomyopathy, ejection fraction 40%  5  Post PCI pericarditis      Interval History:  Since our last visit, Mr Mathieu Echevarria was once again hospitalized with what appears to be a near syncopal event  He was [facility]  His metoprolol was discontinued and he was started on midodrine 10 mg t i d   This has been decreased to 5 mg t i d  He reports no further episodes of passing out  He does complain of occasional headaches focusing above the left eye as well as in the posterior right neck  These headaches are minor but he does notice them  His right posterior neck headache is more significant than the other  He notes an episode his hands and nose turning blue  This occurred approximately 2 days ago    He does have a history of his hands turning white, but this is the 1st time that cyanosis has been observed  He has started attending cardiac rehab  He was quite fatigued afterwards, but he was able to perform during the class  Patient Active Problem List   Diagnosis    Chest pain    NSTEMI (non-ST elevated myocardial infarction) ([facility] )    Cough    Pericarditis    Coronary artery disease involving native coronary artery of native heart with angina pectoris (HCC)    Ischemic cardiomyopathy    Dizziness    Near syncope    Blurry vision     Past Medical History:   Diagnosis Date    Non-ST elevated myocardial infarction ([facility] )     Poison ivy      Social History     Socioeconomic History    Marital status:       Spouse name: Not on file    Number of children: Not on file    Years of education: Not on file    Highest education level: Not on file   Occupational History    Not on file   Social Needs    Financial resource strain: Not on file    Food insecurity:     Worry: Not on file     Inability: Not on file    Transportation needs:     Medical: Not on file     Non-medical: Not on file   Tobacco Use    Smoking status: Former Smoker    Smokeless tobacco: Never Used    Tobacco comment: Quit 50 years ago   Substance and Sexual Activity    Alcohol use: Never     Frequency: Never     Binge frequency: Never    Drug use: No    Sexual activity: Not on file   Lifestyle    Physical activity:     Days per week: Not on file     Minutes per session: Not on file    Stress: Not on file   Relationships    Social connections:     Talks on phone: Not on file     Gets together: Not on file     Attends Orthodoxy service: Not on file     Active member of club or organization: Not on file     Attends meetings of clubs or organizations: Not on file     Relationship status: Not on file    Intimate partner violence:     Fear of current or ex partner: Not on file     Emotionally abused: Not on file     Physically abused: Not on file     Forced sexual activity: Not on file   Other Topics Concern    Not on file   Social History Narrative    Not on file      Family History   Problem Relation Age of Onset    No Known Problems Mother     No Known Problems Father      Past Surgical History:   Procedure Laterality Date    CORONARY STENT PLACEMENT         Current Outpatient Medications:     aspirin (ECOTRIN LOW STRENGTH) 81 mg EC tablet, Take 1 tablet (81 mg total) by mouth daily Please purchase over the counter at her local pharmacy, Disp: 30 tablet, Rfl: 1    atorvastatin (LIPITOR) 80 mg tablet, Take 1 tablet (80 mg total) by mouth daily with dinner, Disp: 30 tablet, Rfl: 1    clopidogrel (PLAVIX) 75 mg tablet, Take 1 tablet (75 mg total) by mouth daily, Disp: 30 tablet, Rfl: 0    colchicine (COLCRYS) 0 6 mg tablet, Take 1 tablet (0 6 mg total) by mouth 2 (two) times a day, Disp: 90 tablet, Rfl: 0    furosemide (LASIX) 20 mg tablet, Take 1 tablet (20 mg total) by mouth 2 (two) times a day, Disp: 90 tablet, Rfl: 0    midodrine (PROAMATINE) 5 mg tablet, Take 1 tablet (5 mg total) by mouth 3 (three) times a day before meals, Disp: 90 tablet, Rfl: 1    nitroglycerin (NITROSTAT) 0 4 mg SL tablet, Place 1 tablet (0 4 mg total) under the tongue every 5 (five) minutes as needed for chest pain, Disp: 30 tablet, Rfl: 0    Blood Pressure KIT, by Does not apply route daily (Patient not taking: Reported on 11/20/2019), Disp: 1 each, Rfl: 0       No Known Allergies    Labs:  Results for Gelene Clore (MRN [id number]) as of 12/6/2019 12:09   11/16/2019 04:54   Sodium 137   Potassium 4 1   Chloride 103   CO2 27   Anion Gap 7   BUN 24   Creatinine 1 10   Glucose, Random 88   Calcium 8 9   eGFR 66       Results for Gelene Clore (MRN [id number]) as of 12/6/2019 12:09   11/16/2019 04:54   WBC 4 55   Red Blood Cell Count 4 15   Hemoglobin 11 8 (L)   HCT 36 3 (L)   MCV 88   MCH 28 4   MCHC 32 5   RDW 13 5   Platelet Count 784 (L) Results for Charley Wood (MRN [id number]) as of 12/6/2019 12:09   11/16/2019 04:54   Cholesterol 68   Triglycerides 76   HDL 31 (L)   Non-HDL Cholesterol 37   LDL Direct 22         Imaging:  Limited transthoracic echocardiogram, 11/15/2019  SUMMARY  LEFT VENTRICLE:  Ejection fraction was estimated to be 50 % to 55%  When compared to previous echocardiogram, LV function appears improved  ( was 40 to 45%)      PERICARDIUM:  A trace pericardial effusion was identified  MRI brain, 11/15/2019  IMPRESSION:  1  Subacute to chronic lacunar infarcts in the left frontal parietal region  No evidence of acute infarct  2   Single nonspecific focus of chronic microhemorrhage in the left frontal region  No acute intracranial hemorrhage or mass effect  CTA head and neck, 11/14/2019  IMPRESSION:  1  No evidence of acute intracranial hemorrhage  2  Focus new low-attenuation left frontal centrum semiovale measuring approximately 8 mm, age indeterminate ischemic event  Follow-up noncontrast brain MRI recommended for further evaluation  3  Old infarcts and microangiopathy  4   Right posterior apical spiculated 9 mm density could represent a nodule or scarring  Follow-up noncontrast chest CT recommended in 3 months  Review of Systems:  Review of Systems   Constitutional: Positive for fatigue  Respiratory: Positive for shortness of breath  Cardiovascular: Negative  Gastrointestinal: Negative  Endocrine: Negative  Musculoskeletal: Negative  Skin: Negative  Neurological: Negative  Hematological: Negative  Physical Exam:  Physical Exam   Constitutional: He is oriented to person, place, and time  He appears well-developed and well-nourished  HENT:   Head: Normocephalic and atraumatic  Eyes: Conjunctivae and EOM are normal    Neck: No hepatojugular reflux and no JVD present  Carotid bruit is not present  No tracheal deviation present  No thyromegaly present     Cardiovascular: Normal rate, regular rhythm, S1 normal and S2 normal  PMI is not displaced  Exam reveals no gallop  No murmur heard  Pulses:       Carotid pulses are 2+ on the right side, and 2+ on the left side  Radial pulses are 2+ on the right side, and 2+ on the left side  Pulmonary/Chest: Breath sounds normal  No accessory muscle usage  No tachypnea  No respiratory distress  Abdominal: Soft  Bowel sounds are normal  He exhibits no distension  There is no tenderness  Musculoskeletal: He exhibits no edema  Lymphadenopathy:        Head (right side): No submental, no submandibular, no tonsillar, no preauricular, no posterior auricular and no occipital adenopathy present  Head (left side): No submental, no submandibular, no tonsillar, no preauricular, no posterior auricular and no occipital adenopathy present  He has no cervical adenopathy  Neurological: He is alert and oriented to person, place, and time  Skin: Skin is warm and dry  Psychiatric: He has a normal mood and affect  His behavior is normal  Judgment and thought content normal        Discussion/Summary:  Non ST-elevation myocardial infarction  PCI of the distal LAD  PTCA of the 1st diagonal occlusion  Difficult stent delivery to the ostial diagonal, resulting in partial deployment with crush technique within the proximal LAD lumen  Persistent shortness of breath, particularly with exertion  Ischemic cardiomyopathy, ejection fraction 40%  Post PCI pericarditis  Syncopal episode and relative hypotension  Likely Raynaud's phenomenon, possibly exacerbated by measuring      The patient is slowly recovering from his non ST-elevation myocardial infarction that was complicated by pericarditis  His syncopal event was likely due to relative hypotension  Metoprolol was removed and was started on midodrine  I feel that the midodrine is probably exacerbating Raynaud's phenomenon with his white and blue extremities  I have ordered an a m  Cortisol to rule out adrenal insufficiency in regards to his hypertension  I would like to slowly step down from managing  We will reduce it to 2 5 mg 3 times a day for the next week and then ask him to discontinue it  He was encouraged to continue going to cardiac rehab  He will continue on his atorvastatin, aspirin and clopidogrel  We will reduce furosemide to 20 mg once a day  He will follow up in our office in 3 months, sooner if any acute issues arise  We will be in contact with him regarding his laboratory results before then  Thank you for the opportunity to consult on this patient  If you have any questions, please feel free to call my office

## 2019-12-06 NOTE — PROGRESS NOTES
INTERNAL MEDICINE INITIAL OFFICE VISIT  [facility] - [facility] SUDARSHAN PERDUE    NAME: Oswaldo Lira  AGE: 69 y o  SEX: male  : 1945     DATE: 2019     Assessment and Plan:     1  Coronary artery disease involving native coronary artery of native heart with angina pectoris ([facility] )    Continue to attended cardiac rehab  Will need to continue dual anti-platelet therapy as well as high intensity statin  Cardiology is working to get him off midodrine  Discussed proper food and water intake  2  Ischemic cardiomyopathy    Ejection fraction 40%  He appears euvolemic today  His lasix dose was recently reduced  3  NSTEMI (non-ST elevated myocardial infarction) ([facility] )    S/P PCI with LUDIVINA and complicated with post-PCI pericarditis  He is on appropriate medical therapy at this time  4  History of lacunar cerebrovascular accident (CVA)    Secondary stroke prevention recommended with aspirin, plavix, and high intensity statin  Will hold off on colonoscopy at this time due to recent NSTEMI with stent placement  Return in about 3 months (around 3/6/2020) for Follow-up, Initial AWV  Chief Complaint:     Chief Complaint   Patient presents with   Angélica Morse Establish Care     new pt      History of Present Illness:     Patient presents to [facility]  He really wasn't seeing any doctors his whole life  Back in 2019 he was hospitalized for chest pain and was found to have a NSTEMI  He underwent cardiac cath with PCI with stent placement  Post cardiac cath he was diagnosed with pericarditis and treated with colchicine  He also has ischemic cardiomyopathy with an EF of 40%  More recently he was hospitalized due to near syncope  He had medication changes made and MRI brain also ended up showing prior evidence of chronic lacunar infarctions  He is currently going to cardiac rehab  He feels tired at times  He has had trouble sleeping his whole life       He was a prior smoker on and off - 15 pack year smoking history  The following portions of the patient's history were reviewed and updated as appropriate: allergies, current medications, past family history, past medical history, past social history, past surgical history and problem list      Review of Systems:     Review of Systems   Constitutional: Positive for fatigue  Negative for appetite change, chills and fever  HENT: Negative for congestion, hearing loss, postnasal drip, rhinorrhea, sore throat, tinnitus and trouble swallowing  Eyes: Negative for pain, discharge, redness and visual disturbance  Respiratory: Negative for cough, chest tightness, shortness of breath and wheezing  Cardiovascular: Negative for chest pain, palpitations and leg swelling  Gastrointestinal: Negative for abdominal distention, abdominal pain, blood in stool, constipation, diarrhea, nausea and vomiting  Endocrine: Negative for cold intolerance, heat intolerance, polydipsia, polyphagia and polyuria  Genitourinary: Negative for difficulty urinating, dysuria, frequency, hematuria and urgency  Musculoskeletal: Negative for arthralgias, back pain, gait problem, joint swelling, myalgias, neck pain and neck stiffness  Skin: Negative for color change and rash  Neurological: Negative for dizziness, tremors, seizures, syncope, speech difficulty, weakness, light-headedness, numbness and headaches  Hematological: Negative for adenopathy  Does not bruise/bleed easily  Psychiatric/Behavioral: Positive for sleep disturbance  Negative for agitation, behavioral problems, confusion, hallucinations and suicidal ideas  The patient is not nervous/anxious         Past Medical History:     Past Medical History:   Diagnosis Date    Ischemic cardiomyopathy     Lacunar infarction ([facility] )     Non-ST elevated myocardial infarction ([facility] )     Pericarditis 10/24/2019    Poison ivy       Past Surgical History:     Past Surgical History:   Procedure Laterality Date    CORONARY STENT PLACEMENT        Social History:     Social History     Socioeconomic History    Marital status:       Spouse name: None    Number of children: None    Years of education: None    Highest education level: None   Occupational History    None   Social Needs    Financial resource strain: None    Food insecurity:     Worry: None     Inability: None    Transportation needs:     Medical: None     Non-medical: None   Tobacco Use    Smoking status: Former Smoker     Packs/day: 1 00     Years: 15 00     Pack years: 15 00     Types: Cigarettes     Last attempt to quit: 1969     Years since quittin 0    Smokeless tobacco: Never Used   Substance and Sexual Activity    Alcohol use: Never     Frequency: Never     Binge frequency: Never    Drug use: No    Sexual activity: Not Currently   Lifestyle    Physical activity:     Days per week: 0 days     Minutes per session: 0 min    Stress: Not at all   Relationships    Social connections:     Talks on phone: None     Gets together: None     Attends Synagogue service: None     Active member of club or organization: None     Attends meetings of clubs or organizations: None     Relationship status: None    Intimate partner violence:     Fear of current or ex partner: None     Emotionally abused: None     Physically abused: None     Forced sexual activity: None   Other Topics Concern    None   Social History Narrative    None         Family History:     Family History   Problem Relation Age of Onset    No Known Problems Mother     Emphysema Father     Heart disease Maternal Grandfather     Heart attack Maternal Grandfather     Emphysema Paternal Grandfather       Current Medications:     Current Outpatient Medications:     aspirin (ECOTRIN LOW STRENGTH) 81 mg EC tablet, Take 1 tablet (81 mg total) by mouth daily Please purchase over the counter at her local pharmacy, Disp: 30 tablet, Rfl: 1    atorvastatin (LIPITOR) 80 mg tablet, Take 1 tablet (80 mg total) by mouth daily with dinner, Disp: 90 tablet, Rfl: 3    Blood Pressure KIT, by Does not apply route daily, Disp: 1 each, Rfl: 0    clopidogrel (PLAVIX) 75 mg tablet, Take 1 tablet (75 mg total) by mouth daily, Disp: 90 tablet, Rfl: 3    furosemide (LASIX) 20 mg tablet, Take 1 tablet (20 mg total) by mouth daily, Disp: 90 tablet, Rfl: 0    midodrine (PROAMATINE) 5 mg tablet, Take 0 5 tablets (2 5 mg total) by mouth 3 (three) times a day before meals, Disp: 90 tablet, Rfl: 0    nitroglycerin (NITROSTAT) 0 4 mg SL tablet, Place 1 tablet (0 4 mg total) under the tongue every 5 (five) minutes as needed for chest pain, Disp: 30 tablet, Rfl: 0     Allergies:   No Known Allergies     Physical Exam:     /80 (BP Location: Left arm, Patient Position: Sitting, Cuff Size: Standard)   Pulse 77   Ht 5' 8 75" (1 746 m)   Wt 58 6 kg (129 lb 3 2 oz)   SpO2 92%   BMI 19 22 kg/m²     Physical Exam   Constitutional: He is oriented to person, place, and time  He appears well-developed and well-nourished  No distress  Eyes: Conjunctivae are normal  Right eye exhibits no discharge  Left eye exhibits no discharge  No scleral icterus  Neck: Neck supple  No JVD present  No thyromegaly present  Cardiovascular: Normal rate, regular rhythm, normal heart sounds and intact distal pulses  Exam reveals no gallop and no friction rub  No murmur heard  Pulmonary/Chest: Effort normal and breath sounds normal  No respiratory distress  He has no wheezes  He has no rales  He exhibits no tenderness  Abdominal: Soft  Bowel sounds are normal  He exhibits no distension and no mass  There is no tenderness  There is no rebound and no guarding  Musculoskeletal: Normal range of motion  He exhibits no edema  Lymphadenopathy:     He has no cervical adenopathy  Neurological: He is alert and oriented to person, place, and time  Skin: Skin is warm and dry  He is not diaphoretic  Psychiatric: He has a normal mood and affect  His behavior is normal       Data:     Laboratory Results: I have personally reviewed the pertinent laboratory results/reports      Radiology/Other Diagnostic Testing Results: I have personally reviewed pertinent reports        Burgess Hiram DO  [facility]

## 2019-12-06 NOTE — PROGRESS NOTES
Cardiology Follow Up    Sana Rajput  [birth year]  [id number]  [address] [place] [address] Mathieu Hull    Dear Dr Erasto Painting is a 69-year-old gentleman who has been referred to the [address] [place] [address] of Cardiology in [place] with the following issues:     1  Non ST-elevation myocardial infarction  2  Status post PCI to the distal LAD (resolute on X 2 5 x 18 LUDVIINA) and to the ostial 1st diagonal as well as the mid vessel  3  PCI complicated by difficult stent delivery requiring crush technique of the ostial diagonal stent  4  Ischemic cardiomyopathy, ejection fraction 40%  5  Post PCI pericarditis      Interval History:  Since our last visit, Mr Sarahi Brady was once again hospitalized with what appears to be a near syncopal event  He was treated at [facility]  His metoprolol was discontinued and he was started on midodrine 10 mg t i d   This has been decreased to 5 mg t i d  He reports no further episodes of passing out  He does complain of occasional headaches focusing above the left eye as well as in the posterior right neck  These headaches are minor but he does notice them  His right posterior neck headache is more significant than the other  He notes an episode his hands and nose turning blue  This occurred approximately 2 days ago  He does have a history of his hands turning white, but this is the 1st time that cyanosis has been observed  He has started attending cardiac rehab  He was quite fatigued afterwards, but he was able to perform during the class        Patient Active Problem List   Diagnosis    Chest pain    NSTEMI (non-ST elevated myocardial infarction) ([facility] [address] )    Cough    Pericarditis    Coronary artery disease involving native coronary artery of native heart with angina pectoris (HCC)    Ischemic cardiomyopathy    Dizziness    Near syncope    Blurry vision     Past Medical History:   Diagnosis Date    Non-ST elevated myocardial infarction [facility])     Poison ivy      Social History     Socioeconomic History    Marital status:       Spouse name: Not on file    Number of children: Not on file    Years of education: Not on file    Highest education level: Not on file   Occupational History    Not on file   Social Needs    Financial resource strain: Not on file    Food insecurity:     Worry: Not on file     Inability: Not on file    Transportation needs:     Medical: Not on file     Non-medical: Not on file   Tobacco Use    Smoking status: Former Smoker    Smokeless tobacco: Never Used    Tobacco comment: Quit 50 years ago   Substance and Sexual Activity    Alcohol use: Never     Frequency: Never     Binge frequency: Never    Drug use: No    Sexual activity: Not on file   Lifestyle    Physical activity:     Days per week: Not on file     Minutes per session: Not on file    Stress: Not on file   Relationships    Social connections:     Talks on phone: Not on file     Gets together: Not on file     Attends [employer] service: Not on file     Active member of club or organization: Not on file     Attends meetings of clubs or organizations: Not on file     Relationship status: Not on file    Intimate partner violence:     Fear of current or ex partner: Not on file     Emotionally abused: Not on file     Physically abused: Not on file     Forced sexual activity: Not on file   Other Topics Concern    Not on file   Social History Narrative    Not on file      Family History   Problem Relation Age of Onset    No Known Problems Mother     No Known Problems Father      Past Surgical History:   Procedure Laterality Date    CORONARY STENT PLACEMENT         Current Outpatient Medications:     aspirin (ECOTRIN LOW STRENGTH) 81 mg EC tablet, Take 1 tablet (81 mg total) by mouth daily Please purchase over the counter at her local pharmacy, Disp: 30 tablet, Rfl: 1   atorvastatin (LIPITOR) 80 mg tablet, Take 1 tablet (80 mg total) by mouth daily with dinner, Disp: 30 tablet, Rfl: 1    clopidogrel (PLAVIX) 75 mg tablet, Take 1 tablet (75 mg total) by mouth daily, Disp: 30 tablet, Rfl: 0    colchicine (COLCRYS) 0 6 mg tablet, Take 1 tablet (0 6 mg total) by mouth 2 (two) times a day, Disp: 90 tablet, Rfl: 0    furosemide (LASIX) 20 mg tablet, Take 1 tablet (20 mg total) by mouth 2 (two) times a day, Disp: 90 tablet, Rfl: 0    midodrine (PROAMATINE) 5 mg tablet, Take 1 tablet (5 mg total) by mouth 3 (three) times a day before meals, Disp: 90 tablet, Rfl: 1    nitroglycerin (NITROSTAT) 0 4 mg SL tablet, Place 1 tablet (0 4 mg total) under the tongue every 5 (five) minutes as needed for chest pain, Disp: 30 tablet, Rfl: 0    Blood Pressure KIT, by Does not apply route daily (Patient not taking: Reported on 11/20/2019), Disp: 1 each, Rfl: 0       No Known Allergies    Labs:  Results for Erin Ellsworth (MRN [id number]) as of 12/6/2019 12:09   11/16/2019 04:54   Sodium 137   Potassium 4 1   Chloride 103   CO2 27   Anion Gap 7   BUN 24   Creatinine 1 10   Glucose, Random 88   Calcium 8 9   eGFR 66       Results for Erin Ellsworth (MRN [id number]) as of 12/6/2019 12:09   11/16/2019 04:54   WBC 4 55   Red Blood Cell Count 4 15   Hemoglobin 11 8 (L)   HCT 36 3 (L)   MCV 88   MCH 28 4   MCHC 32 5   RDW 13 5   Platelet Count 892 (L)     Results for Erin Ellsworth (MRN [id number]) as of 12/6/2019 12:09   11/16/2019 04:54   Cholesterol 68   Triglycerides 76   HDL 31 (L)   Non-HDL Cholesterol 37   LDL Direct 22         Imaging:  Limited transthoracic echocardiogram, 11/15/2019  SUMMARY  LEFT VENTRICLE:  Ejection fraction was estimated to be 50 % to 55%  When compared to previous echocardiogram, LV function appears improved  ( was 40 to 45%)      PERICARDIUM:  A trace pericardial effusion was identified  MRI brain, 11/15/2019  IMPRESSION:  1    Subacute to chronic lacunar infarcts in the left frontal parietal region  No evidence of acute infarct  2   Single nonspecific focus of chronic microhemorrhage in the left frontal region  No acute intracranial hemorrhage or mass effect  CTA head and neck, 11/14/2019  IMPRESSION:  1  No evidence of acute intracranial hemorrhage  2  Focus new low-attenuation left frontal centrum semiovale measuring approximately 8 mm, age indeterminate ischemic event  Follow-up noncontrast brain MRI recommended for further evaluation  3  Old infarcts and microangiopathy  4   Right posterior apical spiculated 9 mm density could represent a nodule or scarring  Follow-up noncontrast chest CT recommended in 3 months  Review of Systems:  Review of Systems   Constitutional: Positive for fatigue  Respiratory: Positive for shortness of breath  Cardiovascular: Negative  Gastrointestinal: Negative  Endocrine: Negative  Musculoskeletal: Negative  Skin: Negative  Neurological: Negative  Hematological: Negative  Physical Exam:  Physical Exam   Constitutional: He is oriented to person, place, and time  He appears well-developed and well-nourished  HENT:   Head: Normocephalic and atraumatic  Eyes: Conjunctivae and EOM are normal    Neck: No hepatojugular reflux and no JVD present  Carotid bruit is not present  No tracheal deviation present  No thyromegaly present  Cardiovascular: Normal rate, regular rhythm, S1 normal and S2 normal  PMI is not displaced  Exam reveals no gallop  No murmur heard  Pulses:       Carotid pulses are 2+ on the right side, and 2+ on the left side  Radial pulses are 2+ on the right side, and 2+ on the left side  Pulmonary/Chest: Breath sounds normal  No accessory muscle usage  No tachypnea  No respiratory distress  Abdominal: Soft  Bowel sounds are normal  He exhibits no distension  There is no tenderness  Musculoskeletal: He exhibits no edema     Lymphadenopathy:        Head (right side): No submental, no submandibular, no tonsillar, no preauricular, no posterior auricular and no occipital adenopathy present  Head (left side): No submental, no submandibular, no tonsillar, no preauricular, no posterior auricular and no occipital adenopathy present  He has no cervical adenopathy  Neurological: He is alert and oriented to person, place, and time  Skin: Skin is warm and dry  Psychiatric: He has a normal mood and affect  His behavior is normal  Judgment and thought content normal        Discussion/Summary:  Non ST-elevation myocardial infarction  PCI of the distal LAD  PTCA of the 1st diagonal occlusion  Difficult stent delivery to the ostial diagonal, resulting in partial deployment with crush technique within the proximal LAD lumen  Persistent shortness of breath, particularly with exertion  Ischemic cardiomyopathy, ejection fraction 40%  Post PCI pericarditis  Syncopal episode and relative hypotension  Likely Raynaud's phenomenon, possibly exacerbated by measuring      The patient is slowly recovering from his non ST-elevation myocardial infarction that was complicated by pericarditis  His syncopal event was likely due to relative hypotension  Metoprolol was removed and was started on midodrine  I feel that the midodrine is probably exacerbating Raynaud's phenomenon with his white and blue extremities  I have ordered an a m  Cortisol to rule out adrenal insufficiency in regards to his hypertension  I would like to slowly step down from managing  We will reduce it to 2 5 mg 3 times a day for the next week and then ask him to discontinue it  He was encouraged to continue going to cardiac rehab  He will continue on his atorvastatin, aspirin and clopidogrel  We will reduce furosemide to 20 mg once a day  He will follow up in our office in 3 months, sooner if any acute issues arise    We will be in contact with him regarding his laboratory results before then     Thank you for the opportunity to consult on this patient  If you have any questions, please feel free to call my office

## 2019-12-12 ENCOUNTER — OFFICE VISIT (OUTPATIENT)
Dept: PULMONOLOGY | Facility: CLINIC | Age: 74
End: 2019-12-12
Payer: MEDICARE

## 2019-12-12 VITALS
OXYGEN SATURATION: 100 % | RESPIRATION RATE: 14 BRPM | SYSTOLIC BLOOD PRESSURE: 110 MMHG | HEART RATE: 75 BPM | HEIGHT: 68 IN | WEIGHT: 125 LBS | DIASTOLIC BLOOD PRESSURE: 60 MMHG | BODY MASS INDEX: 18.94 KG/M2 | TEMPERATURE: 98.3 F

## 2019-12-12 DIAGNOSIS — R06.00 DYSPNEA ON EXERTION: ICD-10-CM

## 2019-12-12 DIAGNOSIS — R94.2 DIFFUSION CAPACITY OF LUNG (DL), DECREASED: Primary | ICD-10-CM

## 2019-12-12 PROCEDURE — 99214 OFFICE O/P EST MOD 30 MIN: CPT | Performed by: INTERNAL MEDICINE

## 2019-12-12 NOTE — PROGRESS NOTES
Pulmonary Consultation   Serena Fontaine  69 y o  male MRN: [id number]  12/12/2019      Referring provider: Dr Maegan Blake  Reason for consult: Abnormal PFT    Assessment and Plan:    Diffusion capacity of the lung decreased  - no evidence of pulmonary hypertension on echocardiogram  No evidence of ILD on imaging  At the time of the testing, perhaps he was volume overloaded with his lower rejection fraction  - I recommend repeat testing in 1 year  Dyspnea on exertion  - likely multifactorial   I suspect this is due to deconditioning as well as his low EF  His breathing is slowly improving  No evidence of obstruction or restriction on PFTs  Thank you for this referral!  I discussed the plan of care in detail with the patient and his daughter  All concerns addressed  I would like him to return in 1 year's time after he has had the above testing complete  History of Present Illness   HPI:  Serena Fontaine  is a 69 y o  male who presents for evaluation of abnormal PFT  The patient was recently having shortness of breath underwent pulmonary function testing  He was found to have a moderately decreased DLCO and is here for further evaluation  The patient was hospitalized in October with a non ST elevation MI  He states that he was at work and he had bilateral upper extremity pain with chest pain  He assumed he had strained muscle  He was hospitalized a few days later and diagnosed with a non ST elevation MI  He went to catheterization lab and had 2 stents placed  He had pericarditis post cardiac catheterization  His EF was found to be 40%  He had shortness of breath following these events and was seen by his outpatient cardiologist at the beginning of November  Testing was ordered including PFTs  Mr Elsa Shields is doing relatively well overall  He does report breathlessness on exertion  He denies breathlessness rest   His shortness of breath is improving gradually    He does report that he is much more sedentary since this event  He is planning to start cardiac rehab in January  Mr  Sobia Mackenzie does not use home inhalers  He does not have any known lung disease  He has a very remote history of tobacco use, quitting 50 years ago  Overall, he is disgruntled that he is no longer working and is unsure that he will be able to return to his job  He is here today with his daughter  Review of Systems  GEN: no fever or chills  HENT: no sinus congestion, no postnasal drip, no rhinorrhea  EYES: no visual changes  RESP: +shortness of breath, no cough, no wheezing  CARDIO: no chest pain, no leg edema  GI: no abdominal pain, no diarrhea  ENDO:  no polydipsia  : no urgency, no dysuria  MSK: no back pain  ALLERGY: not immunocompromised  NEURO: no dizziness, no seizures  HEME: does not bruise easily  PSYCH: no hallucinations    Historical Information   Past Medical History:   Diagnosis Date    Ischemic cardiomyopathy     [facility] [address] )     Non-ST elevated myocardial infarction ([facility] [address] )     Pericarditis 10/24/2019    Poison ivy      Past Surgical History:   Procedure Laterality Date    CORONARY STENT PLACEMENT       Family History   Problem Relation Age of Onset    No Known Problems Mother     Emphysema Father     Heart disease Maternal Grandfather     Heart attack Maternal Grandfather     Emphysema Paternal Grandfather        Occupational History:  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  Social History:  Remote tobacco history  Smoked 1 pack per day for 15 years and quit in 1969  Living with his daughter        Meds/Allergies     Current Outpatient Medications:     aspirin (ECOTRIN LOW STRENGTH) 81 mg EC tablet, Take 1 tablet (81 mg total) by mouth daily Please purchase over the counter at her local pharmacy, Disp: 30 tablet, Rfl: 1    atorvastatin (LIPITOR) 80 mg tablet, Take 1 tablet (80 mg total) by mouth daily with dinner, Disp: 90 tablet, Rfl: 3    Blood Pressure KIT, by Does not apply route daily, Disp: 1 each, Rfl: 0    clopidogrel (PLAVIX) 75 mg tablet, Take 1 tablet (75 mg total) by mouth daily, Disp: 90 tablet, Rfl: 3    furosemide (LASIX) 20 mg tablet, Take 1 tablet (20 mg total) by mouth daily, Disp: 90 tablet, Rfl: 0    midodrine (PROAMATINE) 5 mg tablet, Take 0 5 tablets (2 5 mg total) by mouth 3 (three) times a day before meals, Disp: 90 tablet, Rfl: 0    nitroglycerin (NITROSTAT) 0 4 mg SL tablet, Place 1 tablet (0 4 mg total) under the tongue every 5 (five) minutes as needed for chest pain, Disp: 30 tablet, Rfl: 0  No Known Allergies    Vitals:  Blood pressure 110/60; heart rate 75; respiratory rate 14; temperature 98 3°    Physical Exam  GEN: WDWN, nad, comfortable, thin  HEENT: NCAT, EOMI  CVS: Regular  LUNGS: Clear b/l b s  ABD: soft, nd  EXT: No c/c/e  NEURO: No focal deficits  MS: Moving all extremities  SKIN: warm, dry  PSYCH: calm, cooperative    Labs: I have personally reviewed pertinent lab results  Lab Results   Component Value Date    WBC 4 55 11/16/2019    HGB 11 8 (L) 11/16/2019    HCT 36 3 (L) 11/16/2019    MCV 88 11/16/2019     (L) 11/16/2019     Lab Results   Component Value Date    CALCIUM 8 9 11/16/2019    K 4 1 11/16/2019    CO2 27 11/16/2019     11/16/2019    BUN 24 11/16/2019    CREATININE 1 10 11/16/2019     No results found for: IGE  Lab Results   Component Value Date    ALT 12 11/14/2019    AST 18 11/14/2019    ALKPHOS 86 11/14/2019       Labs reveal anemia  Imaging and other studies: I have personally reviewed pertinent reports  CTA chest reviewed by me personally shows clear bilateral lungs  Pulmonary function testing:  PFTs reviewed by me personally reveal no obstruction, no restriction, moderately reduced diffusion capacity  EKG, Pathology, and Other Studies: I have personally reviewed pertinent reports  Echo November 15th EF 50-55% improved from 40-45%,     [facility]

## 2019-12-13 ENCOUNTER — APPOINTMENT (OUTPATIENT)
Dept: LAB | Facility: CLINIC | Age: 74
End: 2019-12-13
Payer: MEDICARE

## 2019-12-13 DIAGNOSIS — R55 NEAR SYNCOPE: ICD-10-CM

## 2019-12-13 LAB — CORTIS AM PEAK SERPL-MCNC: 23.7 UG/DL (ref 4.2–22.4)

## 2019-12-13 PROCEDURE — 82533 TOTAL CORTISOL: CPT

## 2019-12-16 ENCOUNTER — TELEPHONE (OUTPATIENT)
Dept: CARDIOLOGY CLINIC | Facility: CLINIC | Age: 74
End: 2019-12-16

## 2019-12-16 NOTE — TELEPHONE ENCOUNTER
----- Message from Steve Mcburney, DO sent at 12/13/2019 11:56 PM EST -----  Please call the patient and inform him that his cortisol level is normal     [address]    ----- Message -----  From: Lab, Background User  Sent: 12/13/2019   5:22 PM EST  To: Steve Mcburney, DO

## 2020-01-09 NOTE — PROGRESS NOTES
Ariela Parker      Dear Dr Diane Laguna,     Thank you for referring your patient to our cardiac rehabilitation program  The patient has completed 0  visits  However, rehab is being discontinued at this time due to:    Voluntary Dropout:  The patient has chosen to quit due to :  Co-pay for insurance is too much and patient is extremely stressed about money  I expressed to the patient that it is in his best interest to come to cardiac rehab but we understand about the co-pay  Pt was told that if he decides he wants to to cardiac rehab he is more then welcome to get another order and we will get him on the schedule  I spoke to his daughter Richard Pimentel  Please contact us at [phone] if you have any questions about this patents case or if/when it is appropriate to re-start the patients rehab program at a later date  Thank you for your continued support of cardiac rehabilitation  Sincerely,      Rose Matthews MS, CEP  Exercise Physiologist

## 2020-01-22 DIAGNOSIS — I21.4 NSTEMI (NON-ST ELEVATED MYOCARDIAL INFARCTION) (HCC): ICD-10-CM

## 2020-01-22 RX ORDER — ATORVASTATIN CALCIUM 80 MG/1
80 TABLET, FILM COATED ORAL
Qty: 90 TABLET | Refills: 3 | Status: SHIPPED | OUTPATIENT
Start: 2020-01-22 | End: 2020-01-27 | Stop reason: SDUPTHER

## 2020-01-22 RX ORDER — FUROSEMIDE 20 MG/1
20 TABLET ORAL DAILY
Qty: 90 TABLET | Refills: 3 | Status: SHIPPED | OUTPATIENT
Start: 2020-01-22 | End: 2020-09-30

## 2020-01-24 ENCOUNTER — TELEPHONE (OUTPATIENT)
Dept: CARDIOLOGY CLINIC | Facility: CLINIC | Age: 75
End: 2020-01-24

## 2020-01-27 DIAGNOSIS — I21.4 NSTEMI (NON-ST ELEVATED MYOCARDIAL INFARCTION) (HCC): ICD-10-CM

## 2020-01-27 RX ORDER — ATORVASTATIN CALCIUM 80 MG/1
80 TABLET, FILM COATED ORAL
Qty: 90 TABLET | Refills: 3 | Status: SHIPPED | OUTPATIENT
Start: 2020-01-27 | End: 2020-12-02 | Stop reason: SDUPTHER

## 2020-01-27 NOTE — TELEPHONE ENCOUNTER
Request from pharmacy for refill on Atorvastatin stating that patient would like a 90 day supply, for a lower cost   Refill pending

## 2020-03-12 ENCOUNTER — OFFICE VISIT (OUTPATIENT)
Dept: CARDIOLOGY CLINIC | Facility: CLINIC | Age: 75
End: 2020-03-12
Payer: COMMERCIAL

## 2020-03-12 VITALS
HEART RATE: 81 BPM | HEIGHT: 68 IN | DIASTOLIC BLOOD PRESSURE: 78 MMHG | SYSTOLIC BLOOD PRESSURE: 140 MMHG | WEIGHT: 138 LBS | OXYGEN SATURATION: 98 % | BODY MASS INDEX: 20.92 KG/M2

## 2020-03-12 DIAGNOSIS — I25.5 ISCHEMIC CARDIOMYOPATHY: ICD-10-CM

## 2020-03-12 DIAGNOSIS — I21.4 NSTEMI (NON-ST ELEVATED MYOCARDIAL INFARCTION) (HCC): Primary | ICD-10-CM

## 2020-03-12 DIAGNOSIS — Z86.73 HISTORY OF LACUNAR CEREBROVASCULAR ACCIDENT (CVA): Chronic | ICD-10-CM

## 2020-03-12 DIAGNOSIS — I25.119 CORONARY ARTERY DISEASE INVOLVING NATIVE CORONARY ARTERY OF NATIVE HEART WITH ANGINA PECTORIS (HCC): ICD-10-CM

## 2020-03-12 PROCEDURE — 1036F TOBACCO NON-USER: CPT | Performed by: INTERNAL MEDICINE

## 2020-03-12 PROCEDURE — 3008F BODY MASS INDEX DOCD: CPT | Performed by: INTERNAL MEDICINE

## 2020-03-12 PROCEDURE — 99213 OFFICE O/P EST LOW 20 MIN: CPT | Performed by: INTERNAL MEDICINE

## 2020-03-12 PROCEDURE — 1160F RVW MEDS BY RX/DR IN RCRD: CPT | Performed by: INTERNAL MEDICINE

## 2020-03-12 NOTE — LETTER
March 12, 2020     Yeison Timmons DO  [address] [facility] [address]    Patient: Oswaldo Lira  YOB: 1945   Date of Visit: 3/12/2020       Dear Dr Erika Rapp:    Thank you for referring Flaco Varner to me for evaluation  Below are my notes for this consultation  If you have questions, please do not hesitate to call me  I look forward to following your patient along with you  Sincerely,        Onur Noe DO        CC: No Recipients  Onur Noe DO  3/12/2020 11:47 AM  Sign at close encounter                                             Cardiology Follow Up    Oswaldo Lira  [birth year]  [id number]  [address] PHYSICIAN  [address] [address]  [phone]  [phone]    Dear Dr Marizol Lau a 20-year-old gentleman who has been referred to the [address] [place] [address] Place of Cardiology in [place] with the following issues:     1  Non ST-elevation myocardial infarction  2  Status post PCI to the distal LAD (resolute on X 2 5 x 18 LUDIVINA) and to the ostial 1st diagonal as well as the mid vessel on 10/21/2019  3  PCI complicated by difficult stent delivery requiring crush technique of the ostial diagonal stent  4  Ischemic cardiomyopathy, ejection fraction 40%  5  Post PCI pericarditis  6  History of lacunar CVA    Interval History:  Since our last visit, Mr Jose Dodson has done quite well  His breathing has significantly improved and he is able to perform all of his activities of daily living without restriction  He was unable to attend cardiac rehab due to prohibitive cost   He is currently walking several miles at a time  He is performing normal work loads around the house any does so without chest pain or shortness of breath  He would like to return to work  He continues to suffer from a left inguinal hernia  This will hopefully be resolved sometime in the near future      Patient Active Problem List   Diagnosis    NSTEMI (non-ST elevated myocardial infarction) ([facility] )    Coronary artery disease involving native coronary artery of native heart with angina pectoris ([facility] )    Ischemic cardiomyopathy    Near syncope    History of lacunar cerebrovascular accident (CVA)     Past Medical History:   Diagnosis Date    Ischemic cardiomyopathy     Lacunar infarction ([facility] )     Non-ST elevated myocardial infarction ([facility] )     Pericarditis 10/24/2019    Poison ivy      Social History     Socioeconomic History    Marital status:       Spouse name: Not on file    Number of children: Not on file    Years of education: Not on file    Highest education level: Not on file   Occupational History    Not on file   Social Needs    Financial resource strain: Not on file    Food insecurity:     Worry: Not on file     Inability: Not on file    Transportation needs:     Medical: Not on file     Non-medical: Not on file   Tobacco Use    Smoking status: Former Smoker     Packs/day: 1 00     Years: 15 00     Pack years: 15      Types: Cigarettes     Last attempt to quit: 1969     Years since quittin 2    Smokeless tobacco: Never Used   Substance and Sexual Activity    Alcohol use: Never     Frequency: Never     Binge frequency: Never    Drug use: No    Sexual activity: Not Currently   Lifestyle    Physical activity:     Days per week: 0 days     Minutes per session: 0 min    Stress: Not at all   Relationships    Social connections:     Talks on phone: Not on file     Gets together: Not on file     Attends Worship service: Not on file     Active member of club or organization: Not on file     Attends meetings of clubs or organizations: Not on file     Relationship status: Not on file    Intimate partner violence:     Fear of current or ex partner: Not on file     Emotionally abused: Not on file     Physically abused: Not on file     Forced sexual activity: Not on file   Other Topics Concern    Not on file   Social History Narrative    Not on file      Family History   Problem Relation Age of Onset    No Known Problems Mother     Emphysema Father     Heart disease Maternal Grandfather     Heart attack Maternal Grandfather     Emphysema Paternal Grandfather      Past Surgical History:   Procedure Laterality Date    CORONARY STENT PLACEMENT         Current Outpatient Medications:     aspirin (ECOTRIN LOW STRENGTH) 81 mg EC tablet, Take 1 tablet (81 mg total) by mouth daily Please purchase over the counter at her local pharmacy, Disp: 30 tablet, Rfl: 1    atorvastatin (LIPITOR) 80 mg tablet, Take 1 tablet (80 mg total) by mouth daily with dinner, Disp: 90 tablet, Rfl: 3    Blood Pressure KIT, by Does not apply route daily, Disp: 1 each, Rfl: 0    clopidogrel (PLAVIX) 75 mg tablet, Take 1 tablet (75 mg total) by mouth daily, Disp: 90 tablet, Rfl: 3    furosemide (LASIX) 20 mg tablet, Take 1 tablet (20 mg total) by mouth daily, Disp: 90 tablet, Rfl: 3    midodrine (PROAMATINE) 5 mg tablet, Take 0 5 tablets (2 5 mg total) by mouth 3 (three) times a day before meals (Patient not taking: Reported on 3/12/2020), Disp: 90 tablet, Rfl: 0    nitroglycerin (NITROSTAT) 0 4 mg SL tablet, Place 1 tablet (0 4 mg total) under the tongue every 5 (five) minutes as needed for chest pain (Patient not taking: Reported on 3/12/2020), Disp: 30 tablet, Rfl: 0       No Known Allergies    Labs:  Results for [employer] (MRN [id number]) as of 3/12/2020 11:42   11/16/2019 04:54   Sodium 137   Potassium 4 1   Chloride 103   CO2 27   Anion Gap 7   BUN 24   Creatinine 1 10   Glucose, Random 88   Calcium 8 9   eGFR 66       Results for [employer] (MRN [id number]) as of 3/12/2020 11:42   11/16/2019 04:54   WBC 4 55   Red Blood Cell Count 4 15   Hemoglobin 11 8 (L)   HCT 36 3 (L)   MCV 88   MCH 28 4   MCHC 32 5   RDW 13 5   Platelet Count 204 (L)     Results for [employer] (MRN [id number]) as of 3/12/2020 11:42 11/16/2019 04:54   Cholesterol 68   Triglycerides 76   HDL 31 (L)   Non-HDL Cholesterol 37   LDL Direct 22         Imaging:   Transthoracic echo, 11/15/2019  SUMMARY  LEFT VENTRICLE:  Ejection fraction was estimated to be 50 % to 55%  When compared to previous echocardiogram, LV function appears improved  ( was 40 to 45%)      PERICARDIUM:  A trace pericardial effusion was identified  Review of Systems:  Review of Systems   Constitutional: Negative  Respiratory: Negative  Cardiovascular: Negative  Gastrointestinal: Negative  Endocrine: Negative  Musculoskeletal: Negative  Skin: Negative  Neurological: Negative  Hematological: Negative  /78 (BP Location: Left arm, Patient Position: Sitting, Cuff Size: Standard)   Pulse 81   Ht 5' 8" (1 727 m)   Wt 62 6 kg (138 lb)   SpO2 98%   BMI 20 98 kg/m²      Physical Exam:  Physical Exam   Constitutional: He is oriented to person, place, and time  He appears well-developed and well-nourished  HENT:   Head: Normocephalic and atraumatic  Eyes: Conjunctivae and EOM are normal    Neck: No hepatojugular reflux and no JVD present  Carotid bruit is not present  No tracheal deviation present  No thyromegaly present  Cardiovascular: Normal rate, regular rhythm, S1 normal and S2 normal  PMI is not displaced  Exam reveals no gallop  No murmur heard  Pulses:       Carotid pulses are 2+ on the right side, and 2+ on the left side  Radial pulses are 2+ on the right side, and 2+ on the left side  Femoral pulses are 2+ on the right side, and 2+ on the left side  Dorsalis pedis pulses are 2+ on the right side, and 2+ on the left side  Posterior tibial pulses are 2+ on the right side, and 2+ on the left side  Pulmonary/Chest: Breath sounds normal  No accessory muscle usage  No tachypnea  No respiratory distress  Abdominal: Soft  Bowel sounds are normal  He exhibits no distension  There is no tenderness   A hernia is present  Hernia confirmed positive in the left inguinal area  Musculoskeletal: He exhibits no edema  Lymphadenopathy:        Head (right side): No submental, no submandibular, no tonsillar, no preauricular, no posterior auricular and no occipital adenopathy present  Head (left side): No submental, no submandibular, no tonsillar, no preauricular, no posterior auricular and no occipital adenopathy present  He has no cervical adenopathy  Neurological: He is alert and oriented to person, place, and time  Skin: Skin is warm and dry  Psychiatric: He has a normal mood and affect  His behavior is normal  Judgment and thought content normal          Discussion/Summary:  Non ST-elevation myocardial infarction  PCI of the distal LAD  PTCA of the 1st diagonal occlusion  Difficult stent delivery to the ostial diagonal, resulting in partial deployment with crush technique within the proximal LAD lumen  Persistent shortness of breath, particularly with exertion  Ischemic cardiomyopathy, ejection fraction 40%  Post PCI pericarditis  Syncopal episode and relative hypotension  Likely Raynaud's phenomenon  Left inguinal hernia      Mr Mathieu Echevarria is doing remarkably well from a cardiovascular standpoint  Pulses controlled  Blood pressure is mildly elevated today, but reasonable  Home blood pressures are more controlled  He will continue aspirin and clopidogrel for secondary prevention and his recent PCI  Aspirin should continue lifelong  Clopidogrel should be continued for a year past his stenting  Continue furosemide for some of his heart failure, which now appears resolved  Atorvastatin continues for secondary prevention and cholesterol management  In regards to his hernia, he is stable for surgery from a cardiovascular standpoint, but should continue his clopidogrel for at least 6 months, preferably a year before discontinuing      I would like him to return to our office in 6 months, sooner if any acute issues arise  Thank you for the opportunity to consult on this patient  If you have any questions, please feel free to call my office

## 2020-03-12 NOTE — PATIENT INSTRUCTIONS
No further testing is indicated prior to inguinal surgery  No medication changes were made today  Please continue to monitor her blood pressure on a daily basis  You may return to work  Please return to our office in 6 months, sooner if any acute issues arise

## 2020-03-12 NOTE — PROGRESS NOTES
Cardiology Follow Up    Martina Hernandez  [birth year]  [id number]  [address] PHYSICIAN  [address] [address]  [phone]  [phone]    Dear Dr Troy Pinon a 23-year-old gentleman who has been referred to the [address] [place] [address] Place of Cardiology in [facility] with the following issues:     1  Non ST-elevation myocardial infarction  2  Status post PCI to the distal LAD (resolute on X 2 5 x 18 LUDIVINA) and to the ostial 1st diagonal as well as the mid vessel on 10/21/2019  3  PCI complicated by difficult stent delivery requiring crush technique of the ostial diagonal stent  4  Ischemic cardiomyopathy, ejection fraction 40%  5  Post PCI pericarditis  6  History of lacunar CVA    Interval History:  Since our last visit, Mr Yahaira Isaac has done quite well  His breathing has significantly improved and he is able to perform all of his activities of daily living without restriction  He was unable to attend cardiac rehab due to prohibitive cost   He is currently walking several miles at a time  He is performing normal work loads around the house any does so without chest pain or shortness of breath  He would like to return to work  He continues to suffer from a left inguinal hernia  This will hopefully be resolved sometime in the near future  Patient Active Problem List   Diagnosis    [facility]non-ST elevated [facility] infarction) [facility] )    Coronary artery disease involving native coronary artery of native heart with angina pectoris ([facility] )    Ischemic cardiomyopathy    Near syncope    History of lacunar cerebrovascular accident (CVA)     Past Medical History:   Diagnosis Date    Ischemic cardiomyopathy     Lacunar infarction ([facility] )     Non-ST elevated myocardial infarction ([facility] )     Pericarditis 10/24/2019    Poison ivy      Social History     Socioeconomic History    Marital status:       Spouse name: Not on file    Number of children: Not on file    Years of education: Not on file    Highest education level: Not on file   Occupational History    Not on file   Social Needs    Financial resource strain: Not on file    Food insecurity:     Worry: Not on file     Inability: Not on file    Transportation needs:     Medical: Not on file     Non-medical: Not on file   Tobacco Use    Smoking status: Former Smoker     Packs/day: 1 00     Years: 15 00     Pack years: 15 00     Types: Cigarettes     Last attempt to quit: 1969     Years since quittin 2    Smokeless tobacco: Never Used   Substance and Sexual Activity    Alcohol use: Never     Frequency: Never     Binge frequency: Never    Drug use: No    Sexual activity: Not Currently   Lifestyle    Physical activity:     Days per week: 0 days     Minutes per session: 0 min    Stress: Not at all   Relationships    Social connections:     Talks on phone: Not on file     Gets together: Not on file     Attends Taoism service: Not on file     Active member of club or organization: Not on file     Attends meetings of clubs or organizations: Not on file     Relationship status: Not on file    Intimate partner violence:     Fear of current or ex partner: Not on file     Emotionally abused: Not on file     Physically abused: Not on file     Forced sexual activity: Not on file   Other Topics Concern    Not on file   Social History Narrative    Not on file      Family History   Problem Relation Age of Onset    No Known Problems Mother     Emphysema Father     Heart disease Maternal Grandfather     Heart attack Maternal Grandfather     Emphysema Paternal Grandfather      Past Surgical History:   Procedure Laterality Date    CORONARY STENT PLACEMENT         Current Outpatient Medications:     aspirin (ECOTRIN LOW STRENGTH) 81 mg EC tablet, Take 1 tablet (81 mg total) by mouth daily Please purchase over the counter at her local pharmacy, Disp: 30 tablet, Rfl: 1   atorvastatin (LIPITOR) 80 mg tablet, Take 1 tablet (80 mg total) by mouth daily with dinner, Disp: 90 tablet, Rfl: 3    Blood Pressure KIT, by Does not apply route daily, Disp: 1 each, Rfl: 0    clopidogrel (PLAVIX) 75 mg tablet, Take 1 tablet (75 mg total) by mouth daily, Disp: 90 tablet, Rfl: 3    furosemide (LASIX) 20 mg tablet, Take 1 tablet (20 mg total) by mouth daily, Disp: 90 tablet, Rfl: 3    midodrine (PROAMATINE) 5 mg tablet, Take 0 5 tablets (2 5 mg total) by mouth 3 (three) times a day before meals (Patient not taking: Reported on 3/12/2020), Disp: 90 tablet, Rfl: 0    nitroglycerin (NITROSTAT) 0 4 mg SL tablet, Place 1 tablet (0 4 mg total) under the tongue every 5 (five) minutes as needed for chest pain (Patient not taking: Reported on 3/12/2020), Disp: 30 tablet, Rfl: 0       No Known Allergies    Labs:  Results for [facility] (MRN [id number]) as of 3/12/2020 11:42   11/16/2019 04:54   Sodium 137   Potassium 4 1   Chloride 103   CO2 27   Anion Gap 7   BUN 24   Creatinine 1 10   Glucose, Random 88   Calcium 8 9   eGFR 66       Results for Anastasiya Resendiz (MRN [id number]) as of 3/12/2020 11:42   11/16/2019 04:54   WBC 4 55   Red Blood Cell Count 4 15   Hemoglobin 11 8 (L)   HCT 36 3 (L)   MCV 88   MCH 28 4   MCHC 32 5   RDW 13 5   Platelet Count 358 (L)     Results for Anastasiya Resendiz (MRN [id number]) as of 3/12/2020 11:42   11/16/2019 04:54   Cholesterol 68   Triglycerides 76   HDL 31 (L)   Non-HDL Cholesterol 37   LDL Direct 22         Imaging:   Transthoracic echo, 11/15/2019  SUMMARY  LEFT VENTRICLE:  Ejection fraction was estimated to be 50 % to 55%  When compared to previous echocardiogram, LV function appears improved  ( was 40 to 45%)      PERICARDIUM:  A trace pericardial effusion was identified  Review of Systems:  Review of Systems   Constitutional: Negative  Respiratory: Negative  Cardiovascular: Negative  Gastrointestinal: Negative      Endocrine: Negative  Musculoskeletal: Negative  Skin: Negative  Neurological: Negative  Hematological: Negative  /78 (BP Location: Left arm, Patient Position: Sitting, Cuff Size: Standard)   Pulse 81   Ht 5' 8" (1 727 m)   Wt 62 6 kg (138 lb)   SpO2 98%   BMI 20 98 kg/m²     Physical Exam:  Physical Exam   Constitutional: He is oriented to person, place, and time  He appears well-developed and well-nourished  HENT:   Head: Normocephalic and atraumatic  Eyes: Conjunctivae and EOM are normal    Neck: No hepatojugular reflux and no JVD present  Carotid bruit is not present  No tracheal deviation present  No thyromegaly present  Cardiovascular: Normal rate, regular rhythm, S1 normal and S2 normal  PMI is not displaced  Exam reveals no gallop  No murmur heard  Pulses:       Carotid pulses are 2+ on the right side, and 2+ on the left side  Radial pulses are 2+ on the right side, and 2+ on the left side  Femoral pulses are 2+ on the right side, and 2+ on the left side  Dorsalis pedis pulses are 2+ on the right side, and 2+ on the left side  Posterior tibial pulses are 2+ on the right side, and 2+ on the left side  Pulmonary/Chest: Breath sounds normal  No accessory muscle usage  No tachypnea  No respiratory distress  Abdominal: Soft  Bowel sounds are normal  He exhibits no distension  There is no tenderness  A hernia is present  Hernia confirmed positive in the left inguinal area  Musculoskeletal: He exhibits no edema  Lymphadenopathy:        Head (right side): No submental, no submandibular, no tonsillar, no preauricular, no posterior auricular and no occipital adenopathy present  Head (left side): No submental, no submandibular, no tonsillar, no preauricular, no posterior auricular and no occipital adenopathy present  He has no cervical adenopathy  Neurological: He is alert and oriented to person, place, and time  Skin: Skin is warm and dry  Psychiatric: He has a normal mood and affect  His behavior is normal  Judgment and thought content normal          Discussion/Summary:  Non ST-elevation myocardial infarction  PCI of the distal LAD  PTCA of the 1st diagonal occlusion  Difficult stent delivery to the ostial diagonal, resulting in partial deployment with crush technique within the proximal LAD lumen  Persistent shortness of breath, particularly with exertion  Ischemic cardiomyopathy, ejection fraction 40%  Post PCI pericarditis  Syncopal episode and relative hypotension  Likely Raynaud's phenomenon  Left inguinal hernia      Mr Yahaira Isaac is doing remarkably well from a cardiovascular standpoint  Pulses controlled  Blood pressure is mildly elevated today, but reasonable  Home blood pressures are more controlled  He will continue aspirin and clopidogrel for secondary prevention and his recent PCI  Aspirin should continue lifelong  Clopidogrel should be continued for a year past his stenting  Continue furosemide for some of his heart failure, which now appears resolved  Atorvastatin continues for secondary prevention and cholesterol management  In regards to his hernia, he is stable for surgery from a cardiovascular standpoint, but should continue his clopidogrel for at least 6 months, preferably a year before discontinuing  I would like him to return to our office in 6 months, sooner if any acute issues arise  Thank you for the opportunity to consult on this patient  If you have any questions, please feel free to call my office

## 2020-08-11 ENCOUNTER — TELEPHONE (OUTPATIENT)
Dept: INTERNAL MEDICINE CLINIC | Facility: CLINIC | Age: 75
End: 2020-08-11

## 2020-08-12 ENCOUNTER — OFFICE VISIT (OUTPATIENT)
Dept: INTERNAL MEDICINE CLINIC | Facility: CLINIC | Age: 75
End: 2020-08-12
Payer: COMMERCIAL

## 2020-08-12 VITALS
RESPIRATION RATE: 14 BRPM | BODY MASS INDEX: 20.34 KG/M2 | HEIGHT: 68 IN | DIASTOLIC BLOOD PRESSURE: 78 MMHG | HEART RATE: 86 BPM | WEIGHT: 134.2 LBS | SYSTOLIC BLOOD PRESSURE: 124 MMHG | TEMPERATURE: 98.3 F

## 2020-08-12 DIAGNOSIS — K40.90 NON-RECURRENT UNILATERAL INGUINAL HERNIA WITHOUT OBSTRUCTION OR GANGRENE: Chronic | ICD-10-CM

## 2020-08-12 DIAGNOSIS — Z11.59 NEED FOR HEPATITIS C SCREENING TEST: ICD-10-CM

## 2020-08-12 DIAGNOSIS — Z00.00 MEDICARE ANNUAL WELLNESS VISIT, INITIAL: ICD-10-CM

## 2020-08-12 DIAGNOSIS — Z86.79 HISTORY OF ISCHEMIC CARDIOMYOPATHY: Chronic | ICD-10-CM

## 2020-08-12 DIAGNOSIS — I25.119 CORONARY ARTERY DISEASE INVOLVING NATIVE CORONARY ARTERY OF NATIVE HEART WITH ANGINA PECTORIS (HCC): Primary | ICD-10-CM

## 2020-08-12 PROBLEM — R55 NEAR SYNCOPE: Status: RESOLVED | Noted: 2019-11-14 | Resolved: 2020-08-12

## 2020-08-12 PROBLEM — I21.4 NSTEMI (NON-ST ELEVATED MYOCARDIAL INFARCTION) (HCC): Status: RESOLVED | Noted: 2019-10-20 | Resolved: 2020-08-12

## 2020-08-12 PROCEDURE — 3725F SCREEN DEPRESSION PERFORMED: CPT | Performed by: INTERNAL MEDICINE

## 2020-08-12 PROCEDURE — 1101F PT FALLS ASSESS-DOCD LE1/YR: CPT | Performed by: INTERNAL MEDICINE

## 2020-08-12 PROCEDURE — 3008F BODY MASS INDEX DOCD: CPT | Performed by: INTERNAL MEDICINE

## 2020-08-12 PROCEDURE — 1036F TOBACCO NON-USER: CPT | Performed by: INTERNAL MEDICINE

## 2020-08-12 PROCEDURE — 3288F FALL RISK ASSESSMENT DOCD: CPT | Performed by: INTERNAL MEDICINE

## 2020-08-12 PROCEDURE — 1160F RVW MEDS BY RX/DR IN RCRD: CPT | Performed by: INTERNAL MEDICINE

## 2020-08-12 PROCEDURE — 1125F AMNT PAIN NOTED PAIN PRSNT: CPT | Performed by: INTERNAL MEDICINE

## 2020-08-12 PROCEDURE — 99214 OFFICE O/P EST MOD 30 MIN: CPT | Performed by: INTERNAL MEDICINE

## 2020-08-12 PROCEDURE — 1170F FXNL STATUS ASSESSED: CPT | Performed by: INTERNAL MEDICINE

## 2020-08-12 PROCEDURE — G0438 PPPS, INITIAL VISIT: HCPCS | Performed by: INTERNAL MEDICINE

## 2020-08-12 NOTE — PROGRESS NOTES
Ghazal    NAME: Anish Britt  AGE: 77 y o  SEX: male  : 1945     DATE: 2020     Assessment and Plan:     1  Coronary artery disease involving native coronary artery of native heart with angina pectoris ([address] )    Stable with prior history of stenting and NSTEMI  He should continue atorvastatin as prescribed along with aspirin and clopidogrel  He was given information on a new cardiologist   I would have follow-up with cardiology around October to discuss whether he should continue clopidogrel  He will need to remain on aspirin lifelong     - Basic metabolic panel; Future  - CBC; Future  - Lipid panel; Future    2  History of ischemic cardiomyopathy    Ejection fraction did improve after stent placement  He is euvolemic on exam today  I would hold the furosemide and only take as needed  Discussed with patient that he should monitor his weight  If gained 3 lb in 1 day or 5 lb in 1 week, he should notify the office  3  Non-recurrent unilateral inguinal hernia without obstruction or gangrene    Discussed with patient about seeing General surgery in the future  I would wait until he is finished Plavix for at least 1 year  Return in about 6 months (around 2021) for Follow-up  Chief Complaint:     Chief Complaint   Patient presents with    Follow-up     follow-up        History of Present Illness:     Patient with history of coronary artery disease status post previous stenting back in 2019 when he suffered an NSTEMI  Evidence of cardiomyopathy at that time with ejection fraction going down to 40-45%  Repeat echocardiogram had showed improvement in ejection fraction up to 50-55%  He states he feels like he does not need the furosemide anymore  He denies any recent weight gain  He states the medication just makes him go the bathroom all the time  He is tolerating aspirin, clopidogrel, and atorvastatin    He would like information on seeing a new cardiologist as Dr Mindy Eric is leaving the Network  He has a large left inguinal hernia and is wondering when he might be able to get this repaired  No change in bowel habits  Has not had a colonoscopy  Review of Systems:     Review of Systems   Constitutional: Negative for activity change, appetite change and fatigue  Respiratory: Negative for apnea, cough, chest tightness, shortness of breath and wheezing  Cardiovascular: Negative for chest pain, palpitations and leg swelling  Gastrointestinal: Negative for abdominal distention, abdominal pain, blood in stool, constipation, diarrhea, nausea and vomiting  Large inguinal hernia left   Musculoskeletal: Negative for arthralgias, back pain, gait problem, joint swelling and myalgias  Neurological: Negative for dizziness, weakness, light-headedness, numbness and headaches  Psychiatric/Behavioral: Negative for behavioral problems, confusion, hallucinations, sleep disturbance and suicidal ideas  The patient is not nervous/anxious  Problem List:     Patient Active Problem List   Diagnosis    Coronary artery disease involving native coronary artery of native heart with angina pectoris ([facility] )    History of ischemic cardiomyopathy    History of lacunar cerebrovascular accident (CVA)    Non-recurrent unilateral inguinal hernia      Objective:     /78 (BP Location: Left arm, Patient Position: Sitting, Cuff Size: Standard)   Pulse 86   Temp 98 3 °F (36 8 °C) (Temporal) Comment: no nsaids  Resp 14   Ht 5' 8" (1 727 m)   Wt 60 9 kg (134 lb 3 2 oz)   BMI 20 41 kg/m²     Physical Exam  Vitals signs reviewed  Constitutional:       General: He is not in acute distress  Appearance: He is well-developed  He is not diaphoretic  Eyes:      General: No scleral icterus  Right eye: No discharge  Left eye: No discharge        Conjunctiva/sclera: Conjunctivae normal    Neck:      Musculoskeletal: Neck supple  Thyroid: No thyromegaly  Vascular: No JVD  Cardiovascular:      Rate and Rhythm: Normal rate and regular rhythm  Heart sounds: Normal heart sounds  No murmur  Pulmonary:      Effort: Pulmonary effort is normal  No respiratory distress  Breath sounds: Normal breath sounds  No wheezing or rales  Abdominal:      General: Bowel sounds are normal  There is no distension  Palpations: Abdomen is soft  Tenderness: There is no abdominal tenderness  Hernia: A hernia (Left-sided large inguinal hernia) is present  Lymphadenopathy:      Cervical: No cervical adenopathy  Skin:     General: Skin is warm and dry  Neurological:      Mental Status: He is alert and oriented to person, place, and time     Psychiatric:         Behavior: Behavior normal          Teodoro Lauren DO  MEDICAL [id number]

## 2020-08-12 NOTE — PATIENT INSTRUCTIONS
Medicare Preventive Visit Patient Instructions  Thank you for completing your Welcome to Medicare Visit or Medicare Annual Wellness Visit today  Your next wellness visit will be due in one year (8/12/2021)  The screening/preventive services that you may require over the next 5-10 years are detailed below  Some tests may not apply to you based off risk factors and/or age  Screening tests ordered at today's visit but not completed yet may show as past due  Also, please note that scanned in results may not display below  Preventive Screenings:  Service Recommendations Previous Testing/Comments   Colorectal Cancer Screening  · Colonoscopy    · Fecal Occult Blood Test (FOBT)/Fecal Immunochemical Test (FIT)  · Fecal DNA/Cologuard Test  · Flexible Sigmoidoscopy Age: 54-65 years old   Colonoscopy: every 10 years (May be performed more frequently if at higher risk)  OR  FOBT/FIT: every 1 year  OR  Cologuard: every 3 years  OR  Sigmoidoscopy: every 5 years  Screening may be recommended earlier than age 48 if at higher risk for colorectal cancer  Also, an individualized decision between you and your healthcare provider will decide whether screening between the ages of 74-80 would be appropriate   Colonoscopy: Not on file  FOBT/FIT: Not on file  Cologuard: Not on file  Sigmoidoscopy: Not on file    Risks and Benefits Discussed; recommend colonoscopy before end of year     Prostate Cancer Screening Individualized decision between patient and health care provider in men between ages of 53-78   Medicare will cover every 12 months beginning on the day after your 50th birthday PSA: No results in last 5 years     Screening Not Indicated     Hepatitis C Screening Once for adults born between [facility]  More frequently in patients at high risk for Hepatitis C Hep C Antibody: Not on file    Risks and Benefits Discussed   Diabetes Screening 1-2 times per year if you're at risk for diabetes or have pre-diabetes Fasting glucose: 79 mg/dL   A1C: 5 6 %    Screening Current   Cholesterol Screening Once every 5 years if you don't have a lipid disorder  May order more often based on risk factors  Lipid panel: 11/16/2019    Screening Current      Other Preventive Screenings Covered by Medicare:  1  Abdominal Aortic Aneurysm (AAA) Screening: covered once if your at risk  You're considered to be at risk if you have a family history of AAA or a male between the age of 73-68 who smoking at least 100 cigarettes in your lifetime  2  Lung Cancer Screening: covers low dose CT scan once per year if you meet all of the following conditions: (1) Age 50-69; (2) No signs or symptoms of lung cancer; (3) Current smoker or have quit smoking within the last 15 years; (4) You have a tobacco smoking history of at least 30 pack years (packs per day x number of years you smoked); (5) You get a written order from a healthcare provider  3  Glaucoma Screening: covered annually if you're considered high risk: (1) You have diabetes OR (2) Family history of glaucoma OR (3)  aged 48 and older OR (3)  American aged 72 and older  3  Osteoporosis Screening: covered every 2 years if you meet one of the following conditions: (1) Have a vertebral abnormality; (2) On glucocorticoid therapy for more than 3 months; (3) Have primary hyperparathyroidism; (4) On osteoporosis medications and need to assess response to drug therapy  5  HIV Screening: covered annually if you're between the age of 12-76  Also covered annually if you are younger than 13 and older than 72 with risk factors for HIV infection  For pregnant patients, it is covered up to 3 times per pregnancy      Immunizations:  Immunization Recommendations   Influenza Vaccine Annual influenza vaccination during flu season is recommended for all persons aged >= 6 months who do not have contraindications   Pneumococcal Vaccine (Prevnar and Pneumovax)  * Prevnar = PCV13  * Pneumovax = PPSV23 Adults 19-64 years old: 1-3 doses may be recommended based on certain risk factors  Adults 72 years old: Prevnar (PCV13) vaccine recommended followed by Pneumovax (PPSV23) vaccine  If already received PPSV23 since turning 65, then PCV13 recommended at least one year after PPSV23 dose  Hepatitis B Vaccine 3 dose series if at intermediate or high risk (ex: diabetes, end stage renal disease, liver disease)   Tetanus (Td) Vaccine - COST NOT COVERED BY MEDICARE PART B Following completion of primary series, a booster dose should be given every 10 years to maintain immunity against tetanus  Td may also be given as tetanus wound prophylaxis  Tdap Vaccine - COST NOT COVERED BY MEDICARE PART B Recommended at least once for all adults  For pregnant patients, recommended with each pregnancy  Shingles Vaccine (Shingrix) - COST NOT COVERED BY MEDICARE PART B  2 shot series recommended in those aged 48 and above     Health Maintenance Due:      Topic Date Due    Hepatitis C Screening  1945     Immunizations Due:      Topic Date Due    DTaP,Tdap,and Td Vaccines (1 - Tdap) 04/14/1967    Pneumococcal Vaccine: 65+ Years (1 of 1 - PPSV23) 04/14/2011    Influenza Vaccine  07/01/2020     Advance Directives   What are advance directives? Advance directives are legal documents that state your wishes and plans for medical care  These plans are made ahead of time in case you lose your ability to make decisions for yourself  Advance directives can apply to any medical decision, such as the treatments you want, and if you want to donate organs  What are the types of advance directives? There are many types of advance directives, and each state has rules about how to use them  You may choose a combination of any of the following:  · Living will: This is a written record of the treatment you want  You can also choose which treatments you do not want, which to limit, and which to stop at a certain time   This includes surgery, medicine, IV fluid, and tube feedings  · Durable power of  for healthcare [place] [facility]): This is a written record that states who you want to make healthcare choices for you when you are unable to make them for yourself  This person, called a proxy, is usually a family member or a friend  You may choose more than 1 proxy  · Do not resuscitate (DNR) order:  A DNR order is used in case your heart stops beating or you stop breathing  It is a request not to have certain forms of treatment, such as CPR  A DNR order may be included in other types of advance directives  · Medical directive: This covers the care that you want if you are in a coma, near death, or unable to make decisions for yourself  You can list the treatments you want for each condition  Treatment may include pain medicine, surgery, blood transfusions, dialysis, IV or tube feedings, and a ventilator (breathing machine)  · Values history: This document has questions about your views, beliefs, and how you feel and think about life  This information can help others choose the care that you would choose  Why are advance directives important? An advance directive helps you control your care  Although spoken wishes may be used, it is better to have your wishes written down  Spoken wishes can be misunderstood, or not followed  Treatments may be given even if you do not want them  An advance directive may make it easier for your family to make difficult choices about your care  © Copyright [employer] 2018 Information is for End User's use only and may not be sold, redistributed or otherwise used for commercial purposes   All illustrations and images included in CareNotes® are the copyrighted property of A D A M , Inc  or [address] [place], [place] [address]

## 2020-08-12 NOTE — PROGRESS NOTES
Assessment and Plan:     1  Medicare annual wellness visit, initial    2  Need for hepatitis C screening test  - Hepatitis C antibody; Future      Preventive health issues were discussed with patient, and age appropriate screening tests were ordered as noted in patient's After Visit Summary  Personalized health advice and appropriate referrals for health education or preventive services given if needed, as noted in patient's After Visit Summary  History of Present Illness:     Patient presents for Medicare Annual Wellness visit    Patient Care Team:  Kayode Becerril DO as PCP - General (Internal Medicine)     Problem List:     Patient Active Problem List   Diagnosis    Coronary artery disease involving native coronary artery of native heart with angina pectoris [facility] )    History of ischemic cardiomyopathy    History of lacunar cerebrovascular accident (CVA)    Non-recurrent unilateral inguinal hernia      Past Medical and Surgical History:     Past Medical History:   Diagnosis Date    Ischemic cardiomyopathy     [facility]     Near syncope 11/14/2019    Non-ST elevated myocardial infarction [facility])     NSTEMI (non-ST elevated myocardial infarction) ([facility] ) 10/20/2019    Pericarditis 10/24/2019    Poison ivy      Past Surgical History:   Procedure Laterality Date    CORONARY STENT PLACEMENT        Family History:     Family History   Problem Relation Age of Onset    No Known Problems Mother     Emphysema Father     Heart disease Maternal Grandfather     Heart attack Maternal Grandfather     Emphysema Paternal Grandfather       Social History:     E-Cigarette/Vaping    E-Cigarette Use Never User      E-Cigarette/Vaping Substances    Nicotine No     THC No     CBD No     Flavoring No     Other No     Unknown No      Social History     Socioeconomic History    Marital status:       Spouse name: None    Number of children: None    Years of education: None    Highest education level: None   Occupational History    None   Social Needs    Financial resource strain: None    Food insecurity     Worry: None     Inability: None    Transportation needs     Medical: None     Non-medical: None   Tobacco Use    Smoking status: Former Smoker     Packs/day: 1 00     Years: 15 00     Pack years: 15 00     Types: Cigarettes     Last attempt to quit: 1969     Years since quittin 7    Smokeless tobacco: Never Used   Substance and Sexual Activity    Alcohol use: Not Currently    Drug use: No    Sexual activity: Not Currently     Partners: Female   Lifestyle    Physical activity     Days per week: 0 days     Minutes per session: 0 min    Stress: Not at all   Relationships    Social connections     Talks on phone: None     Gets together: None     Attends [employer] service: None     Active member of club or organization: None     Attends meetings of clubs or organizations: None     Relationship status: None    Intimate partner violence     Fear of current or ex partner: None     Emotionally abused: None     Physically abused: None     Forced sexual activity: None   Other Topics Concern    None   Social History Narrative    None      Medications and Allergies:     Current Outpatient Medications   Medication Sig Dispense Refill    aspirin (ECOTRIN LOW STRENGTH) 81 mg EC tablet Take 1 tablet (81 mg total) by mouth daily Please purchase over the counter at her local pharmacy 30 tablet 1    atorvastatin (LIPITOR) 80 mg tablet Take 1 tablet (80 mg total) by mouth daily with dinner 90 tablet 3    Blood Pressure KIT by Does not apply route daily 1 each 0    clopidogrel (PLAVIX) 75 mg tablet Take 1 tablet (75 mg total) by mouth daily 90 tablet 3    furosemide (LASIX) 20 mg tablet Take 1 tablet (20 mg total) by mouth daily 90 tablet 3    nitroglycerin (NITROSTAT) 0 4 mg SL tablet Place 1 tablet (0 4 mg total) under the tongue every 5 (five) minutes as needed for chest pain (Patient not taking: Reported on 3/12/2020) 30 tablet 0     No current facility-administered medications for this visit  No Known Allergies   Immunizations: There is no immunization history on file for this patient  Health Maintenance:         Topic Date Due    Hepatitis C Screening  1945         Topic Date Due    DTaP,Tdap,and Td Vaccines (1 - Tdap) 04/14/1967    Pneumococcal Vaccine: 65+ Years (1 of 1 - PPSV23) 04/14/2011    Influenza Vaccine  07/01/2020      Medicare Health Risk Assessment:     /78 (BP Location: Left arm, Patient Position: Sitting, Cuff Size: Standard)   Pulse 86   Temp 98 3 °F (36 8 °C) (Temporal) Comment: no nsaids  Resp 14   Ht 5' 8" (1 727 m)   Wt 60 9 kg (134 lb 3 2 oz)   BMI 20 41 kg/m²      Diogenes Diaz is here for his Initial Wellness visit  Health Risk Assessment:   Patient rates overall health as good  Patient feels that their physical health rating is slightly better  Eyesight was rated as same  Hearing was rated as same  Patient feels that their emotional and mental health rating is same  Pain experienced in the last 7 days has been none  Patient states that he has experienced no weight loss or gain in last 6 months  Depression Screening:   PHQ-2 Score: 0      Fall Risk Screening: In the past year, patient has experienced: no history of falling in past year      Home Safety:  Patient does not have trouble with stairs inside or outside of their home  Patient has working smoke alarms and has working carbon monoxide detector  Home safety hazards include: none  Nutrition:   Current diet is Regular, No Added Salt and Limited junk food  Medications:   Patient is not currently taking any over-the-counter supplements  Patient is able to manage medications       Activities of Daily Living (ADLs)/Instrumental Activities of Daily Living (IADLs):   Walk and transfer into and out of bed and chair?: Yes  Dress and groom yourself?: Yes    Bathe or shower yourself?: Yes    Feed yourself? Yes  Do your laundry/housekeeping?: Yes  Manage your money, pay your bills and track your expenses?: Yes  Make your own meals?: Yes    Do your own shopping?: Yes    Durable Medical Equipment Suppliers  None    Previous Hospitalizations:   Any hospitalizations or ED visits within the last 12 months?: Yes    How many hospitalizations have you had in the last year?: 1-2    Advance Care Planning:   Living will: No    Durable POA for healthcare: No    Advanced directive: No    Five wishes given: Yes      Cognitive Screening:   Provider or family/friend/caregiver concerned regarding cognition?: No    PREVENTIVE SCREENINGS      Cardiovascular Screening:    General: Screening Current      Diabetes Screening:     General: Screening Current      Colorectal Cancer Screening:     General: Risks and Benefits Discussed    Due for: Colonoscopy - Low Risk      Prostate Cancer Screening:    General: Screening Not Indicated      Osteoporosis Screening:    General: Screening Not Indicated      Abdominal Aortic Aneurysm (AAA) Screening:    Risk factors include: age between 73-67 yo and tobacco use        General: Patient Declines      Lung Cancer Screening:     General: Screening Not Indicated      Hepatitis C Screening:    General: Risks and Benefits Discussed    Hep C Screening Accepted: Yes      Other Counseling Topics:   Car/seat belt/driving safety, skin self-exam, sunscreen and regular weightbearing exercise         Cony Casillas, DO

## 2020-09-25 ENCOUNTER — HOSPITAL ENCOUNTER (EMERGENCY)
Facility: HOSPITAL | Age: 75
Discharge: HOME | End: 2020-09-25
Attending: EMERGENCY MEDICINE | Admitting: EMERGENCY MEDICINE
Payer: COMMERCIAL

## 2020-09-25 ENCOUNTER — APPOINTMENT (EMERGENCY)
Dept: RADIOLOGY | Facility: HOSPITAL | Age: 75
End: 2020-09-25
Payer: COMMERCIAL

## 2020-09-25 ENCOUNTER — APPOINTMENT (EMERGENCY)
Dept: CT IMAGING | Facility: HOSPITAL | Age: 75
End: 2020-09-25
Payer: COMMERCIAL

## 2020-09-25 VITALS
DIASTOLIC BLOOD PRESSURE: 93 MMHG | OXYGEN SATURATION: 99 % | BODY MASS INDEX: 21.22 KG/M2 | HEART RATE: 72 BPM | RESPIRATION RATE: 16 BRPM | SYSTOLIC BLOOD PRESSURE: 139 MMHG | WEIGHT: 140 LBS | TEMPERATURE: 97.8 F | HEIGHT: 68 IN

## 2020-09-25 DIAGNOSIS — R42 VERTIGO: Primary | ICD-10-CM

## 2020-09-25 DIAGNOSIS — I10 ACCELERATED HYPERTENSION: ICD-10-CM

## 2020-09-25 LAB
ANION GAP SERPL CALCULATED.3IONS-SCNC: 9 MMOL/L (ref 4–13)
ATRIAL RATE: 70 BPM
BASOPHILS # BLD AUTO: 0.02 THOUSANDS/ΜL (ref 0–0.1)
BASOPHILS NFR BLD AUTO: 0 % (ref 0–1)
BUN SERPL-MCNC: 14 MG/DL (ref 5–25)
CALCIUM SERPL-MCNC: 8.8 MG/DL (ref 8.3–10.1)
CHLORIDE SERPL-SCNC: 106 MMOL/L (ref 100–108)
CO2 SERPL-SCNC: 28 MMOL/L (ref 21–32)
CREAT SERPL-MCNC: 0.96 MG/DL (ref 0.6–1.3)
EOSINOPHIL # BLD AUTO: 0.07 THOUSAND/ΜL (ref 0–0.61)
EOSINOPHIL NFR BLD AUTO: 1 % (ref 0–6)
ERYTHROCYTE [DISTWIDTH] IN BLOOD BY AUTOMATED COUNT: 13.8 % (ref 11.6–15.1)
GFR SERPL CREATININE-BSD FRML MDRD: 78 ML/MIN/1.73SQ M
GLUCOSE SERPL-MCNC: 97 MG/DL (ref 65–140)
HCT VFR BLD AUTO: 44 % (ref 36.5–49.3)
HGB BLD-MCNC: 14.3 G/DL (ref 12–17)
IMM GRANULOCYTES # BLD AUTO: 0.03 THOUSAND/UL (ref 0–0.2)
IMM GRANULOCYTES NFR BLD AUTO: 0 % (ref 0–2)
LYMPHOCYTES # BLD AUTO: 1.15 THOUSANDS/ΜL (ref 0.6–4.47)
LYMPHOCYTES NFR BLD AUTO: 15 % (ref 14–44)
MCH RBC QN AUTO: 29.8 PG (ref 26.8–34.3)
MCHC RBC AUTO-ENTMCNC: 32.5 G/DL (ref 31.4–37.4)
MCV RBC AUTO: 92 FL (ref 82–98)
MONOCYTES # BLD AUTO: 0.56 THOUSAND/ΜL (ref 0.17–1.22)
MONOCYTES NFR BLD AUTO: 7 % (ref 4–12)
NEUTROPHILS # BLD AUTO: 5.83 THOUSANDS/ΜL (ref 1.85–7.62)
NEUTS SEG NFR BLD AUTO: 77 % (ref 43–75)
NRBC BLD AUTO-RTO: 0 /100 WBCS
NT-PROBNP SERPL-MCNC: 157 PG/ML
P AXIS: 80 DEGREES
PLATELET # BLD AUTO: 158 THOUSANDS/UL (ref 149–390)
PMV BLD AUTO: 10.8 FL (ref 8.9–12.7)
POTASSIUM SERPL-SCNC: 4.7 MMOL/L (ref 3.5–5.3)
PR INTERVAL: 178 MS
QRS AXIS: 91 DEGREES
QRSD INTERVAL: 78 MS
QT INTERVAL: 384 MS
QTC INTERVAL: 406 MS
RBC # BLD AUTO: 4.8 MILLION/UL (ref 3.88–5.62)
SODIUM SERPL-SCNC: 143 MMOL/L (ref 136–145)
T WAVE AXIS: 89 DEGREES
TROPONIN I SERPL-MCNC: <0.02 NG/ML
VENTRICULAR RATE: 67 BPM
WBC # BLD AUTO: 7.66 THOUSAND/UL (ref 4.31–10.16)

## 2020-09-25 PROCEDURE — 71045 X-RAY EXAM CHEST 1 VIEW: CPT

## 2020-09-25 PROCEDURE — G1004 CDSM NDSC: HCPCS

## 2020-09-25 PROCEDURE — 84484 ASSAY OF TROPONIN QUANT: CPT | Performed by: EMERGENCY MEDICINE

## 2020-09-25 PROCEDURE — 99284 EMERGENCY DEPT VISIT MOD MDM: CPT

## 2020-09-25 PROCEDURE — 85025 COMPLETE CBC W/AUTO DIFF WBC: CPT | Performed by: EMERGENCY MEDICINE

## 2020-09-25 PROCEDURE — 99204 OFFICE O/P NEW MOD 45 MIN: CPT | Performed by: INTERNAL MEDICINE

## 2020-09-25 PROCEDURE — 36415 COLL VENOUS BLD VENIPUNCTURE: CPT | Performed by: EMERGENCY MEDICINE

## 2020-09-25 PROCEDURE — 80048 BASIC METABOLIC PNL TOTAL CA: CPT | Performed by: EMERGENCY MEDICINE

## 2020-09-25 PROCEDURE — 99285 EMERGENCY DEPT VISIT HI MDM: CPT | Performed by: EMERGENCY MEDICINE

## 2020-09-25 PROCEDURE — 93005 ELECTROCARDIOGRAM TRACING: CPT

## 2020-09-25 PROCEDURE — 70498 CT ANGIOGRAPHY NECK: CPT

## 2020-09-25 PROCEDURE — 83880 ASSAY OF NATRIURETIC PEPTIDE: CPT | Performed by: EMERGENCY MEDICINE

## 2020-09-25 PROCEDURE — 70496 CT ANGIOGRAPHY HEAD: CPT

## 2020-09-25 PROCEDURE — 93010 ELECTROCARDIOGRAM REPORT: CPT | Performed by: INTERNAL MEDICINE

## 2020-09-25 RX ORDER — ONDANSETRON 2 MG/ML
4 INJECTION INTRAMUSCULAR; INTRAVENOUS ONCE
Status: DISCONTINUED | OUTPATIENT
Start: 2020-09-25 | End: 2020-09-25 | Stop reason: HOSPADM

## 2020-09-25 RX ORDER — MECLIZINE HYDROCHLORIDE 25 MG/1
12.5 TABLET ORAL 3 TIMES DAILY PRN
Qty: 30 TABLET | Refills: 0 | Status: SHIPPED | OUTPATIENT
Start: 2020-09-25 | End: 2021-04-05 | Stop reason: ALTCHOICE

## 2020-09-25 RX ORDER — SODIUM CHLORIDE 9 MG/ML
3 INJECTION INTRAVENOUS
Status: DISCONTINUED | OUTPATIENT
Start: 2020-09-25 | End: 2020-09-25 | Stop reason: HOSPADM

## 2020-09-25 RX ADMIN — IOHEXOL 85 ML: 350 INJECTION, SOLUTION INTRAVENOUS at 12:44

## 2020-09-25 NOTE — ED PROVIDER NOTES
History  Chief Complaint   Patient presents with    Hypertension     Pt reports HTN of 172/90 this am, states he was very dizzy last night when moving around in bed, states feeling lightheaded this am +nausea  Hx MI in the past       78 yo male with h/o CAD s/p stent on plavix, ICM, lacunar infarct presents with dizziness noted this morning  Pt reports normal state of health over the last several days, denies new medications/activities or exposures  Last night, pt went to bed feeling well, then was light-headed/nauseated/dizzy with head turning and position change this morning upon awakening  When asked to describe symptoms without using the work "dizzy" states "everything was moving around"  Denied associated chest pain/pressure, notable double vision or loss of vision, is unsure about changes in speech because he didn't talk to anyway  Denies ear pain/fever/chills/facial pain or pressure, runny nose or sore throat  Pt reports decreased sensation to right temple area, denies other paresthesia  Feels symptoms attributed to elevated blood pressure  History provided by:  Medical records, patient and relative   used: No    Dizziness   Quality:  Head spinning and room spinning  Severity:  Severe  Onset quality:  Sudden  Duration:  6 hours  Timing:  Constant  Progression:  Improving  Chronicity:  New  Context: head movement    Relieved by:  Nothing  Worsened by:  Nothing  Ineffective treatments:  None tried  Associated symptoms: nausea and weakness    Associated symptoms: no chest pain, no diarrhea, no headaches, no hearing loss, no palpitations, no shortness of breath, no syncope, no tinnitus, no vision changes and no vomiting    Risk factors: heart disease and multiple medications    Risk factors: no hx of vertigo and no new medications        Prior to Admission Medications   Prescriptions Last Dose Informant Patient Reported? Taking?    Blood Pressure KIT  Self No No   Sig: by Does not apply route daily   aspirin (ECOTRIN LOW STRENGTH) 81 mg EC tablet  Self No No   Sig: Take 1 tablet (81 mg total) by mouth daily Please purchase over the counter at her local pharmacy   atorvastatin (LIPITOR) 80 mg tablet  Self No No   Sig: Take 1 tablet (80 mg total) by mouth daily with dinner   clopidogrel (PLAVIX) 75 mg tablet  Self No No   Sig: Take 1 tablet (75 mg total) by mouth daily   furosemide (LASIX) 20 mg tablet Not Taking at Unknown time Self No No   Sig: Take 1 tablet (20 mg total) by mouth daily   Patient not taking: Reported on 2020   nitroglycerin (NITROSTAT) 0 4 mg SL tablet  Self No No   Sig: Place 1 tablet (0 4 mg total) under the tongue every 5 (five) minutes as needed for chest pain   Patient not taking: Reported on 3/12/2020      Facility-Administered Medications: None       Past Medical History:   Diagnosis Date    Ischemic cardiomyopathy     Lacunar infarction ([facility] [address] )     Near syncope 2019    Non-ST elevated myocardial infarction [facility])     NSTEMI (non-ST elevated myocardial infarction) ([facility] [address] ) 10/20/2019    Pericarditis 10/24/2019    Poison ivy        Past Surgical History:   Procedure Laterality Date    CORONARY STENT PLACEMENT         Family History   Problem Relation Age of Onset    No Known Problems Mother     Emphysema Father     Heart disease Maternal Grandfather     Heart attack Maternal Grandfather     Emphysema Paternal Grandfather      I have reviewed and agree with the history as documented      E-Cigarette/Vaping    E-Cigarette Use Never User      E-Cigarette/Vaping Substances    Nicotine No     THC No     CBD No     Flavoring No     Other No     Unknown No      Social History     Tobacco Use    Smoking status: Former Smoker     Packs/day: 1 00     Years: 15 00     Pack years: 15 00     Types: Cigarettes     Last attempt to quit: 1969     Years since quittin 8    Smokeless tobacco: Never Used   Substance Use Topics    Alcohol use: Not Currently    Drug use: No       Review of Systems   Constitutional: Negative for chills and fever  HENT: Negative for hearing loss and tinnitus  Respiratory: Negative for shortness of breath  Cardiovascular: Negative for chest pain, palpitations and syncope  Gastrointestinal: Positive for nausea  Negative for diarrhea and vomiting  Neurological: Positive for dizziness and weakness  Negative for headaches  All other systems reviewed and are negative  Physical Exam  Physical Exam  Vitals signs and nursing note reviewed  Constitutional:       Appearance: He is well-developed  He is ill-appearing  He is not diaphoretic  HENT:      Head: Normocephalic and atraumatic  Eyes:      Conjunctiva/sclera: Conjunctivae normal    Neck:      Musculoskeletal: Normal range of motion  Cardiovascular:      Rate and Rhythm: Normal rate and regular rhythm  Heart sounds: Normal heart sounds  No murmur  Pulmonary:      Effort: Pulmonary effort is normal  No respiratory distress  Breath sounds: Normal breath sounds  Abdominal:      Palpations: Abdomen is soft  Musculoskeletal: Normal range of motion  General: No deformity  Skin:     General: Skin is warm and dry  Neurological:      Mental Status: He is alert and oriented to person, place, and time  He is not disoriented  GCS: GCS eye subscore is 4  GCS verbal subscore is 5  GCS motor subscore is 6  Cranial Nerves: No cranial nerve deficit  Sensory: No sensory deficit  Coordination: Coordination normal       Gait: Gait normal       Deep Tendon Reflexes: Babinski sign absent on the right side  Babinski sign absent on the left side  Comments: No nystagmus   Psychiatric:         Behavior: Behavior normal          Thought Content:  Thought content normal          Judgment: Judgment normal          Vital Signs  ED Triage Vitals   Temperature Pulse Respirations Blood Pressure SpO2   09/25/20 1016 09/25/20 1016 09/25/20 1016 09/25/20 1016 09/25/20 1016   97 8 °F (36 6 °C) 68 16 170/79 100 %      Temp Source Heart Rate Source Patient Position - Orthostatic VS BP Location FiO2 (%)   09/25/20 1016 09/25/20 1016 09/25/20 1016 09/25/20 1016 --   Oral Monitor Sitting Left arm       Pain Score       09/25/20 1059       No Pain           Vitals:    09/25/20 1247 09/25/20 1300 09/25/20 1315 09/25/20 1410   BP: (!) 171/84 156/79  (!) 184/83   Pulse: 68 62 62 78   Patient Position - Orthostatic VS: Sitting   Lying         Visual Acuity      ED Medications  Medications   sodium chloride (PF) 0 9 % injection 3 mL (has no administration in time range)   ondansetron (ZOFRAN) injection 4 mg (4 mg Intravenous Not Given 9/25/20 1207)   iohexol (OMNIPAQUE) 350 MG/ML injection (SINGLE-DOSE) 100 mL (85 mL Intravenous Given 9/25/20 1244)       Diagnostic Studies  Results Reviewed     Procedure Component Value Units Date/Time    NT-BNP PRO [[id number]]  (Abnormal) Collected:  09/25/20 1115    Lab Status:  Final result Specimen:  Blood from Arm, Right Updated:  09/25/20 1149     NT-proBNP 157 pg/mL     Troponin I [[id number]]  (Normal) Collected:  09/25/20 1115    Lab Status:  Final result Specimen:  Blood from Arm, Right Updated:  09/25/20 1139     Troponin I <0 02 ng/mL     Basic metabolic panel [[id number]] Collected:  09/25/20 1115    Lab Status:  Final result Specimen:  Blood from Arm, Right Updated:  09/25/20 1137     Sodium 143 mmol/L      Potassium 4 7 mmol/L      Chloride 106 mmol/L      CO2 28 mmol/L      ANION GAP 9 mmol/L      BUN 14 mg/dL      Creatinine 0 96 mg/dL      Glucose 97 mg/dL      Calcium 8 8 mg/dL      eGFR 78 ml/min/1 73sq m     Narrative:       [place] guidelines for Chronic Kidney Disease (CKD):     Stage 1 with normal or high GFR (GFR > 90 mL/min/1 73 square meters)    Stage 2 Mild CKD (GFR = 60-89 mL/min/1 73 square meters)    Stage 3A Moderate CKD (GFR = 45-59 mL/min/1 73 square meters)    Stage 3B Moderate CKD (GFR = 30-44 mL/min/1 73 square meters)    Stage 4 Severe CKD (GFR = 15-29 mL/min/1 73 square meters)    Stage 5 End Stage CKD (GFR <15 mL/min/1 73 square meters)  Note: GFR calculation is accurate only with a steady state creatinine    CBC and differential [[id number]]  (Abnormal) Collected:  09/25/20 1115    Lab Status:  Final result Specimen:  Blood from Arm, Right Updated:  09/25/20 1127     WBC 7 66 Thousand/uL      RBC 4 80 Million/uL      Hemoglobin 14 3 g/dL      Hematocrit 44 0 %      MCV 92 fL      MCH 29 8 pg      MCHC 32 5 g/dL      RDW 13 8 %      MPV 10 8 fL      Platelets 321 Thousands/uL      nRBC 0 /100 WBCs      Neutrophils Relative 77 %      Immat GRANS % 0 %      Lymphocytes Relative 15 %      Monocytes Relative 7 %      Eosinophils Relative 1 %      Basophils Relative 0 %      Neutrophils Absolute 5 83 Thousands/µL      Immature Grans Absolute 0 03 Thousand/uL      Lymphocytes Absolute 1 15 Thousands/µL      Monocytes Absolute 0 56 Thousand/µL      Eosinophils Absolute 0 07 Thousand/µL      Basophils Absolute 0 02 Thousands/µL                  CTA head and neck with and without contrast   Final Result by Carolyn Chong MD (09/25 9439)      1  No acute intracranial CT abnormality  2   Chronic lacunar infarcts and microangiopathic changes  3   Patent major vasculature of [place] without high-grade stenosis  4   No hemodynamically significant stenosis of cervical carotid and vertebral arteries  No dissection  5   Stable 1 5 mm infundibular widening at the junction of ACOM and A2 segment of right DAMIAN  Workstation performed: [id number]         X-ray chest 1 view portable   Final Result by Estrella Gil DO (09/25 1216)   Mild pulmonary vascular congestion        Workstation performed: [id number]                    Procedures  ECG 12 Lead Documentation Only    Date/Time: 9/25/2020 3:02 PM  Performed by: Crystal Ricketts MD  Authorized by: Shannan Leary MD     Indications / Diagnosis:  Dizziness  ECG reviewed by me, the ED Provider: yes    Patient location:  ED  Previous ECG:     Previous ECG:  Compared to current    Comparison ECG info:  11/14/2019    Similarity:  Changes noted    Comparison to cardiac monitor: Yes    Interpretation:     Interpretation: abnormal    Rate:     ECG rate:  67 BPM    ECG rate assessment: normal    Rhythm:     Rhythm: sinus rhythm    Ectopy:     Ectopy: none    QRS:     QRS axis:  Right  Conduction:     Conduction: normal    ST segments:     ST segments:  Non-specific  T waves:     T waves: normal    Comments:      No STEMI             ED Course  ED Course as of Sep 25 1610   Fri Sep 25, 2020   1150 wnl   NT-proBNP(!): 157   1243    IMPRESSION:  Mild pulmonary vascular congestion       X-ray chest 1 view portable   1353 IMPRESSION:     1  No acute intracranial CT abnormality      2  Chronic lacunar infarcts and microangiopathic changes      3   Patent major vasculature of [place] without high-grade stenosis         4  No hemodynamically significant stenosis of cervical carotid and vertebral arteries  No dissection      5   Stable 1 5 mm infundibular widening at the junction of ACOM and A2 segment of right DAMIAN  CTA head and neck with and without contrast   1546 Pt initially admitted, seen by inpatient team and now has decided he would no longer like to stay  SBIRT 20yo+      Most Recent Value   SBIRT (22 yo +)   In order to provide better care to our patients, we are screening all of our patients for alcohol and drug use  Would it be okay to ask you these screening questions? Yes Filed at: 09/25/2020 1025   Initial Alcohol Screen: US AUDIT-C    1  How often do you have a drink containing alcohol?  0 Filed at: 09/25/2020 1025   2  How many drinks containing alcohol do you have on a typical day you are drinking? 0 Filed at: 09/25/2020 1025   3a   Male UNDER 65: How often do you have five or more drinks on one occasion? 0 Filed at: 09/25/2020 1025   3b  FEMALE Any Age, or MALE 65+: How often do you have 4 or more drinks on one occassion? 0 Filed at: 09/25/2020 1025   Audit-C Score  0 Filed at: 09/25/2020 1025   MILA: How many times in the past year have you    Used an illegal drug or used a prescription medication for non-medical reasons? Never Filed at: 09/25/2020 1025                  MDM  Number of Diagnoses or Management Options  Accelerated hypertension:   Vertigo:   Diagnosis management comments: Pt with episodic room spinning sensation c/w vertigo  Work up laregly reassuring, initially more comfortable with admission versus discharge however changed mind  Improved symptoms over course of ED stay  Neuro intact  Safe for dispo with neuro f/u, meclizine and low threshold to RTER for worsening symptoms or any new concerns          Amount and/or Complexity of Data Reviewed  Clinical lab tests: ordered and reviewed  Tests in the radiology section of CPT®: ordered and reviewed  Tests in the medicine section of CPT®: ordered and reviewed  Review and summarize past medical records: yes  Discuss the patient with other providers: yes  Independent visualization of images, tracings, or specimens: yes    Risk of Complications, Morbidity, and/or Mortality  Presenting problems: moderate  Diagnostic procedures: moderate  Management options: moderate    Patient Progress  Patient progress: stable      Disposition  Final diagnoses:   Vertigo   Accelerated hypertension     Time reflects when diagnosis was documented in both MDM as applicable and the Disposition within this note     Time User Action Codes Description Comment    9/25/2020  2:26 PM Mulflur, Esmer Add [R42] Vertigo     9/25/2020  2:27 PM Mulflur, Esmer Add [I10] Hypertension     9/25/2020  2:27 PM Mulflur, Esmer Remove [I10] Hypertension     9/25/2020  2:27 PM Mulflur, Esmer Add [I10] Accelerated hypertension ED Disposition     ED Disposition Condition Date/Time Comment    Admit Stable Fri Sep 25, 2020 4726 Case was discussed with Baljinder Carpenter and the patient's admission status was agreed to be Admission Status: observation status to the service of Dr Antelmo Isbell   Follow-up Information    None         Patient's Medications   Discharge Prescriptions    No medications on file     No discharge procedures on file      PDMP Review     None          ED Provider  Electronically Signed by           Dillan Timmons MD  09/25/20 114 Brea Luis MD  09/25/20 0902

## 2020-09-25 NOTE — ASSESSMENT & PLAN NOTE
· Blood pressure noted to be elevated today in the 118 systolic range when [facility]  Also noted to be elevated today when checked on our [facility] with a very small cuff  This was actually the reason he presented to the hospital  · Repeat blood pressure done manually now is noted to be 138/93  · Patient not on any antihypertensive agents  He checks his blood pressure 2 to 3 times daily and reports it to be 429-762 systolic at home all the time  He had previously been on blood pressure agents but had a syncopal event due to hypotension and was taken off of them  · I advised patient to monitor his blood pressure closely over the weekend and record them  Explained red flag symptoms to watch for as far as stroke or heart attack  Noted that he would not need to return to the hospital if he has an elevated blood pressure reading in the 170 range again but without any symptoms    Perhaps he may need the addition of well a gentle antihypertensive agent in the future but would not suggest Lasix

## 2020-09-25 NOTE — ASSESSMENT & PLAN NOTE
· Patient presented with transient episode of vertigo associated with nausea which occurred in the middle of the night  Symptoms are already completely resolved and patient is independently ambulating  He did not require any intervention whatsoever    · CTA head and neck noted to be unremarkable for any acute findings, and patient is already on appropriate stroke prevention therapy  · Noted that patient did not have any other red flag symptoms consistent with vertebrobasilar insufficiency, is already on appropriate maximal therapy for stroke prevention, and symptoms were very self limited, therefore I would not proceed with MRI of the brain  · Recommend conservative management with low dose meclizine and physical therapy IF symptoms recur

## 2020-09-25 NOTE — ASSESSMENT & PLAN NOTE
· Known history of non-STEMI with associated ischemic cardiomyopathy, with resolution as per last echocardiogram  · Continue routine cardiology follow-up  · Continue aspirin, Plavix and statin  · No recent cardiac symptoms

## 2020-09-25 NOTE — ASSESSMENT & PLAN NOTE
· Evident again on CTA done today  · Continue stroke prevention measures including aspirin, Plavix and statin

## 2020-09-25 NOTE — CONSULTS
Consult- Kelsey Carbajal  [birth year], 77 y o  male MRN: [id number]    Unit/Bed#: ED 18 Encounter: [id number]    Primary Care Provider: Jcarlos Last DO   Date and time admitted to hospital: 9/25/2020 10:34 AM  Consult and discharge from ER    * Vertigo  Assessment & Plan  · Patient presented with transient episode of vertigo associated with nausea which occurred in the middle of the night  Symptoms are already completely resolved and patient is independently ambulating  He did not require any intervention whatsoever  · CTA head and neck noted to be unremarkable for any acute findings, and patient is already on appropriate stroke prevention therapy  · Noted that patient did not have any other red flag symptoms consistent with vertebrobasilar insufficiency, is already on appropriate maximal therapy for stroke prevention, and symptoms were very self limited, therefore I would not proceed with MRI of the brain  · Recommend conservative management with low dose meclizine and physical therapy IF symptoms recur    Hypertension  Assessment & Plan  · Blood pressure noted to be elevated today in the 206 systolic range when patient checked at home and at [facility]  Also noted to be elevated today when checked on our [facility] with a very small cuff  This was actually the reason he presented to the hospital  · Repeat blood pressure done manually now is noted to be 138/93  · Patient not on any antihypertensive agents  He checks his blood pressure 2 to 3 times daily and reports it to be 799-635 systolic at home all the time  He had previously been on blood pressure agents but had a syncopal event due to hypotension and was taken off of them  · I advised patient to monitor his blood pressure closely over the weekend and record them  Explained red flag symptoms to watch for as far as stroke or heart attack    Noted that he would not need to return to the hospital if he has an elevated blood pressure reading in the 170 range again but without any symptoms  Perhaps he may need the addition of well a gentle antihypertensive agent in the future but would not suggest Lasix    History of lacunar cerebrovascular accident (CVA)  Assessment & Plan  · Evident again on CTA done today  · Continue stroke prevention measures including aspirin, Plavix and statin    Coronary artery disease involving native coronary artery of native heart with angina pectoris (HCC)  Assessment & Plan  · Known history of non-STEMI with associated ischemic cardiomyopathy, with resolution as per last echocardiogram  · Continue routine cardiology follow-up  · Continue aspirin, Plavix and statin  · No recent cardiac symptoms    Recommendations for Discharge:  Patient would like to go home as he is feeling completely back to normal   I agree that this is reasonable and daughter at bedside also agrees  Spoke with ER attending and my attending    Counseling / Coordination of Care Time: 1 hour  Greater than 50% of total time spent on patient counseling and coordination of care  Collaboration of Care: Were Recommendations Directly Discussed with Primary Treatment Team? - Yes     History of Present Illness:    Hima Mcneil  is a 77 y o  male who originally presented to the ER with complaints of elevated blood pressure higher than his normal as well as a transient episode of dizziness described as room spinning when he turned over in bed  This occurred in the middle of the night but he did not notify his daughter of the episode of dizziness until she got home from night shift the next morning  Again, his biggest concern to me was the fact that his blood pressure readings were noted to be higher than usual both confirmed at home and at his local pharmacy  These were noted to be in the 851 systolic range  He did not have any stroke-like symptoms noted by daughter nor did he have any chest pain or shortness of breath    He has not had prior history of vertigo but reports at this time the symptoms of dizziness are completely resolved and he is back to baseline  He is ambulating independently multiple times while in the emergency department  He could not identify any other explanation for why his blood pressure was higher than normal today, but reiterates on several occasions that he feels completely normal now and "could walk several miles "    Review of Systems:    Review of Systems   Constitutional: Negative for activity change, appetite change, chills, diaphoresis, fatigue, fever and unexpected weight change  HENT: Negative for trouble swallowing  Eyes: Negative for photophobia and visual disturbance  No double vision   Respiratory: Negative for apnea, cough, chest tightness, shortness of breath and wheezing  Cardiovascular: Negative for chest pain, palpitations and leg swelling  Gastrointestinal: Positive for nausea  Negative for abdominal distention, abdominal pain, blood in stool, constipation, diarrhea, rectal pain and vomiting  Genitourinary: Negative for decreased urine volume, difficulty urinating and dysuria  Musculoskeletal: Negative for arthralgias, back pain, gait problem, joint swelling, myalgias and neck pain  Skin: Negative for color change, pallor, rash and wound  Neurological: Positive for dizziness and light-headedness  Negative for tremors, seizures, syncope, facial asymmetry, speech difficulty, weakness, numbness and headaches  Psychiatric/Behavioral: Negative for agitation and confusion         Past Medical and Surgical History:     Past Medical History:   Diagnosis Date    Ischemic cardiomyopathy     [facility]ar infarction ([facility] )     Near syncope 11/14/2019    Non-ST elevated myocardial infarction [facility])     NSTEMI (non-ST elevated myocardial infarction) ([facility] ) 10/20/2019    Pericarditis 10/24/2019    Poison ivy        Past Surgical History:   Procedure Laterality Date    CORONARY STENT PLACEMENT Meds/Allergies:    all medications and allergies reviewed    Allergies: No Known Allergies    Social History:     Marital Status:   Lives with daughter who is at bedside  Ambulates independently without use of walker  No dietary restrictions    Substance Use History:   Social History     Substance and Sexual Activity   Alcohol Use Not Currently     Social History     Tobacco Use   Smoking Status Former Smoker    Packs/day: 1 00    Years: 15 00    Pack years: 15 00    Types: Cigarettes    Last attempt to quit: 1969    Years since quittin 8   Smokeless Tobacco Never Used     Social History     Substance and Sexual Activity   Drug Use No       Family History:    non-contributory    Physical Exam:     Vitals:   Blood Pressure: 139/93 (20 1526)  Pulse: 72 (20 1526)  Temperature: 97 8 °F (36 6 °C) (20 1016)  Temp Source: Oral (20 1016)  Respirations: 16 (20 1526)  Height: 5' 8" (172 7 cm) (20 1016)  Weight - Scale: 63 5 kg (140 lb) (20 1016)  SpO2: 99 % (20 1526)    Physical Exam  Vitals signs reviewed  Constitutional:       General: He is not in acute distress  Appearance: Normal appearance  He is normal weight  He is not ill-appearing, toxic-appearing or diaphoretic  Comments: Somewhat thin but overall well-appearing gentleman in no distress   HENT:      Head: Normocephalic and atraumatic  Nose: No rhinorrhea  Eyes:      General: No scleral icterus  Right eye: No discharge  Left eye: No discharge  Extraocular Movements: Extraocular movements intact  Conjunctiva/sclera: Conjunctivae normal    Neck:      Musculoskeletal: Normal range of motion and neck supple  Cardiovascular:      Rate and Rhythm: Normal rate and regular rhythm  Heart sounds: No murmur  Pulmonary:      Effort: Pulmonary effort is normal  No respiratory distress  Breath sounds: No stridor  No wheezing, rhonchi or rales  Chest:      Chest wall: No tenderness  Abdominal:      General: Bowel sounds are normal  There is no distension  Palpations: Abdomen is soft  Tenderness: There is no abdominal tenderness  There is no guarding  Musculoskeletal:         General: No swelling, tenderness, deformity or signs of injury  Right lower leg: No edema  Left lower leg: No edema  Skin:     General: Skin is warm and dry  Coloration: Skin is not jaundiced or pale  Findings: No bruising, erythema, lesion or rash  Neurological:      General: No focal deficit present  Mental Status: He is alert  Comments: Awake alert interactive  No tremor  No dysarthria  No aphasia   strength full in bilateral upper extremities  Strength full in bilateral lower extremities  no nystagmus  Patient ambulating completely independently without any difficulty   Psychiatric:      Comments: Flat affect, but pleasant and cooperative       Additional Data:     Lab Results: I have personally reviewed pertinent reports  Results from last 7 days   Lab Units 09/25/20  1115   WBC Thousand/uL 7 66   HEMOGLOBIN g/dL 14 3   HEMATOCRIT % 44 0   PLATELETS Thousands/uL 158   NEUTROS PCT % 77*   LYMPHS PCT % 15   MONOS PCT % 7   EOS PCT % 1     Results from last 7 days   Lab Units 09/25/20  1115   SODIUM mmol/L 143   POTASSIUM mmol/L 4 7   CHLORIDE mmol/L 106   CO2 mmol/L 28   BUN mg/dL 14   CREATININE mg/dL 0 96   ANION GAP mmol/L 9   CALCIUM mg/dL 8 8   GLUCOSE RANDOM mg/dL 97         Results from last 7 days   Lab Units 09/25/20  1115   TROPONIN I ng/mL <0 02     Lab Results   Component Value Date/Time    HGBA1C 5 6 11/15/2019 05:31 AM    HGBA1C 5 7 10/21/2019 06:38 AM               Imaging: I have personally reviewed pertinent reports  CTA head and neck with and without contrast   Final Result by Tristan Milton MD (09/25 9517)      1  No acute intracranial CT abnormality        2   Chronic lacunar infarcts and microangiopathic changes  3   Patent major vasculature of [place] of [place] without high-grade stenosis  4   No hemodynamically significant stenosis of cervical carotid and vertebral arteries  No dissection  5   Stable 1 5 mm infundibular widening at the junction of ACOM and A2 segment of right DAMIAN  Workstation performed: [id number]         X-ray chest 1 view portable   Final Result by Alicia Griffith DO (09/25 1216)   Mild pulmonary vascular congestion  Workstation performed: [id number]             EKG, Pathology, and Other Studies Reviewed on Admission:   · EKG:  Normal sinus rhythm with no abnormalities noted    ** Please Note: This note has been constructed using a voice recognition system   **

## 2020-09-30 ENCOUNTER — OFFICE VISIT (OUTPATIENT)
Dept: INTERNAL MEDICINE CLINIC | Facility: CLINIC | Age: 75
End: 2020-09-30
Payer: COMMERCIAL

## 2020-09-30 VITALS
TEMPERATURE: 97.4 F | DIASTOLIC BLOOD PRESSURE: 84 MMHG | BODY MASS INDEX: 21.61 KG/M2 | HEIGHT: 68 IN | WEIGHT: 142.6 LBS | RESPIRATION RATE: 16 BRPM | HEART RATE: 82 BPM | SYSTOLIC BLOOD PRESSURE: 144 MMHG

## 2020-09-30 DIAGNOSIS — I10 HYPERTENSION, UNSPECIFIED TYPE: ICD-10-CM

## 2020-09-30 DIAGNOSIS — Z12.11 SCREENING FOR COLON CANCER: Primary | ICD-10-CM

## 2020-09-30 DIAGNOSIS — I25.119 CORONARY ARTERY DISEASE INVOLVING NATIVE CORONARY ARTERY OF NATIVE HEART WITH ANGINA PECTORIS (HCC): ICD-10-CM

## 2020-09-30 DIAGNOSIS — K40.90 NON-RECURRENT UNILATERAL INGUINAL HERNIA WITHOUT OBSTRUCTION OR GANGRENE: Chronic | ICD-10-CM

## 2020-09-30 PROCEDURE — 99214 OFFICE O/P EST MOD 30 MIN: CPT | Performed by: PHYSICIAN ASSISTANT

## 2020-09-30 PROCEDURE — 3079F DIAST BP 80-89 MM HG: CPT | Performed by: PHYSICIAN ASSISTANT

## 2020-09-30 RX ORDER — LISINOPRIL 2.5 MG/1
2.5 TABLET ORAL DAILY
Qty: 30 TABLET | Refills: 5 | Status: SHIPPED | OUTPATIENT
Start: 2020-09-30 | End: 2020-10-19 | Stop reason: SDUPTHER

## 2020-09-30 NOTE — PROGRESS NOTES
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 (HCC)    Hypertension, unspecified type  -     lisinopril (ZESTRIL) 2 5 mg tablet; Take 1 tablet (2 5 mg total) by mouth daily    Non-recurrent unilateral inguinal hernia without obstruction or gangrene        No problem-specific Assessment & Plan notes found for this encounter  Subjective:      Patient ID: Heide Meckel  is a 77 y o  male  he is here for follow-up  Sudden onset of severe spinning dizziness 5 days ago  He was worked up in the ER and treated with meclizine for vertigo  He has had no further vertigo and he has not required any meclizine  His blood pressure was a bit elevated in the ER and he was advised to monitor his blood pressure  He has been taking his blood pressure multiple times per day and the readings are quite labile  Between 055 and 137 systolic 03-69 diastolic  He was on a small dose of Lasix but he stopped this 4 days ago  He feels slightly lightheaded and  Headachy today  He has a previous history of chest pain MI catheterization intervention he was hypotensive on metoprolol  Initially had some cardiomyopathy which improved ejection fraction now about 50%     He has an ongoing problem with a large inguinal hernia which she plans to have repaired in the future  Currently no shortness of breath chest pain palpitations    His appetite has been good he has been sleeping okay      The following portions of the patient's history were reviewed and updated as appropriate:   He has a past medical history of Ischemic cardiomyopathy, Lacunar infarction ([facility] ), Near syncope (11/14/2019), Non-ST elevated myocardial infarction [facility]), NSTEMI (non-ST elevated myocardial infarction) ([facility] ) (10/20/2019), Pericarditis (10/24/2019), and Poison ivy ,  does not have any pertinent problems on file  ,   has a past surgical history that includes Coronary stent placement  ,  family history includes Emphysema in his father and paternal grandfather; Heart attack in his maternal grandfather; Heart disease in his maternal grandfather; No Known Problems in his mother  ,   reports that he quit smoking about 50 years ago  His smoking use included cigarettes  He has a 15 00 pack-year smoking history  He has never used smokeless tobacco  He reports previous alcohol use  He reports that he does not use drugs  ,  has No Known Allergies     Current Outpatient Medications   Medication Sig Dispense Refill    aspirin (ECOTRIN LOW STRENGTH) 81 mg EC tablet Take 1 tablet (81 mg total) by mouth daily Please purchase over the counter at her local pharmacy 30 tablet 1    atorvastatin (LIPITOR) 80 mg tablet Take 1 tablet (80 mg total) by mouth daily with dinner 90 tablet 3    Blood Pressure KIT by Does not apply route daily 1 each 0    clopidogrel (PLAVIX) 75 mg tablet Take 1 tablet (75 mg total) by mouth daily 90 tablet 3    meclizine (ANTIVERT) 25 mg tablet Take 0 5 tablets (12 5 mg total) by mouth 3 (three) times a day as needed for dizziness 30 tablet 0    lisinopril (ZESTRIL) 2 5 mg tablet Take 1 tablet (2 5 mg total) by mouth daily 30 tablet 5     No current facility-administered medications for this visit  Review of Systems   Constitutional: Negative for activity change, appetite change, chills, diaphoresis, fatigue, fever and unexpected weight change     HENT: Negative for congestion, dental problem, drooling, ear discharge, ear pain, facial swelling, hearing loss, nosebleeds, postnasal drip, rhinorrhea, sinus pressure, sinus pain, sneezing, sore throat, tinnitus, trouble swallowing and voice change  Eyes: Negative for photophobia, pain, discharge, redness, itching and visual disturbance  Respiratory: Negative for apnea, cough, choking, chest tightness, shortness of breath, wheezing and stridor  Cardiovascular: Negative for chest pain, palpitations and leg swelling  Gastrointestinal: Negative for abdominal distention, abdominal pain, anal bleeding, blood in stool, constipation, diarrhea, nausea, rectal pain and vomiting  Endocrine: Negative for cold intolerance, heat intolerance, polydipsia, polyphagia and polyuria  Genitourinary: Negative for decreased urine volume, difficulty urinating, dysuria, enuresis, flank pain, frequency, genital sores, hematuria and urgency  Musculoskeletal: Negative for arthralgias, back pain, gait problem, joint swelling, myalgias, neck pain and neck stiffness  Skin: Negative for color change, pallor, rash and wound  Neurological: Positive for light-headedness  Negative for dizziness, tremors, seizures, syncope, facial asymmetry, speech difficulty, weakness, numbness and headaches  Hematological: Negative for adenopathy  Does not bruise/bleed easily  Psychiatric/Behavioral: Negative for agitation, behavioral problems, confusion, decreased concentration, dysphoric mood, hallucinations, self-injury, sleep disturbance and suicidal ideas  The patient is not nervous/anxious and is not hyperactive  Objective:  Vitals:    09/30/20 1240   BP: 144/84   BP Location: Left arm   Patient Position: Sitting   Cuff Size: Standard   Pulse: 82   Resp: 16   Temp: (!) 97 4 °F (36 3 °C)   TempSrc: Temporal   Weight: 64 7 kg (142 lb 9 6 oz)   Height: 5' 8" (1 727 m)     Body mass index is 21 68 kg/m²  Physical Exam  Vitals signs reviewed  Constitutional:       Appearance: He is well-developed  HENT:      Head: Normocephalic        Right Ear: Tympanic membrane and external ear normal  Left Ear: Tympanic membrane and external ear normal       Nose: Nose normal       Mouth/Throat:      Mouth: Mucous membranes are moist    Eyes:      Conjunctiva/sclera: Conjunctivae normal       Pupils: Pupils are equal, round, and reactive to light  Neck:      Musculoskeletal: Normal range of motion and neck supple  Thyroid: No thyromegaly  Cardiovascular:      Rate and Rhythm: Normal rate and regular rhythm  Pulses: Normal pulses  Heart sounds: Normal heart sounds  No murmur  Pulmonary:      Effort: Pulmonary effort is normal  No respiratory distress  Breath sounds: Normal breath sounds  No wheezing or rales  Abdominal:      General: Bowel sounds are normal       Palpations: Abdomen is soft  Hernia: A hernia ( large inguinal hernia non reducible) is present  Musculoskeletal: Normal range of motion  General: No swelling, tenderness, deformity or signs of injury  Right lower leg: No edema  Left lower leg: No edema  Skin:     General: Skin is warm and dry  Neurological:      General: No focal deficit present  Mental Status: He is alert and oriented to person, place, and time  Cranial Nerves: No cranial nerve deficit  Motor: No weakness  Gait: Gait normal       Deep Tendon Reflexes: Reflexes are normal and symmetric  Psychiatric:         Mood and Affect: Mood is anxious  Behavior: Behavior normal          Thought Content:  Thought content normal          Judgment: Judgment normal

## 2020-10-08 ENCOUNTER — TELEPHONE (OUTPATIENT)
Dept: GASTROENTEROLOGY | Facility: CLINIC | Age: 75
End: 2020-10-08

## 2020-10-19 ENCOUNTER — OFFICE VISIT (OUTPATIENT)
Dept: CARDIOLOGY CLINIC | Facility: CLINIC | Age: 75
End: 2020-10-19
Payer: COMMERCIAL

## 2020-10-19 VITALS
WEIGHT: 147 LBS | SYSTOLIC BLOOD PRESSURE: 148 MMHG | DIASTOLIC BLOOD PRESSURE: 72 MMHG | OXYGEN SATURATION: 99 % | RESPIRATION RATE: 18 BRPM | BODY MASS INDEX: 22.28 KG/M2 | HEART RATE: 73 BPM | HEIGHT: 68 IN

## 2020-10-19 DIAGNOSIS — I10 HYPERTENSION, UNSPECIFIED TYPE: ICD-10-CM

## 2020-10-19 PROCEDURE — 99214 OFFICE O/P EST MOD 30 MIN: CPT | Performed by: INTERNAL MEDICINE

## 2020-10-19 PROCEDURE — 1036F TOBACCO NON-USER: CPT | Performed by: INTERNAL MEDICINE

## 2020-10-19 PROCEDURE — 1160F RVW MEDS BY RX/DR IN RCRD: CPT | Performed by: INTERNAL MEDICINE

## 2020-10-19 RX ORDER — LISINOPRIL 2.5 MG/1
2.5 TABLET ORAL DAILY PRN
Qty: 30 TABLET | Refills: 5
Start: 2020-10-19 | End: 2021-04-05 | Stop reason: ALTCHOICE

## 2020-12-02 DIAGNOSIS — I21.4 NSTEMI (NON-ST ELEVATED MYOCARDIAL INFARCTION) (HCC): ICD-10-CM

## 2020-12-02 RX ORDER — ATORVASTATIN CALCIUM 80 MG/1
80 TABLET, FILM COATED ORAL
Qty: 90 TABLET | Refills: 3 | Status: SHIPPED | OUTPATIENT
Start: 2020-12-02 | End: 2020-12-21 | Stop reason: SDUPTHER

## 2020-12-21 DIAGNOSIS — I21.4 NSTEMI (NON-ST ELEVATED MYOCARDIAL INFARCTION) (HCC): ICD-10-CM

## 2020-12-21 RX ORDER — ATORVASTATIN CALCIUM 80 MG/1
80 TABLET, FILM COATED ORAL
Qty: 90 TABLET | Refills: 3 | Status: SHIPPED | OUTPATIENT
Start: 2020-12-21 | End: 2021-10-14 | Stop reason: SDUPTHER

## 2021-01-29 ENCOUNTER — LAB (OUTPATIENT)
Dept: LAB | Facility: CLINIC | Age: 76
End: 2021-01-29
Payer: COMMERCIAL

## 2021-01-29 DIAGNOSIS — I25.119 CORONARY ARTERY DISEASE INVOLVING NATIVE CORONARY ARTERY OF NATIVE HEART WITH ANGINA PECTORIS (HCC): ICD-10-CM

## 2021-01-29 DIAGNOSIS — Z11.59 NEED FOR HEPATITIS C SCREENING TEST: ICD-10-CM

## 2021-01-29 LAB
ANION GAP SERPL CALCULATED.3IONS-SCNC: 3 MMOL/L (ref 4–13)
BUN SERPL-MCNC: 17 MG/DL (ref 5–25)
CALCIUM SERPL-MCNC: 9.1 MG/DL (ref 8.3–10.1)
CHLORIDE SERPL-SCNC: 108 MMOL/L (ref 100–108)
CHOLEST SERPL-MCNC: 117 MG/DL (ref 50–200)
CO2 SERPL-SCNC: 30 MMOL/L (ref 21–32)
CREAT SERPL-MCNC: 1.04 MG/DL (ref 0.6–1.3)
ERYTHROCYTE [DISTWIDTH] IN BLOOD BY AUTOMATED COUNT: 13.4 % (ref 11.6–15.1)
GFR SERPL CREATININE-BSD FRML MDRD: 70 ML/MIN/1.73SQ M
GLUCOSE P FAST SERPL-MCNC: 98 MG/DL (ref 65–99)
HCT VFR BLD AUTO: 48 % (ref 36.5–49.3)
HCV AB SER QL: NORMAL
HDLC SERPL-MCNC: 49 MG/DL
HGB BLD-MCNC: 15.2 G/DL (ref 12–17)
LDLC SERPL CALC-MCNC: 49 MG/DL (ref 0–100)
MCH RBC QN AUTO: 28.7 PG (ref 26.8–34.3)
MCHC RBC AUTO-ENTMCNC: 31.7 G/DL (ref 31.4–37.4)
MCV RBC AUTO: 91 FL (ref 82–98)
NONHDLC SERPL-MCNC: 68 MG/DL
PLATELET # BLD AUTO: 175 THOUSANDS/UL (ref 149–390)
PMV BLD AUTO: 11.2 FL (ref 8.9–12.7)
POTASSIUM SERPL-SCNC: 4.2 MMOL/L (ref 3.5–5.3)
RBC # BLD AUTO: 5.29 MILLION/UL (ref 3.88–5.62)
SODIUM SERPL-SCNC: 141 MMOL/L (ref 136–145)
TRIGL SERPL-MCNC: 96 MG/DL
WBC # BLD AUTO: 7.14 THOUSAND/UL (ref 4.31–10.16)

## 2021-01-29 PROCEDURE — 36415 COLL VENOUS BLD VENIPUNCTURE: CPT

## 2021-01-29 PROCEDURE — 80048 BASIC METABOLIC PNL TOTAL CA: CPT

## 2021-01-29 PROCEDURE — 86803 HEPATITIS C AB TEST: CPT

## 2021-01-29 PROCEDURE — 85027 COMPLETE CBC AUTOMATED: CPT

## 2021-01-29 PROCEDURE — 80061 LIPID PANEL: CPT

## 2021-03-08 ENCOUNTER — OFFICE VISIT (OUTPATIENT)
Dept: INTERNAL MEDICINE CLINIC | Facility: CLINIC | Age: 76
End: 2021-03-08
Payer: COMMERCIAL

## 2021-03-08 VITALS
TEMPERATURE: 97.5 F | DIASTOLIC BLOOD PRESSURE: 82 MMHG | BODY MASS INDEX: 22.79 KG/M2 | OXYGEN SATURATION: 97 % | SYSTOLIC BLOOD PRESSURE: 140 MMHG | WEIGHT: 150.4 LBS | HEART RATE: 76 BPM | HEIGHT: 68 IN

## 2021-03-08 DIAGNOSIS — I10 ESSENTIAL HYPERTENSION: Primary | Chronic | ICD-10-CM

## 2021-03-08 DIAGNOSIS — I25.10 CAD IN NATIVE ARTERY: Chronic | ICD-10-CM

## 2021-03-08 DIAGNOSIS — K40.90 NON-RECURRENT UNILATERAL INGUINAL HERNIA WITHOUT OBSTRUCTION OR GANGRENE: Chronic | ICD-10-CM

## 2021-03-08 PROBLEM — R42 VERTIGO: Status: RESOLVED | Noted: 2020-09-25 | Resolved: 2021-03-08

## 2021-03-08 PROCEDURE — 3725F SCREEN DEPRESSION PERFORMED: CPT | Performed by: INTERNAL MEDICINE

## 2021-03-08 PROCEDURE — 99214 OFFICE O/P EST MOD 30 MIN: CPT | Performed by: INTERNAL MEDICINE

## 2021-03-08 NOTE — PATIENT INSTRUCTIONS
Inguinal Hernia   WHAT YOU NEED TO KNOW:   An inguinal hernia happens when organs or abdominal tissue push through a weak spot in the abdominal wall  The abdominal wall is made of fat and muscle  It holds the intestines in place  The hernia may contain fluid, tissue from the abdomen, or part of an organ (such as an intestine)  DISCHARGE INSTRUCTIONS:   Return to the emergency department if:   · You have severe abdominal pain with nausea and vomiting  · Your abdomen is larger than usual      · Your hernia gets bigger or is purple or blue  · You see blood in your bowel movements  · You feel weak, dizzy, or faint  Contact your healthcare provider if:   · You have a fever  · You have questions or concerns about your condition or care  Medicine: You may  need the following:  · NSAIDs , such as ibuprofen, help decrease swelling, pain, and fever  NSAIDs can cause stomach bleeding or kidney problems in certain people  If you take blood thinner medicine, always ask your healthcare provider if NSAIDs are safe for you  Always read the medicine label and follow directions  · Take your medicine as directed  Contact your healthcare provider if you think your medicine is not helping or if you have side effects  Tell him or her if you are allergic to any medicine  Keep a list of the medicines, vitamins, and herbs you take  Include the amounts, and when and why you take them  Bring the list or the pill bottles to follow-up visits  Carry your medicine list with you in case of an emergency  Follow up with your healthcare provider as directed:  Write down your questions so you remember to ask them during your visits  Manage your symptoms and prevent another hernia:   · Do not lift anything heavy  Heavy lifting can make your hernia worse or cause another hernia  Ask your healthcare provider how much is safe for you to lift  · Drink liquids as directed    Liquids may prevent constipation and straining during a bowel movement  Ask how much liquid to drink each day and which liquids are best for you  · Eat foods high in fiber  Fiber may prevent constipation and straining during a bowel movement  Foods that contain fiber include fruits, vegetables, beans, lentils, and whole grains  · Maintain a healthy weight  If you are overweight, weight loss may prevent your hernia from getting worse  It may also prevent another hernia  Talk to your healthcare provider about exercise and how to lose weight safely if you are overweight  · Do not smoke  Nicotine and other chemicals in cigarettes and cigars can weaken the abdominal wall  This may increase your risk for another hernia  Ask your healthcare provider for information if you currently smoke and need help to quit  E-cigarettes or smokeless tobacco still contain nicotine  Talk to your healthcare provider before you use these products  © Copyright [address] Information is for End User's use only and may not be sold, redistributed or otherwise used for commercial purposes  All illustrations and images included in CareNotes® are the copyrighted property of A D A M [employer] Inc  or [address] [place], [place] [address]mary   The above information is an  only  It is not intended as medical advice for individual conditions or treatments  Talk to your doctor, nurse or pharmacist before following any medical regimen to see if it is safe and effective for you

## 2021-03-08 NOTE — PROGRESS NOTES
[place]    NAME: Masood Cordero  AGE: 77 y o  SEX: male  : 1945     DATE: 3/8/2021     Assessment and Plan:     1  Essential hypertension    Continue lisinopril as needed if SBP >150  Monitor BP at home  Follow heart healthy diet  - Lipid panel; Future  - Basic metabolic panel; Future    2  CAD in native artery    His EF has recovered  It has been greater then 1 year since he had PCI to the dLAD and PTCA to D1  He is off plavix  Continue aspirin 81mg along with high intensity statin  3  Non-recurrent unilateral inguinal hernia without obstruction or gangrene    Further evaluation by general surgery is recommended to consider hernia repair     - Ambulatory referral to General Surgery; Future          Return in about 7 months (around 10/8/2021) for Follow-up  Chief Complaint:     Chief Complaint   Patient presents with    Follow-up     6 months         History of Present Illness:     Patient with CAD s/p stenting in the past  He is >1 year since PCI and is not off plavix  Had underlying ischemic CMP, but his EF has recovered  He is interested in getting COVID-19 vaccine  Hasn't been taking lisinopril because he feels very dizzy when he takes it  Has a large left inguinal hernia that he wants to get repaired  Review of Systems:     Review of Systems   Constitutional: Negative for activity change, appetite change and fatigue  Respiratory: Negative for apnea, cough, chest tightness, shortness of breath and wheezing  Cardiovascular: Negative for chest pain, palpitations and leg swelling  Gastrointestinal: Negative for abdominal distention, abdominal pain, blood in stool, constipation, diarrhea, nausea and vomiting  Left inguinal hernia   Neurological: Positive for dizziness  Negative for weakness, numbness and headaches     Psychiatric/Behavioral: Negative for behavioral problems, confusion, hallucinations, sleep disturbance and suicidal ideas  The patient is not nervous/anxious  Objective:     /82 (BP Location: Left arm, Patient Position: Sitting, Cuff Size: Standard) Comment: gdf  Pulse 76   Temp 97 5 °F (36 4 °C) (Tympanic) Comment: no nsaids  Ht 5' 8" (1 727 m)   Wt 68 2 kg (150 lb 6 4 oz)   SpO2 97%   BMI 22 87 kg/m²     Physical Exam  Vitals signs reviewed  Constitutional:       General: He is not in acute distress  Appearance: He is well-developed  He is not diaphoretic  Neck:      Musculoskeletal: Neck supple  Thyroid: No thyromegaly  Vascular: No JVD  Cardiovascular:      Rate and Rhythm: Normal rate and regular rhythm  Heart sounds: Normal heart sounds  No murmur  Pulmonary:      Effort: Pulmonary effort is normal  No respiratory distress  Breath sounds: Normal breath sounds  No wheezing or rales  Abdominal:      General: Bowel sounds are normal  There is no distension  Palpations: Abdomen is soft  Tenderness: There is no abdominal tenderness  Hernia: A hernia (large left inguinal hernia) is present  Musculoskeletal:      Right lower leg: No edema  Left lower leg: No edema  Lymphadenopathy:      Cervical: No cervical adenopathy  Skin:     General: Skin is warm and dry  Neurological:      Mental Status: He is alert     Psychiatric:         Mood and Affect: Mood normal          Behavior: Behavior normal        Shan Xiong   MEDICAL [id number]

## 2021-03-30 ENCOUNTER — TELEPHONE (OUTPATIENT)
Dept: SURGERY | Facility: CLINIC | Age: 76
End: 2021-03-30

## 2021-03-30 DIAGNOSIS — Z23 ENCOUNTER FOR IMMUNIZATION: ICD-10-CM

## 2021-03-30 NOTE — TELEPHONE ENCOUNTER
NUMBER " NOT ACCEPTING CALLS AT [address]" [phone]  UNABLE TO REACH PATIENT TO GET PHONE NUMBER TO INSURANCE TO CHECK ELIGIBILITY & BENEFITS  CARD WAS SCANNED RECENTLY ON 1/29/21  HOWEVER, BACK OF CARD WAS NOT SCANNED  I see it is [employer] - we do par with local bc     Ppo= no referral    Called second number [phone] and left msg to call us with info

## 2021-03-31 ENCOUNTER — CONSULT (OUTPATIENT)
Dept: SURGERY | Facility: CLINIC | Age: 76
End: 2021-03-31
Payer: COMMERCIAL

## 2021-03-31 VITALS
WEIGHT: 150.4 LBS | HEART RATE: 80 BPM | TEMPERATURE: 97.4 F | HEIGHT: 68 IN | BODY MASS INDEX: 22.79 KG/M2 | DIASTOLIC BLOOD PRESSURE: 74 MMHG | SYSTOLIC BLOOD PRESSURE: 130 MMHG

## 2021-03-31 DIAGNOSIS — K40.90 NON-RECURRENT UNILATERAL INGUINAL HERNIA WITHOUT OBSTRUCTION OR GANGRENE: Chronic | ICD-10-CM

## 2021-03-31 PROCEDURE — 3075F SYST BP GE 130 - 139MM HG: CPT | Performed by: SURGERY

## 2021-03-31 PROCEDURE — 1160F RVW MEDS BY RX/DR IN RCRD: CPT | Performed by: SURGERY

## 2021-03-31 PROCEDURE — 1036F TOBACCO NON-USER: CPT | Performed by: SURGERY

## 2021-03-31 PROCEDURE — 3008F BODY MASS INDEX DOCD: CPT | Performed by: SURGERY

## 2021-03-31 PROCEDURE — 3078F DIAST BP <80 MM HG: CPT | Performed by: SURGERY

## 2021-03-31 PROCEDURE — 99203 OFFICE O/P NEW LOW 30 MIN: CPT | Performed by: SURGERY

## 2021-03-31 RX ORDER — SODIUM CHLORIDE, SODIUM LACTATE, POTASSIUM CHLORIDE, CALCIUM CHLORIDE 600; 310; 30; 20 MG/100ML; MG/100ML; MG/100ML; MG/100ML
125 INJECTION, SOLUTION INTRAVENOUS
Status: CANCELLED | OUTPATIENT
Start: 2021-04-01 | End: 2021-04-02

## 2021-03-31 RX ORDER — HEPARIN SODIUM 5000 [USP'U]/ML
5000 INJECTION, SOLUTION INTRAVENOUS; SUBCUTANEOUS ONCE
Status: CANCELLED | OUTPATIENT
Start: 2021-03-31 | End: 2021-03-31

## 2021-03-31 NOTE — PROGRESS NOTES
Consult- General Surgery   Leda Fleischer  77 y o  male MRN: [id number]  Unit/Bed#:  Encounter: [id number]    Assessment/Plan     Assessment:   LARGE LEFT SCROTAL HERNIA  History of MI, status post stent placement 2019   History of CVA  Plan:   in light of the size and symptomatology I advised the patient to undergo open repair of left inguinal hernia with mesh in the near future  I discussed the operative procedure, risks, benefits, alternatives and possible complications with patient, he understood and agreed to proceed  He will need cardiac clearance prior to surgery  History of Present Illness     HPI:  Leda Fleischer  is a 77 y o  male who presents to my office for evaluation of left inguinal hernia  The patient has had left inguinal hernia  For many years, has increased in size and causing discomfort with straining and also complains of change in bowel habits  He denies any urinary symptoms except frequency  He denied having any nausea, vomiting or any other constitutional symptoms  He went to see his primary care physician and he was referred to us for surgical evaluation  Review of Systems   Constitutional: Negative for chills and fever  HENT: Negative for nosebleeds and sore throat  Eyes: Negative for pain and discharge  Respiratory: Negative for cough and shortness of breath  Cardiovascular: Negative for chest pain and palpitations  Gastrointestinal:        As per history of present illness  Endocrine: Negative for cold intolerance and heat intolerance  Genitourinary: Positive for frequency  Negative for dysuria and hematuria  Neurological: Negative for seizures and headaches  Hematological: Negative for adenopathy  Does not bruise/bleed easily  Psychiatric/Behavioral: Negative for confusion  The patient is not nervous/anxious          Historical Information   Past Medical History:   Diagnosis Date    Ischemic cardiomyopathy     Lacunar infarction ([facility] [address] )     Near syncope 2019    Non-ST elevated [facility] infarction [facility])     NSTEMI (non-ST elevated myocardial infarction) ([facility] [address] ) 10/20/2019    Pericarditis 10/24/2019    Poison ivy      Past Surgical History:   Procedure Laterality Date    CORONARY STENT PLACEMENT       Social History   Social History     Substance and Sexual Activity   Alcohol Use Not Currently     Social History     Substance and Sexual Activity   Drug Use No     Social History     Tobacco Use   Smoking Status Former Smoker    Packs/day: 1 00    Years: 15 00    Pack years: 15 00    Types: Cigarettes    Quit date: 1969    Years since quittin 3   Smokeless Tobacco Never Used     Family History: non-contributory    Meds/Allergies   all medications and allergies reviewed     Current Outpatient Medications:     atorvastatin (LIPITOR) 80 mg tablet, Take 1 tablet (80 mg total) by mouth daily with dinner, Disp: 90 tablet, Rfl: 3    Blood Pressure KIT, by Does not apply route daily, Disp: 1 each, Rfl: 0    aspirin (ECOTRIN LOW STRENGTH) 81 mg EC tablet, Take 1 tablet (81 mg total) by mouth daily Please purchase over the counter at her local pharmacy, Disp: 30 tablet, Rfl: 1    lisinopril (ZESTRIL) 2 5 mg tablet, Take 1 tablet (2 5 mg total) by mouth daily as needed (for SBP >150 mmhg) (Patient not taking: Reported on 3/8/2021), Disp: 30 tablet, Rfl: 5    meclizine (ANTIVERT) 25 mg tablet, Take 0 5 tablets (12 5 mg total) by mouth 3 (three) times a day as needed for dizziness (Patient not taking: Reported on 3/31/2021), Disp: 30 tablet, Rfl: 0  No Known Allergies    Objective     Current Vitals:   Blood Pressure: 130/74 (21 1433)  Pulse: 80 (21 1433)  Temperature: (!) 97 4 °F (36 3 °C) (21)  Temp Source: Temporal (21)  Height: 5' 8" (172 7 cm) (21)  Weight - Scale: 68 2 kg (150 lb 6 4 oz) (21)      Physical Exam  Vitals signs and nursing note reviewed     Constitutional: General: He is not in acute distress  Cardiovascular:      Rate and Rhythm: Normal rate and regular rhythm  Heart sounds: No murmur  Pulmonary:      Effort: No respiratory distress  Breath sounds: Normal breath sounds  Abdominal:      Palpations: Abdomen is soft  There is no mass  Tenderness: There is no abdominal tenderness  Comments: There is a large scrotal left inguinal hernia, most likely with large and small bowel contents  Skin:     Coloration: Skin is not jaundiced  Findings: No erythema or rash  Neurological:      Mental Status: He is alert and oriented to person, place, and time  Cranial Nerves: No cranial nerve deficit     Psychiatric:         Mood and Affect: Mood normal          Behavior: Behavior normal

## 2021-03-31 NOTE — PROGRESS NOTES
Assessment/Plan:    No problem-specific Assessment & Plan notes found for this encounter  Subjective: Inguinal Hernia     Patient ID: Padmini Snow  is a 77 y o  male  Objective: There were no vitals taken for this visit           Physical Exam

## 2021-03-31 NOTE — PATIENT INSTRUCTIONS
Deep Vein Thrombosis Prevention   WHAT YOU NEED TO KNOW:   Deep vein thrombosis (DVT) is a blood clot that forms in a deep vein of the body  The deep veins in the legs, thighs, and hips are the most common sites for DVT  DVT can also occur in your arms  The clot prevents the normal flow of blood in the vein  The blood backs up and causes pain and swelling  The DVT can break into smaller pieces and travel to your lungs and cause a blockage called a pulmonary embolism  A pulmonary embolism can become life-threatening  DISCHARGE INSTRUCTIONS:   Call 911 for any of the following:   · You feel lightheaded, short of breath, and have chest pain  · You cough up blood  Return to the emergency department if:   · Your arm or leg feels warm, tender, and painful  It may look swollen and red  Contact your healthcare provider if:   · You have questions or concerns about your condition or care  Risk factors for DVT:  A DVT can happen to anybody, but certain things can increase your risk  You may be at higher risk if you have had DVT in the past  You may also be at risk if you have a family member who has had blood clots  The following conditions also increase your risk:  · Limited activity caused by bed rest, a leg cast, or sitting for long periods    · Injury to a deep vein, or surgery    · A blood disorder that makes your blood clot faster than normal, such as factor V Leiden mutation    · Age older than 60 years    · Use of hormone replacement therapy or some types of birth control medicine    · Pregnancy, and for 6 weeks after childbirth     · Cancer or heart failure     · A catheter placed in a large vein    · Smoking    · Obesity or varicose veins  Prevent DVT:   · Guidelines for everyone:      ¨ Maintain a healthy weight  Ask your healthcare provider how much you should weigh  Ask him to help you create a weight loss plan if you are overweight  ¨ Do not smoke    Nicotine and other chemicals in cigarettes and cigars can damage blood vessels and increase your risk for a DVT  Ask your healthcare provider for information if you currently smoke and need help to quit  E-cigarettes or smokeless tobacco still contain nicotine  Talk to your healthcare provider before you use these products  ¨ Move regularly if you sit for long periods of time  If you travel by car or work at a desk, move and stretch in your seat several times each hour  In an airplane, get up and walk every hour  Exercise your legs while sitting by raising and lowering your heels  Keep your toes on the floor while you do this  You can also raise and lower your toes while keeping your heels on the floor  Also tighten and release your leg muscles while sitting  ¨ Exercise regularly  to help increase your blood flow  Walking is a good low-impact exercise  Talk to your healthcare provider about the best exercise plan for you  · Guidelines for people at high risk for DVT:      ¨ Take blood thinner medicines as directed  Your healthcare provider may recommend blood thinners and other medicines to help prevent blood clots  ¨ Wear pressure stockings as directed  The stockings are tight and put pressure on your legs  This improves blood flow and helps prevent clots  Wear the stockings during the day  Do not wear them when you sleep  ¨ Elevate your legs  above the level of your heart as often as you can  This will help decrease swelling and pain  Prop your legs on pillows or blankets to keep them elevated comfortably  ¨ Get up and move as directed after surgery or an injury, or during an illness  Early and regular movement can help decrease your risk for DVT by helping to increase your blood flow  Ask your healthcare provider what type of activity you need and how often you should do it  ¨ Change body positions often if you are bedridden  Ask for help to change your position every 1 to 2 hours    Follow up with your healthcare provider as directed:  Write down your questions so you remember to ask them during your visits  © 2017 [address] [employer] Information is for End User's use only and may not be sold, redistributed or otherwise used for commercial purposes  All illustrations and images included in CareNotes® are the copyrighted property of [employer]  or [employer]  The above information is an  only  It is not intended as medical advice for individual conditions or treatments  Talk to your doctor, nurse or pharmacist before following any medical regimen to see if it is safe and effective for you  Inguinal Hernia   WHAT YOU NEED TO KNOW:   An inguinal hernia happens when organs or abdominal tissue push through a weak spot in the abdominal wall  The abdominal wall is made of fat and muscle  It holds the intestines in place  The hernia may contain fluid, tissue from the abdomen, or part of an organ (such as an intestine)  DISCHARGE INSTRUCTIONS:   Return to the emergency department if:   · You have severe abdominal pain with nausea and vomiting  · Your abdomen is larger than usual      · Your hernia gets bigger or is purple or blue  · You see blood in your bowel movements  · You feel weak, dizzy, or faint  Contact your healthcare provider if:   · You have a fever  · You have questions or concerns about your condition or care  Medicine: You may  need the following:  · NSAIDs , such as ibuprofen, help decrease swelling, pain, and fever  NSAIDs can cause stomach bleeding or kidney problems in certain people  If you take blood thinner medicine, always ask your healthcare provider if NSAIDs are safe for you  Always read the medicine label and follow directions  · Take your medicine as directed  Contact your healthcare provider if you think your medicine is not helping or if you have side effects  Tell him or her if you are allergic to any medicine   Keep a list of the medicines, vitamins, and herbs you take  Include the amounts, and when and why you take them  Bring the list or the pill bottles to follow-up visits  Carry your medicine list with you in case of an emergency  Follow up with your healthcare provider as directed:  Write down your questions so you remember to ask them during your visits  Manage your symptoms and prevent another hernia:   · Do not lift anything heavy  Heavy lifting can make your hernia worse or cause another hernia  Ask your healthcare provider how much is safe for you to lift  · Drink liquids as directed  Liquids may prevent constipation and straining during a bowel movement  Ask how much liquid to drink each day and which liquids are best for you  · Eat foods high in fiber  Fiber may prevent constipation and straining during a bowel movement  Foods that contain fiber include fruits, vegetables, beans, lentils, and whole grains  · Maintain a healthy weight  If you are overweight, weight loss may prevent your hernia from getting worse  It may also prevent another hernia  Talk to your healthcare provider about exercise and how to lose weight safely if you are overweight  · Do not smoke  Nicotine and other chemicals in cigarettes and cigars can weaken the abdominal wall  This may increase your risk for another hernia  Ask your healthcare provider for information if you currently smoke and need help to quit  E-cigarettes or smokeless tobacco still contain nicotine  Talk to your healthcare provider before you use these products  © 2017 [facility] [facility] Information is for End User's use only and may not be sold, redistributed or otherwise used for commercial purposes  All illustrations and images included in CareNotes® are the copyrighted property of [employer]  or [employer]ger  The above information is an  only  It is not intended as medical advice for individual conditions or treatments   Talk to your doctor, nurse or pharmacist before following any medical regimen to see if it is safe and effective for you

## 2021-03-31 NOTE — H&P (VIEW-ONLY)
Consult- General Surgery   Ayesha Bose  77 y o  male MRN: [id number]  Unit/Bed#:  Encounter: [id number]    Assessment/Plan     Assessment:   LARGE LEFT SCROTAL HERNIA  History of MI, status post stent placement 2019   History of CVA  Plan:   in light of the size and symptomatology I advised the patient to undergo open repair of left inguinal hernia with mesh in the near future  I discussed the operative procedure, risks, benefits, alternatives and possible complications with patient, he understood and agreed to proceed  He will need cardiac clearance prior to surgery  History of Present Illness     HPI:  Ayesha Bose  is a 77 y o  male who presents to my office for evaluation of left inguinal hernia  The patient has had left inguinal hernia  For many years, has increased in size and causing discomfort with straining and also complains of change in bowel habits  He denies any urinary symptoms except frequency  He denied having any nausea, vomiting or any other constitutional symptoms  He went to see his primary care physician and he was referred to us for surgical evaluation  Review of Systems   Constitutional: Negative for chills and fever  HENT: Negative for nosebleeds and sore throat  Eyes: Negative for pain and discharge  Respiratory: Negative for cough and shortness of breath  Cardiovascular: Negative for chest pain and palpitations  Gastrointestinal:        As per history of present illness  Endocrine: Negative for cold intolerance and heat intolerance  Genitourinary: Positive for frequency  Negative for dysuria and hematuria  Neurological: Negative for seizures and headaches  Hematological: Negative for adenopathy  Does not bruise/bleed easily  Psychiatric/Behavioral: Negative for confusion  The patient is not nervous/anxious          Historical Information   Past Medical History:   Diagnosis Date    Ischemic cardiomyopathy     [facility] [address] )     Near syncope 2019    Non-[facility])     NSTEMI (non-ST elevated myocardial infarction) ([facility] [address] ) 10/20/2019    Pericarditis 10/24/2019    Poison ivy      Past Surgical History:   Procedure Laterality Date    CORONARY STENT PLACEMENT       Social History   Social History     Substance and Sexual Activity   Alcohol Use Not Currently     Social History     Substance and Sexual Activity   Drug Use No     Social History     Tobacco Use   Smoking Status Former Smoker    Packs/day: 1 00    Years: 15 00    Pack years: 15 00    Types: Cigarettes    Quit date: 1969    Years since quittin 3   Smokeless Tobacco Never Used     Family History: non-contributory    Meds/Allergies   all medications and allergies reviewed     Current Outpatient Medications:     atorvastatin (LIPITOR) 80 mg tablet, Take 1 tablet (80 mg total) by mouth daily with dinner, Disp: 90 tablet, Rfl: 3    Blood Pressure KIT, by Does not apply route daily, Disp: 1 each, Rfl: 0    aspirin (ECOTRIN LOW STRENGTH) 81 mg EC tablet, Take 1 tablet (81 mg total) by mouth daily Please purchase over the counter at her local pharmacy, Disp: 30 tablet, Rfl: 1    lisinopril (ZESTRIL) 2 5 mg tablet, Take 1 tablet (2 5 mg total) by mouth daily as needed (for SBP >150 mmhg) (Patient not taking: Reported on 3/8/2021), Disp: 30 tablet, Rfl: 5    meclizine (ANTIVERT) 25 mg tablet, Take 0 5 tablets (12 5 mg total) by mouth 3 (three) times a day as needed for dizziness (Patient not taking: Reported on 3/31/2021), Disp: 30 tablet, Rfl: 0  No Known Allergies    Objective     Current Vitals:   Blood Pressure: 130/74 (21 1433)  Pulse: 80 (21 1433)  Temperature: (!) 97 4 °F (36 3 °C) (21)  Temp Source: Temporal (21)  Height: 5' 8" (172 7 cm) (21)  Weight - Scale: 68 2 kg (150 lb 6 4 oz) (21)      Physical Exam  Vitals signs and nursing note reviewed     Constitutional: General: He is not in acute distress  Cardiovascular:      Rate and Rhythm: Normal rate and regular rhythm  Heart sounds: No murmur  Pulmonary:      Effort: No respiratory distress  Breath sounds: Normal breath sounds  Abdominal:      Palpations: Abdomen is soft  There is no mass  Tenderness: There is no abdominal tenderness  Comments: There is a large scrotal left inguinal hernia, most likely with large and small bowel contents  Skin:     Coloration: Skin is not jaundiced  Findings: No erythema or rash  Neurological:      Mental Status: He is alert and oriented to person, place, and time  Cranial Nerves: No cranial nerve deficit     Psychiatric:         Mood and Affect: Mood normal          Behavior: Behavior normal

## 2021-04-01 ENCOUNTER — APPOINTMENT (OUTPATIENT)
Dept: LAB | Facility: CLINIC | Age: 76
End: 2021-04-01
Payer: COMMERCIAL

## 2021-04-01 ENCOUNTER — CLINICAL SUPPORT (OUTPATIENT)
Dept: URGENT CARE | Facility: CLINIC | Age: 76
End: 2021-04-01
Payer: COMMERCIAL

## 2021-04-01 ENCOUNTER — TELEPHONE (OUTPATIENT)
Dept: CARDIOLOGY CLINIC | Facility: CLINIC | Age: 76
End: 2021-04-01

## 2021-04-01 DIAGNOSIS — K40.90 NON-RECURRENT UNILATERAL INGUINAL HERNIA WITHOUT OBSTRUCTION OR GANGRENE: Chronic | ICD-10-CM

## 2021-04-01 LAB
ALBUMIN SERPL BCP-MCNC: 3.5 G/DL (ref 3.5–5)
ALP SERPL-CCNC: 74 U/L (ref 46–116)
ALT SERPL W P-5'-P-CCNC: 23 U/L (ref 12–78)
ANION GAP SERPL CALCULATED.3IONS-SCNC: 3 MMOL/L (ref 4–13)
AST SERPL W P-5'-P-CCNC: 19 U/L (ref 5–45)
ATRIAL RATE: 71 BPM
BILIRUB SERPL-MCNC: 0.54 MG/DL (ref 0.2–1)
BUN SERPL-MCNC: 22 MG/DL (ref 5–25)
CALCIUM SERPL-MCNC: 9.1 MG/DL (ref 8.3–10.1)
CHLORIDE SERPL-SCNC: 110 MMOL/L (ref 100–108)
CO2 SERPL-SCNC: 28 MMOL/L (ref 21–32)
CREAT SERPL-MCNC: 1.08 MG/DL (ref 0.6–1.3)
ERYTHROCYTE [DISTWIDTH] IN BLOOD BY AUTOMATED COUNT: 13.9 % (ref 11.6–15.1)
GFR SERPL CREATININE-BSD FRML MDRD: 67 ML/MIN/1.73SQ M
GLUCOSE P FAST SERPL-MCNC: 87 MG/DL (ref 65–99)
HCT VFR BLD AUTO: 43.3 % (ref 36.5–49.3)
HGB BLD-MCNC: 13.8 G/DL (ref 12–17)
MCH RBC QN AUTO: 28.9 PG (ref 26.8–34.3)
MCHC RBC AUTO-ENTMCNC: 31.9 G/DL (ref 31.4–37.4)
MCV RBC AUTO: 91 FL (ref 82–98)
P AXIS: 70 DEGREES
PLATELET # BLD AUTO: 154 THOUSANDS/UL (ref 149–390)
PMV BLD AUTO: 12 FL (ref 8.9–12.7)
POTASSIUM SERPL-SCNC: 4.2 MMOL/L (ref 3.5–5.3)
PR INTERVAL: 170 MS
PROT SERPL-MCNC: 6.8 G/DL (ref 6.4–8.2)
QRS AXIS: 90 DEGREES
QRSD INTERVAL: 80 MS
QT INTERVAL: 362 MS
QTC INTERVAL: 393 MS
RBC # BLD AUTO: 4.78 MILLION/UL (ref 3.88–5.62)
SODIUM SERPL-SCNC: 141 MMOL/L (ref 136–145)
T WAVE AXIS: 97 DEGREES
VENTRICULAR RATE: 71 BPM
WBC # BLD AUTO: 6.33 THOUSAND/UL (ref 4.31–10.16)

## 2021-04-01 PROCEDURE — 93005 ELECTROCARDIOGRAM TRACING: CPT

## 2021-04-01 PROCEDURE — 80053 COMPREHEN METABOLIC PANEL: CPT

## 2021-04-01 PROCEDURE — 85027 COMPLETE CBC AUTOMATED: CPT

## 2021-04-01 PROCEDURE — 93010 ELECTROCARDIOGRAM REPORT: CPT | Performed by: INTERNAL MEDICINE

## 2021-04-01 PROCEDURE — 36415 COLL VENOUS BLD VENIPUNCTURE: CPT

## 2021-04-01 NOTE — TELEPHONE ENCOUNTER
Received pre surgical clearance requirement form for Inguinal hernia repair surgery  Patient has appointment with Dr Eve Bañuelos on 04/05/2021  Form placed in folder for Dr Eve Bañuelos to fill out

## 2021-04-05 ENCOUNTER — OFFICE VISIT (OUTPATIENT)
Dept: CARDIOLOGY CLINIC | Facility: CLINIC | Age: 76
End: 2021-04-05
Payer: COMMERCIAL

## 2021-04-05 VITALS
WEIGHT: 153 LBS | HEIGHT: 68 IN | DIASTOLIC BLOOD PRESSURE: 66 MMHG | BODY MASS INDEX: 23.19 KG/M2 | RESPIRATION RATE: 18 BRPM | OXYGEN SATURATION: 97 % | HEART RATE: 95 BPM | SYSTOLIC BLOOD PRESSURE: 132 MMHG

## 2021-04-05 DIAGNOSIS — K40.90 NON-RECURRENT UNILATERAL INGUINAL HERNIA WITHOUT OBSTRUCTION OR GANGRENE: Chronic | ICD-10-CM

## 2021-04-05 PROCEDURE — 3008F BODY MASS INDEX DOCD: CPT | Performed by: INTERNAL MEDICINE

## 2021-04-05 PROCEDURE — 3078F DIAST BP <80 MM HG: CPT | Performed by: INTERNAL MEDICINE

## 2021-04-05 PROCEDURE — 1036F TOBACCO NON-USER: CPT | Performed by: INTERNAL MEDICINE

## 2021-04-05 PROCEDURE — 99214 OFFICE O/P EST MOD 30 MIN: CPT | Performed by: INTERNAL MEDICINE

## 2021-04-05 PROCEDURE — 1160F RVW MEDS BY RX/DR IN RCRD: CPT | Performed by: INTERNAL MEDICINE

## 2021-04-05 PROCEDURE — 3075F SYST BP GE 130 - 139MM HG: CPT | Performed by: INTERNAL MEDICINE

## 2021-04-05 NOTE — PROGRESS NOTES
Cardiology Office Note    Jeanette Velazquez  77 y o  male MRN: [id number]    04/05/21          Assessment:  1  Preoperative evaluation   2  CAD s/p PCI to the dLAD and PTCA to D1 (10/2019)  3  ICM with EF recovery  4  Hx of lacunar CVA  5  Hypertension with orthostatic hypotension     Plan:  · He is scheduled for open repair of a left inguinal hernia with mesh  He is at Moderate risk of MACE (6 6%)  He notes excellent functional capacity and can achieve >4 METs  · No further cardiac testing indicated prior to the procedure  Ok to hold aspirin 5 days prior to the procedure if needed  · Cont aspirin and atorvastatin   · Ambulatory blood pressure monitoring was advised  Follow up: 6 months or sooner as needed    1  Non-recurrent unilateral inguinal hernia without obstruction or gangrene  Ambulatory referral to Cardiology       HPI: Jeanette Velazquez  is a 77y o  year old male with history of CAD s/p PCI to the distal LAD and PTCA to D1, ICM with EF recovery, and history of lacunar CVA who presents for routine follow-up  Past cardiac evaluation:  · CAD s/p NSTEMI s/p PCI to the dLAD and PTCA to D1 (10/2019)  · TTE 11/2019: EF 50-55%; improved from 40-45%     He has been doing well from a cardiac standpoint since his last evaluation  He is scheduled for open repair of a left inguinal hernia with mesh  He denies recent chest pain, palpitations, dyspnea on exertion, edema, or any other cardiac concerns at this time  His blood pressure is at goal off lisinopril  He has been active around his house without limitation  He can ambulate up a flight of stairs or go for a run without limitation  He denies any other cardiac concerns at this time       Family History: grandfather with history of CAD          No Known Allergies      Current Outpatient Medications:     aspirin (ECOTRIN LOW STRENGTH) 81 mg EC tablet, Take 1 tablet (81 mg total) by mouth daily Please purchase over the counter at her local pharmacy, Disp: 30 tablet, Rfl: 1    atorvastatin (LIPITOR) 80 mg tablet, Take 1 tablet (80 mg total) by mouth daily with dinner, Disp: 90 tablet, Rfl: 3    Blood Pressure KIT, by Does not apply route daily (Patient not taking: Reported on 2021), Disp: 1 each, Rfl: 0    lisinopril (ZESTRIL) 2 5 mg tablet, Take 1 tablet (2 5 mg total) by mouth daily as needed (for SBP >150 mmhg) (Patient not taking: Reported on 3/8/2021), Disp: 30 tablet, Rfl: 5    meclizine (ANTIVERT) 25 mg tablet, Take 0 5 tablets (12 5 mg total) by mouth 3 (three) times a day as needed for dizziness (Patient not taking: Reported on 3/31/2021), Disp: 30 tablet, Rfl: 0    Past Medical History:   Diagnosis Date    Ischemic cardiomyopathy     [facility] )     Near syncope 2019    Non-ST elevated [facility])     NSTEMI (non-ST elevated myocardial infarction) ([facility] ) 10/20/2019    Pericarditis 10/24/2019    Poison ivy        Family History   Problem Relation Age of Onset    No Known Problems Mother     Emphysema Father     Heart disease Maternal Grandfather     Heart attack Maternal Grandfather     Emphysema Paternal Grandfather        Past Surgical History:   Procedure Laterality Date    CORONARY STENT PLACEMENT         Social History     Socioeconomic History    Marital status:       Spouse name: Not on file    Number of children: Not on file    Years of education: Not on file    Highest education level: Not on file   Occupational History    Not on file   Social Needs    Financial resource strain: Not on file    Food insecurity     Worry: Not on file     Inability: Not on file    Transportation needs     Medical: Not on file     Non-medical: Not on file   Tobacco Use    Smoking status: Former Smoker     Packs/day: 1 00     Years: 15 00     Pack years: 15 00     Types: Cigarettes     Quit date: 1969     Years since quittin 3    Smokeless tobacco: Never Used   Substance and Sexual Activity    Alcohol use: Not Currently    Drug use: No    Sexual activity: Not Currently     Partners: Female   Lifestyle    Physical activity     Days per week: 0 days     Minutes per session: 0 min    Stress: Not at all   Relationships    Social connections     Talks on phone: Not on file     Gets together: Not on file     Attends Taoist service: Not on file     Active member of club or organization: Not on file     Attends meetings of clubs or organizations: Not on file     Relationship status: Not on file    Intimate partner violence     Fear of current or ex partner: Not on file     Emotionally abused: Not on file     Physically abused: Not on file     Forced sexual activity: Not on file   Other Topics Concern    Not on file   Social History Narrative    Not on file       Review of Systems   Constitution: Negative for diaphoresis, weight gain and weight loss  HENT: Negative for congestion  Cardiovascular: Negative for chest pain, dyspnea on exertion, irregular heartbeat, leg swelling, near-syncope, orthopnea, palpitations, paroxysmal nocturnal dyspnea and syncope  Respiratory: Negative for shortness of breath, sleep disturbances due to breathing and snoring  Hematologic/Lymphatic: Does not bruise/bleed easily  Skin: Negative for rash  Musculoskeletal: Negative for myalgias  Gastrointestinal: Negative for nausea and vomiting  Neurological: Negative for excessive daytime sleepiness and light-headedness  Psychiatric/Behavioral: The patient is not nervous/anxious  Vitals: /66   Pulse 95   Resp 18   Ht 5' 8" (1 727 m)   Wt 69 4 kg (153 lb)   SpO2 97%   BMI 23 26 kg/m²       Physical Exam:     GEN: Alert and oriented x 3, in no acute distress  Well appearing and well nourished  HEENT: Sclera anicteric, conjunctivae pink, mucous membranes moist  Oropharynx clear  NECK: Supple, no carotid bruits, no significant JVD  Trachea midline, no thyromegaly     HEART: Regular rhythm, normal S1 and S2, no murmurs, clicks, gallops or rubs  PMI nondisplaced, no thrills  LUNGS: Clear to auscultation bilaterally; no wheezes, rales, or rhonchi  No increased work of breathing or signs of respiratory distress  ABDOMEN: Soft, nontender, nondistended, normoactive bowel sounds  Inguinal hernia noted  EXTREMITIES: Skin warm and well perfused, no clubbing, cyanosis, or edema  NEURO: No focal findings  Normal speech  Mood and affect normal    SKIN: Normal without suspicious lesions on exposed skin

## 2021-04-28 ENCOUNTER — ANESTHESIA EVENT (OUTPATIENT)
Dept: PERIOP | Facility: HOSPITAL | Age: 76
End: 2021-04-28
Payer: COMMERCIAL

## 2021-04-28 ENCOUNTER — HOSPITAL ENCOUNTER (OUTPATIENT)
Facility: HOSPITAL | Age: 76
Setting detail: OUTPATIENT SURGERY
Discharge: HOME | End: 2021-04-28
Attending: SURGERY | Admitting: SURGERY
Payer: COMMERCIAL

## 2021-04-28 ENCOUNTER — ANESTHESIA (OUTPATIENT)
Dept: PERIOP | Facility: HOSPITAL | Age: 76
End: 2021-04-28
Payer: COMMERCIAL

## 2021-04-28 VITALS
SYSTOLIC BLOOD PRESSURE: 139 MMHG | OXYGEN SATURATION: 95 % | HEART RATE: 64 BPM | HEIGHT: 68 IN | RESPIRATION RATE: 22 BRPM | TEMPERATURE: 97.6 F | WEIGHT: 148.15 LBS | DIASTOLIC BLOOD PRESSURE: 67 MMHG | BODY MASS INDEX: 22.45 KG/M2

## 2021-04-28 DIAGNOSIS — K40.90 NON-RECURRENT UNILATERAL INGUINAL HERNIA WITHOUT OBSTRUCTION OR GANGRENE: Chronic | ICD-10-CM

## 2021-04-28 PROCEDURE — 49525 REPAIR ING HERNIA SLIDING: CPT | Performed by: SURGERY

## 2021-04-28 PROCEDURE — 49525 REPAIR ING HERNIA SLIDING: CPT | Performed by: CLINICAL NURSE SPECIALIST

## 2021-04-28 PROCEDURE — C1781 MESH (IMPLANTABLE): HCPCS | Performed by: SURGERY

## 2021-04-28 DEVICE — BARD MESH PERFIX PLUG, LARGE
Type: IMPLANTABLE DEVICE | Site: GROIN | Status: FUNCTIONAL
Brand: BARD MESH PERFIX PLUG

## 2021-04-28 RX ORDER — ACETAMINOPHEN AND CODEINE PHOSPHATE 300; 30 MG/1; MG/1
1 TABLET ORAL EVERY 6 HOURS PRN
Qty: 20 TABLET | Refills: 0 | Status: SHIPPED | OUTPATIENT
Start: 2021-04-28 | End: 2021-05-08

## 2021-04-28 RX ORDER — PROPOFOL 10 MG/ML
INJECTION, EMULSION INTRAVENOUS AS NEEDED
Status: DISCONTINUED | OUTPATIENT
Start: 2021-04-28 | End: 2021-04-28

## 2021-04-28 RX ORDER — MAGNESIUM HYDROXIDE 1200 MG/15ML
LIQUID ORAL AS NEEDED
Status: DISCONTINUED | OUTPATIENT
Start: 2021-04-28 | End: 2021-04-28 | Stop reason: HOSPADM

## 2021-04-28 RX ORDER — ONDANSETRON 2 MG/ML
INJECTION INTRAMUSCULAR; INTRAVENOUS AS NEEDED
Status: DISCONTINUED | OUTPATIENT
Start: 2021-04-28 | End: 2021-04-28

## 2021-04-28 RX ORDER — BUPIVACAINE HYDROCHLORIDE 2.5 MG/ML
INJECTION, SOLUTION EPIDURAL; INFILTRATION; INTRACAUDAL AS NEEDED
Status: DISCONTINUED | OUTPATIENT
Start: 2021-04-28 | End: 2021-04-28 | Stop reason: HOSPADM

## 2021-04-28 RX ORDER — HYDROMORPHONE HCL/PF 1 MG/ML
0.5 SYRINGE (ML) INJECTION
Status: DISCONTINUED | OUTPATIENT
Start: 2021-04-28 | End: 2021-04-28 | Stop reason: HOSPADM

## 2021-04-28 RX ORDER — DEXAMETHASONE SODIUM PHOSPHATE 4 MG/ML
INJECTION, SOLUTION INTRA-ARTICULAR; INTRALESIONAL; INTRAMUSCULAR; INTRAVENOUS; SOFT TISSUE AS NEEDED
Status: DISCONTINUED | OUTPATIENT
Start: 2021-04-28 | End: 2021-04-28

## 2021-04-28 RX ORDER — LIDOCAINE HYDROCHLORIDE 10 MG/ML
INJECTION, SOLUTION EPIDURAL; INFILTRATION; INTRACAUDAL; PERINEURAL AS NEEDED
Status: DISCONTINUED | OUTPATIENT
Start: 2021-04-28 | End: 2021-04-28

## 2021-04-28 RX ORDER — ONDANSETRON 2 MG/ML
4 INJECTION INTRAMUSCULAR; INTRAVENOUS ONCE AS NEEDED
Status: DISCONTINUED | OUTPATIENT
Start: 2021-04-28 | End: 2021-04-28 | Stop reason: HOSPADM

## 2021-04-28 RX ORDER — FENTANYL CITRATE 50 UG/ML
INJECTION, SOLUTION INTRAMUSCULAR; INTRAVENOUS AS NEEDED
Status: DISCONTINUED | OUTPATIENT
Start: 2021-04-28 | End: 2021-04-28

## 2021-04-28 RX ORDER — HEPARIN SODIUM 5000 [USP'U]/ML
5000 INJECTION, SOLUTION INTRAVENOUS; SUBCUTANEOUS ONCE
Status: COMPLETED | OUTPATIENT
Start: 2021-04-28 | End: 2021-04-28

## 2021-04-28 RX ORDER — FENTANYL CITRATE/PF 50 MCG/ML
25 SYRINGE (ML) INJECTION
Status: DISCONTINUED | OUTPATIENT
Start: 2021-04-28 | End: 2021-04-28 | Stop reason: HOSPADM

## 2021-04-28 RX ORDER — SODIUM CHLORIDE, SODIUM LACTATE, POTASSIUM CHLORIDE, CALCIUM CHLORIDE 600; 310; 30; 20 MG/100ML; MG/100ML; MG/100ML; MG/100ML
INJECTION, SOLUTION INTRAVENOUS CONTINUOUS PRN
Status: DISCONTINUED | OUTPATIENT
Start: 2021-04-28 | End: 2021-04-28

## 2021-04-28 RX ORDER — ONDANSETRON 2 MG/ML
4 INJECTION INTRAMUSCULAR; INTRAVENOUS EVERY 8 HOURS PRN
Status: DISCONTINUED | OUTPATIENT
Start: 2021-04-28 | End: 2021-04-28 | Stop reason: HOSPADM

## 2021-04-28 RX ORDER — TRAMADOL HYDROCHLORIDE 50 MG/1
50 TABLET ORAL EVERY 6 HOURS PRN
Status: DISCONTINUED | OUTPATIENT
Start: 2021-04-28 | End: 2021-04-28 | Stop reason: HOSPADM

## 2021-04-28 RX ORDER — CEFAZOLIN SODIUM 2 G/50ML
2000 SOLUTION INTRAVENOUS
Status: COMPLETED | OUTPATIENT
Start: 2021-04-28 | End: 2021-04-28

## 2021-04-28 RX ORDER — SODIUM CHLORIDE, SODIUM LACTATE, POTASSIUM CHLORIDE, CALCIUM CHLORIDE 600; 310; 30; 20 MG/100ML; MG/100ML; MG/100ML; MG/100ML
100 INJECTION, SOLUTION INTRAVENOUS CONTINUOUS
Status: DISPENSED | OUTPATIENT
Start: 2021-04-28 | End: 2021-04-28

## 2021-04-28 RX ORDER — SODIUM CHLORIDE, SODIUM LACTATE, POTASSIUM CHLORIDE, CALCIUM CHLORIDE 600; 310; 30; 20 MG/100ML; MG/100ML; MG/100ML; MG/100ML
125 INJECTION, SOLUTION INTRAVENOUS
Status: COMPLETED | OUTPATIENT
Start: 2021-04-28 | End: 2021-04-28

## 2021-04-28 RX ADMIN — FENTANYL CITRATE 50 MCG: 50 INJECTION, SOLUTION INTRAMUSCULAR; INTRAVENOUS at 12:34

## 2021-04-28 RX ADMIN — CEFAZOLIN SODIUM 2000 MG: 2 SOLUTION INTRAVENOUS at 12:00

## 2021-04-28 RX ADMIN — SODIUM CHLORIDE, SODIUM LACTATE, POTASSIUM CHLORIDE, AND CALCIUM CHLORIDE 125 ML/HR: .6; .31; .03; .02 INJECTION, SOLUTION INTRAVENOUS at 10:23

## 2021-04-28 RX ADMIN — ONDANSETRON 4 MG: 2 INJECTION INTRAMUSCULAR; INTRAVENOUS at 12:34

## 2021-04-28 RX ADMIN — DEXAMETHASONE SODIUM PHOSPHATE 4 MG: 4 INJECTION, SOLUTION INTRAMUSCULAR; INTRAVENOUS at 12:40

## 2021-04-28 RX ADMIN — HEPARIN SODIUM 5000 UNITS: 5000 INJECTION INTRAVENOUS; SUBCUTANEOUS at 10:23

## 2021-04-28 RX ADMIN — SODIUM CHLORIDE, SODIUM LACTATE, POTASSIUM CHLORIDE, AND CALCIUM CHLORIDE: .6; .31; .03; .02 INJECTION, SOLUTION INTRAVENOUS at 13:17

## 2021-04-28 RX ADMIN — SODIUM CHLORIDE, SODIUM LACTATE, POTASSIUM CHLORIDE, AND CALCIUM CHLORIDE: .6; .31; .03; .02 INJECTION, SOLUTION INTRAVENOUS at 12:31

## 2021-04-28 RX ADMIN — LIDOCAINE HYDROCHLORIDE 50 MG: 10 INJECTION, SOLUTION EPIDURAL; INFILTRATION; INTRACAUDAL; PERINEURAL at 12:34

## 2021-04-28 RX ADMIN — PROPOFOL 150 MG: 10 INJECTION, EMULSION INTRAVENOUS at 12:34

## 2021-04-28 RX ADMIN — FENTANYL CITRATE 50 MCG: 50 INJECTION, SOLUTION INTRAMUSCULAR; INTRAVENOUS at 12:48

## 2021-04-28 NOTE — ANESTHESIA PREPROCEDURE EVALUATION
Procedure:  OPEN REPAIR HERNIA INGUINAL LEFT WITH MESH (Left Abdomen)    Relevant Problems   CARDIO   (+) CAD in native artery   (+) Essential hypertension      NEURO/PSYCH   (+) History of ischemic cardiomyopathy   (+) History of lacunar cerebrovascular accident (CVA)      Other   (+) Non-recurrent unilateral inguinal hernia      H/o NSTEMI 2019: CAD s/p PCI to the dLAD and PTCA to D1 (10/2019); ICM with EF recovery  Mod risk of MACE per cardiology, 6 6%  No residual deficits from CVA  Reports that it was diagnosed incidentally on MRI/imaging  Physical Exam    Airway    Mallampati score: II  TM Distance: >3 FB  Neck ROM: full     Dental   Comment: edentulous,     Cardiovascular  Cardiovascular exam normal    Pulmonary  Pulmonary exam normal     Other Findings  Portions of exam deferred due to low yield and/or unknown COVID status      Anesthesia Plan  ASA Score- 3     Anesthesia Type- general with ASA Monitors  Additional Monitors:   Airway Plan: LMA  Plan Factors-Exercise tolerance (METS): >4 METS  Chart reviewed  Existing labs reviewed  Patient summary reviewed  Patient is not a current smoker  Induction- intravenous  Postoperative Plan-     Informed Consent- Anesthetic plan and risks discussed with patient  I personally reviewed this patient with the CRNA  Discussed and agreed on the Anesthesia Plan with the CRNA  Donta Earl

## 2021-04-28 NOTE — OP NOTE
OPERATIVE REPORT  PATIENT NAME: Kellie Burden  :  1945  MRN: [id number]  Pt Location: [facility] OR ROOM 04    SURGERY DATE: 2021    Surgeon(s) and Role:     * Cathy Garces MD - Primary     * Dev Macedo PA-C - Assisting    Preop Diagnosis:  Non-recurrent unilateral inguinal hernia without obstruction or gangrene [K40 90]    Post-Op Diagnosis Codes:     * Non-recurrent unilateral inguinal hernia without obstruction or gangrene [K40 90]    Procedure(s) (LRB):  OPEN REPAIR HERNIA INGUINAL LEFT WITH MESH (Left)    Specimen(s):  None    Estimated Blood Loss:   Minimal    Drains:  None    Anesthesia Type:   General/LMA    Operative Indications:  Non-recurrent unilateral inguinal hernia without obstruction or gangrene [K40 90]  Left scrotal hernia    Operative Findings:  The patient had a large left incarcerated scrotal hernia with sigmoid and loops of the small bowel within the hernia sac, I was able to reduce it surgically  The entire floor of the canal was completely obliterated  Complications:   None    Procedure and Technique:  The patient was identified in the patient was placed in the operating table in a supine position  After adequate anesthesia induction and satisfactory LMA intubation, the left groin was prepped and draped in sterile usual fashion with ChloraPrep  Time-out was called the patient was identified as was surgical site  Curvilinear incision was made on the left groin with a scalpel, taken down through the subcutaneous tissue and Essence's fascia with cautery  A large hernia sac was identified, subsequently with cautery the hernia sac was opened  Then the contents of the hernia were slowly reduced, careful attention was paid not to injury the small bowel or the large bowel  Part of the hernia sac was the side of the sigmoid colon consistent with a sliding left inguinal hernia    The hernia sac was dissected off from the canal and the cord structures with blunt finger dissection and cautery  Once that was accomplished the contents of the hernia were carefully reduced into the abdominal cavity as well as the hernia sac  The spermatic cord was dissected and Penrose drain was passed around the  Once this was accomplished I proceeded to approximate the floor of the canal using 2-0 Vicryl in a continuous fashion, small paste was left around the internal ring to place the large plug  Onlay patch was placed on the floor canal secured to Quentin's ligament in an interrupted fashion, allowing 2 cm overlap over the symphysis pubis  The mesh was sutured to the shelving edge of the ilioinguinal ligament with 2-0 Vicryl in a continuous fashion, the other side was sutured to the conjoint tendon with 2-0 Vicryl in a continuous fashion  The arms of the mesh were passed around the internal ring and sutured with 2-0 Vicryl in an interrupted fashion  Wound was copiously irrigated with saline solution  Penrose drain was removed and the cord structures were allowed to go back into the canal  External oblique fascia was closed with 2-0 Vicryl in a continuous fashion  Essence's fascia was approximated with 3-0 Vicryl in an interrupted fashion the skin was closed with 4-0 Vicryl in a continuous subcuticular fashion  At the end of the case instrument, needles, and sponge counts were correct  Patient tolerated the procedure well     I was present for the entire procedure, A qualified resident physician was not available and A physician assistant was required during the procedure for retraction tissue handling,dissection and suturing    Patient Disposition:  PACU , hemodynamically stable and extubated and stable    SIGNATURE: Lily Stacy MD  DATE: April 28, 2021  TIME: 2:12 PM

## 2021-04-28 NOTE — ANESTHESIA POSTPROCEDURE EVALUATION
Post-Op Assessment Note    CV Status:  Stable  Pain Score: 0    Pain management: adequate     Mental Status:  Sleepy   Hydration Status:  Stable   PONV Controlled:  None   Airway Patency:  Patent and adequate      Post Op Vitals Reviewed: Yes      Staff: CRNA   Comments: 6LPM/Mask        No complications documented      BP   155/87   Temp   97 8   Pulse  66   Resp   20   SpO2   100

## 2021-04-28 NOTE — DISCHARGE INSTRUCTIONS
SURGERY INSTRUCTIONS    No diet restriction for this surgery  May shower every day  Remove dressings in 3 days  Remove strips in 7 days  Call office to make an appointment in 2 weeks [phone]  Call office with any issues regarding the surgery  No driving, heavy lifting or strenuous exercise for one week  Resume home medications starting today  Resume blood thinners starting tomorrow  (Aspirin, Coumadin, Eliquis, Xarelto)  Apply ice to the incisions  May take Tylenol 3, regular Tylenol or ibuprofen for pain  Alternating narcotics with ibuprofen or Advil leave will have better pain control  May take Colace for constipation  If you experience urinary retention, please contact your Urologist or go to the emergency room to be treated    Deep Vein Thrombosis Prevention   WHAT YOU NEED TO KNOW:   Deep vein thrombosis (DVT) is a blood clot that forms in a deep vein of the body  The deep veins in the legs, thighs, and hips are the most common sites for DVT  DVT can also occur in your arms  The clot prevents the normal flow of blood in the vein  The blood backs up and causes pain and swelling  The DVT can break into smaller pieces and travel to your lungs and cause a blockage called a pulmonary embolism  A pulmonary embolism can become life-threatening  DISCHARGE INSTRUCTIONS:   Call 911 for any of the following:   · You feel lightheaded, short of breath, and have chest pain  · You cough up blood  Return to the emergency department if:   · Your arm or leg feels warm, tender, and painful  It may look swollen and red  Contact your healthcare provider if:   · You have questions or concerns about your condition or care  Risk factors for DVT:  A DVT can happen to anybody, but certain things can increase your risk  You may be at higher risk if you have had DVT in the past  You may also be at risk if you have a family member who has had blood clots   The following conditions also increase your risk:  · Limited activity caused by bed rest, a leg cast, or sitting for long periods    · Injury to a deep vein, or surgery    · A blood disorder that makes your blood clot faster than normal, such as factor V Leiden mutation    · Age older than 60 years    · Use of hormone replacement therapy or some types of birth control medicine    · Pregnancy, and for 6 weeks after childbirth     · Cancer or heart failure     · A catheter placed in a large vein    · Smoking    · Obesity or varicose veins  Prevent DVT:   · Guidelines for everyone:      ¨ Maintain a healthy weight  Ask your healthcare provider how much you should weigh  Ask him to help you create a weight loss plan if you are overweight  ¨ Do not smoke  Nicotine and other chemicals in cigarettes and cigars can damage blood vessels and increase your risk for a DVT  Ask your healthcare provider for information if you currently smoke and need help to quit  E-cigarettes or smokeless tobacco still contain nicotine  Talk to your healthcare provider before you use these products  ¨ Move regularly if you sit for long periods of time  If you travel by car or work at a desk, move and stretch in your seat several times each hour  In an airplane, get up and walk every hour  Exercise your legs while sitting by raising and lowering your heels  Keep your toes on the floor while you do this  You can also raise and lower your toes while keeping your heels on the floor  Also tighten and release your leg muscles while sitting  ¨ Exercise regularly  to help increase your blood flow  Walking is a good low-impact exercise  Talk to your healthcare provider about the best exercise plan for you  · Guidelines for people at high risk for DVT:      ¨ Take blood thinner medicines as directed  Your healthcare provider may recommend blood thinners and other medicines to help prevent blood clots  ¨ Wear pressure stockings as directed    The stockings are tight and put pressure on your legs  This improves blood flow and helps prevent clots  Wear the stockings during the day  Do not wear them when you sleep  ¨ Elevate your legs  above the level of your heart as often as you can  This will help decrease swelling and pain  Prop your legs on pillows or blankets to keep them elevated comfortably  ¨ Get up and move as directed after surgery or an injury, or during an illness  Early and regular movement can help decrease your risk for DVT by helping to increase your blood flow  Ask your healthcare provider what type of activity you need and how often you should do it  ¨ Change body positions often if you are bedridden  Ask for help to change your position every 1 to 2 hours  Follow up with your healthcare provider as directed:  Write down your questions so you remember to ask them during your visits  © 2017 [employer] Information is for End User's use only and may not be sold, redistributed or otherwise used for commercial purposes  All illustrations and images included in CareNotes® are the copyrighted property of A [employer] M [employer] Inc  or [employer]  The above information is an  only  It is not intended as medical advice for individual conditions or treatments  Talk to your doctor, nurse or pharmacist before following any medical regimen to see if it is safe and effective for you

## 2021-05-12 ENCOUNTER — OFFICE VISIT (OUTPATIENT)
Dept: SURGERY | Facility: CLINIC | Age: 76
End: 2021-05-12

## 2021-05-12 VITALS
HEART RATE: 87 BPM | TEMPERATURE: 98.2 F | BODY MASS INDEX: 22.28 KG/M2 | HEIGHT: 68 IN | DIASTOLIC BLOOD PRESSURE: 72 MMHG | WEIGHT: 147 LBS | SYSTOLIC BLOOD PRESSURE: 138 MMHG

## 2021-05-12 DIAGNOSIS — K40.90 NON-RECURRENT UNILATERAL INGUINAL HERNIA WITHOUT OBSTRUCTION OR GANGRENE: Primary | Chronic | ICD-10-CM

## 2021-05-12 DIAGNOSIS — Z48.89 POSTOPERATIVE VISIT: ICD-10-CM

## 2021-05-12 PROCEDURE — 3008F BODY MASS INDEX DOCD: CPT | Performed by: INTERNAL MEDICINE

## 2021-05-12 PROCEDURE — 99024 POSTOP FOLLOW-UP VISIT: CPT | Performed by: SURGERY

## 2021-05-12 NOTE — PROGRESS NOTES
Post-Op Follow Up- General Surgery   Graciela Chun  77 y o  male MRN: [id number]  Unit/Bed#:  Encounter: [id number]    Assessment/Plan     Assessment:    Status post open repair of left scrotal inguinal hernia, improving  Plan:   patient is doing well after open repair of left inguinal hernia, he will return to my office in 1 month for routine follow-up  History of Present Illness     HPI:  Graciela Chun  is a 77 y o  male who presents   To my office for 1st postop follow-up accompanied by his granddaughter  The patient stated doing well, tolerating diet and having regular bowel movement  The patient denies having any fever, chills, nausea, vomiting, diarrhea, constipation or abdominal pain         Historical Information   Past Medical History:   Diagnosis Date    Hyperlipidemia     Ischemic cardiomyopathy     [facility] infarction ([facility] )     Myocardial infarction ([facility] )     Near syncope 2019    Non-ST elevated myocardial infarction [facility])     NSTEMI (non-ST elevated myocardial infarction) ([facility] ) 10/20/2019    Pericarditis 10/24/2019    Poison ivy     Stroke [facility])      Past Surgical History:   Procedure Laterality Date    ANGIOPLASTY      CORONARY STENT PLACEMENT      HERNIA REPAIR Left 2021    Procedure: OPEN REPAIR HERNIA INGUINAL LEFT WITH MESH;  Surgeon: Eder Lawson MD;  [facility];  Service: General     Social History   Social History     Substance and Sexual Activity   Alcohol Use Not Currently     Social History     Substance and Sexual Activity   Drug Use No     Social History     Tobacco Use   Smoking Status Former Smoker    Packs/day: 1 00    Years: 15 00    Pack years: 15 00    Types: Cigarettes    Quit date: 1969    Years since quittin 4   Smokeless Tobacco Never Used     Family History: non-contributory    Meds/Allergies   all medications and allergies reviewed     Current Outpatient Medications:     aspirin (ECOTRIN LOW STRENGTH) 81 mg EC tablet, Take 1 tablet (81 mg total) by mouth daily Please purchase over the counter at her local pharmacy, Disp: 30 tablet, Rfl: 1    atorvastatin (LIPITOR) 80 mg tablet, Take 1 tablet (80 mg total) by mouth daily with dinner, Disp: 90 tablet, Rfl: 3    Blood Pressure KIT, by Does not apply route daily (Patient not taking: Reported on 4/5/2021), Disp: 1 each, Rfl: 0  No Known Allergies    Objective     Current Vitals:   Blood Pressure: 138/72 (05/12/21 1319)  Pulse: 87 (05/12/21 1319)  Temperature: 98 2 °F (36 8 °C) (05/12/21 1319)  Temp Source: Temporal (05/12/21 1319)  Height: 5' 8" (172 7 cm) (05/12/21 1319)  Weight - Scale: 66 7 kg (147 lb) (05/12/21 1319)      Physical Exam  Vitals signs and nursing note reviewed  Abdominal:      Comments: Incision from the left groin is healed well without evidence of infection  There is no recurrence of the hernia  He does have a moderate size seroma from the left scrotum  Patient was reassured that this will resolve in the next couple months

## 2021-05-12 NOTE — PROGRESS NOTES
Assessment/Plan:    No problem-specific Assessment & Plan notes found for this encounter  Subjective: Inguinal Hernia Repair     Patient ID: David Delatorre  is a 77 y o  male  Objective: There were no vitals taken for this visit           Physical Exam

## 2021-06-23 ENCOUNTER — OFFICE VISIT (OUTPATIENT)
Dept: SURGERY | Facility: CLINIC | Age: 76
End: 2021-06-23

## 2021-06-23 VITALS
WEIGHT: 143 LBS | HEIGHT: 68 IN | TEMPERATURE: 98.5 F | DIASTOLIC BLOOD PRESSURE: 68 MMHG | SYSTOLIC BLOOD PRESSURE: 130 MMHG | BODY MASS INDEX: 21.67 KG/M2 | HEART RATE: 90 BPM

## 2021-06-23 DIAGNOSIS — Z48.89 POSTOPERATIVE VISIT: ICD-10-CM

## 2021-06-23 DIAGNOSIS — K40.90 NON-RECURRENT UNILATERAL INGUINAL HERNIA WITHOUT OBSTRUCTION OR GANGRENE: Primary | Chronic | ICD-10-CM

## 2021-06-23 PROCEDURE — 99024 POSTOP FOLLOW-UP VISIT: CPT | Performed by: SURGERY

## 2021-06-23 NOTE — PROGRESS NOTES
Assessment/Plan:    No problem-specific Assessment & Plan notes found for this encounter  Subjective: Inguinal Hernia     Patient ID: Ayesha Bose  is a 77 y o  male  Objective: There were no vitals taken for this visit           Physical Exam

## 2021-06-23 NOTE — PROGRESS NOTES
Post-Op Follow Up- General Surgery   Jason Agosto  77 y o  male MRN: [id number]  Unit/Bed#:  Encounter: [id number]    Assessment/Plan     Assessment:  Status post open repair of left scrotal inguinal hernia, improved  Plan:  Patient is doing quite well from the surgical standpoint, he is discharged from my care and I will be glad to see him if any problem arises in the future  Patient was reassured that seroma should subside in the next few months  History of Present Illness     HPI:  Jason Agosto  is a 77 y o  male who presents  To my office for 2nd postop follow-up after open repair of left scrotal hernia from May 28th  He offers no complaints at this time        Historical Information   Past Medical History:   Diagnosis Date    Hyperlipidemia     Ischemic cardiomyopathy     [facility]     [facility] )     Near syncope 2019    Non-ST elevated [facility] infarction [facility])     [facility]STEMI [facility]non-ST elevated [facility]) [facility] ) 10/20/2019    Pericarditis 10/24/2019    Poison ivy     Stroke [facility])      Past Surgical History:   Procedure Laterality Date    ANGIOPLASTY      CORONARY STENT PLACEMENT      HERNIA REPAIR Left 2021    Procedure: OPEN REPAIR HERNIA INGUINAL LEFT WITH MESH;  Surgeon: Uriah Christianson MD;  Location: [facility];  Service: General     Social History   Social History     Substance and Sexual Activity   Alcohol Use Not Currently     Social History     Substance and Sexual Activity   Drug Use No     Social History     Tobacco Use   Smoking Status Former Smoker    Packs/day: 1 00    Years: 15 00    Pack years: 15 00    Types: Cigarettes    Quit date: 1969    Years since quittin 5   Smokeless Tobacco Never Used     Family History: non-contributory    Meds/Allergies   all medications and allergies reviewed     Current Outpatient Medications:     aspirin (ECOTRIN LOW STRENGTH) 81 mg EC tablet, Take 1 tablet (81 mg total) by mouth daily Please purchase over the counter at her local pharmacy, Disp: 30 tablet, Rfl: 1    atorvastatin (LIPITOR) 80 mg tablet, Take 1 tablet (80 mg total) by mouth daily with dinner, Disp: 90 tablet, Rfl: 3    Blood Pressure KIT, by Does not apply route daily (Patient not taking: Reported on 4/5/2021), Disp: 1 each, Rfl: 0  No Known Allergies    Objective     Current Vitals:   Blood Pressure: 130/68 (06/23/21 1310)  Pulse: 90 (06/23/21 1310)  Temperature: 98 5 °F (36 9 °C) (06/23/21 1310)  Temp Source: Temporal (06/23/21 1310)  Height: 5' 8" (172 7 cm) (06/23/21 1310)  Weight - Scale: 64 9 kg (143 lb) (06/23/21 1310)      Physical Exam  Vitals and nursing note reviewed  Abdominal:      Comments: Abdomen is soft, nondistended and nontender  Incision on the left groin is well-healed without evidence of infection, no evidence of recurrence of the hernia  He does have a moderate size seroma, patient was reassured this will resolve in the next few months

## 2021-09-27 ENCOUNTER — APPOINTMENT (OUTPATIENT)
Dept: LAB | Facility: CLINIC | Age: 76
End: 2021-09-27
Payer: COMMERCIAL

## 2021-09-27 DIAGNOSIS — I10 ESSENTIAL HYPERTENSION: Chronic | ICD-10-CM

## 2021-09-27 LAB
ANION GAP SERPL CALCULATED.3IONS-SCNC: 3 MMOL/L (ref 4–13)
BUN SERPL-MCNC: 21 MG/DL (ref 5–25)
CALCIUM SERPL-MCNC: 8.8 MG/DL (ref 8.3–10.1)
CHLORIDE SERPL-SCNC: 108 MMOL/L (ref 100–108)
CHOLEST SERPL-MCNC: 97 MG/DL (ref 50–200)
CO2 SERPL-SCNC: 27 MMOL/L (ref 21–32)
CREAT SERPL-MCNC: 0.9 MG/DL (ref 0.6–1.3)
GFR SERPL CREATININE-BSD FRML MDRD: 83 ML/MIN/1.73SQ M
GLUCOSE P FAST SERPL-MCNC: 90 MG/DL (ref 65–99)
HDLC SERPL-MCNC: 53 MG/DL
LDLC SERPL CALC-MCNC: 34 MG/DL (ref 0–100)
NONHDLC SERPL-MCNC: 44 MG/DL
POTASSIUM SERPL-SCNC: 4 MMOL/L (ref 3.5–5.3)
SODIUM SERPL-SCNC: 138 MMOL/L (ref 136–145)
TRIGL SERPL-MCNC: 49 MG/DL

## 2021-09-27 PROCEDURE — 36415 COLL VENOUS BLD VENIPUNCTURE: CPT

## 2021-09-27 PROCEDURE — 80061 LIPID PANEL: CPT

## 2021-09-27 PROCEDURE — 80048 BASIC METABOLIC PNL TOTAL CA: CPT

## 2021-10-04 ENCOUNTER — OFFICE VISIT (OUTPATIENT)
Dept: CARDIOLOGY CLINIC | Facility: CLINIC | Age: 76
End: 2021-10-04
Payer: COMMERCIAL

## 2021-10-04 VITALS
SYSTOLIC BLOOD PRESSURE: 134 MMHG | HEIGHT: 68 IN | WEIGHT: 138 LBS | BODY MASS INDEX: 20.92 KG/M2 | DIASTOLIC BLOOD PRESSURE: 76 MMHG | OXYGEN SATURATION: 98 % | HEART RATE: 81 BPM | RESPIRATION RATE: 16 BRPM

## 2021-10-04 DIAGNOSIS — I25.10 CORONARY ARTERY DISEASE INVOLVING NATIVE CORONARY ARTERY OF NATIVE HEART WITHOUT ANGINA PECTORIS: Primary | ICD-10-CM

## 2021-10-04 PROCEDURE — 3075F SYST BP GE 130 - 139MM HG: CPT | Performed by: INTERNAL MEDICINE

## 2021-10-04 PROCEDURE — 99214 OFFICE O/P EST MOD 30 MIN: CPT | Performed by: INTERNAL MEDICINE

## 2021-10-04 PROCEDURE — 3078F DIAST BP <80 MM HG: CPT | Performed by: INTERNAL MEDICINE

## 2021-10-07 ENCOUNTER — RA CDI HCC (OUTPATIENT)
Dept: OTHER | Facility: HOSPITAL | Age: 76
End: 2021-10-07

## 2021-10-14 ENCOUNTER — OFFICE VISIT (OUTPATIENT)
Dept: INTERNAL MEDICINE CLINIC | Facility: CLINIC | Age: 76
End: 2021-10-14
Payer: COMMERCIAL

## 2021-10-14 VITALS
WEIGHT: 136.8 LBS | TEMPERATURE: 98.7 F | BODY MASS INDEX: 20.73 KG/M2 | HEIGHT: 68 IN | HEART RATE: 76 BPM | DIASTOLIC BLOOD PRESSURE: 66 MMHG | SYSTOLIC BLOOD PRESSURE: 122 MMHG | OXYGEN SATURATION: 98 %

## 2021-10-14 DIAGNOSIS — I10 ESSENTIAL HYPERTENSION: Primary | Chronic | ICD-10-CM

## 2021-10-14 DIAGNOSIS — I25.10 CAD IN NATIVE ARTERY: ICD-10-CM

## 2021-10-14 DIAGNOSIS — Z00.00 MEDICARE ANNUAL WELLNESS VISIT, SUBSEQUENT: ICD-10-CM

## 2021-10-14 DIAGNOSIS — Z86.73 HISTORY OF LACUNAR CEREBROVASCULAR ACCIDENT (CVA): Chronic | ICD-10-CM

## 2021-10-14 DIAGNOSIS — Z13.31 SCREENING FOR DEPRESSION: ICD-10-CM

## 2021-10-14 PROCEDURE — 1160F RVW MEDS BY RX/DR IN RCRD: CPT | Performed by: INTERNAL MEDICINE

## 2021-10-14 PROCEDURE — G0439 PPPS, SUBSEQ VISIT: HCPCS | Performed by: INTERNAL MEDICINE

## 2021-10-14 PROCEDURE — 99214 OFFICE O/P EST MOD 30 MIN: CPT | Performed by: INTERNAL MEDICINE

## 2021-10-14 PROCEDURE — 1036F TOBACCO NON-USER: CPT | Performed by: INTERNAL MEDICINE

## 2021-10-14 PROCEDURE — 3008F BODY MASS INDEX DOCD: CPT | Performed by: INTERNAL MEDICINE

## 2021-10-14 PROCEDURE — 1125F AMNT PAIN NOTED PAIN PRSNT: CPT | Performed by: INTERNAL MEDICINE

## 2021-10-14 PROCEDURE — 1101F PT FALLS ASSESS-DOCD LE1/YR: CPT | Performed by: INTERNAL MEDICINE

## 2021-10-14 PROCEDURE — 1170F FXNL STATUS ASSESSED: CPT | Performed by: INTERNAL MEDICINE

## 2021-10-14 PROCEDURE — 3725F SCREEN DEPRESSION PERFORMED: CPT | Performed by: INTERNAL MEDICINE

## 2021-10-14 PROCEDURE — 3288F FALL RISK ASSESSMENT DOCD: CPT | Performed by: INTERNAL MEDICINE

## 2021-10-14 PROCEDURE — G0444 DEPRESSION SCREEN ANNUAL: HCPCS | Performed by: INTERNAL MEDICINE

## 2021-10-14 RX ORDER — ATORVASTATIN CALCIUM 80 MG/1
80 TABLET, FILM COATED ORAL
Qty: 90 TABLET | Refills: 3 | Status: SHIPPED | OUTPATIENT
Start: 2021-10-14

## 2022-10-10 ENCOUNTER — APPOINTMENT (OUTPATIENT)
Dept: LAB | Facility: CLINIC | Age: 77
End: 2022-10-10
Payer: COMMERCIAL

## 2022-10-10 DIAGNOSIS — I25.10 CAD IN NATIVE ARTERY: ICD-10-CM

## 2022-10-10 DIAGNOSIS — I10 ESSENTIAL HYPERTENSION: Chronic | ICD-10-CM

## 2022-10-10 LAB
ALBUMIN SERPL BCP-MCNC: 3.3 G/DL (ref 3.5–5)
ALP SERPL-CCNC: 88 U/L (ref 46–116)
ALT SERPL W P-5'-P-CCNC: 24 U/L (ref 12–78)
ANION GAP SERPL CALCULATED.3IONS-SCNC: 3 MMOL/L (ref 4–13)
AST SERPL W P-5'-P-CCNC: 20 U/L (ref 5–45)
BILIRUB SERPL-MCNC: 0.4 MG/DL (ref 0.2–1)
BUN SERPL-MCNC: 12 MG/DL (ref 5–25)
CALCIUM ALBUM COR SERPL-MCNC: 9.7 MG/DL (ref 8.3–10.1)
CALCIUM SERPL-MCNC: 9.1 MG/DL (ref 8.3–10.1)
CHLORIDE SERPL-SCNC: 110 MMOL/L (ref 96–108)
CHOLEST SERPL-MCNC: 82 MG/DL
CO2 SERPL-SCNC: 27 MMOL/L (ref 21–32)
CREAT SERPL-MCNC: 0.96 MG/DL (ref 0.6–1.3)
ERYTHROCYTE [DISTWIDTH] IN BLOOD BY AUTOMATED COUNT: 13.8 % (ref 11.6–15.1)
GFR SERPL CREATININE-BSD FRML MDRD: 76 ML/MIN/1.73SQ M
GLUCOSE P FAST SERPL-MCNC: 91 MG/DL (ref 65–99)
HCT VFR BLD AUTO: 42.1 % (ref 36.5–49.3)
HDLC SERPL-MCNC: 48 MG/DL
HGB BLD-MCNC: 13.3 G/DL (ref 12–17)
LDLC SERPL CALC-MCNC: 27 MG/DL (ref 0–100)
MCH RBC QN AUTO: 28.1 PG (ref 26.8–34.3)
MCHC RBC AUTO-ENTMCNC: 31.6 G/DL (ref 31.4–37.4)
MCV RBC AUTO: 89 FL (ref 82–98)
NONHDLC SERPL-MCNC: 34 MG/DL
PLATELET # BLD AUTO: 181 THOUSANDS/UL (ref 149–390)
PMV BLD AUTO: 11.5 FL (ref 8.9–12.7)
POTASSIUM SERPL-SCNC: 4.1 MMOL/L (ref 3.5–5.3)
PROT SERPL-MCNC: 6.7 G/DL (ref 6.4–8.4)
RBC # BLD AUTO: 4.74 MILLION/UL (ref 3.88–5.62)
SODIUM SERPL-SCNC: 140 MMOL/L (ref 135–147)
TRIGL SERPL-MCNC: 37 MG/DL
WBC # BLD AUTO: 7.05 THOUSAND/UL (ref 4.31–10.16)

## 2022-10-10 PROCEDURE — 80061 LIPID PANEL: CPT

## 2022-10-10 PROCEDURE — 85027 COMPLETE CBC AUTOMATED: CPT

## 2022-10-10 PROCEDURE — 80053 COMPREHEN METABOLIC PANEL: CPT

## 2022-10-10 PROCEDURE — 36415 COLL VENOUS BLD VENIPUNCTURE: CPT

## 2022-10-17 ENCOUNTER — OFFICE VISIT (OUTPATIENT)
Dept: INTERNAL MEDICINE CLINIC | Facility: CLINIC | Age: 77
End: 2022-10-17
Payer: COMMERCIAL

## 2022-10-17 VITALS
BODY MASS INDEX: 18.9 KG/M2 | TEMPERATURE: 98.6 F | DIASTOLIC BLOOD PRESSURE: 80 MMHG | SYSTOLIC BLOOD PRESSURE: 138 MMHG | HEART RATE: 70 BPM | OXYGEN SATURATION: 100 % | WEIGHT: 132 LBS | HEIGHT: 70 IN | RESPIRATION RATE: 20 BRPM

## 2022-10-17 DIAGNOSIS — Z00.00 MEDICARE ANNUAL WELLNESS VISIT, SUBSEQUENT: ICD-10-CM

## 2022-10-17 DIAGNOSIS — Z86.73 HISTORY OF LACUNAR CEREBROVASCULAR ACCIDENT (CVA): Chronic | ICD-10-CM

## 2022-10-17 DIAGNOSIS — I25.10 CAD IN NATIVE ARTERY: Chronic | ICD-10-CM

## 2022-10-17 DIAGNOSIS — Z13.39 SCREENING FOR ALCOHOL PROBLEM: ICD-10-CM

## 2022-10-17 DIAGNOSIS — I10 ESSENTIAL HYPERTENSION: Primary | Chronic | ICD-10-CM

## 2022-10-17 PROCEDURE — G0439 PPPS, SUBSEQ VISIT: HCPCS | Performed by: INTERNAL MEDICINE

## 2022-10-17 PROCEDURE — 99214 OFFICE O/P EST MOD 30 MIN: CPT | Performed by: INTERNAL MEDICINE

## 2022-10-17 PROCEDURE — G0442 ANNUAL ALCOHOL SCREEN 15 MIN: HCPCS | Performed by: INTERNAL MEDICINE

## 2022-10-17 PROCEDURE — G0444 DEPRESSION SCREEN ANNUAL: HCPCS | Performed by: INTERNAL MEDICINE

## 2022-10-17 RX ORDER — ATORVASTATIN CALCIUM 80 MG/1
80 TABLET, FILM COATED ORAL
Qty: 90 TABLET | Refills: 3 | Status: SHIPPED | OUTPATIENT
Start: 2022-10-17

## 2022-10-17 NOTE — ASSESSMENT & PLAN NOTE
Has been off anti-hypertensive medication and BP has been stable  Will continue to monitor  Watch salt intake

## 2022-10-17 NOTE — PATIENT INSTRUCTIONS
Medicare Preventive Visit Patient Instructions  Thank you for completing your Welcome to Medicare Visit or Medicare Annual Wellness Visit today  Your next wellness visit will be due in one year (10/18/2023)  The screening/preventive services that you may require over the next 5-10 years are detailed below  Some tests may not apply to you based off risk factors and/or age  Screening tests ordered at today's visit but not completed yet may show as past due  Also, please note that scanned in results may not display below  Preventive Screenings:  Service Recommendations Previous Testing/Comments   Colorectal Cancer Screening  Colonoscopy    Fecal Occult Blood Test (FOBT)/Fecal Immunochemical Test (FIT)  Fecal DNA/Cologuard Test  Flexible Sigmoidoscopy Age: 39-70 years old   Colonoscopy: every 10 years (May be performed more frequently if at higher risk)  OR  FOBT/FIT: every 1 year  OR  Cologuard: every 3 years  OR  Sigmoidoscopy: every 5 years  Screening may be recommended earlier than age 39 if at higher risk for colorectal cancer  Also, an individualized decision between you and your healthcare provider will decide whether screening between the ages of 74-80 would be appropriate   Colonoscopy: Not on file  FOBT/FIT: Not on file  Cologuard: Not on file  Sigmoidoscopy: Not on file    Patient Declines     Prostate Cancer Screening Individualized decision between patient and health care provider in men between ages of 53-78   Medicare will cover every 12 months beginning on the day after your 50th birthday PSA: No results in last 5 years     Screening Not Indicated     Hepatitis C Screening Once for adults born between 1945 and 1965  More frequently in patients at high risk for Hepatitis C Hep C Antibody: 01/29/2021    Screening Current   Diabetes Screening 1-2 times per year if you're at risk for diabetes or have pre-diabetes Fasting glucose: 91 mg/dL (10/10/2022)  A1C: 5 6 % (11/15/2019)  Screening Current Cholesterol Screening Once every 5 years if you don't have a lipid disorder  May order more often based on risk factors  Lipid panel: 10/10/2022  Screening Current      Other Preventive Screenings Covered by Medicare:  Abdominal Aortic Aneurysm (AAA) Screening: covered once if your at risk  You're considered to be at risk if you have a family history of AAA or a male between the age of 73-68 who smoking at least 100 cigarettes in your lifetime  Lung Cancer Screening: covers low dose CT scan once per year if you meet all of the following conditions: (1) Age 50-69; (2) No signs or symptoms of lung cancer; (3) Current smoker or have quit smoking within the last 15 years; (4) You have a tobacco smoking history of at least 20 pack years (packs per day x number of years you smoked); (5) You get a written order from a healthcare provider  Glaucoma Screening: covered annually if you're considered high risk: (1) You have diabetes OR (2) Family history of glaucoma OR (3)  aged 48 and older OR (3)  American aged 72 and older  Osteoporosis Screening: covered every 2 years if you meet one of the following conditions: (1) Have a vertebral abnormality; (2) On glucocorticoid therapy for more than 3 months; (3) Have primary hyperparathyroidism; (4) On osteoporosis medications and need to assess response to drug therapy  HIV Screening: covered annually if you're between the age of 12-76  Also covered annually if you are younger than 13 and older than 72 with risk factors for HIV infection  For pregnant patients, it is covered up to 3 times per pregnancy      Immunizations:  Immunization Recommendations   Influenza Vaccine Annual influenza vaccination during flu season is recommended for all persons aged >= 6 months who do not have contraindications   Pneumococcal Vaccine   * Pneumococcal conjugate vaccine = PCV13 (Prevnar 13), PCV15 (Vaxneuvance), PCV20 (Prevnar 20)  * Pneumococcal polysaccharide vaccine = PPSV23 (Pneumovax) Adults 2364 years old: 1-3 doses may be recommended based on certain risk factors  Adults 72 years old: 1-2 doses may be recommended based off what pneumonia vaccine you previously received   Hepatitis B Vaccine 3 dose series if at intermediate or high risk (ex: diabetes, end stage renal disease, liver disease)   Tetanus (Td) Vaccine - COST NOT COVERED BY MEDICARE PART B Following completion of primary series, a booster dose should be given every 10 years to maintain immunity against tetanus  Td may also be given as tetanus wound prophylaxis  Tdap Vaccine - COST NOT COVERED BY MEDICARE PART B Recommended at least once for all adults  For pregnant patients, recommended with each pregnancy  Shingles Vaccine (Shingrix) - COST NOT COVERED BY MEDICARE PART B  2 shot series recommended in those aged 48 and above     Health Maintenance Due:      Topic Date Due    Hepatitis C Screening  Completed     Immunizations Due:      Topic Date Due    COVID-19 Vaccine (2 - Balinda Cera series) 04/27/2021     Advance Directives   What are advance directives? Advance directives are legal documents that state your wishes and plans for medical care  These plans are made ahead of time in case you lose your ability to make decisions for yourself  Advance directives can apply to any medical decision, such as the treatments you want, and if you want to donate organs  What are the types of advance directives? There are many types of advance directives, and each state has rules about how to use them  You may choose a combination of any of the following:  Living will: This is a written record of the treatment you want  You can also choose which treatments you do not want, which to limit, and which to stop at a certain time  This includes surgery, medicine, IV fluid, and tube feedings  Durable power of  for healthcare [facility]):   This is a written record that states who you want to make healthcare choices for you when you are unable to make them for yourself  This person, called a proxy, is usually a family member or a friend  You may choose more than 1 proxy  Do not resuscitate (DNR) order:  A DNR order is used in case your heart stops beating or you stop breathing  It is a request not to have certain forms of treatment, such as CPR  A DNR order may be included in other types of advance directives  Medical directive: This covers the care that you want if you are in a coma, near death, or unable to make decisions for yourself  You can list the treatments you want for each condition  Treatment may include pain medicine, surgery, blood transfusions, dialysis, IV or tube feedings, and a ventilator (breathing machine)  Values history: This document has questions about your views, beliefs, and how you feel and think about life  This information can help others choose the care that you would choose  Why are advance directives important? An advance directive helps you control your care  Although spoken wishes may be used, it is better to have your wishes written down  Spoken wishes can be misunderstood, or not followed  Treatments may be given even if you do not want them  An advance directive may make it easier for your family to make difficult choices about your care  © Copyright [employer] 2018 Information is for End User's use only and may not be sold, redistributed or otherwise used for commercial purposes   All illustrations and images included in CareNotes® are the copyrighted property of [employer]  or [address] [place] [address]

## 2022-10-17 NOTE — PROGRESS NOTES
Assessment and Plan:     Problem List Items Addressed This Visit        Cardiovascular and Mediastinum    Essential hypertension - Primary (Chronic)     Has been off anti-hypertensive medication and BP has been stable  Will continue to monitor  Watch salt intake  Relevant Orders    CBC    Comprehensive metabolic panel    Lipid Panel with Direct LDL reflex    CAD in native artery (Chronic)     History of ischemic cardiomyopathy in the past which resolved  Stable without angina  Continue aspirin and statin  Relevant Medications    atorvastatin (LIPITOR) 80 mg tablet    Other Relevant Orders    CBC    Comprehensive metabolic panel    Lipid Panel with Direct LDL reflex       Other    History of lacunar cerebrovascular accident (CVA) (Chronic)     Continue secondary stroke prevention with aspirin and statin  BP controlled  Other Visit Diagnoses     Medicare annual wellness visit, subsequent              Depression Screening and Follow-up Plan: Patient was screened for depression during today's encounter  They screened negative with a PHQ-2 score of 0  Preventive health issues were discussed with patient, and age appropriate screening tests were ordered as noted in patient's After Visit Summary  Personalized health advice and appropriate referrals for health education or preventive services given if needed, as noted in patient's After Visit Summary  History of Present Illness:     Patient presents for a Medicare Wellness Visit    He denies any concerns with his health  Still working 40 hours per week at [employer]  Denies cardiac symptoms  Taking aspirin and atorvastatin  Labs are well controlled  Total cholesterol is under 100      Patient Care Team:  Teodoro Lauren DO as PCP - General (Internal Medicine)  Teodoro Lauren DO as PCP - [address] [place] [address] (RTE)     Review of Systems:     Review of Systems   Constitutional: Negative for activity change, appetite change and fatigue  Respiratory: Negative for apnea, cough, chest tightness, shortness of breath and wheezing  Cardiovascular: Negative for chest pain, palpitations and leg swelling  Gastrointestinal: Negative for abdominal distention, abdominal pain, blood in stool, constipation, diarrhea, nausea and vomiting  Musculoskeletal: Negative for arthralgias, back pain, gait problem, joint swelling and myalgias  Skin: Negative for rash and wound  Neurological: Negative for dizziness, weakness, light-headedness, numbness and headaches  Psychiatric/Behavioral: Negative for behavioral problems, confusion, hallucinations, sleep disturbance and suicidal ideas  The patient is not nervous/anxious         Problem List:     Patient Active Problem List   Diagnosis   • CAD in native artery   • History of ischemic cardiomyopathy   • History of lacunar cerebrovascular accident (CVA)   • Non-recurrent unilateral inguinal hernia   • Essential hypertension      Past Medical and Surgical History:     Past Medical History:   Diagnosis Date   • Hyperlipidemia    • Ischemic cardiomyopathy    • Lacunar infarction ([facility] )    • Myocardial infarction ([facility] )    • Near syncope 11/14/2019   • Non-ST elevated myocardial infarction [facility])    • NSTEMI (non-ST elevated myocardial infarction) ([facility] ) 10/20/2019   • Pericarditis 10/24/2019   • Poison ivy    • Stroke [facility])      Past Surgical History:   Procedure Laterality Date   • ANGIOPLASTY     • CORONARY STENT PLACEMENT     • HERNIA REPAIR Left 4/28/2021    Procedure: OPEN REPAIR HERNIA INGUINAL LEFT WITH MESH;  Surgeon: Emily Ceron MD;  Location: [facility];  Service: General      Family History:     Family History   Problem Relation Age of Onset   • No Known Problems Mother    • Emphysema Father    • Heart disease Maternal Grandfather    • Heart attack Maternal Grandfather    • Emphysema Paternal Grandfather       Social History:     Social History     Socioeconomic History   • Marital status:      Spouse name: None   • Number of children: None   • Years of education: None   • Highest education level: None   Occupational History   • None   Tobacco Use   • Smoking status: Former Smoker     Packs/day: 1 00     Years: 15 00     Pack years: 15 00     Types: Cigarettes     Quit date: 1969     Years since quittin 8   • Smokeless tobacco: Never Used   Vaping Use   • Vaping Use: Never used   Substance and Sexual Activity   • Alcohol use: Not Currently   • Drug use: No   • Sexual activity: Not Currently     Partners: Female   Other Topics Concern   • None   Social History Narrative   • None     Social Determinants of Health     Financial Resource Strain: Low Risk    • Difficulty of Paying Living Expenses: Not hard at all   Food Insecurity: Not on file   Transportation Needs: No Transportation Needs   • Lack of Transportation (Medical): No   • Lack of Transportation (Non-Medical): No   Physical Activity: Not on file   Stress: Not on file   Social Connections: Not on file   Intimate Partner Violence: Not on file   Housing Stability: Not on file      Medications and Allergies:     Current Outpatient Medications   Medication Sig Dispense Refill   • aspirin (ECOTRIN LOW STRENGTH) 81 mg EC tablet Take 1 tablet (81 mg total) by mouth daily Please purchase over the counter at her local pharmacy 30 tablet 1   • atorvastatin (LIPITOR) 80 mg tablet Take 1 tablet (80 mg total) by mouth daily with dinner 90 tablet 3   • Blood Pressure KIT by Does not apply route daily 1 each 0     No current facility-administered medications for this visit       No Known Allergies   Immunizations:     Immunization History   Administered Date(s) Administered   • COVID-19 MODERNA VACC 0 5 ML IM 2021      Health Maintenance:         Topic Date Due   • Hepatitis C Screening  Completed         Topic Date Due   • COVID-19 Vaccine (2 - Moderna series) 2021      Medicare Screening Tests and Risk Assessments:     Jose Alberto Gomez is here for his Subsequent Wellness visit  Last Medicare Wellness visit information reviewed, patient interviewed and updates made to the record today  Health Risk Assessment:   Patient rates overall health as good  Patient feels that their physical health rating is same  Patient is satisfied with their life  Eyesight was rated as same  Hearing was rated as same  Patient feels that their emotional and mental health rating is slightly worse  Patients states they are never, rarely angry  Patient states they are sometimes unusually tired/fatigued  Pain experienced in the last 7 days has been none  Patient states that he has experienced no weight loss or gain in last 6 months  Depression Screening:   PHQ-2 Score: 0      Fall Risk Screening: In the past year, patient has experienced: no history of falling in past year      Home Safety:  Patient does not have trouble with stairs inside or outside of their home  Patient has working smoke alarms and has working carbon monoxide detector  Home safety hazards include: none  Nutrition:   Current diet is Low Cholesterol, Limited junk food and No Added Salt  Medications:   Patient is not currently taking any over-the-counter supplements  Patient is able to manage medications  Activities of Daily Living (ADLs)/Instrumental Activities of Daily Living (IADLs):   Walk and transfer into and out of bed and chair?: Yes  Dress and groom yourself?: Yes    Bathe or shower yourself?: Yes    Feed yourself? Yes  Do your laundry/housekeeping?: Yes  Manage your money, pay your bills and track your expenses?: Yes  Make your own meals?: Yes    Do your own shopping?: Yes    Durable Medical Equipment Suppliers  none    Previous Hospitalizations:   Any hospitalizations or ED visits within the last 12 months?: No      Advance Care Planning:   Living will: Yes    Durable POA for healthcare:  Yes    Advanced directive: Yes    Five wishes given: No      Cognitive Screening:   Provider or family/friend/caregiver concerned regarding cognition?: No    PREVENTIVE SCREENINGS      Cardiovascular Screening:    General: Screening Current      Diabetes Screening:     General: Screening Current      Colorectal Cancer Screening:     General: Patient Declines      Prostate Cancer Screening:    General: Screening Not Indicated      Osteoporosis Screening:    General: Screening Not Indicated      Abdominal Aortic Aneurysm (AAA) Screening:    Risk factors include: age between 73-69 yo and tobacco use        General: Screening Current      Lung Cancer Screening:     General: Screening Not Indicated      Hepatitis C Screening:    General: Screening Current    Screening, Brief Intervention, and Referral to Treatment (SBIRT)    Screening  Typical number of drinks in a day: 0  Typical number of drinks in a week: 0  Interpretation: Low risk drinking behavior  AUDIT-C Screenin) How often did you have a drink containing alcohol in the past year? never  2) How many drinks did you have on a typical day when you were drinking in the past year? 0  3) How often did you have 6 or more drinks on one occasion in the past year? never    AUDIT-C Score: 0  Interpretation: Score 0-3 (male): Negative screen for alcohol misuse    Single Item Drug Screening:  How often have you used an illegal drug (including marijuana) or a prescription medication for non-medical reasons in the past year? never    Single Item Drug Screen Score: 0  Interpretation: Negative screen for possible drug use disorder    Brief Intervention  Alcohol & drug use screenings were reviewed  No concerns regarding substance use disorder identified  Other Counseling Topics:   Car/seat belt/driving safety, skin self-exam, sunscreen and regular weightbearing exercise        Physical Exam:     /80 (BP Location: Left arm, Patient Position: Sitting, Cuff Size: Standard)   Pulse 70   Temp 98 6 °F (37 °C) (Tympanic)   Resp 20   Ht 5' 10" (1 778 m)   Wt 59 9 kg (132 lb)   SpO2 100%   BMI 18 94 kg/m²     Physical Exam  Constitutional:       General: He is not in acute distress  Appearance: He is not ill-appearing  Cardiovascular:      Rate and Rhythm: Normal rate and regular rhythm  Heart sounds: No murmur heard  Pulmonary:      Effort: Pulmonary effort is normal  No respiratory distress  Breath sounds: No wheezing  Abdominal:      General: Bowel sounds are normal  There is no distension  Tenderness: There is no abdominal tenderness  Musculoskeletal:      Right lower leg: No edema  Left lower leg: No edema  Neurological:      Mental Status: He is alert          Suzy Roller, DO

## 2022-10-17 NOTE — ASSESSMENT & PLAN NOTE
History of ischemic cardiomyopathy in the past which resolved  Stable without angina  Continue aspirin and statin

## 2023-02-06 ENCOUNTER — OFFICE VISIT (OUTPATIENT)
Dept: CARDIOLOGY CLINIC | Facility: CLINIC | Age: 78
End: 2023-02-06

## 2023-02-06 VITALS
WEIGHT: 133 LBS | BODY MASS INDEX: 19.08 KG/M2 | HEART RATE: 62 BPM | DIASTOLIC BLOOD PRESSURE: 72 MMHG | OXYGEN SATURATION: 97 % | SYSTOLIC BLOOD PRESSURE: 134 MMHG

## 2023-02-06 DIAGNOSIS — I25.10 CAD IN NATIVE ARTERY: Primary | Chronic | ICD-10-CM

## 2023-02-06 NOTE — PROGRESS NOTES
Cardiology Office Note    Jessica Lagos  69 y o  male MRN: [id number]    02/06/23          Assessment:  1  CAD s/p PCI to the dLAD and PTCA to D1 (10/2019)  2  ICM with EF recovery  3  Hx of lacunar CVA  4  Hypertension with orthostatic hypotension     Plan:  · Continue aspirin and atorvastatin  · BP at goal off medical therapy  Ambulatory blood pressure monitoring and maintaining a low sodium diet was advised  · He was advised to notify us with the onset of cardiac symptoms  Follow up: 1 year or sooner as needed    1  CAD in native artery            HPI: Jessica Lagos  is a 69y o  year old male with history of CAD s/p PCI, ICM with EF recovery, and history of lacunar CVA who presents for routine follow-up  Past cardiac evaluation:  · CAD s/p NSTEMI s/p PCI to the dLAD and PTCA to D1 (10/2019)  · TTE 11/2019: EF 50-55%; improved from 40-45%   · He presented to the ED in 9/2020 with vertigo  He was hypertensive on presentation and was started on lisinopril 2 5 mg daily which precipitated hypotension and it was subsequently discontinued  He has been doing very well from a cardiac standpoint since his last evaluation  He is very active and still works 40 hours/week at [employer]  He is tolerating his medications without side effects  He denies chest pain, palpitations, dyspnea on exertion or any other cardiac concerns at this time          Family History: grandfather with history of CAD          No Known Allergies      Current Outpatient Medications:   •  aspirin (ECOTRIN LOW STRENGTH) 81 mg EC tablet, Take 1 tablet (81 mg total) by mouth daily Please purchase over the counter at her local pharmacy, Disp: 30 tablet, Rfl: 1  •  atorvastatin (LIPITOR) 80 mg tablet, Take 1 tablet (80 mg total) by mouth daily with dinner, Disp: 90 tablet, Rfl: 3  •  Blood Pressure KIT, by Does not apply route daily, Disp: 1 each, Rfl: 0    Past Medical History:   Diagnosis Date   • Hyperlipidemia    • Ischemic cardiomyopathy    • Lacunar infarction [facility])    • Myocardial infarction [facility])    • Near syncope 2019   • Non-ST elevated myocardial infarction [facility])    • NSTEMI (non-ST elevated myocardial infarction) ([facility] ) 10/20/2019   • Pericarditis 10/24/2019   • Poison ivy    • Stroke [facility])        Family History   Problem Relation Age of Onset   • No Known Problems Mother    • Emphysema Father    • Heart disease Maternal Grandfather    • Heart attack Maternal Grandfather    • Emphysema Paternal Grandfather        Past Surgical History:   Procedure Laterality Date   • ANGIOPLASTY     • CORONARY STENT PLACEMENT     • HERNIA REPAIR Left 2021    Procedure: OPEN REPAIR HERNIA INGUINAL LEFT WITH MESH;  Surgeon: Norm Newman MD;  Location: [facility];  Service: General       Social History     Socioeconomic History   • Marital status:      Spouse name: Not on file   • Number of children: Not on file   • Years of education: Not on file   • Highest education level: Not on file   Occupational History   • Not on file   Tobacco Use   • Smoking status: Former     Packs/day: 1 00     Years: 15 00     Pack years: 15 00     Types: Cigarettes     Quit date: 1969     Years since quittin 2   • Smokeless tobacco: Never   Vaping Use   • Vaping Use: Never used   Substance and Sexual Activity   • Alcohol use: Not Currently   • Drug use: No   • Sexual activity: Not Currently     Partners: Female   Other Topics Concern   • Not on file   Social History Narrative   • Not on file     Social Determinants of Health     Financial Resource Strain: Low Risk    • Difficulty of Paying Living Expenses: Not hard at all   Food Insecurity: Not on file   Transportation Needs: No Transportation Needs   • Lack of Transportation (Medical): No   • Lack of Transportation (Non-Medical):  No   Physical Activity: Not on file   Stress: Not on file   Social Connections: Not on file   Intimate Partner Violence: Not on file   Housing Stability: Not on file       Review of Systems   Constitutional: Negative for diaphoresis, weight gain and weight loss  HENT: Negative for congestion  Cardiovascular: Negative for chest pain, dyspnea on exertion, irregular heartbeat, leg swelling, near-syncope, orthopnea, palpitations, paroxysmal nocturnal dyspnea and syncope  Respiratory: Negative for shortness of breath, sleep disturbances due to breathing and snoring  Hematologic/Lymphatic: Does not bruise/bleed easily  Skin: Negative for rash  Musculoskeletal: Negative for myalgias  Gastrointestinal: Negative for nausea and vomiting  Neurological: Negative for excessive daytime sleepiness and light-headedness  Psychiatric/Behavioral: The patient is not nervous/anxious  Vitals: /72 (BP Location: Left arm, Patient Position: Sitting, Cuff Size: Standard)   Pulse 62   Wt 60 3 kg (133 lb)   SpO2 97%   BMI 19 08 kg/m²       Physical Exam:     GEN: Alert and oriented x 3, in no acute distress  Well appearing and well nourished  HEENT: Sclera anicteric, conjunctivae pink, mucous membranes moist  Oropharynx clear  NECK: Supple, no carotid bruits, no significant JVD  Trachea midline, no thyromegaly  HEART: Regular rhythm, normal S1 and S2, no murmurs, clicks, gallops or rubs  PMI nondisplaced, no thrills  LUNGS: Clear to auscultation bilaterally; no wheezes, rales, or rhonchi  No increased work of breathing or signs of respiratory distress  ABDOMEN: Soft, nontender, nondistended, normoactive bowel sounds  EXTREMITIES: Skin warm and well perfused, no clubbing, cyanosis, or edema  NEURO: No focal findings  Normal speech  Mood and affect normal    SKIN: Normal without suspicious lesions on exposed skin

## 2023-05-01 ENCOUNTER — HOSPITAL ENCOUNTER (EMERGENCY)
Facility: HOSPITAL | Age: 78
Discharge: HOME | End: 2023-05-01
Attending: EMERGENCY MEDICINE

## 2023-05-01 ENCOUNTER — APPOINTMENT (EMERGENCY)
Dept: CT IMAGING | Facility: HOSPITAL | Age: 78
End: 2023-05-01

## 2023-05-01 VITALS
RESPIRATION RATE: 20 BRPM | OXYGEN SATURATION: 99 % | HEART RATE: 74 BPM | TEMPERATURE: 97.7 F | SYSTOLIC BLOOD PRESSURE: 150 MMHG | DIASTOLIC BLOOD PRESSURE: 87 MMHG

## 2023-05-01 DIAGNOSIS — K40.90 INGUINAL HERNIA: ICD-10-CM

## 2023-05-01 DIAGNOSIS — R32 URINARY INCONTINENCE: Primary | ICD-10-CM

## 2023-05-01 DIAGNOSIS — H93.8X3 EAR PRESSURE, BILATERAL: ICD-10-CM

## 2023-05-01 DIAGNOSIS — N32.89 BLADDER WALL THICKENING: ICD-10-CM

## 2023-05-01 DIAGNOSIS — R09.81 NASAL CONGESTION: ICD-10-CM

## 2023-05-01 LAB
ALBUMIN SERPL BCP-MCNC: 3.8 G/DL (ref 3.5–5)
ALP SERPL-CCNC: 59 U/L (ref 34–104)
ALT SERPL W P-5'-P-CCNC: 15 U/L (ref 7–52)
ANION GAP SERPL CALCULATED.3IONS-SCNC: 7 MMOL/L (ref 4–13)
AST SERPL W P-5'-P-CCNC: 31 U/L (ref 13–39)
BACTERIA UR QL AUTO: ABNORMAL /HPF
BASOPHILS # BLD AUTO: 0 THOUSANDS/ΜL (ref 0–0.1)
BASOPHILS NFR BLD AUTO: 0 % (ref 0–1)
BILIRUB SERPL-MCNC: 0.69 MG/DL (ref 0.2–1)
BILIRUB UR QL STRIP: NEGATIVE
BUN SERPL-MCNC: 38 MG/DL (ref 5–25)
CALCIUM SERPL-MCNC: 8.9 MG/DL (ref 8.4–10.2)
CHLORIDE SERPL-SCNC: 101 MMOL/L (ref 96–108)
CLARITY UR: ABNORMAL
CO2 SERPL-SCNC: 24 MMOL/L (ref 21–32)
COLOR UR: YELLOW
CREAT SERPL-MCNC: 1.59 MG/DL (ref 0.6–1.3)
EOSINOPHIL # BLD AUTO: 0 THOUSAND/ΜL (ref 0–0.61)
EOSINOPHIL NFR BLD AUTO: 0 % (ref 0–6)
ERYTHROCYTE [DISTWIDTH] IN BLOOD BY AUTOMATED COUNT: 16.2 % (ref 11.6–15.1)
GFR SERPL CREATININE-BSD FRML MDRD: 41 ML/MIN/1.73SQ M
GLUCOSE SERPL-MCNC: 118 MG/DL (ref 65–140)
GLUCOSE UR STRIP-MCNC: NEGATIVE MG/DL
GRAN CASTS #/AREA URNS LPF: ABNORMAL /[LPF]
HCT VFR BLD AUTO: 36.1 % (ref 36.5–49.3)
HGB BLD-MCNC: 11.4 G/DL (ref 12–17)
HGB UR QL STRIP.AUTO: NEGATIVE
HYALINE CASTS #/AREA URNS LPF: ABNORMAL /LPF
IMM GRANULOCYTES # BLD AUTO: 0.03 THOUSAND/UL (ref 0–0.2)
IMM GRANULOCYTES NFR BLD AUTO: 1 % (ref 0–2)
KETONES UR STRIP-MCNC: NEGATIVE MG/DL
LEUKOCYTE ESTERASE UR QL STRIP: NEGATIVE
LIPASE SERPL-CCNC: 22 U/L (ref 11–82)
LYMPHOCYTES # BLD AUTO: 0.52 THOUSANDS/ΜL (ref 0.6–4.47)
LYMPHOCYTES NFR BLD AUTO: 9 % (ref 14–44)
MCH RBC QN AUTO: 24.2 PG (ref 26.8–34.3)
MCHC RBC AUTO-ENTMCNC: 31.6 G/DL (ref 31.4–37.4)
MCV RBC AUTO: 77 FL (ref 82–98)
MONOCYTES # BLD AUTO: 0.67 THOUSAND/ΜL (ref 0.17–1.22)
MONOCYTES NFR BLD AUTO: 12 % (ref 4–12)
MUCOUS THREADS UR QL AUTO: ABNORMAL
NEUTROPHILS # BLD AUTO: 4.37 THOUSANDS/ΜL (ref 1.85–7.62)
NEUTS SEG NFR BLD AUTO: 78 % (ref 43–75)
NITRITE UR QL STRIP: NEGATIVE
NON-SQ EPI CELLS URNS QL MICRO: ABNORMAL /HPF
NRBC BLD AUTO-RTO: 0 /100 WBCS
PH UR STRIP.AUTO: 5 [PH]
PLATELET # BLD AUTO: 162 THOUSANDS/UL (ref 149–390)
PMV BLD AUTO: 11.6 FL (ref 8.9–12.7)
POTASSIUM SERPL-SCNC: 4.4 MMOL/L (ref 3.5–5.3)
PROT SERPL-MCNC: 6.5 G/DL (ref 6.4–8.4)
PROT UR STRIP-MCNC: ABNORMAL MG/DL
RBC # BLD AUTO: 4.71 MILLION/UL (ref 3.88–5.62)
RBC #/AREA URNS AUTO: ABNORMAL /HPF
SODIUM SERPL-SCNC: 132 MMOL/L (ref 135–147)
SP GR UR STRIP.AUTO: 1.03 (ref 1–1.03)
UROBILINOGEN UR STRIP-ACNC: 3 MG/DL
WBC # BLD AUTO: 5.59 THOUSAND/UL (ref 4.31–10.16)
WBC #/AREA URNS AUTO: ABNORMAL /HPF

## 2023-05-01 RX ORDER — OXYMETAZOLINE HYDROCHLORIDE 0.05 G/100ML
3 SPRAY NASAL ONCE
Status: COMPLETED | OUTPATIENT
Start: 2023-05-01 | End: 2023-05-01

## 2023-05-01 RX ADMIN — SODIUM CHLORIDE 500 ML: 0.9 INJECTION, SOLUTION INTRAVENOUS at 15:59

## 2023-05-01 RX ADMIN — OXYMETAZOLINE HYDROCHLORIDE 3 SPRAY: 0.05 SPRAY NASAL at 12:52

## 2023-05-01 RX ADMIN — IOHEXOL 100 ML: 350 INJECTION, SOLUTION INTRAVENOUS at 14:53

## 2023-05-01 NOTE — ED PROVIDER NOTES
History  Chief Complaint   Patient presents with    Headache     Pt reports head pressure and popping in his ears X3-4 days along with nausea  Denies V/D      80-year-old male history of CAD on aspirin and hypertension presenting with multiple complaints  Patient complaining of bilateral ear discomfort and pressure ongoing for the past week and worsening  Denies any nasal congestion but per daughter at bedside he sounds congested to her  Reports ongoing rhinorrhea  Denies any cough or any shortness of breath chest pain  Patient also reports several months of urinary urge incontinence  Does not feel like he is able to completely void  Voiding small amounts at a time multiple times a day  Denies any abdominal pain nausea vomiting diarrhea  Denies any bleeding  Father with history of prostate cancer  Denies any other complaints  Chart reviewed  Discussed results and recommendations  Advised follow up PCP  Medication recommendations  Given instructions and return precautions  Patient/family at bedside acknowledged understanding of all written and verbal instructions and return precautions  Discharged  Past Medical History:  No date: Hyperlipidemia  No date: Ischemic cardiomyopathy  No date: Lacunar infarction [facility])  No date: Myocardial infarction ([facility] )  11/14/2019: Near syncope  No date: Non-ST elevated myocardial infarction [facility])  10/20/2019: NSTEMI (non-ST elevated myocardial infarction) ([facility] )  10/24/2019: Pericarditis  No date: Poison ivy  No date: Stroke [facility])  Family History: non-contributory  Social History            Prior to Admission Medications   Prescriptions Last Dose Informant Patient Reported? Taking?    Blood Pressure KIT   No No   Sig: by Does not apply route daily   aspirin (ECOTRIN LOW STRENGTH) 81 mg EC tablet   No No   Sig: Take 1 tablet (81 mg total) by mouth daily Please purchase over the counter at her local pharmacy   atorvastatin (LIPITOR) 80 mg tablet   No No   Sig: Take 1 tablet (80 mg total) by mouth daily with dinner      Facility-Administered Medications: None       Past Medical History:   Diagnosis Date    Hyperlipidemia     Ischemic cardiomyopathy     [facility] )     [facility]     Near syncope 2019    Non-ST elevated myocardial infarction [facility])     NSTEMI (non-ST elevated myocardial infarction) ([facility] ) 10/20/2019    Pericarditis 10/24/2019    Poison ivy     Stroke [facility])        Past Surgical History:   Procedure Laterality Date    ANGIOPLASTY      CORONARY STENT PLACEMENT      HERNIA REPAIR Left 2021    Procedure: OPEN REPAIR HERNIA INGUINAL LEFT WITH MESH;  Surgeon: Tristan Munoz MD;  Location: [facility];  Service: General       Family History   Problem Relation Age of Onset    No Known Problems Mother     Emphysema Father     Heart disease Maternal Grandfather     Heart attack Maternal Grandfather     Emphysema Paternal Grandfather      I have reviewed and agree with the history as documented  E-Cigarette/Vaping    E-Cigarette Use Never User      E-Cigarette/Vaping Substances    Nicotine No     THC No     CBD No     Flavoring No     Other No     Unknown No      Social History     Tobacco Use    Smoking status: Former     Packs/day: 1 00     Years: 15 00     Pack years: 15      Types: Cigarettes     Quit date: 1969     Years since quittin 4    Smokeless tobacco: Never   Vaping Use    Vaping Use: Never used   Substance Use Topics    Alcohol use: Not Currently    Drug use: No       Review of Systems   Constitutional: Negative for appetite change, chills, diaphoresis, fever and unexpected weight change  HENT: Positive for congestion, ear pain, rhinorrhea and sinus pressure  Negative for ear discharge  Eyes: Negative for photophobia and visual disturbance  Respiratory: Negative for cough, chest tightness and shortness of breath      Cardiovascular: Negative for chest pain, palpitations and leg swelling  Gastrointestinal: Negative for abdominal distention, abdominal pain, blood in stool, constipation, diarrhea, nausea and vomiting  Genitourinary: Positive for difficulty urinating and frequency  Negative for dysuria and hematuria  Musculoskeletal: Negative for back pain, joint swelling, neck pain and neck stiffness  Skin: Negative for color change, pallor, rash and wound  Neurological: Negative for dizziness, syncope, weakness, light-headedness and headaches  Psychiatric/Behavioral: Negative for agitation  All other systems reviewed and are negative  Physical Exam  Physical Exam  Vitals and nursing note reviewed  Constitutional:       General: He is not in acute distress  Appearance: Normal appearance  He is well-developed  He is not ill-appearing, toxic-appearing or diaphoretic  HENT:      Head: Normocephalic and atraumatic  Right Ear: Tympanic membrane, ear canal and external ear normal  There is no impacted cerumen  Left Ear: Tympanic membrane, ear canal and external ear normal  There is no impacted cerumen  Nose: Nose normal  No congestion or rhinorrhea  Mouth/Throat:      Mouth: Mucous membranes are moist       Pharynx: Oropharynx is clear  No oropharyngeal exudate or posterior oropharyngeal erythema  Eyes:      General: No scleral icterus  Right eye: No discharge  Left eye: No discharge  Extraocular Movements: Extraocular movements intact  Conjunctiva/sclera: Conjunctivae normal       Pupils: Pupils are equal, round, and reactive to light  Neck:      Vascular: No JVD  Trachea: No tracheal deviation  Comments: Supple  Normal range of motion  Cardiovascular:      Rate and Rhythm: Normal rate and regular rhythm  Heart sounds: Normal heart sounds  No murmur heard  No friction rub  No gallop        Comments: Normal rate and regular rhythm  Pulmonary:      Effort: Pulmonary effort is normal  No respiratory distress  Breath sounds: Normal breath sounds  No stridor  No wheezing or rales  Comments: Clear to auscultation bilaterally  Chest:      Chest wall: No tenderness  Abdominal:      General: Bowel sounds are normal  There is no distension  Palpations: Abdomen is soft  Tenderness: There is no abdominal tenderness  There is no right CVA tenderness, left CVA tenderness, guarding or rebound  Comments: Soft, nontender, nondistended  Normal bowel sounds throughout   Musculoskeletal:         General: No swelling, tenderness, deformity or signs of injury  Normal range of motion  Cervical back: Normal range of motion and neck supple  No rigidity  No muscular tenderness  Right lower leg: No edema  Left lower leg: No edema  Lymphadenopathy:      Cervical: No cervical adenopathy  Skin:     General: Skin is warm and dry  Coloration: Skin is not pale  Findings: No erythema or rash  Neurological:      General: No focal deficit present  Mental Status: He is alert  Mental status is at baseline  Sensory: No sensory deficit  Motor: No weakness or abnormal muscle tone  Coordination: Coordination normal       Gait: Gait normal       Comments: Alert  Strength and sensation grossly intact  Ambulatory without difficulty at baseline  Psychiatric:         Behavior: Behavior normal          Thought Content:  Thought content normal          Vital Signs  ED Triage Vitals [05/01/23 1203]   Temperature Pulse Respirations Blood Pressure SpO2   97 7 °F (36 5 °C) 95 20 128/71 99 %      Temp Source Heart Rate Source Patient Position - Orthostatic VS BP Location FiO2 (%)   Temporal Monitor Sitting Left arm --      Pain Score       --           Vitals:    05/01/23 1203 05/01/23 1537   BP: 128/71 150/87   Pulse: 95 74   Patient Position - Orthostatic VS: Sitting Lying         Visual Acuity      ED Medications  Medications   oxymetazoline (AFRIN) 0 05 % nasal spray 3 spray (3 sprays Each Nare Given 5/1/23 1252)   sodium chloride 0 9 % bolus 500 mL (0 mL Intravenous Stopped 5/1/23 1657)   iohexol (OMNIPAQUE) 350 MG/ML injection (SINGLE-DOSE) 100 mL (100 mL Intravenous Given 5/1/23 1453)       Diagnostic Studies  Results Reviewed     Procedure Component Value Units Date/Time    Comprehensive metabolic panel [[id number]]  (Abnormal) Collected: 05/01/23 1354    Lab Status: Final result Specimen: Blood from Arm, Right Updated: 05/01/23 1425     Sodium 132 mmol/L      Potassium 4 4 mmol/L      Chloride 101 mmol/L      CO2 24 mmol/L      ANION GAP 7 mmol/L      BUN 38 mg/dL      Creatinine 1 59 mg/dL      Glucose 118 mg/dL      Calcium 8 9 mg/dL      AST 31 U/L      ALT 15 U/L      Alkaline Phosphatase 59 U/L      Total Protein 6 5 g/dL      Albumin 3 8 g/dL      Total Bilirubin 0 69 mg/dL      eGFR 41 ml/min/1 73sq m     Narrative:      [place] guidelines for Chronic Kidney Disease (CKD):     Stage 1 with normal or high GFR (GFR > 90 mL/min/1 73 square meters)    Stage 2 Mild CKD (GFR = 60-89 mL/min/1 73 square meters)    Stage 3A Moderate CKD (GFR = 45-59 mL/min/1 73 square meters)    Stage 3B Moderate CKD (GFR = 30-44 mL/min/1 73 square meters)    Stage 4 Severe CKD (GFR = 15-29 mL/min/1 73 square meters)    Stage 5 End Stage CKD (GFR <15 mL/min/1 73 square meters)  Note: GFR calculation is accurate only with a steady state creatinine    Lipase [[id number]]  (Normal) Collected: 05/01/23 1354    Lab Status: Final result Specimen: Blood from Arm, Right Updated: 05/01/23 1425     Lipase 22 u/L     CBC and differential [[id number]]  (Abnormal) Collected: 05/01/23 1354    Lab Status: Final result Specimen: Blood from Arm, Right Updated: 05/01/23 1413     WBC 5 59 Thousand/uL      RBC 4 71 Million/uL      Hemoglobin 11 4 g/dL      Hematocrit 36 1 %      MCV 77 fL      MCH 24 2 pg      MCHC 31 6 g/dL      RDW 16 2 %      MPV 11 6 fL      Platelets 687 Thousands/uL nRBC 0 /100 WBCs      Neutrophils Relative 78 %      Immat GRANS % 1 %      Lymphocytes Relative 9 %      Monocytes Relative 12 %      Eosinophils Relative 0 %      Basophils Relative 0 %      Neutrophils Absolute 4 37 Thousands/µL      Immature Grans Absolute 0 03 Thousand/uL      Lymphocytes Absolute 0 52 Thousands/µL      Monocytes Absolute 0 67 Thousand/µL      Eosinophils Absolute 0 00 Thousand/µL      Basophils Absolute 0 00 Thousands/µL     Urine Microscopic [[id number]]  (Abnormal) Collected: 05/01/23 1255    Lab Status: Final result Specimen: Urine, Clean Catch Updated: 05/01/23 1314     RBC, UA None Seen /hpf      WBC, UA 2-4 /hpf      Epithelial Cells Occasional /hpf      Bacteria, UA Occasional /hpf      MUCUS THREADS Moderate     Hyaline Casts, UA Innumerable /lpf      Granular Casts, UA 5-10    UA w Reflex to Microscopic w Reflex to Culture [[id number]]  (Abnormal) Collected: 05/01/23 1255    Lab Status: Final result Specimen: Urine, Clean Catch Updated: 05/01/23 1309     Color, UA Yellow     Clarity, UA Turbid     Specific Ollie, UA 1 027     pH, UA 5 0     Leukocytes, UA Negative     Nitrite, UA Negative     Protein, UA 30 (1+) mg/dl      Glucose, UA Negative mg/dl      Ketones, UA Negative mg/dl      Urobilinogen, UA 3 0 mg/dl      Bilirubin, UA Negative     Occult Blood, UA Negative                 CT abdomen pelvis with contrast   Final Result by Uche Correa MD (05/01 1630)      Cholelithiasis  Splenomegaly  Bilateral renal cysts  Left posterior lateral irregular thickening of the bladder wall, correlate with urinalysis and outpatient nonemergent cystoscopy to rule out transitional cell carcinoma  3 3 x 1 9 cm fluid collection at the origin of the left inguinal canal could be postsurgical but needs to be correlated clinically  There is increased soft tissue along the left inguinal canal extending to the scrotum, correlate with possible hernia    surgical history        The study was marked in EPIC for significant notification  Workstation performed: [id number]                    Procedures  Procedures         ED Course                               SBIRT 22yo+    Flowsheet Row Most Recent Value   Initial Alcohol Screen: US AUDIT-C     1  How often do you have a drink containing alcohol? 0 Filed at: 05/01/2023 1653   2  How many drinks containing alcohol do you have on a typical day you are drinking? 0 Filed at: 05/01/2023 1653   3a  Male UNDER 65: How often do you have five or more drinks on one occasion? 0 Filed at: 05/01/2023 1653   3b  FEMALE Any Age, or MALE 65+: How often do you have 4 or more drinks on one occassion? 0 Filed at: 05/01/2023 1653   Audit-C Score 0 Filed at: 05/01/2023 1653   MILA: How many times in the past year have you    Used an illegal drug or used a prescription medication for non-medical reasons? Never Filed at: 05/01/2023 1653                    Medical Decision Making  78-year-old male history of CAD on aspirin and hypertension presenting with multiple complaints  Normal ear exam and complaint of pressure with concern for congestion likely eustachian tube dysfunction  Plan for nasal spray  Urge continence type symptoms possibly related to urinary retention  Plan for UA and postvoid residual   Reassess  Ear pressure significantly improved  UA without infectious concerns  Normal postvoid residual   Patient high risk for intra-abdominal etiology so plan for labs including abdominal labs and CT abdomen pelvis at this time  Reassess  Labs notable for elevated creatinine 1 5 with baseline near 1  Plan for IV fluids  CT imaging notable for thickened bladder and irregular contour  Likely contributing to symptoms  Informed patient of recommendations for follow-up with urology for cystoscopy given concern for possible cancer  Scheduled for small fluid collection and possible hernia    Informed patient of CT results and need for follow-up  Urology referral  Discussed results and recommendations  Advised follow up PCP  Medication recommendations  Given instructions and return precautions  Patient/family at bedside acknowledged understanding of all written and verbal instructions and return precautions  Discharged  Amount and/or Complexity of Data Reviewed  Labs: ordered  Radiology: ordered  Risk  OTC drugs  Prescription drug management  Disposition  Final diagnoses:   Ear pressure, bilateral   Nasal congestion   Urinary incontinence   Inguinal hernia   Bladder wall thickening     Time reflects when diagnosis was documented in both MDM as applicable and the Disposition within this note     Time User Action Codes Description Comment    5/1/2023  2:53 PM Brooksie Chihuahua Add [F76 0E1] Ear pressure, bilateral     5/1/2023  2:53 PM Brooksie Chihuahua Add [R09 81] Nasal congestion     5/1/2023  2:53 PM Brooksie Chihuahua Add [R32] Urinary incontinence     5/1/2023  4:34 PM Brooksie Chihuahua Add [K40 90] Inguinal hernia     5/1/2023  4:35 PM Brooksie Chihuahua Add [N32 89] Bladder wall thickening     5/1/2023  4:35 PM Brooksie Chihuahua Modify [V21 0E6] Ear pressure, bilateral     5/1/2023  4:35 PM Brooksie Chihuahua Modify [R32] Urinary incontinence       ED Disposition     ED Disposition   Discharge    Condition   Stable    Date/Time   Mon May 1, 2023  4:35 PM    Comment   Jami Asher  discharge to home/self care                 Follow-up Information     Follow up With Specialties Details Why Contact Info Additional [address] [facility],  Internal Medicine Schedule an appointment as soon as possible for a visit in 1 week  [address]  [facility] [address] [place] [address] [address] [facility] [address] For Urology [facility] Urology Schedule an appointment as soon as possible for a visit in 1 week  [address] [facility] [id number]  [address]  [facility] [place], [address], [address], [place], [place], [address] [facility] [address]          Discharge Medication List as of 5/1/2023  4:36 PM      CONTINUE these medications which have NOT CHANGED    Details   aspirin (ECOTRIN LOW STRENGTH) 81 mg EC tablet Take 1 tablet (81 mg total) by mouth daily Please purchase over the counter at her local pharmacy, Starting Tue 11/19/2019, Until Mon 2/6/2023, Normal      atorvastatin (LIPITOR) 80 mg tablet Take 1 tablet (80 mg total) by mouth daily with dinner, Starting Mon 10/17/2022, Normal      Blood Pressure KIT by Does not apply route daily, Starting Tue 11/19/2019, Normal                 PDMP Review     None          ED Provider  Electronically Signed by           Rhina Musa MD  05/02/23 5809

## 2023-05-01 NOTE — Clinical Note
Wilfrid Flores was seen and treated in our emergency department on 5/1/2023  Diagnosis:     Tram Chilel  may return to work on return date  He may return on this date: 05/02/2023         If you have any questions or concerns, please don't hesitate to call        Yue Calhoun MD    ______________________________           _______________          _______________  Hospital Representative                              Date                                Time

## 2023-05-01 NOTE — DISCHARGE INSTRUCTIONS
"THERAPY NOTE    Patient Active Problem List   Diagnosis     Obesity     Chronic rhinitis     Incomplete circumcision     Disorganized behavior     PTSD (post-traumatic stress disorder)     Current moderate episode of major depressive disorder, unspecified whether recurrent (H)         Duration: Met with patient on 1/21/2021, for a total of 25 minutes.    Patient Goals: The patient identified their treatment goals as working on completing his relapse prevention and safety plan.     Interventions used: Motivational Interviewing    Patient progress: The purpose of this session was to work on developing a relapse prevention plan. Pt processed that he is in \"great\" mood due having a good talk with his doctor. Pt elaborated that he is aware of his upcoming therapy appointments. The rest of the discussion was surrounded are relapse prevention. Pt was able to answer the various question to parts of the relapse prevention.     Patient Response: Pt presented as bright and a little elevated. Pt was engaged throughout the entire of the session. Pt was able to work on begging a relapse prevention plan and was agreeable to continue to work on finishing the relapse prevention plan and safety plan.    Assessment or plan: Pt appears anxious about his upcoming discharge due to needing constant reassurance that he will be discharging the week of February 1st. Continue to assist pt with completion and processing of safety plan and relapse prevention plan.     " Please follow up PCP and urology  Recommend tylenol 650 mg and ibuprofen 600 mg every 6 hours as needed for pain  Please return for severe chest pain, significant shortness of breath, severely worsening symptoms, or any other concerning signs or symptoms  Please refer to the following documents for additional instructions and return precautions

## 2023-05-03 ENCOUNTER — RA CDI HCC (OUTPATIENT)
Dept: OTHER | Facility: HOSPITAL | Age: 78
End: 2023-05-03

## 2023-05-03 ENCOUNTER — TELEPHONE (OUTPATIENT)
Dept: UROLOGY | Facility: AMBULATORY SURGERY CENTER | Age: 78
End: 2023-05-03

## 2023-05-03 NOTE — TELEPHONE ENCOUNTER
New Patient    What is the reason for the patients appointment?: ER referral   R32 (ICD-10-CM) - Urinary incontinence   K40 90 (ICD-10-CM) - Inguinal hernia   N32 89 (ICD-10-CM) - Bladder wall thickening       What office location does the patient prefer?: [place]     Does patient have Imaging/Lab Results: yes    Have patient records been requested?: no  If No, are the records showing in Epic: yes      INSURANCE:  Do we accept the patient's insurance or is the patient Self-Pay?:    Insurance Provider: [employer]  Plan Type/Number:  Member ID#:       HISTORY:   Has the patient had any previous Urologist(s)?: no     Was the patient seen in the ED?: yes     Has the patient had any outside testing done?: no    Does the patient have a personal history of cancer?: no    Patient's daughter Ronald He can be reached at  [phone]

## 2023-05-04 ENCOUNTER — TELEPHONE (OUTPATIENT)
Dept: UROLOGY | Facility: CLINIC | Age: 78
End: 2023-05-04

## 2023-05-04 NOTE — PROGRESS NOTES
[facility]  coding opportunities       Chart reviewed, no opportunity found:   [address]        Patients Insurance     Medicare Insurance: [employer]

## 2023-05-05 ENCOUNTER — OFFICE VISIT (OUTPATIENT)
Age: 78
End: 2023-05-05

## 2023-05-05 ENCOUNTER — APPOINTMENT (OUTPATIENT)
Age: 78
End: 2023-05-05

## 2023-05-05 VITALS
HEART RATE: 70 BPM | SYSTOLIC BLOOD PRESSURE: 116 MMHG | RESPIRATION RATE: 20 BRPM | WEIGHT: 134 LBS | TEMPERATURE: 97 F | DIASTOLIC BLOOD PRESSURE: 60 MMHG | BODY MASS INDEX: 19.18 KG/M2 | OXYGEN SATURATION: 98 % | HEIGHT: 70 IN

## 2023-05-05 DIAGNOSIS — N17.9 ACUTE KIDNEY INJURY (HCC): ICD-10-CM

## 2023-05-05 DIAGNOSIS — N17.9 ACUTE KIDNEY INJURY (HCC): Primary | ICD-10-CM

## 2023-05-05 DIAGNOSIS — I10 ESSENTIAL HYPERTENSION: Chronic | ICD-10-CM

## 2023-05-05 DIAGNOSIS — I25.10 CAD IN NATIVE ARTERY: Chronic | ICD-10-CM

## 2023-05-05 PROBLEM — E46 PROTEIN-CALORIE MALNUTRITION, UNSPECIFIED SEVERITY (HCC): Status: ACTIVE | Noted: 2023-05-05

## 2023-05-05 NOTE — PROGRESS NOTES
Name: Jomar Henry  : 1945      MRN: [id number]  Encounter Provider: Charlene Cosby DO  Encounter Date: 2023   Encounter department: [facility] PRIMARY CARE [place]    Assessment & Plan     1  Acute kidney injury [facility])    He is feeling better now and has been drinking better  Will repeat BMP to re-evaluate BUN/Cr/eGFR  Discussed importance of hydration     - Basic metabolic panel; Future    2  Essential hypertension    BP is well controlled and does not require anti-HTN therapy at this time  3  CAD in native artery    Stable without angina  Continue aspirin and statin  Follow-up with urology in regards to abnormality seen on CT scan  Patient has scheduled appt at the end of the month  Depression Screening and Follow-up Plan: Patient was screened for depression during today's encounter  They screened negative with a PHQ-2 score of 0  Return in about 6 months (around 2023) for Subsequent AWV  Subjective      Went into ED recently with multiple complaints and concern for sinus infection  Was not given any antibiotics  Also got a CT scan which showed irregular bladder wall thickening  He has smoking history and was given referral to urology  Has future appointment to undergo cystoscopy  No significant blood in urine  No evidence of UTI  His sinus symptoms are now gone but when they were bothering him, he didn't have a great appetite so wasn't eating and drinking well  CMP showed evidence of SCOTT  Over the years has had difficulty gaining weight  States he generally eats a lot and that he eats a high carb diet  Review of Systems   Constitutional: Negative for activity change, appetite change and fatigue  Respiratory: Negative for apnea, cough, chest tightness, shortness of breath and wheezing  Cardiovascular: Negative for chest pain, palpitations and leg swelling     Gastrointestinal: Negative for abdominal distention, abdominal pain, blood in stool, "constipation, diarrhea, nausea and vomiting  Genitourinary: Negative for dysuria and hematuria  Neurological: Negative for dizziness, weakness, light-headedness, numbness and headaches  Psychiatric/Behavioral: Negative for behavioral problems, confusion, hallucinations, sleep disturbance and suicidal ideas  The patient is not nervous/anxious  Objective     /60 (BP Location: Left arm, Patient Position: Sitting, Cuff Size: Standard)   Pulse 70   Temp (!) 97 °F (36 1 °C) (Tympanic)   Resp 20   Ht 5' 10\" (1 778 m)   Wt 60 8 kg (134 lb)   SpO2 98%   BMI 19 23 kg/m²     Physical Exam  Constitutional:       General: He is not in acute distress  Appearance: He is not ill-appearing  Cardiovascular:      Rate and Rhythm: Normal rate and regular rhythm  Heart sounds: No murmur heard  Pulmonary:      Effort: Pulmonary effort is normal  No respiratory distress  Breath sounds: No wheezing  Abdominal:      General: Bowel sounds are normal  There is no distension  Tenderness: There is no abdominal tenderness  Musculoskeletal:      Right lower leg: No edema  Left lower leg: No edema  Neurological:      Mental Status: He is alert         Jhoan Nothstein, DO    "

## 2023-05-06 LAB
ANION GAP SERPL CALCULATED.3IONS-SCNC: 4 MMOL/L (ref 4–13)
BUN SERPL-MCNC: 21 MG/DL (ref 5–25)
CALCIUM SERPL-MCNC: 8.3 MG/DL (ref 8.3–10.1)
CHLORIDE SERPL-SCNC: 110 MMOL/L (ref 96–108)
CO2 SERPL-SCNC: 23 MMOL/L (ref 21–32)
CREAT SERPL-MCNC: 0.9 MG/DL (ref 0.6–1.3)
GFR SERPL CREATININE-BSD FRML MDRD: 82 ML/MIN/1.73SQ M
GLUCOSE P FAST SERPL-MCNC: 84 MG/DL (ref 65–99)
POTASSIUM SERPL-SCNC: 3.9 MMOL/L (ref 3.5–5.3)
SODIUM SERPL-SCNC: 137 MMOL/L (ref 135–147)

## 2023-05-08 ENCOUNTER — TELEPHONE (OUTPATIENT)
Age: 78
End: 2023-05-08

## 2023-05-08 NOTE — TELEPHONE ENCOUNTER
As per lory consent I am able to leave a voicemail I left detail message of results and providers message

## 2023-05-08 NOTE — TELEPHONE ENCOUNTER
----- Message from Toney Henriquez DO sent at 5/6/2023  4:44 PM EDT -----  Kidney function improved back to normal

## 2023-05-18 ENCOUNTER — HOSPITAL ENCOUNTER (INPATIENT)
Facility: HOSPITAL | Age: 78
LOS: 6 days | Discharge: HOME | End: 2023-05-25
Attending: EMERGENCY MEDICINE | Admitting: INTERNAL MEDICINE

## 2023-05-18 ENCOUNTER — APPOINTMENT (EMERGENCY)
Dept: CT IMAGING | Facility: HOSPITAL | Age: 78
End: 2023-05-18

## 2023-05-18 DIAGNOSIS — I21.02 STEMI INVOLVING LEFT ANTERIOR DESCENDING CORONARY ARTERY (HCC): ICD-10-CM

## 2023-05-18 DIAGNOSIS — I21.3 STEMI (ST ELEVATION MYOCARDIAL INFARCTION) (HCC): ICD-10-CM

## 2023-05-18 DIAGNOSIS — E44.0 MODERATE PROTEIN-CALORIE MALNUTRITION (HCC): ICD-10-CM

## 2023-05-18 DIAGNOSIS — K62.89 RECTAL MASS: ICD-10-CM

## 2023-05-18 DIAGNOSIS — R07.9 CHEST PAIN, UNSPECIFIED TYPE: ICD-10-CM

## 2023-05-18 DIAGNOSIS — I25.5 ISCHEMIC CARDIOMYOPATHY: ICD-10-CM

## 2023-05-18 DIAGNOSIS — K62.5 RECTAL BLEEDING: Primary | ICD-10-CM

## 2023-05-18 PROBLEM — D64.9 ACUTE ON CHRONIC ANEMIA: Status: ACTIVE | Noted: 2023-05-18

## 2023-05-18 LAB
2HR DELTA HS TROPONIN: 1 NG/L
4HR DELTA HS TROPONIN: 1 NG/L
ALBUMIN SERPL BCP-MCNC: 3.8 G/DL (ref 3.5–5)
ALP SERPL-CCNC: 65 U/L (ref 34–104)
ALT SERPL W P-5'-P-CCNC: 15 U/L (ref 7–52)
ANION GAP SERPL CALCULATED.3IONS-SCNC: 6 MMOL/L (ref 4–13)
APTT PPP: 28 SECONDS (ref 23–37)
AST SERPL W P-5'-P-CCNC: 22 U/L (ref 13–39)
ATRIAL RATE: 69 BPM
BASOPHILS # BLD AUTO: 0.03 THOUSANDS/ÂΜL (ref 0–0.1)
BASOPHILS NFR BLD AUTO: 1 % (ref 0–1)
BILIRUB SERPL-MCNC: 0.37 MG/DL (ref 0.2–1)
BILIRUB UR QL STRIP: NEGATIVE
BUN SERPL-MCNC: 30 MG/DL (ref 5–25)
CALCIUM SERPL-MCNC: 9 MG/DL (ref 8.4–10.2)
CARDIAC TROPONIN I PNL SERPL HS: 3 NG/L
CARDIAC TROPONIN I PNL SERPL HS: 4 NG/L
CARDIAC TROPONIN I PNL SERPL HS: 4 NG/L
CHLORIDE SERPL-SCNC: 109 MMOL/L (ref 96–108)
CLARITY UR: CLEAR
CO2 SERPL-SCNC: 23 MMOL/L (ref 21–32)
COLOR UR: NORMAL
CREAT SERPL-MCNC: 1.21 MG/DL (ref 0.6–1.3)
EOSINOPHIL # BLD AUTO: 0.15 THOUSAND/ÂΜL (ref 0–0.61)
EOSINOPHIL NFR BLD AUTO: 3 % (ref 0–6)
ERYTHROCYTE [DISTWIDTH] IN BLOOD BY AUTOMATED COUNT: 16.9 % (ref 11.6–15.1)
GFR SERPL CREATININE-BSD FRML MDRD: 57 ML/MIN/1.73SQ M
GLUCOSE SERPL-MCNC: 85 MG/DL (ref 65–140)
GLUCOSE UR STRIP-MCNC: NEGATIVE MG/DL
HCT VFR BLD AUTO: 31 % (ref 36.5–49.3)
HCT VFR BLD AUTO: 32.2 % (ref 36.5–49.3)
HGB BLD-MCNC: 10 G/DL (ref 12–17)
HGB BLD-MCNC: 9.9 G/DL (ref 12–17)
HGB UR QL STRIP.AUTO: NEGATIVE
IMM GRANULOCYTES # BLD AUTO: 0.02 THOUSAND/UL (ref 0–0.2)
IMM GRANULOCYTES NFR BLD AUTO: 0 % (ref 0–2)
INR PPP: 1.13 (ref 0.84–1.19)
KETONES UR STRIP-MCNC: NEGATIVE MG/DL
LEUKOCYTE ESTERASE UR QL STRIP: NEGATIVE
LYMPHOCYTES # BLD AUTO: 1.2 THOUSANDS/ÂΜL (ref 0.6–4.47)
LYMPHOCYTES NFR BLD AUTO: 22 % (ref 14–44)
MCH RBC QN AUTO: 24.1 PG (ref 26.8–34.3)
MCHC RBC AUTO-ENTMCNC: 31.1 G/DL (ref 31.4–37.4)
MCV RBC AUTO: 78 FL (ref 82–98)
MONOCYTES # BLD AUTO: 0.61 THOUSAND/ÂΜL (ref 0.17–1.22)
MONOCYTES NFR BLD AUTO: 11 % (ref 4–12)
NEUTROPHILS # BLD AUTO: 3.58 THOUSANDS/ÂΜL (ref 1.85–7.62)
NEUTS SEG NFR BLD AUTO: 63 % (ref 43–75)
NITRITE UR QL STRIP: NEGATIVE
NRBC BLD AUTO-RTO: 0 /100 WBCS
P AXIS: 69 DEGREES
PH UR STRIP.AUTO: 5.5 [PH]
PLATELET # BLD AUTO: 176 THOUSANDS/UL (ref 149–390)
PMV BLD AUTO: 11.1 FL (ref 8.9–12.7)
POTASSIUM SERPL-SCNC: 4.3 MMOL/L (ref 3.5–5.3)
PR INTERVAL: 174 MS
PROT SERPL-MCNC: 6.3 G/DL (ref 6.4–8.4)
PROT UR STRIP-MCNC: NEGATIVE MG/DL
PROTHROMBIN TIME: 14.3 SECONDS (ref 11.6–14.5)
QRS AXIS: 78 DEGREES
QRSD INTERVAL: 76 MS
QT INTERVAL: 400 MS
QTC INTERVAL: 428 MS
RBC # BLD AUTO: 4.15 MILLION/UL (ref 3.88–5.62)
SODIUM SERPL-SCNC: 138 MMOL/L (ref 135–147)
SP GR UR STRIP.AUTO: 1.03 (ref 1–1.03)
T WAVE AXIS: 87 DEGREES
UROBILINOGEN UR STRIP-ACNC: <2 MG/DL
VENTRICULAR RATE: 69 BPM
WBC # BLD AUTO: 5.59 THOUSAND/UL (ref 4.31–10.16)

## 2023-05-18 RX ORDER — ONDANSETRON 2 MG/ML
4 INJECTION INTRAMUSCULAR; INTRAVENOUS EVERY 6 HOURS PRN
Status: DISCONTINUED | OUTPATIENT
Start: 2023-05-18 | End: 2023-05-25 | Stop reason: HOSPADM

## 2023-05-18 RX ORDER — ATORVASTATIN CALCIUM 40 MG/1
80 TABLET, FILM COATED ORAL
Status: DISCONTINUED | OUTPATIENT
Start: 2023-05-18 | End: 2023-05-25 | Stop reason: HOSPADM

## 2023-05-18 RX ORDER — ACETAMINOPHEN 325 MG/1
650 TABLET ORAL EVERY 6 HOURS PRN
Status: DISCONTINUED | OUTPATIENT
Start: 2023-05-18 | End: 2023-05-25 | Stop reason: HOSPADM

## 2023-05-18 RX ADMIN — ATORVASTATIN CALCIUM 80 MG: 40 TABLET, FILM COATED ORAL at 18:31

## 2023-05-18 RX ADMIN — IOHEXOL 100 ML: 350 INJECTION, SOLUTION INTRAVENOUS at 15:57

## 2023-05-18 NOTE — ASSESSMENT & PLAN NOTE
Found to have new rectal mass  Likely the cause of rectal bleeding  Would benefit from further evaluation with colonoscopy  Follow-up GI and oncology recs

## 2023-05-18 NOTE — ASSESSMENT & PLAN NOTE
Presented with rectal bleeding for the past 1 week bright red blood  Likely secondary to new rectal cancer  Hemoglobin noted to be approximately 10  Baseline approximately 11  We will monitor hemoglobin  Given new rectal mass would benefit from colonoscopy  We will consult GI and oncology

## 2023-05-18 NOTE — ED PROVIDER NOTES
History  Chief Complaint   Patient presents with   • Rectal Bleeding     Patient reports rectal bleeding for the last week  Patient denies abdominal pain, nausea, and vomiting  +fatigue     Patient is a 79-year-old male past medical history of hypertension, hyperlipidemia, MI, ischemic cardiomyopathy, CVA, CAD presenting with rectal bleeding  Patient states for 1 weeks he has been having bright red blood per rectum and states it happens every time he urinates but is not coming from his urine  Denies any dysuria  He states that he also has to wear depends at night due to bleeding  States that it is bright red and notes left lower quadrant burning abdominal pain which started on arrival   Denies any rectal pain, nausea/vomiting/diarrhea, chest pain, shortness of breath, dizziness  States last bowel movement with stool was 2 to 3 days ago  Takes aspirin but no other blood thinners  Prior to Admission Medications   Prescriptions Last Dose Informant Patient Reported? Taking?    Blood Pressure KIT   No No   Sig: by Does not apply route daily   aspirin (ECOTRIN LOW STRENGTH) 81 mg EC tablet   No No   Sig: Take 1 tablet (81 mg total) by mouth daily Please purchase over the counter at her local pharmacy   atorvastatin (LIPITOR) 80 mg tablet   No No   Sig: Take 1 tablet (80 mg total) by mouth daily with dinner      Facility-Administered Medications: None       Past Medical History:   Diagnosis Date   • Hyperlipidemia    • Ischemic cardiomyopathy    • Lacunar infarction [facility])    • Myocardial infarction ([facility] )    • Near syncope 11/14/2019   • Non-ST elevated myocardial infarction [facility])    • NSTEMI (non-ST elevated myocardial infarction) ([facility] ) 10/20/2019   • Pericarditis 10/24/2019   • Poison ivy    • Stroke [facility])        Past Surgical History:   Procedure Laterality Date   • ANGIOPLASTY     • CORONARY STENT PLACEMENT     • HERNIA REPAIR Left 4/28/2021    Procedure: OPEN REPAIR HERNIA INGUINAL LEFT WITH MESH; Surgeon: Agus Funes MD;  Location: [facility] [place] OR;  Service: General       Family History   Problem Relation Age of Onset   • No Known Problems Mother    • Emphysema Father    • Heart disease Maternal Grandfather    • Heart attack Maternal Grandfather    • Emphysema Paternal Grandfather      I have reviewed and agree with the history as documented  E-Cigarette/Vaping   • E-Cigarette Use Never User      E-Cigarette/Vaping Substances   • Nicotine No    • THC No    • CBD No    • Flavoring No    • Other No    • Unknown No      Social History     Tobacco Use   • Smoking status: Former     Packs/day:  00     Years: 15 00     Pack years: 15      Types: Cigarettes     Quit date: 1969     Years since quittin 4   • Smokeless tobacco: Never   Vaping Use   • Vaping Use: Never used   Substance Use Topics   • Alcohol use: Not Currently   • Drug use: No       Review of Systems   All other systems reviewed and are negative  Physical Exam  Physical Exam  Vitals reviewed  Constitutional:       General: He is not in acute distress  Appearance: Normal appearance  He is not ill-appearing  HENT:      Mouth/Throat:      Mouth: Mucous membranes are moist    Eyes:      Conjunctiva/sclera: Conjunctivae normal    Cardiovascular:      Rate and Rhythm: Normal rate and regular rhythm  Heart sounds: Normal heart sounds  Pulmonary:      Effort: Pulmonary effort is normal       Breath sounds: Normal breath sounds  Abdominal:      General: Abdomen is flat  Palpations: Abdomen is soft  Tenderness: There is abdominal tenderness  There is no right CVA tenderness, left CVA tenderness or guarding  Comments: Left lower quadrant tenderness without guarding   Musculoskeletal:         General: No swelling  Normal range of motion  Cervical back: Neck supple  Skin:     General: Skin is warm and dry  Neurological:      General: No focal deficit present  Mental Status: He is alert  Psychiatric:         Mood and Affect: Mood normal          Vital Signs  ED Triage Vitals   Temperature Pulse Respirations Blood Pressure SpO2   05/18/23 1247 05/18/23 1247 05/18/23 1247 05/18/23 1248 05/18/23 1247   97 9 °F (36 6 °C) 84 18 128/68 99 %      Temp Source Heart Rate Source Patient Position - Orthostatic VS BP Location FiO2 (%)   05/18/23 1247 05/18/23 1456 05/18/23 1456 05/18/23 1456 --   Temporal Monitor Lying Right arm       Pain Score       05/18/23 1456       3           Vitals:    05/18/23 1247 05/18/23 1248 05/18/23 1456   BP:  128/68 152/73   Pulse: 84  73   Patient Position - Orthostatic VS:   Lying         Visual Acuity      ED Medications  Medications   iohexol (OMNIPAQUE) 350 MG/ML injection (SINGLE-DOSE) 100 mL (100 mL Intravenous Given 5/18/23 1557)       Diagnostic Studies  Results Reviewed     Procedure Component Value Units Date/Time    UA w Reflex to Microscopic w Reflex to Culture [[id number]] Collected: 05/18/23 1554    Lab Status: Final result Specimen: Urine, Clean Catch Updated: 05/18/23 1609     Color, UA Light Yellow     Clarity, UA Clear     Specific Gravity, UA 1 027     pH, UA 5 5     Leukocytes, UA Negative     Nitrite, UA Negative     Protein, UA Negative mg/dl      Glucose, UA Negative mg/dl      Ketones, UA Negative mg/dl      Urobilinogen, UA <2 0 mg/dl      Bilirubin, UA Negative     Occult Blood, UA Negative    HS Troponin I 4hr [[id number]]     Lab Status: No result Specimen: Blood     HS Troponin 0hr (reflex protocol) [[id number]]  (Normal) Collected: 05/18/23 1512    Lab Status: Final result Specimen: Blood from Arm, Left Updated: 05/18/23 1546     hs TnI 0hr 3 ng/L     HS Troponin I 2hr [[id number]]     Lab Status: No result Specimen: Blood     Comprehensive metabolic panel [[id number]]  (Abnormal) Collected: 05/18/23 1512    Lab Status: Final result Specimen: Blood from Arm, Left Updated: 05/18/23 1542     Sodium 138 mmol/L      Potassium 4 3 mmol/L      Chloride 109 mmol/L      CO2 23 mmol/L      ANION GAP 6 mmol/L      BUN 30 mg/dL      Creatinine 1 21 mg/dL      Glucose 85 mg/dL      Calcium 9 0 mg/dL      AST 22 U/L      ALT 15 U/L      Alkaline Phosphatase 65 U/L      Total Protein 6 3 g/dL      Albumin 3 8 g/dL      Total Bilirubin 0 37 mg/dL      eGFR 57 ml/min/1 73sq m     Narrative:      [place] guidelines for Chronic Kidney Disease (CKD):   •  Stage 1 with normal or high GFR (GFR > 90 mL/min/1 73 square meters)  •  Stage 2 Mild CKD (GFR = 60-89 mL/min/1 73 square meters)  •  Stage 3A Moderate CKD (GFR = 45-59 mL/min/1 73 square meters)  •  Stage 3B Moderate CKD (GFR = 30-44 mL/min/1 73 square meters)  •  Stage 4 Severe CKD (GFR = 15-29 mL/min/1 73 square meters)  •  Stage 5 End Stage CKD (GFR <15 mL/min/1 73 square meters)  Note: GFR calculation is accurate only with a steady state creatinine    Protime-INR [[id number]]  (Normal) Collected: 05/18/23 1512    Lab Status: Final result Specimen: Blood from Arm, Left Updated: 05/18/23 1533     Protime 14 3 seconds      INR 1 13    APTT [[id number]]  (Normal) Collected: 05/18/23 1512    Lab Status: Final result Specimen: Blood from Arm, Left Updated: 05/18/23 1533     PTT 28 seconds     CBC and differential [[id number]]  (Abnormal) Collected: 05/18/23 1512    Lab Status: Final result Specimen: Blood from Arm, Left Updated: 05/18/23 1528     WBC 5 59 Thousand/uL      RBC 4 15 Million/uL      Hemoglobin 10 0 g/dL      Hematocrit 32 2 %      MCV 78 fL      MCH 24 1 pg      MCHC 31 1 g/dL      RDW 16 9 %      MPV 11 1 fL      Platelets 812 Thousands/uL      nRBC 0 /100 WBCs      Neutrophils Relative 63 %      Immat GRANS % 0 %      Lymphocytes Relative 22 %      Monocytes Relative 11 %      Eosinophils Relative 3 %      Basophils Relative 1 %      Neutrophils Absolute 3 58 Thousands/µL      Immature Grans Absolute 0 02 Thousand/uL      Lymphocytes Absolute 1 20 Thousands/µL      Monocytes Absolute 0 61 Thousand/µL      Eosinophils Absolute 0 15 Thousand/µL      Basophils Absolute 0 03 Thousands/µL                  CT abdomen pelvis with contrast   Final Result by Trevin Barnes MD (05/18 3554)      Imaging findings highly concerning for a rectal neoplasm with suspicious 2 mesorectal lymph nodes  Colonoscopy and rectal MRI examination are recommended for further evaluation  Moderate colonic stool burden without abnormal dilation, likely representing constipation  A 2 mm pulmonary nodule in the left lower lobe, new since 11/17/2019  CT chest without contrast in 12 months is recommended to assess for stability according to Fleischner criteria  However, if there is established rectal malignancy, earlier CT chest    follow-up should be obtained in 3 months  A possible 0 3 cm right hepatic dome lesion versus artifact  If this is a true lesion, this is statistically most likely a benign lesion such as cyst or hemangioma  However if the diagnosis of rectal malignancy is established, short-term follow-up with    postcontrast CT or MRI abdomen is recommended to exclude possibility of a developing metastatic lesion  A 1 0 cm cystic lesion in the uncinate process, 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  MRI abdomen MRCP with and without contrast is recommended in 1 year to assess for stability              I personally discussed this study with Aislinn Butts on 5/18/2023 4:54 PM                            Workstation performed: [id number]                    Procedures  ECG 12 Lead Documentation Only    Date/Time: 5/18/2023 3:37 PM  Performed by: Fleurette Kehr, DO  Authorized by: Fleurette Kehr, DO     Patient location:  ED  Previous ECG:     Previous ECG:  Compared to current    Similarity:  No change  Interpretation:     Interpretation: normal    Rate:     ECG rate assessment: normal    Rhythm:     Rhythm: sinus rhythm    Ectopy:     Ectopy: none    QRS:     QRS axis:  Normal    QRS intervals:  Normal  Conduction:     Conduction: normal    ST segments:     ST segments:  Normal  T waves:     T waves: normal               ED Course                               SBIRT 20yo+    Flowsheet Row Most Recent Value   Initial Alcohol Screen: US AUDIT-C     1  How often do you have a drink containing alcohol? 0 Filed at: 05/18/2023 1459   2  How many drinks containing alcohol do you have on a typical day you are drinking? 0 Filed at: 05/18/2023 1453   3a  Male UNDER 65: How often do you have five or more drinks on one occasion? 0 Filed at: 05/18/2023 1457   3b  FEMALE Any Age, or MALE 65+: How often do you have 4 or more drinks on one occassion? 0 Filed at: 05/18/2023 1455   Audit-C Score 0 Filed at: 05/18/2023 1452   MILA: How many times in the past year have you    Used an illegal drug or used a prescription medication for non-medical reasons? Never Filed at: 05/18/2023 1455                    Medical Decision Making  Patient is a 67-year-old male past medical history of hypertension, hyperlipidemia, CAD, CVA, ischemic cardiomyopathy, MI presenting with rectal bleeding  Patient is well-appearing at bedside with stable vitals and in no acute distress  He has mild left lower quadrant tenderness without guarding and no other significant physical exam findings  Will obtain labs to assess for anemia, electrolyte abnormalities, CT abdomen pelvis to assess for diverticulitis, cystitis, small bowel obstruction, active bleeding, and continue to monitor  Amount and/or Complexity of Data Reviewed  Labs: ordered  Radiology: ordered            Disposition  Final diagnoses:   Rectal bleeding     Time reflects when diagnosis was documented in both MDM as applicable and the Disposition within this note     Time User Action Codes Description Comment    5/18/2023  5:02 PM Opal Mota Add [K62 5] Rectal bleeding       ED Disposition     ED Disposition   Admit    Condition   Stable    Date/Time   Thu May 18, 2023  5:02 PM    Comment   Case was discussed with Dr Jinny Quinn and the patient's admission status was agreed to be Admission Status: observation status to the service of Dr Jinny Quinn   Follow-up Information    None         Patient's Medications   Discharge Prescriptions    No medications on file       No discharge procedures on file      PDMP Review     None          ED Provider  Electronically Signed by           Di Hamm,   05/18/23 Valarie Rodriguez,   05/18/23 7131

## 2023-05-18 NOTE — H&P
[address] [place] [address]  H&P  Name: Sascha Martinez  69 y o  male I MRN: [id number]  Unit/Bed#: ED 12 I Date of Admission: 5/18/2023   Date of Service: 5/18/2023 I Hospital Day: 0      Assessment/Plan   * Rectal bleeding  Assessment & Plan  Presented with rectal bleeding for the past 1 week bright red blood  Likely secondary to new rectal cancer  Hemoglobin noted to be approximately 10  Baseline approximately 11  We will monitor hemoglobin  Given new rectal mass would benefit from colonoscopy  We will consult GI and oncology    Rectal mass  Assessment & Plan  Found to have new rectal mass  Likely the cause of rectal bleeding  Would benefit from further evaluation with colonoscopy  Follow-up GI and oncology recs     Acute on chronic anemia  Assessment & Plan   Likely secondary to rectal mass  Monitor hemoglobin  Hemoglobin currently 10    S/P primary angioplasty with coronary stent  Assessment & Plan  Continue home aspirin, statin           VTE Prophylaxis: Pharmacologic VTE Prophylaxis contraindicated due to rectal mass  / sequential compression device   Code Status: full code  POLST: There is no POLST form on file for this patient (pre-hospital)  Discussion with family: pt    Anticipated Length of Stay:  Patient will be admitted on an Observation basis with an anticipated length of stay of  < 2 midnights  Justification for Hospital Stay: Rectal bleeding     Total Time for Visit, including Counseling / Coordination of Care: 60 minutes  Greater than 50% of this total time spent on direct patient counseling and coordination of care  Chief Complaint:    Rectal bleeding    History of Present Illness:    Sascha Martinez  is a 69 y o  male with past medical history significant of hypertension, hyperlipidemia initially presented with rectal bleeding  Patient reports rectal bleeding for the past 1 week  Reports bright red blood per rectum  Denies any other acute complaints    Denies melena, rectal pain, fevers, chills or any other complaints  Review of Systems:    Review of Systems   Constitutional: Negative for appetite change, chills, diaphoresis, fatigue, fever and unexpected weight change  HENT: Negative for congestion, rhinorrhea and sore throat  Eyes: Negative for photophobia and visual disturbance  Respiratory: Negative for cough, shortness of breath and wheezing  Cardiovascular: Negative for chest pain, palpitations and leg swelling  Gastrointestinal: Positive for blood in stool  Negative for abdominal pain, anal bleeding, constipation, diarrhea, nausea and vomiting  Genitourinary: Negative for decreased urine volume, difficulty urinating, dysuria, flank pain, frequency, hematuria and urgency  Musculoskeletal: Negative for arthralgias, back pain, joint swelling and myalgias  Skin: Negative for color change and rash  Neurological: Negative for dizziness, seizures, facial asymmetry, speech difficulty, numbness and headaches  Psychiatric/Behavioral: Negative for agitation, confusion and decreased concentration  The patient is not nervous/anxious  Past Medical and Surgical History:     Past Medical History:   Diagnosis Date   • Hyperlipidemia    • Ischemic cardiomyopathy    • Lacunar infarction [facility])    • Myocardial infarction [facility])    • Near syncope 11/14/2019   • Non-ST elevated myocardial infarction [facility])    • NSTEMI (non-ST elevated myocardial infarction) ([facility] ) 10/20/2019   • Pericarditis 10/24/2019   • Poison ivy    • Stroke [facility])        Past Surgical History:   Procedure Laterality Date   • ANGIOPLASTY     • CORONARY STENT PLACEMENT     • HERNIA REPAIR Left 4/28/2021    Procedure: OPEN REPAIR HERNIA INGUINAL LEFT WITH MESH;  Surgeon: Estella Anglin MD;  Location: [facility];  Service: General       Meds/Allergies:    Prior to Admission medications    Medication Sig Start Date End Date Taking?  Authorizing Provider   aspirin (ECOTRIN LOW STRENGTH) 81 mg EC tablet Take 1 tablet (81 mg total) by mouth daily Please purchase over the counter at her local pharmacy 19  Suzy Rowland MD   atorvastatin (LIPITOR) 80 mg tablet Take 1 tablet (80 mg total) by mouth daily with dinner 10/17/22   Chandler Goodman DO   Blood Pressure KIT by Does not apply route daily 19   Suzy Rowland MD     I have reviewed home medications with patient personally  Allergies: No Known Allergies    Social History:     Marital Status:      Patient Pre-hospital Living Situation: home  Patient Pre-hospital Level of Mobility: independent  Patient Pre-hospital Diet Restrictions: none  Substance Use History:   Social History     Substance and Sexual Activity   Alcohol Use Not Currently     Social History     Tobacco Use   Smoking Status Former   • Packs/day:    • Years: 15 00   • Pack years: 15 00   • Types: Cigarettes   • Quit date: 1969   • Years since quittin 4   Smokeless Tobacco Never     Social History     Substance and Sexual Activity   Drug Use No       Family History:    Family History   Problem Relation Age of Onset   • No Known Problems Mother    • Emphysema Father    • Heart disease Maternal Grandfather    • Heart attack Maternal Grandfather    • Emphysema Paternal Grandfather        Physical Exam:     Vitals:   Blood Pressure: 152/73 (23 1456)  Pulse: 73 (23 1456)  Temperature: 97 9 °F (36 6 °C) (23 1247)  Temp Source: Temporal (23 1247)  Respirations: 18 (23 1456)  Weight - Scale: 59 kg (130 lb 1 1 oz) (23 1250)  SpO2: 99 % (23 1456)    Physical Exam  Constitutional:       General: He is not in acute distress  Appearance: He is well-developed  He is not diaphoretic  HENT:      Head: Normocephalic and atraumatic  Nose: Nose normal       Mouth/Throat:      Pharynx: No oropharyngeal exudate  Eyes:      General: No scleral icterus       Conjunctiva/sclera: Conjunctivae normal    Cardiovascular:      Rate and Rhythm: Normal rate and regular rhythm  Heart sounds: Normal heart sounds  No murmur heard  No friction rub  No gallop  Pulmonary:      Effort: Pulmonary effort is normal  No respiratory distress  Breath sounds: Normal breath sounds  No wheezing or rales  Chest:      Chest wall: No tenderness  Abdominal:      General: Bowel sounds are normal  There is no distension  Palpations: Abdomen is soft  Tenderness: There is no abdominal tenderness  There is no guarding  Musculoskeletal:         General: No tenderness or deformity  Normal range of motion  Cervical back: Normal range of motion and neck supple  Skin:     General: Skin is warm and dry  Findings: No erythema  Neurological:      Mental Status: He is alert  Mental status is at baseline  Additional Data:     Lab Results: I have personally reviewed pertinent reports  Results from last 7 days   Lab Units 05/18/23  1512   WBC Thousand/uL 5 59   HEMOGLOBIN g/dL 10 0*   HEMATOCRIT % 32 2*   PLATELETS Thousands/uL 176   NEUTROS PCT % 63   LYMPHS PCT % 22   MONOS PCT % 11   EOS PCT % 3     Results from last 7 days   Lab Units 05/18/23  1512   SODIUM mmol/L 138   POTASSIUM mmol/L 4 3   CHLORIDE mmol/L 109*   CO2 mmol/L 23   BUN mg/dL 30*   CREATININE mg/dL 1 21   ANION GAP mmol/L 6   CALCIUM mg/dL 9 0   ALBUMIN g/dL 3 8   TOTAL BILIRUBIN mg/dL 0 37   ALK PHOS U/L 65   ALT U/L 15   AST U/L 22   GLUCOSE RANDOM mg/dL 85     Results from last 7 days   Lab Units 05/18/23  1512   INR  1 13                   Imaging: I have personally reviewed pertinent reports  CT abdomen pelvis with contrast   Final Result by Mariann Ayoub MD (05/18 1654)      Imaging findings highly concerning for a rectal neoplasm with suspicious 2 mesorectal lymph nodes  Colonoscopy and rectal MRI examination are recommended for further evaluation        Moderate colonic stool burden without abnormal dilation, likely representing constipation  A 2 mm pulmonary nodule in the left lower lobe, new since 11/17/2019  CT chest without contrast in 12 months is recommended to assess for stability according to Fleischner criteria  However, if there is established rectal malignancy, earlier CT chest    follow-up should be obtained in 3 months  A possible 0 3 cm right hepatic dome lesion versus artifact  If this is a true lesion, this is statistically most likely a benign lesion such as cyst or hemangioma  However if the diagnosis of rectal malignancy is established, short-term follow-up with    postcontrast CT or MRI abdomen is recommended to exclude possibility of a developing metastatic lesion  A 1 0 cm cystic lesion in the uncinate process, 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

## 2023-05-19 PROBLEM — E44.0 MODERATE PROTEIN-CALORIE MALNUTRITION (HCC): Status: ACTIVE | Noted: 2023-05-19

## 2023-05-19 PROBLEM — I21.02 STEMI INVOLVING LEFT ANTERIOR DESCENDING CORONARY ARTERY (HCC): Status: ACTIVE | Noted: 2023-05-19

## 2023-05-19 LAB
ABO GROUP BLD BPU: NORMAL
ABO GROUP BLD BPU: NORMAL
ABO GROUP BLD: NORMAL
ANION GAP SERPL CALCULATED.3IONS-SCNC: 4 MMOL/L (ref 4–13)
ANION GAP SERPL CALCULATED.3IONS-SCNC: 8 MMOL/L (ref 4–13)
ATRIAL RATE: 72 BPM
ATRIAL RATE: 73 BPM
ATRIAL RATE: 85 BPM
BASOPHILS # BLD AUTO: 0.03 THOUSANDS/ÂΜL (ref 0–0.1)
BASOPHILS NFR BLD AUTO: 1 % (ref 0–1)
BLD GP AB SCN SERPL QL: NEGATIVE
BPU ID: NORMAL
BPU ID: NORMAL
BUN SERPL-MCNC: 21 MG/DL (ref 5–25)
BUN SERPL-MCNC: 24 MG/DL (ref 5–25)
CALCIUM SERPL-MCNC: 8.5 MG/DL (ref 8.4–10.2)
CALCIUM SERPL-MCNC: 8.9 MG/DL (ref 8.4–10.2)
CARDIAC TROPONIN I PNL SERPL HS: 6 NG/L (ref 8–18)
CARDIAC TROPONIN I PNL SERPL HS: 84 NG/L (ref 8–18)
CEA SERPL-MCNC: 1.7 NG/ML (ref 0–3)
CHLORIDE SERPL-SCNC: 104 MMOL/L (ref 96–108)
CHLORIDE SERPL-SCNC: 110 MMOL/L (ref 96–108)
CO2 SERPL-SCNC: 21 MMOL/L (ref 21–32)
CO2 SERPL-SCNC: 23 MMOL/L (ref 21–32)
CREAT SERPL-MCNC: 0.88 MG/DL (ref 0.6–1.3)
CREAT SERPL-MCNC: 1.04 MG/DL (ref 0.6–1.3)
CROSSMATCH: NORMAL
CROSSMATCH: NORMAL
EOSINOPHIL # BLD AUTO: 0.17 THOUSAND/ÂΜL (ref 0–0.61)
EOSINOPHIL NFR BLD AUTO: 3 % (ref 0–6)
ERYTHROCYTE [DISTWIDTH] IN BLOOD BY AUTOMATED COUNT: 16.8 % (ref 11.6–15.1)
ERYTHROCYTE [DISTWIDTH] IN BLOOD BY AUTOMATED COUNT: 16.8 % (ref 11.6–15.1)
FERRITIN SERPL-MCNC: 8 NG/ML (ref 24–336)
FOLATE SERPL-MCNC: 21.8 NG/ML
GFR SERPL CREATININE-BSD FRML MDRD: 68 ML/MIN/1.73SQ M
GFR SERPL CREATININE-BSD FRML MDRD: 82 ML/MIN/1.73SQ M
GLUCOSE P FAST SERPL-MCNC: 89 MG/DL (ref 65–99)
GLUCOSE SERPL-MCNC: 164 MG/DL (ref 65–140)
GLUCOSE SERPL-MCNC: 89 MG/DL (ref 65–140)
HCT VFR BLD AUTO: 31.6 % (ref 36.5–49.3)
HCT VFR BLD AUTO: 32.3 % (ref 36.5–49.3)
HGB BLD-MCNC: 10 G/DL (ref 12–17)
HGB BLD-MCNC: 11.5 G/DL (ref 12–17)
HGB BLD-MCNC: 9.8 G/DL (ref 12–17)
IMM GRANULOCYTES # BLD AUTO: 0.02 THOUSAND/UL (ref 0–0.2)
IMM GRANULOCYTES NFR BLD AUTO: 0 % (ref 0–2)
IRON SATN MFR SERPL: 6 % (ref 20–50)
IRON SERPL-MCNC: 19 UG/DL (ref 65–175)
LYMPHOCYTES # BLD AUTO: 1.08 THOUSANDS/ÂΜL (ref 0.6–4.47)
LYMPHOCYTES NFR BLD AUTO: 22 % (ref 14–44)
MAGNESIUM SERPL-MCNC: 2.1 MG/DL (ref 1.9–2.7)
MCH RBC QN AUTO: 23.9 PG (ref 26.8–34.3)
MCH RBC QN AUTO: 24 PG (ref 26.8–34.3)
MCHC RBC AUTO-ENTMCNC: 31 G/DL (ref 31.4–37.4)
MCHC RBC AUTO-ENTMCNC: 31 G/DL (ref 31.4–37.4)
MCV RBC AUTO: 77 FL (ref 82–98)
MCV RBC AUTO: 78 FL (ref 82–98)
MONOCYTES # BLD AUTO: 0.57 THOUSAND/ÂΜL (ref 0.17–1.22)
MONOCYTES NFR BLD AUTO: 11 % (ref 4–12)
NEUTROPHILS # BLD AUTO: 3.12 THOUSANDS/ÂΜL (ref 1.85–7.62)
NEUTS SEG NFR BLD AUTO: 63 % (ref 43–75)
NRBC BLD AUTO-RTO: 0 /100 WBCS
P AXIS: 72 DEGREES
P AXIS: 73 DEGREES
P AXIS: 73 DEGREES
PLATELET # BLD AUTO: 160 THOUSANDS/UL (ref 149–390)
PLATELET # BLD AUTO: 174 THOUSANDS/UL (ref 149–390)
PMV BLD AUTO: 11 FL (ref 8.9–12.7)
PMV BLD AUTO: 11.7 FL (ref 8.9–12.7)
POTASSIUM SERPL-SCNC: 4.3 MMOL/L (ref 3.5–5.3)
POTASSIUM SERPL-SCNC: 4.5 MMOL/L (ref 3.5–5.3)
PR INTERVAL: 130 MS
PR INTERVAL: 164 MS
PR INTERVAL: 166 MS
QRS AXIS: -29 DEGREES
QRS AXIS: -33 DEGREES
QRS AXIS: -37 DEGREES
QRSD INTERVAL: 102 MS
QRSD INTERVAL: 106 MS
QRSD INTERVAL: 110 MS
QT INTERVAL: 390 MS
QT INTERVAL: 396 MS
QT INTERVAL: 396 MS
QTC INTERVAL: 433 MS
QTC INTERVAL: 436 MS
QTC INTERVAL: 464 MS
RBC # BLD AUTO: 4.08 MILLION/UL (ref 3.88–5.62)
RBC # BLD AUTO: 4.18 MILLION/UL (ref 3.88–5.62)
RETICS # AUTO: NORMAL 10*3/UL (ref 14356–105094)
RETICS # CALC: 1.07 % (ref 0.37–1.87)
RH BLD: NEGATIVE
SODIUM SERPL-SCNC: 133 MMOL/L (ref 135–147)
SODIUM SERPL-SCNC: 137 MMOL/L (ref 135–147)
SPECIMEN EXPIRATION DATE: NORMAL
T WAVE AXIS: 78 DEGREES
T WAVE AXIS: 82 DEGREES
T WAVE AXIS: 83 DEGREES
TIBC SERPL-MCNC: 339 UG/DL (ref 250–450)
UNIT DISPENSE STATUS: NORMAL
UNIT DISPENSE STATUS: NORMAL
UNIT PRODUCT CODE: NORMAL
UNIT PRODUCT CODE: NORMAL
UNIT PRODUCT VOLUME: 350 ML
UNIT PRODUCT VOLUME: 350 ML
UNIT RH: NORMAL
UNIT RH: NORMAL
VENTRICULAR RATE: 72 BPM
VENTRICULAR RATE: 73 BPM
VENTRICULAR RATE: 85 BPM
VIT B12 SERPL-MCNC: 222 PG/ML (ref 180–914)
WBC # BLD AUTO: 4.99 THOUSAND/UL (ref 4.31–10.16)
WBC # BLD AUTO: 9.83 THOUSAND/UL (ref 4.31–10.16)

## 2023-05-19 PROCEDURE — B2111ZZ FLUOROSCOPY OF MULTIPLE CORONARY ARTERIES USING LOW OSMOLAR CONTRAST: ICD-10-PCS | Performed by: INTERNAL MEDICINE

## 2023-05-19 PROCEDURE — 02703DZ DILATION OF CORONARY ARTERY, ONE ARTERY WITH INTRALUMINAL DEVICE, PERCUTANEOUS APPROACH: ICD-10-PCS | Performed by: INTERNAL MEDICINE

## 2023-05-19 DEVICE — MULTI-LINK RX VISION CORONARY STENT SYSTEM 3.00 MM X 15 MM
Type: IMPLANTABLE DEVICE | Site: CORONARY | Status: FUNCTIONAL
Brand: MULTI-LINK VISION

## 2023-05-19 RX ORDER — CLOPIDOGREL BISULFATE 75 MG/1
75 TABLET ORAL DAILY
Status: DISCONTINUED | OUTPATIENT
Start: 2023-05-20 | End: 2023-05-25 | Stop reason: HOSPADM

## 2023-05-19 RX ORDER — VERAPAMIL HCL 2.5 MG/ML
AMPUL (ML) INTRAVENOUS CODE/TRAUMA/SEDATION MEDICATION
Status: DISCONTINUED | OUTPATIENT
Start: 2023-05-19 | End: 2023-05-19 | Stop reason: HOSPADM

## 2023-05-19 RX ORDER — CLOPIDOGREL BISULFATE 75 MG/1
75 TABLET ORAL DAILY
Status: DISCONTINUED | OUTPATIENT
Start: 2023-05-20 | End: 2023-05-20

## 2023-05-19 RX ORDER — FENTANYL CITRATE 50 UG/ML
INJECTION, SOLUTION INTRAMUSCULAR; INTRAVENOUS CODE/TRAUMA/SEDATION MEDICATION
Status: DISCONTINUED | OUTPATIENT
Start: 2023-05-19 | End: 2023-05-19 | Stop reason: HOSPADM

## 2023-05-19 RX ORDER — SODIUM CHLORIDE 9 MG/ML
75 INJECTION, SOLUTION INTRAVENOUS CONTINUOUS
Status: DISCONTINUED | OUTPATIENT
Start: 2023-05-19 | End: 2023-05-20

## 2023-05-19 RX ORDER — BISACODYL 5 MG/1
10 TABLET, DELAYED RELEASE ORAL ONCE
Status: COMPLETED | OUTPATIENT
Start: 2023-05-19 | End: 2023-05-19

## 2023-05-19 RX ORDER — LIDOCAINE HYDROCHLORIDE 10 MG/ML
INJECTION, SOLUTION EPIDURAL; INFILTRATION; INTRACAUDAL; PERINEURAL CODE/TRAUMA/SEDATION MEDICATION
Status: DISCONTINUED | OUTPATIENT
Start: 2023-05-19 | End: 2023-05-19 | Stop reason: HOSPADM

## 2023-05-19 RX ORDER — MIDAZOLAM HYDROCHLORIDE 2 MG/2ML
INJECTION, SOLUTION INTRAMUSCULAR; INTRAVENOUS CODE/TRAUMA/SEDATION MEDICATION
Status: DISCONTINUED | OUTPATIENT
Start: 2023-05-19 | End: 2023-05-19 | Stop reason: HOSPADM

## 2023-05-19 RX ORDER — NITROGLYCERIN 0.4 MG/1
0.4 TABLET SUBLINGUAL
Status: DISCONTINUED | OUTPATIENT
Start: 2023-05-19 | End: 2023-05-25 | Stop reason: HOSPADM

## 2023-05-19 RX ORDER — CLOPIDOGREL BISULFATE 75 MG/1
TABLET ORAL CODE/TRAUMA/SEDATION MEDICATION
Status: DISCONTINUED | OUTPATIENT
Start: 2023-05-19 | End: 2023-05-19 | Stop reason: HOSPADM

## 2023-05-19 RX ORDER — POLYETHYLENE GLYCOL 3350 17 G/17G
238 POWDER, FOR SOLUTION ORAL ONCE
Status: COMPLETED | OUTPATIENT
Start: 2023-05-19 | End: 2023-05-19

## 2023-05-19 RX ORDER — NITROGLYCERIN 20 MG/100ML
INJECTION INTRAVENOUS CODE/TRAUMA/SEDATION MEDICATION
Status: DISCONTINUED | OUTPATIENT
Start: 2023-05-19 | End: 2023-05-19 | Stop reason: HOSPADM

## 2023-05-19 RX ADMIN — BISACODYL 10 MG: 5 TABLET, COATED ORAL at 15:52

## 2023-05-19 RX ADMIN — ASPIRIN 81 MG: 81 TABLET ORAL at 10:41

## 2023-05-19 RX ADMIN — NITROGLYCERIN 0.4 MG: 0.4 TABLET SUBLINGUAL at 21:05

## 2023-05-19 RX ADMIN — POLYETHYLENE GLYCOL 3350 238 G: 17 POWDER, FOR SOLUTION ORAL at 15:53

## 2023-05-19 RX ADMIN — NITROGLYCERIN 0.4 MG: 0.4 TABLET SUBLINGUAL at 20:58

## 2023-05-19 RX ADMIN — ATORVASTATIN CALCIUM 80 MG: 40 TABLET, FILM COATED ORAL at 15:52

## 2023-05-19 RX ADMIN — MORPHINE SULFATE 2 MG: 2 INJECTION, SOLUTION INTRAMUSCULAR; INTRAVENOUS at 20:31

## 2023-05-19 NOTE — UTILIZATION REVIEW
Initial Clinical Review      OBS order 5/17 1702 converted to IP on 5/19 1255 for continued workup and treatment of GI bleed  Admission: Date/Time/Statement:   Admission Orders (From admission, onward)     Ordered        05/19/23 1255  Inpatient Admission  Once            05/18/23 1702  Place in Observation  Once                       Inpatient Admission     Standing Status:   Standing     Number of Occurrences:   1     Order Specific Question:   Level of Care     Answer:   Med Surg [16]     Order Specific Question:   Estimated length of stay     Answer:   More than 2 Midnights     Order Specific Question:   Certification     Answer:   I certify that inpatient services are medically necessary for this patient for a duration of greater than two midnights  See H&P and MD Progress Notes for additional information about the patient's course of treatment  ED Arrival Information     Expected   -    Arrival   5/18/2023 12:43    Acuity   Urgent            Means of arrival   Walk-In    Escorted by   Family Member    Service   Hospitalist    Admission type   Emergency            Arrival complaint   rectal bleeding           Chief Complaint   Patient presents with   • Rectal Bleeding     Patient reports rectal bleeding for the last week  Patient denies abdominal pain, nausea, and vomiting  +fatigue       Initial Presentation: 69 y o  male to ED from home w/ rectal bleeding x1 week   BRBPR   PMHX HTN , HLD  hgb 10 , baseline 11  Found to have a new rectal mass  Admitted OBS status w / rectal bleeding , rectal mass   Plan to consult GI and oncology , monitor hgb   S/p angioplasty w/ coronary stent cont statin and asa      5/19 IM Note   Cont stay with plan for colonoscopy tomorrow   hgb stable   5/19 GI Consult   Rectal bleeding , rectal mass   Plan for colonoscopy tomorrow for diagnosis /bx , clear liq diet , bowel prep , NPO after 0500 , CEA level      ED Triage Vitals   Temperature Pulse Respirations Blood Pressure SpO2   05/18/23 1247 05/18/23 1247 05/18/23 1247 05/18/23 1248 05/18/23 1247   97 9 °F (36 6 °C) 84 18 128/68 99 %      Temp Source Heart Rate Source Patient Position - Orthostatic VS BP Location FiO2 (%)   05/18/23 1247 05/18/23 1456 05/18/23 1456 05/18/23 1456 --   Temporal Monitor Lying Right arm       Pain Score       05/18/23 1456       3          Wt Readings from Last 1 Encounters:   05/18/23 58 7 kg (129 lb 6 6 oz)     Additional Vital Signs:   05/19/23 07:35:45 98 °F (36 7 °C) 71 -- 139/80 100 99 % -- --   05/18/23 22:52:53 98 1 °F (36 7 °C) 75 16 123/67 86 98 % -- --   05/18/23 20:56:49 98 °F (36 7 °C) 83 17 145/69 -- 98 % -- --   05/18/23 1733 -- 72 18 189/90 Abnormal  -- 99 % -- --   05/18/23 1700 -- -- -- -- -- 99 % None (Room air) --   05/18/23 1456 -- 73 18 152/73 -- 99 % None (Room air) Lying   05/18/23 1248 -- -- -- 128/68 -- -- --        Pertinent Labs/Diagnostic Test Results:   CT abdomen pelvis with contrast   Final Result by Josue Toney MD (05/18 0914)      Imaging findings highly concerning for a rectal neoplasm with suspicious 2 mesorectal lymph nodes  Colonoscopy and rectal MRI examination are recommended for further evaluation  Moderate colonic stool burden without abnormal dilation, likely representing constipation  A 2 mm pulmonary nodule in the left lower lobe, new since 11/17/2019  CT chest without contrast in 12 months is recommended to assess for stability according to Fleischner criteria  However, if there is established rectal malignancy, earlier CT chest    follow-up should be obtained in 3 months  A possible 0 3 cm right hepatic dome lesion versus artifact  If this is a true lesion, this is statistically most likely a benign lesion such as cyst or hemangioma  However if the diagnosis of rectal malignancy is established, short-term follow-up with    postcontrast CT or MRI abdomen is recommended to exclude possibility of a developing metastatic lesion        A 1 0 cm cystic lesion in the uncinate process, 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  MRI abdomen MRCP with and without contrast is recommended in 1 year to assess for stability              I personally discussed this study with Ru Alexandre on 5/18/2023 4:54 PM                            Workstation performed: [id number]               Results from last 7 days   Lab Units 05/19/23  0632 05/18/23  2245 05/18/23  1512   WBC Thousand/uL 4 99  --  5 59   HEMOGLOBIN g/dL 9 8* 9 9* 10 0*   HEMATOCRIT % 31 6* 31 0* 32 2*   PLATELETS Thousands/uL 160  --  176   NEUTROS ABS Thousands/µL 3 12  --  3 58     Results from last 7 days   Lab Units 05/19/23  0632   RETIC CT ABS  43,800   RETIC CT PCT % 1 07     Results from last 7 days   Lab Units 05/19/23  0632 05/18/23  1512   SODIUM mmol/L 137 138   POTASSIUM mmol/L 4 3 4 3   CHLORIDE mmol/L 110* 109*   CO2 mmol/L 23 23   ANION GAP mmol/L 4 6   BUN mg/dL 24 30*   CREATININE mg/dL 0 88 1 21   EGFR ml/min/1 73sq m 82 57   CALCIUM mg/dL 8 5 9 0     Results from last 7 days   Lab Units 05/18/23  1512   AST U/L 22   ALT U/L 15   ALK PHOS U/L 65   TOTAL PROTEIN g/dL 6 3*   ALBUMIN g/dL 3 8   TOTAL BILIRUBIN mg/dL 0 37     Results from last 7 days   Lab Units 05/19/23  0632 05/18/23  1512   GLUCOSE RANDOM mg/dL 89 85     Results from last 7 days   Lab Units 05/18/23  2245 05/18/23  1744 05/18/23  1512   HS TNI 0HR ng/L  --   --  3   HS TNI 2HR ng/L  --  4  --    HSTNI D2 ng/L  --  1  --    HS TNI 4HR ng/L 4  --   --    HSTNI D4 ng/L 1  --   --      Results from last 7 days   Lab Units 05/18/23  1512   PROTIME seconds 14 3   INR  1 13   PTT seconds 28     Results from last 7 days   Lab Units 05/18/23  1554   CLARITY UA  Clear   COLOR UA  Light Yellow   SPEC GRAV UA  1 027   PH UA  5 5   GLUCOSE UA mg/dl Negative   KETONES UA mg/dl Negative   BLOOD UA  Negative   PROTEIN UA mg/dl Negative   NITRITE UA  Negative   BILIRUBIN UA  Negative   UROBILINOGEN UA (BE) mg/dl <2 0   LEUKOCYTES UA  Negative     ED Treatment:   Medication Administration from 05/18/2023 1243 to 05/18/2023 2029       Date/Time Order Dose Route Action     05/18/2023 1831 EDT atorvastatin (LIPITOR) tablet 80 mg 80 mg Oral Given        Past Medical History:   Diagnosis Date   • Hyperlipidemia    • Ischemic cardiomyopathy    • Lacunar infarction [facility])    • Myocardial infarction ([facility] )    • Near syncope 11/14/2019   • Non-ST elevated myocardial infarction [facility])    • NSTEMI (non-ST elevated myocardial infarction) ([facility] ) 10/20/2019   • Pericarditis 10/24/2019   • Poison ivy    • Stroke [facility])      Present on Admission:  **None**      Admitting Diagnosis: Rectal bleeding [K62 5]  Rectal mass [K62 89]  Age/Sex: 69 y o  male  Admission Orders:  Scheduled Medications:  aspirin, 81 mg, Oral, Daily  atorvastatin, 80 mg, Oral, Daily With Dinner  bisacodyl, 10 mg, Oral, Once  polyethylene glycol, 238 g, Oral, Once      Continuous IV Infusions:     PRN Meds:  acetaminophen, 650 mg, Oral, Q6H PRN  ondansetron, 4 mg, Intravenous, Q6H PRN    I&O   Reg diet   Up and OOB   Tele     IP CONSULT TO GASTROENTEROLOGY    Network Utilization Review Department  ATTENTION: Please call with any questions or concerns to [phone] and carefully listen to the prompts so that you are directed to the right person  All voicemails are confidential   Adamaris Mullins all requests for admission clinical reviews, approved or denied determinations and any other requests to dedicated fax number below belonging to the campus where the patient is receiving treatment   List of dedicated fax numbers for the Facilities:  [address] DENIALS (Administrative/Medical Necessity) [phone]   [address] [place] [address] (Maternity/NICU/Pediatrics) [phone]   [address] Selena Capps  [phone]   13[address] [facility] [phone] [address] [place] [address] Ashley Dela CruzUC [facility] [address] [facility] [place] [address] 687 [facility] [phone]

## 2023-05-19 NOTE — PLAN OF CARE
Problem: SAFETY ADULT  Goal: Patient will remain free of falls  Description: INTERVENTIONS:  - Educate patient/family on patient safety including physical limitations  - Instruct patient to call for assistance with activity   - Consult OT/PT to assist with strengthening/mobility   - Keep Call bell within reach  - Keep bed low and locked with side rails adjusted as appropriate  - Keep care items and personal belongings within reach  - Initiate and maintain comfort rounds  - Make Fall Risk Sign visible to staff  - Offer Toileting every 2 Hours, in advance of need  - Initiate/Maintain alarm  - Obtain necessary fall risk management equipment  - Apply yellow socks and bracelet for high fall risk patients  - Consider moving patient to room near nurses station  Outcome: Progressing     Problem: GASTROINTESTINAL - ADULT  Goal: Maintains or returns to baseline bowel function  Description: INTERVENTIONS:  - Assess bowel function  - Encourage oral fluids to ensure adequate hydration  - Administer IV fluids if ordered to ensure adequate hydration  - Administer ordered medications as needed  - Encourage mobilization and activity  - Consider nutritional services referral to assist patient with adequate nutrition and appropriate food choices  Outcome: Progressing

## 2023-05-19 NOTE — PROGRESS NOTES
[address] [place] [address]  Progress Note  Name: Kina Ponce I  MRN: [id number]  Unit/Bed#: -01 I Date of Admission: 5/18/2023   Date of Service: 5/19/2023 I Hospital Day: 0    Assessment/Plan   * Rectal bleeding  Assessment & Plan  Presented with rectal bleeding for the past 1 week bright red blood  Likely secondary to new rectal cancer  Hemoglobin noted to be approximately 10  Baseline approximately 11  We will monitor hemoglobin  Given new rectal mass would benefit from colonoscopy  GI and oncology consulted, appreciate recommendations    Moderate protein-calorie malnutrition ([address] )  Assessment & Plan  Malnutrition Findings:   Adult Malnutrition type: Chronic illness  Adult Degree of Malnutrition: Malnutrition of moderate degree                     360 Statement: Related likely to catabolic illness as evidenced by mild-moderate depletion of body fat (Orbitals) and mild-moderate depletion of muscle mass (clavicle) treated with diet (patient denies need for supplements at this time)    BMI Findings: Body mass index is 18 57 kg/m²  Acute on chronic anemia  Assessment & Plan   Likely secondary to rectal mass  Monitor hemoglobin    Rectal mass  Assessment & Plan  Found to have new rectal mass  Likely the cause of rectal bleeding  Would benefit from further evaluation with colonoscopy  Follow-up GI and oncology recs     S/P primary angioplasty with coronary stent  Assessment & Plan  Continue home aspirin, statin             VTE Pharmacologic Prophylaxis:   Pharmacologic: Pharmacologic VTE Prophylaxis contraindicated due to Rectal bleed  Mechanical VTE Prophylaxis in Place: Yes    Review of Systems:    Review of Systems   Constitutional: Negative for chills and fever  HENT: Negative for ear pain and sore throat  Eyes: Negative for pain and visual disturbance  Respiratory: Negative for cough and shortness of breath  Cardiovascular: Negative for chest pain and palpitations  Gastrointestinal: Negative for abdominal pain and vomiting  Genitourinary: Negative for dysuria and hematuria  Musculoskeletal: Negative for arthralgias and back pain  Skin: Negative for color change and rash  Neurological: Negative for seizures and syncope  All other systems reviewed and are negative  Past Medical and Surgical History:     Past Medical History:   Diagnosis Date   • Hyperlipidemia    • Ischemic cardiomyopathy    • Lacunar infarction [facility])    • Myocardial infarction ([facility] )    • Near syncope 2019   • Non-ST elevated myocardial infarction [facility])    • NSTEMI (non-ST elevated myocardial infarction) ([facility] ) 10/20/2019   • Pericarditis 10/24/2019   • Poison ivy    • Stroke [facility])        Past Surgical History:   Procedure Laterality Date   • ANGIOPLASTY     • CORONARY STENT PLACEMENT     • HERNIA REPAIR Left 2021    Procedure: OPEN REPAIR HERNIA INGUINAL LEFT WITH MESH;  Surgeon: Steven Wick MD;  Location: [facility];  Service: General         Discussions with Specialists or Other Care Team Provider: Nursing, CM    Education and Discussions with Family / Patient: Patient and family at bedside    Time Spent for Care: 44  More than 50% of total time spent on counseling and coordination of care as described above  Current Length of Stay: 0 day(s)    Current Patient Status: Observation   Certification Statement: The patient will continue to require additional inpatient hospital stay due to Colonoscopy planned for tomorrow    Discharge Plan: Likely in 48 to 72 hours    Code Status: Level 1 - Full Code      Subjective:   No acute events    Objective:     Vitals:   Temp (24hrs), Av °F (36 7 °C), Min:98 °F (36 7 °C), Max:98 1 °F (36 7 °C)    Temp:  [98 °F (36 7 °C)-98 1 °F (36 7 °C)] 98 °F (36 7 °C)  HR:  [71-83] 71  Resp:  [16-18] 16  BP: (123-189)/(67-90) 139/80  SpO2:  [98 %-99 %] 98 %  Body mass index is 18 57 kg/m²       Input and Output Summary (last 24 hours): Intake/Output Summary (Last 24 hours) at 5/19/2023 1248  Last data filed at 5/19/2023 1040  Gross per 24 hour   Intake 480 ml   Output 1000 ml   Net -520 ml       Physical Exam:     Physical Exam  Vitals and nursing note reviewed  Constitutional:       General: He is not in acute distress  Appearance: He is well-developed  He is not ill-appearing or diaphoretic  HENT:      Head: Normocephalic and atraumatic  Eyes:      Extraocular Movements: Extraocular movements intact  Conjunctiva/sclera: Conjunctivae normal    Cardiovascular:      Rate and Rhythm: Normal rate and regular rhythm  Heart sounds: No murmur heard  Pulmonary:      Effort: Pulmonary effort is normal  No respiratory distress  Breath sounds: Normal breath sounds  Abdominal:      General: Bowel sounds are normal       Palpations: Abdomen is soft  Tenderness: There is no abdominal tenderness  Musculoskeletal:         General: No swelling  Cervical back: Neck supple  Right lower leg: No edema  Left lower leg: No edema  Skin:     General: Skin is warm and dry  Capillary Refill: Capillary refill takes less than 2 seconds  Neurological:      General: No focal deficit present  Mental Status: He is alert and oriented to person, place, and time     Psychiatric:         Mood and Affect: Mood normal          Behavior: Behavior normal            Additional Data:     Labs:    Results from last 7 days   Lab Units 05/19/23  0632   WBC Thousand/uL 4 99   HEMOGLOBIN g/dL 9 8*   HEMATOCRIT % 31 6*   PLATELETS Thousands/uL 160   NEUTROS PCT % 63   LYMPHS PCT % 22   MONOS PCT % 11   EOS PCT % 3     Results from last 7 days   Lab Units 05/19/23  0632 05/18/23  1512   SODIUM mmol/L 137 138   POTASSIUM mmol/L 4 3 4 3   CHLORIDE mmol/L 110* 109*   CO2 mmol/L 23 23   BUN mg/dL 24 30*   CREATININE mg/dL 0 88 1 21   ANION GAP mmol/L 4 6   CALCIUM mg/dL 8 5 9 0   ALBUMIN g/dL  --  3 8   TOTAL BILIRUBIN mg/dL  -- 0  37   ALK PHOS U/L  --  65   ALT U/L  --  15   AST U/L  --  22   GLUCOSE RANDOM mg/dL 89 85     Results from last 7 days   Lab Units 05/18/23  1512   INR  1 13                       * I Have Reviewed All Lab Data Listed Above  * Additional Pertinent Lab Tests Reviewed: Chris [address] Admission Reviewed    Imaging:    Imaging Reports Reviewed Today Include: CT abdomen/pelvis  Imaging Personally Reviewed by Myself Includes:  none    Recent Cultures (last 7 days):           Last 24 Hours Medication List:   Current Facility-Administered Medications   Medication Dose Route Frequency Provider Last Rate   • acetaminophen  650 mg Oral Q6H PRN aSscha Gomes MD     • aspirin  81 mg Oral Daily Sascha Gomes MD     • atorvastatin  80 mg Oral Daily With Janet Cooper MD     • bisacodyl  10 mg Oral Once Bailey Brown PA-C     • ondansetron  4 mg Intravenous Q6H PRN Sascha Gomes MD     • polyethylene glycol  238 g Oral Once Yash Lemus PA-C          Today, Patient Was Seen By: Joann Jimenez MD    ** Please Note: Dictation voice to text software may have been used in the creation of this document   **

## 2023-05-19 NOTE — ASSESSMENT & PLAN NOTE
Presented with rectal bleeding for the past 1 week bright red blood  Likely secondary to new rectal cancer  Hemoglobin noted to be approximately 10  Baseline approximately 11  We will monitor hemoglobin  Given new rectal mass would benefit from colonoscopy  GI and oncology consulted, appreciate recommendations

## 2023-05-19 NOTE — MALNUTRITION/BMI
This medical record reflects one or more clinical indicators suggestive of malnutrition    Malnutrition Findings:   Adult Malnutrition type: Chronic illness  Adult Degree of Malnutrition: Malnutrition of moderate degree         360 Statement: Related likely to catabolic illness as evidenced by mild-moderate depletion of body fat (Orbitals) and mild-moderate depletion of muscle mass (clavicle) treated with diet (patient denies need for supplements at this time)        See Nutrition note dated 5/19/23 for additional details  Completed nutrition assessment is viewable in the nutrition documentation

## 2023-05-19 NOTE — CONSULTS
Consultation -  [facility]  Bin [facility]  69 y o  male MRN: [id number]  Unit/Bed#: -01 Encounter: [id number]        Consults    Reason for Consult / Principal Problem: Rectal Bleeding, Rectal Mass    HPI: Jason Lema is a 70 yo M with a PMH of HLD, ischemic cardiomyopathy, who presented yesterday due to rectal bleeding ongoing for the past several weeks  He reports that he had a weight loss of 7 or 8 pounds as well despite good p o  intake and a good appetite  He denies any abdominal pain  He denies any bowel habit changes prior to a few weeks ago when the blood started    He has never had a colonoscopy in the past     REVIEW OF SYSTEMS: Negative except for as stated above      Historical Information   Past Medical History:   Diagnosis Date   • Hyperlipidemia    • Ischemic cardiomyopathy    • Lacunar infarction [facility])    • Myocardial infarction ([place] [address] )    • Near syncope 2019   • Non-ST elevated myocardial infarction [facility])    • NSTEMI (non-ST elevated myocardial infarction) ([facility] ) 10/20/2019   • Pericarditis 10/24/2019   • Poison ivy    • Stroke [facility])      Past Surgical History:   Procedure Laterality Date   • ANGIOPLASTY     • CORONARY STENT PLACEMENT     • HERNIA REPAIR Left 2021    Procedure: OPEN REPAIR HERNIA INGUINAL LEFT WITH MESH;  Surgeon: Abelino Reese MD;  [facility];  Service: General     Social History   Social History     Substance and Sexual Activity   Alcohol Use Never     Social History     Substance and Sexual Activity   Drug Use No     Social History     Tobacco Use   Smoking Status Former   • Packs/day: 1 00   • Years: 15 00   • Pack years: 15 00   • Types: Cigarettes   • Quit date: 1969   • Years since quittin 4   Smokeless Tobacco Never     Family History   Problem Relation Age of Onset   • No Known Problems Mother    • Emphysema Father    • Heart disease Maternal Grandfather    • Heart attack Maternal Grandfather    • Emphysema Paternal "Grandfather        Meds/Allergies     Medications Prior to Admission   Medication   • aspirin (ECOTRIN LOW STRENGTH) 81 mg EC tablet   • atorvastatin (LIPITOR) 80 mg tablet   • Blood Pressure KIT     Current Facility-Administered Medications   Medication Dose Route Frequency   • acetaminophen (TYLENOL) tablet 650 mg  650 mg Oral Q6H PRN   • aspirin (ECOTRIN LOW STRENGTH) EC tablet 81 mg  81 mg Oral Daily   • atorvastatin (LIPITOR) tablet 80 mg  80 mg Oral Daily With Dinner   • bisacodyl (DULCOLAX) EC tablet 10 mg  10 mg Oral Once   • ondansetron (ZOFRAN) injection 4 mg  4 mg Intravenous Q6H PRN   • polyethylene glycol (GLYCOLAX) bowel prep 238 g  238 g Oral Once       No Known Allergies        Objective     Blood pressure 139/80, pulse 71, temperature 98 °F (36 7 °C), resp  rate 16, height 5' 10\" (1 778 m), weight 58 7 kg (129 lb 6 6 oz), SpO2 98 %  Intake/Output Summary (Last 24 hours) at 5/19/2023 1212  Last data filed at 5/19/2023 1040  Gross per 24 hour   Intake 480 ml   Output 1000 ml   Net -520 ml         PHYSICAL EXAM:      General Appearance:   Alert, cooperative, no distress, appears stated age    HEENT:   Normocephalic, atraumatic, anicteric      Neck:  Supple, symmetrical, trachea midline, no adenopathy;    thyroid: no enlargement/tenderness/nodules; no carotid  bruit or JVD    Lungs:   Clear to auscultation bilaterally; no rales, rhonchi or wheezing; respirations unlabored    Heart[de-identified]   S1 and S2 normal; regular rate and rhythm; no murmur, rub, or gallop     Abdomen:   Soft, non-tender, non-distended; normal bowel sounds; no masses, no organomegaly    Genitalia:   Deferred    Rectal:   Deferred    Extremities:  No cyanosis, clubbing or edema    Pulses:  2+ and symmetric all extremities    Skin:  Skin color, texture, turgor normal, no rashes or lesions    Lymph nodes:  No palpable cervical, axillary or inguinal lymphadenopathy        Lab Results:   Results from last 7 days   Lab Units 05/19/23  5405 " WBC Thousand/uL 4 99   HEMOGLOBIN g/dL 9 8*   HEMATOCRIT % 31 6*   PLATELETS Thousands/uL 160   NEUTROS PCT % 63   LYMPHS PCT % 22   MONOS PCT % 11   EOS PCT % 3     Results from last 7 days   Lab Units 05/19/23  0632 05/18/23  1512   POTASSIUM mmol/L 4 3 4 3   CHLORIDE mmol/L 110* 109*   CO2 mmol/L 23 23   BUN mg/dL 24 30*   CREATININE mg/dL 0 88 1 21   CALCIUM mg/dL 8 5 9 0   ALK PHOS U/L  --  65   ALT U/L  --  15   AST U/L  --  22     Results from last 7 days   Lab Units 05/18/23  1512   INR  1 13           Imaging Studies: I have personally reviewed pertinent imaging studies  CT abdomen pelvis with contrast    Result Date: 5/18/2023  Impression: Imaging findings highly concerning for a rectal neoplasm with suspicious 2 mesorectal lymph nodes  Colonoscopy and rectal MRI examination are recommended for further evaluation  Moderate colonic stool burden without abnormal dilation, likely representing constipation  A 2 mm pulmonary nodule in the left lower lobe, new since 11/17/2019  CT chest without contrast in 12 months is recommended to assess for stability according to Fleischner criteria  However, if there is established rectal malignancy, earlier CT chest follow-up should be obtained in 3 months  A possible 0 3 cm right hepatic dome lesion versus artifact  If this is a true lesion, this is statistically most likely a benign lesion such as cyst or hemangioma  However if the diagnosis of rectal malignancy is established, short-term follow-up with postcontrast CT or MRI abdomen is recommended to exclude possibility of a developing metastatic lesion  A 1 0 cm cystic lesion in the uncinate process, 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  MRI abdomen MRCP with and without contrast is recommended in 1 year to assess for stability   I personally discussed this study with Vincent Roberts on 5/18/2023 4:54 PM  Workstation performed: [id number]       ASSESSMENT and PLAN:      Rectal Bleeding  Rectal Mass  - He presented with BRBPR for several weeks, found to have findings for rectal mass on CT scan  - Will plan for colonoscopy tomorrow for diagnosis/biopsies  - Clear liquid diet today, bowel prep this afternoon, NPO after 0500 tomorrow  - CEA level  - Further recommendations pending colonoscopy findings      The patient will be seen by Dr Burgos Heading

## 2023-05-19 NOTE — CASE MANAGEMENT
Case Management Assessment & Discharge Planning Note    Patient name Savanna Baldwin    Location /-01 MRN [id number]  : 1945 Date 2023       Current Admission Date: 2023  Current Admission Diagnosis:Rectal bleeding   Patient Active Problem List    Diagnosis Date Noted   • Moderate protein-calorie malnutrition ([facility] [address] ) 2023   • Rectal bleeding 2023   • Rectal mass 2023   • Acute on chronic anemia 2023   • Protein-calorie malnutrition, unspecified severity ([facility] [address] ) 2023   • Essential hypertension 2020   • Non-recurrent unilateral inguinal hernia 2020   • History of lacunar cerebrovascular accident (CVA) 2019   • CAD in native artery 2019   • S/P primary angioplasty with coronary stent 2019   • History of ischemic cardiomyopathy 2019      LOS (days): 0  Geometric Mean LOS (GMLOS) (days):   Days to GMLOS:     OBJECTIVE:              Current admission status: Inpatient       Preferred Pharmacy:   [address] [place] [id number]  Phone: [phone] Fax: Bal Escamilla , [facility] [address]  Phone: [phone] Fax: [phone]    [facility] [address]  Phone: [phone] Fax: [phone]    Primary Care Provider: Ja Castrejon DO    Primary Insurance: [facility]  Secondary Insurance:     ASSESSMENT:  [facility], [address] [place] [address] 44 Representative - Daughter   Primary Phone: [phone] (Home)               Advance Directives  Does patient have a [address] [facility] [address]?: No  Was patient offered paperwork?: Yes (declined)  Does patient currently have a Health Care decision maker?: Yes, please see Health Care Proxy section  Does patient have Advance Directives?: No  Was patient offered paperwork?: Yes (declined)         Readmission Root Cause  30 Day Readmission: No    Patient Information  Admitted from[de-identified] Home  Mental Status: Alert  During Assessment patient was accompanied by: Not accompanied during assessment  Assessment information provided by[de-identified] Patient  Primary Caregiver: Self  Support Systems: Self, Dinorah Mcintosh Dr of Residence: [place] [address] do you live in?: [address] entry access options   Select all that apply : No steps to enter home  Type of Current Residence: Travel Trailer/ Mobile Home  In the last 12 months, was there a time when you were not able to pay the mortgage or rent on time?: No  In the last 12 months, how many places have you lived?: 1  In the last 12 months, was there a time when you did not have a steady place to sleep or slept in a shelter (including now)?: No  Homeless/housing insecurity resource given?: N/A  Living Arrangements: Lives w/ Daughter  Is patient a ?: Yes  Is patient active with [address] [place] [address])?: No    Activities of Daily Living Prior to Admission  Functional Status: Independent  Completes ADLs independently?: Yes  Does patient use assisted devices?: No  Does patient currently own DME?: No  Does patient have a history of Outpatient Therapy (PT/OT)?: No  Does the patient have a history of Short-Term Rehab?: No  Does patient have a history of HHC?: No  Does patient currently have [facility]?: No         Patient Information Continued  Income Source: SSI/SSD  Does patient have prescription coverage?: Yes  Within the past 12 months, you worried that your food would run out before you got the money to buy more : Never true  Within the past 12 months, the food you bought just didn't last and you didn't have money to get more : Never true  Food insecurity resource given?: N/A  Does patient receive dialysis treatments?: No  Does patient have a history of substance abuse?: No  Does patient have a history of Mental Health Diagnosis?: No    PHQ 2/9 Screening   Reviewed PHQ 2/9 Depression Screening Score?: No    Means of Transportation  Means of Transport to Appts[de-identified] Drives Self  In the past 12 months, has lack of transportation kept you from medical appointments or from getting medications?: No  In the past 12 months, has lack of transportation kept you from meetings, work, or from getting things needed for daily living?: No  Was application for public transport provided?: N/A        DISCHARGE DETAILS:    Discharge planning discussed with[de-identified] Patient at bedside  Freedom of Choice: Yes  Comments - Freedom of Choice: CM discussed freedom of choice as it pertains to discharge planning  Patient denied having any needs    CM contacted family/caregiver?: No- see comments (declined)  Were Treatment Team discharge recommendations reviewed with patient/caregiver?: Yes  Did patient/caregiver verbalize understanding of patient care needs?: Yes  Were patient/caregiver advised of the risks associated with not following Treatment Team discharge recommendations?: Yes         Requested [address]         Is the patient interested in [facility] at discharge?: No    DME Referral Provided  Referral made for DME?: No    Other Referral/Resources/Interventions Provided:  Interventions: None Indicated    Would you like to participate in our [address] service program?  : No - Declined    Treatment Team Recommendation: Other (TBD)  Discharge Destination Plan[de-identified] Home  Transport at Discharge : Family

## 2023-05-19 NOTE — ASSESSMENT & PLAN NOTE
Malnutrition Findings:   Adult Malnutrition type: Chronic illness  Adult Degree of Malnutrition: Malnutrition of moderate degree                     360 Statement: Related likely to catabolic illness as evidenced by mild-moderate depletion of body fat (Orbitals) and mild-moderate depletion of muscle mass (clavicle) treated with diet (patient denies need for supplements at this time)    BMI Findings: Body mass index is 18 57 kg/m²

## 2023-05-20 ENCOUNTER — APPOINTMENT (INPATIENT)
Dept: NON INVASIVE DIAGNOSTICS | Facility: HOSPITAL | Age: 78
End: 2023-05-20

## 2023-05-20 ENCOUNTER — ANESTHESIA EVENT (INPATIENT)
Dept: GASTROENTEROLOGY | Facility: HOSPITAL | Age: 78
End: 2023-05-20

## 2023-05-20 ENCOUNTER — ANESTHESIA (INPATIENT)
Dept: GASTROENTEROLOGY | Facility: HOSPITAL | Age: 78
End: 2023-05-20

## 2023-05-20 ENCOUNTER — APPOINTMENT (INPATIENT)
Dept: GASTROENTEROLOGY | Facility: HOSPITAL | Age: 78
End: 2023-05-20

## 2023-05-20 LAB
ABO GROUP BLD: NORMAL
ANION GAP SERPL CALCULATED.3IONS-SCNC: 6 MMOL/L (ref 4–13)
ANION GAP SERPL CALCULATED.3IONS-SCNC: 7 MMOL/L (ref 4–13)
ATRIAL RATE: 69 BPM
ATRIAL RATE: 73 BPM
BUN SERPL-MCNC: 17 MG/DL (ref 5–25)
BUN SERPL-MCNC: 18 MG/DL (ref 5–25)
CALCIUM SERPL-MCNC: 7 MG/DL (ref 8.4–10.2)
CALCIUM SERPL-MCNC: 8.7 MG/DL (ref 8.4–10.2)
CANCER AG19-9 SERPL-ACNC: 18 U/ML (ref 0–35)
CHLORIDE SERPL-SCNC: 106 MMOL/L (ref 96–108)
CHLORIDE SERPL-SCNC: 112 MMOL/L (ref 96–108)
CHOLEST SERPL-MCNC: 100 MG/DL
CO2 SERPL-SCNC: 19 MMOL/L (ref 21–32)
CO2 SERPL-SCNC: 22 MMOL/L (ref 21–32)
CREAT SERPL-MCNC: 0.71 MG/DL (ref 0.6–1.3)
CREAT SERPL-MCNC: 0.85 MG/DL (ref 0.6–1.3)
ERYTHROCYTE [DISTWIDTH] IN BLOOD BY AUTOMATED COUNT: 17 % (ref 11.6–15.1)
EST. AVERAGE GLUCOSE BLD GHB EST-MCNC: 111 MG/DL
GFR SERPL CREATININE-BSD FRML MDRD: 84 ML/MIN/1.73SQ M
GFR SERPL CREATININE-BSD FRML MDRD: 90 ML/MIN/1.73SQ M
GLUCOSE SERPL-MCNC: 102 MG/DL (ref 65–140)
GLUCOSE SERPL-MCNC: 104 MG/DL (ref 65–140)
HBA1C MFR BLD: 5.5 %
HCT VFR BLD AUTO: 30.6 % (ref 36.5–49.3)
HDLC SERPL-MCNC: 49 MG/DL
HGB BLD-MCNC: 9.2 G/DL (ref 12–17)
LDLC SERPL CALC-MCNC: 39 MG/DL (ref 0–100)
MAGNESIUM SERPL-MCNC: 1.6 MG/DL (ref 1.9–2.7)
MAGNESIUM SERPL-MCNC: 2.7 MG/DL (ref 1.9–2.7)
MCH RBC QN AUTO: 23.5 PG (ref 26.8–34.3)
MCHC RBC AUTO-ENTMCNC: 30.1 G/DL (ref 31.4–37.4)
MCV RBC AUTO: 78 FL (ref 82–98)
P AXIS: 67 DEGREES
P AXIS: 71 DEGREES
PLATELET # BLD AUTO: 160 THOUSANDS/UL (ref 149–390)
PMV BLD AUTO: 10.7 FL (ref 8.9–12.7)
POTASSIUM SERPL-SCNC: 3.6 MMOL/L (ref 3.5–5.3)
POTASSIUM SERPL-SCNC: 4.7 MMOL/L (ref 3.5–5.3)
PR INTERVAL: 174 MS
PR INTERVAL: 178 MS
QRS AXIS: 29 DEGREES
QRS AXIS: 81 DEGREES
QRSD INTERVAL: 80 MS
QRSD INTERVAL: 88 MS
QT INTERVAL: 406 MS
QT INTERVAL: 410 MS
QTC INTERVAL: 439 MS
QTC INTERVAL: 447 MS
RBC # BLD AUTO: 3.91 MILLION/UL (ref 3.88–5.62)
RH BLD: NEGATIVE
SODIUM SERPL-SCNC: 135 MMOL/L (ref 135–147)
SODIUM SERPL-SCNC: 137 MMOL/L (ref 135–147)
T WAVE AXIS: 92 DEGREES
T WAVE AXIS: 93 DEGREES
TRIGL SERPL-MCNC: 61 MG/DL
VENTRICULAR RATE: 69 BPM
VENTRICULAR RATE: 73 BPM
WBC # BLD AUTO: 7.71 THOUSAND/UL (ref 4.31–10.16)

## 2023-05-20 PROCEDURE — 0DBP8ZX EXCISION OF RECTUM, VIA NATURAL OR ARTIFICIAL OPENING ENDOSCOPIC, DIAGNOSTIC: ICD-10-PCS | Performed by: INTERNAL MEDICINE

## 2023-05-20 RX ORDER — PHENYLEPHRINE HCL IN 0.9% NACL 1 MG/10 ML
SYRINGE (ML) INTRAVENOUS AS NEEDED
Status: DISCONTINUED | OUTPATIENT
Start: 2023-05-20 | End: 2023-05-20

## 2023-05-20 RX ORDER — PROPOFOL 10 MG/ML
INJECTION, EMULSION INTRAVENOUS AS NEEDED
Status: DISCONTINUED | OUTPATIENT
Start: 2023-05-20 | End: 2023-05-20

## 2023-05-20 RX ORDER — SODIUM CHLORIDE, SODIUM LACTATE, POTASSIUM CHLORIDE, CALCIUM CHLORIDE 600; 310; 30; 20 MG/100ML; MG/100ML; MG/100ML; MG/100ML
INJECTION, SOLUTION INTRAVENOUS CONTINUOUS PRN
Status: DISCONTINUED | OUTPATIENT
Start: 2023-05-20 | End: 2023-05-20

## 2023-05-20 RX ORDER — MAGNESIUM SULFATE HEPTAHYDRATE 40 MG/ML
2 INJECTION, SOLUTION INTRAVENOUS ONCE
Status: COMPLETED | OUTPATIENT
Start: 2023-05-20 | End: 2023-05-20

## 2023-05-20 RX ORDER — LIDOCAINE HYDROCHLORIDE 10 MG/ML
INJECTION, SOLUTION EPIDURAL; INFILTRATION; INTRACAUDAL; PERINEURAL AS NEEDED
Status: DISCONTINUED | OUTPATIENT
Start: 2023-05-20 | End: 2023-05-20

## 2023-05-20 RX ADMIN — PERFLUTREN 0.9 ML/MIN: 6.52 INJECTION, SUSPENSION INTRAVENOUS at 10:20

## 2023-05-20 RX ADMIN — PROPOFOL 10 MG: 10 INJECTION, EMULSION INTRAVENOUS at 15:28

## 2023-05-20 RX ADMIN — ASPIRIN 81 MG: 81 TABLET ORAL at 09:15

## 2023-05-20 RX ADMIN — Medication 12.5 MG: at 21:41

## 2023-05-20 RX ADMIN — Medication 12.5 MG: at 09:28

## 2023-05-20 RX ADMIN — SODIUM CHLORIDE 75 ML/HR: 0.9 INJECTION, SOLUTION INTRAVENOUS at 00:06

## 2023-05-20 RX ADMIN — SODIUM CHLORIDE, SODIUM LACTATE, POTASSIUM CHLORIDE, AND CALCIUM CHLORIDE: .6; .31; .03; .02 INJECTION, SOLUTION INTRAVENOUS at 15:22

## 2023-05-20 RX ADMIN — PROPOFOL 40 MG: 10 INJECTION, EMULSION INTRAVENOUS at 15:24

## 2023-05-20 RX ADMIN — MAGNESIUM SULFATE HEPTAHYDRATE 2 G: 40 INJECTION, SOLUTION INTRAVENOUS at 01:37

## 2023-05-20 RX ADMIN — IRON SUCROSE 300 MG: 20 INJECTION, SOLUTION INTRAVENOUS at 16:20

## 2023-05-20 RX ADMIN — LIDOCAINE HYDROCHLORIDE 50 MG: 10 INJECTION, SOLUTION EPIDURAL; INFILTRATION; INTRACAUDAL at 15:24

## 2023-05-20 RX ADMIN — ATORVASTATIN CALCIUM 80 MG: 40 TABLET, FILM COATED ORAL at 17:03

## 2023-05-20 RX ADMIN — Medication 100 MCG: at 15:30

## 2023-05-20 RX ADMIN — CLOPIDOGREL BISULFATE 75 MG: 75 TABLET ORAL at 09:15

## 2023-05-20 NOTE — ANESTHESIA PREPROCEDURE EVALUATION
Procedure:  FLEXIBLE SIGMOIDOSCOPY    Relevant Problems   CARDIO   (+) CAD in native artery   (+) Essential hypertension   (+) STEMI involving left anterior descending coronary artery (HCC)      GI/HEPATIC   (+) Rectal bleeding      HEMATOLOGY   (+) Acute on chronic anemia      NEURO/PSYCH   (+) History of ischemic cardiomyopathy   (+) History of lacunar cerebrovascular accident (CVA)      S/p LAD LUDIVINA 5/19 for STEMI  Bleeding rectal mass, need dx +/- hemostatic measures     Physical Exam    Airway    Mallampati score: II  TM Distance: >3 FB  Neck ROM: limited     Dental   No notable dental hx     Cardiovascular  Rhythm: regular, Rate: normal,     Pulmonary  Breath sounds clear to auscultation,     Other Findings        Anesthesia Plan  ASA Score- 4 Emergent    Anesthesia Type- IV sedation with anesthesia with ASA Monitors  Additional Monitors:   Airway Plan:     Comment: Light sedation with avoidance of systemic hypotension  Plan Factors-Exercise tolerance (METS): >4 METS  Chart reviewed  EKG reviewed  Imaging results reviewed  Existing labs reviewed  Patient summary reviewed  Patient is not a current smoker  Induction- intravenous  Postoperative Plan-     Informed Consent- Anesthetic plan and risks discussed with patient, healthcare power of  and daughter  I personally reviewed this patient with the CRNA  Discussed and agreed on the Anesthesia Plan with the CHELSIE Mcgee

## 2023-05-20 NOTE — QUICK NOTE
Updated by nursing, patient developed chest pain overnight  Reports experiencing 10/10 left sided chest pain radiating into the left arm  Reports feeling similar to prior MI and started after vomiting episode  EKG concerning for ST changes, reached out to on-call cardiology who have concern for STEMI and recommend reaching out to on-call interventionalist  Spoke with Dr Sherryle Rife who have plan for cath overnight tonight  Unfortunately due to rectal bleeding anticoagulation/antiplatlet therapy may prove difficult  Dr Sherryle Rife asked that CT surgery be made aware of patient in-case CABG is required in future (messaged on-call at this time)  STEMI alert was called and PAC's notified to prep cath lab  With concurrent rectal bleeding, ordered type and screen + 2U crossmatched blood to be prepped  Patient reports some improvement in pain after IV morphine 2mg + SL nitro tab x2  Patient being prepped for cath lab currently

## 2023-05-20 NOTE — ASSESSMENT & PLAN NOTE
Continue to monitor hemoglobins and transfuse for hemoglobin less than 8 given ST segment elevation MI suffered this evening    Consider Venofer

## 2023-05-20 NOTE — ASSESSMENT & PLAN NOTE
Malnutrition Findings:   Adult Malnutrition type: Chronic illness  Adult Degree of Malnutrition: Malnutrition of moderate degree                    360 Statement: Related likely to catabolic illness as evidenced by mild-moderate depletion of body fat (Orbitals) and mild-moderate depletion of muscle mass (clavicle) treated with diet (patient denies need for supplements at this time)    BMI Findings: Body mass index is 18 57 kg/m²     Nutrition consulted

## 2023-05-20 NOTE — UTILIZATION REVIEW
Continued Stay Review    Date:    5/20/23                          Current Patient Class:    Inpatient  Current Level of Care:   ICU    HPI:78 y o  male initially admitted on OBS order 5/17 1702 converted to IP on 5/19 1255 for continued workup and treatment of GI bleed  5/19    9:14  PM  Transfer to ICU  Developed chest pain,  10/10  EKG  With concern for STEMI  Cardiac cath  1  Significant single vessel CAD  2  Successful BMS deployment in proximal LAD           Assessment/Plan:   5/20    Cardiology consult  Anterior  STEMI    Wait  TTE  Continue tele  Continue to monitor hemoglobin  Continue IV  Venofer  Ongoiong rectal bleeding, likely  Colorectal cancer  Plan flex sigmoid/colonoscopy this pm      Flex sigmoid  IMPRESSION:  1    Rectal mass consistent with adenocarcinoma, two thirds circumference, 4 cm from the anal verge, extending 5 cm proximal       Vital Signs:    75 20 109/61 -- 99 % None (Room air) --    05/20/23 1543 97 5 °F (36 4 °C) 73 20 103/56 -- 99 % None (Room air) --   05/20/23 1537 -- 71 20 103/56 -- 99 % None (Room air) --   05/20/23 1301 97 8 °F (36 6 °C) 67 20 121/66 -- 98 % None (Room air) --   05/20/23 1100 97 7 °F (36 5 °C) 67 17 135/75 97 99 % None (Room air) Lying   05/20/23 1000 -- 72 -- 140/75 -- 99 % -- --   05/20/23 0800 -- 73 -- 145/82 105 99 % -- --   05/20/23 0330 -- 69 -- 122/67 90 -- -- --   05/20/23 0315 -- 70 -- 122/70 89 -- -- --   05/20/23 0300 -- 72 -- 129/67 90 -- -- --   05/20/23 0245 -- 69 -- 134/78 101 -- -- --   05/20/23 0230 -- 68 -- 126/70 92 -- -- --   05/20/23 0215 -- 69 -- 129/72 95 -- -- --   05/20/23 0200 -- 69 -- 130/72 96 99 % -- --   05/20/23 0145 -- 68 -- 141/73 98 100 % -- --   05/20/23 0130 -- 71 -- 131/74 97 100 % -- --   05/20/23 0115 -- 71 -- 122/72 92 99 % -- --   05/20/23 0100 -- 70 -- 119/72 90 99 % -- --   05/20/23 0045 -- 68 -- 122/68 90 99 % -- --   05/20/23 0030 -- 71 -- 121/68 91 99 % -- --   05/20/23 0015 -- 69 -- 123/72 92 99 % -- --   05/20/23 0000 -- 72 -- 115/67 86 99 % None (Room air) --         Pertinent Labs/Diagnostic Results:       Results from last 7 days   Lab Units 05/20/23  0446 05/19/23  2349 05/19/23 2044 05/19/23 0632 05/18/23 2245 05/18/23  1512   WBC Thousand/uL 7 71 9 83  --  4 99  --  5 59   HEMOGLOBIN g/dL 9 2* 10 0* 11 5* 9 8* 9 9* 10 0*   HEMATOCRIT % 30 6* 32 3*  --  31 6* 31 0* 32 2*   PLATELETS Thousands/uL 160 174  --  160  --  176   NEUTROS ABS Thousands/µL  --   --   --  3 12  --  3 58     Results from last 7 days   Lab Units 05/19/23  0632   RETIC CT ABS  43,800   RETIC CT PCT % 1 07     Results from last 7 days   Lab Units 05/20/23  0641 05/19/23  2349 05/19/23 2146 05/19/23 0632 05/18/23  1512   SODIUM mmol/L 135 137 133* 137 138   POTASSIUM mmol/L 4 7 3 6 4 5 4 3 4 3   CHLORIDE mmol/L 106 112* 104 110* 109*   CO2 mmol/L 22 19* 21 23 23   ANION GAP mmol/L 7 6 8 4 6   BUN mg/dL 18 17 21 24 30*   CREATININE mg/dL 0 85 0 71 1 04 0 88 1 21   EGFR ml/min/1 73sq m 84 90 68 82 57   CALCIUM mg/dL 8 7 7 0* 8 9 8 5 9 0   MAGNESIUM mg/dL 2 7 1 6* 2 1  --   --      Results from last 7 days   Lab Units 05/18/23  1512   AST U/L 22   ALT U/L 15   ALK PHOS U/L 65   TOTAL PROTEIN g/dL 6 3*   ALBUMIN g/dL 3 8   TOTAL BILIRUBIN mg/dL 0 37         Results from last 7 days   Lab Units 05/20/23  0641 05/19/23  2349 05/19/23 2146 05/19/23  0632 05/18/23  1512   GLUCOSE RANDOM mg/dL 104 102 164* 89 85         Results from last 7 days   Lab Units 05/19/23  2349   HEMOGLOBIN A1C % 5 5   EAG mg/dl 111           Results from last 7 days   Lab Units 05/18/23 2245 05/18/23  1744 05/18/23  1512   HS TNI 0HR ng/L  --   --  3   HS TNI 2HR ng/L  --  4  --    HSTNI D2 ng/L  --  1  --    HS TNI 4HR ng/L 4  --   --    HSTNI D4 ng/L 1  --   --          Results from last 7 days   Lab Units 05/18/23  1512   PROTIME seconds 14 3   INR  1 13   PTT seconds 28                             Results from last 7 days   Lab Units 05/19/23  5554 FERRITIN ng/mL 8*   IRON SATURATION % 6*   IRON ug/dL 19*   TIBC ug/dL 339         Results from last 7 days   Lab Units 05/19/23  2230   UNIT PRODUCT CODE  [id number]  [id number]   UNIT NUMBER  [id number]  [id number]   UNITABO  A  A   UNITRH  NEG  NEG   CROSSMATCH  Compatible  Compatible   UNIT DISPENSE STATUS  Crossmatched  Crossmatched   UNIT PRODUCT VOL ml 350  350                 Results from last 7 days   Lab Units 05/19/23  2111   CEA ng/mL 1 7         Results from last 7 days   Lab Units 05/18/23  1554   CLARITY UA  Clear   COLOR UA  Light Yellow   SPEC GRAV UA  1 027   PH UA  5 5   GLUCOSE UA mg/dl Negative   KETONES UA mg/dl Negative   BLOOD UA  Negative   PROTEIN UA mg/dl Negative   NITRITE UA  Negative   BILIRUBIN UA  Negative   UROBILINOGEN UA (BE) mg/dl <2 0   LEUKOCYTES UA  Negative         Medications:   Scheduled Medications:  aspirin, 81 mg, Oral, Daily  atorvastatin, 80 mg, Oral, Daily With Dinner  clopidogrel, 75 mg, Oral, Daily  iron sucrose, 300 mg, Intravenous, Daily  metoprolol tartrate, 12 5 mg, Oral, Q12H BEAU      Continuous IV Infusions:     PRN Meds:  acetaminophen, 650 mg, Oral, Q6H PRN  nitroglycerin, 0 4 mg, Sublingual, Q5 Min PRN  ondansetron, 4 mg, Intravenous, Q6H PRN        Discharge Plan:    D    Network Utilization Review Department  ATTENTION: Please call with any questions or concerns to [phone] and carefully listen to the prompts so that you are directed to the right person  All voicemails are confidential   Abram Thomas all requests for admission clinical reviews, approved or denied determinations and any other requests to dedicated fax number below belonging to the campus where the patient is receiving treatment   List of dedicated fax numbers for the Facilities:  [address] DENIALS (Administrative/Medical Necessity) [phone]   [address] (Maternity/NICU/Pediatrics) Verito Coe 172 [address] Selena Capps 77 [phone]   [address] [place] [address] Ashley Moeller 28 [phone] [address] Richa Barakat [place] 134 815 [facility] [phone]

## 2023-05-20 NOTE — ANESTHESIA POSTPROCEDURE EVALUATION
Post-Op Assessment Note    CV Status:  Stable  Pain Score: 0    Pain management: adequate     Mental Status:  Awake and sleepy   Hydration Status:  Euvolemic   PONV Controlled:  Controlled   Airway Patency:  Patent and adequate      Post Op Vitals Reviewed: Yes      Staff: CRNA         No notable events documented      BP   105/49   Temp      Pulse  65   Resp 20   SpO2 100

## 2023-05-20 NOTE — ASSESSMENT & PLAN NOTE
Given need for Angiomax during cardiac cath and DAPT therapy status post bare-metal stent to the proximal LAD, monitor hemoglobins every 6 hours and transfuse for hemoglobin less than 8 given ST segment elevation MI suffered this evening  Reevaluate for colonoscopy in a m    Prep was completed prior to cardiac cath  GI following

## 2023-05-20 NOTE — PROGRESS NOTES
Pt with STEMI overnight and bare mental stent placed to LAD s/p plavix and angiomax given in cath lab  Given his ongoing rectal bleeding from presumed new rectal cancer and need for DAPT, we will proceed with flex sig today rather than colonoscopy as this is considered emergent to determine plan of care moving forward to address his GI bleeding and reduce risk of further cardiac events  Discussed with ICU/anesthesia provider as well as cardiology and the patient, all in agreement for flex sig today

## 2023-05-20 NOTE — CONSULTS
Consultation - Cardiology Team One  Christie Stroud  69 y o  male MRN: [id number]  Unit/Bed#:  Encounter: [id number]    Inpatient consult to Cardiology  Consult performed by: HUSSAIN Akhtar  Consult ordered by: HUSSAIN Cole          Physician Requesting Consult: Valentine Sifuentes DO  Reason for Consult / Principal Problem: STEMI    Assessment/Plan:    1  Anterior STEMI  - S/p coronary angiography and BMS to the proximal LAD  - Continue Plavix, atorvastatin and aspirin  - TTE ordered and pending  -Consider adding low dose beta blocker   - Continue to monitor on telemetry    2  Rectal bleeding  - Hemoglobin stable  - Management per gastroenterology and primary team    3  Rectal mass  - New finding  -Management per gastroenterology and oncology     4  History of ischemic cardiomyopathy with recovered EF of 50 to 55%  - Patient currently appears euvolemic  - Had not been on ACE/ARB secondary to hypotension in the past    5  History of CAD s/p PCI of the dLAD and PTCA of D1  -See plan for #1    6  Hypertension  - Blood pressure overall well controlled  - Continue to monitor    7  Hyperlipidemia  - Lipid panel reveals controlled cholesterol  - Continue atorvastatin    8  History of lacunar infarct  - Continue aspirin and atorvastatin    HPI: Cardiologist Dr Janice Bautista  is a 69y o  year old male who has a history of CAD s/p NSTEMI s/p  PCI of the dLAD and PTCA of D1, hypertension with orthostatic hypotension, lacunar CVA, ischemic cardiomyopathy with recovered EF of 50 to 55% (40-45% prior), hyperlipidemia presented to the emergency room on 5/18/2023 with complaints of rectal bleeding  He was admitted for work-up and was found to have a rectal mass on CT suspicious for rectal cancer with possible metastatic lesions in the liver lung and pancreas and was planned to have a colonoscopy today for further evaluation      Overnight he developed chest pain he reported was similar to prior MI and an EKG revealed ST changes  He was brought urgently to the cardiac Cath Lab where he received a proximal LAD BMS  He was loaded with Plavix we will continue DAPT with close monitoring of his hemoglobin and for recurrence of bleeding          REVIEW OF SYSTEMS:  Constitutional:  Denies fever or chills   Eyes:  Denies change in visual acuity   HENT:  Denies nasal congestion or sore throat   Respiratory:  Denies cough, orthopnea, PND or shortness of breath   Cardiovascular: + Chest pain, palpitations or edema   GI:  Denies abdominal pain, nausea, vomiting, bloody stools or diarrhea   :  Denies dysuria, frequency, difficulty in micturition and nocturia  Musculoskeletal:  Denies back pain or joint pain   Neurologic:  Denies headache, focal weakness or sensory changes   Endocrine:  Denies polyuria or polydipsia   Lymphatic:  Denies swollen glands   Psychiatric:  Denies depression or anxiety     Historical Information   Past Medical History:   Diagnosis Date   • Hyperlipidemia    • Ischemic cardiomyopathy    • Lacunar infarction ([facility] [address] )    • Myocardial infarction ([facility] )    • Near syncope 2019   • Non-ST elevated myocardial infarction [facility])    • NSTEMI (non-ST elevated myocardial infarction) ([facility] [address] ) 10/20/2019   • Pericarditis 10/24/2019   • Poison ivy    • Stroke [facility])      Past Surgical History:   Procedure Laterality Date   • ANGIOPLASTY     • CORONARY STENT PLACEMENT     • HERNIA REPAIR Left 2021    Procedure: OPEN REPAIR HERNIA INGUINAL LEFT WITH MESH;  Surgeon: Patricio Skaggs MD;  Location: [facility];  Service: General     Social History     Substance and Sexual Activity   Alcohol Use Never     Social History     Substance and Sexual Activity   Drug Use No     Social History     Tobacco Use   Smoking Status Former   • Packs/day: 1 00   • Years: 15 00   • Pack years: 15 00   • Types: Cigarettes   • Quit date: 1969   • Years since quittin 4   Smokeless Tobacco Never Family History:   Family History   Problem Relation Age of Onset   • No Known Problems Mother    • Emphysema Father    • Heart disease Maternal Grandfather    • Heart attack Maternal Grandfather    • Emphysema Paternal Grandfather        MEDS & ALLERGIES:  all current active meds have been reviewed and current meds:   Current Facility-Administered Medications   Medication Dose Route Frequency   • acetaminophen (TYLENOL) tablet 650 mg  650 mg Oral Q6H PRN   • aspirin (ECOTRIN LOW STRENGTH) EC tablet 81 mg  81 mg Oral Daily   • atorvastatin (LIPITOR) tablet 80 mg  80 mg Oral Daily With Dinner   • clopidogrel (PLAVIX) tablet 75 mg  75 mg Oral Daily   • nitroglycerin (NITROSTAT) SL tablet 0 4 mg  0 4 mg Sublingual Q5 Min PRN   • ondansetron (ZOFRAN) injection 4 mg  4 mg Intravenous Q6H PRN        No Known Allergies    OBJECTIVE:  Vitals:   Vitals:    05/20/23 0330   BP: 122/67   Pulse: 69   Resp:    Temp:    SpO2:      Body mass index is 18 57 kg/m²  Systolic (89DWC), PHX:126 , Min:114 , MDR:535     Diastolic (12KZG), OAU:69, Min:67, Max:83      Intake/Output Summary (Last 24 hours) at 5/20/2023 0841  Last data filed at 5/20/2023 0201  Gross per 24 hour   Intake 480 ml   Output 850 ml   Net -370 ml     Weight (last 2 days)     Date/Time Weight    05/18/23 20:56:49 58 7 (129 41)    05/18/23 1250 59 (130 07)        Invasive Devices     Peripheral Intravenous Line  Duration           Peripheral IV 05/18/23 Left;Ventral (anterior) Forearm 1 day                PHYSICAL EXAMS:  General:  Patient is not in acute distress, laying in the bed comfortably, awake, alert   Head: Normocephalic, Atraumatic     HEENT: White sclera, pink conjunctiva  Neck:  Supple, no neck vein distention  Respiratory: clear to auscultation   Cardiovascular:  PMI normal, S1-S2 normal, no murmurs, thrills, gallops, rubs, regular rhythm  GI:  Abdomen soft, non-tender, non-distended  Extremities: No edema, normal pulses  Integument:  No skin rashes or ulceration  Lymphatic:  No cervical or inguinal lymphadenopathy  Neurologic:  Patient is awake alert, responding to command, oriented to person, place and time     LABORATORY RESULTS:      CBC with diff:   Results from last 7 days   Lab Units 05/20/23  0446 05/19/23  2349 05/19/23  2044 05/19/23  0632 05/18/23  2245 05/18/23  1512   WBC Thousand/uL 7 71 9 83  --  4 99  --  5 59   HEMOGLOBIN g/dL 9 2* 10 0* 11 5* 9 8*   < > 10 0*   HEMATOCRIT % 30 6* 32 3*  --  31 6*   < > 32 2*   MCV fL 78* 77*  --  78*  --  78*   PLATELETS Thousands/uL 160 174  --  160  --  176   MCH pg 23 5* 23 9*  --  24 0*  --  24 1*   MCHC g/dL 30 1* 31 0*  --  31 0*  --  31 1*   RDW % 17 0* 16 8*  --  16 8*  --  16 9*   MPV fL 10 7 11 7  --  11 0  --  11 1   NRBC AUTO /100 WBCs  --   --   --  0  --  0    < > = values in this interval not displayed  CMP:  Results from last 7 days   Lab Units 05/20/23 0641 05/19/23 2349 05/19/23 2146 05/19/23 0632 05/18/23  1512   POTASSIUM mmol/L 4 7 3 6 4 5   < > 4 3   CHLORIDE mmol/L 106 112* 104   < > 109*   CO2 mmol/L 22 19* 21   < > 23   BUN mg/dL 18 17 21   < > 30*   CREATININE mg/dL 0 85 0 71 1 04   < > 1 21   CALCIUM mg/dL 8 7 7 0* 8 9   < > 9 0   AST U/L  --   --   --   --  22   ALT U/L  --   --   --   --  15   ALK PHOS U/L  --   --   --   --  65   EGFR ml/min/1 73sq m 84 90 68   < > 57    < > = values in this interval not displayed         BMP:  Results from last 7 days   Lab Units 05/20/23  0641 05/19/23 2349 05/19/23  2146   POTASSIUM mmol/L 4 7 3 6 4 5   CHLORIDE mmol/L 106 112* 104   CO2 mmol/L 22 19* 21   BUN mg/dL 18 17 21   CREATININE mg/dL 0 85 0 71 1 04   CALCIUM mg/dL 8 7 7 0* 8 9            Results from last 7 days   Lab Units 05/20/23  0641 05/19/23  2349 05/19/23  2146   MAGNESIUM mg/dL 2 7 1 6* 2 1             Results from last 7 days   Lab Units 05/18/23  1512   INR  1 13       Lipid Profile:   No results found for: CHOL  Lab Results   Component Value Date    HDL 49 2023    HDL 48 10/10/2022    HDL 53 2021     Lab Results   Component Value Date    LDLCALC 39 2023    LDLCALC 27 10/10/2022    LDLCALC 34 2021     Lab Results   Component Value Date    TRIG 61 2023    TRIG 37 10/10/2022    TRIG 49 2021       Cardiac testing:   Results for orders placed during the hospital encounter of 10/20/19    Echo complete with contrast if indicated    Narrative  [address] [place] [id number]  [phone]    Transthoracic Echocardiogram  2D, M-mode, Doppler, and Color Doppler    Study date:  20-Oct-2019    Patient: Leticia Dominguez  MR number: [id number]  Account number: [de-identified]  : 1945  Age: 69 years  Gender: Male  Status: Inpatient  Location: Bedside  Height: 70 in  Weight: 133 lb  BP: 119/ 69 mmHg    Indications: Evaluate suspected myocardial infarction  Diagnoses: I21 4 - Non-ST elevation (NSTEMI) myocardial infarction    Sonographer:  Hannah Mcdaniel RDCS  Referring Physician:  Estil Apley, PA-C  Group:  [facility]  Interpreting Physician:  Adrian Myers MD    SUMMARY    LEFT VENTRICLE:  Ejection fraction was estimated to be 40 %  Inferior wall,apex, apical septum and lateral wall are hypokinetic  MITRAL VALVE:  There was trace regurgitation  TRICUSPID VALVE:  There was trace regurgitation  HISTORY: Symptoms: chest pain  PRIOR HISTORY: Shortness of breath, NSTEMI,    PROCEDURE: The procedure was performed at the bedside  This was a routine study  The transthoracic approach was used  The study included complete 2D imaging, M-mode, complete spectral Doppler, and color Doppler  The heart rate was 58 bpm,  at the start of the study  Images were obtained from the parasternal, apical, subcostal, and suprasternal notch acoustic windows  Echocardiographic views were limited due to low windows and lung interference  Image quality was adequate      LEFT VENTRICLE: Size was normal  Ejection fraction was estimated to be 40 %  Inferior wall,apex, apical septum and lateral wall are hypokinetic  RIGHT VENTRICLE: The size was normal  Systolic function was normal  Wall thickness was normal     LEFT ATRIUM: Size was normal     RIGHT ATRIUM: Size was normal     MITRAL VALVE: There was annular calcification  DOPPLER: There was trace regurgitation  AORTIC VALVE: The valve was not well visualized  DOPPLER: There was no evidence for stenosis  TRICUSPID VALVE: The valve structure was normal  There was normal leaflet separation  DOPPLER: The transtricuspid velocity was within the normal range  There was no evidence for stenosis  There was trace regurgitation  PULMONIC VALVE: Leaflets exhibited normal thickness, no calcification, and normal cuspal separation  DOPPLER: The transpulmonic velocity was within the normal range  There was no regurgitation  PERICARDIUM: There was no pericardial effusion  The pericardium was normal in appearance  AORTA: The root exhibited upper limit of normal size  SYSTEM MEASUREMENT TABLES    2D  %FS: 20 8 %  Ao Diam: 3 8 cm  EDV(Teich): 85 5 ml  EF(Teich): 42 6 %  ESV(Teich): 49 1 ml  IVSd: 0 8 cm  LA Area: 13 4 cm2  LA Diam: 3 5 cm  LVEDV MOD A4C: 104 ml  LVEF MOD A4C: 45 6 %  LVESV MOD A4C: 56 6 ml  LVIDd: 4 4 cm  LVIDs: 3 4 cm  LVLd A4C: 8 7 cm  LVLs A4C: 7 cm  LVPWd: 0 8 cm  RA Area: 12 cm2  RVIDd: 3 1 cm  SV MOD A4C: 47 5 ml  SV(Teich): 36 5 ml    MM  TAPSE: 1 6 cm    PW  E': 0 1 m/s  E/E': 6 6  MV A Antolin: 0 6 m/s  MV Dec Sublette: 2 4 m/s2  MV DecT: 230 4 ms  MV E Antolin: 0 6 m/s  MV E/A Ratio: 0 9  MV PHT: 66 8 ms  MVA By PHT: 3 3 cm2    [employer] Accredited Echocardiography Laboratory    Prepared and electronically signed by    Val Reyes MD  Signed 20-Oct-2019 19:40:30    No results found for this or any previous visit  No valid procedures specified  No results found for this or any previous visit          Imaging: I have personally reviewed pertinent reports          EKG reviewed personally:  NSR     Telemetry reviewed personally:   Sinus rhythm with a rate in the 70s      Code Status: Level 1 - Full Code      HUSSAIN Menjivar  5/20/2023,8:41 AM

## 2023-05-20 NOTE — PROGRESS NOTES
[address] [place] [address]  Progress Note  Name: Sascha Martinez I  MRN: [id number]  Unit/Bed#:  I Date of Admission: 5/18/2023   Date of Service: 5/19/2023 I Hospital Day: 0    Assessment/Plan   S/P primary angioplasty with coronary stent  Assessment & Plan  Patient continued on aspirin and high intensity statin since admission    STEMI involving left anterior descending coronary artery [facility])  Assessment & Plan  Bare-metal stent deployed to the proximal LAD  Plavix 600 mg p o  given in Cath Lab, continue daily  Continue aspirin daily  Angiomax given in Cath Lab  Monitor closely for bleeding, hold DAPT therapy if needed for life-threatening bleeding  Lipid panel and A1c pending  Nitroglycerin for chest pain as needed    Moderate protein-calorie malnutrition ([address] )  Assessment & Plan  Malnutrition Findings:   Adult Malnutrition type: Chronic illness  Adult Degree of Malnutrition: Malnutrition of moderate degree                    360 Statement: Related likely to catabolic illness as evidenced by mild-moderate depletion of body fat (Orbitals) and mild-moderate depletion of muscle mass (clavicle) treated with diet (patient denies need for supplements at this time)    BMI Findings: Body mass index is 18 57 kg/m²  Nutrition consulted      Acute on chronic anemia  Assessment & Plan  Continue to monitor hemoglobins and transfuse for hemoglobin less than 8 given ST segment elevation MI suffered this evening  Consider Venofer    Rectal mass  Assessment & Plan  See above  GI following    * Rectal bleeding  Assessment & Plan  Given need for Angiomax during cardiac cath and DAPT therapy status post bare-metal stent to the proximal LAD, monitor hemoglobins every 6 hours and transfuse for hemoglobin less than 8 given ST segment elevation MI suffered this evening  Reevaluate for colonoscopy in a m    Prep was completed prior to cardiac cath  GI following           ICU Core Measures     A: Assess, Prevent, and Manage Pain · Has pain been assessed? Yes  · Need for changes to pain regimen? No   B: Both SAT/SAT  · N/A   C: Choice of Sedation · RASS Goal: 0 Alert and Calm  · Need for changes to sedation or analgesia regimen? NA   D: Delirium · CAM-ICU: Negative   E: Early Mobility  · Plan for early mobility? Yes   F: Family Engagement · Plan for family engagement today? Yes         Prophylaxis:  VTE Contraindicated secondary to: GI Bleed   Stress Ulcer  not ordered       Subjective   HPI/24hr events: Patient transferred to the stepdown unit status post cardiac cath  Patient was experiencing chest pain with radiation to the left arm while on the medical surgical unit awaiting colonoscopy in the morning for a newly identified rectal mass with bleeding  Cardiology was concerned for STEMI, patient taken to the Cath Lab where a bare-metal stent was deployed to the proximal LAD  Patient received Angiomax in the Cath Lab, which has since been discontinued  Patient also received a loading dose of Plavix  Patient to continue aspirin and Plavix daily unless life-threatening bleeding  Will reevaluate in the morning for colonoscopy  Objective                            Vitals I/O      Most Recent Min/Max in 24hrs   Temp (!) 97 3 °F (36 3 °C) Temp  Min: 97 3 °F (36 3 °C)  Max: 98 °F (36 7 °C)   Pulse 74 Pulse  Min: 67  Max: 75   Resp 18 Resp  Min: 16  Max: 20   /76 BP  Min: 114/75  Max: 139/80   O2 Sat 99 % SpO2  Min: 98 %  Max: 100 %      Intake/Output Summary (Last 24 hours) at 5/19/2023 2332  Last data filed at 5/19/2023 2250  Gross per 24 hour   Intake 720 ml   Output 1200 ml   Net -480 ml         Diet NPO; Sips with meds  Diet Cardiovascular; Cardiac     Invasive Monitoring Physical exam    Physical Exam  Vitals and nursing note reviewed  Constitutional:       General: He is not in acute distress  Appearance: He is well-developed  He is ill-appearing     HENT:      Head: Normocephalic and atraumatic  Mouth/Throat:      Mouth: Mucous membranes are dry  Pharynx: Oropharynx is clear  Eyes:      General: No scleral icterus  Conjunctiva/sclera: Conjunctivae normal       Pupils: Pupils are equal, round, and reactive to light  Neck:      Vascular: No JVD  Trachea: No tracheal deviation  Cardiovascular:      Rate and Rhythm: Normal rate and regular rhythm  Pulses: Normal pulses  Heart sounds: Normal heart sounds  No murmur heard  No friction rub  No gallop  Pulmonary:      Effort: Pulmonary effort is normal  No respiratory distress  Breath sounds: Normal breath sounds  Chest:      Chest wall: No tenderness  Abdominal:      General: Abdomen is flat  Bowel sounds are normal  There is no distension  Palpations: Abdomen is soft  Tenderness: There is no abdominal tenderness  There is no guarding or rebound  Musculoskeletal:         General: Normal range of motion  Cervical back: Normal range of motion and neck supple  Right lower leg: No edema  Left lower leg: No edema  Skin:     General: Skin is warm and dry  Capillary Refill: Capillary refill takes less than 2 seconds  Coloration: Skin is not jaundiced or pale  Neurological:      General: No focal deficit present  Mental Status: He is alert and oriented to person, place, and time  Mental status is at baseline  Cranial Nerves: No cranial nerve deficit  Coordination: Coordination normal    Psychiatric:         Mood and Affect: Mood normal          Behavior: Behavior normal          Thought Content: Thought content normal          Judgment: Judgment normal               Diagnostic Studies      EKG: Sinus rhythm  Imaging:  I have personally reviewed pertinent reports     and I have personally reviewed pertinent films in PACS     Medications:  Scheduled PRN   aspirin, 81 mg, Daily  atorvastatin, 80 mg, Daily With Dinner  [START ON 5/20/2023] clopidogrel, 75 mg, Daily  [START ON 5/20/2023] clopidogrel, 75 mg, Daily      acetaminophen, 650 mg, Q6H PRN  nitroglycerin, 0 4 mg, Q5 Min PRN  ondansetron, 4 mg, Q6H PRN       Continuous    sodium chloride, 75 mL/hr         Labs:    CBC    Recent Labs     05/18/23  1512 05/18/23  2245 05/19/23  0632 05/19/23  2044   WBC 5 59  --  4 99  --    HGB 10 0* 9 9* 9 8* 11 5*   HCT 32 2* 31 0* 31 6*  --      --  160  --      BMP    Recent Labs     05/19/23  0632 05/19/23  2146   SODIUM 137 133*   K 4 3 4 5   * 104   CO2 23 21   AGAP 4 8   BUN 24 21   CREATININE 0 88 1 04   CALCIUM 8 5 8 9       Coags    Recent Labs     05/18/23  1512   INR 1 13   PTT 28        Additional Electrolytes  Recent Labs     05/19/23  2146   MG 2 1          Blood Gas    No recent results  No recent results LFTs  Recent Labs     05/18/23  1512   ALT 15   AST 22   ALKPHOS 65   ALB 3 8   TBILI 0 37       Infectious  No recent results  Glucose  Recent Labs     05/18/23  1512 05/19/23  0632 05/19/23  2146   GLUC 85 [address], [address]

## 2023-05-20 NOTE — ASSESSMENT & PLAN NOTE
Bare-metal stent deployed to the proximal LAD  Plavix 600 mg p o  given in Cath Lab, continue 75 mg daily  Continue aspirin daily  Angiomax given in Cath Lab  Monitor closely for bleeding, hold DAPT therapy if needed for life-threatening bleeding  Lipid panel and A1c pending  Nitroglycerin for chest pain as needed

## 2023-05-20 NOTE — PROGRESS NOTES
[facility]  Progress Note  Name: Glen Ruggiero I  MRN: [id number]  Unit/Bed#:  I Date of Admission: 5/18/2023   Date of Service: 5/20/2023 I Hospital Day: 1    Assessment/Plan   S/P primary angioplasty with coronary stent  Assessment & Plan  Patient continued on aspirin and high intensity statin since admission    STEMI involving left anterior descending coronary artery [facility])  Assessment & Plan  Bare-metal stent deployed to the proximal LAD  Plavix 600 mg p o  given in Cath Lab, continue 75 mg daily  Continue aspirin daily  Angiomax given in Cath Lab  Monitor closely for bleeding, hold DAPT therapy if needed for life-threatening bleeding  Lipid panel and A1c pending  Nitroglycerin for chest pain as needed    Moderate protein-calorie malnutrition ([address] )  Assessment & Plan  Malnutrition Findings:   Adult Malnutrition type: Chronic illness  Adult Degree of Malnutrition: Malnutrition of moderate degree                    360 Statement: Related likely to catabolic illness as evidenced by mild-moderate depletion of body fat (Orbitals) and mild-moderate depletion of muscle mass (clavicle) treated with diet (patient denies need for supplements at this time)    BMI Findings: Body mass index is 18 57 kg/m²  Nutrition consulted      Acute on chronic anemia  Assessment & Plan  Continue to monitor hemoglobins and transfuse for hemoglobin less than 8 given ST segment elevation MI suffered this evening  Consider Venofer      Rectal mass  Assessment & Plan  See above  GI following    * Rectal bleeding  Assessment & Plan  Given need for Angiomax during cardiac cath and DAPT therapy status post bare-metal stent to the proximal LAD, monitor hemoglobins every 6 hours and transfuse for hemoglobin less than 8 given ST segment elevation MI suffered this evening  Reevaluate for colonoscopy in a m    Prep was completed prior to cardiac cath  GI following           ICU Core Measures     A: Assess, Prevent, and Manage Pain · Has pain been assessed? Yes  · Need for changes to pain regimen? NA   B: Both SAT/SAT  · N/A   C: Choice of Sedation · RASS Goal: 0 Alert and Calm  · Need for changes to sedation or analgesia regimen? NA   D: Delirium · CAM-ICU: Negative   E: Early Mobility  · Plan for early mobility? Yes   F: Family Engagement · Plan for family engagement today? Yes         Prophylaxis:  VTE Contraindicated secondary to: GI Bleed   Stress Ulcer  not ordered       Subjective   HPI/24hr events: Patient transferred to the stepdown unit last evening postcardiac cath where a bare-metal stent was deployed to the proximal LAD  Patient has been pain-free overnight and hemodynamically stable  Given his need for DAPT therapy, vigilance for bleeding  Patient for colonoscopy today  Anesthesia to decide medical suitability  Objective                            Vitals I/O      Most Recent Min/Max in 24hrs   Temp (!) 97 3 °F (36 3 °C) Temp  Min: 97 3 °F (36 3 °C)  Max: 98 °F (36 7 °C)   Pulse 69 Pulse  Min: 67  Max: 75   Resp 18 Resp  Min: 16  Max: 20   /67 BP  Min: 114/75  Max: 141/73   O2 Sat 99 % SpO2  Min: 98 %  Max: 100 %      Intake/Output Summary (Last 24 hours) at 5/20/2023 0530  Last data filed at 5/20/2023 0201  Gross per 24 hour   Intake 720 ml   Output 1150 ml   Net -430 ml         Diet NPO; Sips with meds     Invasive Monitoring Physical exam    Physical Exam  Vitals and nursing note reviewed  Constitutional:       General: He is not in acute distress  Appearance: He is well-developed and normal weight  HENT:      Head: Normocephalic and atraumatic  Mouth/Throat:      Mouth: Mucous membranes are moist       Pharynx: Oropharynx is clear  Eyes:      General: No scleral icterus  Conjunctiva/sclera: Conjunctivae normal       Pupils: Pupils are equal, round, and reactive to light  Neck:      Vascular: No JVD  Trachea: No tracheal deviation     Cardiovascular: Rate and Rhythm: Normal rate and regular rhythm  Pulses: Normal pulses  Heart sounds: Normal heart sounds  No murmur heard  No friction rub  No gallop  Pulmonary:      Effort: Pulmonary effort is normal  No respiratory distress  Breath sounds: Normal breath sounds  Chest:      Chest wall: No tenderness  Abdominal:      General: Bowel sounds are normal  There is no distension  Palpations: Abdomen is soft  Tenderness: There is no abdominal tenderness  There is no guarding or rebound  Musculoskeletal:         General: Normal range of motion  Cervical back: Normal range of motion and neck supple  Skin:     General: Skin is warm and dry  Capillary Refill: Capillary refill takes less than 2 seconds  Coloration: Skin is not pale  Neurological:      General: No focal deficit present  Mental Status: He is alert and oriented to person, place, and time  Mental status is at baseline  Cranial Nerves: No cranial nerve deficit  Coordination: Coordination normal    Psychiatric:         Mood and Affect: Mood normal          Behavior: Behavior normal          Thought Content: Thought content normal          Judgment: Judgment normal               Diagnostic Studies      EKG: Sinus rhythm  Imaging:  I have personally reviewed pertinent reports     and I have personally reviewed pertinent films in PACS     Medications:  Scheduled PRN   aspirin, 81 mg, Daily  atorvastatin, 80 mg, Daily With Dinner  clopidogrel, 75 mg, Daily      acetaminophen, 650 mg, Q6H PRN  nitroglycerin, 0 4 mg, Q5 Min PRN  ondansetron, 4 mg, Q6H PRN       Continuous    sodium chloride, 75 mL/hr, Last Rate: 75 mL/hr (05/20/23 0006)         Labs:    CBC    Recent Labs     05/19/23  2349 05/20/23  0446   WBC 9 83 7 71   HGB 10 0* 9 2*   HCT 32 3* 30 6*    160     BMP    Recent Labs     05/19/23  2146 05/19/23  2349   SODIUM 133* 137   K 4 5 3 6    112*   CO2 21 19*   AGAP 8 6   BUN 21 17 CREATININE 1 04 0 71   CALCIUM 8 9 7 0*       Coags    Recent Labs     05/18/23  1512   INR 1 13   PTT 28        Additional Electrolytes  Recent Labs     05/19/23  2146 05/19/23  2349   MG 2 1 1 6*          Blood Gas    No recent results  No recent results LFTs  Recent Labs     05/18/23  1512   ALT 15   AST 22   ALKPHOS 65   ALB 3 8   TBILI 0 37       Infectious  No recent results  Glucose  Recent Labs     05/18/23  1512 05/19/23  0632 05/19/23  2146 05/19/23  2349   GLUC 85 89 164* [address], [address]

## 2023-05-20 NOTE — ASSESSMENT & PLAN NOTE
Bare-metal stent deployed to the proximal LAD  Plavix 600 mg p o  given in Cath Lab, continue daily  Continue aspirin daily  Angiomax given in Cath Lab  Monitor closely for bleeding, hold DAPT therapy if needed for life-threatening bleeding  Lipid panel and A1c pending  Nitroglycerin for chest pain as needed 80

## 2023-05-21 ENCOUNTER — TREATMENT (OUTPATIENT)
Dept: GASTROENTEROLOGY | Facility: CLINIC | Age: 78
End: 2023-05-21

## 2023-05-21 DIAGNOSIS — C20 RECTAL CANCER (HCC): Primary | ICD-10-CM

## 2023-05-21 LAB
ANION GAP SERPL CALCULATED.3IONS-SCNC: 4 MMOL/L (ref 4–13)
AORTIC ROOT: 3.4 CM
APICAL FOUR CHAMBER EJECTION FRACTION: 38 %
ASCENDING AORTA: 3.9 CM
AV LVOT MEAN GRADIENT: 1 MMHG
AV LVOT PEAK GRADIENT: 3 MMHG
AV PEAK GRADIENT: 2 MMHG
AV REGURGITATION PRESSURE HALF TIME: 574 MS
BASOPHILS # BLD AUTO: 0.02 THOUSANDS/ÂΜL (ref 0–0.1)
BASOPHILS NFR BLD AUTO: 0 % (ref 0–1)
BUN SERPL-MCNC: 16 MG/DL (ref 5–25)
CA-I BLD-SCNC: 1.13 MMOL/L (ref 1.12–1.32)
CALCIUM SERPL-MCNC: 8.4 MG/DL (ref 8.4–10.2)
CHLORIDE SERPL-SCNC: 106 MMOL/L (ref 96–108)
CO2 SERPL-SCNC: 24 MMOL/L (ref 21–32)
CREAT SERPL-MCNC: 0.91 MG/DL (ref 0.6–1.3)
DOP CALC LVOT PEAK VEL VTI: 16.33 CM
DOP CALC LVOT PEAK VEL: 0.87 M/S
E WAVE DECELERATION TIME: 304 MS
EOSINOPHIL # BLD AUTO: 0.13 THOUSAND/ÂΜL (ref 0–0.61)
EOSINOPHIL NFR BLD AUTO: 2 % (ref 0–6)
ERYTHROCYTE [DISTWIDTH] IN BLOOD BY AUTOMATED COUNT: 16.9 % (ref 11.6–15.1)
FRACTIONAL SHORTENING: 28 (ref 28–44)
GFR SERPL CREATININE-BSD FRML MDRD: 81 ML/MIN/1.73SQ M
GLUCOSE SERPL-MCNC: 96 MG/DL (ref 65–140)
HCT VFR BLD AUTO: 33.4 % (ref 36.5–49.3)
HGB BLD-MCNC: 10.4 G/DL (ref 12–17)
IMM GRANULOCYTES # BLD AUTO: 0.02 THOUSAND/UL (ref 0–0.2)
IMM GRANULOCYTES NFR BLD AUTO: 0 % (ref 0–2)
INTERVENTRICULAR SEPTUM IN DIASTOLE (PARASTERNAL SHORT AXIS VIEW): 1.1 CM
INTERVENTRICULAR SEPTUM: 1.1 CM (ref 0.6–1.1)
LAAS-AP2: 12.6 CM2
LAAS-AP4: 10.4 CM2
LEFT ATRIUM AREA SYSTOLE SINGLE PLANE A4C: 10.5 CM2
LEFT ATRIUM SIZE: 3.1 CM
LEFT INTERNAL DIMENSION IN SYSTOLE: 3.3 CM (ref 2.1–4)
LEFT VENTRICULAR INTERNAL DIMENSION IN DIASTOLE: 4.6 CM (ref 3.5–6)
LEFT VENTRICULAR POSTERIOR WALL IN END DIASTOLE: 0.9 CM
LEFT VENTRICULAR STROKE VOLUME: 54 ML
LVSV (TEICH): 54 ML
LYMPHOCYTES # BLD AUTO: 1.06 THOUSANDS/ÂΜL (ref 0.6–4.47)
LYMPHOCYTES NFR BLD AUTO: 18 % (ref 14–44)
MAGNESIUM SERPL-MCNC: 2.3 MG/DL (ref 1.9–2.7)
MCH RBC QN AUTO: 23.9 PG (ref 26.8–34.3)
MCHC RBC AUTO-ENTMCNC: 31.1 G/DL (ref 31.4–37.4)
MCV RBC AUTO: 77 FL (ref 82–98)
MITRAL REGURGITATION PEAK VELOCITY: 5.1 M/S
MITRAL VALVE MEAN INFLOW VELOCITY: 4.08 M/S
MITRAL VALVE REGURGITANT PEAK GRADIENT: 104 MMHG
MONOCYTES # BLD AUTO: 0.75 THOUSAND/ÂΜL (ref 0.17–1.22)
MONOCYTES NFR BLD AUTO: 13 % (ref 4–12)
MV E'TISSUE VEL-SEP: 6 CM/S
MV PEAK A VEL: 0.96 M/S
MV PEAK E VEL: 52 CM/S
MV STENOSIS PRESSURE HALF TIME: 88 MS
MV VALVE AREA P 1/2 METHOD: 2.5
NEUTROPHILS # BLD AUTO: 3.82 THOUSANDS/ÂΜL (ref 1.85–7.62)
NEUTS SEG NFR BLD AUTO: 67 % (ref 43–75)
NRBC BLD AUTO-RTO: 0 /100 WBCS
PHOSPHATE SERPL-MCNC: 2.7 MG/DL (ref 2.3–4.1)
PLATELET # BLD AUTO: 148 THOUSANDS/UL (ref 149–390)
PMV BLD AUTO: 11 FL (ref 8.9–12.7)
POTASSIUM SERPL-SCNC: 4.6 MMOL/L (ref 3.5–5.3)
RBC # BLD AUTO: 4.35 MILLION/UL (ref 3.88–5.62)
RIGHT ATRIAL 2D VOLUME: 39 ML
RIGHT ATRIUM AREA SYSTOLE A4C: 13.2 CM2
RIGHT VENTRICLE ID DIMENSION: 3.6 CM
SL CV AV DECELERATION TIME RETROGRADE: 1979 MS
SL CV AV PEAK GRADIENT RETROGRADE: 53 MMHG
SL CV DOP CALC MV VTI RETROGRADE: 190.9 CM
SL CV LEFT ATRIUM LENGTH A2C: 4.1 CM
SL CV LV EF: 20
SL CV MV MEAN GRADIENT RETROGRADE: 74 MMHG
SL CV PED ECHO LEFT VENTRICLE DIASTOLIC VOLUME (MOD BIPLANE) 2D: 100 ML
SL CV PED ECHO LEFT VENTRICLE SYSTOLIC VOLUME (MOD BIPLANE) 2D: 46 ML
SODIUM SERPL-SCNC: 134 MMOL/L (ref 135–147)
TR MAX PG: 24 MMHG
TR PEAK VELOCITY: 2.4 M/S
TRICUSPID ANNULAR PLANE SYSTOLIC EXCURSION: 2.1 CM
TRICUSPID VALVE PEAK REGURGITATION VELOCITY: 2.44 M/S
WBC # BLD AUTO: 5.8 THOUSAND/UL (ref 4.31–10.16)

## 2023-05-21 RX ADMIN — CLOPIDOGREL BISULFATE 75 MG: 75 TABLET ORAL at 09:02

## 2023-05-21 RX ADMIN — ASPIRIN 81 MG: 81 TABLET ORAL at 09:02

## 2023-05-21 RX ADMIN — IRON SUCROSE 300 MG: 20 INJECTION, SOLUTION INTRAVENOUS at 09:02

## 2023-05-21 RX ADMIN — ATORVASTATIN CALCIUM 80 MG: 40 TABLET, FILM COATED ORAL at 16:20

## 2023-05-21 RX ADMIN — Medication 12.5 MG: at 09:02

## 2023-05-21 NOTE — ASSESSMENT & PLAN NOTE
· While patient was here did have chest pain and had an urgent cath secondary to STEMI  1  Significant single vessel CAD  2  Successful BMS deployment in proximal LAD    · Continue aspirin Plavix and statin

## 2023-05-21 NOTE — ASSESSMENT & PLAN NOTE
· Patient taken for urgent cath  1  Significant single vessel CAD  2  Successful BMS deployment in proximal LAD    · Cardiology following  · Continue dual antiplatelet therapy

## 2023-05-21 NOTE — PROGRESS NOTES
"Progress Note - Christie Stroud  69 y o  male MRN: [id number]    Unit/Bed#:  Encounter: [id number]    Subjective: The patient denies any abdominal pain this morning  Objective:     Vitals: Blood pressure 92/55, pulse 75, temperature 97 7 °F (36 5 °C), temperature source Oral, resp  rate 16, height 5' 10\" (1 778 m), weight 58 5 kg (129 lb), SpO2 97 %  ,Body mass index is 18 51 kg/m²  Intake/Output Summary (Last 24 hours) at 5/21/2023 0931  Last data filed at 5/21/2023 0300  Gross per 24 hour   Intake 150 ml   Output 825 ml   Net -675 ml       Physical Exam:     General Appearance: Alert, appears stated age and cooperative  HEENT:  Normocephalic, atraumatic, nonicteric, PERRLA  Neck:  Supple, symmetrical, trachea midline  Lungs: Clear to auscultation bilaterally, no rales or rhonchi, no labored breathing/accessory muscle use  Heart: Regular rate and rhythm, S1, S2 normal, no murmur, click, rub or gallop  Abdomen: Soft, non-tender, non-distended; bowel sounds normal; no masses or no organomegaly  Extremities: No cyanosis, edema  Lymphatic:  No cervical, inguinal, axillary adenopathy  Skin:  No jaundice, lesions, pallor  Pulses:  2+ and equal bilaterally, no bruits    Invasive Devices     None                 Lab Results:  Results from last 7 days   Lab Units 05/21/23  0511   WBC Thousand/uL 5 80   HEMOGLOBIN g/dL 10 4*   HEMATOCRIT % 33 4*   PLATELETS Thousands/uL 148*   NEUTROS PCT % 67   LYMPHS PCT % 18   MONOS PCT % 13*   EOS PCT % 2     Results from last 7 days   Lab Units 05/21/23  0511 05/19/23  0632 05/18/23  1512   POTASSIUM mmol/L 4 6   < > 4 3   CHLORIDE mmol/L 106   < > 109*   CO2 mmol/L 24   < > 23   BUN mg/dL 16   < > 30*   CREATININE mg/dL 0 91   < > 1 21   CALCIUM mg/dL 8 4   < > 9 0   ALK PHOS U/L  --   --  65   ALT U/L  --   --  15   AST U/L  --   --  22    < > = values in this interval not displayed       Results from last 7 days   Lab Units 05/18/23  1512   INR  1 13       " Imaging Studies: I have personally reviewed pertinent imaging studies  CT abdomen pelvis with contrast    Result Date: 5/18/2023  Impression: Imaging findings highly concerning for a rectal neoplasm with suspicious 2 mesorectal lymph nodes  Colonoscopy and rectal MRI examination are recommended for further evaluation  Moderate colonic stool burden without abnormal dilation, likely representing constipation  A 2 mm pulmonary nodule in the left lower lobe, new since 11/17/2019  CT chest without contrast in 12 months is recommended to assess for stability according to Fleischner criteria  However, if there is established rectal malignancy, earlier CT chest follow-up should be obtained in 3 months  A possible 0 3 cm right hepatic dome lesion versus artifact  If this is a true lesion, this is statistically most likely a benign lesion such as cyst or hemangioma  However if the diagnosis of rectal malignancy is established, short-term follow-up with postcontrast CT or MRI abdomen is recommended to exclude possibility of a developing metastatic lesion  A 1 0 cm cystic lesion in the uncinate process, 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  MRI abdomen MRCP with and without contrast is recommended in 1 year to assess for stability  I personally discussed this study with Naresh Hudson on 5/18/2023 4:54 PM  Workstation performed: [id number]     Flexible Sigmoidoscopy    Result Date: 5/20/2023  Impression: 1  Rectal mass consistent with adenocarcinoma, two thirds circumference, 4 cm from the anal verge, extending 5 cm proximal RECOMMENDATION: 1  Follow-up with the biopsies 2  Oncology consultation  Luciano Rangel [facility]      Assessment and Plan:     1    Rectal cancer  -Flexible sigmoidoscopy yesterday demonstrated a cancer, 4 cm from the anal verge, covering 70% of the circumference, and extending up to 9 cm from the anal verge; biopsies pending  -CEA 1 7 ng/mL  -Hemoglobin 10 4 and stable  -Receiving IV Venofer for his iron deficiency  -Flexible sigmoidoscopy was performed up to 50 cm  Full colonoscopy was not performed due to the recent STEMI, 2 days ago  -Patient will require an eventual full colonoscopy due to the 10% incidence of synchronous cancers  -We will consult oncology, radiation oncology, and colorectal surgery  An inpatient oncology consult has already been requested; patient states that he is amenable to having a discussion about possible treatment options    2  Acute anterior STEMI  -Status post LAD stenting on 5/19    We have no further recommendations at this time and we will be signing off

## 2023-05-21 NOTE — ASSESSMENT & PLAN NOTE
Presented with rectal bleeding for the past 1 week bright red blood  Likely secondary to new rectal cancer  ·   Patient is status post flexible sigmoidoscopy  Biopsy taken    Oncology consultation requested  · Patient on dual antiplatelet therapy given STEMI

## 2023-05-21 NOTE — UTILIZATION REVIEW
NOTIFICATION OF INPATIENT ADMISSION   AUTHORIZATION REQUEST   SERVICING FACILITY:   [address] [place] [address]  Tax ID: [id number]  NPI: [id number] ATTENDING PROVIDER:  Attending Name and NPI#: Rosa Elena Weber Md [[id number]]  Address: [address] [place] [address]  Phone: [phone]     ADMISSION INFORMATION:  Place of Service: Inpatient [address] [facility]  [address]  Place of Service Code: 21  Inpatient Admission Date/Time: 5/19/23 12:55 PM  Discharge Date/Time: No discharge date for patient encounter  Admitting Diagnosis Code/Description:  Rectal bleeding [K62 5]  Rectal mass [K62 89]     UTILIZATION REVIEW CONTACT:  Rm Vega Utilization   Network Utilization Review Department  Phone: [phone]  Fax [phone]  Email: [email or web address]  Contact for approvals/pending authorizations, clinical reviews, and discharge  PHYSICIAN ADVISORY SERVICES:  Medical Necessity Denial & [employer] Review  Phone: [phone]  Fax: [phone]  Email: [email or web address]

## 2023-05-21 NOTE — ASSESSMENT & PLAN NOTE
Found to have new rectal mass  Likely the cause of rectal bleeding  Rectal mass seen on flexible sigmoidoscopy  Follow-up GI and oncology recs

## 2023-05-21 NOTE — PROGRESS NOTES
[address] [place] [address]  Progress Note  Name: Beatrice Liu I  MRN: [id number]  Unit/Bed#:  I Date of Admission: 5/18/2023   Date of Service: 5/21/2023 I Hospital Day: 2    Assessment/Plan   STEMI involving left anterior descending coronary artery [facility])  Assessment & Plan  · Patient taken for urgent cath  1  Significant single vessel CAD  2  Successful BMS deployment in proximal LAD  · Cardiology following  · Continue dual antiplatelet therapy    Moderate protein-calorie malnutrition ([address] )  Assessment & Plan  Malnutrition Findings:   Adult Malnutrition type: Chronic illness  Adult Degree of Malnutrition: Malnutrition of moderate degree            Nutrition consult         360 Statement: Related likely to catabolic illness as evidenced by mild-moderate depletion of body fat (Orbitals) and mild-moderate depletion of muscle mass (clavicle) treated with diet (patient denies need for supplements at this time)    BMI Findings: Body mass index is 18 51 kg/m²  Acute on chronic anemia  Assessment & Plan   Likely secondary to rectal mass  Hemoglobin has been stable    Rectal mass  Assessment & Plan  Found to have new rectal mass  Likely the cause of rectal bleeding  Rectal mass seen on flexible sigmoidoscopy  Follow-up GI and oncology recs     S/P primary angioplasty with coronary stent  Assessment & Plan  · While patient was here did have chest pain and had an urgent cath secondary to STEMI  3  Significant single vessel CAD  4  Successful BMS deployment in proximal LAD  · Continue aspirin Plavix and statin    * Rectal bleeding  Assessment & Plan  Presented with rectal bleeding for the past 1 week bright red blood  Likely secondary to new rectal cancer  ·   Patient is status post flexible sigmoidoscopy  Biopsy taken    Oncology consultation requested  · Patient on dual antiplatelet therapy given STEMI           VTE Pharmacologic Prophylaxis:   Pharmacologic: Pharmacologic VTE Prophylaxis contraindicated due to Recent GI bleed  Mechanical VTE Prophylaxis in Place: Yes    Patient Centered Rounds: I have performed bedside rounds with nursing staff today  Education and Discussions with Family / Patient: Offered but patient politely declined since they are waiting for oncology    Time Spent for Care: 30 minutes  More than 50% of total time spent on counseling and coordination of care as described above  Current Length of Stay: 2 day(s)    Current Patient Status: Inpatient   Certification Statement: The patient will continue to require additional inpatient hospital stay due to Needing oncology evaluation and monitoring of hemoglobin    Discharge Plan: Anticipate discharge tomorrow after plan of care established with oncology    Code Status: Level 1 - Full Code      Subjective:   Patient seen and examined  States he feels okay today  No chest pain or shortness of breath  He was wondering when oncology is going to be in to see him    Objective:     Vitals:   Temp (24hrs), Av 9 °F (36 6 °C), Min:97 5 °F (36 4 °C), Max:98 8 °F (37 1 °C)    Temp:  [97 5 °F (36 4 °C)-98 8 °F (37 1 °C)] 97 7 °F (36 5 °C)  HR:  [67-79] 75  Resp:  [15-20] 16  BP: ()/(54-75) 92/55  SpO2:  [97 %-99 %] 97 %  Body mass index is 18 51 kg/m²  Input and Output Summary (last 24 hours):        Intake/Output Summary (Last 24 hours) at 2023 7259  Last data filed at 2023 0300  Gross per 24 hour   Intake 150 ml   Output 825 ml   Net -675 ml       Physical Exam:     Physical Exam  (   General Appearance:    Alert, cooperative, no distress, appears stated age                               Lungs:     Clear to auscultation bilaterally, respirations unlabored       Heart:    Regular rate and rhythm, S1 and S2 normal, no murmur, rub    or gallop   Abdomen:     Soft, non-tender, bowel sounds active all four quadrants,     no masses, no organomegaly           Extremities:   Extremities normal, atraumatic, no cyanosis or edema       Additional Data:     Labs:    Results from last 7 days   Lab Units 05/21/23  0511   WBC Thousand/uL 5 80   HEMOGLOBIN g/dL 10 4*   HEMATOCRIT % 33 4*   PLATELETS Thousands/uL 148*   NEUTROS PCT % 67   LYMPHS PCT % 18   MONOS PCT % 13*   EOS PCT % 2     Results from last 7 days   Lab Units 05/21/23  0511 05/19/23  0632 05/18/23  1512   SODIUM mmol/L 134*   < > 138   POTASSIUM mmol/L 4 6   < > 4 3   CHLORIDE mmol/L 106   < > 109*   CO2 mmol/L 24   < > 23   BUN mg/dL 16   < > 30*   CREATININE mg/dL 0 91   < > 1 21   ANION GAP mmol/L 4   < > 6   CALCIUM mg/dL 8 4   < > 9 0   ALBUMIN g/dL  --   --  3 8   TOTAL BILIRUBIN mg/dL  --   --  0 37   ALK PHOS U/L  --   --  65   ALT U/L  --   --  15   AST U/L  --   --  22   GLUCOSE RANDOM mg/dL 96   < > 85    < > = values in this interval not displayed  Results from last 7 days   Lab Units 05/18/23  1512   INR  1 13         Results from last 7 days   Lab Units 05/19/23  2349   HEMOGLOBIN A1C % 5 5               * I Have Reviewed All Lab Data Listed Above  * Additional Pertinent Lab Tests Reviewed:  [facility]        Recent Cultures (last 7 days):           Last 24 Hours Medication List:   Current Facility-Administered Medications   Medication Dose Route Frequency Provider Last Rate   • acetaminophen  650 mg Oral Q6H PRN Elvis Veloz DO     • aspirin  81 mg Oral Daily Elvis Veloz DO     • atorvastatin  80 mg Oral Daily With [employer], DO     • clopidogrel  75 mg Oral Daily Elvis Veloz DO     • iron sucrose  300 mg Intravenous Daily Elvis Veloz  mg (05/20/23 1620)   • metoprolol tartrate  12 5 mg Oral Q12H [facility] Elvis Veloz DO     • nitroglycerin  0 4 mg Sublingual Q5 Min PRN Elvis Veloz DO     • ondansetron  4 mg Intravenous Q6H PRN Elvis Veloz DO          Today, Patient Was Seen By: Alvaro Finnegan MD    ** Please Note: Dictation voice to text software may have been used in the creation of this document   **

## 2023-05-21 NOTE — PROGRESS NOTES
General Cardiology   Progress Note   [facility]  69 y o  male MRN: [id number]  Unit/Bed#:  Encounter: [id number]    Assessment/Plan:     1  Anterior STEMI  - S/p coronary angiography and BMS to the proximal LAD  - Continue Plavix, atorvastatin, metoprolol tartrate and aspirin  -Discussed with patient dual antiplatelet therapy cannot be stopped for at least 4 to 6 weeks  - TTE performed yesterday revealed severely reduced systolic function with an EF of 20%, with multiple areas of akinesis and hypokinesis and mild aortic and mitral valve regurgitation  - Continue to monitor on telemetry     2  Anemia secondary to rectal bleeding  - Hemoglobin stable  - Management per gastroenterology and primary team     3   Rectal mass  - New finding  -S/p colonoscopy with biopsies again  -Management per gastroenterology and oncology      4   Recurrent ischemic cardiomyopathy with EF of 20%  - Prior EF noted to be recovered at 50 to 55% in 11/2019, was 40% in 10/2019  - Patient currently appears euvolemic  - Had not been on ACE/ARB secondary to hypotension in the past  -Continue metoprolol tartrate     5  History of CAD s/p PCI of the dLAD and PTCA of D1  -See plan for #1     6  Hypertension  - Blood pressure overall well controlled  - Continue to monitor     7  Hyperlipidemia  - Lipid panel reveals controlled cholesterol  - Continue atorvastatin     8  History of lacunar infarct  - Continue aspirin and atorvastatin         Subjective:   Patient seen and examined  No significant events since the last encounter       REVIEW OF SYSTEMS:  Constitutional:  Denies fever or chills   Eyes:  Denies change in visual acuity   HENT:  Denies nasal congestion or sore throat   Respiratory:  Denies cough, orthopnea, PND or shortness of breath   Cardiovascular:  Denies chest pain, palpitations or edema   GI:  Denies abdominal pain, nausea, vomiting, bloody stools, constipation or diarrhea   :  Denies dysuria, frequency, difficulty in urination or nocturia  Musculoskeletal:  Denies back pain or joint pain   Neurologic:  Denies headache, focal weakness or sensory changes   Endocrine:  Denies polyuria or polydipsia   Lymphatic:  Denies swollen glands   Psychiatric:  Denies depression or anxiety     Objective:   Vitals:  Vitals:    05/21/23 0300   BP: 92/55   Pulse: 75   Resp: 16   Temp: 97 7 °F (36 5 °C)   SpO2: 97%       Body mass index is 18 51 kg/m²  Systolic (62TDD), DANITA:503 , Min:92 , ESK:102     Diastolic (96LPE), CUE:15, Min:54, Max:66      Intake/Output Summary (Last 24 hours) at 5/21/2023 1135  Last data filed at 5/21/2023 0300  Gross per 24 hour   Intake 150 ml   Output 575 ml   Net -425 ml     Weight (last 2 days)     Date/Time Weight    05/20/23 1000 58 5 (129)          Telemetry Review: No significant arrhythmias seen on telemetry review  PHYSICAL EXAMS  General:  Patient is not in acute distress, laying in the bed comfortably, awake  Head: Normocephalic, Atraumatic     HEENT: White sclera, pink conjunctiva  Neck:  Supple, no neck vein distention  Respiratory: clear to auscultation   Cardiovascular: S1-S2 normal, no murmurs, thrills, gallops, rubs, regular rhythm  GI:  Abdomen soft, nontender, non-distended  Extremities: No edema, normal pulses  Integument:  No skin rashes or ulceration  Neurologic:  Patient is awake alert, responding to command, oriented to person, place and time    LABORATORY RESULTS:      CBC with diff:   Results from last 7 days   Lab Units 05/21/23  0511 05/20/23  0446 05/19/23  2349 05/19/23  2044 05/19/23  0632 05/18/23  2245 05/18/23  1512   WBC Thousand/uL 5 80 7 71 9 83  --  4 99  --  5 59   HEMOGLOBIN g/dL 10 4* 9 2* 10 0*   < > 9 8*   < > 10 0*   HEMATOCRIT % 33 4* 30 6* 32 3*  --  31 6*   < > 32 2*   MCV fL 77* 78* 77*  --  78*  --  78*   PLATELETS Thousands/uL 148* 160 174  --  160  --  176   MCH pg 23 9* 23 5* 23 9*  --  24 0*  --  24 1*   MCHC g/dL 31 1* 30 1* 31 0*  --  31 0*  -- 31 1*   RDW % 16 9* 17 0* 16 8*  --  16 8*  --  16 9*   MPV fL 11 0 10 7 11 7  --  11 0  --  11 1   NRBC AUTO /100 WBCs 0  --   --   --  0  --  0    < > = values in this interval not displayed  CMP:  Results from last 7 days   Lab Units 05/21/23  0511 05/20/23  0641 05/19/23  2349 05/19/23  0632 05/18/23  1512   POTASSIUM mmol/L 4 6 4 7 3 6   < > 4 3   CHLORIDE mmol/L 106 106 112*   < > 109*   CO2 mmol/L 24 22 19*   < > 23   BUN mg/dL 16 18 17   < > 30*   CREATININE mg/dL 0 91 0 85 0 71   < > 1 21   CALCIUM mg/dL 8 4 8 7 7 0*   < > 9 0   AST U/L  --   --   --   --  22   ALT U/L  --   --   --   --  15   ALK PHOS U/L  --   --   --   --  65   EGFR ml/min/1 73sq m 81 84 90   < > 57    < > = values in this interval not displayed         BMP:  Results from last 7 days   Lab Units 05/21/23  0511 05/20/23  0641 05/19/23  2349   POTASSIUM mmol/L 4 6 4 7 3 6   CHLORIDE mmol/L 106 106 112*   CO2 mmol/L 24 22 19*   BUN mg/dL 16 18 17   CREATININE mg/dL 0 91 0 85 0 71   CALCIUM mg/dL 8 4 8 7 7 0*              Results from last 7 days   Lab Units 05/21/23  0511 05/20/23  0641 05/19/23  2349 05/19/23  2146   MAGNESIUM mg/dL 2 3 2 7 1 6* 2 1     Results from last 7 days   Lab Units 05/19/23  2349   HEMOGLOBIN A1C % 5 5         Results from last 7 days   Lab Units 05/18/23  1512   INR  1 13       Lipid Profile:   No results found for: CHOL  Lab Results   Component Value Date    HDL 49 05/19/2023    HDL 48 10/10/2022    HDL 53 09/27/2021     Lab Results   Component Value Date    LDLCALC 39 05/19/2023    LDLCALC 27 10/10/2022    LDLCALC 34 09/27/2021     Lab Results   Component Value Date    TRIG 61 05/19/2023    TRIG 37 10/10/2022    TRIG 49 09/27/2021       Cardiac testing:   Results for orders placed during the hospital encounter of 10/20/19    Echo complete with contrast if indicated    Narrative  [address] [place] [address],[facility] [place] A Cristian Steinberg [address]  [phone]    Transthoracic Echocardiogram  2D, M-mode, Doppler, and Color Doppler    Study date:  20-Oct-2019    Patient: Amira Jurado  MR number: [id number]  Account number: [de-identified]  : 1945  Age: 69 years  Gender: Male  Status: Inpatient  Location: Bedside  Height: 70 in  Weight: 133 lb  BP: 119/ 69 mmHg    Indications: Evaluate suspected myocardial infarction  Diagnoses: I21 4 - Non-ST elevation (NSTEMI) myocardial infarction    Sonographer:  Hannah Santana RDCS  Referring Physician:  Marlin Dominique PA-C  Group:  [facility]  Interpreting Physician:  Isaura Proctor MD    SUMMARY    LEFT VENTRICLE:  Ejection fraction was estimated to be 40 %  Inferior wall,apex, apical septum and lateral wall are hypokinetic  MITRAL VALVE:  There was trace regurgitation  TRICUSPID VALVE:  There was trace regurgitation  HISTORY: Symptoms: chest pain  PRIOR HISTORY: Shortness of breath, NSTEMI,    PROCEDURE: The procedure was performed at the bedside  This was a routine study  The transthoracic approach was used  The study included complete 2D imaging, M-mode, complete spectral Doppler, and color Doppler  The heart rate was 58 bpm,  at the start of the study  Images were obtained from the parasternal, apical, subcostal, and suprasternal notch acoustic windows  Echocardiographic views were limited due to low windows and lung interference  Image quality was adequate  LEFT VENTRICLE: Size was normal  Ejection fraction was estimated to be 40 %  Inferior wall,apex, apical septum and lateral wall are hypokinetic  RIGHT VENTRICLE: The size was normal  Systolic function was normal  Wall thickness was normal     LEFT ATRIUM: Size was normal     RIGHT ATRIUM: Size was normal     MITRAL VALVE: There was annular calcification  DOPPLER: There was trace regurgitation  AORTIC VALVE: The valve was not well visualized  DOPPLER: There was no evidence for stenosis      TRICUSPID VALVE: The valve structure was normal  There was normal leaflet separation  DOPPLER: The transtricuspid velocity was within the normal range  There was no evidence for stenosis  There was trace regurgitation  PULMONIC VALVE: Leaflets exhibited normal thickness, no calcification, and normal cuspal separation  DOPPLER: The transpulmonic velocity was within the normal range  There was no regurgitation  PERICARDIUM: There was no pericardial effusion  The pericardium was normal in appearance  AORTA: The root exhibited upper limit of normal size  SYSTEM MEASUREMENT TABLES    2D  %FS: 20 8 %  Ao Diam: 3 8 cm  EDV(Teich): 85 5 ml  EF(Teich): 42 6 %  ESV(Teich): 49 1 ml  IVSd: 0 8 cm  LA Area: 13 4 cm2  LA Diam: 3 5 cm  LVEDV MOD A4C: 104 ml  LVEF MOD A4C: 45 6 %  LVESV MOD A4C: 56 6 ml  LVIDd: 4 4 cm  LVIDs: 3 4 cm  LVLd A4C: 8 7 cm  LVLs A4C: 7 cm  LVPWd: 0 8 cm  RA Area: 12 cm2  RVIDd: 3 1 cm  SV MOD A4C: 47 5 ml  SV(Teich): 36 5 ml    MM  TAPSE: 1 6 cm    PW  E': 0 1 m/s  E/E': 6 6  MV A Antolin: 0 6 m/s  MV Dec [place]: 2 4 m/s2  MV DecT: 230 4 ms  MV E Antolin: 0 6 m/s  MV E/A Ratio: 0 9  MV PHT: 66 8 ms  MVA By PHT: 3 3 cm2    [facility] Accredited Echocardiography [facility]    Prepared and electronically signed by    Jefe Lynn MD  Signed 20-Oct-2019 19:40:30    No results found for this or any previous visit  No results found for this or any previous visit  No valid procedures specified  No results found for this or any previous visit  Meds/Allergies   all current active meds have been reviewed  Medications Prior to Admission   Medication   • aspirin (ECOTRIN LOW STRENGTH) 81 mg EC tablet   • atorvastatin (LIPITOR) 80 mg tablet   • Blood Pressure KIT              ** Please Note: Dragon 360 Dictation voice to text software may have been used in the creation of this document   **

## 2023-05-21 NOTE — PLAN OF CARE
Problem: DISCHARGE PLANNING  Goal: Discharge to home or other facility with appropriate resources  Description: INTERVENTIONS:  - Identify barriers to discharge w/patient and caregiver  - Arrange for needed discharge resources and transportation as appropriate  - Identify discharge learning needs (meds, wound care, etc )  - Arrange for interpretive services to assist at discharge as needed  - Refer to Case Management Department for coordinating discharge planning if the patient needs post-hospital services based on physician/advanced practitioner order or complex needs related to functional status, cognitive ability, or social support system  Outcome: Progressing     Problem: Knowledge Deficit  Goal: Patient/family/caregiver demonstrates understanding of disease process, treatment plan, medications, and discharge instructions  Description: Complete learning assessment and assess knowledge base  Interventions:  - Provide teaching at level of understanding  - Provide teaching via preferred learning methods  Outcome: Progressing     Problem: Nutrition/Hydration-ADULT  Goal: Nutrient/Hydration intake appropriate for improving, restoring or maintaining nutritional needs  Description: Monitor and assess patient's nutrition/hydration status for malnutrition  Collaborate with interdisciplinary team and initiate plan and interventions as ordered  Monitor patient's weight and dietary intake as ordered or per policy  Utilize nutrition screening tool and intervene as necessary  Determine patient's food preferences and provide high-protein, high-caloric foods as appropriate       INTERVENTIONS:  - Monitor oral intake, urinary output, labs, and treatment plans  - Assess nutrition and hydration status and recommend course of action  - Evaluate amount of meals eaten  - Assist patient with eating if necessary   - Allow adequate time for meals  - Recommend/ encourage appropriate diets, oral nutritional supplements, and vitamin/mineral supplements  - Order, calculate, and assess calorie counts as needed  - Recommend, monitor, and adjust tube feedings and TPN/PPN based on assessed needs  - Assess need for intravenous fluids  - Provide specific nutrition/hydration education as appropriate  - Include patient/family/caregiver in decisions related to nutrition  Outcome: Progressing     Problem: MOBILITY - ADULT  Goal: Maintain or return to baseline ADL function  Description: INTERVENTIONS:  -  Assess patient's ability to carry out ADLs; assess patient's baseline for ADL function and identify physical deficits which impact ability to perform ADLs (bathing, care of mouth/teeth, toileting, grooming, dressing, etc )  - Assess/evaluate cause of self-care deficits   - Assess range of motion  - Assess patient's mobility; develop plan if impaired  - Assess patient's need for assistive devices and provide as appropriate  - Encourage maximum independence but intervene and supervise when necessary  - Involve family in performance of ADLs  - Assess for home care needs following discharge   - Consider OT consult to assist with ADL evaluation and planning for discharge  - Provide patient education as appropriate  Outcome: Progressing  Outcome: Progressing

## 2023-05-21 NOTE — ASSESSMENT & PLAN NOTE
Malnutrition Findings:   Adult Malnutrition type: Chronic illness  Adult Degree of Malnutrition: Malnutrition of moderate degree            Nutrition consult         360 Statement: Related likely to catabolic illness as evidenced by mild-moderate depletion of body fat (Orbitals) and mild-moderate depletion of muscle mass (clavicle) treated with diet (patient denies need for supplements at this time)    BMI Findings: Body mass index is 18 51 kg/m²

## 2023-05-22 PROBLEM — I25.5 ISCHEMIC CARDIOMYOPATHY: Status: ACTIVE | Noted: 2023-05-22

## 2023-05-22 PROBLEM — R30.0 DYSURIA: Status: ACTIVE | Noted: 2023-05-22

## 2023-05-22 LAB
ANION GAP SERPL CALCULATED.3IONS-SCNC: 4 MMOL/L (ref 4–13)
BACTERIA UR QL AUTO: ABNORMAL /HPF
BILIRUB UR QL STRIP: NEGATIVE
BUN SERPL-MCNC: 14 MG/DL (ref 5–25)
CALCIUM SERPL-MCNC: 8.7 MG/DL (ref 8.4–10.2)
CHLORIDE SERPL-SCNC: 107 MMOL/L (ref 96–108)
CLARITY UR: CLEAR
CO2 SERPL-SCNC: 24 MMOL/L (ref 21–32)
COLOR UR: YELLOW
CREAT SERPL-MCNC: 0.86 MG/DL (ref 0.6–1.3)
ERYTHROCYTE [DISTWIDTH] IN BLOOD BY AUTOMATED COUNT: 17.2 % (ref 11.6–15.1)
GFR SERPL CREATININE-BSD FRML MDRD: 83 ML/MIN/1.73SQ M
GLUCOSE SERPL-MCNC: 94 MG/DL (ref 65–140)
GLUCOSE UR STRIP-MCNC: NEGATIVE MG/DL
HCT VFR BLD AUTO: 34 % (ref 36.5–49.3)
HGB BLD-MCNC: 10.8 G/DL (ref 12–17)
HGB UR QL STRIP.AUTO: NEGATIVE
KETONES UR STRIP-MCNC: NEGATIVE MG/DL
LEUKOCYTE ESTERASE UR QL STRIP: ABNORMAL
MCH RBC QN AUTO: 24.4 PG (ref 26.8–34.3)
MCHC RBC AUTO-ENTMCNC: 31.8 G/DL (ref 31.4–37.4)
MCV RBC AUTO: 77 FL (ref 82–98)
MUCOUS THREADS UR QL AUTO: ABNORMAL
NITRITE UR QL STRIP: NEGATIVE
NON-SQ EPI CELLS URNS QL MICRO: ABNORMAL /HPF
PH UR STRIP.AUTO: 6 [PH]
PLATELET # BLD AUTO: 159 THOUSANDS/UL (ref 149–390)
PMV BLD AUTO: 10.8 FL (ref 8.9–12.7)
POTASSIUM SERPL-SCNC: 4.3 MMOL/L (ref 3.5–5.3)
PROT UR STRIP-MCNC: NEGATIVE MG/DL
RBC # BLD AUTO: 4.43 MILLION/UL (ref 3.88–5.62)
RBC #/AREA URNS AUTO: ABNORMAL /HPF
SODIUM SERPL-SCNC: 135 MMOL/L (ref 135–147)
SP GR UR STRIP.AUTO: 1.03 (ref 1–1.03)
UROBILINOGEN UR STRIP-ACNC: 2 MG/DL
WBC # BLD AUTO: 5.67 THOUSAND/UL (ref 4.31–10.16)
WBC #/AREA URNS AUTO: ABNORMAL /HPF

## 2023-05-22 RX ORDER — METOPROLOL SUCCINATE 25 MG/1
12.5 TABLET, EXTENDED RELEASE ORAL DAILY
Status: DISCONTINUED | OUTPATIENT
Start: 2023-05-23 | End: 2023-05-23

## 2023-05-22 RX ADMIN — Medication 12.5 MG: at 08:17

## 2023-05-22 RX ADMIN — CLOPIDOGREL BISULFATE 75 MG: 75 TABLET ORAL at 08:18

## 2023-05-22 RX ADMIN — IRON SUCROSE 300 MG: 20 INJECTION, SOLUTION INTRAVENOUS at 08:28

## 2023-05-22 RX ADMIN — ASPIRIN 81 MG: 81 TABLET ORAL at 08:19

## 2023-05-22 RX ADMIN — ATORVASTATIN CALCIUM 80 MG: 40 TABLET, FILM COATED ORAL at 18:42

## 2023-05-22 NOTE — PROGRESS NOTES
[address] [place] [address]  Progress Note  Name: Kina Ponce I  MRN: [id number]  Unit/Bed#:  I Date of Admission: 5/18/2023   Date of Service: 5/22/2023 I Hospital Day: 3    Assessment/Plan   Ischemic cardiomyopathy  Assessment & Plan  · Metoprolol added  Cardiology planning on LifeVest   · Discussed with case management    Dysuria  Assessment & Plan  · Complaining of dysuria started last night  Follow-up on urinalysis  STEMI involving left anterior descending coronary artery [facility])  Assessment & Plan  · Acute STEMI underwent urgent catheterization  · Bare-metal stent was placed  · Continue aspirin and clopidogrel  Lab Results   Component Value Date    HSTNI0 3 05/18/2023    HSTNI2 4 05/18/2023    HSTNI4 4 05/18/2023    HSTNI 84 (H) 05/19/2023       Moderate protein-calorie malnutrition (HCC)  Assessment & Plan  Malnutrition Findings:   Adult Malnutrition type: Chronic illness  Adult Degree of Malnutrition: Malnutrition of moderate degree  360 Statement: Related likely to catabolic illness as evidenced by mild-moderate depletion of body fat (Orbitals) and mild-moderate depletion of muscle mass (clavicle) treated with diet (patient denies need for supplements at this time)  BMI Findings: Body mass index is 17 37 kg/m²  Acute on chronic anemia  Assessment & Plan  · Acute on chronic anemia secondary to bleeding rectal mass  · Has not required any transfusion  · Venofer has been ordered by GI    Results from last 7 days   Lab Units 05/22/23  0427 05/21/23  0511 05/20/23  0446 05/19/23  2349   HEMOGLOBIN g/dL 10 8* 10 4* 9 2* 10 0*       Rectal mass  Assessment & Plan  · Rectal mass underwent biopsy concerning for malignancy  · Oncology consulted for staging and establishing care    VTE Pharmacologic Prophylaxis: VTE Score: 6 High Risk (Score >/= 5) - Pharmacological DVT Prophylaxis Contraindicated  Sequential Compression Devices Ordered      Patient Centered Rounds: I have performed "bedside rounds with nursing staff today  Discussions with Specialists or Other Care Team Provider: Cardiology and case management    Education and Discussions with Family / Patient: Updated  (daughter) at bedside  Time Spent for Care: This time was spent on one or more of the following: performing physical exam; counseling and coordination of care; obtaining or reviewing history; documenting in the medical record; reviewing/ordering tests, medications or procedures; communicating with other healthcare professionals and discussing with patient's family/caregivers  Current Length of Stay: 3 day(s)  Current Patient Status: Inpatient   Certification Statement: The patient will continue to require additional inpatient hospital stay due to Further work-up by oncology and awaiting LifeVest  Discharge Plan: Anticipate discharge in 24-48 hrs to home  Code Status: Level 1 - Full Code      Subjective:   Patient seen and examined  Feeling okay, still having some rectal pains  Having dysuria since last night  Objective:   Vitals: Blood pressure 98/53, pulse 73, temperature 97 8 °F (36 6 °C), temperature source Oral, resp  rate 16, height 5' 10\" (1 778 m), weight 54 9 kg (121 lb 0 5 oz), SpO2 98 %  Intake/Output Summary (Last 24 hours) at 5/22/2023 1347  Last data filed at 5/22/2023 1591  Gross per 24 hour   Intake 730 ml   Output 1450 ml   Net -720 ml       Physical Exam  Vitals reviewed  Constitutional:       General: He is not in acute distress  Appearance: He is underweight  HENT:      Head: Atraumatic  Cardiovascular:      Rate and Rhythm: Regular rhythm  Heart sounds: Normal heart sounds  Pulmonary:      Effort: Pulmonary effort is normal       Breath sounds: Decreased breath sounds present  No wheezing  Abdominal:      General: Bowel sounds are normal       Palpations: Abdomen is soft  Tenderness: There is no abdominal tenderness  There is no rebound   " Musculoskeletal:         General: No swelling or tenderness  Skin:     General: Skin is warm and dry  Neurological:      General: No focal deficit present  Mental Status: He is alert and oriented to person, place, and time  Cranial Nerves: No cranial nerve deficit  Psychiatric:         Mood and Affect: Mood normal        Additional Data:   Labs:  Results from last 7 days   Lab Units 05/22/23 0427 05/21/23  0511 05/20/23  0446 05/18/23  2245 05/18/23  1512   WBC Thousand/uL 5 67 5 80 7 71   < > 5 59   HEMOGLOBIN g/dL 10 8* 10 4* 9 2*   < > 10 0*   PLATELETS Thousands/uL 159 148* 160   < > 176   MCV fL 77* 77* 78*   < > 78*   INR   --   --   --   --  1 13    < > = values in this interval not displayed  Results from last 7 days   Lab Units 05/22/23 0427 05/21/23  0511 05/20/23  0641 05/19/23  0632 05/18/23  1512   SODIUM mmol/L 135 134* 135   < > 138   POTASSIUM mmol/L 4 3 4 6 4 7   < > 4 3   CHLORIDE mmol/L 107 106 106   < > 109*   CO2 mmol/L 24 24 22   < > 23   ANION GAP mmol/L 4 4 7   < > 6   BUN mg/dL 14 16 18   < > 30*   CREATININE mg/dL 0 86 0 91 0 85   < > 1 21   CALCIUM mg/dL 8 7 8 4 8 7   < > 9 0   ALBUMIN g/dL  --   --   --   --  3 8   TOTAL BILIRUBIN mg/dL  --   --   --   --  0 37   ALK PHOS U/L  --   --   --   --  65   ALT U/L  --   --   --   --  15   AST U/L  --   --   --   --  22   EGFR ml/min/1 73sq m 83 81 84   < > 57   GLUCOSE RANDOM mg/dL 94 96 104   < > 85    < > = values in this interval not displayed       Results from last 7 days   Lab Units 05/21/23  0511 05/20/23  0641 05/19/23  2349 05/19/23  2146   MAGNESIUM mg/dL 2 3 2 7 1 6* 2 1   PHOSPHORUS mg/dL 2 7  --   --   --          Results from last 7 days   Lab Units 05/18/23  2245 05/18/23  1744 05/18/23  1512   HS TNI 0HR ng/L  --   --  3   HS TNI 2HR ng/L  --  4  --    HS TNI 4HR ng/L 4  --   --                   Results from last 7 days   Lab Units 05/19/23  8129   HEMOGLOBIN A1C % 5 5         * I Have Reviewed All Lab Data Listed Above  Cultures:                   Lines/Drains:  Invasive Devices     Peripheral Intravenous Line  Duration           Peripheral IV 05/21/23 Dorsal (posterior); Right Forearm 1 day              Telemetry:   Telemetry Orders (From admission, onward)             24 Hour Telemetry Monitoring  Continuous x 24 Hours (Telem)        Question:  Reason for 24 Hour Telemetry  Answer:  PCI/EP study (including pacer and ICD implementation), Cardiac surgery, MI, abnormal cardiac cath, and chest pain- rule out MI                  Imaging:  Imaging Reports Reviewed Today Include:       Flexible Sigmoidoscopy    Result Date: 5/20/2023  Impression: 1  Rectal mass consistent with adenocarcinoma, two thirds circumference, 4 cm from the anal verge, extending 5 cm proximal RECOMMENDATION: 1  Follow-up with the biopsies 2  Oncology consultation  Laureano Choi DO [place], [place]      Scheduled Meds:  Current Facility-Administered Medications   Medication Dose Route Frequency Provider Last Rate   • acetaminophen  650 mg Oral Q6H PRN Laureano Choi, DO     • aspirin  81 mg Oral Daily Laureano Choi, DO     • atorvastatin  80 mg Oral Daily With [facility], DO     • clopidogrel  75 mg Oral Daily Laureano Choi, DO     • iron sucrose  300 mg Intravenous Daily Laureano Choi,  mg (05/22/23 0828)   • [START ON 5/23/2023] metoprolol succinate  12 5 mg Oral Daily Lory HUSSAIN Wooten     • nitroglycerin  0 4 mg Sublingual Q5 Min PRN Laureano Choi DO     • ondansetron  4 mg Intravenous Q6H PRN Laureano Choi DO         Today, Patient Was Seen By: Mona Gupta DO    ** Please Note: Dictation voice to text software may have been used in the creation of this document   **

## 2023-05-22 NOTE — ASSESSMENT & PLAN NOTE
· Acute on chronic anemia secondary to bleeding rectal mass  · Has not required any transfusion  · Venofer has been ordered by GI    Results from last 7 days   Lab Units 05/22/23  0427 05/21/23  0511 05/20/23  0446 05/19/23  2349   HEMOGLOBIN g/dL 10 8* 10 4* 9 2* 10 0*

## 2023-05-22 NOTE — ASSESSMENT & PLAN NOTE
· Rectal mass underwent biopsy concerning for malignancy  · Oncology consulted for staging and establishing care

## 2023-05-22 NOTE — CONSULTS
Medical Oncology/Hematology Consult Note  Sascha Martinez , male, 69 y o , 1945,  /, [phone]     Reason for admission: Rectal Bleeding  Reason for consultation: Rectal Mass      ASSESSMENT AND PLAN:     1  Rectal Mass  2  Rectal Bleeding  3  Anemia  4  Acute anterior STEMI  Helena Worthington is a 69yo male pmh as below admitted for rectal bleeding, hospital course complicated by acute STEMI s/p stent placement  Patient previously stated if proven cancer he would not want treatment  After our discussion today, he is amenable to discussion treatment options so I will set patient up for outpatient follow up for this with one of our physicians  Recommendations while admitted:   -CT chest with contrast for staging workup, may need outpatient PETCT  -Colorectal surgery consultation   -F/u with medical oncologist 10-14 days to discuss further planning as patient needs to be on antiplatelet therapy 4-6 weeks uninterrupted per cardiology  Patient understands and is in agreement with this plan  Thank you for the opportunity to participate in this patient's care  Son and daughter present for evaluation  ECOG - prior to admission 0 still working full time, currently 1 due to recent ACS     History of present illness: Patient is a 36-year-old male with a past medical history significant for HLD, MI,ischemic cardiomyopathy, lacunar infarction, presented to emergency department for evaluation of rectal bleeding x 1 week, bleeding with bowel movements filling toilet bowl per patient  Family states weight loss occurred this hospitalization otherwise denies prior  Smoking -former smoker approximately 1 pack/day for approximately 15 years, cessation in 1969 per chart review and reviewed with patient - he states he smoked from age [age] average 1ppd     Alcohol -denies  History malignancy - denies  Family history malignancy - denies    Review of Systems:   Review of Systems   Constitutional: Negative for "chills and fever  HENT: Negative for ear pain and sore throat  Eyes: Negative for pain and visual disturbance  Respiratory: Negative for cough and shortness of breath  Cardiovascular: Negative for chest pain and palpitations  Gastrointestinal: Positive for blood in stool  Negative for abdominal pain, diarrhea, nausea and vomiting  Genitourinary: Negative for dysuria and hematuria  Musculoskeletal: Negative for arthralgias and back pain  Skin: Negative for color change and rash  Neurological: Negative for seizures and syncope  All other systems reviewed and are negative  PHYSICAL EXAM:    /59 (BP Location: Right arm)   Pulse 76   Temp 98 1 °F (36 7 °C) (Oral)   Resp 15   Ht 5' 10\" (1 778 m)   Wt 54 9 kg (121 lb 0 5 oz)   SpO2 97%   BMI 17 37 kg/m²     Physical Exam  Vitals reviewed  Constitutional:       General: He is not in acute distress  Appearance: Normal appearance  He is not ill-appearing  HENT:      Head: Normocephalic and atraumatic  Eyes:      General: No scleral icterus  Cardiovascular:      Rate and Rhythm: Normal rate and regular rhythm  Heart sounds: No murmur heard  Pulmonary:      Effort: Pulmonary effort is normal       Breath sounds: Normal breath sounds  No wheezing or rhonchi  Chest:      Chest wall: No tenderness  Abdominal:      Palpations: Abdomen is soft  Tenderness: There is no abdominal tenderness  Musculoskeletal:      Right lower leg: No edema  Left lower leg: No edema  Lymphadenopathy:      Cervical: No cervical adenopathy  Skin:     General: Skin is warm and dry  Neurological:      Mental Status: He is alert and oriented to person, place, and time  Psychiatric:         Thought Content:  Thought content normal          LABS:     Recent Results (from the past 48 hour(s))   Echo complete w/ contrast if indicated    Collection Time: 05/20/23 10:30 AM   Result Value Ref Range    A4C EF 38 %    LVIDd 4 60 cm    " LVIDS 3 30 cm    IVSd 1 10 cm    LVPWd 0 90 cm    FS 28 28 - 44    MV E' Tissue Velocity Septal 6 cm/s    E wave deceleration time 304 ms    MV Peak E Antolin 52 cm/s    MV Peak A Antolin 0 96 m/s    AV LVOT peak gradient 3 mmHg    LVOT peak VTI 16 33 cm    LVOT peak antolin 0 87 m/s    RVID d 3 6 cm    Tricuspid annular plane systolic excursion 5 71 cm    LA size 3 1 cm    LA length (A2C) 4 10 cm    JULIA A4C 10 5 cm2    RA 2D Volume 39 0 mL    RAA A4C 13 2 cm2    LVOT mn grad 1 0 mmHg    AV peak gradient 2 mmHg    AV Deceleration Time 1,979 ms    AV regurgitation pressure 1/2 time 574 ms    MV VTI RETROGRADE 190 9 cm    MV stenosis pressure 1/2 time 88 ms    MV valve area p 1/2 method 2 50     MV mean gradient retrograde 74 mmHg    MR  mmHg    TR Peak Antolin 2 4 m/s    Triscuspid Valve Regurgitation Peak Gradient 24 0 mmHg    Ao root 3 40 cm    Asc Ao 3 9 cm    AV peak gradient 53 mmHg    Mitral regurgitation peak velocity 5 10 m/s    Mitral valve mean inflow velocity 4 08 m/s    Tricuspid valve peak regurgitation velocity 2 44 m/s    Left ventricular stroke volume (2D) 54 00 mL    IVS 1 1 cm    LEFT VENTRICLE SYSTOLIC VOLUME (MOD BIPLANE) 2D 46 mL    LV DIASTOLIC VOLUME (MOD BIPLANE) 2D 100 mL    Left Atrium Area-systolic Four Chamber 84 8 cm2    Left Atrium Area-systolic Apical Two Chamber 12 6 cm2    LVSV, 2D 54 mL    LV EF 20    Basic metabolic panel    Collection Time: 05/21/23  5:11 AM   Result Value Ref Range    Sodium 134 (L) 135 - 147 mmol/L    Potassium 4 6 3 5 - 5 3 mmol/L    Chloride 106 96 - 108 mmol/L    CO2 24 21 - 32 mmol/L    ANION GAP 4 4 - 13 mmol/L    BUN 16 5 - 25 mg/dL    Creatinine 0 91 0 60 - 1 30 mg/dL    Glucose 96 65 - 140 mg/dL    Calcium 8 4 8 4 - 10 2 mg/dL    eGFR 81 ml/min/1 73sq m   Calcium, ionized    Collection Time: 05/21/23  5:11 AM   Result Value Ref Range    Calcium, Ionized 1 13 1 12 - 1 32 mmol/L   CBC and differential    Collection Time: 05/21/23  5:11 AM   Result Value Ref Range WBC 5 80 4 31 - 10 16 Thousand/uL    RBC 4 35 3 88 - 5 62 Million/uL    Hemoglobin 10 4 (L) 12 0 - 17 0 g/dL    Hematocrit 33 4 (L) 36 5 - 49 3 %    MCV 77 (L) 82 - 98 fL    MCH 23 9 (L) 26 8 - 34 3 pg    MCHC 31 1 (L) 31 4 - 37 4 g/dL    RDW 16 9 (H) 11 6 - 15 1 %    MPV 11 0 8 9 - 12 7 fL    Platelets 078 (L) 438 - 390 Thousands/uL    nRBC 0 /100 WBCs    Neutrophils Relative 67 43 - 75 %    Immat GRANS % 0 0 - 2 %    Lymphocytes Relative 18 14 - 44 %    Monocytes Relative 13 (H) 4 - 12 %    Eosinophils Relative 2 0 - 6 %    Basophils Relative 0 0 - 1 %    Neutrophils Absolute 3 82 1 85 - 7 62 Thousands/µL    Immature Grans Absolute 0 02 0 00 - 0 20 Thousand/uL    Lymphocytes Absolute 1 06 0 60 - 4 47 Thousands/µL    Monocytes Absolute 0 75 0 17 - 1 22 Thousand/µL    Eosinophils Absolute 0 13 0 00 - 0 61 Thousand/µL    Basophils Absolute 0 02 0 00 - 0 10 Thousands/µL   Magnesium    Collection Time: 05/21/23  5:11 AM   Result Value Ref Range    Magnesium 2 3 1 9 - 2 7 mg/dL   Phosphorus    Collection Time: 05/21/23  5:11 AM   Result Value Ref Range    Phosphorus 2 7 2 3 - 4 1 mg/dL   CBC    Collection Time: 05/22/23  4:27 AM   Result Value Ref Range    WBC 5 67 4 31 - 10 16 Thousand/uL    RBC 4 43 3 88 - 5 62 Million/uL    Hemoglobin 10 8 (L) 12 0 - 17 0 g/dL    Hematocrit 34 0 (L) 36 5 - 49 3 %    MCV 77 (L) 82 - 98 fL    MCH 24 4 (L) 26 8 - 34 3 pg    MCHC 31 8 31 4 - 37 4 g/dL    RDW 17 2 (H) 11 6 - 15 1 %    Platelets 200 884 - 544 Thousands/uL    MPV 10 8 8 9 - 12 7 fL   Basic metabolic panel    Collection Time: 05/22/23  4:27 AM   Result Value Ref Range    Sodium 135 135 - 147 mmol/L    Potassium 4 3 3 5 - 5 3 mmol/L    Chloride 107 96 - 108 mmol/L    CO2 24 21 - 32 mmol/L    ANION GAP 4 4 - 13 mmol/L    BUN 14 5 - 25 mg/dL    Creatinine 0 86 0 60 - 1 30 mg/dL    Glucose 94 65 - 140 mg/dL    Calcium 8 7 8 4 - 10 2 mg/dL    eGFR 83 ml/min/1 73sq m       Cardiac catheterization    Result Date: 5/20/2023  Narrative: •  Prox LAD lesion is 90% stenosed  •  1st Mrg lesion is 50% stenosed  Significant single vessel CAD  Successful BMS deployment in proximal LAD  CT abdomen pelvis with contrast    Result Date: 5/18/2023  Narrative: CT ABDOMEN AND PELVIS WITH IV CONTRAST INDICATION:   Left lower quadrant pain, rectal bleeding  COMPARISON: CT abdomen pelvis 5/1/2023 CT chest 11/17/2019 TECHNIQUE:  CT examination of the abdomen and pelvis was performed  Multiplanar 2D reformatted images were created from the source data  This examination, like all CT scans performed in the [facility], was performed utilizing techniques to minimize radiation dose exposure, including the use of iterative reconstruction and automated exposure control  Radiation dose length product (DLP) for this visit:  684 mGy-cm IV Contrast:  100 mL of iohexol (OMNIPAQUE) Enteric Contrast:  Enteric contrast was not administered  FINDINGS: ABDOMEN LOWER CHEST: A 2 mm pulmonary nodule in the left lower lobe (series 2 image 12), grossly stable in size since 5/1/2023 but new compared to 11/17/2019  LIVER/BILIARY TREE: 0 3 cm low-attenuation lesion is seen in the right hepatic dome (series 2 image 24) which can represent either artifact or a focal hepatic lesion  It is not identified on prior CT examination dated 5/1/2023  Hepatic contours are normal   No biliary dilatation  GALLBLADDER:  There are gallstone(s) within the gallbladder, without pericholecystic inflammatory changes  SPLEEN:  Unremarkable  PANCREAS: No main pancreatic ductal dilation  A 1 0 cm cystic lesion in the uncinate process, 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  ADRENAL GLANDS:  Unremarkable  KIDNEYS/URETERS:  No hydronephrosis or urinary tract calculus  Bilateral renal simple cysts  One or more sharply circumscribed subcentimeter renal hypodensities are present, too small to accurately characterize, and statistically most likely benign findings   According to recent literature (Radiology 2019) no further workup of these findings is recommended  STOMACH AND BOWEL: Moderate stool burden in the colon  There is asymmetric wall thickening involving the anterior, right lateral and posterior aspect of the rectum, highly concerning for rectal neoplasm (series 2 image 162)  It measures approximately 5 5  cm in craniocaudal dimension  There are 2 suspicious mesorectal lymph nodes measuring up to 0 7 cm on series 2 image 163 and a high rectal lymph node measuring up to 0 5 cm on series 2 image 151)  APPENDIX:  A normal appendix was visualized  ABDOMINOPELVIC CAVITY:  No ascites  Stable 3 2 cm fluid collection at the left internal inguinal ring, likely representing seroma with history of left inguinal hernia repair  No pneumoperitoneum  No lymphadenopathy  VESSELS:  Unremarkable for patient's age  PELVIS REPRODUCTIVE ORGANS:  Unremarkable for patient's age  URINARY BLADDER: Underdistended, limiting evaluation  ABDOMINAL WALL/INGUINAL REGIONS:  Unremarkable  OSSEOUS STRUCTURES:  No acute fracture or destructive osseous lesion  Impression: Imaging findings highly concerning for a rectal neoplasm with suspicious 2 mesorectal lymph nodes  Colonoscopy and rectal MRI examination are recommended for further evaluation  Moderate colonic stool burden without abnormal dilation, likely representing constipation  A 2 mm pulmonary nodule in the left lower lobe, new since 11/17/2019  CT chest without contrast in 12 months is recommended to assess for stability according to Fleischner criteria  However, if there is established rectal malignancy, earlier CT chest follow-up should be obtained in 3 months  A possible 0 3 cm right hepatic dome lesion versus artifact  If this is a true lesion, this is statistically most likely a benign lesion such as cyst or hemangioma   However if the diagnosis of rectal malignancy is established, short-term follow-up with postcontrast CT or MRI abdomen is recommended to exclude possibility of a developing metastatic lesion  A 1 0 cm cystic lesion in the uncinate process, 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  MRI abdomen MRCP with and without contrast is recommended in 1 year to assess for stability  I personally discussed this study with Farhana Springer on 5/18/2023 4:54 PM  Workstation performed: [id number]     CT abdomen pelvis with contrast    Result Date: 5/1/2023  Narrative: CT ABDOMEN AND PELVIS WITH IV CONTRAST INDICATION:   Urinary retention New urinary incontinence  COMPARISON:  None  TECHNIQUE:  CT examination of the abdomen and pelvis was performed  Multiplanar 2D reformatted images were created from the source data  Radiation dose length product (DLP) for this visit:  656 mGy-cm   This examination, like all CT scans performed in the [facility], was performed utilizing techniques to minimize radiation dose exposure, including the use of iterative reconstruction and automated exposure control  IV Contrast:  100 mL of iohexol (OMNIPAQUE) Enteric Contrast:  Enteric contrast was not administered  FINDINGS: ABDOMEN LOWER CHEST: Small hiatal hernia  LIVER/BILIARY TREE:  Unremarkable  GALLBLADDER: There are gallstone(s) within the gallbladder, without pericholecystic inflammatory changes  SPLEEN: The spleen is enlarged, measuring 13 8 cm  PANCREAS:  Unremarkable  ADRENAL GLANDS:  Unremarkable  KIDNEYS/URETERS: One or more simple renal cyst(s) is noted  Otherwise unremarkable kidneys  No hydronephrosis  STOMACH AND BOWEL: There is colonic diverticulosis without evidence of acute diverticulitis  APPENDIX:  No findings to suggest appendicitis  ABDOMINOPELVIC CAVITY:  No ascites  No pneumoperitoneum  No lymphadenopathy  VESSELS:  Unremarkable for patient's age  PELVIS REPRODUCTIVE ORGANS: The prostate is enlarged   URINARY BLADDER: Left posterior lateral thickening of the bladder wall, correlate with urinalysis and outpatient nonemergent cystoscopy best seen on series 2 image 158  ABDOMINAL WALL/INGUINAL REGIONS: 3 3 x 1 9 cm fluid collection at the origin of the left inguinal canal could be postsurgical but needs to be correlated clinically  There is increased soft tissue along the left inguinal canal extending to the scrotum, correlate with possible hernia surgical history  OSSEOUS STRUCTURES:  No acute fracture or destructive osseous lesion  Impression: Cholelithiasis  Splenomegaly  Bilateral renal cysts  Left posterior lateral irregular thickening of the bladder wall, correlate with urinalysis and outpatient nonemergent cystoscopy to rule out transitional cell carcinoma  3 3 x 1 9 cm fluid collection at the origin of the left inguinal canal could be postsurgical but needs to be correlated clinically  There is increased soft tissue along the left inguinal canal extending to the scrotum, correlate with possible hernia surgical history  The study was marked in EPIC for significant notification  Workstation performed: [id number]     Flexible Sigmoidoscopy    Result Date: 5/20/2023  Narrative: Table formatting from the original result was not included  [address] [address] [phone] DATE OF SERVICE: 5/20/23 PHYSICIAN(S): Attending: Alex Alfredo DO Fellow: No Staff Documented INDICATION: Rectal bleeding, Rectal mass POST-OP DIAGNOSIS: See the impression below  HISTORY: Prior colonoscopy: No prior colonoscopy  BOWEL PREPARATION: Miralax/Dulcolax PREPROCEDURE: Informed consent was obtained for the procedure, including sedation  Risks including but not limited to bleeding, infection, perforation, adverse drug reaction and aspiration were explained in detail  Also explained about less than 100% sensitivity with the exam and other alternatives  The patient was placed in the left lateral decubitus position   Procedure: Colonoscopy DETAILS OF PROCEDURE: Patient was taken to the procedure room where a time out was performed to confirm correct patient and correct procedure  The patient underwent monitored anesthesia care, which was administered by an anesthesia professional  The patient's blood pressure, heart rate, level of consciousness, oxygen and respirations were monitored throughout the procedure  A digital rectal exam was performed  The scope was introduced through the anus and advanced to the descending colon  Retroflexion was performed in the rectum  The patient's estimated blood loss was minimal (<5 mL)  The procedure was not difficult  The patient tolerated the procedure well  There were no apparent complications  ANESTHESIA INFORMATION: ASA: IV Anesthesia Type: IV Sedation with Anesthesia MEDICATIONS: No administrations occurring from 1522 to 1537 on 05/20/23 FINDINGS: Mass (traversable) measuring 6 cm x 4 cm in the distal rectum observed during digital rectal exam, covering two thirds of the circumference; bleeding occurred before and after intervention; performed cold forceps biopsy  The distal tip of the tumor is 4 cm from the anal verge  Multiple small moderate diverticula in the sigmoid colon; no bleeding was identified EVENTS: Procedure Events Event Event Time SPECIMENS: ID Type Source Tests Collected by Time Destination 1 :  Tissue Rectum TISSUE EXAM [facility]  5/20/2023  3:30 PM  EQUIPMENT: Colonoscope -[id number]     Impression: 1  Rectal mass consistent with adenocarcinoma, two thirds circumference, 4 cm from the anal verge, extending 5 cm proximal RECOMMENDATION: 1  Follow-up with the biopsies 2  Oncology consultation  [facility] [place]    Echo complete w/ contrast if indicated    Result Date: 5/21/2023  Narrative: •  Left Ventricle: Left ventricular cavity size is mildly dilated  Wall thickness is normal  The left ventricular ejection fraction is 20%  Systolic function is severely reduced   •  The following segments are akinetic: mid anterior, mid anteroseptal, apical anterior, apical septal, apical inferior and apex  •  The following segments are hypokinetic: basal anteroseptal, basal inferoseptal, mid inferoseptal, mid anterolateral and apical lateral  •  All other segments are normal  •  Aortic Valve: There is mild regurgitation  •  Mitral Valve: There is mild regurgitation  HISTORY:    Past Medical History:   Diagnosis Date   • Hyperlipidemia    • Ischemic cardiomyopathy    • Lacunar infarction [facility])    • Myocardial infarction [facility])    • Near syncope 2019   • Non-ST elevated myocardial infarction [facility])    • NSTEMI (non-ST elevated myocardial infarction) ([facility] [address] ) 10/20/2019   • Pericarditis 10/24/2019   • Poison ivy    • Stroke [facility])        Past Surgical History:   Procedure Laterality Date   • ANGIOPLASTY     • CARDIAC CATHETERIZATION N/A 2023    Procedure: Cardiac pci;  Surgeon: Shanae Mead MD;  Location: [facility] CARDIAC CATH LAB; Service: Cardiology   • CORONARY STENT PLACEMENT     • HERNIA REPAIR Left 2021    Procedure: OPEN REPAIR HERNIA INGUINAL LEFT WITH MESH;  Surgeon: Seth Lopez MD;  Location: [facility] MAIN OR;  Service: General       Family History   Problem Relation Age of Onset   • No Known Problems Mother    • Emphysema Father    • Heart disease Maternal Grandfather    • Heart attack Maternal Grandfather    • Emphysema Paternal Grandfather        Social History     Socioeconomic History   • Marital status:       Spouse name: None   • Number of children: None   • Years of education: None   • Highest education level: None   Occupational History   • None   Tobacco Use   • Smoking status: Former     Packs/day: 1 00     Years: 15 00     Pack years: 15 00     Types: Cigarettes     Quit date: 1969     Years since quittin 4   • Smokeless tobacco: Never   Vaping Use   • Vaping Use: Never used   Substance and Sexual Activity   • Alcohol use: Never   • Drug use: No   • Sexual activity: Not Currently     Partners: Female   Other Topics Concern   • None   Social History Narrative   • None     Social Determinants of Health     Financial Resource Strain: Low Risk    • Difficulty of Paying Living Expenses: Not hard at all   Food Insecurity: No Food Insecurity   • Worried About Running Out of Food in the Last Year: Never true   • Ran Out of Food in the Last Year: Never true   Transportation Needs: No Transportation Needs   • Lack of Transportation (Medical): No   • Lack of Transportation (Non-Medical):  No   Physical Activity: Not on file   Stress: Not on file   Social Connections: Not on file   Intimate Partner Violence: Not on file   Housing Stability: Low Risk    • Unable to Pay for Housing in the Last Year: No   • Number of Places Lived in the Last Year: 1   • Unstable Housing in the Last Year: No         Current Facility-Administered Medications:   •  acetaminophen (TYLENOL) tablet 650 mg, 650 mg, Oral, Q6H PRN, Rossana Leisure, DO  •  aspirin (ECOTRIN LOW STRENGTH) EC tablet 81 mg, 81 mg, Oral, Daily, Rossana Leisure, DO, 81 mg at 05/22/23 5442  •  atorvastatin (LIPITOR) tablet 80 mg, 80 mg, Oral, Daily With Ashley Sami, DO, 80 mg at 05/21/23 1620  •  clopidogrel (PLAVIX) tablet 75 mg, 75 mg, Oral, Daily, Rossana Leisure, DO, 75 mg at 05/22/23 0818  •  iron sucrose (VENOFER) 300 mg in sodium chloride 0 9 % 250 mL IVPB, 300 mg, Intravenous, Daily, Rossana Leisure, DO, Last Rate: 166 7 mL/hr at 05/22/23 0828, 300 mg at 05/22/23 2015  •  metoprolol tartrate (LOPRESSOR) partial tablet 12 5 mg, 12 5 mg, Oral, Q12H [address], Rossana Leisure, DO, 12 5 mg at 05/22/23 3778  •  nitroglycerin (NITROSTAT) SL tablet 0 4 mg, 0 4 mg, Sublingual, Q5 Min PRN, Rossana Leisure, DO, 0 4 mg at 05/19/23 2105  •  ondansetron (ZOFRAN) injection 4 mg, 4 mg, Intravenous, Q6H PRN, Rossana Leisure, DO    Medications Prior to Admission   Medication   • aspirin (ECOTRIN LOW STRENGTH) 81 mg EC tablet   • atorvastatin (LIPITOR) 80 mg tablet   • Blood Pressure KIT       No Known Allergies    Labs and pertinent reports reviewed  This note has been generated by voice recognition software system  Therefore, there may be spelling, grammar, and or syntax errors  Please contact if questions arise

## 2023-05-22 NOTE — ASSESSMENT & PLAN NOTE
Malnutrition Findings:   Adult Malnutrition type: Chronic illness  Adult Degree of Malnutrition: Malnutrition of moderate degree  360 Statement: Related likely to catabolic illness as evidenced by mild-moderate depletion of body fat (Orbitals) and mild-moderate depletion of muscle mass (clavicle) treated with diet (patient denies need for supplements at this time)  BMI Findings: Body mass index is 17 37 kg/m²

## 2023-05-22 NOTE — PROGRESS NOTES
"Cardiology Progress Note - Sascha Martinez  69 y o  male MRN: [id number]    Unit/Bed#:  Encounter: [id number]      Assessment/Plan:  1  Anterior STEMI  • S/p cardiac catheterization with BMS to proximal LAD  • Continue DAPT with aspirin and Plavix for 4 to 6 weeks  • Continue atorvastatin and metoprolol tartrate  • TTE showed severely reduced systolic function with EF of 20%, hypokinetic basal anteroseptal, basal inferoseptal, mid inferoseptal, mid anterolateral, and apical lateral segments  Akinesis of mid anterior, mid anteroseptal, apical anterior, apical septal, apical inferior and apex segments  2   ICM s/p MI, EF 20%  • Last noted to be 50 to 55%  • Plan for LifeVest on discharge  • Continue telemetry monitoring    3  Acute HFrEF  • Patient appears euvolemic off of diuretics  • Transition to Toprol-Xl 12 5 mg daily starting tomorrow  • No ACEi/ARB due to hypotension    4  Anemia secondary to rectal bleeding  • Hemoglobin 10 8  • Management per GI and primary team     5  Rectal mass    · S/p colonoscopy with biopsies performed  · Management per GI and oncology  6   CAD s/p PCI to the LAD and PTCA of D1   · Patient denies chest pain or anginal equivalent  · See plan above  7   Hyperlipidemia   · Continue atorvastatin  8   History of lacunar infarct  • Continue aspirin and statin  Subjective:   Patient seen and examined  No significant events overnight  Patient denies any chest pain, shortness of breath, palpitations, or lower extremity edema  Discussed Lifevest on discharge  Objective:     Vitals: Blood pressure 112/59, pulse 76, temperature 98 1 °F (36 7 °C), temperature source Oral, resp  rate 15, height 5' 10\" (1 778 m), weight 54 9 kg (121 lb 0 5 oz), SpO2 97 %  , Body mass index is 17 37 kg/m² ,   Orthostatic Blood Pressures    Flowsheet Row Most Recent Value   Blood Pressure 112/59 filed at 05/22/2023 0700   Patient Position - Orthostatic VS Lying filed at 05/22/2023 " 0700            Intake/Output Summary (Last 24 hours) at 5/22/2023 1028  Last data filed at 5/22/2023 0300  Gross per 24 hour   Intake --   Output 1100 ml   Net -1100 ml         Physical Exam:  Physical Exam  Vitals and nursing note reviewed  Constitutional:       General: He is not in acute distress  Appearance: He is well-developed and underweight  He is ill-appearing  HENT:      Head: Normocephalic and atraumatic  Eyes:      Conjunctiva/sclera: Conjunctivae normal    Neck:      Vascular: No carotid bruit  Cardiovascular:      Rate and Rhythm: Normal rate and regular rhythm  Heart sounds: Normal heart sounds  No murmur heard  Pulmonary:      Effort: Pulmonary effort is normal  No respiratory distress  Breath sounds: Normal breath sounds  Abdominal:      Palpations: Abdomen is soft  Tenderness: There is no abdominal tenderness  Musculoskeletal:         General: No swelling  Cervical back: Neck supple  Right lower leg: No edema  Left lower leg: No edema  Skin:     General: Skin is warm and dry  Capillary Refill: Capillary refill takes less than 2 seconds  Neurological:      Mental Status: He is alert and oriented to person, place, and time     Psychiatric:         Mood and Affect: Mood normal               Medications:      Current Facility-Administered Medications:   •  acetaminophen (TYLENOL) tablet 650 mg, 650 mg, Oral, Q6H PRN, Leata Screws, DO  •  aspirin (ECOTRIN LOW STRENGTH) EC tablet 81 mg, 81 mg, Oral, Daily, Leata Screws, DO, 81 mg at 05/22/23 3238  •  atorvastatin (LIPITOR) tablet 80 mg, 80 mg, Oral, Daily With Teena Cook, DO, 80 mg at 05/21/23 1620  •  clopidogrel (PLAVIX) tablet 75 mg, 75 mg, Oral, Daily, Leata Screws, DO, 75 mg at 05/22/23 0818  •  iron sucrose (VENOFER) 300 mg in sodium chloride 0 9 % 250 mL IVPB, 300 mg, Intravenous, Daily, Leata Screws, DO, Last Rate: 166 7 mL/hr at 05/22/23 0828, 300 mg at 05/22/23 3482  • "metoprolol tartrate (LOPRESSOR) partial tablet 12 5 mg, 12 5 mg, Oral, Q12H [address], Joan Crooked, DO, 12 5 mg at 05/22/23 7260  •  nitroglycerin (NITROSTAT) SL tablet 0 4 mg, 0 4 mg, Sublingual, Q5 Min PRN, Joan Crooked, DO, 0 4 mg at 05/19/23 2105  •  ondansetron (ZOFRAN) injection 4 mg, 4 mg, Intravenous, Q6H PRN, Joan Crooked, DO     Labs & Results:     Results from last 7 days   Lab Units 05/18/23  2245 05/18/23  1744 05/18/23  1512   HS TNI 0HR ng/L  --   --  3   HS TNI 2HR ng/L  --  4  --    HSTNI D2 ng/L  --  1  --    HS TNI 4HR ng/L 4  --   --    HSTNI D4 ng/L 1  --   --      Results from last 7 days   Lab Units 05/22/23  0427 05/21/23  0511 05/20/23  0446   WBC Thousand/uL 5 67 5 80 7 71   HEMOGLOBIN g/dL 10 8* 10 4* 9 2*   HEMATOCRIT % 34 0* 33 4* 30 6*   PLATELETS Thousands/uL 159 148* 160     Results from last 7 days   Lab Units 05/19/23  2349   TRIGLYCERIDES mg/dL 61   HDL mg/dL 49     Results from last 7 days   Lab Units 05/22/23  0427 05/21/23  0511 05/20/23  0641 05/19/23  0632 05/18/23  1512   POTASSIUM mmol/L 4 3 4 6 4 7   < > 4 3   CHLORIDE mmol/L 107 106 106   < > 109*   CO2 mmol/L 24 24 22   < > 23   BUN mg/dL 14 16 18   < > 30*   CREATININE mg/dL 0 86 0 91 0 85   < > 1 21   CALCIUM mg/dL 8 7 8 4 8 7   < > 9 0   ALK PHOS U/L  --   --   --   --  65   ALT U/L  --   --   --   --  15   AST U/L  --   --   --   --  22    < > = values in this interval not displayed  Results from last 7 days   Lab Units 05/18/23  1512   INR  1 13   PTT seconds 28     Results from last 7 days   Lab Units 05/21/23  0511 05/20/23  0641 05/19/23  2349   MAGNESIUM mg/dL 2 3 2 7 1 6*       Vitals: Blood pressure 112/59, pulse 76, temperature 98 1 °F (36 7 °C), temperature source Oral, resp  rate 15, height 5' 10\" (1 778 m), weight 54 9 kg (121 lb 0 5 oz), SpO2 97 %  , Body mass index is 17 37 kg/m² ,   Orthostatic Blood Pressures    Flowsheet Row Most Recent Value   Blood Pressure 112/59 filed at 05/22/2023 0700   Patient " Position - Orthostatic VS Lying filed at 2023 3774          Systolic (98OMO), FHA:744 , Min:100 , JQW:194     Diastolic (28GQE), HM, Min:55, Max:60        Intake/Output Summary (Last 24 hours) at 2023 1028  Last data filed at 2023 0300  Gross per 24 hour   Intake --   Output 1100 ml   Net -1100 ml       Invasive Devices     Peripheral Intravenous Line  Duration           Peripheral IV 23 Dorsal (posterior); Right Forearm 1 day                    Telemetry:  Telemetry Orders (From admission, onward)             24 Hour Telemetry Monitoring  Continuous x 24 Hours (Telem)        Question:  Reason for 24 Hour Telemetry  Answer:  PCI/EP study (including pacer and ICD implementation), Cardiac surgery, MI, abnormal cardiac cath, and chest pain- rule out MI                     BP Readings from Last 3 Encounters:   23 112/59   23 116/60   23 150/87      Wt Readings from Last 3 Encounters:   23 54 9 kg (121 lb 0 5 oz)   23 60 8 kg (134 lb)   23 60 3 kg (133 lb)

## 2023-05-22 NOTE — CASE MANAGEMENT
Case Management Discharge Planning Note    Patient name Deidre Page  Location / MRN [id number]  : 1945 Date 2023       Current Admission Date: 2023  Current Admission Diagnosis:Rectal bleeding   Patient Active Problem List    Diagnosis Date Noted   • Dysuria 2023   • Ischemic cardiomyopathy 2023   • Moderate protein-calorie malnutrition ([facility] ) 2023   • STEMI involving left anterior descending coronary artery ([facility] [facility] ) 2023   • Rectal bleeding 2023   • Rectal mass 2023   • Acute on chronic anemia 2023   • Protein-calorie malnutrition, unspecified severity ([facility] [address] ) 2023   • Essential hypertension 2020   • Non-recurrent unilateral inguinal hernia 2020   • History of lacunar cerebrovascular accident (CVA) 2019   • CAD in native artery 2019   • S/P primary angioplasty with coronary stent 2019   • History of ischemic cardiomyopathy 2019      LOS (days): 3  Geometric Mean LOS (GMLOS) (days): 3 00  Days to GMLOS:-0 1     OBJECTIVE:  Risk of Unplanned Readmission Score: 10 47         Current admission status: Inpatient   Preferred Pharmacy:   [address] [place] [address] [id number] Juliann Brannon  [address]  Phone: [phone] Fax: [address], [address]  [address] [place] [address]  Phone: [phone] Fax: [phone]    [place] [address]  Phone: [phone] Fax: [phone]    Primary Care Provider: Chandler Goodman DO    Primary Insurance: Gilberto Jeronimo [facility]  Secondary Insurance:     DISCHARGE DETAILS:      Other Referral/Resources/Interventions Provided:  Interventions: DME  Referral Comments: CM sent lifevest referral to [facility]  via email           Treatment Team Recommendation: Home  Discharge Destination Plan[de-identified] Home

## 2023-05-22 NOTE — ASSESSMENT & PLAN NOTE
· Acute STEMI underwent urgent catheterization  · Bare-metal stent was placed  · Continue aspirin and clopidogrel      Lab Results   Component Value Date    HSTNI0 3 05/18/2023    HSTNI2 4 05/18/2023    HSTNI4 4 05/18/2023    HSTNI 84 (H) 05/19/2023

## 2023-05-22 NOTE — PLAN OF CARE
Problem: DISCHARGE PLANNING  Goal: Discharge to home or other facility with appropriate resources  Description: INTERVENTIONS:  - Identify barriers to discharge w/patient and caregiver  - Arrange for needed discharge resources and transportation as appropriate  - Identify discharge learning needs (meds, wound care, etc )  - Arrange for interpretive services to assist at discharge as needed  - Refer to Case Management Department for coordinating discharge planning if the patient needs post-hospital services based on physician/advanced practitioner order or complex needs related to functional status, cognitive ability, or social support system  Outcome: Progressing     Problem: Knowledge Deficit  Goal: Patient/family/caregiver demonstrates understanding of disease process, treatment plan, medications, and discharge instructions  Description: Complete learning assessment and assess knowledge base  Interventions:  - Provide teaching at level of understanding  - Provide teaching via preferred learning methods  Outcome: Progressing     Problem: GASTROINTESTINAL - ADULT  Goal: Maintains or returns to baseline bowel function  Description: INTERVENTIONS:  - Assess bowel function  - Encourage oral fluids to ensure adequate hydration  - Administer IV fluids if ordered to ensure adequate hydration  - Administer ordered medications as needed  - Encourage mobilization and activity  - Consider nutritional services referral to assist patient with adequate nutrition and appropriate food choices  Outcome: Progressing     Problem: Nutrition/Hydration-ADULT  Goal: Nutrient/Hydration intake appropriate for improving, restoring or maintaining nutritional needs  Description: Monitor and assess patient's nutrition/hydration status for malnutrition  Collaborate with interdisciplinary team and initiate plan and interventions as ordered  Monitor patient's weight and dietary intake as ordered or per policy   Utilize nutrition screening tool and intervene as necessary  Determine patient's food preferences and provide high-protein, high-caloric foods as appropriate       INTERVENTIONS:  - Monitor oral intake, urinary output, labs, and treatment plans  - Assess nutrition and hydration status and recommend course of action  - Evaluate amount of meals eaten  - Assist patient with eating if necessary   - Allow adequate time for meals  - Recommend/ encourage appropriate diets, oral nutritional supplements, and vitamin/mineral supplements  - Order, calculate, and assess calorie counts as needed  - Recommend, monitor, and adjust tube feedings and TPN/PPN based on assessed needs  - Assess need for intravenous fluids  - Provide specific nutrition/hydration education as appropriate  - Include patient/family/caregiver in decisions related to nutrition  Outcome: Progressing

## 2023-05-23 ENCOUNTER — APPOINTMENT (INPATIENT)
Dept: CT IMAGING | Facility: HOSPITAL | Age: 78
End: 2023-05-23

## 2023-05-23 LAB
ABO GROUP BLD BPU: NORMAL
ABO GROUP BLD BPU: NORMAL
ALBUMIN SERPL BCP-MCNC: 3.4 G/DL (ref 3.5–5)
ALP SERPL-CCNC: 64 U/L (ref 34–104)
ALT SERPL W P-5'-P-CCNC: 18 U/L (ref 7–52)
ANION GAP SERPL CALCULATED.3IONS-SCNC: 4 MMOL/L (ref 4–13)
AST SERPL W P-5'-P-CCNC: 41 U/L (ref 13–39)
ATRIAL RATE: 85 BPM
BILIRUB SERPL-MCNC: 0.45 MG/DL (ref 0.2–1)
BPU ID: NORMAL
BPU ID: NORMAL
BUN SERPL-MCNC: 24 MG/DL (ref 5–25)
CALCIUM ALBUM COR SERPL-MCNC: 9.2 MG/DL (ref 8.3–10.1)
CALCIUM SERPL-MCNC: 8.7 MG/DL (ref 8.4–10.2)
CHLORIDE SERPL-SCNC: 107 MMOL/L (ref 96–108)
CO2 SERPL-SCNC: 24 MMOL/L (ref 21–32)
CREAT SERPL-MCNC: 0.98 MG/DL (ref 0.6–1.3)
CROSSMATCH: NORMAL
CROSSMATCH: NORMAL
ERYTHROCYTE [DISTWIDTH] IN BLOOD BY AUTOMATED COUNT: 17.3 % (ref 11.6–15.1)
GFR SERPL CREATININE-BSD FRML MDRD: 74 ML/MIN/1.73SQ M
GLUCOSE SERPL-MCNC: 94 MG/DL (ref 65–140)
HCT VFR BLD AUTO: 32.1 % (ref 36.5–49.3)
HGB BLD-MCNC: 10 G/DL (ref 12–17)
MCH RBC QN AUTO: 24.1 PG (ref 26.8–34.3)
MCHC RBC AUTO-ENTMCNC: 31.2 G/DL (ref 31.4–37.4)
MCV RBC AUTO: 77 FL (ref 82–98)
P AXIS: 69 DEGREES
PLATELET # BLD AUTO: 163 THOUSANDS/UL (ref 149–390)
PMV BLD AUTO: 11.7 FL (ref 8.9–12.7)
POTASSIUM SERPL-SCNC: 4.3 MMOL/L (ref 3.5–5.3)
PR INTERVAL: 172 MS
PROT SERPL-MCNC: 5.7 G/DL (ref 6.4–8.4)
QRS AXIS: 75 DEGREES
QRSD INTERVAL: 84 MS
QT INTERVAL: 386 MS
QTC INTERVAL: 459 MS
RBC # BLD AUTO: 4.15 MILLION/UL (ref 3.88–5.62)
SODIUM SERPL-SCNC: 135 MMOL/L (ref 135–147)
T WAVE AXIS: 92 DEGREES
UNIT DISPENSE STATUS: NORMAL
UNIT DISPENSE STATUS: NORMAL
UNIT PRODUCT CODE: NORMAL
UNIT PRODUCT CODE: NORMAL
UNIT PRODUCT VOLUME: 350 ML
UNIT PRODUCT VOLUME: 350 ML
UNIT RH: NORMAL
UNIT RH: NORMAL
VENTRICULAR RATE: 85 BPM
WBC # BLD AUTO: 5.82 THOUSAND/UL (ref 4.31–10.16)

## 2023-05-23 RX ORDER — LIDOCAINE 50 MG/G
1 PATCH TOPICAL DAILY
Status: DISCONTINUED | OUTPATIENT
Start: 2023-05-24 | End: 2023-05-25 | Stop reason: HOSPADM

## 2023-05-23 RX ORDER — LISINOPRIL 2.5 MG/1
2.5 TABLET ORAL DAILY
Status: DISCONTINUED | OUTPATIENT
Start: 2023-05-23 | End: 2023-05-25 | Stop reason: HOSPADM

## 2023-05-23 RX ORDER — TIZANIDINE 2 MG/1
4 TABLET ORAL ONCE
Status: COMPLETED | OUTPATIENT
Start: 2023-05-23 | End: 2023-05-23

## 2023-05-23 RX ORDER — METOPROLOL SUCCINATE 25 MG/1
25 TABLET, EXTENDED RELEASE ORAL DAILY
Status: DISCONTINUED | OUTPATIENT
Start: 2023-05-24 | End: 2023-05-25 | Stop reason: HOSPADM

## 2023-05-23 RX ORDER — FUROSEMIDE 10 MG/ML
20 INJECTION INTRAMUSCULAR; INTRAVENOUS ONCE
Status: COMPLETED | OUTPATIENT
Start: 2023-05-23 | End: 2023-05-23

## 2023-05-23 RX ADMIN — CLOPIDOGREL BISULFATE 75 MG: 75 TABLET ORAL at 08:36

## 2023-05-23 RX ADMIN — FUROSEMIDE 20 MG: 10 INJECTION, SOLUTION INTRAVENOUS at 10:26

## 2023-05-23 RX ADMIN — ATORVASTATIN CALCIUM 80 MG: 40 TABLET, FILM COATED ORAL at 15:34

## 2023-05-23 RX ADMIN — IOHEXOL 85 ML: 350 INJECTION, SOLUTION INTRAVENOUS at 09:27

## 2023-05-23 RX ADMIN — IRON SUCROSE 300 MG: 20 INJECTION, SOLUTION INTRAVENOUS at 10:26

## 2023-05-23 RX ADMIN — TIZANIDINE 4 MG: 2 TABLET ORAL at 23:49

## 2023-05-23 RX ADMIN — ACETAMINOPHEN 650 MG: 325 TABLET ORAL at 19:57

## 2023-05-23 RX ADMIN — ASPIRIN 81 MG: 81 TABLET ORAL at 08:36

## 2023-05-23 RX ADMIN — METOPROLOL SUCCINATE 12.5 MG: 25 TABLET, EXTENDED RELEASE ORAL at 08:36

## 2023-05-23 RX ADMIN — LISINOPRIL 2.5 MG: 2.5 TABLET ORAL at 15:22

## 2023-05-23 NOTE — PROGRESS NOTES
[address] [place] [address]  Progress Note  Name: Kina Ponce I  MRN: [id number]  Unit/Bed#:  I Date of Admission: 5/18/2023   Date of Service: 5/23/2023 I Hospital Day: 4    Assessment/Plan   Ischemic cardiomyopathy  Assessment & Plan  · LifeVest at bedside  · Continue beta-blocker    Dysuria  Assessment & Plan  · UA is unremarkable    STEMI involving left anterior descending coronary artery [facility])  Assessment & Plan  · Acute STEMI underwent urgent catheterization  · Bare-metal stent was placed  · Continue aspirin and clopidogrel  Lab Results   Component Value Date    HSTNI0 3 05/18/2023    HSTNI2 4 05/18/2023    HSTNI4 4 05/18/2023    HSTNI 84 (H) 05/19/2023       Moderate protein-calorie malnutrition (HCC)  Assessment & Plan  Malnutrition Findings:   Adult Malnutrition type: Chronic illness  Adult Degree of Malnutrition: Malnutrition of moderate degree  360 Statement: Related likely to catabolic illness as evidenced by mild-moderate depletion of body fat (Orbitals) and mild-moderate depletion of muscle mass (clavicle) treated with diet (patient denies need for supplements at this time)  BMI Findings: Body mass index is 17 33 kg/m²  Acute on chronic anemia  Assessment & Plan  · Acute on chronic anemia secondary to bleeding rectal mass  · Has not required any transfusion  · Venofer has been ordered by GI    Results from last 7 days   Lab Units 05/23/23  0539 05/22/23  0427 05/21/23  0511 05/20/23  0446   HEMOGLOBIN g/dL 10 0* 10 8* 10 4* 9 2*       Rectal mass  Assessment & Plan  · Rectal mass underwent biopsy concerning for malignancy  · Oncology consulted for staging and establishing care  · Needs outpatient colorectal    S/P primary angioplasty with coronary stent  Assessment & Plan  · While patient was here did have chest pain and had an urgent cath secondary to STEMI  1  Significant single vessel CAD  2  Successful BMS deployment in proximal LAD    · Continue aspirin Plavix and statin  · Experiencing atypical chest pain today  Cardiology aware  I am going to get a CT scan of the chest for cancer staging purposes as well as patient not being able to be on DVT prophylaxis secondary to GI bleed    * Rectal bleeding  Assessment & Plan  Presented with rectal bleeding for the past 1 week bright red blood  Likely secondary to new rectal cancer  ·   Patient is status post flexible sigmoidoscopy  Biopsy taken  Oncology consultation requested  · Patient on dual antiplatelet therapy given STEMI  · Patient still has some bleeding per rectum but hemoglobin has been stable             VTE Pharmacologic Prophylaxis:   Pharmacologic: Pharmacologic VTE Prophylaxis contraindicated due to GI bleed  Mechanical VTE Prophylaxis in Place: Yes    Patient Centered Rounds: I have performed bedside rounds with nursing staff today  Discussions with Specialists or Other Care Team Provider: Cardiology    Education and Discussions with Family / Patient: Son at the bedside    Time Spent for Care: 20 minutes  More than 50% of total time spent on counseling and coordination of care as described above  Current Length of Stay: 4 day(s)    Current Patient Status: Inpatient   Certification Statement: The patient will continue to require additional inpatient hospital stay due to Monitoring for chest pain    Discharge Plan: Hopeful discharge tomorrow    Code Status: Level 1 - Full Code      Subjective:   Patient seen and examined  Complaining of left-sided chest pain when he lays on his left side and he also states he had some chest pain when he was ambulating to the bathroom and felt a little short of breath  Also states that he does have some bright blood when he has a bowel movement      Objective:     Vitals:   Temp (24hrs), Av °F (36 7 °C), Min:97 8 °F (36 6 °C), Max:98 1 °F (36 7 °C)    Temp:  [97 8 °F (36 6 °C)-98 1 °F (36 7 °C)] 97 9 °F (36 6 °C)  HR:  [73-93] 93  Resp:  [14-16] 16  BP: ()/(53-79) 134/79  SpO2:  [97 %-99 %] 99 %  Body mass index is 17 33 kg/m²  Input and Output Summary (last 24 hours): Intake/Output Summary (Last 24 hours) at 5/23/2023 1049  Last data filed at 5/23/2023 0100  Gross per 24 hour   Intake --   Output 620 ml   Net -620 ml       Physical Exam:     Physical Exam  (   General Appearance:    Alert, cooperative, no distress, appears stated age                               Lungs:     Clear to auscultation bilaterally, respirations unlabored       Heart:    Regular rate and rhythm, S1 and S2 normal, no murmur, rub    or gallop   Abdomen:     Soft, non-tender, bowel sounds active all four quadrants,     no masses, no organomegaly           Extremities:   Extremities normal, atraumatic, no cyanosis or edema       Additional Data:     Labs:    Results from last 7 days   Lab Units 05/23/23  0539 05/22/23  0427 05/21/23  0511   WBC Thousand/uL 5 82   < > 5 80   HEMOGLOBIN g/dL 10 0*   < > 10 4*   HEMATOCRIT % 32 1*   < > 33 4*   PLATELETS Thousands/uL 163   < > 148*   NEUTROS PCT %  --   --  67   LYMPHS PCT %  --   --  18   MONOS PCT %  --   --  13*   EOS PCT %  --   --  2    < > = values in this interval not displayed  Results from last 7 days   Lab Units 05/23/23  0539   SODIUM mmol/L 135   POTASSIUM mmol/L 4 3   CHLORIDE mmol/L 107   CO2 mmol/L 24   BUN mg/dL 24   CREATININE mg/dL 0 98   ANION GAP mmol/L 4   CALCIUM mg/dL 8 7   ALBUMIN g/dL 3 4*   TOTAL BILIRUBIN mg/dL 0 45   ALK PHOS U/L 64   ALT U/L 18   AST U/L 41*   GLUCOSE RANDOM mg/dL 94     Results from last 7 days   Lab Units 05/18/23  1512   INR  1 13         Results from last 7 days   Lab Units 05/19/23  2349   HEMOGLOBIN A1C % 5 5               * I Have Reviewed All Lab Data Listed Above  * Additional Pertinent Lab Tests Reviewed:  [facility]        Recent Cultures (last 7 days):           Last 24 Hours Medication List:   Current Facility-Administered Medications   Medication Dose Route Frequency Provider Last Rate   • acetaminophen  650 mg Oral Q6H PRN Chelly Fought, DO     • aspirin  81 mg Oral Daily [employer] Fought, DO     • atorvastatin  80 mg Oral Daily With [facility], DO     • clopidogrel  75 mg Oral Daily Chelly Fought, DO     • iron sucrose  300 mg Intravenous Daily [employer] Fought,  mg (05/23/23 1026)   • [START ON 5/24/2023] metoprolol succinate  25 mg Oral Daily Crystal Wang PA-C     • nitroglycerin  0 4 mg Sublingual Q5 Min PRN [employer] Fought, DO     • ondansetron  4 mg Intravenous Q6H PRN [employer] Fought, DO          Today, Patient Was Seen By: Melissa Meng MD    ** Please Note: Dictation voice to text software may have been used in the creation of this document   **

## 2023-05-23 NOTE — ASSESSMENT & PLAN NOTE
· Rectal mass underwent biopsy concerning for malignancy  · Oncology consulted for staging and establishing care  · Needs outpatient colorectal

## 2023-05-23 NOTE — ASSESSMENT & PLAN NOTE
· Acute on chronic anemia secondary to bleeding rectal mass  · Has not required any transfusion  · Venofer has been ordered by GI    Results from last 7 days   Lab Units 05/23/23  0539 05/22/23  0427 05/21/23  0511 05/20/23  0446   HEMOGLOBIN g/dL 10 0* 10 8* 10 4* 9 2*

## 2023-05-23 NOTE — ASSESSMENT & PLAN NOTE
Presented with rectal bleeding for the past 1 week bright red blood  Likely secondary to new rectal cancer  ·   Patient is status post flexible sigmoidoscopy  Biopsy taken    Oncology consultation requested  · Patient on dual antiplatelet therapy given STEMI  · Patient still has some bleeding per rectum but hemoglobin has been stable

## 2023-05-23 NOTE — PLAN OF CARE
Problem: PAIN - ADULT  Goal: Verbalizes/displays adequate comfort level or baseline comfort level  Description: Interventions:  - Encourage patient to monitor pain and request assistance  - Assess pain using appropriate pain scale  - Administer analgesics based on type and severity of pain and evaluate response  - Implement non-pharmacological measures as appropriate and evaluate response  - Consider cultural and social influences on pain and pain management  - Notify physician/advanced practitioner if interventions unsuccessful or patient reports new pain  Outcome: Progressing     Problem: INFECTION - ADULT  Goal: Absence or prevention of progression during hospitalization  Description: INTERVENTIONS:  - Assess and monitor for signs and symptoms of infection  - Monitor lab/diagnostic results  - Monitor all insertion sites, i e  indwelling lines, tubes, and drains  - Monitor endotracheal if appropriate and nasal secretions for changes in amount and color  - [place] appropriate cooling/warming therapies per order  - Administer medications as ordered  - Instruct and encourage patient and family to use good hand hygiene technique  - Identify and instruct in appropriate isolation precautions for identified infection/condition  Outcome: Progressing  Goal: Absence of fever/infection during neutropenic period  Description: INTERVENTIONS:  - Monitor WBC    Outcome: Progressing     Problem: SAFETY ADULT  Goal: Patient will remain free of falls  Description: INTERVENTIONS:  - Educate patient/family on patient safety including physical limitations  - Instruct patient to call for assistance with activity   - Consult OT/PT to assist with strengthening/mobility   - Keep Call bell within reach  - Keep bed low and locked with side rails adjusted as appropriate  - Keep care items and personal belongings within reach  - Initiate and maintain comfort rounds  - Make Fall Risk Sign visible to staff  - Offer Toileting every 2 Hours, in advance of need  - Initiate/Maintain alarm  - Obtain necessary fall risk management equipment:   - Apply yellow socks and bracelet for high fall risk patients  - Consider moving patient to room near nurses station  Outcome: Progressing  Goal: Maintain or return to baseline ADL function  Description: INTERVENTIONS:  -  Assess patient's ability to carry out ADLs; assess patient's baseline for ADL function and identify physical deficits which impact ability to perform ADLs (bathing, care of mouth/teeth, toileting, grooming, dressing, etc )  - Assess/evaluate cause of self-care deficits   - Assess range of motion  - Assess patient's mobility; develop plan if impaired  - Assess patient's need for assistive devices and provide as appropriate  - Encourage maximum independence but intervene and supervise when necessary  - Involve family in performance of ADLs  - Assess for home care needs following discharge   - Consider OT consult to assist with ADL evaluation and planning for discharge  - Provide patient education as appropriate  Outcome: Progressing  Goal: Maintains/Returns to pre admission functional level  Description: INTERVENTIONS:  - Perform BMAT or MOVE assessment daily    - Set and communicate daily mobility goal to care team and patient/family/caregiver  - Collaborate with rehabilitation services on mobility goals if consulted  - Perform Range of Motion 3 times a day  - Reposition patient every 2 hours    - Dangle patient 1 times a day  - Stand patient 4 times a day  - Ambulate patient 2 times a day  - Out of bed to chair 1 times a day   - Out of bed for meals 2 times a day  - Out of bed for toileting  - Record patient progress and toleration of activity level   Outcome: Progressing     Problem: DISCHARGE PLANNING  Goal: Discharge to home or other facility with appropriate resources  Description: INTERVENTIONS:  - Identify barriers to discharge w/patient and caregiver  - Arrange for needed discharge resources and transportation as appropriate  - Identify discharge learning needs (meds, wound care, etc )  - Arrange for interpretive services to assist at discharge as needed  - Refer to Case Management Department for coordinating discharge planning if the patient needs post-hospital services based on physician/advanced practitioner order or complex needs related to functional status, cognitive ability, or social support system  Outcome: Progressing     Problem: Knowledge Deficit  Goal: Patient/family/caregiver demonstrates understanding of disease process, treatment plan, medications, and discharge instructions  Description: Complete learning assessment and assess knowledge base  Interventions:  - Provide teaching at level of understanding  - Provide teaching via preferred learning methods  Outcome: Progressing     Problem: GASTROINTESTINAL - ADULT  Goal: Maintains or returns to baseline bowel function  Description: INTERVENTIONS:  - Assess bowel function  - Encourage oral fluids to ensure adequate hydration  - Administer IV fluids if ordered to ensure adequate hydration  - Administer ordered medications as needed  - Encourage mobilization and activity  - Consider nutritional services referral to assist patient with adequate nutrition and appropriate food choices  Outcome: Progressing     Problem: Nutrition/Hydration-ADULT  Goal: Nutrient/Hydration intake appropriate for improving, restoring or maintaining nutritional needs  Description: Monitor and assess patient's nutrition/hydration status for malnutrition  Collaborate with interdisciplinary team and initiate plan and interventions as ordered  Monitor patient's weight and dietary intake as ordered or per policy  Utilize nutrition screening tool and intervene as necessary  Determine patient's food preferences and provide high-protein, high-caloric foods as appropriate       INTERVENTIONS:  - Monitor oral intake, urinary output, labs, and treatment plans  - Assess nutrition and hydration status and recommend course of action  - Evaluate amount of meals eaten  - Assist patient with eating if necessary   - Allow adequate time for meals  - Recommend/ encourage appropriate diets, oral nutritional supplements, and vitamin/mineral supplements  - Order, calculate, and assess calorie counts as needed  - Recommend, monitor, and adjust tube feedings and TPN/PPN based on assessed needs  - Assess need for intravenous fluids  - Provide specific nutrition/hydration education as appropriate  - Include patient/family/caregiver in decisions related to nutrition  Outcome: Progressing     Problem: MOBILITY - ADULT  Goal: Maintain or return to baseline ADL function  Description: INTERVENTIONS:  -  Assess patient's ability to carry out ADLs; assess patient's baseline for ADL function and identify physical deficits which impact ability to perform ADLs (bathing, care of mouth/teeth, toileting, grooming, dressing, etc )  - Assess/evaluate cause of self-care deficits   - Assess range of motion  - Assess patient's mobility; develop plan if impaired  - Assess patient's need for assistive devices and provide as appropriate  - Encourage maximum independence but intervene and supervise when necessary  - Involve family in performance of ADLs  - Assess for home care needs following discharge   - Consider OT consult to assist with ADL evaluation and planning for discharge  - Provide patient education as appropriate  Outcome: Progressing  Goal: Maintains/Returns to pre admission functional level  Description: INTERVENTIONS:  - Perform BMAT or MOVE assessment daily    - Set and communicate daily mobility goal to care team and patient/family/caregiver  - Collaborate with rehabilitation services on mobility goals if consulted  - Perform Range of Motion 3 times a day  - Reposition patient every 2 hours    - Dangle patient 1 times a day  - Stand patient 4 times a day  - Ambulate patient 2 times a day  - Out of bed to chair 1 times a day   - Out of bed for meals 2 times a day  - Out of bed for toileting  - Record patient progress and toleration of activity level   Outcome: Progressing

## 2023-05-23 NOTE — PROGRESS NOTES
"Cardiology Progress Note - Yadira Swartz  69 y o  male MRN: [id number]    Unit/Bed#:  Encounter: [id number]      Assessment/Plan:  1  Anterior STEMI status post cardiac catheterization with bare-metal stent to proximal LAD  Patient will need DAPT with aspirin for 4 to 6 weeks  Continue Lipitor, metoprolol  Echocardiogram done showed EF of 20%  Plan for LifeVest at discharge, LifeVest is at the bedside  EKG today shows recent MI changes but nothing acute  2   Ischemic cardiomyopathy status post MI with EF 20%  Plan for LifeVest at discharge  LifeVest currently at the bedside  Plan for GDMT; aspirin, statins, beta-blockers  Will start low-dose ACE inhibitor lisinopril 2 5     3   Acute systolic heart failure  Currently appears euvolemic  Currently off diuretics  Continue with Toprol, lisinopril, aspirin, statins  Daily weights  Salt restriction  Strict I's and O's     4   Anemia secondary to rectal bleeding  Hemoglobin this morning 10 0, continue to monitor    5  Rectal mass status post colonoscopy with biopsies performed  Pathology pending  Management per GI/oncology    6  CAD status post PCI to LAD and PTCA of to D1  Continue with aspirin, Lipitor, metoprolol, Plavix  Chest pain this morning appeared to be atypical as it was reproducible and on the lateral side of the chest     7   Hyperlipidemia on Lipitor    8  History of lacunar infarct, continue aspirin and statins      Subjective:   Patient seen and examined  No significant events overnight  Pt complained of left side chest/lateral chest/axilla  Pain reproducible with palpation    Objective:     Vitals: Blood pressure 134/79, pulse 93, temperature 97 9 °F (36 6 °C), temperature source Oral, resp  rate 16, height 5' 10\" (1 778 m), weight 54 8 kg (120 lb 13 oz), SpO2 99 %  , Body mass index is 17 33 kg/m² ,   Orthostatic Blood Pressures    Flowsheet Row Most Recent Value   Blood Pressure 134/79 filed at 05/23/2023 0836 " Patient Position - Orthostatic VS Lying filed at 05/23/2023 0700            Intake/Output Summary (Last 24 hours) at 5/23/2023 1402  Last data filed at 5/23/2023 0100  Gross per 24 hour   Intake --   Output 620 ml   Net -620 ml         Physical Exam:    GEN: Savanna Baldwin   appears ill, alert and oriented pleasant and cooperative   HEENT: pupils equal, round, and reactive to light; extraocular muscles intact  NECK: supple, no carotid bruits   HEART: regular rhythm, normal S1 and S2, no murmurs, clicks, gallops or rubs   LUNGS: decreased breath sounds bilaterally; no wheezes, rales, or rhonchi   ABDOMEN: normal bowel sounds, soft, no tenderness, no distention  EXTREMITIES: peripheral pulses normal; no clubbing, cyanosis, or edema      Medications:      Current Facility-Administered Medications:   •  acetaminophen (TYLENOL) tablet 650 mg, 650 mg, Oral, Q6H PRN, Dorla [place], DO  •  aspirin (ECOTRIN LOW STRENGTH) EC tablet 81 mg, 81 mg, Oral, Daily, Dorla [place], DO, 81 mg at 05/23/23 8487  •  atorvastatin (LIPITOR) tablet 80 mg, 80 mg, Oral, Daily With Bobo Paniagua, DO, 80 mg at 05/22/23 1842  •  clopidogrel (PLAVIX) tablet 75 mg, 75 mg, Oral, Daily, Dorla [place], DO, 75 mg at 05/23/23 7831  •  iron sucrose (VENOFER) 300 mg in sodium chloride 0 9 % 250 mL IVPB, 300 mg, Intravenous, Daily, Dorla [place], DO, Last Rate: 166 7 mL/hr at 05/23/23 1026, 300 mg at 05/23/23 1026  •  [START ON 5/24/2023] metoprolol succinate (TOPROL-XL) 24 hr tablet 25 mg, 25 mg, Oral, Daily, Crystal Wang PA-C  •  nitroglycerin (NITROSTAT) SL tablet 0 4 mg, 0 4 mg, Sublingual, Q5 Min PRN, Dorla [place], DO, 0 4 mg at 05/19/23 2105  •  ondansetron (ZOFRAN) injection 4 mg, 4 mg, Intravenous, Q6H PRN, Dorla [place], DO     Labs & Results:    Results from last 7 days   Lab Units 05/18/23  2245 05/18/23  1744 05/18/23  1512   HS TNI 0HR ng/L  --   --  3   HS TNI 2HR ng/L  --  4  --    HS TNI 4HR ng/L 4  --   --    HSTNI D4 ng/L 1 --   --      Results from last 7 days   Lab Units 05/23/23  0539 05/22/23  0427 05/21/23  0511   WBC Thousand/uL 5 82 5 67 5 80   HEMOGLOBIN g/dL 10 0* 10 8* 10 4*   HEMATOCRIT % 32 1* 34 0* 33 4*   PLATELETS Thousands/uL 163 159 148*     Results from last 7 days   Lab Units 05/19/23  2349   TRIGLYCERIDES mg/dL 61   HDL mg/dL 49     Results from last 7 days   Lab Units 05/23/23  0539 05/22/23  0427 05/21/23  0511 05/19/23  0632 05/18/23  1512   POTASSIUM mmol/L 4 3 4 3 4 6   < > 4 3   CHLORIDE mmol/L 107 107 106   < > 109*   CO2 mmol/L 24 24 24   < > 23   BUN mg/dL 24 14 16   < > 30*   CREATININE mg/dL 0 98 0 86 0 91   < > 1 21   CALCIUM mg/dL 8 7 8 7 8 4   < > 9 0   ALK PHOS U/L 64  --   --   --  65   ALT U/L 18  --   --   --  15   AST U/L 41*  --   --   --  22    < > = values in this interval not displayed       Results from last 7 days   Lab Units 05/18/23  1512   INR  1 13   PTT seconds 28     Results from last 7 days   Lab Units 05/21/23  0511 05/20/23  0641 05/19/23  2349   MAGNESIUM mg/dL 2 3 2 7 1 6*

## 2023-05-23 NOTE — PLAN OF CARE
Problem: PAIN - ADULT  Goal: Verbalizes/displays adequate comfort level or baseline comfort level  Description: Interventions:  - Encourage patient to monitor pain and request assistance  - Assess pain using appropriate pain scale  - Administer analgesics based on type and severity of pain and evaluate response  - Implement non-pharmacological measures as appropriate and evaluate response  - Consider cultural and social influences on pain and pain management  - Notify physician/advanced practitioner if interventions unsuccessful or patient reports new pain  Outcome: Progressing     Problem: INFECTION - ADULT  Goal: Absence or prevention of progression during hospitalization  Description: INTERVENTIONS:  - Assess and monitor for signs and symptoms of infection  - Monitor lab/diagnostic results  - Monitor all insertion sites, i e  indwelling lines, tubes, and drains  - Monitor endotracheal if appropriate and nasal secretions for changes in amount and color  - [place] appropriate cooling/warming therapies per order  - Administer medications as ordered  - Instruct and encourage patient and family to use good hand hygiene technique  - Identify and instruct in appropriate isolation precautions for identified infection/condition  Outcome: Progressing  Goal: Absence of fever/infection during neutropenic period  Description: INTERVENTIONS:  - Monitor WBC    Outcome: Progressing     Problem: SAFETY ADULT  Goal: Patient will remain free of falls  Description: INTERVENTIONS:  - Educate patient/family on patient safety including physical limitations  - Instruct patient to call for assistance with activity   - Consult OT/PT to assist with strengthening/mobility   - Keep Call bell within reach  - Keep bed low and locked with side rails adjusted as appropriate  - Keep care items and personal belongings within reach  - Initiate and maintain comfort rounds  - Make Fall Risk Sign visible to staff  - Apply yellow socks and bracelet for high fall risk patients  - Consider moving patient to room near nurses station  Outcome: Progressing  Goal: Maintain or return to baseline ADL function  Description: INTERVENTIONS:  -  Assess patient's ability to carry out ADLs; assess patient's baseline for ADL function and identify physical deficits which impact ability to perform ADLs (bathing, care of mouth/teeth, toileting, grooming, dressing, etc )  - Assess/evaluate cause of self-care deficits   - Assess range of motion  - Assess patient's mobility; develop plan if impaired  - Assess patient's need for assistive devices and provide as appropriate  - Encourage maximum independence but intervene and supervise when necessary  - Involve family in performance of ADLs  - Assess for home care needs following discharge   - Consider OT consult to assist with ADL evaluation and planning for discharge  - Provide patient education as appropriate  Outcome: Progressing  Goal: Maintains/Returns to pre admission functional level  Description: INTERVENTIONS:  - Perform BMAT or MOVE assessment daily    - Set and communicate daily mobility goal to care team and patient/family/caregiver     - Collaborate with rehabilitation services on mobility goals if consulted  - Out of bed for toileting  - Record patient progress and toleration of activity level   Outcome: Progressing     Problem: DISCHARGE PLANNING  Goal: Discharge to home or other facility with appropriate resources  Description: INTERVENTIONS:  - Identify barriers to discharge w/patient and caregiver  - Arrange for needed discharge resources and transportation as appropriate  - Identify discharge learning needs (meds, wound care, etc )  - Arrange for interpretive services to assist at discharge as needed  - Refer to Case Management Department for coordinating discharge planning if the patient needs post-hospital services based on physician/advanced practitioner order or complex needs related to functional status, cognitive ability, or social support system  Outcome: Progressing     Problem: Knowledge Deficit  Goal: Patient/family/caregiver demonstrates understanding of disease process, treatment plan, medications, and discharge instructions  Description: Complete learning assessment and assess knowledge base  Interventions:  - Provide teaching at level of understanding  - Provide teaching via preferred learning methods  Outcome: Progressing     Problem: GASTROINTESTINAL - ADULT  Goal: Maintains or returns to baseline bowel function  Description: INTERVENTIONS:  - Assess bowel function  - Encourage oral fluids to ensure adequate hydration  - Administer IV fluids if ordered to ensure adequate hydration  - Administer ordered medications as needed  - Encourage mobilization and activity  - Consider nutritional services referral to assist patient with adequate nutrition and appropriate food choices  Outcome: Progressing     Problem: Nutrition/Hydration-ADULT  Goal: Nutrient/Hydration intake appropriate for improving, restoring or maintaining nutritional needs  Description: Monitor and assess patient's nutrition/hydration status for malnutrition  Collaborate with interdisciplinary team and initiate plan and interventions as ordered  Monitor patient's weight and dietary intake as ordered or per policy  Utilize nutrition screening tool and intervene as necessary  Determine patient's food preferences and provide high-protein, high-caloric foods as appropriate       INTERVENTIONS:  - Monitor oral intake, urinary output, labs, and treatment plans  - Assess nutrition and hydration status and recommend course of action  - Evaluate amount of meals eaten  - Assist patient with eating if necessary   - Allow adequate time for meals  - Recommend/ encourage appropriate diets, oral nutritional supplements, and vitamin/mineral supplements  - Order, calculate, and assess calorie counts as needed  - Recommend, monitor, and adjust tube feedings and TPN/PPN based on assessed needs  - Assess need for intravenous fluids  - Provide specific nutrition/hydration education as appropriate  - Include patient/family/caregiver in decisions related to nutrition  Outcome: Progressing     Problem: MOBILITY - ADULT  Goal: Maintain or return to baseline ADL function  Description: INTERVENTIONS:  -  Assess patient's ability to carry out ADLs; assess patient's baseline for ADL function and identify physical deficits which impact ability to perform ADLs (bathing, care of mouth/teeth, toileting, grooming, dressing, etc )  - Assess/evaluate cause of self-care deficits   - Assess range of motion  - Assess patient's mobility; develop plan if impaired  - Assess patient's need for assistive devices and provide as appropriate  - Encourage maximum independence but intervene and supervise when necessary  - Involve family in performance of ADLs  - Assess for home care needs following discharge   - Consider OT consult to assist with ADL evaluation and planning for discharge  - Provide patient education as appropriate  Outcome: Progressing  Goal: Maintains/Returns to pre admission functional level  Description: INTERVENTIONS:  - Perform BMAT or MOVE assessment daily    - Set and communicate daily mobility goal to care team and patient/family/caregiver     - Collaborate with rehabilitation services on mobility goals if consulted  - Out of bed for toileting  - Record patient progress and toleration of activity level   Outcome: Progressing

## 2023-05-23 NOTE — ASSESSMENT & PLAN NOTE
· While patient was here did have chest pain and had an urgent cath secondary to STEMI  1  Significant single vessel CAD  2  Successful BMS deployment in proximal LAD  · Continue aspirin Plavix and statin  · Experiencing atypical chest pain today  Cardiology aware    I am going to get a CT scan of the chest for cancer staging purposes as well as patient not being able to be on DVT prophylaxis secondary to GI bleed

## 2023-05-23 NOTE — ASSESSMENT & PLAN NOTE
Malnutrition Findings:   Adult Malnutrition type: Chronic illness  Adult Degree of Malnutrition: Malnutrition of moderate degree  360 Statement: Related likely to catabolic illness as evidenced by mild-moderate depletion of body fat (Orbitals) and mild-moderate depletion of muscle mass (clavicle) treated with diet (patient denies need for supplements at this time)  BMI Findings: Body mass index is 17 33 kg/m²

## 2023-05-24 ENCOUNTER — TELEPHONE (OUTPATIENT)
Dept: HEMATOLOGY ONCOLOGY | Facility: CLINIC | Age: 78
End: 2023-05-24

## 2023-05-24 PROBLEM — R07.9 CHEST PAIN: Status: ACTIVE | Noted: 2023-05-24

## 2023-05-24 LAB
ANION GAP SERPL CALCULATED.3IONS-SCNC: 6 MMOL/L (ref 4–13)
BUN SERPL-MCNC: 28 MG/DL (ref 5–25)
CALCIUM SERPL-MCNC: 8.9 MG/DL (ref 8.4–10.2)
CHLORIDE SERPL-SCNC: 107 MMOL/L (ref 96–108)
CO2 SERPL-SCNC: 23 MMOL/L (ref 21–32)
CREAT SERPL-MCNC: 0.96 MG/DL (ref 0.6–1.3)
CRP SERPL QL: 5 MG/L
ERYTHROCYTE [DISTWIDTH] IN BLOOD BY AUTOMATED COUNT: 18.5 % (ref 11.6–15.1)
ERYTHROCYTE [SEDIMENTATION RATE] IN BLOOD: 18 MM/HOUR (ref 0–19)
GFR SERPL CREATININE-BSD FRML MDRD: 75 ML/MIN/1.73SQ M
GLUCOSE SERPL-MCNC: 97 MG/DL (ref 65–140)
HCT VFR BLD AUTO: 33.6 % (ref 36.5–49.3)
HCT VFR BLD AUTO: 34.5 % (ref 36.5–49.3)
HGB BLD-MCNC: 10.5 G/DL (ref 12–17)
HGB BLD-MCNC: 10.6 G/DL (ref 12–17)
MCH RBC QN AUTO: 24.5 PG (ref 26.8–34.3)
MCHC RBC AUTO-ENTMCNC: 31.3 G/DL (ref 31.4–37.4)
MCV RBC AUTO: 79 FL (ref 82–98)
METHYLMALONATE SERPL-SCNC: 277 NMOL/L (ref 0–378)
PLATELET # BLD AUTO: 177 THOUSANDS/UL (ref 149–390)
PMV BLD AUTO: 11.5 FL (ref 8.9–12.7)
POTASSIUM SERPL-SCNC: 4.1 MMOL/L (ref 3.5–5.3)
RBC # BLD AUTO: 4.28 MILLION/UL (ref 3.88–5.62)
SODIUM SERPL-SCNC: 136 MMOL/L (ref 135–147)
WBC # BLD AUTO: 5.83 THOUSAND/UL (ref 4.31–10.16)

## 2023-05-24 RX ORDER — METHOCARBAMOL 500 MG/1
500 TABLET, FILM COATED ORAL EVERY 8 HOURS SCHEDULED
Status: DISCONTINUED | OUTPATIENT
Start: 2023-05-24 | End: 2023-05-25 | Stop reason: HOSPADM

## 2023-05-24 RX ORDER — OXYCODONE HYDROCHLORIDE 5 MG/1
5 TABLET ORAL EVERY 6 HOURS PRN
Status: DISCONTINUED | OUTPATIENT
Start: 2023-05-24 | End: 2023-05-25 | Stop reason: HOSPADM

## 2023-05-24 RX ADMIN — ATORVASTATIN CALCIUM 80 MG: 40 TABLET, FILM COATED ORAL at 17:26

## 2023-05-24 RX ADMIN — METHOCARBAMOL 500 MG: 500 TABLET ORAL at 08:58

## 2023-05-24 RX ADMIN — LISINOPRIL 2.5 MG: 2.5 TABLET ORAL at 08:58

## 2023-05-24 RX ADMIN — OXYCODONE HYDROCHLORIDE 5 MG: 5 TABLET ORAL at 21:42

## 2023-05-24 RX ADMIN — METHOCARBAMOL 500 MG: 500 TABLET ORAL at 21:42

## 2023-05-24 RX ADMIN — IRON SUCROSE 300 MG: 20 INJECTION, SOLUTION INTRAVENOUS at 09:00

## 2023-05-24 RX ADMIN — METHOCARBAMOL 500 MG: 500 TABLET ORAL at 15:09

## 2023-05-24 RX ADMIN — ASPIRIN 81 MG: 81 TABLET ORAL at 08:58

## 2023-05-24 RX ADMIN — METOPROLOL SUCCINATE 25 MG: 25 TABLET, EXTENDED RELEASE ORAL at 08:58

## 2023-05-24 RX ADMIN — LIDOCAINE 5% 1 PATCH: 700 PATCH TOPICAL at 08:58

## 2023-05-24 RX ADMIN — CLOPIDOGREL BISULFATE 75 MG: 75 TABLET ORAL at 08:58

## 2023-05-24 NOTE — PLAN OF CARE
Problem: PAIN - ADULT  Goal: Verbalizes/displays adequate comfort level or baseline comfort level  Description: Interventions:  - Encourage patient to monitor pain and request assistance  - Assess pain using appropriate pain scale  - Administer analgesics based on type and severity of pain and evaluate response  - Implement non-pharmacological measures as appropriate and evaluate response  - Consider cultural and social influences on pain and pain management  - Notify physician/advanced practitioner if interventions unsuccessful or patient reports new pain  Outcome: Progressing     Problem: INFECTION - ADULT  Goal: Absence or prevention of progression during hospitalization  Description: INTERVENTIONS:  - Assess and monitor for signs and symptoms of infection  - Monitor lab/diagnostic results  - Monitor all insertion sites, i e  indwelling lines, tubes, and drains  - Monitor endotracheal if appropriate and nasal secretions for changes in amount and color  - [facility] appropriate cooling/warming therapies per order  - Administer medications as ordered  - Instruct and encourage patient and family to use good hand hygiene technique  - Identify and instruct in appropriate isolation precautions for identified infection/condition  Outcome: Progressing  Goal: Absence of fever/infection during neutropenic period  Description: INTERVENTIONS:  - Monitor WBC    Outcome: Progressing     Problem: SAFETY ADULT  Goal: Patient will remain free of falls  Description: INTERVENTIONS:  - Educate patient/family on patient safety including physical limitations  - Instruct patient to call for assistance with activity   - Consult OT/PT to assist with strengthening/mobility   - Keep Call bell within reach  - Keep bed low and locked with side rails adjusted as appropriate  - Keep care items and personal belongings within reach  - Initiate and maintain comfort rounds  - Make Fall Risk Sign visible to staff  - Apply yellow socks and bracelet for high fall risk patients  - Consider moving patient to room near nurses station  Outcome: Progressing  Goal: Maintain or return to baseline ADL function  Description: INTERVENTIONS:  -  Assess patient's ability to carry out ADLs; assess patient's baseline for ADL function and identify physical deficits which impact ability to perform ADLs (bathing, care of mouth/teeth, toileting, grooming, dressing, etc )  - Assess/evaluate cause of self-care deficits   - Assess range of motion  - Assess patient's mobility; develop plan if impaired  - Assess patient's need for assistive devices and provide as appropriate  - Encourage maximum independence but intervene and supervise when necessary  - Involve family in performance of ADLs  - Assess for home care needs following discharge   - Consider OT consult to assist with ADL evaluation and planning for discharge  - Provide patient education as appropriate  Outcome: Progressing  Goal: Maintains/Returns to pre admission functional level  Description: INTERVENTIONS:  - Perform BMAT or MOVE assessment daily    - Set and communicate daily mobility goal to care team and patient/family/caregiver     - Collaborate with rehabilitation services on mobility goals if consulted  - Out of bed for toileting  - Record patient progress and toleration of activity level   Outcome: Progressing     Problem: DISCHARGE PLANNING  Goal: Discharge to home or other facility with appropriate resources  Description: INTERVENTIONS:  - Identify barriers to discharge w/patient and caregiver  - Arrange for needed discharge resources and transportation as appropriate  - Identify discharge learning needs (meds, wound care, etc )  - Arrange for interpretive services to assist at discharge as needed  - Refer to Case Management Department for coordinating discharge planning if the patient needs post-hospital services based on physician/advanced practitioner order or complex needs related to functional status, cognitive ability, or social support system  Outcome: Progressing     Problem: Knowledge Deficit  Goal: Patient/family/caregiver demonstrates understanding of disease process, treatment plan, medications, and discharge instructions  Description: Complete learning assessment and assess knowledge base  Interventions:  - Provide teaching at level of understanding  - Provide teaching via preferred learning methods  Outcome: Progressing     Problem: GASTROINTESTINAL - ADULT  Goal: Maintains or returns to baseline bowel function  Description: INTERVENTIONS:  - Assess bowel function  - Encourage oral fluids to ensure adequate hydration  - Administer IV fluids if ordered to ensure adequate hydration  - Administer ordered medications as needed  - Encourage mobilization and activity  - Consider nutritional services referral to assist patient with adequate nutrition and appropriate food choices  Outcome: Progressing     Problem: Nutrition/Hydration-ADULT  Goal: Nutrient/Hydration intake appropriate for improving, restoring or maintaining nutritional needs  Description: Monitor and assess patient's nutrition/hydration status for malnutrition  Collaborate with interdisciplinary team and initiate plan and interventions as ordered  Monitor patient's weight and dietary intake as ordered or per policy  Utilize nutrition screening tool and intervene as necessary  Determine patient's food preferences and provide high-protein, high-caloric foods as appropriate       INTERVENTIONS:  - Monitor oral intake, urinary output, labs, and treatment plans  - Assess nutrition and hydration status and recommend course of action  - Evaluate amount of meals eaten  - Assist patient with eating if necessary   - Allow adequate time for meals  - Recommend/ encourage appropriate diets, oral nutritional supplements, and vitamin/mineral supplements  - Order, calculate, and assess calorie counts as needed  - Recommend, monitor, and adjust tube feedings and TPN/PPN based on assessed needs  - Assess need for intravenous fluids  - Provide specific nutrition/hydration education as appropriate  - Include patient/family/caregiver in decisions related to nutrition  Outcome: Progressing     Problem: MOBILITY - ADULT  Goal: Maintain or return to baseline ADL function  Description: INTERVENTIONS:  -  Assess patient's ability to carry out ADLs; assess patient's baseline for ADL function and identify physical deficits which impact ability to perform ADLs (bathing, care of mouth/teeth, toileting, grooming, dressing, etc )  - Assess/evaluate cause of self-care deficits   - Assess range of motion  - Assess patient's mobility; develop plan if impaired  - Assess patient's need for assistive devices and provide as appropriate  - Encourage maximum independence but intervene and supervise when necessary  - Involve family in performance of ADLs  - Assess for home care needs following discharge   - Consider OT consult to assist with ADL evaluation and planning for discharge  - Provide patient education as appropriate  Outcome: Progressing  Goal: Maintains/Returns to pre admission functional level  Description: INTERVENTIONS:  - Perform BMAT or MOVE assessment daily    - Set and communicate daily mobility goal to care team and patient/family/caregiver     - Collaborate with rehabilitation services on mobility goals if consulted  - Out of bed for toileting  - Record patient progress and toleration of activity level   Outcome: Progressing

## 2023-05-24 NOTE — TELEPHONE ENCOUNTER
Called daughter, Janelle Moe  I let her know unfortunately, Dr Keyla Gonsalez does not have any available appointments next Wednesday to change patient's appointment to  Erendira reports they will keep the 6/7 appointment  She has no other questions or concerns at this moment

## 2023-05-24 NOTE — PROGRESS NOTES
"Cardiology Progress Note - Shane Cross  69 y o  male MRN: [id number]    Unit/Bed#:  Encounter: [id number]      Assessment/Plan:  1  Anterior STEMI status post cardiac cath with bare-metal stent to proximal LAD, patient will need DAPT with aspirin for 4 to 6 weeks; continue Lipitor, metoprolol  Echocardiogram showed EF at 20%  Patient will need LifeVest at discharge, LifeVest is at the bedside  2   Ischemic cardiomyopathy status post MI with EF at 20%  Plan for LifeVest   Plan for GDMT, aspirin, statins, beta-blockers, ACE inhibitors  3   Acute systolic heart failure, appears euvolemic  Currently off of diuretics  Continue Toprol, lisinopril, aspirin, statins  Daily weights  Salt restriction  Strict I's and O's  Net -3 1 L     4   Anemia, secondary to rectal bleeding  Hemoglobin this morning 10 5  Continue to monitor  Patient states he had more episodes of rectal bleeding just this morning  GI following  5   Rectal mass status post colonoscopy with biopsy  Pathology pending  Management per GI/oncology    6  CAD status post PCI to LAD/PTCA of D1  Continue aspirin, Lipitor, metoprolol, Plavix, lisinopril  7   Hyperlipidemia on Lipitor    8  History of lacunar infarct, continue aspirin and statins      Subjective:   Patient seen and examined  No significant events overnight  Im ok but I have more rectal bleeding this Am  Still has left sided lateral chest pain    Objective:     Vitals: Blood pressure 98/56, pulse 77, temperature 98 1 °F (36 7 °C), temperature source Oral, resp  rate 18, height 5' 10\" (1 778 m), weight 54 8 kg (120 lb 13 oz), SpO2 99 %  , Body mass index is 17 33 kg/m² ,   Orthostatic Blood Pressures    Flowsheet Row Most Recent Value   Blood Pressure 98/56 filed at 05/24/2023 1538   Patient Position - Orthostatic VS Lying filed at 05/24/2023 1538            Intake/Output Summary (Last 24 hours) at 5/24/2023 1543  Last data filed at 5/23/2023 2001  Gross per 24 " hour   Intake --   Output 500 ml   Net -500 ml         Physical Exam:    GEN: Shane Moder   appears ill, alert and oriented x 3, pleasant and cooperative   HEENT: pupils equal, round, and reactive to light; extraocular muscles intact  NECK: supple, no carotid bruits   HEART: regular rhythm, normal S1 and S2, no murmurs, clicks, gallops or rubs   LUNGS: decreased breath sounds bilaterally; no wheezes, rales, or rhonchi   ABDOMEN: normal bowel sounds, soft, no tenderness, no distention  EXTREMITIES: peripheral pulses normal; no clubbing, cyanosis, or edema      Medications:      Current Facility-Administered Medications:   •  acetaminophen (TYLENOL) tablet 650 mg, 650 mg, Oral, Q6H PRN, Rachelle Block DO, 650 mg at 05/23/23 1957  •  aspirin (ECOTRIN LOW STRENGTH) EC tablet 81 mg, 81 mg, Oral, Daily, Rachelle Block DO, 81 mg at 05/24/23 6894  •  atorvastatin (LIPITOR) tablet 80 mg, 80 mg, Oral, Daily With Wendy Castellon DO, 80 mg at 05/23/23 1534  •  clopidogrel (PLAVIX) tablet 75 mg, 75 mg, Oral, Daily, Rachelle Block DO, 75 mg at 05/24/23 3492  •  lidocaine (LIDODERM) 5 % patch 1 patch, 1 patch, Topical, Daily, Devan Becker [place], 1 patch at 05/24/23 9815  •  lisinopril (ZESTRIL) tablet 2 5 mg, 2 5 mg, Oral, Daily, Crystal Wang PA-C, 2 5 mg at 05/24/23 9064  •  methocarbamol (ROBAXIN) tablet 500 mg, 500 mg, Oral, Q8H [address], Amilcar Munguia MD, 500 mg at 05/24/23 1509  •  metoprolol succinate (TOPROL-XL) 24 hr tablet 25 mg, 25 mg, Oral, Daily, Crystal Wang PA-C, 25 mg at 05/24/23 0720  •  nitroglycerin (NITROSTAT) SL tablet 0 4 mg, 0 4 mg, Sublingual, Q5 Min PRN, Rachelle Block DO, 0 4 mg at 05/19/23 2105  •  ondansetron (ZOFRAN) injection 4 mg, 4 mg, Intravenous, Q6H PRN, Rachelle Block DO  •  oxyCODONE (ROXICODONE) IR tablet 5 mg, 5 mg, Oral, Q6H PRN, Amilcar Munguia MD     Labs & Results:    Results from last 7 days   Lab Units 05/18/23  2245 05/18/23  1744 05/18/23  1512   HS TNI 0HR ng/L  -- --  3   HS TNI 2HR ng/L  --  4  --    HS TNI 4HR ng/L 4  --   --    HSTNI D4 ng/L 1  --   --      Results from last 7 days   Lab Units 05/24/23  0528 05/23/23  0539 05/22/23  0427   HEMATOCRIT % 33 6* 32 1* 34 0*   HEMOGLOBIN g/dL 10 5* 10 0* 10 8*   PLATELETS Thousands/uL 177 163 159   WBC Thousand/uL 5 83 5 82 5 67     Results from last 7 days   Lab Units 05/19/23  2349   HDL mg/dL 49   TRIGLYCERIDES mg/dL 61     Results from last 7 days   Lab Units 05/24/23  0528 05/23/23  0539 05/22/23  0427 05/19/23  0632 05/18/23  1512   ALK PHOS U/L  --  64  --   --  65   ALT U/L  --  18  --   --  15   AST U/L  --  41*  --   --  22   BUN mg/dL 28* 24 14   < > 30*   CALCIUM mg/dL 8 9 8 7 8 7   < > 9 0   CHLORIDE mmol/L 107 107 107   < > 109*   CO2 mmol/L 23 24 24   < > 23   CREATININE mg/dL 0 96 0 98 0 86   < > 1 21   POTASSIUM mmol/L 4 1 4 3 4 3   < > 4 3    < > = values in this interval not displayed       Results from last 7 days   Lab Units 05/18/23  1512   INR  1 13   PTT seconds 28     Results from last 7 days   Lab Units 05/21/23  0511 05/20/23  0641 05/19/23  2349   MAGNESIUM mg/dL 2 3 2 7 1 6*

## 2023-05-24 NOTE — PROGRESS NOTES
" Spiritual Care Progress Note    2023  Patient: Shahram Luque  : 1945  Admission Date & Time: 2023 1451  MRN: [id number] CSN: [id number]     visited patient per RN referral  Alyssa Hines introduced self & role, supported patient in identifying emotions, facilitated catharsis through storytelling, explored patient's priorities and goals for care, and provided prayer  Patient shared he does not want to pursue chemotherapy or radiation and described feeling like he \"has no quality of life\" due to rectal bleeding and pain  Patient stated that his wife passed away from cancer after several chemo and radiation interventions, and patient does not want to repeat this process due to the physical and emotional effects on pt and family  Patient expressed interest in focusing on pain management and emphasized his openness towards death - \"If I die tomorrow in my sleep, that would be ok with me  \" Alyssa Hines communicated this information to pt's nurse and contacted palliative provider regarding additional support  Pt thanked  for visiting, noted the importance of verbally processing his experience  Spiritual care remains available to support               Chaplaincy Interventions Utilized:   Empowerment: Encouraged assertiveness, Encouraged focus on present, Normalized experience of patient/family, and Provided chaplaincy education    Exploration: Explored emotional needs & resources    Collaboration: Advocated for patient/family and Consulted with interdisciplinary team    Relationship Building: Cultivated a relationship of care and support and Listened empathically    Ritual: Provided prayer      Chaplaincy Outcomes Achieved:  Catharsis, Developed chaplaincy care plan, Identified priorities, Improved communication, Progressed toward acceptance, and Verbally processed emotions      Spiritual Coping Strategies Utilized:   Spiritual practices and Spiritual comfort     23 1600   Clinical " Encounter Type   Visited With Patient; Health care provider   Routine Visit Introduction   Referral From Nurse   Referral To Physician   Scientologist Encounters   Scientologist Needs Prayer

## 2023-05-24 NOTE — TELEPHONE ENCOUNTER
I did not cancel the one they have scheduled but she has a graduation conflict on that day  Ladarius Paniagua is currently inpatient and not sure if he should be seen sooner  Please contact Erendira if this needs to be rescheduled, otherwise they will keep that appt

## 2023-05-24 NOTE — ASSESSMENT & PLAN NOTE
· Acute on chronic anemia secondary to bleeding rectal mass  · Has not required any transfusion  · Venofer has been ordered by GI  · Check a CBC tomorrow    Results from last 7 days   Lab Units 05/24/23  0528 05/23/23  0539 05/22/23  0427 05/21/23  0511   HEMOGLOBIN g/dL 10 5* 10 0* 10 8* 10 4*

## 2023-05-24 NOTE — PROGRESS NOTES
[facility]  Progress Note  Name: Crissy Bennett I  MRN: [id number]  Unit/Bed#:  I Date of Admission: 5/18/2023   Date of Service: 5/24/2023 I Hospital Day: 5    Assessment/Plan   Chest pain  Assessment & Plan  · Patient with atypical chest pain  · CT scan reviewed yesterday  · More positional pain  · Check inflammatory markers    Ischemic cardiomyopathy  Assessment & Plan  · LifeVest at bedside  · Continue beta-blocker    Dysuria  Assessment & Plan  · UA is unremarkable    STEMI involving left anterior descending coronary artery [facility])  Assessment & Plan  · Acute STEMI underwent urgent catheterization  · Bare-metal stent was placed  · Continue aspirin and clopidogrel  Lab Results   Component Value Date    HSTNI 84 (H) 05/19/2023    HSTNI0 3 05/18/2023    HSTNI2 4 05/18/2023    HSTNI4 4 05/18/2023       Moderate protein-calorie malnutrition (HCC)  Assessment & Plan  Malnutrition Findings:   Adult Malnutrition type: Chronic illness  Adult Degree of Malnutrition: Malnutrition of moderate degree  360 Statement: Related likely to catabolic illness as evidenced by mild-moderate depletion of body fat (Orbitals) and mild-moderate depletion of muscle mass (clavicle) treated with diet (patient denies need for supplements at this time)  BMI Findings: Body mass index is 17 33 kg/m²       Acute on chronic anemia  Assessment & Plan  · Acute on chronic anemia secondary to bleeding rectal mass  · Has not required any transfusion  · Venofer has been ordered by GI  · Check a CBC tomorrow    Results from last 7 days   Lab Units 05/24/23  0528 05/23/23  0539 05/22/23  0427 05/21/23  0511   HEMOGLOBIN g/dL 10 5* 10 0* 10 8* 10 4*       Rectal mass  Assessment & Plan  · Rectal mass underwent biopsy concerning for malignancy  · Oncology consulted for staging and establishing care  · Needs outpatient colorectal  ·     S/P primary angioplasty with coronary stent  Assessment & Plan  · While patient was here did have chest pain and had an urgent cath secondary to STEMI  1  Significant single vessel CAD  2  Successful BMS deployment in proximal LAD  · Continue aspirin Plavix and statin  · CT scan noted  Some nodules noted  No PE  Patient does complain of shortness of breath  Check inflammatory markers  No evidence of pleural effusion or volume overload  Patient appears euvolemic  · Patient with chest pain yesterday which is atypical   Cardiology note appreciated    * Rectal bleeding  Assessment & Plan  Presented with rectal bleeding for the past 1 week bright red blood  Likely secondary to new rectal cancer  ·   Patient is status post flexible sigmoidoscopy  Biopsy taken  Oncology consultation requested  · Patient on dual antiplatelet therapy given STEMI  · t had a decent amount of GI bleed today  There were drops on the floor and explosive bowel movement in the toilet  It was bright red for the most part  There were some small clots  I did give GI a heads up to see the patient again today  · Hemoglobin has remained stable               VTE Pharmacologic Prophylaxis:   Pharmacologic: Pharmacologic VTE Prophylaxis contraindicated due to GI bleed  Mechanical VTE Prophylaxis in Place: Yes    Patient Centered Rounds: I have performed bedside rounds with nursing staff today  Discussions with Specialists or Other Care Team Provider: GI    Education and Discussions with Family / Patient: We will discuss with the daughter    Time Spent for Care: 30 minutes  More than 50% of total time spent on counseling and coordination of care as described above  Current Length of Stay: 5 day(s)    Current Patient Status: Inpatient   Certification Statement: The patient will continue to require additional inpatient hospital stay due to Having a GI bleed    Discharge Plan: Anticipate discharge hopefully in the next 24 to 48 hours    Code Status: Level 1 - Full Code      Subjective:   Patient seen and examined  States that he has been having a lot of blood in his stool  Also states he has been having chest pain but when he lays on his left side  He also gets shortness of breath with exertion  Objective:     Vitals:   Temp (24hrs), Av 1 °F (36 7 °C), Min:97 7 °F (36 5 °C), Max:98 4 °F (36 9 °C)    Temp:  [97 7 °F (36 5 °C)-98 4 °F (36 9 °C)] 97 7 °F (36 5 °C)  HR:  [85-96] 96  Resp:  [16-20] 20  BP: (109-125)/(61-64) 125/64  SpO2:  [98 %-100 %] 100 %  Body mass index is 17 33 kg/m²  Input and Output Summary (last 24 hours): Intake/Output Summary (Last 24 hours) at 2023 0954  Last data filed at 2023  Gross per 24 hour   Intake --   Output 500 ml   Net -500 ml       Physical Exam:     Physical Exam  (   General Appearance:    Alert, cooperative, no distress, appears stated age                               Lungs:     Clear to auscultation bilaterally, respirations unlabored       Heart:    Regular rate and rhythm, S1 and S2 normal, no murmur, rub    or gallop   Abdomen:     Soft, non-tender, bowel sounds active all four quadrants,     no masses, no organomegaly           Extremities:   Extremities normal, atraumatic, no cyanosis or edema       Additional Data:     Labs:    Results from last 7 days   Lab Units 23  0427 23  0511   EOS PCT %  --   --  2   HEMATOCRIT % 33 6*   < > 33 4*   HEMOGLOBIN g/dL 10 5*   < > 10 4*   LYMPHS PCT %  --   --  18   MONOS PCT %  --   --  13*   NEUTROS PCT %  --   --  67   PLATELETS Thousands/uL 177   < > 148*   WBC Thousand/uL 5 83   < > 5 80    < > = values in this interval not displayed       Results from last 7 days   Lab Units 23  0528 23  0539   ANION GAP mmol/L 6 4   ALBUMIN g/dL  --  3 4*   ALK PHOS U/L  --  64   ALT U/L  --  18   AST U/L  --  41*   BUN mg/dL 28* 24   CALCIUM mg/dL 8 9 8 7   CHLORIDE mmol/L 107 107   CO2 mmol/L 23 24   CREATININE mg/dL 0 96 0 98   GLUCOSE RANDOM mg/dL 97 94   POTASSIUM mmol/L 4 1 4 3 SODIUM mmol/L 136 135   TOTAL BILIRUBIN mg/dL  --  0 45     Results from last 7 days   Lab Units 05/18/23  1512   INR  1 13         Results from last 7 days   Lab Units 05/19/23  2349   HEMOGLOBIN A1C % 5 5               * I Have Reviewed All Lab Data Listed Above  * Additional Pertinent Lab Tests Reviewed: [facility]        Recent Cultures (last 7 days):           Last 24 Hours Medication List:   Current Facility-Administered Medications   Medication Dose Route Frequency Provider Last Rate   • acetaminophen  650 mg Oral Q6H PRN Kinsey Verna, DO     • aspirin  81 mg Oral Daily Kinsey Verna, DO     • atorvastatin  80 mg Oral Daily With [employer], DO     • clopidogrel  75 mg Oral Daily Kinsey Verna, DO     • iron sucrose  300 mg Intravenous Daily Kinsey Verna,  mg (05/24/23 0900)   • lidocaine  1 patch Topical Daily Jasmine Castillo PA-C     • lisinopril  2 5 mg Oral Daily Graciela Colin PA-C     • methocarbamol  500 mg Oral Q8H [address] Amilcar Gonzalez MD     • metoprolol succinate  25 mg Oral Daily Graciela Colin PA-C     • nitroglycerin  0 4 mg Sublingual Q5 Min PRN Kinsey Verna, DO     • ondansetron  4 mg Intravenous Q6H PRN Kinsey Verna DO     • oxyCODONE  5 mg Oral Q6H PRN Amilcar Gonzalez MD          Today, Patient Was Seen By: Lindsey Lopez MD    ** Please Note: Dictation voice to text software may have been used in the creation of this document   **

## 2023-05-24 NOTE — TELEPHONE ENCOUNTER
"Soft Intake Form   Patient Details   Ashley Class     [birth year]     Reason For Appointment   Who is Calling? Yumiko Hercules   If not patient, Name? NA   DID YOU CONFIRM INSURANCE WITH PATIENT? E verified, routed to finance   Who is the Referring Doctor? Janell Willard PA-C   What is the diagnosis? 1  Rectal Mass 2  Rectal Bleeding 3  Anemia 4  Acute anterior STEMI    Has this diagnosis been confirmed by a biopsy/surgery? If yes, what is the date it was done? Rectal Mass bx taken on 5/20   Biopsy done at [facility]? If not, where was it done? Yes   Was imaging done, and was it done at [address] [place] [address]? If not, where was it done? Yes-[facility]   Have you been seen by another Oncologist?  If so, who and where (name of facility, city and state) No   For 2nd Opinions Only: Are you currently undergoing treatment, or are you scheduled to start treatment? If yes, name of facility, city and state  NA   For \"History Of\" only: Have you completed treatment? NA   Have you had Genetic Testing done in the past?  If so, advise to bring test results to their visit No   Record Gathering Information   Did you advise to have records faxed to [phone]? NA   Did you advise to have disks sent to the proper address with imaging? (\"History of\" Patients)  5 years of imaging for breast patients-Mammos, US etc NA   Scheduling Information   Did you send new patient paperwork? Email or mail? NA   What is the best call back number? (If the [facility] is calling, please use their number) NA   Miscellaneous Information      The patient is scheduled for a HFU appointment with Dr Mandi Patel on 6/7 at [phone] in the [facility] office       "

## 2023-05-24 NOTE — PROGRESS NOTES
Progress Note  Shane Cross  69 y o  male MRN: [id number]  Unit/Bed#:  Encounter: [id number]    Subjective:  Patient reports he had a more significant episode of rectal bleeding this morning  HGB stable at 10 5 this am     Objective:     Vitals:   Vitals:    05/24/23 0736   BP: 125/64   Pulse: 96   Resp: 20   Temp: 97 7 °F (36 5 °C)   SpO2: 100%   ,Body mass index is 17 33 kg/m²  Intake/Output Summary (Last 24 hours) at 5/24/2023 1224  Last data filed at 5/23/2023 2001  Gross per 24 hour   Intake --   Output 500 ml   Net -500 ml       Physical Exam:     General Appearance: Alert, appears stated age and cooperative  Lungs: Clear to auscultation bilaterally, no rales or rhonchi, no labored breathing/accessory muscle use  Heart: Regular rate and rhythm, S1, S2 normal, no murmur, click, rub or gallop  Abdomen: Soft, non-tender, non-distended; bowel sounds normal; no masses or no organomegaly  Extremities: No cyanosis, edema    Invasive Devices     Peripheral Intravenous Line  Duration           Peripheral IV 05/23/23 Left;Ventral (anterior) Forearm 1 day                Lab Results:  No results displayed because visit has over 200 results  Imaging Studies:   I have personally reviewed pertinent imaging studies  CTA chest pe study    Result Date: 5/23/2023  Narrative: CTA - CHEST WITH IV CONTRAST - PULMONARY ANGIOGRAM INDICATION:   pe protocol as well as r/o mets  COMPARISON: CTA chest PE study 11/17/2019  Lung base imaging included with CT abdomen/pelvis 5/18/2023 TECHNIQUE: CTA examination of the chest was performed using angiographic technique according to a protocol specifically tailored to evaluate for pulmonary embolism  Multiplanar 2D reformatted images were created from the source data  In addition, coronal 3D MIP postprocessing was performed on the acquisition scanner  Radiation dose length product (DLP) for this visit:  233 mGy-cm     This examination, like all CT scans performed in the [facility], was performed utilizing techniques to minimize radiation dose exposure, including the use of iterative reconstruction and automated exposure control  IV Contrast:  85 mL of iohexol (OMNIPAQUE) FINDINGS: PULMONARY ARTERIAL TREE:  No pulmonary embolus is seen  LUNGS:  3 mm left lower lobe subpleural nodule #3/83, also seen on the recent CT abdomen pelvis study, is new since the 2019 study  3 mm right upper lobe nodule #6/107  Scattered right upper lobe granuloma  Mild biapical pleural/parenchymal scarring  No endotracheal or endobronchial lesion  PLEURA:  Unremarkable  HEART/GREAT VESSELS: Coronary artery calcifications  No significant pericardial effusion  No thoracic aortic aneurysm  MEDIASTINUM AND NANCY: Small hiatal hernia  CHEST WALL AND LOWER NECK:   Unremarkable  VISUALIZED STRUCTURES IN THE UPPER ABDOMEN: See the recent CT abdomen pelvis report  OSSEOUS STRUCTURES:  No acute fracture or destructive osseous lesion  Impression: No acute findings; specifically, no acute pulmonary arterial embolism  Indeterminate 3 mm left lower lobe and right upper lobe nodules new since the 2019 study  Workstation performed: [id number]     Echo complete w/ contrast if indicated    Result Date: 5/21/2023  Narrative: •  Left Ventricle: Left ventricular cavity size is mildly dilated  Wall thickness is normal  The left ventricular ejection fraction is 20%  Systolic function is severely reduced  •  The following segments are akinetic: mid anterior, mid anteroseptal, apical anterior, apical septal, apical inferior and apex  •  The following segments are hypokinetic: basal anteroseptal, basal inferoseptal, mid inferoseptal, mid anterolateral and apical lateral  •  All other segments are normal  •  Aortic Valve: There is mild regurgitation  •  Mitral Valve: There is mild regurgitation       Flexible Sigmoidoscopy    Result Date: 5/20/2023  Narrative: Table formatting from the original result was not included  [facility] [address] [place] [address] [phone] DATE OF SERVICE: 5/20/23 PHYSICIAN(S): Attending: Antonio Lott DO Fellow: No Staff Documented INDICATION: Rectal bleeding, Rectal mass POST-OP DIAGNOSIS: See the impression below  HISTORY: Prior colonoscopy: No prior colonoscopy  BOWEL PREPARATION: Miralax/Dulcolax PREPROCEDURE: Informed consent was obtained for the procedure, including sedation  Risks including but not limited to bleeding, infection, perforation, adverse drug reaction and aspiration were explained in detail  Also explained about less than 100% sensitivity with the exam and other alternatives  The patient was placed in the left lateral decubitus position  Procedure: Colonoscopy DETAILS OF PROCEDURE: Patient was taken to the procedure room where a time out was performed to confirm correct patient and correct procedure  The patient underwent monitored anesthesia care, which was administered by an anesthesia professional  The patient's blood pressure, heart rate, level of consciousness, oxygen and respirations were monitored throughout the procedure  A digital rectal exam was performed  The scope was introduced through the anus and advanced to the descending colon  Retroflexion was performed in the rectum  The patient's estimated blood loss was minimal (<5 mL)  The procedure was not difficult  The patient tolerated the procedure well  There were no apparent complications  ANESTHESIA INFORMATION: ASA: IV Anesthesia Type: IV Sedation with Anesthesia MEDICATIONS: No administrations occurring from 1522 to 1537 on 05/20/23 FINDINGS: Mass (traversable) measuring 6 cm x 4 cm in the distal rectum observed during digital rectal exam, covering two thirds of the circumference; bleeding occurred before and after intervention; performed cold forceps biopsy  The distal tip of the tumor is 4 cm from the anal verge   Multiple small moderate diverticula in the sigmoid colon; no bleeding was identified EVENTS: Procedure Events Event Event Time SPECIMENS: ID Type Source Tests Collected by Time Destination 1 :  Tissue Rectum TISSUE EXAM Rossana Elam DO 5/20/2023  3:30 PM  EQUIPMENT: Colonoscope -[id number]     Impression: 1  Rectal mass consistent with adenocarcinoma, two thirds circumference, 4 cm from the anal verge, extending 5 cm proximal RECOMMENDATION: 1  Follow-up with the biopsies 2  Oncology consultation  Rossana Elam DO [place], [place]    Cardiac catheterization    Result Date: 5/20/2023  Narrative: •  Prox LAD lesion is 90% stenosed  •  1st Mrg lesion is 50% stenosed  Significant single vessel CAD  Successful BMS deployment in proximal LAD  CT abdomen pelvis with contrast    Result Date: 5/18/2023  Narrative: CT ABDOMEN AND PELVIS WITH IV CONTRAST INDICATION:   Left lower quadrant pain, rectal bleeding  COMPARISON: CT abdomen pelvis 5/1/2023 CT chest 11/17/2019 TECHNIQUE:  CT examination of the abdomen and pelvis was performed  Multiplanar 2D reformatted images were created from the source data  This examination, like all CT scans performed in the [facility], was performed utilizing techniques to minimize radiation dose exposure, including the use of iterative reconstruction and automated exposure control  Radiation dose length product (DLP) for this visit:  684 mGy-cm IV Contrast:  100 mL of iohexol (OMNIPAQUE) Enteric Contrast:  Enteric contrast was not administered  FINDINGS: ABDOMEN LOWER CHEST: A 2 mm pulmonary nodule in the left lower lobe (series 2 image 12), grossly stable in size since 5/1/2023 but new compared to 11/17/2019  LIVER/BILIARY TREE: 0 3 cm low-attenuation lesion is seen in the right hepatic dome (series 2 image 24) which can represent either artifact or a focal hepatic lesion  It is not identified on prior CT examination dated 5/1/2023  Hepatic contours are normal   No biliary dilatation   GALLBLADDER:  There are gallstone(s) within the gallbladder, without pericholecystic inflammatory changes  SPLEEN:  Unremarkable  PANCREAS: No main pancreatic ductal dilation  A 1 0 cm cystic lesion in the uncinate process, 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  ADRENAL GLANDS:  Unremarkable  KIDNEYS/URETERS:  No hydronephrosis or urinary tract calculus  Bilateral renal simple cysts  One or more sharply circumscribed subcentimeter renal hypodensities are present, too small to accurately characterize, and statistically most likely benign findings  According to recent literature (Radiology 2019) no further workup of these findings is recommended  STOMACH AND BOWEL: Moderate stool burden in the colon  There is asymmetric wall thickening involving the anterior, right lateral and posterior aspect of the rectum, highly concerning for rectal neoplasm (series 2 image 162)  It measures approximately 5 5  cm in craniocaudal dimension  There are 2 suspicious mesorectal lymph nodes measuring up to 0 7 cm on series 2 image 163 and a high rectal lymph node measuring up to 0 5 cm on series 2 image 151)  APPENDIX:  A normal appendix was visualized  ABDOMINOPELVIC CAVITY:  No ascites  Stable 3 2 cm fluid collection at the left internal inguinal ring, likely representing seroma with history of left inguinal hernia repair  No pneumoperitoneum  No lymphadenopathy  VESSELS:  Unremarkable for patient's age  PELVIS REPRODUCTIVE ORGANS:  Unremarkable for patient's age  URINARY BLADDER: Underdistended, limiting evaluation  ABDOMINAL WALL/INGUINAL REGIONS:  Unremarkable  OSSEOUS STRUCTURES:  No acute fracture or destructive osseous lesion  Impression: Imaging findings highly concerning for a rectal neoplasm with suspicious 2 mesorectal lymph nodes  Colonoscopy and rectal MRI examination are recommended for further evaluation  Moderate colonic stool burden without abnormal dilation, likely representing constipation   A 2 mm pulmonary nodule in the left lower lobe, new since 11/17/2019  CT chest without contrast in 12 months is recommended to assess for stability according to Fleischner criteria  However, if there is established rectal malignancy, earlier CT chest follow-up should be obtained in 3 months  A possible 0 3 cm right hepatic dome lesion versus artifact  If this is a true lesion, this is statistically most likely a benign lesion such as cyst or hemangioma  However if the diagnosis of rectal malignancy is established, short-term follow-up with postcontrast CT or MRI abdomen is recommended to exclude possibility of a developing metastatic lesion  A 1 0 cm cystic lesion in the uncinate process, 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  MRI abdomen MRCP with and without contrast is recommended in 1 year to assess for stability  I personally discussed this study with Naresh Hudson on 5/18/2023 4:54 PM  Workstation performed: [id number]     CT abdomen pelvis with contrast    Result Date: 5/1/2023  Narrative: CT ABDOMEN AND PELVIS WITH IV CONTRAST INDICATION:   Urinary retention New urinary incontinence  COMPARISON:  None  TECHNIQUE:  CT examination of the abdomen and pelvis was performed  Multiplanar 2D reformatted images were created from the source data  Radiation dose length product (DLP) for this visit:  656 mGy-cm   This examination, like all CT scans performed in the [facility], was performed utilizing techniques to minimize radiation dose exposure, including the use of iterative reconstruction and automated exposure control  IV Contrast:  100 mL of iohexol (OMNIPAQUE) Enteric Contrast:  Enteric contrast was not administered  FINDINGS: ABDOMEN LOWER CHEST: Small hiatal hernia  LIVER/BILIARY TREE:  Unremarkable  GALLBLADDER: There are gallstone(s) within the gallbladder, without pericholecystic inflammatory changes  SPLEEN: The spleen is enlarged, measuring 13 8 cm  PANCREAS:  Unremarkable  ADRENAL GLANDS:  Unremarkable   KIDNEYS/URETERS: One or more simple renal cyst(s) is noted  Otherwise unremarkable kidneys  No hydronephrosis  STOMACH AND BOWEL: There is colonic diverticulosis without evidence of acute diverticulitis  APPENDIX:  No findings to suggest appendicitis  ABDOMINOPELVIC CAVITY:  No ascites  No pneumoperitoneum  No lymphadenopathy  VESSELS:  Unremarkable for patient's age  PELVIS REPRODUCTIVE ORGANS: The prostate is enlarged  URINARY BLADDER: Left posterior lateral thickening of the bladder wall, correlate with urinalysis and outpatient nonemergent cystoscopy best seen on series 2 image 158  ABDOMINAL WALL/INGUINAL REGIONS: 3 3 x 1 9 cm fluid collection at the origin of the left inguinal canal could be postsurgical but needs to be correlated clinically  There is increased soft tissue along the left inguinal canal extending to the scrotum, correlate with possible hernia surgical history  OSSEOUS STRUCTURES:  No acute fracture or destructive osseous lesion  Impression: Cholelithiasis  Splenomegaly  Bilateral renal cysts  Left posterior lateral irregular thickening of the bladder wall, correlate with urinalysis and outpatient nonemergent cystoscopy to rule out transitional cell carcinoma  3 3 x 1 9 cm fluid collection at the origin of the left inguinal canal could be postsurgical but needs to be correlated clinically  There is increased soft tissue along the left inguinal canal extending to the scrotum, correlate with possible hernia surgical history  The study was marked in EPIC for significant notification  Workstation performed: [id number]         Assessment & Plan    Rectal bleeding  Rectal mass  Anemia  STEMI s/p stent on Asa/Plavix   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%    - Patient underwent flexible sigmoidoscopy 5/20 with Dr Dale Aragon which showed a 6x4cm mass in the rectum covering 2/3 the circumference and diverticulosis  - He also had a STEMI this admission and is s/p stenting on Asa/Plavix and has a severe cardiomyopathy with an EF of 20%    - He had a larger amount of BRBPR this am and GI was asked to reevaluate the patient   - HGB this am is stable at 10 5   - Monitor H&H and will recheck HGB this afternoon  - Venofer per SLIM  - Monitor stool output  - Will proceed conservatively and continue to monitor his rectal bleeding at this time  - If continued significant bleeding/significant drop in hemoglobin, we could consider an flexible sigmoidoscopy without sedation for injection of epinephrine, etc as a temporary measure  The patient will be seen by Dr Bharathi Claudio PA-C  5/24/2023,12:24 PM

## 2023-05-24 NOTE — ASSESSMENT & PLAN NOTE
· Acute STEMI underwent urgent catheterization  · Bare-metal stent was placed  · Continue aspirin and clopidogrel      Lab Results   Component Value Date    HSTNI 84 (H) 05/19/2023    HSTNI0 3 05/18/2023    HSTNI2 4 05/18/2023    HSTNI4 4 05/18/2023

## 2023-05-24 NOTE — ASSESSMENT & PLAN NOTE
· Rectal mass underwent biopsy concerning for malignancy  · Oncology consulted for staging and establishing care  · Needs outpatient colorectal  ·

## 2023-05-24 NOTE — ASSESSMENT & PLAN NOTE
· Patient with atypical chest pain  · CT scan reviewed yesterday  · More positional pain  · Check inflammatory markers

## 2023-05-24 NOTE — ASSESSMENT & PLAN NOTE
· While patient was here did have chest pain and had an urgent cath secondary to STEMI  1  Significant single vessel CAD  2  Successful BMS deployment in proximal LAD  · Continue aspirin Plavix and statin  · CT scan noted  Some nodules noted  No PE  Patient does complain of shortness of breath  Check inflammatory markers  No evidence of pleural effusion or volume overload    Patient appears euvolemic  · Patient with chest pain yesterday which is atypical   Cardiology note appreciated

## 2023-05-25 VITALS
SYSTOLIC BLOOD PRESSURE: 132 MMHG | DIASTOLIC BLOOD PRESSURE: 74 MMHG | TEMPERATURE: 98 F | HEIGHT: 70 IN | BODY MASS INDEX: 17.3 KG/M2 | HEART RATE: 84 BPM | RESPIRATION RATE: 16 BRPM | OXYGEN SATURATION: 98 % | WEIGHT: 120.81 LBS

## 2023-05-25 PROBLEM — R30.0 DYSURIA: Status: RESOLVED | Noted: 2023-05-22 | Resolved: 2023-05-25

## 2023-05-25 PROBLEM — R07.9 CHEST PAIN: Status: RESOLVED | Noted: 2023-05-24 | Resolved: 2023-05-25

## 2023-05-25 LAB
ANION GAP SERPL CALCULATED.3IONS-SCNC: 3 MMOL/L (ref 4–13)
BUN SERPL-MCNC: 21 MG/DL (ref 5–25)
CALCIUM SERPL-MCNC: 8.5 MG/DL (ref 8.4–10.2)
CHLORIDE SERPL-SCNC: 106 MMOL/L (ref 96–108)
CO2 SERPL-SCNC: 26 MMOL/L (ref 21–32)
CREAT SERPL-MCNC: 0.92 MG/DL (ref 0.6–1.3)
ERYTHROCYTE [DISTWIDTH] IN BLOOD BY AUTOMATED COUNT: 19 % (ref 11.6–15.1)
GFR SERPL CREATININE-BSD FRML MDRD: 79 ML/MIN/1.73SQ M
GLUCOSE SERPL-MCNC: 96 MG/DL (ref 65–140)
HCT VFR BLD AUTO: 33.2 % (ref 36.5–49.3)
HGB BLD-MCNC: 10.2 G/DL (ref 12–17)
MCH RBC QN AUTO: 24.3 PG (ref 26.8–34.3)
MCHC RBC AUTO-ENTMCNC: 30.7 G/DL (ref 31.4–37.4)
MCV RBC AUTO: 79 FL (ref 82–98)
PLATELET # BLD AUTO: 185 THOUSANDS/UL (ref 149–390)
PMV BLD AUTO: 10.4 FL (ref 8.9–12.7)
POTASSIUM SERPL-SCNC: 4.5 MMOL/L (ref 3.5–5.3)
RBC # BLD AUTO: 4.2 MILLION/UL (ref 3.88–5.62)
SODIUM SERPL-SCNC: 135 MMOL/L (ref 135–147)
WBC # BLD AUTO: 5.93 THOUSAND/UL (ref 4.31–10.16)

## 2023-05-25 RX ORDER — LIDOCAINE 50 MG/G
1 PATCH TOPICAL DAILY
Qty: 15 PATCH | Refills: 0 | Status: SHIPPED | OUTPATIENT
Start: 2023-05-25 | End: 2023-05-31

## 2023-05-25 RX ORDER — CLOPIDOGREL BISULFATE 75 MG/1
75 TABLET ORAL DAILY
Qty: 30 TABLET | Refills: 0 | Status: CANCELLED | OUTPATIENT
Start: 2023-05-25

## 2023-05-25 RX ORDER — LISINOPRIL 2.5 MG/1
2.5 TABLET ORAL DAILY
Qty: 30 TABLET | Refills: 0 | Status: SHIPPED | OUTPATIENT
Start: 2023-05-25

## 2023-05-25 RX ORDER — CLOPIDOGREL BISULFATE 75 MG/1
75 TABLET ORAL DAILY
Qty: 30 TABLET | Refills: 0 | Status: SHIPPED | OUTPATIENT
Start: 2023-05-25

## 2023-05-25 RX ORDER — OXYCODONE HYDROCHLORIDE 5 MG/1
5 TABLET ORAL EVERY 6 HOURS PRN
Qty: 10 TABLET | Refills: 0 | Status: SHIPPED | OUTPATIENT
Start: 2023-05-25 | End: 2023-05-31

## 2023-05-25 RX ORDER — METOPROLOL SUCCINATE 25 MG/1
25 TABLET, EXTENDED RELEASE ORAL DAILY
Qty: 30 TABLET | Refills: 0 | Status: SHIPPED | OUTPATIENT
Start: 2023-05-25

## 2023-05-25 RX ADMIN — METHOCARBAMOL 500 MG: 500 TABLET ORAL at 05:47

## 2023-05-25 RX ADMIN — METOPROLOL SUCCINATE 25 MG: 25 TABLET, EXTENDED RELEASE ORAL at 10:03

## 2023-05-25 RX ADMIN — ASPIRIN 81 MG: 81 TABLET ORAL at 10:03

## 2023-05-25 RX ADMIN — CLOPIDOGREL BISULFATE 75 MG: 75 TABLET ORAL at 10:03

## 2023-05-25 RX ADMIN — LISINOPRIL 2.5 MG: 2.5 TABLET ORAL at 10:03

## 2023-05-25 RX ADMIN — LIDOCAINE 5% 1 PATCH: 700 PATCH TOPICAL at 10:03

## 2023-05-25 NOTE — DISCHARGE SUMMARY
[facility]  Discharge- [facility]  [birth year], 69 y o  male MRN: [id number]  Unit/Bed#:  Encounter: [id number]  Primary Care Provider: Tsering Cadet DO   Date and time admitted to hospital: 5/18/2023  2:51 PM    Ischemic cardiomyopathy  Assessment & Plan  · LifeVest at bedside  · Continue beta-blocker    STEMI involving left anterior descending coronary artery [facility])  Assessment & Plan  · Acute STEMI underwent urgent catheterization  · Bare-metal stent was placed  · Continue aspirin and clopidogrel  Lab Results   Component Value Date    HSTNI 84 (H) 05/19/2023    HSTNI0 3 05/18/2023    HSTNI2 4 05/18/2023    HSTNI4 4 05/18/2023       Moderate protein-calorie malnutrition (HCC)  Assessment & Plan  Malnutrition Findings:   Adult Malnutrition type: Chronic illness  Adult Degree of Malnutrition: Malnutrition of moderate degree  360 Statement: Related likely to catabolic illness as evidenced by mild-moderate depletion of body fat (Orbitals) and mild-moderate depletion of muscle mass (clavicle) treated with diet (patient denies need for supplements at this time)  BMI Findings: Body mass index is 17 33 kg/m²  Recommend supplement    Acute on chronic anemia  Assessment & Plan  · Acute on chronic anemia secondary to bleeding rectal mass  · Has not required any transfusion  · Hemoglobin is stable    Results from last 7 days   Lab Units 05/25/23  0422 05/24/23  1150 05/24/23  0528 05/23/23  0539   HEMOGLOBIN g/dL 10 2* 10 6* 10 5* 10 0*       Rectal mass  Assessment & Plan  · Rectal mass underwent biopsy concerning for malignancy  · Oncology consulted for staging and establishing care  · Needs outpatient colorectal  · Needs follow-up with Dr Adonay Rangel as an outpatient    S/P primary angioplasty with coronary stent  Assessment & Plan  · While patient was here did have chest pain and had an urgent cath secondary to STEMI  1  Significant single vessel CAD    2  Successful BMS deployment in proximal LAD  · Continue aspirin Plavix and statin  · CT scan noted  Some nodules noted  No PE  Patient does complain of shortness of breath  Check inflammatory markers  * Rectal bleeding  Assessment & Plan  Presented with rectal bleeding for the past 1 week bright red blood  Likely secondary to new rectal cancer  ·   Patient is status post flexible sigmoidoscopy  Biopsy taken  Oncology consultation requested  · Patient on dual antiplatelet therapy given STEMI  · Hemoglobin stable  Patient still has a little bit of bleeding per rectum but it is stable for now and he is going to continue to do so secondary to colon mass      Chest pain-resolved as of 5/25/2023  Assessment & Plan  · Patient with atypical chest pain  · CT scan reviewed yesterday  · More positional pain  · Inflammatory markers unremarkable    Dysuria-resolved as of 5/25/2023  Assessment & Plan  · UA is unremarkable        Discharging Physician / Practitioner: Steven Mak MD  PCP: Rafael Francisco DO  Admission Date:   Admission Orders (From admission, onward)     Ordered        05/19/23 1255  Inpatient Admission  Once            05/18/23 1702  Place in Observation  Once                      Discharge Date: 05/25/23    Medical Problems     Resolved Problems  Date Reviewed: 5/25/2023          Resolved    Dysuria 5/25/2023     Resolved by  Steven Mak MD    Chest pain 5/25/2023     Resolved by  Steven Mak MD          Consultations During Hospital Stay:  · Cardiology  · Gastroenterology  · Oncology    Procedures Performed:   · Cardiac catheterization: Bare-metal stent placed in the proximal LAD  · Echocardiogram showing ejection fraction of 20%  • Flexible sigmoidoscopy: Mass (traversable) measuring 6 cm x 4 cm in the distal rectum observed during digital rectal exam, covering two thirds of the circumference; bleeding occurred before and after intervention; performed cold forceps biopsy   The distal tip of the tumor is 4 cm from the anal verge  · Multiple small moderate diverticula in the sigmoid colon; no bleeding was identified    Significant Findings / Test Results:   · See above  · Hemoglobin is 10 2 on discharge    Incidental Findings:   · See above    Test Results Pending at Discharge (will require follow up): · Need biopsy follow-up     Outpatient Tests Requested:  · Needs CBC and BMP in 1 week     Complications: STEMI    Reason for Admission: GI bleed    Hospital Course:     Wilfred Batista  is a 69 y o  male patient who originally presented to the hospital on 5/18/2023 due to GI bleed  History presenting illness:Minesh Acharya  is a 69 y o  male with past medical history significant of hypertension, hyperlipidemia initially presented with rectal bleeding  Patient reports rectal bleeding for the past 1 week  Reports bright red blood per rectum  Denies any other acute complaints  Denies melena, rectal pain, fevers, chills or any other complaints  Hospital course: Patient was seen for above reasons  While he was prepping for his colonoscopy the patient had chest pain and wound up with a STEMI  Patient went for a urgent catheterization and did have a bare-metal stent placed     Patient then did go for a flexible sigmoidoscopy and was found to have a rectal mass for which biopsies are done  The patient's had continued bleeding with bright red bleeding per rectum but hemoglobin has  remained stable  The patient was seen by GI and there is nothing else for them to offer at this point  The patient unfortunately needs to be on dual antiplatelet therapy given the recent bare-metal stent  Patient had a little bit of more bleeding yesterday  Patient's hemoglobin still remained stable  Patient does have an anxiety component with everything going on    He was also found to have an ejection fraction of 20% needs to go home with a LifeVest   Patient today stated that he just wants to go home today and then "he will figure things out as an outpatient  There is no reason for the patient's sign AGAINST MEDICAL ADVICE he does have a lot of things going on at this time and is a high risk for readmission but his issues are stable at this time however he does have severe cardiomyopathy and has a little bit of a GI bleed with a rectal mass on dual antiplatelet therapy  He is aware of any signs and symptoms to look out for as an outpatient and needs to come back to the hospital for any concerns  Patient needs to follow-up with colorectal surgery  Patient had a little bit of chest pain day before yesterday which was atypical   CT scan of the chest did not show any pulmonary embolism  Did show still some pulmonary nodules  Patient is can need a PET scan as an outpatient  I did also put in for referral for palliative care as an outpatient      Please see above list of diagnoses and related plan for additional information  Condition at Discharge: fair     Discharge Day Visit / Exam:     Subjective: Patient seen and examined    Just wants to go home today  Vitals: Blood Pressure: 132/74 (05/25/23 0700)  Pulse: 84 (05/25/23 0700)  Temperature: 98 °F (36 7 °C) (05/25/23 0700)  Temp Source: Oral (05/25/23 0700)  Respirations: 16 (05/25/23 0700)  Height: 5' 10\" (177 8 cm) (05/21/23 1900)  Weight - Scale: 54 8 kg (120 lb 13 oz) (05/23/23 0700)  SpO2: 98 % (05/25/23 0700)  Exam:   Physical Exam  (   General Appearance:    Alert, cooperative, no distress, appears stated age                               Lungs:     Clear to auscultation bilaterally, respirations unlabored       Heart:    Regular rate and rhythm, S1 and S2 normal, no murmur, rub    or gallop   Abdomen:     Soft, non-tender, bowel sounds active all four quadrants,     no masses, no organomegaly           Extremities:   Extremities normal, atraumatic, no cyanosis or edema     Discussion with Family: Discussed with the daughter    Discharge instructions/Information to " patient and family:   See after visit summary for information provided to patient and family  Provisions for Follow-Up Care:  See after visit summary for information related to follow-up care and any pertinent home health orders  Disposition:     Home    For Discharges to [facility] SNF:   · Not Applicable to this Patient - Not Applicable to this Patient    Planned Readmission: Not anticipated     Discharge Statement:  I spent 35 minutes discharging the patient  This time was spent on the day of discharge  I had direct contact with the patient on the day of discharge  Greater than 50% of the total time was spent examining patient, answering all patient questions, arranging and discussing plan of care with patient as well as directly providing post-discharge instructions  Additional time then spent on discharge activities  Discharge Medications:  See after visit summary for reconciled discharge medications provided to patient and family        ** Please Note: This note has been constructed using a voice recognition system **

## 2023-05-25 NOTE — OCCUPATIONAL THERAPY NOTE
Occupational Therapy Evaluation      Keri Anderson     5/25/2023    Patient Active Problem List   Diagnosis    CAD in native artery    S/P primary angioplasty with coronary stent    History of ischemic cardiomyopathy    History of lacunar cerebrovascular accident (CVA)    Non-recurrent unilateral inguinal hernia    Essential hypertension    Protein-calorie malnutrition, unspecified severity (HCC)    Rectal bleeding    Rectal mass    Acute on chronic anemia    Moderate protein-calorie malnutrition (HCC)    STEMI involving left anterior descending coronary artery ([facility] )    Ischemic cardiomyopathy       Past Medical History:   Diagnosis Date    Hyperlipidemia     Ischemic cardiomyopathy     Lacunar infarction ([facility] )     Myocardial infarction [facility] )     Near syncope 11/14/2019    Non-ST elevated myocardial infarction [facility])     NSTEMI (non-ST elevated myocardial infarction) ([facility] ) 10/20/2019    Pericarditis 10/24/2019    Poison ivy     Stroke [facility])        Past Surgical History:   Procedure Laterality Date    ANGIOPLASTY      CARDIAC CATHETERIZATION N/A 5/19/2023    Procedure: Cardiac pci;  Surgeon: Mike Messer MD;  Location: [facility] CARDIAC CATH LAB; Service: Cardiology    CORONARY STENT PLACEMENT      HERNIA REPAIR Left 4/28/2021    Procedure: OPEN REPAIR HERNIA INGUINAL LEFT WITH MESH;  Surgeon: Kaylan Mcelroy MD;  Location: [facility] MAIN OR;  Service: General        05/25/23 0917   OT Last Visit   OT Visit Date 05/25/23   Note Type   Note type Evaluation   Additional Comments Pt agreeable to OT session  Upon arrival pt supine in bed with HOB elevated  Pain Assessment   Pain Assessment Tool 0-10   Pain Score No Pain   Restrictions/Precautions   Weight Bearing Precautions Per Order No   Braces or Orthoses Other (Comment)  (none reported)   Other Precautions Fall Risk;Telemetry;Multiple lines   Home Living   Type of Home Mobile home   Home Layout One level;Performs ADLs on one level; Able to live on main level with bedroom/bathroom;Stairs to enter with rails  (3 TAI)   Bathroom Shower/Tub Tub/shower unit   [employer] Raised   Bathroom Equipment   (no DME per pt)   [address]   (no AD used at baseline)   Prior Function   Level of [place] Independent with functional mobility; Independent with ADLs; Independent with IADLS   Lives With Daughter;Family  (grandson, granddaughter)   Receives Help From Family   IADLs Independent with driving; Independent with meal prep; Independent with medication management   Falls in the last 6 months 0   Vocational Full time employment  ([employer])   Lifestyle   Autonomy Patient reporting being independent with ADLs/IADLs, ambulatory with no AD, and lives with family in a one level house   Reciprocal Relationships Family   Service to Others Works full-time as [employer]   ADL   [address] 7  Independent   Grooming Assistance 7  [address] 6  Modified Independent   [address]  [address]  6  Modified independent   [address] 5  [address]  6  Modified independent   Additional Comments ADL levels based on functional performance during OT eval    Bed Mobility   Supine to Sit 6  Modified independent   Additional items HOB elevated; Bedrails   Sit to Supine   (DNT: pt seated OOB in recliner at end of session)   Additional Comments Pt denied lightheaded/dizziness with transitional movements   Transfers   Sit to Stand 6  Modified independent   Additional items Bedrails   Stand to Sit 6  Modified independent   Additional items Armrests   Functional Mobility   Functional Mobility 5  Supervision   Additional Comments Pt ambulated household distance in hallway with no overt SOB or LOB   Pt ambulated with no AD   Balance   Static Sitting Normal   Dynamic Sitting Good   Static Standing Good   Dynamic Standing Fair +   Activity Tolerance Activity Tolerance Patient tolerated treatment well   Medical Staff Made Aware Pt seen as a co-eval with PT Manisha Massey due to the patient's co-morbidities & clinically unstable presentation   RUE Assessment   RUE Assessment WFL  (full AROM, 4+/5 MMT)   LUE Assessment   LUE Assessment WFL  (full AROM, 4+/5 MMT)   Hand Function   Gross Motor Coordination Functional   Fine Motor Coordination Functional   Sensation   Light Touch No apparent deficits   Vision-Basic Assessment   Current Vision Wears glasses only for reading   Cognition   Overall Cognitive Status [facility]   Arousal/Participation Alert; Responsive; Cooperative   Attention Within functional limits   Orientation Level Oriented X4   Memory Within functional limits   Following Commands Follows all commands and directions without difficulty   Assessment   Prognosis Good   Assessment Pt is a 69 y o  male who was admitted to [address] [place] [address] on 5/18/2023 with Rectal bleeding  At this time, patient is also affected by the comorbidities of: rectal mass, anemia, malnutrition, and ischemic cardiomyopathy  Additionally, patient is affected by the following personal factors:steps to enter environment  Orders placed for OT evaluation/treatment  Prior to admission, Patient reporting being independent with ADLs/IADLs, ambulatory with no AD, and lives with family in a one level house  Upon OT evaluation, patient requires modified independent  for UB ADLs and supervision/set-up for LB ADLs  Patient presents with functional use of BUEs, with intact prehension and fine motor coordination, and symmetrical muscle strength  Patient appears to be functioning near baseline, no further Acute OT needs identified at this time to warrant OT services  D/C OT and from OT standpoint, recommendation at time of d/c would be No rehabilitation needs  Goals   Patient Goals to go home today   Plan   OT Frequency Eval only  (no further skilled OT services warranted   Will sign-off and d/c orders)   Recommendation   OT Discharge Recommendation No rehabilitation needs   Additional Comments  The patient's raw score on the AM-PAC Daily Activity Inpatient Short Form is 21  A raw score of greater than or equal to 19 suggests the patient may benefit from discharge to home  Please refer to the recommendation of the Occupational Therapist for safe discharge planning  AM-PAC Daily Activity Inpatient   Lower Body Dressing 3   Bathing 3   Toileting 3   Upper Body Dressing 4   Grooming 4   Eating 4   Daily Activity Raw Score 21   Daily Activity Standardized Score (Calc for Raw Score >=11) 44 27   AM-[facility] Applied Cognition Inpatient   Following a Speech/Presentation 4   Understanding Ordinary Conversation 4   Taking Medications 4   Remembering Where Things Are Placed or Put Away 4   Remembering List of 4-5 Errands 4   Taking Care of Complicated Tasks 4   Applied Cognition Raw Score 24   Applied Cognition Standardized Score 62 21   Modified [place] Scale   Modified [place] Scale 1   End of Consult   Patient Position at End of Consult Bedside chair; All needs within reach   Nurse Communication Nurse aware of consult     [employer], OTR/L

## 2023-05-25 NOTE — ASSESSMENT & PLAN NOTE
Malnutrition Findings:   Adult Malnutrition type: Chronic illness  Adult Degree of Malnutrition: Malnutrition of moderate degree  360 Statement: Related likely to catabolic illness as evidenced by mild-moderate depletion of body fat (Orbitals) and mild-moderate depletion of muscle mass (clavicle) treated with diet (patient denies need for supplements at this time)  BMI Findings: Body mass index is 17 33 kg/m²       Recommend supplement

## 2023-05-25 NOTE — CASE MANAGEMENT
Case Management Discharge Planning Note    Patient name Micky Khalil  Location / MRN [id number]  : 1945 Date 2023       Current Admission Date: 2023  Current Admission Diagnosis:Rectal bleeding   Patient Active Problem List    Diagnosis Date Noted   • Ischemic cardiomyopathy 2023   • Moderate protein-calorie malnutrition ([facility] ) 2023   • STEMI involving left anterior descending coronary artery ([facility] ) 2023   • Rectal bleeding 2023   • Rectal mass 2023   • Acute on chronic anemia 2023   • Protein-calorie malnutrition, unspecified severity ([facility] ) 2023   • Essential hypertension 2020   • Non-recurrent unilateral inguinal hernia 2020   • History of lacunar cerebrovascular accident (CVA) 2019   • CAD in native artery 2019   • S/P primary angioplasty with coronary stent 2019   • History of ischemic cardiomyopathy 2019      LOS (days): 6  Geometric Mean LOS (GMLOS) (days): 8 50  Days to GMLOS:2 6     OBJECTIVE:  Risk of Unplanned Readmission Score: 10 9         Current admission status: Inpatient   Preferred Pharmacy:   [address] [place] [address] [id number] [facility] [place] [address] [place] [id number]  Phone: [phone] Fax: [address] [address]  Phone: [phone] Fax: [phone]    [facility] [address] [address]  Phone: [phone] Fax: [phone]    Primary Care Provider: Courtney Soriano DO    Primary Insurance: [facility]  Secondary Insurance:     DISCHARGE DETAILS:    IMM Given (Date):: 23  IMM Given to[de-identified] Patient  Family notified[de-identified] Pt's daughter at bedside  Aware of discharge plan to home  Additional Comments: IMM reviewed with patient at bedside   Pt agreeable with discharge plan  IMM placed in pt's chart

## 2023-05-25 NOTE — NURSING NOTE
AVS reviewed with pts daughter, questions answered  Pt refused to wear the Life Vest   All belongings taken, scripts sent to pharmacy  Pt discharged via wheel chair

## 2023-05-25 NOTE — PHYSICAL THERAPY NOTE
Physical Therapy Evaluation     Patient's Name: Christie Stroud  Admitting Diagnosis  Rectal bleeding [K62 5]  Rectal mass [K62 89]    Problem List  Patient Active Problem List   Diagnosis    CAD in native artery    S/P primary angioplasty with coronary stent    History of ischemic cardiomyopathy    History of lacunar cerebrovascular accident (CVA)    Non-recurrent unilateral inguinal hernia    Essential hypertension    Protein-calorie malnutrition, unspecified severity (HCC)    Rectal bleeding    Rectal mass    Acute on chronic anemia    Moderate protein-calorie malnutrition (HCC)    STEMI involving left anterior descending coronary artery ([facility] )    Ischemic cardiomyopathy       Past Medical History  Past Medical History:   Diagnosis Date    Hyperlipidemia     Ischemic cardiomyopathy     Lacunar infarction ([facility] )     Myocardial infarction ([place] [address] )     Near syncope 11/14/2019    Non-ST elevated myocardial infarction [facility])     NSTEMI (non-ST elevated myocardial infarction) ([place] [address] ) 10/20/2019    Pericarditis 10/24/2019    Poison ivy     Stroke [facility])        Past Surgical History  Past Surgical History:   Procedure Laterality Date    ANGIOPLASTY      CARDIAC CATHETERIZATION N/A 5/19/2023    Procedure: Cardiac pci;  Surgeon: Lyly Babin MD;  Location: [facility]; Service: Cardiology    CORONARY STENT PLACEMENT      HERNIA REPAIR Left 4/28/2021    Procedure: OPEN REPAIR HERNIA INGUINAL LEFT WITH MESH;  Surgeon: Trung Coello MD;  Location: [facility] MAIN OR;  Service: General        05/25/23 0916   PT Last Visit   PT Visit Date 05/25/23   Note Type   Note type Evaluation   Pain Assessment   Pain Assessment Tool 0-10   Pain Score No Pain   Restrictions/Precautions   Weight Bearing Precautions Per Order No   Braces or Orthoses Other (Comment)  (none reported)   Other Precautions Fall Risk;Telemetry;Multiple lines   Home Living   Type of Home Mobile home   Home Layout One level;Performs ADLs on one level; Able to live on main level with bedroom/bathroom;Stairs to enter with rails  (3 TAI)   Bathroom Shower/Tub Tub/shower unit   [employer] Raised   Bathroom Equipment Other (Comment)  (none reported)   [address] Other (Comment)  (none reported)   Prior Function   Level of [place] Independent with functional mobility; Independent with ADLs; Independent with IADLS   Lives With Daughter  (grandson, granddaughter)   IADLs Independent with driving; Independent with meal prep; Independent with medication management   Falls in the last 6 months 0   Vocational Full time employment  ( at [employer])   General   Family/Caregiver Present No   Cognition   Overall Cognitive Status WFL   Arousal/Participation Alert   Attention Within functional limits   Orientation Level Oriented X4   Memory Within functional limits   Following Commands Follows all commands and directions without difficulty   Comments Pt agreeable to PT   RLE Assessment   RLE Assessment WFL   LLE Assessment   LLE Assessment WFL   Vision-Basic Assessment   Current Vision Wears glasses only for reading   Light Touch   RLE Light Touch Grossly intact   LLE Light Touch Grossly intact   Bed Mobility   Supine to Sit 6  Modified independent   Additional items HOB elevated; Bedrails   Transfers   Sit to Stand 6  Modified independent   Additional items Armrests   Stand to Sit 6  Modified independent   Additional items Armrests   Additional Comments without device   Ambulation/Elevation   Gait pattern Decreased foot clearance; Inconsistent vinh; Short stride;Decreased heel strike   Gait Assistance 7  Independent   Assistive Device None   Distance 200'   Stair Management Assistance Not tested   Balance   Static Sitting Normal   Dynamic Sitting Good   Static Standing Good   Dynamic Standing Fair +   Ambulatory Fair +   Activity Tolerance   Activity Tolerance Patient tolerated treatment well   Medical Staff Made Aware Co-evaluation with OT Jarred Vargas due to medical complexity and clinical presenation   Nurse Made Aware MERE Evasn   Assessment   Prognosis Good   Problem List Impaired balance;Decreased mobility   Assessment Pt is 69 y o  male seen for PT evaluation s/p admit to [place] on 5/18/2023 w/ Rectal bleeding  PT consulted to assess pt's functional mobility and d/c needs  Order placed for PT eval and tx, w/ up w/ A order  Comorbidities affecting pt's physical performance at time of assessment include: cardiomyopathy, STEMI, anemia, rectal mass, rectal bleeding  PTA, pt was independent w/ all functional mobility w/ no device, ambulates community distances and elevations, ambulates household distances, has 3 TAI, lives w/ daughter in one level mobile home and works full time  Personal factors affecting pt at time of IE include: stairs to enter home, inability to navigate community distances, inability to navigate level surfaces w/o external assistance, unable to perform dynamic tasks in community, inability to perform IADLs and inability to perform ADLs  Please find objective findings from PT assessment regarding body systems outlined above  The following objective measures performed on IE: Barthel Index: 90/100, Modified [place]: 1 (no significant disability) and AM-PAC 6-Clicks: 90/46  Pt's clinical presentation is currently stable  seen in pt's presentation of continued need for medical management and monitoring, decreased balance  From PT/mobility standpoint, pt appears to be functioning close to or at mobility baseline, therefore no further immediate skilled PT needs are warranted at this time  Pt currently performing all phases of functional mobility at independent level without need for AD  Recommend pt continue to mobilize ad elias while here  Recommend pt return to previous living environment once medically cleared and with continued family support  Will sign off, D/C PT   Please reconsult if further immediate skilled PT needs are warranted     Barriers to Discharge None   Recommendation   PT Discharge Recommendation No rehabilitation needs   AM-PAC Basic Mobility Inpatient   Turning in Flat Bed Without Bedrails 4   Lying on Back to Sitting on Edge of Flat Bed Without Bedrails 4   Moving Bed to Chair 4   Standing Up From Chair Using Arms 4   Walk in Room 4   Climb 3-5 Stairs With Railing 3   Basic Mobility Inpatient Raw Score 23   Basic Mobility Standardized Score 50 88   Highest Level Of Mobility   JH-HLM Goal 7: Walk 25 feet or more   JH-HLM Achieved 7: Walk 25 feet or more   Modified Williamson Scale   Modified Williamson Scale 1   Barthel Index   Feeding 10   Bathing 0   Grooming Score 5   Dressing Score 10   Bladder Score 10   Bowels Score 10   Toilet Use Score 10   Transfers (Bed/Chair) Score 15   Mobility (Level Surface) Score 15   Stairs Score 5   Barthel Index Score 90       Nelly Garsia, PT

## 2023-05-25 NOTE — ASSESSMENT & PLAN NOTE
· Patient with atypical chest pain  · CT scan reviewed yesterday  · More positional pain  · Inflammatory markers unremarkable

## 2023-05-25 NOTE — ASSESSMENT & PLAN NOTE
· While patient was here did have chest pain and had an urgent cath secondary to STEMI  1  Significant single vessel CAD  2  Successful BMS deployment in proximal LAD  · Continue aspirin Plavix and statin  · CT scan noted  Some nodules noted  No PE  Patient does complain of shortness of breath  Check inflammatory markers

## 2023-05-25 NOTE — ASSESSMENT & PLAN NOTE
Presented with rectal bleeding for the past 1 week bright red blood  Likely secondary to new rectal cancer  ·   Patient is status post flexible sigmoidoscopy  Biopsy taken  Oncology consultation requested  · Patient on dual antiplatelet therapy given STEMI  · Hemoglobin stable    Patient still has a little bit of bleeding per rectum but it is stable for now and he is going to continue to do so secondary to colon mass

## 2023-05-25 NOTE — ASSESSMENT & PLAN NOTE
· Acute on chronic anemia secondary to bleeding rectal mass  · Has not required any transfusion  · Hemoglobin is stable    Results from last 7 days   Lab Units 05/25/23  0422 05/24/23  1150 05/24/23  0528 05/23/23  0539   HEMOGLOBIN g/dL 10 2* 10 6* 10 5* 10 0*

## 2023-05-25 NOTE — PROGRESS NOTES
"Cardiology Progress Note - Sheree Agosto  69 y o  male MRN: [id number]    Unit/Bed#:  Encounter: [id number]      Assessment/Plan:  1  Anterior STEMI status post cardiac cath with bare-metal stent placed to proximal LAD, patient will need DAPT with aspirin for 4 to 6 weeks  Continue Lipitor, metoprolol  Echocardiogram done showed EF of 20%  Patient be sent home with LifeVest   Reviewed the importance of using LifeVest to prevent SCD  2   Ischemic cardiomyopathy status post MI with EF of 20%  Plan for LifeVest   Continue with GDMT aspirin, statins, beta-blockers, ACE inhibitors    3  Acute systolic heart failure, appears euvolemic  Continue on medications  Daily weights  Salt restriction  Strict I's and O's     4   Anemia, secondary to rectal bleeding  Hemoglobin stable  Continue to monitor  Management per GI    5  Rectal mass, status post colonoscopy with pathology results pending  Management per GI and oncology    6  CAD status post PCI to LAD/PTCA of D1, continue aspirin, Lipitor, metoprolol, Plavix, lisinopril    7  Hyperlipidemia on Lipitor    8  History of lacunar infarct on aspirin and statins      Patient is stable from a cardiac standpoint discharge  We will follow-up in the office      Subjective:   Patient seen and examined  No significant events overnight  Im feeling good  Denies chest pain    Objective:     Vitals: Blood pressure 132/74, pulse 84, temperature 98 °F (36 7 °C), temperature source Oral, resp  rate 16, height 5' 10\" (1 778 m), weight 54 8 kg (120 lb 13 oz), SpO2 98 %  , Body mass index is 17 33 kg/m² ,   Orthostatic Blood Pressures    Flowsheet Row Most Recent Value   Blood Pressure 132/74 filed at 05/25/2023 0700   Patient Position - Orthostatic VS Lying filed at 05/25/2023 0700            Intake/Output Summary (Last 24 hours) at 5/25/2023 1451  Last data filed at 5/25/2023 1001  Gross per 24 hour   Intake 200 ml   Output --   Net 200 ml         Physical " Exam:    GEN: Ashlee Lawrence  appears well, alert and oriented x 3, pleasant and cooperative   HEENT: pupils equal, round, and reactive to light; extraocular muscles intact  NECK: supple, no carotid bruits   HEART: regular rhythm, normal S1 and S2, no murmurs, clicks, gallops or rubs   LUNGS: clear to auscultation bilaterally; no wheezes, rales, or rhonchi   ABDOMEN: normal bowel sounds, soft, no tenderness, no distention  EXTREMITIES: peripheral pulses normal; no clubbing, cyanosis, or edema      Medications:    No current facility-administered medications for this encounter      Current Outpatient Medications:   •  aspirin (ECOTRIN LOW STRENGTH) 81 mg EC tablet, Take 1 tablet (81 mg total) by mouth daily Please purchase over the counter at her local pharmacy, Disp: 30 tablet, Rfl: 1  •  atorvastatin (LIPITOR) 80 mg tablet, Take 1 tablet (80 mg total) by mouth daily with dinner, Disp: 90 tablet, Rfl: 3  •  clopidogrel (Plavix) 75 mg tablet, Take 1 tablet (75 mg total) by mouth daily, Disp: 30 tablet, Rfl: 0  •  lidocaine (LIDODERM) 5 %, Apply 1 patch topically over 12 hours daily Remove & Discard patch within 12 hours or as directed by MD, Disp: 15 patch, Rfl: 0  •  lisinopril (ZESTRIL) 2 5 mg tablet, Take 1 tablet (2 5 mg total) by mouth daily, Disp: 30 tablet, Rfl: 0  •  metoprolol succinate (TOPROL-XL) 25 mg 24 hr tablet, Take 1 tablet (25 mg total) by mouth daily, Disp: 30 tablet, Rfl: 0  •  oxyCODONE (ROXICODONE) 5 immediate release tablet, Take 1 tablet (5 mg total) by mouth every 6 (six) hours as needed for moderate pain for up to 10 days Max Daily Amount: 20 mg, Disp: 10 tablet, Rfl: 0  •  Blood Pressure KIT, by Does not apply route daily, Disp: 1 each, Rfl: 0     Labs & Results:    Results from last 7 days   Lab Units 05/18/23  2245 05/18/23  1744 05/18/23  1512   HS TNI 0HR ng/L  --   --  3   HS TNI 2HR ng/L  --  4  --    HS TNI 4HR ng/L 4  --   --    HSTNI D4 ng/L 1  --   --      Results from last 7 days   Lab Units 05/25/23  0422 05/24/23  1150 05/24/23  0528 05/23/23  0539   HEMATOCRIT % 33 2* 34 5* 33 6* 32 1*   HEMOGLOBIN g/dL 10 2* 10 6* 10 5* 10 0*   PLATELETS Thousands/uL 185  --  177 163   WBC Thousand/uL 5 93  --  5 83 5 82     Results from last 7 days   Lab Units 05/19/23  2349   HDL mg/dL 49   TRIGLYCERIDES mg/dL 61     Results from last 7 days   Lab Units 05/25/23  0422 05/24/23  0528 05/23/23  0539 05/19/23  0632 05/18/23  1512   ALK PHOS U/L  --   --  64  --  65   ALT U/L  --   --  18  --  15   AST U/L  --   --  41*  --  22   BUN mg/dL 21 28* 24   < > 30*   CALCIUM mg/dL 8 5 8 9 8 7   < > 9 0   CHLORIDE mmol/L 106 107 107   < > 109*   CO2 mmol/L 26 23 24   < > 23   CREATININE mg/dL 0 92 0 96 0 98   < > 1 21   POTASSIUM mmol/L 4 5 4 1 4 3   < > 4 3    < > = values in this interval not displayed       Results from last 7 days   Lab Units 05/18/23  1512   INR  1 13   PTT seconds 28     Results from last 7 days   Lab Units 05/21/23  0511 05/20/23  0641 05/19/23  2349   MAGNESIUM mg/dL 2 3 2 7 1 6*

## 2023-05-25 NOTE — ASSESSMENT & PLAN NOTE
· Rectal mass underwent biopsy concerning for malignancy  · Oncology consulted for staging and establishing care  · Needs outpatient colorectal  · Needs follow-up with Dr King Sanders as an outpatient

## 2023-05-25 NOTE — ACP (ADVANCE CARE PLANNING)
Advanced Care Planning Progress Note    Serious Illness Conversation    1  What is your understanding now of where you are with your illness? Prognostic Understanding: appropriate understanding of prognosis  Patient understands prognosis and he does understand all of the complexities involved in his care with the GI bleed as well as STEMI     2  How much information about what is likely to be ahead with your illness would you like to have? Information: patient wants to be fully informed     3  What did you (clinician) communicate to the patient? Prognostic Communication: Uncertain - It can be difficult to predict what will happen with your illness  I hope you will continue to live well for a long time but I’m worried that you could get sick quickly, and I think it is important to prepare for that possibility  4  If your health situation worsens, what are your most important goals? Comfort in his own home     5  What are the biggest fears and worries about the future and your health?   Fears/Worries: being a physical burden, being dependent, burdening others          I have spent 35minutes speaking with my patient on advanced care planning today or during this visit     Advanced directives         Lin Wiseman MD

## 2023-05-26 ENCOUNTER — PATIENT OUTREACH (OUTPATIENT)
Dept: HEMATOLOGY ONCOLOGY | Facility: CLINIC | Age: 78
End: 2023-05-26

## 2023-05-26 ENCOUNTER — TELEPHONE (OUTPATIENT)
Age: 78
End: 2023-05-26

## 2023-05-26 ENCOUNTER — TELEPHONE (OUTPATIENT)
Dept: CARDIOLOGY CLINIC | Facility: CLINIC | Age: 78
End: 2023-05-26

## 2023-05-26 ENCOUNTER — TRANSITIONAL CARE MANAGEMENT (OUTPATIENT)
Age: 78
End: 2023-05-26

## 2023-05-26 DIAGNOSIS — K62.89 RECTAL MASS: Primary | ICD-10-CM

## 2023-05-26 NOTE — PROGRESS NOTES
Called pts daughter to confirm upcoming scheduled appointments  She stated she needed appointments mid day, not early or late in the day  MRI was rescheduled to 6/14 at [facility]  Dr Prudencio Richter consult was scheduled for 6/8  Medical oncology consult was moved to after MRI and Dr Prudencio Richter consult        Called pts daughter to confirm the changed appointments, no answer, left my contact information for her to call me back to confirm all changed appointments

## 2023-05-26 NOTE — TELEPHONE ENCOUNTER
----- Message from Edna Hutton PA-C sent at 5/26/2023  3:54 PM EDT -----  Regarding: Ashley Markham  Cardiology Follow-up:    Patient clinical visit in 1 week at the [address] [place] [address] location: [place] office       Schedule visit with Cardio Garry Providers: [facility]/ first available provider  Type of Visit: VISIT TYPE: in-person office visit  Test ordered: Cardiac tests:       Additional Details: also needs 1 month with saturnino  (ishmael if above appt is not with him), 1 week [facility]

## 2023-05-26 NOTE — TELEPHONE ENCOUNTER
Patient needs a tcm call  Discharged from  [facility] 5/25   Has an appt on 6/1 with sandhya Mcduffie

## 2023-05-30 ENCOUNTER — OFFICE VISIT (OUTPATIENT)
Dept: CARDIOLOGY CLINIC | Facility: CLINIC | Age: 78
End: 2023-05-30

## 2023-05-30 ENCOUNTER — APPOINTMENT (OUTPATIENT)
Age: 78
End: 2023-05-30

## 2023-05-30 VITALS
BODY MASS INDEX: 18.47 KG/M2 | RESPIRATION RATE: 16 BRPM | DIASTOLIC BLOOD PRESSURE: 62 MMHG | WEIGHT: 129 LBS | HEIGHT: 70 IN | OXYGEN SATURATION: 99 % | SYSTOLIC BLOOD PRESSURE: 110 MMHG | HEART RATE: 69 BPM

## 2023-05-30 DIAGNOSIS — I63.9 CEREBROVASCULAR ACCIDENT (CVA), UNSPECIFIED MECHANISM (HCC): ICD-10-CM

## 2023-05-30 DIAGNOSIS — I25.5 ISCHEMIC CARDIOMYOPATHY: Primary | ICD-10-CM

## 2023-05-30 DIAGNOSIS — I25.119 ATHEROSCLEROSIS OF NATIVE CORONARY ARTERY WITH ANGINA PECTORIS, UNSPECIFIED WHETHER NATIVE OR TRANSPLANTED HEART (HCC): ICD-10-CM

## 2023-05-30 DIAGNOSIS — I21.02 STEMI INVOLVING LEFT ANTERIOR DESCENDING CORONARY ARTERY (HCC): ICD-10-CM

## 2023-05-30 DIAGNOSIS — I25.10 CAD IN NATIVE ARTERY: Chronic | ICD-10-CM

## 2023-05-30 DIAGNOSIS — Z95.5 S/P PRIMARY ANGIOPLASTY WITH CORONARY STENT: ICD-10-CM

## 2023-05-30 PROBLEM — N28.1 RENAL CYST: Status: ACTIVE | Noted: 2023-05-30

## 2023-05-30 PROBLEM — N40.0 ENLARGED PROSTATE: Status: ACTIVE | Noted: 2023-05-30

## 2023-05-30 PROBLEM — Z71.2 ENCOUNTER TO DISCUSS TEST RESULTS: Status: ACTIVE | Noted: 2023-05-30

## 2023-05-30 PROBLEM — R31.29 MICROSCOPIC HEMATURIA: Status: ACTIVE | Noted: 2023-05-30

## 2023-05-30 LAB
ALBUMIN SERPL BCP-MCNC: 3.3 G/DL (ref 3.5–5)
ALP SERPL-CCNC: 77 U/L (ref 46–116)
ALT SERPL W P-5'-P-CCNC: 27 U/L (ref 12–78)
ANION GAP SERPL CALCULATED.3IONS-SCNC: 1 MMOL/L (ref 4–13)
AST SERPL W P-5'-P-CCNC: 23 U/L (ref 5–45)
BILIRUB SERPL-MCNC: 0.39 MG/DL (ref 0.2–1)
BUN SERPL-MCNC: 15 MG/DL (ref 5–25)
CALCIUM ALBUM COR SERPL-MCNC: 9.7 MG/DL (ref 8.3–10.1)
CALCIUM SERPL-MCNC: 9.1 MG/DL (ref 8.3–10.1)
CHLORIDE SERPL-SCNC: 110 MMOL/L (ref 96–108)
CHOLEST SERPL-MCNC: 105 MG/DL
CO2 SERPL-SCNC: 25 MMOL/L (ref 21–32)
CREAT SERPL-MCNC: 1.1 MG/DL (ref 0.6–1.3)
ERYTHROCYTE [DISTWIDTH] IN BLOOD BY AUTOMATED COUNT: 21.9 % (ref 11.6–15.1)
GFR SERPL CREATININE-BSD FRML MDRD: 64 ML/MIN/1.73SQ M
GLUCOSE P FAST SERPL-MCNC: 91 MG/DL (ref 65–99)
HCT VFR BLD AUTO: 38.1 % (ref 36.5–49.3)
HDLC SERPL-MCNC: 48 MG/DL
HGB BLD-MCNC: 11.7 G/DL (ref 12–17)
LDLC SERPL CALC-MCNC: 45 MG/DL (ref 0–100)
MCH RBC QN AUTO: 25.2 PG (ref 26.8–34.3)
MCHC RBC AUTO-ENTMCNC: 30.7 G/DL (ref 31.4–37.4)
MCV RBC AUTO: 82 FL (ref 82–98)
PLATELET # BLD AUTO: 210 THOUSANDS/UL (ref 149–390)
PMV BLD AUTO: 12 FL (ref 8.9–12.7)
POTASSIUM SERPL-SCNC: 4.6 MMOL/L (ref 3.5–5.3)
PROT SERPL-MCNC: 6.7 G/DL (ref 6.4–8.4)
RBC # BLD AUTO: 4.64 MILLION/UL (ref 3.88–5.62)
SODIUM SERPL-SCNC: 136 MMOL/L (ref 135–147)
TRIGL SERPL-MCNC: 61 MG/DL
WBC # BLD AUTO: 6.93 THOUSAND/UL (ref 4.31–10.16)

## 2023-05-30 NOTE — PROGRESS NOTES
Cardiology Follow Up    Bridger Kovacs   0[birth year]  [id number]  [facility] [place] [address]  [facility] [address] [place] [id number] [phone]    1  Ischemic cardiomyopathy  -     Echo complete w/ contrast if indicated; Future; Expected date: 08/18/2023    2  CAD in native artery    3  S/P primary angioplasty with coronary stent    4  Cerebrovascular accident (CVA), unspecified mechanism ([facility] )          Interval History: Unfortunate 69years old found to have rectal cancer after a bleeding episode that was complicated by myocardial infarction  He had urgent stenting of the LAD but his ejection fraction was noted to be reduced to 20%  He is on aspirin and Plavix  He continues to have some degree of blood in his stools almost daily although yesterday he had no blood in the stools  He is having no chest discomfort or shortness of breath  He is on dual antiplatelet therapy      Patient Active Problem List   Diagnosis   • CAD in native artery   • S/P primary angioplasty with coronary stent   • History of ischemic cardiomyopathy   • History of lacunar cerebrovascular accident (CVA)   • Non-recurrent unilateral inguinal hernia   • Essential hypertension   • Protein-calorie malnutrition, unspecified severity (HCC)   • Rectal bleeding   • Rectal mass   • Acute on chronic anemia   • Moderate protein-calorie malnutrition ([facility] )   • STEMI involving left anterior descending coronary artery ([facility] )   • Ischemic cardiomyopathy   • Renal cyst   • Microscopic hematuria   • Encounter to discuss test results   • Enlarged prostate   • Cerebrovascular accident (CVA) [facility])     Past Medical History:   Diagnosis Date   • Hyperlipidemia    • Ischemic cardiomyopathy    • Lacunar infarction [facility])    • Myocardial infarction [facility])    • Near syncope 11/14/2019   • Non-ST elevated myocardial infarction [facility])    • NSTEMI (non-ST elevated myocardial infarction) ([facility] ) 10/20/2019   • Pericarditis 10/24/2019   • Poison ivy    • Stroke [facility])      Social History     Socioeconomic History   • Marital status:      Spouse name: Not on file   • Number of children: Not on file   • Years of education: Not on file   • Highest education level: Not on file   Occupational History   • Not on file   Tobacco Use   • Smoking status: Former     Packs/day: 1 00     Years: 15 00     Total pack years: 15 00     Types: Cigarettes     Quit date: 1969     Years since quittin 5   • Smokeless tobacco: Never   Vaping Use   • Vaping Use: Never used   Substance and Sexual Activity   • Alcohol use: Never   • Drug use: No   • Sexual activity: Not Currently     Partners: Female   Other Topics Concern   • Not on file   Social History Narrative   • Not on file     Social Determinants of Health     Financial Resource Strain: Low Risk  (10/17/2022)    Overall Financial Resource Strain (CARDIA)    • Difficulty of Paying Living Expenses: Not hard at all   Food Insecurity: No Food Insecurity (2023)    Hunger Vital Sign    • Worried About Running Out of Food in the Last Year: Never true    • Ran Out of Food in the Last Year: Never true   Transportation Needs: No Transportation Needs (2023)    PRAPARE - Transportation    • Lack of Transportation (Medical): No    • Lack of Transportation (Non-Medical):  No   Physical Activity: Inactive (3/8/2021)    Exercise Vital Sign    • Days of Exercise per Week: 0 days    • Minutes of Exercise per Session: 0 min   Stress: No Stress Concern Present (3/8/2021)    [place] [place]    • Feeling of Stress : Not at all   Social Connections: Not on file   Intimate Partner Violence: Not on file   Housing Stability: [address]  (2023)    Housing Stability Vital Sign    • Unable to Pay for Housing in the Last Year: No    • Number of [place] in the Last Year: 1    • Unstable Housing in the Last Year: No      Family History   Problem Relation Age of Onset   • No Known Problems Mother    • Emphysema Father    • Heart disease Maternal Grandfather    • Heart attack Maternal Grandfather    • Emphysema Paternal Grandfather      Past Surgical History:   Procedure Laterality Date   • ANGIOPLASTY     • CARDIAC CATHETERIZATION N/A 5/19/2023    Procedure: Cardiac pci;  Surgeon: Rossana Callejas MD;  Location: [facility] C[facility]; Service: Cardiology   • CORONARY STENT PLACEMENT     • HERNIA REPAIR Left 4/28/2021    Procedure: OPEN REPAIR HERNIA INGUINAL LEFT WITH MESH;  Surgeon: Loanne Brunner, MD;  Location: [facility] MAIN OR;  Service: General       Current Outpatient Medications:   •  aspirin (ECOTRIN LOW STRENGTH) 81 mg EC tablet, Take 1 tablet (81 mg total) by mouth daily Please purchase over the counter at her local pharmacy, Disp: 30 tablet, Rfl: 1  •  atorvastatin (LIPITOR) 80 mg tablet, Take 1 tablet (80 mg total) by mouth daily with dinner, Disp: 90 tablet, Rfl: 3  •  Blood Pressure KIT, by Does not apply route daily, Disp: 1 each, Rfl: 0  •  clopidogrel (Plavix) 75 mg tablet, Take 1 tablet (75 mg total) by mouth daily, Disp: 30 tablet, Rfl: 0  •  lisinopril (ZESTRIL) 2 5 mg tablet, Take 1 tablet (2 5 mg total) by mouth daily, Disp: 30 tablet, Rfl: 0  •  metoprolol succinate (TOPROL-XL) 25 mg 24 hr tablet, Take 1 tablet (25 mg total) by mouth daily, Disp: 30 tablet, Rfl: 0  •  oxyCODONE (ROXICODONE) 5 immediate release tablet, Take 1 tablet (5 mg total) by mouth every 6 (six) hours as needed for moderate pain for up to 10 days Max Daily Amount: 20 mg, Disp: 10 tablet, Rfl: 0  •  lidocaine (LIDODERM) 5 %, Apply 1 patch topically over 12 hours daily Remove & Discard patch within 12 hours or as directed by MD (Patient not taking: Reported on 5/30/2023), Disp: 15 patch, Rfl: 0  No Known Allergies    Labs:  No results displayed because visit has over 200 results        Appointment on 05/05/2023   Component Date Value   • Sodium 05/05/2023 137    • Potassium 05/05/2023 3 9    • Chloride 05/05/2023 110 (H)    • CO2 05/05/2023 23    • ANION GAP 05/05/2023 4    • BUN 05/05/2023 21    • Creatinine 05/05/2023 0 90    • Glucose, Fasting 05/05/2023 84    • Calcium 05/05/2023 8 3    • eGFR 05/05/2023 82    Admission on 05/01/2023, Discharged on 05/01/2023   Component Date Value   • Color, UA 05/01/2023 Yellow    • Clarity, UA 05/01/2023 Turbid    • Specific Gravity, UA 05/01/2023 1 027    • pH, UA 05/01/2023 5 0    • Leukocytes, UA 05/01/2023 Negative    • Nitrite, UA 05/01/2023 Negative    • Protein, UA 05/01/2023 30 (1+) (A)    • Glucose, UA 05/01/2023 Negative    • Ketones, UA 05/01/2023 Negative    • Urobilinogen, UA 05/01/2023 3 0 (A)    • Bilirubin, UA 05/01/2023 Negative    • Occult Blood, UA 05/01/2023 Negative    • RBC, UA 05/01/2023 None Seen    • WBC, UA 05/01/2023 2-4 (A)    • Epithelial Cells 05/01/2023 Occasional    • Bacteria, UA 05/01/2023 Occasional    • MUCUS THREADS 05/01/2023 Moderate (A)    • Hyaline Casts, UA 05/01/2023 Innumerable (A)    • Granular Casts, UA 05/01/2023 5-10 (A)    • WBC 05/01/2023 5 59    • RBC 05/01/2023 4 71    • Hemoglobin 05/01/2023 11 4 (L)    • Hematocrit 05/01/2023 36 1 (L)    • MCV 05/01/2023 77 (L)    • MCH 05/01/2023 24 2 (L)    • MCHC 05/01/2023 31 6    • RDW 05/01/2023 16 2 (H)    • MPV 05/01/2023 11 6    • Platelets 65/70/2599 162    • nRBC 05/01/2023 0    • Neutrophils Relative 05/01/2023 78 (H)    • Immat GRANS % 05/01/2023 1    • Lymphocytes Relative 05/01/2023 9 (L)    • Monocytes Relative 05/01/2023 12    • Eosinophils Relative 05/01/2023 0    • Basophils Relative 05/01/2023 0    • Neutrophils Absolute 05/01/2023 4 37    • Immature Grans Absolute 05/01/2023 0 03    • Lymphocytes Absolute 05/01/2023 0 52 (L)    • Monocytes Absolute 05/01/2023 0 67    • Eosinophils Absolute 05/01/2023 0 00    • Basophils Absolute 05/01/2023 0 00    • Sodium 05/01/2023 132 (L)    • Potassium 05/01/2023 4 4    • Chloride 05/01/2023 101    • CO2 05/01/2023 24    • ANION GAP 05/01/2023 7    • BUN 05/01/2023 38 (H)    • Creatinine 05/01/2023 1 59 (H)    • Glucose 05/01/2023 118    • Calcium 05/01/2023 8 9    • AST 05/01/2023 31    • ALT 05/01/2023 15    • Alkaline Phosphatase 05/01/2023 59    • Total Protein 05/01/2023 6 5    • Albumin 05/01/2023 3 8    • Total Bilirubin 05/01/2023 0 69    • eGFR 05/01/2023 41    • Lipase 05/01/2023 22      Imaging: CTA chest pe study    Result Date: 5/23/2023  Narrative: CTA - CHEST WITH IV CONTRAST - PULMONARY ANGIOGRAM INDICATION:   pe protocol as well as r/o mets  COMPARISON: CTA chest PE study 11/17/2019  Lung base imaging included with CT abdomen/pelvis 5/18/2023 TECHNIQUE: CTA examination of the chest was performed using angiographic technique according to a protocol specifically tailored to evaluate for pulmonary embolism  Multiplanar 2D reformatted images were created from the source data  In addition, coronal 3D MIP postprocessing was performed on the acquisition scanner  Radiation dose length product (DLP) for this visit:  233 mGy-cm   This examination, like all CT scans performed in the [facility], was performed utilizing techniques to minimize radiation dose exposure, including the use of iterative reconstruction and automated exposure control  IV Contrast:  85 mL of iohexol (OMNIPAQUE) FINDINGS: PULMONARY ARTERIAL TREE:  No pulmonary embolus is seen  LUNGS:  3 mm left lower lobe subpleural nodule #3/83, also seen on the recent CT abdomen pelvis study, is new since the 2019 study  3 mm right upper lobe nodule #6/107  Scattered right upper lobe granuloma  Mild biapical pleural/parenchymal scarring  No endotracheal or endobronchial lesion  PLEURA:  Unremarkable  HEART/GREAT VESSELS: Coronary artery calcifications  No significant pericardial effusion  No thoracic aortic aneurysm   MEDIASTINUM AND NANCY: Small hiatal hernia  CHEST WALL AND LOWER NECK:   Unremarkable  VISUALIZED STRUCTURES IN THE UPPER ABDOMEN: See the recent CT abdomen pelvis report  OSSEOUS STRUCTURES:  No acute fracture or destructive osseous lesion  Impression: No acute findings; specifically, no acute pulmonary arterial embolism  Indeterminate 3 mm left lower lobe and right upper lobe nodules new since the 2019 study  Workstation performed: [id number]     Echo complete w/ contrast if indicated    Result Date: 5/21/2023  Narrative: •  Left Ventricle: Left ventricular cavity size is mildly dilated  Wall thickness is normal  The left ventricular ejection fraction is 20%  Systolic function is severely reduced  •  The following segments are akinetic: mid anterior, mid anteroseptal, apical anterior, apical septal, apical inferior and apex  •  The following segments are hypokinetic: basal anteroseptal, basal inferoseptal, mid inferoseptal, mid anterolateral and apical lateral  •  All other segments are normal  •  Aortic Valve: There is mild regurgitation  •  Mitral Valve: There is mild regurgitation  Flexible Sigmoidoscopy    Result Date: 5/20/2023  Narrative: Table formatting from the original result was not included  [id number] [facility] [address] [place] [id number] [phone] DATE OF SERVICE: 5/20/23 PHYSICIAN(S): Attending: Abbi Jackson DO Fellow: No Staff Documented INDICATION: Rectal bleeding, Rectal mass POST-OP DIAGNOSIS: See the impression below  HISTORY: Prior colonoscopy: No prior colonoscopy  BOWEL PREPARATION: Miralax/Dulcolax PREPROCEDURE: Informed consent was obtained for the procedure, including sedation  Risks including but not limited to bleeding, infection, perforation, adverse drug reaction and aspiration were explained in detail  Also explained about less than 100% sensitivity with the exam and other alternatives  The patient was placed in the left lateral decubitus position   Procedure: Colonoscopy DETAILS OF PROCEDURE: Patient was taken to the procedure room where a time out was performed to confirm correct patient and correct procedure  The patient underwent monitored anesthesia care, which was administered by an anesthesia professional  The patient's blood pressure, heart rate, level of consciousness, oxygen and respirations were monitored throughout the procedure  A digital rectal exam was performed  The scope was introduced through the anus and advanced to the descending colon  Retroflexion was performed in the rectum  The patient's estimated blood loss was minimal (<5 mL)  The procedure was not difficult  The patient tolerated the procedure well  There were no apparent complications  ANESTHESIA INFORMATION: ASA: IV Anesthesia Type: IV Sedation with Anesthesia MEDICATIONS: No administrations occurring from 1522 to 1537 on 05/20/23 FINDINGS: Mass (traversable) measuring 6 cm x 4 cm in the distal rectum observed during digital rectal exam, covering two thirds of the circumference; bleeding occurred before and after intervention; performed cold forceps biopsy  The distal tip of the tumor is 4 cm from the anal verge  Multiple small moderate diverticula in the sigmoid colon; no bleeding was identified EVENTS: Procedure Events Event Event Time SPECIMENS: ID Type Source Tests Collected by Time Destination 1 :  Tissue Rectum TISSUE EXAM Lucina Sotelo DO 5/20/2023  3:30 PM  EQUIPMENT: Colonoscope -[id number]     Impression: 1  Rectal mass consistent with adenocarcinoma, two thirds circumference, 4 cm from the anal verge, extending 5 cm proximal RECOMMENDATION: 1  Follow-up with the biopsies 2  Oncology consultation  Lucina Sotelo DO [place], [place]    Cardiac catheterization    Result Date: 5/20/2023  Narrative: •  Prox LAD lesion is 90% stenosed  •  1st Mrg lesion is 50% stenosed  Significant single vessel CAD  Successful BMS deployment in proximal LAD       CT abdomen pelvis with contrast    Result Date: 5/18/2023  Narrative: CT ABDOMEN AND PELVIS WITH IV CONTRAST INDICATION:   Left lower quadrant pain, rectal bleeding  COMPARISON: CT abdomen pelvis 5/1/2023 CT chest 11/17/2019 TECHNIQUE:  CT examination of the abdomen and pelvis was performed  Multiplanar 2D reformatted images were created from the source data  This examination, like all CT scans performed in the [facility], was performed utilizing techniques to minimize radiation dose exposure, including the use of iterative reconstruction and automated exposure control  Radiation dose length product (DLP) for this visit:  684 mGy-cm IV Contrast:  100 mL of iohexol (OMNIPAQUE) Enteric Contrast:  Enteric contrast was not administered  FINDINGS: ABDOMEN LOWER CHEST: A 2 mm pulmonary nodule in the left lower lobe (series 2 image 12), grossly stable in size since 5/1/2023 but new compared to 11/17/2019  LIVER/BILIARY TREE: 0 3 cm low-attenuation lesion is seen in the right hepatic dome (series 2 image 24) which can represent either artifact or a focal hepatic lesion  It is not identified on prior CT examination dated 5/1/2023  Hepatic contours are normal   No biliary dilatation  GALLBLADDER:  There are gallstone(s) within the gallbladder, without pericholecystic inflammatory changes  SPLEEN:  Unremarkable  PANCREAS: No main pancreatic ductal dilation  A 1 0 cm cystic lesion in the uncinate process, 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  ADRENAL GLANDS:  Unremarkable  KIDNEYS/URETERS:  No hydronephrosis or urinary tract calculus  Bilateral renal simple cysts  One or more sharply circumscribed subcentimeter renal hypodensities are present, too small to accurately characterize, and statistically most likely benign findings  According to recent literature (Radiology 2019) no further workup of these findings is recommended  STOMACH AND BOWEL: Moderate stool burden in the colon   There is asymmetric wall thickening involving the anterior, right lateral and posterior aspect of the rectum, highly concerning for rectal neoplasm (series 2 image 162)  It measures approximately 5 5  cm in craniocaudal dimension  There are 2 suspicious mesorectal lymph nodes measuring up to 0 7 cm on series 2 image 163 and a high rectal lymph node measuring up to 0 5 cm on series 2 image 151)  APPENDIX:  A normal appendix was visualized  ABDOMINOPELVIC CAVITY:  No ascites  Stable 3 2 cm fluid collection at the left internal inguinal ring, likely representing seroma with history of left inguinal hernia repair  No pneumoperitoneum  No lymphadenopathy  VESSELS:  Unremarkable for patient's age  PELVIS REPRODUCTIVE ORGANS:  Unremarkable for patient's age  URINARY BLADDER: Underdistended, limiting evaluation  ABDOMINAL WALL/INGUINAL REGIONS:  Unremarkable  OSSEOUS STRUCTURES:  No acute fracture or destructive osseous lesion  Impression: Imaging findings highly concerning for a rectal neoplasm with suspicious 2 mesorectal lymph nodes  Colonoscopy and rectal MRI examination are recommended for further evaluation  Moderate colonic stool burden without abnormal dilation, likely representing constipation  A 2 mm pulmonary nodule in the left lower lobe, new since 11/17/2019  CT chest without contrast in 12 months is recommended to assess for stability according to Fleischner criteria  However, if there is established rectal malignancy, earlier CT chest follow-up should be obtained in 3 months  A possible 0 3 cm right hepatic dome lesion versus artifact  If this is a true lesion, this is statistically most likely a benign lesion such as cyst or hemangioma  However if the diagnosis of rectal malignancy is established, short-term follow-up with postcontrast CT or MRI abdomen is recommended to exclude possibility of a developing metastatic lesion  A 1 0 cm cystic lesion in the uncinate process, 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   MRI abdomen MRCP with and without contrast is recommended in 1 year to assess for stability  I personally discussed this study with Giovana Borrego on 5/18/2023 4:54 PM  Workstation performed: [id number]     CT abdomen pelvis with contrast    Result Date: 5/1/2023  Narrative: CT ABDOMEN AND PELVIS WITH IV CONTRAST INDICATION:   Urinary retention New urinary incontinence  COMPARISON:  None  TECHNIQUE:  CT examination of the abdomen and pelvis was performed  Multiplanar 2D reformatted images were created from the source data  Radiation dose length product (DLP) for this visit:  656 mGy-cm   This examination, like all CT scans performed in the [facility], was performed utilizing techniques to minimize radiation dose exposure, including the use of iterative reconstruction and automated exposure control  IV Contrast:  100 mL of iohexol (OMNIPAQUE) Enteric Contrast:  Enteric contrast was not administered  FINDINGS: ABDOMEN LOWER CHEST: Small hiatal hernia  LIVER/BILIARY TREE:  Unremarkable  GALLBLADDER: There are gallstone(s) within the gallbladder, without pericholecystic inflammatory changes  SPLEEN: The spleen is enlarged, measuring 13 8 cm  PANCREAS:  Unremarkable  ADRENAL GLANDS:  Unremarkable  KIDNEYS/URETERS: One or more simple renal cyst(s) is noted  Otherwise unremarkable kidneys  No hydronephrosis  STOMACH AND BOWEL: There is colonic diverticulosis without evidence of acute diverticulitis  APPENDIX:  No findings to suggest appendicitis  ABDOMINOPELVIC CAVITY:  No ascites  No pneumoperitoneum  No lymphadenopathy  VESSELS:  Unremarkable for patient's age  PELVIS REPRODUCTIVE ORGANS: The prostate is enlarged  URINARY BLADDER: Left posterior lateral thickening of the bladder wall, correlate with urinalysis and outpatient nonemergent cystoscopy best seen on series 2 image 158  ABDOMINAL WALL/INGUINAL REGIONS: 3 3 x 1 9 cm fluid collection at the origin of the left inguinal canal could be postsurgical but needs to be correlated clinically   There is increased soft tissue along the left inguinal canal extending to the scrotum, correlate with possible hernia surgical history  OSSEOUS STRUCTURES:  No acute fracture or destructive osseous lesion  Impression: Cholelithiasis  Splenomegaly  Bilateral renal cysts  Left posterior lateral irregular thickening of the bladder wall, correlate with urinalysis and outpatient nonemergent cystoscopy to rule out transitional cell carcinoma  3 3 x 1 9 cm fluid collection at the origin of the left inguinal canal could be postsurgical but needs to be correlated clinically  There is increased soft tissue along the left inguinal canal extending to the scrotum, correlate with possible hernia surgical history  The study was marked in EPIC for significant notification  Workstation performed: [id number]       Review of Systems:  Review of Systems    Physical Exam:  Color is good  Regular rate and rhythm  Heart rate 69 and regular  Blood pressure 110/62  Lungs clear  Regular rhythm regular  No murmurs or gallops    Discussion/Summary:    1  Recent myocardial infarction with ischemic cardiomyopathy and ejection fraction of 20%-compensated at present  2  Patient has decided against a LifeVest  3  Rectal Cancer    Recommendations:    1  Continue dual antiplatelet therapy  2  He has follow-up scheduled with the gastroenterologist very soon  3  Repeat echo in 3 months  He has had recovery of ejection fraction in the past after PCI  4    Follow-up with primary cardiologist-Dr Aarti Panchal MD

## 2023-05-30 NOTE — PROGRESS NOTES
Problem List Items Addressed This Visit        Cardiovascular and Mediastinum    STEMI involving left anterior descending coronary artery [facility])     Recently s/p PCI with stent placement per patient report, on antiplatelet therapy at this time  I would not recommend circumcision now given recent PCI but I do recommend circumcision at some point given significant phimosis            Genitourinary    Renal cyst - Primary     Simple renal cysts, these do not require surveillance         Microscopic hematuria     Cystoscopy today shows no mucosal abnormalities, mild trabeculation, and no tumors  Negative work-up for malignancy as a cause of microscopic hematuria         Phimosis     Significant phimosis, I do recommend eventual circumcision  The foreskin was reduced over the head of the penis at the end of today's procedure  He will need to stop antiplatelet therapy in preparation for this, he will likely not be able to have this for 1 year given recent percutaneous coronary intervention            Other    Encounter to discuss test results     All findings discussed with him today, questions and concerns answered and addressed         Enlarged prostate     His prostate is smooth, no nodules, no consistent changes, the palpable portion of the gland is roughly 60 to 65 g  A PSA has been ordered to be performed roughly 4 weeks from now         Lower urinary tract symptoms (LUTS)     Nocturia every 2 hours, slow stream, intermittency and urgency  I counseled him on available treatments including alpha blockade and 5 alpha reductase inhibitors  He wishes to start with tamsulosin  We will assess his treatment effect in 3 months, if still with bothersome symptoms we will add finasteride at that time         Relevant Medications    tamsulosin (FLOMAX) 0 4 mg    Other Relevant Orders    PSA Total, Diagnostic           Portions of the above record have been created with voice recognition software    Occasional wrong "word or \"sound alike\" substitution may have occurred due to the inherent limitations of voice recognition software  Read the chart carefully and recognize, using context, where substitution may have occurred  Assessment and plan:       Please see problem oriented charting for the assessment plan of today's urological complaints      Marcel Cooper MD      Chief Complaint     As listed above      History of Present Illness     Antonio Marcum  is a 69 y o  man presenting in consultation for bladder wall thickening and LUTS  The patient was referred by himself    With regard to this complaint it is localized to the bladder and prostate  The quality is described as lower urinary tract symptoms and the severity of this complaint is described as moderate  These symptoms have been present for weeks to months and the timing is ongoing  Previous treatments include none and previous work-up includes imaging work-up  The patient mentions nothing as aggravating and alleviating factors, respectively  The following associated signs and symptoms are mentioned: none  The following portions of the patient's history were reviewed and updated as appropriate: allergies, current medications, past family history, past medical history, past social history, past surgical history and problem list     Detailed Urologic History     - please refer to HPI    Review of Systems     Review of Systems   Constitutional: Negative  HENT: Negative  Eyes: Negative  Respiratory: Negative  Cardiovascular: Negative  Gastrointestinal: Negative  Endocrine: Negative  Genitourinary: Negative  As per HPI   Musculoskeletal: Negative  Skin: Negative  Allergic/Immunologic: Negative  Neurological: Negative  Hematological: Negative  Psychiatric/Behavioral: Negative            AUA SYMPTOM SCORE    Flowsheet Row Most Recent Value   AUA SYMPTOM SCORE    How often have you had a sensation of not emptying your " bladder completely after you finished urinating? 5   How often have you had to urinate again less than two hours after you finished urinating? 5   How often have you found you stopped and started again several times when you urinate? 5   How often have you found it difficult to postpone urination? 5   How often have you had a weak urinary stream? 5   How often have you had to push or strain to begin urination? 0   How many times did you most typically get up to urinate from the time you went to bed at night until the time you got up in the morning? 3   Quality of Life: If you were to spend the rest of your life with your urinary condition just the way it is now, how would you feel about that? --   AUA SYMPTOM SCORE 28            Allergies     No Known Allergies    Physical Exam     Physical Exam  Vitals reviewed  Constitutional:       General: He is not in acute distress  Appearance: Normal appearance  He is not ill-appearing, toxic-appearing or diaphoretic  HENT:      Head: Normocephalic and atraumatic  Eyes:      General: No scleral icterus  Right eye: No discharge  Left eye: No discharge  Cardiovascular:      Pulses: Normal pulses  Pulmonary:      Effort: Pulmonary effort is normal    Abdominal:      General: There is no distension  Genitourinary:     Comments: Phimosis present, prostate 65 grams on its palpable portion  Musculoskeletal:         General: No swelling  Skin:     Coloration: Skin is not jaundiced  Neurological:      General: No focal deficit present  Mental Status: He is alert and oriented to person, place, and time  Cranial Nerves: No cranial nerve deficit  Psychiatric:         Mood and Affect: Mood normal          Thought Content:  Thought content normal          Judgment: Judgment normal              Vital Signs  Vitals:    05/31/23 1354   BP: 124/60   BP Location: Left arm   Patient Position: Sitting   Cuff Size: Adult   Weight: 59 4 kg (131 lb) "  Height: 5' 10\" (1 778 m)         Current Medications       Current Outpatient Medications:   •  aspirin (ECOTRIN LOW STRENGTH) 81 mg EC tablet, Take 1 tablet (81 mg total) by mouth daily Please purchase over the counter at her local pharmacy, Disp: 30 tablet, Rfl: 1  •  atorvastatin (LIPITOR) 80 mg tablet, Take 1 tablet (80 mg total) by mouth daily with dinner, Disp: 90 tablet, Rfl: 3  •  Blood Pressure KIT, by Does not apply route daily, Disp: 1 each, Rfl: 0  •  clopidogrel (Plavix) 75 mg tablet, Take 1 tablet (75 mg total) by mouth daily, Disp: 30 tablet, Rfl: 0  •  lisinopril (ZESTRIL) 2 5 mg tablet, Take 1 tablet (2 5 mg total) by mouth daily, Disp: 30 tablet, Rfl: 0  •  metoprolol succinate (TOPROL-XL) 25 mg 24 hr tablet, Take 1 tablet (25 mg total) by mouth daily, Disp: 30 tablet, Rfl: 0  •  tamsulosin (FLOMAX) 0 4 mg, Take 1 capsule (0 4 mg total) by mouth daily at bedtime, Disp: 90 capsule, Rfl: 3      Active Problems     Patient Active Problem List   Diagnosis   • CAD in native artery   • S/P primary angioplasty with coronary stent   • History of ischemic cardiomyopathy   • History of lacunar cerebrovascular accident (CVA)   • Non-recurrent unilateral inguinal hernia   • Essential hypertension   • Protein-calorie malnutrition, unspecified severity (HCC)   • Rectal bleeding   • Rectal mass   • Acute on chronic anemia   • Moderate protein-calorie malnutrition (HCC)   • STEMI involving left anterior descending coronary artery (HCC)   • Ischemic cardiomyopathy   • Renal cyst   • Microscopic hematuria   • Encounter to discuss test results   • Enlarged prostate   • Cerebrovascular accident (CVA) ([facility] [place] [address] )   • Phimosis   • Lower urinary tract symptoms (LUTS)         Past Medical History     Past Medical History:   Diagnosis Date   • Hyperlipidemia    • Ischemic cardiomyopathy    • Lacunar infarction [facility])    • Myocardial infarction ([facility] [place] [address] )    • Near syncope 11/14/2019   • Non-ST elevated myocardial infarction ([facility] [place] [address] )  " "  • NSTEMI (non-ST elevated myocardial infarction) ([facility] [address] ) 10/20/2019   • Pericarditis 10/24/2019   • Poison ivy    • Stroke [facility])          Surgical History     Past Surgical History:   Procedure Laterality Date   • ANGIOPLASTY     • CARDIAC CATHETERIZATION N/A 2023    Procedure: Cardiac pci;  Surgeon: Charlene Michael MD;  Location: [facility] CARDIAC CATH LAB; Service: Cardiology   • CORONARY STENT PLACEMENT     • HERNIA REPAIR Left 2021    Procedure: OPEN REPAIR HERNIA INGUINAL LEFT WITH MESH;  Surgeon: Carolyne Bradshaw MD;  Location: [facility] MAIN OR;  Service: General         Family History     Family History   Problem Relation Age of Onset   • No Known Problems Mother    • Emphysema Father    • Heart disease Maternal Grandfather    • Heart attack Maternal Grandfather    • Emphysema Paternal Grandfather          Social History     Social History     Social History     Tobacco Use   Smoking Status Former   • Packs/day: 1 00   • Years: 15 00   • Total pack years: 15 00   • Types: Cigarettes   • Quit date: 1969   • Years since quittin 5   Smokeless Tobacco Never         Pertinent Lab Values     Lab Results   Component Value Date    CREATININE 1 10 2023       No results found for: \"PSA\"            Pertinent Imaging       The patient's images were reviewed by me personally and also in real time with them in the examination room using our PACS imaging system  The imaging findings are significant for renal cysts, bladder wall thickening      "

## 2023-05-31 ENCOUNTER — OFFICE VISIT (OUTPATIENT)
Dept: UROLOGY | Facility: CLINIC | Age: 78
End: 2023-05-31

## 2023-05-31 VITALS
WEIGHT: 131 LBS | HEIGHT: 70 IN | BODY MASS INDEX: 18.75 KG/M2 | DIASTOLIC BLOOD PRESSURE: 60 MMHG | SYSTOLIC BLOOD PRESSURE: 124 MMHG

## 2023-05-31 DIAGNOSIS — N40.0 ENLARGED PROSTATE: ICD-10-CM

## 2023-05-31 DIAGNOSIS — N28.1 RENAL CYST: Primary | ICD-10-CM

## 2023-05-31 DIAGNOSIS — I21.02 STEMI INVOLVING LEFT ANTERIOR DESCENDING CORONARY ARTERY (HCC): ICD-10-CM

## 2023-05-31 DIAGNOSIS — R31.29 MICROSCOPIC HEMATURIA: ICD-10-CM

## 2023-05-31 DIAGNOSIS — R39.9 LOWER URINARY TRACT SYMPTOMS (LUTS): ICD-10-CM

## 2023-05-31 DIAGNOSIS — Z71.2 ENCOUNTER TO DISCUSS TEST RESULTS: ICD-10-CM

## 2023-05-31 DIAGNOSIS — N47.1 PHIMOSIS: ICD-10-CM

## 2023-05-31 RX ORDER — TAMSULOSIN HYDROCHLORIDE 0.4 MG/1
0.4 CAPSULE ORAL
Qty: 90 CAPSULE | Refills: 3 | Status: SHIPPED | OUTPATIENT
Start: 2023-05-31 | End: 2024-05-25

## 2023-05-31 NOTE — ASSESSMENT & PLAN NOTE
Recently s/p PCI with stent placement per patient report, on antiplatelet therapy at this time   I would not recommend circumcision now given recent PCI but I do recommend circumcision at some point given significant phimosis

## 2023-05-31 NOTE — ASSESSMENT & PLAN NOTE
Nocturia every 2 hours, slow stream, intermittency and urgency  I counseled him on available treatments including alpha blockade and 5 alpha reductase inhibitors  He wishes to start with tamsulosin    We will assess his treatment effect in 3 months, if still with bothersome symptoms we will add finasteride at that time

## 2023-05-31 NOTE — ASSESSMENT & PLAN NOTE
His prostate is smooth, no nodules, no consistent changes, the palpable portion of the gland is roughly 60 to 65 g    A PSA has been ordered to be performed roughly 4 weeks from now

## 2023-05-31 NOTE — ASSESSMENT & PLAN NOTE
Significant phimosis, I do recommend eventual circumcision  The foreskin was reduced over the head of the penis at the end of today's procedure    He will need to stop antiplatelet therapy in preparation for this, he will likely not be able to have this for 1 year given recent percutaneous coronary intervention

## 2023-05-31 NOTE — PROGRESS NOTES
Office Cystoscopy Procedure Note    Indication:    Hematuria    Informed consent   The risks, benefits, complications, treatment options, and expected outcomes were discussed with the patient  The patient concurred with the proposed plan and provided informed consent  Anesthesia  Lidocaine jelly 2%    Antibiotic prophylaxis   None    Procedure  The patient was placed in the supineposition, was prepped and draped in the usual manner using sterile technique, and 2% lidocaine jelly instilled into the urethra  A 17 F flexible cystoscope was then inserted into the urethra and the urethra and bladder carefully examined  The following findings were noted:    Findings:  Urethra:  Normal  Prostate:  prominent lateral lobe hypertrophy, no median lobe, no lesions  Bladder:  Trabeculations, mild, no lesions, no stones  Ureteral orifices:  orthotopic  Other findings:  None, retroflexed view confirms    Specimens: None                 Complications:    None; patient tolerated the procedure well           Disposition: To home            Condition: Stable    Plan: Bladder outlet obstruction noted, no tumors, no malignant findings       Cystoscopy     Date/Time 5/31/2023 2:00 PM     Performed by  Janice Aquino MD   Authorized by Janice Aquino MD     Universal Protocol:  Consent: Verbal consent obtained  Written consent obtained  Risks and benefits: risks, benefits and alternatives were discussed  Consent given by: patient  Patient understanding: patient states understanding of the procedure being performed  Patient consent: the patient's understanding of the procedure matches consent given  Procedure consent: procedure consent matches procedure scheduled  Relevant documents: relevant documents present and verified  Test results: test results available and properly labeled  Site marked: the operative site was not marked  Radiology Images displayed and confirmed   If images not available, report reviewed: imaging studies available  Required items: required blood products, implants, devices, and special equipment available  Patient identity confirmed: verbally with patient and provided demographic data        Procedure Details:  Procedure type: cystoscopy    Patient tolerance: Patient tolerated the procedure well with no immediate complications    Additional Procedure Details: Office Cystoscopy Procedure Note    Indication:    Hematuria    Informed consent   The risks, benefits, complications, treatment options, and expected outcomes were discussed with the patient  The patient concurred with the proposed plan and provided informed consent  Anesthesia  Lidocaine jelly 2%    Antibiotic prophylaxis   None    Procedure  The patient was placed in the supineposition, was prepped and draped in the usual manner using sterile technique, and 2% lidocaine jelly instilled into the urethra  A 17 F flexible cystoscope was then inserted into the urethra and the urethra and bladder carefully examined    The following findings were noted:    Findings:  Urethra:  Normal  Prostate:  prominent lateral lobe hypertrophy, no median lobe, no lesions  Bladder:  Trabeculations, mild, no lesions, no stones  Ureteral orifices:  orthotopic  Other findings:  None, retroflexed view confirms    Specimens: None                 Complications:    None; patient tolerated the procedure well           Disposition: To home            Condition: Stable    Plan: Bladder outlet obstruction noted, no tumors, no malignant findings

## 2023-05-31 NOTE — ASSESSMENT & PLAN NOTE
Cystoscopy today shows no mucosal abnormalities, mild trabeculation, and no tumors    Negative work-up for malignancy as a cause of microscopic hematuria

## 2023-06-01 ENCOUNTER — OFFICE VISIT (OUTPATIENT)
Age: 78
End: 2023-06-01

## 2023-06-01 VITALS
HEART RATE: 75 BPM | OXYGEN SATURATION: 98 % | WEIGHT: 132 LBS | DIASTOLIC BLOOD PRESSURE: 60 MMHG | HEIGHT: 70 IN | TEMPERATURE: 97.6 F | SYSTOLIC BLOOD PRESSURE: 110 MMHG | BODY MASS INDEX: 18.9 KG/M2

## 2023-06-01 DIAGNOSIS — K62.5 RECTAL BLEEDING: ICD-10-CM

## 2023-06-01 DIAGNOSIS — Z95.5 S/P PRIMARY ANGIOPLASTY WITH CORONARY STENT: ICD-10-CM

## 2023-06-01 DIAGNOSIS — I10 ESSENTIAL HYPERTENSION: Chronic | ICD-10-CM

## 2023-06-01 DIAGNOSIS — N40.0 ENLARGED PROSTATE: ICD-10-CM

## 2023-06-01 DIAGNOSIS — I25.5 ISCHEMIC CARDIOMYOPATHY: ICD-10-CM

## 2023-06-01 DIAGNOSIS — K62.89 RECTAL MASS: Primary | ICD-10-CM

## 2023-06-01 NOTE — ASSESSMENT & PLAN NOTE
H&H is stable  Patient continues to have rectal bleeding which is controlled  He is on dual platelet therapy for recent STEMI  Is aware that if bleeding increases or if he develops symptoms of dizziness, weakness or chest pain/shortness of breath he should seek further evaluation in the emergency department  Has a follow-up with colorectal surgery as well as heme-onc

## 2023-06-01 NOTE — ASSESSMENT & PLAN NOTE
Had an appointment with urology yesterday cystoscopy completed  Patient was started on tamsulosin  He is aware of potential side effects related to hypertension  PSA was ordered by urology

## 2023-06-01 NOTE — PROGRESS NOTES
INTERNAL MEDICINE TRANSITION OF CARE OFFICE VISIT  [facility] - [facility] PRIMARY CARE [place]    NAME: Glen Ruggiero  AGE: 69 y o  SEX: male  : 1945     DATE: 2023     Assessment and Plan:     Problem List Items Addressed This Visit        Digestive    Rectal bleeding     H&H is stable  Patient continues to have rectal bleeding which is controlled  He is on dual platelet therapy for recent STEMI  Is aware that if bleeding increases or if he develops symptoms of dizziness, weakness or chest pain/shortness of breath he should seek further evaluation in the emergency department  Has a follow-up with colorectal surgery as well as heme-onc  Cardiovascular and Mediastinum    Essential hypertension (Chronic)     Blood pressure is 110/60  He is on lisinopril 2 5 mg daily  Continue current medication         Ischemic cardiomyopathy     Status post urgent PCI to the LAD  Echocardiogram showed 20% EF-currently compensated  Patient declined [facility] with cardiology  Had a follow-up with them 2 days ago  They will be repeating echocardiogram which daughter states she is calling to schedule today  Other    S/P primary angioplasty with coronary stent     Underwent an urgent cardiac cath secondary to a STEMI during hospitalization  Found to have significant single-vessel disease PCI of the LAD  Currently on DAPT, statin, beta-blocker  Following with cardiology  Denies chest pain or shortness of breath  Rectal mass - Primary     Following with colorectal surgery, heme-onc, and gastroenterology  Enlarged prostate     Had an appointment with urology yesterday cystoscopy completed  Patient was started on tamsulosin  He is aware of potential side effects related to hypertension  PSA was ordered by urology               Transitional Care Management Review:     Glen Ruggiero  is a 69 y o  male here for TCM follow-up    During the TCM phone call patient stated:    TCM Call     Date and time call was made  5/26/2023 11:33 AM    Hospital care reviewed  Records reviewed    Patient was hospitialized at  [facility]    Date of Admission  05/18/23    Date of discharge  05/26/23    Diagnosis  rectal bleed,cardiac cath    Disposition  Home    Were the patients medications reviewed and updated  Yes    Current Symptoms  None      TCM Call     Post hospital issues  None    Scheduled for follow up? Yes    Patient refusal reason  for convenience, will keep 12/6/19 appt, which is the same day as appt w/cardiology  Patients specialists  Cardiologist    Cardiologist name  Moisés De Los Santos DO    Cardiologist contact #  [phone]    Did you obtain your prescribed medications  --  midodrine    Do you need help managing your prescriptions or medications  No    Is transportation to your appointment needed  No    I have advised the patient to call PCP with any new or worsening symptoms  Margie Díaz LPN    Are you recieving any outpatient services  Yes    What type of services  cardiac rehab, PFT, PT, Nurtrition Services    Are you recieving home care services  No    Interperter language line needed  No    Counseling  Family    Counseling topics  Importance of RX compliance           HPI:     Argenis Tejada presents to the office today for TCM visit  He was hospitalized at [facility] from May 18 through May 25 with rectal bleeding  While he was in the hospital he developed chest pain found to be having a STEMI and underwent a cardiac catheterization with PCI of LAD  EF is reduced to approximately 20% he is on dual antiplatelet therapy  He has not had chest pain or shortness of breath  He is also status post flexible sigmoidoscopy which found a new rectal mass  Patient continues to have rectal bleeding though controlled  Hemoglobin hematocrit are stable  Following with gastroenterology, colorectal surgery and heme-onc    Patient also had a visit yesterday with urology for microscopic hematuria and renal cyst   Underwent cystoscopy showed no mucosal abnormalities or tumors  Patient does have enlarged prostate and was started on tamsulosin 0 4 mg nightly  Discussed cardiac rehab with patient he is not interested  He states that he is walking at home  Patient and daughter have no new concerns or questions today  Patient has a new complaint of lower back pain with movement nonradiating  They believe it might be from his new bed  Also patient underwent cystoscopy yesterday which aggravated symptoms  If symptoms do not improve in a week they were instructed to return for further evaluation  The following portions of the patient's history were reviewed and updated as appropriate: allergies, current medications, past family history, past medical history, past social history, past surgical history and problem list      Review of Systems:     Review of Systems   Constitutional: Positive for fatigue  Negative for chills and fever  HENT: Negative  Respiratory: Negative for shortness of breath  Cardiovascular: Negative for chest pain  Gastrointestinal: Positive for anal bleeding  Negative for abdominal pain  Genitourinary:        Nocturia   Musculoskeletal: Positive for back pain (lower)  Skin: Negative  Neurological: Negative  Negative for dizziness, weakness and light-headedness  Psychiatric/Behavioral: Negative           Problem List:     Patient Active Problem List   Diagnosis   • CAD in native artery   • S/P primary angioplasty with coronary stent   • History of ischemic cardiomyopathy   • History of lacunar cerebrovascular accident (CVA)   • Non-recurrent unilateral inguinal hernia   • Essential hypertension   • Protein-calorie malnutrition, unspecified severity (HCC)   • Rectal bleeding   • Rectal mass   • Acute on chronic anemia   • Moderate protein-calorie malnutrition ([facility] [address] )   • STEMI involving left anterior descending coronary "artery ([facility] )   • Ischemic cardiomyopathy   • Renal cyst   • Microscopic hematuria   • Encounter to discuss test results   • Enlarged prostate   • Cerebrovascular accident (CVA) ([facility] )   • Phimosis   • Lower urinary tract symptoms (LUTS)        Objective:     /60   Pulse 75   Temp 97 6 °F (36 4 °C)   Ht 5' 10\" (1 778 m)   Wt 59 9 kg (132 lb)   SpO2 98%   BMI 18 94 kg/m²     Physical Exam  Vitals and nursing note reviewed  Constitutional:       General: He is not in acute distress  Appearance: He is underweight  He is ill-appearing  HENT:      Head: Normocephalic and atraumatic  Right Ear: External ear normal       Left Ear: External ear normal       Nose: Nose normal       Mouth/Throat:      Pharynx: Oropharynx is clear  Eyes:      Conjunctiva/sclera: Conjunctivae normal    Cardiovascular:      Rate and Rhythm: Normal rate and regular rhythm  Pulses: Normal pulses  Heart sounds: Normal heart sounds  Pulmonary:      Effort: Pulmonary effort is normal  No respiratory distress  Breath sounds: Normal breath sounds  Abdominal:      General: Abdomen is flat  Bowel sounds are normal  There is no distension  Palpations: Abdomen is soft  Tenderness: There is no abdominal tenderness  Musculoskeletal:         General: Normal range of motion  Cervical back: Normal range of motion and neck supple  Back:       Comments: Pain with movement   Skin:     General: Skin is warm and dry  Capillary Refill: Capillary refill takes less than 2 seconds  Neurological:      Mental Status: He is alert and oriented to person, place, and time  Psychiatric:         Mood and Affect: Mood normal          Behavior: Behavior normal          Thought Content:  Thought content normal          Judgment: Judgment normal           Laboratory Results: I have personally reviewed the pertinent laboratory results/reports     Radiology/Other Diagnostic Testing Results: I have personally " reviewed pertinent reports  Cardiac catheterization    Result Date: 5/20/2023  •  Prox LAD lesion is 90% stenosed  •  1st Mrg lesion is 50% stenosed  Significant single vessel CAD  Successful BMS deployment in proximal LAD  CT abdomen pelvis with contrast    Result Date: 5/18/2023  CT ABDOMEN AND PELVIS WITH IV CONTRAST INDICATION:   Left lower quadrant pain, rectal bleeding  COMPARISON: CT abdomen pelvis 5/1/2023 CT chest 11/17/2019 TECHNIQUE:  CT examination of the abdomen and pelvis was performed  Multiplanar 2D reformatted images were created from the source data  This examination, like all CT scans performed in the [facility], was performed utilizing techniques to minimize radiation dose exposure, including the use of iterative reconstruction and automated exposure control  Radiation dose length product (DLP) for this visit:  684 mGy-cm IV Contrast:  100 mL of iohexol (OMNIPAQUE) Enteric Contrast:  Enteric contrast was not administered  FINDINGS: ABDOMEN LOWER CHEST: A 2 mm pulmonary nodule in the left lower lobe (series 2 image 12), grossly stable in size since 5/1/2023 but new compared to 11/17/2019  LIVER/BILIARY TREE: 0 3 cm low-attenuation lesion is seen in the right hepatic dome (series 2 image 24) which can represent either artifact or a focal hepatic lesion  It is not identified on prior CT examination dated 5/1/2023  Hepatic contours are normal   No biliary dilatation  GALLBLADDER:  There are gallstone(s) within the gallbladder, without pericholecystic inflammatory changes  SPLEEN:  Unremarkable  PANCREAS: No main pancreatic ductal dilation  A 1 0 cm cystic lesion in the uncinate process, 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  ADRENAL GLANDS:  Unremarkable  KIDNEYS/URETERS:  No hydronephrosis or urinary tract calculus  Bilateral renal simple cysts   One or more sharply circumscribed subcentimeter renal hypodensities are present, too small to accurately characterize, and statistically most likely benign findings  According to recent literature (Radiology 2019) no further workup of these findings is recommended  STOMACH AND BOWEL: Moderate stool burden in the colon  There is asymmetric wall thickening involving the anterior, right lateral and posterior aspect of the rectum, highly concerning for rectal neoplasm (series 2 image 162)  It measures approximately 5 5  cm in craniocaudal dimension  There are 2 suspicious mesorectal lymph nodes measuring up to 0 7 cm on series 2 image 163 and a high rectal lymph node measuring up to 0 5 cm on series 2 image 151)  APPENDIX:  A normal appendix was visualized  ABDOMINOPELVIC CAVITY:  No ascites  Stable 3 2 cm fluid collection at the left internal inguinal ring, likely representing seroma with history of left inguinal hernia repair  No pneumoperitoneum  No lymphadenopathy  VESSELS:  Unremarkable for patient's age  PELVIS REPRODUCTIVE ORGANS:  Unremarkable for patient's age  URINARY BLADDER: Underdistended, limiting evaluation  ABDOMINAL WALL/INGUINAL REGIONS:  Unremarkable  OSSEOUS STRUCTURES:  No acute fracture or destructive osseous lesion  Imaging findings highly concerning for a rectal neoplasm with suspicious 2 mesorectal lymph nodes  Colonoscopy and rectal MRI examination are recommended for further evaluation  Moderate colonic stool burden without abnormal dilation, likely representing constipation  A 2 mm pulmonary nodule in the left lower lobe, new since 11/17/2019  CT chest without contrast in 12 months is recommended to assess for stability according to Fleischner criteria  However, if there is established rectal malignancy, earlier CT chest follow-up should be obtained in 3 months  A possible 0 3 cm right hepatic dome lesion versus artifact  If this is a true lesion, this is statistically most likely a benign lesion such as cyst or hemangioma   However if the diagnosis of rectal malignancy is established, short-term follow-up with postcontrast CT or MRI abdomen is recommended to exclude possibility of a developing metastatic lesion  A 1 0 cm cystic lesion in the uncinate process, 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  MRI abdomen MRCP with and without contrast is recommended in 1 year to assess for stability  I personally discussed this study with Bozena Becker on 5/18/2023 4:54 PM  Workstation performed: [id number]        Current Medications:     Outpatient Medications Prior to Visit   Medication Sig Dispense Refill   • atorvastatin (LIPITOR) 80 mg tablet Take 1 tablet (80 mg total) by mouth daily with dinner 90 tablet 3   • Blood Pressure KIT by Does not apply route daily 1 each 0   • clopidogrel (Plavix) 75 mg tablet Take 1 tablet (75 mg total) by mouth daily 30 tablet 0   • lisinopril (ZESTRIL) 2 5 mg tablet Take 1 tablet (2 5 mg total) by mouth daily 30 tablet 0   • metoprolol succinate (TOPROL-XL) 25 mg 24 hr tablet Take 1 tablet (25 mg total) by mouth daily 30 tablet 0   • tamsulosin (FLOMAX) 0 4 mg Take 1 capsule (0 4 mg total) by mouth daily at bedtime 90 capsule 3   • aspirin (ECOTRIN LOW STRENGTH) 81 mg EC tablet Take 1 tablet (81 mg total) by mouth daily Please purchase over the counter at her local pharmacy 30 tablet 1     No facility-administered medications prior to visit  I spent 30 minutes with this patient      [address] [place] [address], [address] [place] [address],[address] PRIMARY CARE [place]

## 2023-06-01 NOTE — ASSESSMENT & PLAN NOTE
Underwent an urgent cardiac cath secondary to a STEMI during hospitalization  Found to have significant single-vessel disease PCI of the LAD  Currently on DAPT, statin, beta-blocker  Following with cardiology  Denies chest pain or shortness of breath

## 2023-06-02 ENCOUNTER — TELEPHONE (OUTPATIENT)
Dept: GASTROENTEROLOGY | Facility: CLINIC | Age: 78
End: 2023-06-02

## 2023-06-02 NOTE — TELEPHONE ENCOUNTER
LMOM for patient to phone back and confirm the appt 08/15 with Dr Margaret Kuo     Patient has a scheduled appt 06/08 with Dr Carlos Franco

## 2023-06-05 NOTE — PROGRESS NOTES
Diagnoses and all orders for this visit:    Rectal cancer [facility])  -     Ambulatory Referral to Colorectal Surgery       Rectal cancer [facility])  Patient presents for evaluation of newly diagnosed rectal cancer  Patient was in the hospital 3 weeks ago for acute MI  He also had rectal bleeding and underwent flexible sigmoidoscopy  This showed a rectal mass  Biopsies consistent with adenocarcinoma  CT scans done in house at the time showed small lung nodules that are too small to characterize  No obvious dominant liver mass but a 3 mm lesion was seen  MRI is pending and scheduled for next week  Examination today reveals a relatively bulky fixed right to right posterior mass lesion  This seems to abut but not necessarily invade the sphincter complex  Examination and CT scan suggest a locally invasive process without obvious disseminated disease  Standard would likely be chemotherapy and chemotherapy with radiation  Given patient's age and comorbidities he is unsure if he wants any therapy  I have encouraged him to at least have the follow-ups done with MRI and medical oncology to finalize his staging work-up and discuss treatment  I can see him there after for surgical follow-up down the line  HPI   Floyd Alcantara  has been referred by Dr Jimmy Thompson, for evaluation  Patient had a Flexible sigmoidoscopy on 5/20/2023 with Dr Jimmy Thompson, which showed rectal mass consistent with adenocarcinoma, two thirds circumference, 4 cm from the anal verge, extending 5 cm proximal   Multiple small moderate diverticula in the sigmoid colon; no bleeding was identified  Flexible sigmoidoscopy pathology reported:  A  Rectum, mass, biopsy:  -Adenocarcinoma, invasive, moderately differentiated  -Ancillary testing for mismatch repair (MMR) protein deficiency by immunohistochemical panel (MLH1, PMS2, MSH2, MSH6) is undertaken, the results will be issued in an addendum      Addendum  RESULTS OF IMMUNOHISTOCHEMICAL ANALYSIS FOR MISMATCH REPAIR PROTEIN LOSS  INTERPRETATION: NO LOSS OF NUCLEAR EXPRESSION OF MMR PROTEINS: LOW PROBABILITY OF MSI-H        RESULTS:  Antibody            Clone                 Description                     Results  MLH1                 M1                     Mismatch repair protein  Intact nuclear expression  MSH2                [id number]        Mismatch repair protein  Intact nuclear expression  MSH6                44                      Mismatch repair protein  Intact nuclear expression  PMS2                 [id number]           Mismatch repair protein  Intact nuclear expression     Lab Results   Component Value Date    HCT 38 1 05/30/2023    HGB 11 7 (L) 05/30/2023    MCV 82 05/30/2023     05/30/2023    WBC 6 93 05/30/2023     Lab Results   Component Value Date    AGAP 1 (L) 05/30/2023    ALKPHOS 77 05/30/2023    ALT 27 05/30/2023    AST 23 05/30/2023    BUN 15 05/30/2023    CALCIUM 9 1 05/30/2023     (H) 05/30/2023    CO2 25 05/30/2023    CREATININE 1 10 05/30/2023    EGFR 64 05/30/2023    GLUC 96 05/25/2023    GLUF 91 05/30/2023    K 4 6 05/30/2023    SODIUM 136 05/30/2023    TBILI 0 39 05/30/2023    TP 6 7 05/30/2023     CT abdomen and pelvis on 5/18/2023 reported:  Imaging findings highly concerning for a rectal neoplasm with suspicious 2 mesorectal lymph nodes  Colonoscopy and rectal MRI examination are recommended for further evaluation      Moderate colonic stool burden without abnormal dilation, likely representing constipation      A 2 mm pulmonary nodule in the left lower lobe, new since 11/17/2019  CT chest without contrast in 12 months is recommended to assess for stability according to Fleischner criteria  However, if there is established rectal malignancy, earlier CT chest   follow-up should be obtained in 3 months      A possible 0 3 cm right hepatic dome lesion versus artifact  If this is a true lesion, this is statistically most likely a benign lesion such as cyst or hemangioma  However if the diagnosis of rectal malignancy is established, short-term follow-up with   postcontrast CT or MRI abdomen is recommended to exclude possibility of a developing metastatic lesion      A 1 0 cm cystic lesion in the uncinate process, 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  MRI abdomen MRCP with and without contrast is recommended in 1 year to assess for stability  CT Abdomen and pelvis on 5/1/2023 reported:  Cholelithiasis      Splenomegaly      Bilateral renal cysts      Left posterior lateral irregular thickening of the bladder wall, correlate with urinalysis and outpatient nonemergent cystoscopy to rule out transitional cell carcinoma      3 3 x 1 9 cm fluid collection at the origin of the left inguinal canal could be postsurgical but needs to be correlated clinically  There is increased soft tissue along the left inguinal canal extending to the scrotum, correlate with possible hernia   surgical history  CTA chest PE study reported:   No acute findings; specifically, no acute pulmonary arterial embolism      Indeterminate 3 mm left lower lobe and right upper lobe nodules new since the 2019 study          Past Medical History:   Diagnosis Date   • Hyperlipidemia    • Ischemic cardiomyopathy    • Lacunar infarction [facility])    • Myocardial infarction [facility])    • Near syncope 11/14/2019   • Non-ST elevated myocardial infarction [facility])    • NSTEMI (non-ST elevated myocardial infarction) ([facility] [address] ) 10/20/2019   • Pericarditis 10/24/2019   • Poison ivy    • Stroke [facility])      Past Surgical History:   Procedure Laterality Date   • ANGIOPLASTY     • CARDIAC CATHETERIZATION N/A 5/19/2023    Procedure: Cardiac pci;  Surgeon: Ira Gamble MD;  Location: [facility] CARDIAC CATH LAB;   Service: Cardiology   • CORONARY STENT PLACEMENT     • HERNIA REPAIR Left 4/28/2021    Procedure: OPEN REPAIR HERNIA INGUINAL LEFT WITH MESH;  Surgeon: Travis Zuniga MD;  Location: [facility] MAIN OR;  Service: General       Current Outpatient Medications:   •  aspirin (ECOTRIN LOW STRENGTH) 81 mg EC tablet, Take 1 tablet (81 mg total) by mouth daily Please purchase over the counter at her local pharmacy, Disp: 30 tablet, Rfl: 1  •  atorvastatin (LIPITOR) 80 mg tablet, Take 1 tablet (80 mg total) by mouth daily with dinner, Disp: 90 tablet, Rfl: 3  •  Blood Pressure KIT, by Does not apply route daily, Disp: 1 each, Rfl: 0  •  clopidogrel (Plavix) 75 mg tablet, Take 1 tablet (75 mg total) by mouth daily, Disp: 30 tablet, Rfl: 0  •  lisinopril (ZESTRIL) 2 5 mg tablet, Take 1 tablet (2 5 mg total) by mouth daily, Disp: 30 tablet, Rfl: 0  •  metoprolol succinate (TOPROL-XL) 25 mg 24 hr tablet, Take 1 tablet (25 mg total) by mouth daily, Disp: 30 tablet, Rfl: 0  •  tamsulosin (FLOMAX) 0 4 mg, Take 1 capsule (0 4 mg total) by mouth daily at bedtime, Disp: 90 capsule, Rfl: 3  Allergies as of 06/08/2023   • (No Known Allergies)     Review of Systems   Gastrointestinal: Positive for blood in stool and rectal pain  All other systems reviewed and are negative  There were no vitals filed for this visit  Physical Exam  Constitutional:       Appearance: Normal appearance  HENT:      Head: Normocephalic and atraumatic  Eyes:      Extraocular Movements: Extraocular movements intact  Pupils: Pupils are equal, round, and reactive to light  Abdominal:      General: Abdomen is flat  Palpations: Abdomen is soft  Genitourinary:     Comments: Gross mass lesion palpable just above anal ring  Appears bulky but nonobstructive  Probably 4 cm from the anal verge  Musculoskeletal:         General: Normal range of motion  Skin:     General: Skin is warm and dry  Neurological:      General: No focal deficit present  Mental Status: He is alert and oriented to person, place, and time  Psychiatric:         Mood and Affect: Mood normal          Behavior: Behavior normal          Thought Content:  Thought content normal          Judgment: Judgment normal

## 2023-06-08 ENCOUNTER — OFFICE VISIT (OUTPATIENT)
Age: 78
End: 2023-06-08
Payer: COMMERCIAL

## 2023-06-08 VITALS — HEIGHT: 70 IN | BODY MASS INDEX: 18.9 KG/M2 | WEIGHT: 132 LBS

## 2023-06-08 DIAGNOSIS — C20 RECTAL CANCER (HCC): ICD-10-CM

## 2023-06-08 PROCEDURE — 99204 OFFICE O/P NEW MOD 45 MIN: CPT | Performed by: COLON & RECTAL SURGERY

## 2023-06-08 NOTE — ASSESSMENT & PLAN NOTE
Patient presents for evaluation of newly diagnosed rectal cancer  Patient was in the hospital 3 weeks ago for acute MI  He also had rectal bleeding and underwent flexible sigmoidoscopy  This showed a rectal mass  Biopsies consistent with adenocarcinoma  CT scans done in house at the time showed small lung nodules that are too small to characterize  No obvious dominant liver mass but a 3 mm lesion was seen  MRI is pending and scheduled for next week  Examination today reveals a relatively bulky fixed right to right posterior mass lesion  This seems to abut but not necessarily invade the sphincter complex  Examination and CT scan suggest a locally invasive process without obvious disseminated disease  Standard would likely be chemotherapy and chemotherapy with radiation  Given patient's age and comorbidities he is unsure if he wants any therapy  I have encouraged him to at least have the follow-ups done with MRI and medical oncology to finalize his staging work-up and discuss treatment  I can see him there after for surgical follow-up down the line

## 2023-06-08 NOTE — LETTER
June 8, 2023     [facility]    Patient: Jennie Diaz  YOB: 1945   Date of Visit: 6/8/2023       Dear Dr Ester Sawyer: Thank you for referring Kemar Carrera to me for evaluation  Below are my notes for this consultation  If you have questions, please do not hesitate to call me  I look forward to following your patient along with you  Sincerely,        Dima Capellan MD        CC: Ivan James, DO Dima Capellan MD  6/8/2023 12:05 PM  Sign when Signing Visit  Diagnoses and all orders for this visit:    Rectal cancer [facility])  -     Ambulatory Referral to Colorectal Surgery       Rectal cancer [facility])  Patient presents for evaluation of newly diagnosed rectal cancer  Patient was in the hospital 3 weeks ago for acute MI  He also had rectal bleeding and underwent flexible sigmoidoscopy  This showed a rectal mass  Biopsies consistent with adenocarcinoma  CT scans done in house at the time showed small lung nodules that are too small to characterize  No obvious dominant liver mass but a 3 mm lesion was seen  MRI is pending and scheduled for next week  Examination today reveals a relatively bulky fixed right to right posterior mass lesion  This seems to abut but not necessarily invade the sphincter complex  Examination and CT scan suggest a locally invasive process without obvious disseminated disease  Standard would likely be chemotherapy and chemotherapy with radiation  Given patient's age and comorbidities he is unsure if he wants any therapy  I have encouraged him to at least have the follow-ups done with MRI and medical oncology to finalize his staging work-up and discuss treatment  I can see him there after for surgical follow-up down the line  HPI   Jennie Diaz  has been referred by Dr Myron Adams, for evaluation       Patient had a Flexible sigmoidoscopy on 5/20/2023 with Dr Myron Adams, which showed rectal mass consistent with adenocarcinoma, two thirds circumference, 4 cm from the anal verge, extending 5 cm proximal   Multiple small moderate diverticula in the sigmoid colon; no bleeding was identified  Flexible sigmoidoscopy pathology reported:  A  Rectum, mass, biopsy:  -Adenocarcinoma, invasive, moderately differentiated  -Ancillary testing for mismatch repair (MMR) protein deficiency by immunohistochemical panel (MLH1, PMS2, MSH2, MSH6) is undertaken, the results will be issued in an addendum  Addendum  RESULTS OF IMMUNOHISTOCHEMICAL ANALYSIS FOR MISMATCH REPAIR PROTEIN LOSS  INTERPRETATION: NO LOSS OF NUCLEAR EXPRESSION OF MMR PROTEINS: LOW PROBABILITY OF MSI-H        RESULTS:  Antibody            Clone                 Description                     Results  MLH1                 M1                     Mismatch repair protein  Intact nuclear expression  MSH2                [id number]        Mismatch repair protein  Intact nuclear expression  MSH6                44                      Mismatch repair protein  Intact nuclear expression  PMS2                 [id number]           Mismatch repair protein  Intact nuclear expression     Lab Results   Component Value Date    HCT 38 1 05/30/2023    HGB 11 7 (L) 05/30/2023    MCV 82 05/30/2023     05/30/2023    WBC 6 93 05/30/2023     Lab Results   Component Value Date    AGAP 1 (L) 05/30/2023    ALKPHOS 77 05/30/2023    ALT 27 05/30/2023    AST 23 05/30/2023    BUN 15 05/30/2023    CALCIUM 9 1 05/30/2023     (H) 05/30/2023    CO2 25 05/30/2023    CREATININE 1 10 05/30/2023    EGFR 64 05/30/2023    GLUC 96 05/25/2023    GLUF 91 05/30/2023    K 4 6 05/30/2023    SODIUM 136 05/30/2023    TBILI 0 39 05/30/2023    TP 6 7 05/30/2023     CT abdomen and pelvis on 5/18/2023 reported:  Imaging findings highly concerning for a rectal neoplasm with suspicious 2 mesorectal lymph nodes  Colonoscopy and rectal MRI examination are recommended for further evaluation       Moderate colonic stool burden without abnormal dilation, likely representing constipation  A 2 mm pulmonary nodule in the left lower lobe, new since 11/17/2019  CT chest without contrast in 12 months is recommended to assess for stability according to Fleischner criteria  However, if there is established rectal malignancy, earlier CT chest   follow-up should be obtained in 3 months  A possible 0 3 cm right hepatic dome lesion versus artifact  If this is a true lesion, this is statistically most likely a benign lesion such as cyst or hemangioma  However if the diagnosis of rectal malignancy is established, short-term follow-up with   postcontrast CT or MRI abdomen is recommended to exclude possibility of a developing metastatic lesion  A 1 0 cm cystic lesion in the uncinate process, 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  MRI abdomen MRCP with and without contrast is recommended in 1 year to assess for stability  CT Abdomen and pelvis on 5/1/2023 reported:  Cholelithiasis  Splenomegaly  Bilateral renal cysts  Left posterior lateral irregular thickening of the bladder wall, correlate with urinalysis and outpatient nonemergent cystoscopy to rule out transitional cell carcinoma  3 3 x 1 9 cm fluid collection at the origin of the left inguinal canal could be postsurgical but needs to be correlated clinically  There is increased soft tissue along the left inguinal canal extending to the scrotum, correlate with possible hernia   surgical history  CTA chest PE study reported:   No acute findings; specifically, no acute pulmonary arterial embolism  Indeterminate 3 mm left lower lobe and right upper lobe nodules new since the 2019 study           Past Medical History:   Diagnosis Date   • Hyperlipidemia    • Ischemic cardiomyopathy    • Lacunar infarction [facility])    • Myocardial infarction [facility])    • Near syncope 11/14/2019   • Non-ST elevated myocardial infarction [facility])    • [facility] 10/20/2019 • Pericarditis 10/24/2019   • Poison ivy    • Stroke [facility])      Past Surgical History:   Procedure Laterality Date   • ANGIOPLASTY     • CARDIAC CATHETERIZATION N/A 5/19/2023    Procedure: Cardiac pci;  Surgeon: Dora Alfredo MD;  Location: [facility] CARDIAC CATH LAB; Service: Cardiology   • CORONARY STENT PLACEMENT     • HERNIA REPAIR Left 4/28/2021    Procedure: OPEN REPAIR HERNIA INGUINAL LEFT WITH MESH;  Surgeon: Jossue Poe MD;  Location: [facility] MAIN OR;  Service: General       Current Outpatient Medications:   •  aspirin (ECOTRIN LOW STRENGTH) 81 mg EC tablet, Take 1 tablet (81 mg total) by mouth daily Please purchase over the counter at her local pharmacy, Disp: 30 tablet, Rfl: 1  •  atorvastatin (LIPITOR) 80 mg tablet, Take 1 tablet (80 mg total) by mouth daily with dinner, Disp: 90 tablet, Rfl: 3  •  Blood Pressure KIT, by Does not apply route daily, Disp: 1 each, Rfl: 0  •  clopidogrel (Plavix) 75 mg tablet, Take 1 tablet (75 mg total) by mouth daily, Disp: 30 tablet, Rfl: 0  •  lisinopril (ZESTRIL) 2 5 mg tablet, Take 1 tablet (2 5 mg total) by mouth daily, Disp: 30 tablet, Rfl: 0  •  metoprolol succinate (TOPROL-XL) 25 mg 24 hr tablet, Take 1 tablet (25 mg total) by mouth daily, Disp: 30 tablet, Rfl: 0  •  tamsulosin (FLOMAX) 0 4 mg, Take 1 capsule (0 4 mg total) by mouth daily at bedtime, Disp: 90 capsule, Rfl: 3  Allergies as of 06/08/2023   • (No Known Allergies)     Review of Systems   Gastrointestinal: Positive for blood in stool and rectal pain  All other systems reviewed and are negative  There were no vitals filed for this visit  Physical Exam  Constitutional:       Appearance: Normal appearance  HENT:      Head: Normocephalic and atraumatic  Eyes:      Extraocular Movements: Extraocular movements intact  Pupils: Pupils are equal, round, and reactive to light  Abdominal:      General: Abdomen is flat  Palpations: Abdomen is soft     Genitourinary:     Comments: Gross mass lesion palpable just above anal ring  Appears bulky but nonobstructive  Probably 4 cm from the anal verge  Musculoskeletal:         General: Normal range of motion  Skin:     General: Skin is warm and dry  Neurological:      General: No focal deficit present  Mental Status: He is alert and oriented to person, place, and time  Psychiatric:         Mood and Affect: Mood normal          Behavior: Behavior normal          Thought Content:  Thought content normal          Judgment: Judgment normal

## 2023-06-09 ENCOUNTER — PATIENT OUTREACH (OUTPATIENT)
Dept: HEMATOLOGY ONCOLOGY | Facility: CLINIC | Age: 78
End: 2023-06-09

## 2023-06-09 NOTE — PROGRESS NOTES
Phone outreach to the pt, no answer, left VM, stated my name, title, and reason for call- to introduce myself and explain my role in addition to go over his upcoming appts, left my contact number and waiting for call back

## 2023-06-12 ENCOUNTER — PATIENT OUTREACH (OUTPATIENT)
Dept: HEMATOLOGY ONCOLOGY | Facility: CLINIC | Age: 78
End: 2023-06-12

## 2023-06-12 NOTE — PROGRESS NOTES
Return call to the daughter, no answer, left VM, stated my name and title, reason for call and provided my contact info

## 2023-06-14 ENCOUNTER — HOSPITAL ENCOUNTER (OUTPATIENT)
Dept: RADIOLOGY | Age: 78
Discharge: HOME | End: 2023-06-14
Attending: INTERNAL MEDICINE
Payer: COMMERCIAL

## 2023-06-14 DIAGNOSIS — K62.89 RECTAL MASS: ICD-10-CM

## 2023-06-14 PROCEDURE — 72195 MRI PELVIS W/O DYE: CPT

## 2023-06-14 PROCEDURE — G1004 CDSM NDSC: HCPCS

## 2023-06-16 ENCOUNTER — DOCUMENTATION (OUTPATIENT)
Dept: HEMATOLOGY ONCOLOGY | Facility: CLINIC | Age: 78
End: 2023-06-16

## 2023-06-16 ENCOUNTER — PATIENT OUTREACH (OUTPATIENT)
Dept: HEMATOLOGY ONCOLOGY | Facility: CLINIC | Age: 78
End: 2023-06-16

## 2023-06-16 DIAGNOSIS — C20 RECTAL CANCER (HCC): Primary | ICD-10-CM

## 2023-06-16 NOTE — PROGRESS NOTES
Left a message with pts daughter with details of the scheduled port placement for 7/3/2023 at the [facility]    Left my contact information if she has any questions

## 2023-06-16 NOTE — PROGRESS NOTES
Diagnosis- Rectal cancer, T3b, N1      NN phone outreach to the pt's daughter today, we discussed the confirmed pathology and how pt was seen in office by Dr aYred Santillan, we talked about the results to his CT scan and no obvious signs for true met disease, I did go over the results of the MRI w her and explained that based on the staging the pt will require chemotherapy  I gave her the breakdown of the treatment plan typically decided for rectal CA pts- 3-4 mos of chemo, 5-6 wks chemo/RT, then surgery  The pt would need to be set up for a port which the daughter was familiar what a port is (I explained the key points for the surg procedure and why we sched this tentatively)  Pt's daughter stated that the pt is still unsure of what he wants to do, he wants to speak to the medical oncologist on the 28th before deciding on whether he wants to go through w tx or not  Pt's wife had breast CA 30 yrs ago and she passed away and he feels like the chemo/RT is a waste of time  Daughter asked about radiation therapy- I explained the most common SE is typically diarrhea and some discomfort in that area but the SE and all info pertaining to the RT would be explained by the RN and radiation oncologist at the time of the consultation  She did ask if it would be allowed for her to bring her grandchildren since she watches them often, I suggested it best not to bring them w her to the office  She mentioned the pt working full time up until his diagnosis, pt was a  at a [employer]  Since his wife's passing away from breast CA the pt has the perception the cancer is going to kill him and he is depressed about the diagnosis  Completed the general assessment w his daughter and referrals placed for IR for port as well as onc SW  Pt denies having pain and no other complaints from what the daughter knows of, he has good support w his children (daughter and son)   She did mention he is on the thinner side, but he eats well, daughter was unsure if the pt is losing or holding steady  She was agreeable to rcving info on the [facility], email provided  I let her know that I would be getting the port sched and once pt has a date I would call back w those appt details  I asked her to save my contact info and to please reach out for any further questions or concerns  She thanked me for my call and said she will keep in touch  Email sent to [email or web address] with info on the [facility]

## 2023-06-16 NOTE — PROGRESS NOTES
Port placement scheduled with IR for 7/3/2023 [facility] will give the pt details via phone

## 2023-06-16 NOTE — PROGRESS NOTES
JOE rasheedst sent to GI nc pool to add this pt for GENERAL meeting on 6/29/2023 as a NEW rectal CA pt, to be presented by Dr Silva Avalos

## 2023-06-19 ENCOUNTER — DOCUMENTATION (OUTPATIENT)
Dept: HEMATOLOGY ONCOLOGY | Facility: CLINIC | Age: 78
End: 2023-06-19

## 2023-06-19 NOTE — PROGRESS NOTES
In-basket message received from Diego Haas RN to add patient to [facility] on 6/29/2023  Chart reviewed and prep completed

## 2023-06-20 ENCOUNTER — PATIENT OUTREACH (OUTPATIENT)
Dept: CASE MANAGEMENT | Facility: HOSPITAL | Age: 78
End: 2023-06-20

## 2023-06-20 NOTE — PROGRESS NOTES
OncSW referral received, chart review completed  Pt will have a HFU appt with Dr Shar Sosa next week, MSW will outreach to assess for needs when a tx plan is in place

## 2023-06-25 DIAGNOSIS — I21.3 STEMI (ST ELEVATION MYOCARDIAL INFARCTION) (HCC): ICD-10-CM

## 2023-06-25 DIAGNOSIS — I21.02 STEMI INVOLVING LEFT ANTERIOR DESCENDING CORONARY ARTERY (HCC): ICD-10-CM

## 2023-06-25 DIAGNOSIS — I25.5 ISCHEMIC CARDIOMYOPATHY: ICD-10-CM

## 2023-06-26 ENCOUNTER — TELEPHONE (OUTPATIENT)
Dept: RADIOLOGY | Facility: HOSPITAL | Age: 78
End: 2023-06-26

## 2023-06-26 RX ORDER — METOPROLOL SUCCINATE 25 MG/1
TABLET, EXTENDED RELEASE ORAL
Qty: 90 TABLET | Refills: 3 | Status: SHIPPED | OUTPATIENT
Start: 2023-06-26

## 2023-06-26 RX ORDER — LISINOPRIL 2.5 MG/1
TABLET ORAL
Qty: 90 TABLET | Refills: 3 | Status: SHIPPED | OUTPATIENT
Start: 2023-06-26

## 2023-06-26 RX ORDER — CEFAZOLIN SODIUM 1 G/50ML
1000 SOLUTION INTRAVENOUS ONCE
OUTPATIENT
Start: 2023-06-26 | End: 2023-06-26

## 2023-06-26 RX ORDER — CLOPIDOGREL BISULFATE 75 MG/1
TABLET ORAL
Qty: 90 TABLET | Refills: 3 | Status: SHIPPED | OUTPATIENT
Start: 2023-06-26

## 2023-06-26 RX ORDER — SODIUM CHLORIDE 9 MG/ML
75 INJECTION, SOLUTION INTRAVENOUS CONTINUOUS
OUTPATIENT
Start: 2023-06-26

## 2023-06-26 NOTE — PRE-PROCEDURE INSTRUCTIONS
Pre-procedure Instructions for Interventional Radiology  [address] [facility] [address]  Interventional Radiology [phone]    You are scheduled for a/an [facility] ER Placement  On Monday 7/3/23  Your arrival time is 0800  Our Interventional Radiology nurse will notify you a few days before your procedure with the exact arrival time and location to arrive  To prepare for your procedure:  1  Please arrange for someone to drive you home after the procedure and stay with you until the next morning if you are instructed to do so  This is typically for patients receiving some type of sedative or anesthetic for the procedure  2  DO NOT EAT OR DRINK ANYTHING after midnight on the evening before your procedure including candy & gum   3  ONLY SIPS OF WATER with your medications are allowed on the morning of your procedure  4  TAKE ALL OF YOUR REGULAR MEDICATIONS THE MORNING OF YOUR PROCEDURE with sips of water! We may call you to stop some of your blood sugar, blood pressure and blood thinning medications depending on the procedure  Please take all of these medications unless we instruct you to stop them  5  If you have an allergy to x-ray dye, please contact Interventional Radiology for an x-ray dye preparation which usually consists of an oral steroid and Benadryl  The day of your procedure:  1  Bring a list of the medications you take at home  2  Bring medications you take for breathing problems (such as inhalers), medications for chest pain, or both  3  Bring a case for your glasses or contacts  4  Bring you insurance card and a form of photo ID   5  Please leave all valuables such as credit cards and jewelry at home  6  Report to the registration desk in the main lobby at the [facility]  The  will direct you to the waiting room  7  While your procedure is being performed, your family may wait in the waiting room   If they need to leave, they may provide a number to be called following the procedure  8  Be prepared to stay overnight just in case  Sometimes procedures will indicate the need for further observation or treatment  9  If you are scheduled for a follow-up visit with the Interventional Radiologist after your procedure, you will be called with a date and time  Special Instructions (Medications to alter or stop taking before your procedure etc ):  Above reviewed with his daughter Morro Ramirez

## 2023-06-28 ENCOUNTER — DOCUMENTATION (OUTPATIENT)
Dept: HEMATOLOGY ONCOLOGY | Facility: CLINIC | Age: 78
End: 2023-06-28

## 2023-06-28 ENCOUNTER — OFFICE VISIT (OUTPATIENT)
Dept: HEMATOLOGY ONCOLOGY | Facility: CLINIC | Age: 78
End: 2023-06-28
Payer: COMMERCIAL

## 2023-06-28 VITALS
HEART RATE: 62 BPM | SYSTOLIC BLOOD PRESSURE: 120 MMHG | TEMPERATURE: 98 F | DIASTOLIC BLOOD PRESSURE: 70 MMHG | BODY MASS INDEX: 19.18 KG/M2 | RESPIRATION RATE: 17 BRPM | WEIGHT: 134 LBS | OXYGEN SATURATION: 98 % | HEIGHT: 70 IN

## 2023-06-28 DIAGNOSIS — I25.10 CAD IN NATIVE ARTERY: Chronic | ICD-10-CM

## 2023-06-28 DIAGNOSIS — K62.5 RECTAL BLEEDING: ICD-10-CM

## 2023-06-28 DIAGNOSIS — C20 RECTAL CANCER (HCC): Primary | ICD-10-CM

## 2023-06-28 PROCEDURE — 99215 OFFICE O/P EST HI 40 MIN: CPT | Performed by: INTERNAL MEDICINE

## 2023-06-28 NOTE — PROGRESS NOTES
Returned pts daughter phone call, no answer, left a message with my contact information for her to call me back

## 2023-06-28 NOTE — LETTER
2023     DO Lakisha Anthony  [address] [facility]    Patient: Ernesto Alaniz  YOB: 1945   Date of Visit: 2023       Dear Dr Maura Ramirez:    Thank you for referring Parveen Holden to me for evaluation  Below are my notes for this consultation  If you have questions, please do not hesitate to call me  I look forward to following your patient along with you  Sincerely,        Anneliese Ayoub DO        CC: MD Amber Guadalupe DO Jeannetta Berthold, MD Thurl Gables, DO  2023 10:24 AM  [facility] [place]  [address]    [place] [address]  [phone]  [phone]    Parveen Holden DA ,[birth year], [phone]  23    Discussion:   In summary, this is a 73-year-old male with a history of recently diagnosed locally advanced rectal adenocarcinoma, T3, N1 by MRI  He has two 3 mm pulmonary nodules not noted on prior CT 2019  Identity uncertain  We reviewed that the current standard of care for patients in this scenario involves preoperative chemotherapy followed by RT chemo followed by resection  We reviewed that this gives the highest likelihood of long-term survival/cure  Patient declines chemo or radiation therapy based on previous experience with his wife who  of cancer  We reviewed that this is understandable though is an imperfect predictor of potential toxicities in his case  We reviewed that best efforts are made to minimize potential toxicities of chemotherapy though they may occur despite this  We reviewed that surgery alone could be considered but that the chance of cure is considered significantly lower  Additionally, we reviewed that without any treatment disease will be progressive, cause morbidities, and ultimately death sooner than later    Reviewed some morbidities including pain, bowel obstruction, pulmonary or hepatic toxicities, etc   Considering all of this and having previously discussed with his family the patient remains opposed to chemotherapy or radiation therapy  He would consider surgery if this is felt acceptable from surgical and cardiology viewpoints  Questions were answered to their satisfaction  I discussed the above with the patient  The patient and his daughter voiced understanding and agreement   ______________________________________________________________________    Chief Complaint   Patient presents with   • Follow-up       HPI:  Oncology History   Rectal cancer ([facility] [address] )   5/18/2023 Initial Diagnosis    May 18, 2023 patient presented with BRBPR x1 week  Weight stable over 8 months  CT abdomen pelvis showed rectal mass with 2 suspicious mesorectal lymph nodes  2 mm left lower lobe pulmonary nodule, 3 mm hepatic dome questionable lesion, 1 cm uncinate process cystic lesion  CT chest showed 3 mm left lower lobe and right upper lobe nodules new compared to 2019  May 20 flexible sigmoidoscopy showed distal rectal mass  Biopsy of the mass showed moderately differentiated adenocarcinoma  MMR intact  June 14, 2023 MRI of the pelvis showed findings consistent with T3b, N1 low rectal cancer  Suspicion for vascular invasion  CBC on presentation normal WBC platelets  Hemoglobin 11 4, MCV 77  CMP showed albumin 3 3, otherwise normal         Cancer Staged    Cancer Staging   No matching staging information was found for the patient  5/18/2023 -  Cancer Staged    Staging form: Colon and Rectum, AJCC 8th Edition  - Clinical stage from 5/18/2023: Stage IIIB (cT3, cN1, cM0) - Signed by Pauline Bernard DO on 6/28/2023  Microsatellite instability (MSI): Stable           Interval History: Clinically stable  Diarrhea  BRBPR  ECOG-  1 - Symptomatic but completely ambulatory    Review of Systems   Constitutional: Negative for chills and fever  HENT: Negative for nosebleeds      Eyes: Negative for discharge  Respiratory: Positive for shortness of breath ( Intermittent, not associated with chest pain  )  Negative for cough  Cardiovascular: Negative for chest pain  Gastrointestinal: Positive for blood in stool and diarrhea  Negative for abdominal pain and constipation  Endocrine: Negative for polydipsia  Genitourinary: Negative for hematuria  Musculoskeletal: Negative for arthralgias  Skin: Negative for color change  Allergic/Immunologic: Negative for immunocompromised state  Neurological: Negative for dizziness and headaches  Hematological: Negative for adenopathy  Psychiatric/Behavioral: Negative for agitation         Past Medical History:   Diagnosis Date   • Hyperlipidemia    • Ischemic cardiomyopathy    • Lacunar infarction [facility])    • Myocardial infarction [facility])    • Near syncope 11/14/2019   • Non-ST elevated myocardial infarction [facility])    • NSTEMI (non-ST elevated myocardial infarction) ([place] [address] ) 10/20/2019   • Pericarditis 10/24/2019   • Poison ivy    • Stroke [facility])      Patient Active Problem List   Diagnosis   • CAD in native artery   • S/P primary angioplasty with coronary stent   • History of ischemic cardiomyopathy   • History of lacunar cerebrovascular accident (CVA)   • Non-recurrent unilateral inguinal hernia   • Essential hypertension   • Protein-calorie malnutrition, unspecified severity (HCC)   • Rectal bleeding   • Rectal mass   • Acute on chronic anemia   • Moderate protein-calorie malnutrition ([facility] )   • STEMI involving left anterior descending coronary artery (HCC)   • Ischemic cardiomyopathy   • Renal cyst   • Microscopic hematuria   • Encounter to discuss test results   • Enlarged prostate   • Cerebrovascular accident (CVA) ([facility] )   • Phimosis   • Lower urinary tract symptoms (LUTS)   • Rectal cancer (HCC)       Current Outpatient Medications:   •  atorvastatin (LIPITOR) 80 mg tablet, Take 1 tablet (80 mg total) by mouth daily with dinner, Disp: 90 tablet, Rfl: 3  • "Blood Pressure KIT, by Does not apply route daily, Disp: 1 each, Rfl: 0  •  clopidogrel (PLAVIX) 75 mg tablet, take 1 tablet by mouth daily, Disp: 90 tablet, Rfl: 3  •  lisinopril (ZESTRIL) 2 5 mg tablet, take 1 tablet by mouth daily, Disp: 90 tablet, Rfl: 3  •  metoprolol succinate (TOPROL-XL) 25 mg 24 hr tablet, take 1 tablet by mouth daily, Disp: 90 tablet, Rfl: 3  •  tamsulosin (FLOMAX) 0 4 mg, Take 1 capsule (0 4 mg total) by mouth daily at bedtime, Disp: 90 capsule, Rfl: 3  •  aspirin (ECOTRIN LOW STRENGTH) 81 mg EC tablet, Take 1 tablet (81 mg total) by mouth daily Please purchase over the counter at her local pharmacy, Disp: 30 tablet, Rfl: 1  No Known Allergies  Past Surgical History:   Procedure Laterality Date   • ANGIOPLASTY     • CARDIAC CATHETERIZATION N/A 5/19/2023    Procedure: Cardiac pci;  Surgeon: Stephany Fermin MD;  Location: [address] [place] [address] CATH LAB; Service: Cardiology   • CORONARY STENT PLACEMENT     • HERNIA REPAIR Left 4/28/2021    Procedure: OPEN REPAIR HERNIA INGUINAL LEFT WITH MESH;  Surgeon: Janie Redd MD;  Location: [facility];  Service: General     Social History     Objective:  Vitals:    06/28/23 0933   BP: 120/70   BP Location: Right arm   Patient Position: Sitting   Cuff Size: Adult   Pulse: 62   Resp: 17   Temp: 98 °F (36 7 °C)   SpO2: 98%   Weight: 60 8 kg (134 lb)   Height: 5' 10\" (1 778 m)     Physical Exam  Constitutional:       Appearance: He is well-developed  HENT:      Head: Normocephalic and atraumatic  Eyes:      Pupils: Pupils are equal, round, and reactive to light  Cardiovascular:      Rate and Rhythm: Normal rate and regular rhythm  Heart sounds: No murmur heard  Pulmonary:      Breath sounds: Normal breath sounds  No wheezing or rales  Abdominal:      Palpations: Abdomen is soft  Tenderness: There is no abdominal tenderness  Musculoskeletal:         General: No tenderness  Normal range of motion  Cervical back: Neck supple   " Lymphadenopathy:      Cervical: No cervical adenopathy  Skin:     Findings: No erythema or rash  Neurological:      Mental Status: He is alert and oriented to person, place, and time  Cranial Nerves: No cranial nerve deficit  Deep Tendon Reflexes: Reflexes are normal and symmetric  Psychiatric:         Behavior: Behavior normal            Labs: I personally reviewed the labs and imaging pertinent to this patient care  Wilmer White DO  2023 10:24 AM  Sign when Signing Visit  [address] [address]  [phone]  [address] [place] [address] [place] ,, [phone]  23    Discussion:   In summary, this is a 52-year-old male with a history of recently diagnosed locally advanced rectal adenocarcinoma, T3, N1 by MRI  He has two 3 mm pulmonary nodules not noted on prior CT 2019  Identity uncertain  We reviewed that the current standard of care for patients in this scenario involves preoperative chemotherapy followed by RT chemo followed by resection  We reviewed that this gives the highest likelihood of long-term survival/cure  Patient declines chemo or radiation therapy based on previous experience with his wife who  of cancer  We reviewed that this is understandable though is an imperfect predictor of potential toxicities in his case  We reviewed that surgery alone could be considered but that the chance of cure is considered significantly lower  Additionally, we reviewed that without any treatment disease will be progressive, cause morbidities, and ultimately death sooner than later  Reviewed some morbidities including pain, bowel obstruction, pulmonary or hepatic toxicities, etc   Considering all of this and having previously discussed with his family the patient remains opposed to chemotherapy or radiation therapy    He would consider surgery if this is felt acceptable from surgical and cardiology viewpoints  Questions were answered to their satisfaction  I discussed the above with the patient  The patient and his daughter voiced understanding and agreement   ______________________________________________________________________    Chief Complaint   Patient presents with   • Follow-up       HPI:  Oncology History   Rectal cancer ([address] )   5/18/2023 Initial Diagnosis    May 18, 2023 patient presented with BRBPR x1 week  Weight stable over 8 months  CT abdomen pelvis showed rectal mass with 2 suspicious mesorectal lymph nodes  2 mm left lower lobe pulmonary nodule, 3 mm hepatic dome questionable lesion, 1 cm uncinate process cystic lesion  CT chest showed 3 mm left lower lobe and right upper lobe nodules new compared to 2019  May 20 flexible sigmoidoscopy showed distal rectal mass  Biopsy of the mass showed moderately differentiated adenocarcinoma  MMR intact  June 14, 2023 MRI of the pelvis showed findings consistent with T3b, N1 low rectal cancer  Suspicion for vascular invasion  CBC on presentation normal WBC platelets  Hemoglobin 11 4, MCV 77  CMP showed albumin 3 3, otherwise normal         Cancer Staged    Cancer Staging   No matching staging information was found for the patient  5/18/2023 -  Cancer Staged    Staging form: Colon and Rectum, AJCC 8th Edition  - Clinical stage from 5/18/2023: Stage IIIB (cT3, cN1, cM0) - Signed by Analia Landrum DO on 6/28/2023  Microsatellite instability (MSI): Stable           Interval History: Clinically stable  Diarrhea  BRBPR  ECOG-  1 - Symptomatic but completely ambulatory    Review of Systems   Constitutional: Negative for chills and fever  HENT: Negative for nosebleeds  Eyes: Negative for discharge  Respiratory: Positive for shortness of breath ( Intermittent, not associated with chest pain  )  Negative for cough  Cardiovascular: Negative for chest pain     Gastrointestinal: Positive for blood in stool and diarrhea  Negative for abdominal pain and constipation  Endocrine: Negative for polydipsia  Genitourinary: Negative for hematuria  Musculoskeletal: Negative for arthralgias  Skin: Negative for color change  Allergic/Immunologic: Negative for immunocompromised state  Neurological: Negative for dizziness and headaches  Hematological: Negative for adenopathy  Psychiatric/Behavioral: Negative for agitation         Past Medical History:   Diagnosis Date   • Hyperlipidemia    • Ischemic cardiomyopathy    • Lacunar infarction [facility])    • Myocardial infarction ([place] [address] )    • Near syncope 11/14/2019   • Non-ST elevated myocardial infarction [facility])    • NSTEMI (non-ST elevated myocardial infarction) ([place] [address] ) 10/20/2019   • Pericarditis 10/24/2019   • Poison ivy    • Stroke [facility])      Patient Active Problem List   Diagnosis   • CAD in native artery   • S/P primary angioplasty with coronary stent   • History of ischemic cardiomyopathy   • History of lacunar cerebrovascular accident (CVA)   • Non-recurrent unilateral inguinal hernia   • Essential hypertension   • Protein-calorie malnutrition, unspecified severity (HCC)   • Rectal bleeding   • Rectal mass   • Acute on chronic anemia   • Moderate protein-calorie malnutrition ([place] [address] )   • STEMI involving left anterior descending coronary artery (HCC)   • Ischemic cardiomyopathy   • Renal cyst   • Microscopic hematuria   • Encounter to discuss test results   • Enlarged prostate   • Cerebrovascular accident (CVA) ([place] [address] )   • Phimosis   • Lower urinary tract symptoms (LUTS)   • Rectal cancer (HCC)       Current Outpatient Medications:   •  atorvastatin (LIPITOR) 80 mg tablet, Take 1 tablet (80 mg total) by mouth daily with dinner, Disp: 90 tablet, Rfl: 3  •  Blood Pressure KIT, by Does not apply route daily, Disp: 1 each, Rfl: 0  •  clopidogrel (PLAVIX) 75 mg tablet, take 1 tablet by mouth daily, Disp: 90 tablet, Rfl: 3  •  lisinopril (ZESTRIL) 2 5 mg tablet, take 1 tablet by mouth "daily, Disp: 90 tablet, Rfl: 3  •  metoprolol succinate (TOPROL-XL) 25 mg 24 hr tablet, take 1 tablet by mouth daily, Disp: 90 tablet, Rfl: 3  •  tamsulosin (FLOMAX) 0 4 mg, Take 1 capsule (0 4 mg total) by mouth daily at bedtime, Disp: 90 capsule, Rfl: 3  •  aspirin (ECOTRIN LOW STRENGTH) 81 mg EC tablet, Take 1 tablet (81 mg total) by mouth daily Please purchase over the counter at her local pharmacy, Disp: 30 tablet, Rfl: 1  No Known Allergies  Past Surgical History:   Procedure Laterality Date   • ANGIOPLASTY     • CARDIAC CATHETERIZATION N/A 5/19/2023    Procedure: Cardiac pci;  Surgeon: Gregory Randall MD;  Location: [address] [place] [address] CATH LAB; Service: Cardiology   • CORONARY STENT PLACEMENT     • HERNIA REPAIR Left 4/28/2021    Procedure: OPEN REPAIR HERNIA INGUINAL LEFT WITH MESH;  Surgeon: Viridiana Terry MD;  Location: [facility];  Service: General     Social History     Objective:  Vitals:    06/28/23 0933   BP: 120/70   BP Location: Right arm   Patient Position: Sitting   Cuff Size: Adult   Pulse: 62   Resp: 17   Temp: 98 °F (36 7 °C)   SpO2: 98%   Weight: 60 8 kg (134 lb)   Height: 5' 10\" (1 778 m)     Physical Exam  Constitutional:       Appearance: He is well-developed  HENT:      Head: Normocephalic and atraumatic  Eyes:      Pupils: Pupils are equal, round, and reactive to light  Cardiovascular:      Rate and Rhythm: Normal rate and regular rhythm  Heart sounds: No murmur heard  Pulmonary:      Breath sounds: Normal breath sounds  No wheezing or rales  Abdominal:      Palpations: Abdomen is soft  Tenderness: There is no abdominal tenderness  Musculoskeletal:         General: No tenderness  Normal range of motion  Cervical back: Neck supple  Lymphadenopathy:      Cervical: No cervical adenopathy  Skin:     Findings: No erythema or rash  Neurological:      Mental Status: He is alert and oriented to person, place, and time  Cranial Nerves: No cranial nerve deficit        " Deep Tendon Reflexes: Reflexes are normal and symmetric  Psychiatric:         Behavior: Behavior normal            Labs: I personally reviewed the labs and imaging pertinent to this patient care

## 2023-06-28 NOTE — PROGRESS NOTES
[facility]  [address] [facility]    [facility] [address] [address] [phone]    Jese Chakrabortytaylor MERLOS ,, [phone]  23    Discussion:   In summary, this is a 69-year-old male with a history of recently diagnosed locally advanced rectal adenocarcinoma, T3, N1 by MRI  He has two 3 mm pulmonary nodules not noted on prior CT 2019  Identity uncertain  We reviewed that the current standard of care for patients in this scenario involves preoperative chemotherapy followed by RT chemo followed by resection  We reviewed that this gives the highest likelihood of long-term survival/cure  Patient declines chemo or radiation therapy based on previous experience with his wife who  of cancer  We reviewed that this is understandable though is an unreliable predictor of potential toxicities in his case  We reviewed that best efforts are made to minimize potential toxicities of chemotherapy though they may occur despite this  We reviewed that surgery alone could be considered but that the chance of cure is considered significantly lower  Additionally, we reviewed that without any treatment disease will be progressive, cause morbidities, and ultimately death sooner than later  Reviewed some morbidities including pain, bowel obstruction, pulmonary or hepatic toxicities, etc   Considering all of this and having previously discussed with his family the patient remains opposed to chemotherapy or radiation therapy  He would consider surgery if this is felt acceptable from surgical and cardiology viewpoints  Questions were answered to their satisfaction  I discussed the above with the patient    The patient and his daughter voiced understanding and agreement   ______________________________________________________________________    Chief Complaint   Patient presents with   • Follow-up       HPI:  Oncology History   Rectal cancer ([address] )   2023 Initial Diagnosis    May 18, 2023 patient presented with BRBPR x1 week  Weight stable over 8 months  CT abdomen pelvis showed rectal mass with 2 suspicious mesorectal lymph nodes  2 mm left lower lobe pulmonary nodule, 3 mm hepatic dome questionable lesion, 1 cm uncinate process cystic lesion  CT chest showed 3 mm left lower lobe and right upper lobe nodules new compared to 2019  May 20 flexible sigmoidoscopy showed distal rectal mass  Biopsy of the mass showed moderately differentiated adenocarcinoma  MMR intact  June 14, 2023 MRI of the pelvis showed findings consistent with T3b, N1 low rectal cancer  Suspicion for vascular invasion  CBC on presentation normal WBC platelets  Hemoglobin 11 4, MCV 77  CMP showed albumin 3 3, otherwise normal         Cancer Staged    Cancer Staging   No matching staging information was found for the patient  5/18/2023 -  Cancer Staged    Staging form: Colon and Rectum, AJCC 8th Edition  - Clinical stage from 5/18/2023: Stage IIIB (cT3, cN1, cM0) - Signed by Shaina Ramírez DO on 6/28/2023  Microsatellite instability (MSI): Stable           Interval History: Clinically stable  Diarrhea  BRBPR  ECOG-  1 - Symptomatic but completely ambulatory    Review of Systems   Constitutional: Negative for chills and fever  HENT: Negative for nosebleeds  Eyes: Negative for discharge  Respiratory: Positive for shortness of breath ( Intermittent, not associated with chest pain  )  Negative for cough  Cardiovascular: Negative for chest pain  Gastrointestinal: Positive for blood in stool and diarrhea  Negative for abdominal pain and constipation  Endocrine: Negative for polydipsia  Genitourinary: Negative for hematuria  Musculoskeletal: Negative for arthralgias  Skin: Negative for color change  Allergic/Immunologic: Negative for immunocompromised state  Neurological: Negative for dizziness and headaches  Hematological: Negative for adenopathy  Psychiatric/Behavioral: Negative for agitation         Past Medical History:   Diagnosis Date   • Hyperlipidemia    • Ischemic cardiomyopathy    • Lacunar infarction [facility])    • Myocardial infarction ([place] [address] )    • Near syncope 11/14/2019   • Non-ST elevated myocardial infarction [facility])    • NSTEMI (non-ST elevated myocardial infarction) ([place] [address] ) 10/20/2019   • Pericarditis 10/24/2019   • Poison ivy    • Stroke [facility])      Patient Active Problem List   Diagnosis   • CAD in native artery   • S/P primary angioplasty with coronary stent   • History of ischemic cardiomyopathy   • History of lacunar cerebrovascular accident (CVA)   • Non-recurrent unilateral inguinal hernia   • Essential hypertension   • Protein-calorie malnutrition, unspecified severity (HCC)   • Rectal bleeding   • Rectal mass   • Acute on chronic anemia   • Moderate protein-calorie malnutrition (HCC)   • STEMI involving left anterior descending coronary artery (HCC)   • Ischemic cardiomyopathy   • Renal cyst   • Microscopic hematuria   • Encounter to discuss test results   • Enlarged prostate   • Cerebrovascular accident (CVA) ([place] [address] )   • Phimosis   • Lower urinary tract symptoms (LUTS)   • Rectal cancer (HCC)       Current Outpatient Medications:   •  atorvastatin (LIPITOR) 80 mg tablet, Take 1 tablet (80 mg total) by mouth daily with dinner, Disp: 90 tablet, Rfl: 3  •  Blood Pressure KIT, by Does not apply route daily, Disp: 1 each, Rfl: 0  •  clopidogrel (PLAVIX) 75 mg tablet, take 1 tablet by mouth daily, Disp: 90 tablet, Rfl: 3  •  lisinopril (ZESTRIL) 2 5 mg tablet, take 1 tablet by mouth daily, Disp: 90 tablet, Rfl: 3  •  metoprolol succinate (TOPROL-XL) 25 mg 24 hr tablet, take 1 tablet by mouth daily, Disp: 90 tablet, Rfl: 3  •  tamsulosin (FLOMAX) 0 4 mg, Take 1 capsule (0 4 mg total) by mouth daily at bedtime, Disp: 90 capsule, Rfl: 3  •  aspirin (ECOTRIN LOW STRENGTH) 81 mg EC tablet, Take 1 tablet (81 mg total) by mouth daily Please purchase over "the counter at her local pharmacy, Disp: 30 tablet, Rfl: 1  No Known Allergies  Past Surgical History:   Procedure Laterality Date   • ANGIOPLASTY     • CARDIAC CATHETERIZATION N/A 5/19/2023    Procedure: Cardiac pci;  Surgeon: Karson Howell MD;  Location: [facility] CARDIAC CATH LAB; Service: Cardiology   • CORONARY STENT PLACEMENT     • HERNIA REPAIR Left 4/28/2021    Procedure: OPEN REPAIR HERNIA INGUINAL LEFT WITH MESH;  Surgeon: Macario Bond MD;  Location: [facility] MAIN OR;  Service: General     Social History     Objective:  Vitals:    06/28/23 0933   BP: 120/70   BP Location: Right arm   Patient Position: Sitting   Cuff Size: Adult   Pulse: 62   Resp: 17   Temp: 98 °F (36 7 °C)   SpO2: 98%   Weight: 60 8 kg (134 lb)   Height: 5' 10\" (1 778 m)     Physical Exam  Constitutional:       Appearance: He is well-developed  HENT:      Head: Normocephalic and atraumatic  Eyes:      Pupils: Pupils are equal, round, and reactive to light  Cardiovascular:      Rate and Rhythm: Normal rate and regular rhythm  Heart sounds: No murmur heard  Pulmonary:      Breath sounds: Normal breath sounds  No wheezing or rales  Abdominal:      Palpations: Abdomen is soft  Tenderness: There is no abdominal tenderness  Musculoskeletal:         General: No tenderness  Normal range of motion  Cervical back: Neck supple  Lymphadenopathy:      Cervical: No cervical adenopathy  Skin:     Findings: No erythema or rash  Neurological:      Mental Status: He is alert and oriented to person, place, and time  Cranial Nerves: No cranial nerve deficit  Deep Tendon Reflexes: Reflexes are normal and symmetric  Psychiatric:         Behavior: Behavior normal            Labs: I personally reviewed the labs and imaging pertinent to this patient care    "

## 2023-06-28 NOTE — LETTER
2023     [id number]    Patient: Tracey Hilton  YOB: 1945   Date of Visit: 2023       Dear Dr Guilherme Avalos: Thank you for referring Mary Day to me for evaluation  Below are my notes for this consultation  If you have questions, please do not hesitate to call me  I look forward to following your patient along with you  Sincerely,        Paige Roberson DO        CC: No Recipients    Paige Roberson DO  2023 10:27 AM  Signed  [facility] [place]  [address]    [place] [address]  [phone]  [phone]    Mary Day CF ,, [id number]  23    Discussion:   In summary, this is a 27-year-old male with a history of recently diagnosed locally advanced rectal adenocarcinoma, T3, N1 by MRI  He has two 3 mm pulmonary nodules not noted on prior CT 2019  Identity uncertain  We reviewed that the current standard of care for patients in this scenario involves preoperative chemotherapy followed by RT chemo followed by resection  We reviewed that this gives the highest likelihood of long-term survival/cure  Patient declines chemo or radiation therapy based on previous experience with his wife who  of cancer  We reviewed that this is understandable though is an unreliable predictor of potential toxicities in his case  We reviewed that best efforts are made to minimize potential toxicities of chemotherapy though they may occur despite this  We reviewed that surgery alone could be considered but that the chance of cure is considered significantly lower  Additionally, we reviewed that without any treatment disease will be progressive, cause morbidities, and ultimately death sooner than later    Reviewed some morbidities including pain, bowel obstruction, pulmonary or hepatic toxicities, etc   Considering all of this and having previously discussed with his family the patient remains opposed to chemotherapy or radiation therapy  He would consider surgery if this is felt acceptable from surgical and cardiology viewpoints  Questions were answered to their satisfaction  I discussed the above with the patient  The patient and his daughter voiced understanding and agreement   ______________________________________________________________________    Chief Complaint   Patient presents with   • Follow-up       HPI:  Oncology History   Rectal cancer ([facility] [address] )   5/18/2023 Initial Diagnosis    May 18, 2023 patient presented with BRBPR x1 week  Weight stable over 8 months  CT abdomen pelvis showed rectal mass with 2 suspicious mesorectal lymph nodes  2 mm left lower lobe pulmonary nodule, 3 mm hepatic dome questionable lesion, 1 cm uncinate process cystic lesion  CT chest showed 3 mm left lower lobe and right upper lobe nodules new compared to 2019  May 20 flexible sigmoidoscopy showed distal rectal mass  Biopsy of the mass showed moderately differentiated adenocarcinoma  MMR intact  June 14, 2023 MRI of the pelvis showed findings consistent with T3b, N1 low rectal cancer  Suspicion for vascular invasion  CBC on presentation normal WBC platelets  Hemoglobin 11 4, MCV 77  CMP showed albumin 3 3, otherwise normal         Cancer Staged    Cancer Staging   No matching staging information was found for the patient  5/18/2023 -  Cancer Staged    Staging form: Colon and Rectum, AJCC 8th Edition  - Clinical stage from 5/18/2023: Stage IIIB (cT3, cN1, cM0) - Signed by Pk Cabrales DO on 6/28/2023  Microsatellite instability (MSI): Stable           Interval History: Clinically stable  Diarrhea  BRBPR  ECOG-  1 - Symptomatic but completely ambulatory    Review of Systems   Constitutional: Negative for chills and fever  HENT: Negative for nosebleeds  Eyes: Negative for discharge     Respiratory: Positive for shortness of breath ( Intermittent, not associated with chest pain  )  Negative for cough  Cardiovascular: Negative for chest pain  Gastrointestinal: Positive for blood in stool and diarrhea  Negative for abdominal pain and constipation  Endocrine: Negative for polydipsia  Genitourinary: Negative for hematuria  Musculoskeletal: Negative for arthralgias  Skin: Negative for color change  Allergic/Immunologic: Negative for immunocompromised state  Neurological: Negative for dizziness and headaches  Hematological: Negative for adenopathy  Psychiatric/Behavioral: Negative for agitation         Past Medical History:   Diagnosis Date   • Hyperlipidemia    • Ischemic cardiomyopathy    • Lacunar infarction [facility])    • Myocardial infarction [facility])    • Near syncope 11/14/2019   • Non-ST elevated myocardial infarction [facility])    • NSTEMI (non-ST elevated myocardial infarction) ([facility] ) 10/20/2019   • Pericarditis 10/24/2019   • Poison ivy    • Stroke [facility])      Patient Active Problem List   Diagnosis   • CAD in native artery   • S/P primary angioplasty with coronary stent   • History of ischemic cardiomyopathy   • History of lacunar cerebrovascular accident (CVA)   • Non-recurrent unilateral inguinal hernia   • Essential hypertension   • Protein-calorie malnutrition, unspecified severity (HCC)   • Rectal bleeding   • Rectal mass   • Acute on chronic anemia   • Moderate protein-calorie malnutrition ([facility] )   • STEMI involving left anterior descending coronary artery (HCC)   • Ischemic cardiomyopathy   • Renal cyst   • Microscopic hematuria   • Encounter to discuss test results   • Enlarged prostate   • Cerebrovascular accident (CVA) ([facility] )   • Phimosis   • Lower urinary tract symptoms (LUTS)   • Rectal cancer (HCC)       Current Outpatient Medications:   •  atorvastatin (LIPITOR) 80 mg tablet, Take 1 tablet (80 mg total) by mouth daily with dinner, Disp: 90 tablet, Rfl: 3  •  Blood Pressure KIT, by Does not apply route daily, Disp: 1 each, Rfl: 0  • "clopidogrel (PLAVIX) 75 mg tablet, take 1 tablet by mouth daily, Disp: 90 tablet, Rfl: 3  •  lisinopril (ZESTRIL) 2 5 mg tablet, take 1 tablet by mouth daily, Disp: 90 tablet, Rfl: 3  •  metoprolol succinate (TOPROL-XL) 25 mg 24 hr tablet, take 1 tablet by mouth daily, Disp: 90 tablet, Rfl: 3  •  tamsulosin (FLOMAX) 0 4 mg, Take 1 capsule (0 4 mg total) by mouth daily at bedtime, Disp: 90 capsule, Rfl: 3  •  aspirin (ECOTRIN LOW STRENGTH) 81 mg EC tablet, Take 1 tablet (81 mg total) by mouth daily Please purchase over the counter at her local pharmacy, Disp: 30 tablet, Rfl: 1  No Known Allergies  Past Surgical History:   Procedure Laterality Date   • ANGIOPLASTY     • CARDIAC CATHETERIZATION N/A 5/19/2023    Procedure: Cardiac pci;  Surgeon: Joseph Giordano MD;  Location: [address] [place] [address] CATH LAB; Service: Cardiology   • CORONARY STENT PLACEMENT     • HERNIA REPAIR Left 4/28/2021    Procedure: OPEN REPAIR HERNIA INGUINAL LEFT WITH MESH;  Surgeon: Paola Darden MD;  Location: [facility];  Service: General     Social History     Objective:  Vitals:    06/28/23 0933   BP: 120/70   BP Location: Right arm   Patient Position: Sitting   Cuff Size: Adult   Pulse: 62   Resp: 17   Temp: 98 °F (36 7 °C)   SpO2: 98%   Weight: 60 8 kg (134 lb)   Height: 5' 10\" (1 778 m)     Physical Exam  Constitutional:       Appearance: He is well-developed  HENT:      Head: Normocephalic and atraumatic  Eyes:      Pupils: Pupils are equal, round, and reactive to light  Cardiovascular:      Rate and Rhythm: Normal rate and regular rhythm  Heart sounds: No murmur heard  Pulmonary:      Breath sounds: Normal breath sounds  No wheezing or rales  Abdominal:      Palpations: Abdomen is soft  Tenderness: There is no abdominal tenderness  Musculoskeletal:         General: No tenderness  Normal range of motion  Cervical back: Neck supple  Lymphadenopathy:      Cervical: No cervical adenopathy     Skin:     Findings: No erythema " or rash  Neurological:      Mental Status: He is alert and oriented to person, place, and time  Cranial Nerves: No cranial nerve deficit  Deep Tendon Reflexes: Reflexes are normal and symmetric  Psychiatric:         Behavior: Behavior normal            Labs: I personally reviewed the labs and imaging pertinent to this patient care

## 2023-06-29 ENCOUNTER — DOCUMENTATION (OUTPATIENT)
Dept: HEMATOLOGY ONCOLOGY | Facility: CLINIC | Age: 78
End: 2023-06-29

## 2023-06-29 ENCOUNTER — PATIENT OUTREACH (OUTPATIENT)
Dept: HEMATOLOGY ONCOLOGY | Facility: CLINIC | Age: 78
End: 2023-06-29

## 2023-06-29 NOTE — PROGRESS NOTES
CANCER TREATMENT EVALUATION AND RECOMMENDATION SUMMARY    RECTAL CANCER MULTIDISCIPLINARY CASE REVIEW    NAME OF SURGEON: Dr Benji Alatorre    PATIENT NAME/: Kelley Manriquez       DATE: 2023          TUMOR LOCATION IN THE RECTUM (LOWER, MIDDLE, OR UPPER THIRD) -   Low rectum      INDICATION OF SPHINCTER INVOLVEMENT -   Sphincter involvement: No  Suspicious extra mesorectal lymph nodes:No  EMVI: Yes  Suspicious vascular invasion along the right lateral aspect (series 7 image 64)      CLINICAL (PRETREATMENT) [employer] ON CANCER (AJCC) STAGE -   T3b, N1      CT SCAN STAGING -   2023- CT AP w contrast  Imaging findings highly concerning for a rectal neoplasm with suspicious 2 mesorectal lymph nodes  Colonoscopy and rectal MRI examination are recommended for further evaluation      Moderate colonic stool burden without abnormal dilation, likely representing constipation      A 2 mm pulmonary nodule in the left lower lobe, new since 2019  CT chest without contrast in 12 months is recommended to assess for stability according to Fleischner criteria  However, if there is established rectal malignancy, earlier CT chest   follow-up should be obtained in 3 months      A possible 0 3 cm right hepatic dome lesion versus artifact  If this is a true lesion, this is statistically most likely a benign lesion such as cyst or hemangioma  However if the diagnosis of rectal malignancy is established, short-term follow-up with   postcontrast CT or MRI abdomen is recommended to exclude possibility of a developing metastatic lesion      A 1 0 cm cystic lesion in the uncinate process, 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  MRI abdomen MRCP with and without contrast is recommended in 1 year to assess for stability      2023- CTA chest pe study  No acute findings; specifically, no acute pulmonary arterial embolism      Indeterminate 3 mm left lower lobe and right upper lobe nodules new since the  study     PRETREATMENT CIRCUMFERENTIAL RESECTION MARGIN STATUS (INVOLVED, THREATENED, OR NOT THREATENED) -   MRF: clear (tumor margin >2 mm from MRF)      CARCINOEMBRYONIC ANTIGEN LEVEL (CEA) -   5/19/2023- 1 7      NEOADJUVANT THERAPY RECOMMENDATION -   YES but patient is declining systemic therapy  TYPE AND DURATION OF NEOADJUVANT THERAPY RECOMMENDED -   -Chemotherapy (FOLFOX) x4 months  -Concurrent chemo (5FU)/ RT x6 weeks      ANTICIPATED DATE AND TYPE OF SURGICAL PROCEDURE -   No date at this time  The patient recently had an MI and EF is 20%  Patient is not a candidate for upfront surgery, which he was agreeable to doing         CLINICAL RESEARCH STUDY ELIGIBILITY AND/OR ENROLLMENT -   NO

## 2023-06-29 NOTE — PROGRESS NOTES
RECTAL/GI MULTIDISCIPLINARY CASE REVIEW  NEW RECTAL CANCER DIAGNOSIS    DATE: 6/29/2023      PRESENTING DOCTOR: Dr Joelle Higgins      DIAGNOSIS: Rectal cancer, T3b, N1      Ernesto Alaniz  was presented at the Rectal/GI Multidisciplinary Conference today  BIOPSY DATE/RESULT:  5/20/2023-  A  Rectum, mass, biopsy:  -Adenocarcinoma, invasive, moderately differentiated  -Ancillary testing for mismatch repair (MMR) protein deficiency by immunohistochemical panel (MLH1, PMS2, MSH2, MSH6) is undertaken, the results will be issued in an addendum    IMAGING DONE:  5/18/2023- CT AP w contrast-  5/23/2023- CTA chest pe study  6/14/2023- MRI pelvis rectal cancer staging    WAS IMAGING REVIEWED TODAY:  YES    CEA RESULT/DATE/REVIEWED:  5/19/2023- 1 7- Reviewed    EXPECTED DATE OF FIRST TREATMENT:  No date, patient is refusing to undergo chemotherapy/radiation therapy  COLONOSCOPY REVIEWED:  YES    IF METASTATIC, WERE BIOPSIES OF METASTATIC SITES AVAILABLE FOR REVIEW:  N/A    PRE TREATMENT CLINICAL STAGE:  T3b, N1 Low rectal cancer      PHYSICIAN RECOMMENDED PLAN:    -Recommendation is for [facility]  Patient expressed to colorectal surgeon as well as medical oncologist that he is not interested in receiving chemotherapy/radiation therapy  The patient's wife had cancer years ago and passed away  He does not want treatment after watching what his wife went through  -Patient is interested in surgery upfront which the recommendation for surgery would be an abdominoperineal resection (APR)  Patient not a surgical candidate at this time since patient recently had MI and his EF is 20%  -Since patient is declining systemic therapy, recommendation for palliative care to manage patient's symptoms  Team agreed to plan  The final treatment plan will be left to the discretion of the patient and the treating physician       DISCLAIMERS:  TO THE TREATING PHYSICIAN:  This conference is a meeting of clinicians from various specialty areas who evaluate and discuss patients for whom a multidisciplinary treatment approach is being considered  Please note that the above opinion was a consensus of the conference attendees and is intended only to assist in quality care of the discussed patient  The responsibility for follow up on the input given during the conference, along with any final decisions regarding plan of care, is that of the patient and the patient's provider  Accordingly, appointments have only been recommended based on this information and have NOT been scheduled unless otherwise noted  TO THE PATIENT:  This summary is a brief record of major aspects of your cancer treatment  You may choose to share a copy with any of your doctors or nurses  However, this is not a detailed or comprehensive record of your care        NCCN guidelines were readily available for review at this discussion

## 2023-06-29 NOTE — PROGRESS NOTES
Pts daughter called stating they met with Dr Karen Lawrence and they decided to not have chemo/ radiation  They were instructed to move the scheduled echo to a sooner date, and schedule a follow up/ surgery discussion with Dr Nano Lorenzo  I called scheduling and got the echo moved to 7/13/2023 at the [facility] location and a follow up scheduled with Dr Nano Lorenzo for 8/10/2023    I called Erendira back, no answer, left a detailed message with the scheduled appointments, and my contact  Information if she has any questions

## 2023-06-30 ENCOUNTER — PATIENT OUTREACH (OUTPATIENT)
Dept: HEMATOLOGY ONCOLOGY | Facility: CLINIC | Age: 78
End: 2023-06-30

## 2023-06-30 NOTE — PROGRESS NOTES
Phone outreach to the pt, no answer, left VM, stated my name and title and reason for call- left a very detailed msg stating we presented the pt at our TB meeting and I called to inquire  if he be interested in seeing pall care to discuss goals of care since the pt did decline to go forward w chemo/RT tx  Provided my phone number and hoping to hear back from the pt

## 2023-07-03 ENCOUNTER — PATIENT OUTREACH (OUTPATIENT)
Dept: HEMATOLOGY ONCOLOGY | Facility: CLINIC | Age: 78
End: 2023-07-03

## 2023-07-03 DIAGNOSIS — C20 RECTAL CANCER (HCC): Primary | ICD-10-CM

## 2023-07-03 NOTE — PROGRESS NOTES
Spoke with pts daughter [address], confirmed scheduled echo and follow up with Dr West Manuel. We also discussed setting Minesh up with palliative care. Erendira stated its a good idea to have him see them. I placed a referral to palliative care.   She was thankful for the call, and has my contact information if she needs anything

## 2023-07-06 ENCOUNTER — DOCUMENTATION (OUTPATIENT)
Dept: HEMATOLOGY ONCOLOGY | Facility: CLINIC | Age: 78
End: 2023-07-06

## 2023-07-06 NOTE — PROGRESS NOTES
Left a message with palliative care to follow up on referral placed for a consult appointment.   Message left with my contact information

## 2023-07-07 ENCOUNTER — DOCUMENTATION (OUTPATIENT)
Dept: HEMATOLOGY ONCOLOGY | Facility: CLINIC | Age: 78
End: 2023-07-07

## 2023-07-07 NOTE — PROGRESS NOTES
Spoke with palliative care, message was left for pt to schedule a consult appointment. Another attempt will be made today to schedule a consult appointment.  Confirmed daughters contact information since she handles all his scheduling

## 2023-07-13 ENCOUNTER — TELEPHONE (OUTPATIENT)
Dept: CARDIOLOGY CLINIC | Facility: CLINIC | Age: 78
End: 2023-07-13

## 2023-07-13 ENCOUNTER — HOSPITAL ENCOUNTER (OUTPATIENT)
Dept: NON INVASIVE DIAGNOSTICS | Facility: CLINIC | Age: 78
Discharge: HOME | End: 2023-07-13
Attending: INTERNAL MEDICINE
Payer: COMMERCIAL

## 2023-07-13 VITALS
HEIGHT: 70 IN | WEIGHT: 134 LBS | HEART RATE: 62 BPM | DIASTOLIC BLOOD PRESSURE: 70 MMHG | SYSTOLIC BLOOD PRESSURE: 120 MMHG | BODY MASS INDEX: 19.18 KG/M2

## 2023-07-13 DIAGNOSIS — I25.5 ISCHEMIC CARDIOMYOPATHY: ICD-10-CM

## 2023-07-13 LAB
AORTIC ROOT: 3.2 CM
APICAL FOUR CHAMBER EJECTION FRACTION: 54 %
FRACTIONAL SHORTENING: 32 % (ref 28–44)
INTERVENTRICULAR SEPTUM IN DIASTOLE (PARASTERNAL SHORT AXIS VIEW): 0.8 CM
INTERVENTRICULAR SEPTUM: 0.8 CM (ref 0.6–1.1)
LAAS-AP2: 14.3 CM2
LAAS-AP4: 14.8 CM2
LEFT ATRIUM SIZE: 3.8 CM
LEFT ATRIUM VOLUME (MOD BIPLANE): 40 ML
LEFT INTERNAL DIMENSION IN SYSTOLE: 2.8 CM (ref 2.1–4)
LEFT VENTRICLE DIASTOLIC VOLUME (MOD BIPLANE): 130 ML
LEFT VENTRICLE SYSTOLIC VOLUME (MOD BIPLANE): 57 ML
LEFT VENTRICULAR INTERNAL DIMENSION IN DIASTOLE: 4.1 CM (ref 3.5–6)
LEFT VENTRICULAR POSTERIOR WALL IN END DIASTOLE: 0.9 CM
LEFT VENTRICULAR STROKE VOLUME: 43 ML
LV EF: 56 %
LVSV (TEICH): 43 ML
RIGHT ATRIUM AREA SYSTOLE A4C: 15 CM2
RIGHT VENTRICLE ID DIMENSION: 3.4 CM
SL CV LEFT ATRIUM LENGTH A2C: 4.3 CM
SL CV LV EF: 45
SL CV PED ECHO LEFT VENTRICLE DIASTOLIC VOLUME (MOD BIPLANE) 2D: 72 ML
SL CV PED ECHO LEFT VENTRICLE SYSTOLIC VOLUME (MOD BIPLANE) 2D: 29 ML

## 2023-07-13 PROCEDURE — 93321 DOPPLER ECHO F-UP/LMTD STD: CPT

## 2023-07-13 PROCEDURE — 93321 DOPPLER ECHO F-UP/LMTD STD: CPT | Performed by: INTERNAL MEDICINE

## 2023-07-13 PROCEDURE — 93308 TTE F-UP OR LMTD: CPT

## 2023-07-13 PROCEDURE — 93325 DOPPLER ECHO COLOR FLOW MAPG: CPT

## 2023-07-13 PROCEDURE — 93325 DOPPLER ECHO COLOR FLOW MAPG: CPT | Performed by: INTERNAL MEDICINE

## 2023-07-13 PROCEDURE — 93308 TTE F-UP OR LMTD: CPT | Performed by: INTERNAL MEDICINE

## 2023-07-14 ENCOUNTER — PATIENT OUTREACH (OUTPATIENT)
Dept: HEMATOLOGY ONCOLOGY | Facility: CLINIC | Age: 78
End: 2023-07-14

## 2023-07-14 NOTE — PROGRESS NOTES
Spoke with pts daughter Varsha Hartman to confirm scheduled palliative consult appointment scheduled for 8/2/2023, she is aware

## 2023-07-24 ENCOUNTER — TELEPHONE (OUTPATIENT)
Dept: CARDIOLOGY CLINIC | Facility: CLINIC | Age: 78
End: 2023-07-24

## 2023-07-24 NOTE — TELEPHONE ENCOUNTER
Fax received from [employer] form in pts chart and placed original in Dr. Rocha Cluster folder.     Pt has an appointment with Dr. Gay Caraballo on 09/01/2023

## 2023-07-25 NOTE — TELEPHONE ENCOUNTER
Spoke with our rep from [address].  He states that its ideally completed and signed by patients primary cardiologist.

## 2023-07-25 NOTE — TELEPHONE ENCOUNTER
Lifevest ordered by Dr. Sukhwinder Torres.  Can you please check with [employer] if it has to be signed by the ordering provider or the primary cardiologist.

## 2023-07-31 ENCOUNTER — TELEPHONE (OUTPATIENT)
Dept: CARDIOLOGY CLINIC | Facility: CLINIC | Age: 78
End: 2023-07-31

## 2023-07-31 NOTE — TELEPHONE ENCOUNTER
----- Message from Kasia Baez MD sent at 7/31/2023 12:55 PM EDT -----  Please let him know that his LVEF has recovered to about 45%. Previously was noted to be 20%. He can return the LifeVest to the company.

## 2023-07-31 NOTE — TELEPHONE ENCOUNTER
Spoke to pts daughter and relayed Dr. Kishor Goodrich response, pts daughter verbalized understanding.

## 2023-08-01 NOTE — TELEPHONE ENCOUNTER
Call transferred from [facility]. S/w Jasmin Belcher from [facility]. Wanted to confirm that pt can d/c use of life vest. Advised Sterling that per Dr. Watson Doe, life vest can be d/c'd and pt has been notified. Jasmin Belcher verbalized understanding.

## 2023-08-02 ENCOUNTER — CONSULT (OUTPATIENT)
Dept: PALLIATIVE MEDICINE | Facility: CLINIC | Age: 78
End: 2023-08-02
Payer: COMMERCIAL

## 2023-08-02 VITALS
HEART RATE: 72 BPM | RESPIRATION RATE: 16 BRPM | OXYGEN SATURATION: 99 % | BODY MASS INDEX: 19.17 KG/M2 | TEMPERATURE: 97.3 F | WEIGHT: 133.6 LBS

## 2023-08-02 DIAGNOSIS — Z51.5 PALLIATIVE CARE ENCOUNTER: ICD-10-CM

## 2023-08-02 DIAGNOSIS — C20 RECTAL CANCER (HCC): Primary | ICD-10-CM

## 2023-08-02 DIAGNOSIS — Z71.89 ADVANCED CARE PLANNING/COUNSELING DISCUSSION: ICD-10-CM

## 2023-08-02 DIAGNOSIS — E46 PROTEIN-CALORIE MALNUTRITION, UNSPECIFIED SEVERITY (HCC): ICD-10-CM

## 2023-08-02 PROCEDURE — 99204 OFFICE O/P NEW MOD 45 MIN: CPT | Performed by: NURSE PRACTITIONER

## 2023-08-02 NOTE — PROGRESS NOTES
Outpatient Consultation - Palliative and Supportive Care   Dereje Tran. 69 y.o. male [id number]    Assessment & Plan  Problem List Items Addressed This Visit        Digestive    Rectal cancer [facility]) - Primary       Other    Protein-calorie malnutrition, unspecified severity ([address] )   Other Visit Diagnoses     Advanced care planning/counseling discussion        Palliative care encounter              · Rectal cancer- not seeking treatment  · Protein calorie malnutrition- tolerating regular diet, decreased appetite. Monitor  · Goals of Care- discussion with daughter and patient, patient is interested in meeting with Surgeon, will consider surgery, however, will not consider chemotherapy. Hospice discussion introduced. · Advance Care Planning Counseling- patient has an Advanced Directive and POA papers at home. Daughter Taniya Aceves and son are POA. He states he is DNR/DNI. Goal is to have surgery to help with bleeding and then consider hospice at home as he does not want to pursue any cancer treatments, but to have symptoms treated. · Palliative Care Encounter- introduced Palliative and Supportive Care to patient and daughter. Psychosocial support- supportive listening provided to patient and daughter    Medications adjusted this encounter:  Requested Prescriptions      No prescriptions requested or ordered in this encounter     No orders of the defined types were placed in this encounter. There are no discontinued medications. [facility] for symptoms and planning cares related to above illnesses. Above orders were sent electronically, or provided in hardcopy in clinic. I have reviewed the patient's controlled substance dispensing history in the Prescription Drug Monitoring Program in compliance with the [facility] regulations before prescribing any controlled substances. We appreciate the referral, and wish for him to continue to follow with us.   If there are questions or concerns, please contact us through our clinic/answering service 24 hours a day, seven days a week. HUSSAIN Benavides  [facility] Palliative and Supportive Care  [phone]        Visit Information    Accompanied By: Family member    Source of History: Self, Family member    History Limitations: None    Contacts:Contact Information:  Name: Cherise Whiteside, Relationship: daughter, Cell Number: [phone]    Chief Complaint  No chief complaint on file. Copied from Oncology history:  Oncology History   Rectal cancer ([address])   5/18/2023 Initial Diagnosis     May 18, 2023 patient presented with BRBPR x1 week. Weight stable over 8 months. CT abdomen pelvis showed rectal mass with 2 suspicious mesorectal lymph nodes. 2 mm left lower lobe pulmonary nodule, 3 mm hepatic dome questionable lesion, 1 cm uncinate process cystic lesion. CT chest showed 3 mm left lower lobe and right upper lobe nodules new compared to 2019. May 20 flexible sigmoidoscopy showed distal rectal mass. Biopsy of the mass showed moderately differentiated adenocarcinoma. MMR intact. June 14, 2023 MRI of the pelvis showed findings consistent with T3b, N1 low rectal cancer. Suspicion for vascular invasion. CBC on presentation normal WBC platelets. Hemoglobin 11.4, MCV 77. CMP showed albumin 3.3, otherwise normal.           Cancer Staged     Cancer Staging   No matching staging information was found for the patient.         5/18/2023 -  Cancer Staged     Staging form: Colon and Rectum, AJCC 8th Edition  - Clinical stage from 5/18/2023: Stage IIIB (cT3, cN1, cM0) - Signed by May Never, DO on 6/28/2023  Microsatellite instability (MSI): Stable              History of Present Illness      Belem Gatica is a 69 y.o. male who presents as a referral from Dr. Porfirio Kurtz of Oncology  for primary palliative diagnosis of rectal cancer. Consultation is requested for: goal of care assessment and decisional support.   Patient presents to the office with his daughter Chandler Cary. He voices frustration with current medical condition, his biggest complaint is rectal bleeding. He denies pain, states appetite is fair, hoping to have more energy to do what he wants. He was very active and was working as a  at [employer] when he was diagnosed with rectal cancer. He has personal history of watching others he has loved receive cancer treatments and does not want to pursue any chemotherapy or radiation therapy. He reports his goal is to have surgery to remove cancer, and to have no further rectal bleeding. He states he understands that his life expectancy may be 6 months to a year, and would like to be able to live managing symptoms and being comfortable at home with his family. Pertinent Palliative Care Domains    Physical Symptoms:ambulates with difficulty    Psychological Symptoms:mild anxiety    Social Aspects: support system lives with daughter Chandler Cary, granddaughter and great grandchildren live next door, brother resides in [place],         Advance Directive and Living Will:    Yes, not on file, DNR/DNI  Power of :  yes, not on file  POLST:  No    Historical Data  Past Medical History:   Diagnosis Date   • Hyperlipidemia    • Ischemic cardiomyopathy    • Lacunar infarction ([address])    • Myocardial infarction ([address])    • Near syncope 11/14/2019   • Non-ST elevated myocardial infarction [facility])    • NSTEMI (non-ST elevated myocardial infarction) ([address]) 10/20/2019   • Pericarditis 10/24/2019   • Poison ivy    • Stroke [facility])      Past Surgical History:   Procedure Laterality Date   • ANGIOPLASTY     • CARDIAC CATHETERIZATION N/A 5/19/2023    Procedure: Cardiac pci;  Surgeon: Compa Carlson MD;  Location: [facility] CARDIAC CATH LAB;   Service: Cardiology   • CORONARY STENT PLACEMENT     • HERNIA REPAIR Left 4/28/2021    Procedure: OPEN REPAIR HERNIA INGUINAL LEFT WITH MESH;  Surgeon: Aly Butts MD;  Location: [facility] MAIN OR;  Service: General     Social History     Socioeconomic History   • Marital status:      Spouse name: Not on file   • Number of children: Not on file   • Years of education: Not on file   • Highest education level: Not on file   Occupational History   • Not on file   Tobacco Use   • Smoking status: Former     Packs/day: 1.00     Years: 15.00     Total pack years: 15.00     Types: Cigarettes     Quit date: 1969     Years since quittin.6   • Smokeless tobacco: Never   Vaping Use   • Vaping Use: Never used   Substance and Sexual Activity   • Alcohol use: Never   • Drug use: No   • Sexual activity: Not Currently     Partners: Female   Other Topics Concern   • Not on file   Social History Narrative   • Not on file     Social Determinants of Health     Financial Resource Strain: Low Risk  (10/17/2022)    Overall Financial Resource Strain (CARDIA)    • Difficulty of Paying Living Expenses: Not hard at all   Food Insecurity: No Food Insecurity (2023)    Hunger Vital Sign    • Worried About Running Out of Food in the Last Year: Never true    • Ran Out of Food in the Last Year: Never true   Transportation Needs: No Transportation Needs (2023)    PRAPARE - Transportation    • Lack of Transportation (Medical): No    • Lack of Transportation (Non-Medical):  No   Physical Activity: Inactive (3/8/2021)    Exercise Vital Sign    • Days of Exercise per Week: 0 days    • Minutes of Exercise per Session: 0 min   Stress: No Stress Concern Present (3/8/2021)    [facility]    • Feeling of Stress : Not at all   Social Connections: Not on file   Intimate Partner Violence: Not on file   Housing Stability: [address]  (2023)    Housing Stability Vital Sign    • Unable to Pay for Housing in the Last Year: No    • Number of Places Lived in the Last Year: 1    • Unstable Housing in the Last Year: No     Family History   Problem Relation Age of Onset   • No Known Problems Mother    • Emphysema Father    • Heart disease Maternal Grandfather    • Heart attack Maternal Grandfather    • Emphysema Paternal Grandfather      No Known Allergies  Current Outpatient Medications   Medication Sig Dispense Refill   • aspirin (ECOTRIN LOW STRENGTH) 81 mg EC tablet Take 1 tablet (81 mg total) by mouth daily Please purchase over the counter at her local pharmacy 30 tablet 1   • atorvastatin (LIPITOR) 80 mg tablet Take 1 tablet (80 mg total) by mouth daily with dinner 90 tablet 3   • Blood Pressure KIT by Does not apply route daily 1 each 0   • clopidogrel (PLAVIX) 75 mg tablet take 1 tablet by mouth daily 90 tablet 3   • lisinopril (ZESTRIL) 2.5 mg tablet take 1 tablet by mouth daily 90 tablet 3   • metoprolol succinate (TOPROL-XL) 25 mg 24 hr tablet take 1 tablet by mouth daily 90 tablet 3   • tamsulosin (FLOMAX) 0.4 mg Take 1 capsule (0.4 mg total) by mouth daily at bedtime 90 capsule 3     No current facility-administered medications for this visit. Review of Systems   Constitutional: Positive for malaise/fatigue. Cardiovascular: Positive for dyspnea on exertion. Negative for chest pain. Respiratory: Positive for shortness of breath. Gastrointestinal: Positive for diarrhea. Negative for constipation, nausea and vomiting. Rectal bleeding         Vital Signs  Pulse 72   Temp (!) 97.3 °F (36.3 °C) (Temporal)   Resp 16   Wt 60.6 kg (133 lb 9.6 oz)   SpO2 99%   BMI 19.17 kg/m²     Physical Exam and Objective Data  Physical Exam  Vitals (chronically ill appearing) reviewed. Constitutional:       General: He is not in acute distress. Cardiovascular:      Rate and Rhythm: Normal rate. Pulmonary:      Effort: Pulmonary effort is normal.   Neurological:      Mental Status: He is alert and oriented to person, place, and time.            Radiology and Laboratory:  I personally reviewed  the following results:  MRI pelvis rectal cancer staging wo contrast  Status: Final result     PACS Images     Show images for MRI pelvis rectal cancer staging wo contrast  Study Result    Narrative & Impression   MRI PELVIS - WITHOUT CONTRAST (RECTAL CANCER STAGING)     INDICATION:   K62.89: Other specified diseases of anus and rectum. Based on sigmoidoscopy 5/20/2023, the tumor is low rectal patient has not had preoperative chemoradiation treatment.     COMPARISON: CT abdomen pelvis 5/18/2023     TECHNIQUE: Multiplanar/multisequence MRI of the pelvis without contrast was performed using rectal cancer imaging protocol. An additional small field of view, axial oblique T2-weighted sequence was performed through the tumor, perpendicular to the long   axis of the rectum at that level. Imaging performed on 3.0T MRI     IV contrast was not given.     FINDINGS:  TUMOR LOCATION AND CHARACTERISTICS  -  Tumor location: Low rectal cancer (0 to 5 cm). , 4.1 cm from the anal verge. -  Distance of the lowest extent of tumor from the top of the anal sphincter: 1.0 cm.  -  Relationship to anterior peritoneal reflection: Below  -  Morphology: Partly Annular. It is located centered in the right posterolateral wall of the rectum, covering approximately 75% circumference (series 5 image 17)  -  Tumor craniocaudal length: 4.0 cm.  -  Mucinous: No     T-STAGING:     T3b (tumor penetrates 1- 5 mm beyond muscularis propria) in the right posterolateral aspect, there is likely 5 mm tumor extension beyond muscularis propria (series 3 image 14)     ANAL SPHINCTER INVOLVEMENT (FOR LOW RECTAL CA): Absent.     EXTRAMURAL VASCULAR INVASION: EMVI is likely present. Thickened linear T2 hypointensity originating from the right lateral aspect of the rectum (series 5 image 17) with restriction of diffusion (series 7 image 64), concerning for early EMVI     MESORECTAL FASCIA (MRF) STATUS:  -Not involved.  Shortest distance from tumor to MRF: 13 mm  -Is there a suspicious LN with smooth borders threatening ( >=  1mm and  <= 2 mm) or invading (< 1 mm) the MRF? No     TUMOR DEPOSITS:No.     LYMPH NODES:   There are between 1 and 3 suspicious locoregional nodes (N1).     Suspicious mesorectal/superior rectal lymph nodes described on series 5:  Image 16, Right posterior mesorectal, 6 mm. Image 8, Superior rectal, 5 mm, better appreciated on CT 5/18/2023.     Suspicious extra-mesorectal locoregional nodes: No.     Suspicious non-locoregional nodes: None.     REST OF THE PELVIS:     REPRODUCTIVE STRUCTURES: Age-appropriate.     BLADDER:  Normal.     OTHER BOWEL LOOPS: Unremarkable MRI appearance.     PELVIC CAVITY:  Unremarkable.     VASCULAR STRUCTURES:  Limited evaluation of the visualized vasculature on this non-contrast MRI is unremarkable.     PELVIC WALL:  Unremarkable.     OSSEOUS STRUCTURES: No osseous destruction.        IMPRESSION:  Unenhanced MRI of the Pelvis (Rectal Protocol)     Overall MRI stage: T3b, N1, Low rectal cancer (series 3 image 14)  MRF: clear (tumor margin >2 mm from MRF)  Sphincter involvement: No  Suspicious extra mesorectal lymph nodes:No  EMVI: Yes. Suspicious vascular invasion along the right lateral aspect (series 7 image 64)     For the purpose of radiation therapy planning, series 3 would be most useful.                 Workstation performed: [id number]     CTA chest pe study  Status: Final result     PACS Images     Show images for CTA chest pe study  Study Result    Narrative & Impression   CTA - CHEST WITH IV CONTRAST - PULMONARY ANGIOGRAM     INDICATION:   pe protocol as well as r/o mets.     COMPARISON: CTA chest PE study 11/17/2019. Lung base imaging included with CT abdomen/pelvis 5/18/2023     TECHNIQUE: CTA examination of the chest was performed using angiographic technique according to a protocol specifically tailored to evaluate for pulmonary embolism. Multiplanar 2D reformatted images were created from the source data.  In addition,   coronal 3D MIP postprocessing was performed on the acquisition scanner.     Radiation dose length product (DLP) for this visit:  233 mGy-cm . This examination, like all CT scans performed in the [facility], was performed utilizing techniques to minimize radiation dose exposure, including the use of iterative   reconstruction and automated exposure control.     IV Contrast:  85 mL of iohexol (OMNIPAQUE)     FINDINGS:     PULMONARY ARTERIAL TREE:  No pulmonary embolus is seen.     LUNGS:  3 mm left lower lobe subpleural nodule #3/83, also seen on the recent CT abdomen pelvis study, is new since the 2019 study. 3 mm right upper lobe nodule #6/107. Scattered right upper lobe granuloma.     Mild biapical pleural/parenchymal scarring.     No endotracheal or endobronchial lesion.     PLEURA:  Unremarkable.     HEART/GREAT VESSELS: Coronary artery calcifications. No significant pericardial effusion. No thoracic aortic aneurysm.     MEDIASTINUM AND NANCY: Small hiatal hernia.     CHEST WALL AND LOWER NECK:   Unremarkable.     VISUALIZED STRUCTURES IN THE UPPER ABDOMEN: See the recent CT abdomen pelvis report.     OSSEOUS STRUCTURES:  No acute fracture or destructive osseous lesion.     IMPRESSION:     No acute findings; specifically, no acute pulmonary arterial embolism.     Indeterminate 3 mm left lower lobe and right upper lobe nodules new since the 2019 study.                 Workstation performed: [id number]        CT abdomen pelvis with contrast  Status: Final result     PACS Images     Show images for CT abdomen pelvis with contrast  Study Result    Narrative & Impression   CT ABDOMEN AND PELVIS WITH IV CONTRAST     INDICATION:   Left lower quadrant pain, rectal bleeding.     COMPARISON: CT abdomen pelvis 5/1/2023 CT chest 11/17/2019     TECHNIQUE:  CT examination of the abdomen and pelvis was performed.  Multiplanar 2D reformatted images were created from the source data.     This examination, like all CT scans performed in the [facility], was performed utilizing techniques to minimize radiation dose exposure, including the use of iterative reconstruction and automated exposure control. Radiation dose length   product (DLP) for this visit:  684 mGy-cm     IV Contrast:  100 mL of iohexol (OMNIPAQUE)  Enteric Contrast:  Enteric contrast was not administered.     FINDINGS:     ABDOMEN     LOWER CHEST: A 2 mm pulmonary nodule in the left lower lobe (series 2 image 12), grossly stable in size since 5/1/2023 but new compared to 11/17/2019.     LIVER/BILIARY TREE: 0.3 cm low-attenuation lesion is seen in the right hepatic dome (series 2 image 24) which can represent either artifact or a focal hepatic lesion. It is not identified on prior CT examination dated 5/1/2023. Hepatic contours are   normal.  No biliary dilatation.     GALLBLADDER:  There are gallstone(s) within the gallbladder, without pericholecystic inflammatory changes.     SPLEEN:  Unremarkable.     PANCREAS: No main pancreatic ductal dilation. 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 ADRENAL GLANDS:  Unremarkable.     KIDNEYS/URETERS:  No hydronephrosis or urinary tract calculus. Bilateral renal simple cysts. One or more sharply circumscribed subcentimeter renal hypodensities are present, too small to accurately characterize, and statistically most likely benign   findings. According to recent literature (Radiology 2019) no further workup of these findings is recommended.     STOMACH AND BOWEL: Moderate stool burden in the colon. There is asymmetric wall thickening involving the anterior, right lateral and posterior aspect of the rectum, highly concerning for rectal neoplasm (series 2 image 162). It measures approximately 5.5   cm in craniocaudal dimension. There are 2 suspicious mesorectal lymph nodes measuring up to 0.7 cm on series 2 image 163 and a high rectal lymph node measuring up to 0.5 cm on series 2 image 151).      APPENDIX:  A normal appendix was visualized.     ABDOMINOPELVIC CAVITY:  No ascites. Stable 3.2 cm fluid collection at the left internal inguinal ring, likely representing seroma with history of left inguinal hernia repair. No pneumoperitoneum. No lymphadenopathy.     VESSELS:  Unremarkable for patient's age.     PELVIS     REPRODUCTIVE ORGANS:  Unremarkable for patient's age.     URINARY BLADDER: Underdistended, limiting evaluation.     ABDOMINAL WALL/INGUINAL REGIONS:  Unremarkable.     OSSEOUS STRUCTURES:  No acute fracture or destructive osseous lesion.     IMPRESSION:     Imaging findings highly concerning for a rectal neoplasm with suspicious 2 mesorectal lymph nodes. Colonoscopy and rectal MRI examination are recommended for further evaluation.     Moderate colonic stool burden without abnormal dilation, likely representing constipation.     A 2 mm pulmonary nodule in the left lower lobe, new since 11/17/2019. CT chest without contrast in 12 months is recommended to assess for stability according to Fleischner criteria. However, if there is established rectal malignancy, earlier CT chest   follow-up should be obtained in 3 months.     A possible 0.3 cm right hepatic dome lesion versus artifact. If this is a true lesion, this is statistically most likely a benign lesion such as cyst or hemangioma. However if the diagnosis of rectal malignancy is established, short-term follow-up with   postcontrast CT or MRI abdomen is recommended to exclude possibility of a developing metastatic lesion.     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  MRI abdomen MRCP with and without contrast is recommended in 1 year to assess for stability.           I personally discussed this study with Dario Goltz on 5/18/2023 4:54 PM.                          Workstation performed: [id number]     CT abdomen pelvis with contrast  Status: Final result     PACS Images     Show images for CT abdomen pelvis with contrast  Study Result    Narrative & Impression   CT ABDOMEN AND PELVIS WITH IV CONTRAST     INDICATION:   Urinary retention  New urinary incontinence.     COMPARISON:  None.     TECHNIQUE:  CT examination of the abdomen and pelvis was performed. Multiplanar 2D reformatted images were created from the source data.     Radiation dose length product (DLP) for this visit:  656 mGy-cm . This examination, like all CT scans performed in the [facility], was performed utilizing techniques to minimize radiation dose exposure, including the use of iterative   reconstruction and automated exposure control.     IV Contrast:  100 mL of iohexol (OMNIPAQUE)  Enteric Contrast:  Enteric contrast was not administered.     FINDINGS:     ABDOMEN     LOWER CHEST: Small hiatal hernia.     LIVER/BILIARY TREE:  Unremarkable.     GALLBLADDER: There are gallstone(s) within the gallbladder, without pericholecystic inflammatory changes.     SPLEEN: The spleen is enlarged, measuring 13.8 cm.     PANCREAS:  Unremarkable.     ADRENAL GLANDS:  Unremarkable.     KIDNEYS/URETERS: One or more simple renal cyst(s) is noted. Otherwise unremarkable kidneys. No hydronephrosis.     STOMACH AND BOWEL: There is colonic diverticulosis without evidence of acute diverticulitis.     APPENDIX:  No findings to suggest appendicitis.     ABDOMINOPELVIC CAVITY:  No ascites. No pneumoperitoneum. No lymphadenopathy.     VESSELS:  Unremarkable for patient's age.     PELVIS     REPRODUCTIVE ORGANS: The prostate is enlarged.     URINARY BLADDER: Left posterior lateral thickening of the bladder wall, correlate with urinalysis and outpatient nonemergent cystoscopy best seen on series 2 image 158.     ABDOMINAL WALL/INGUINAL REGIONS: 3.3 x 1.9 cm fluid collection at the origin of the left inguinal canal could be postsurgical but needs to be correlated clinically.  There is increased soft tissue along the left inguinal canal extending to the scrotum, correlate with possible hernia surgical history.     OSSEOUS STRUCTURES:  No acute fracture or destructive osseous lesion.     IMPRESSION:     Cholelithiasis.     Splenomegaly.     Bilateral renal cysts.     Left posterior lateral irregular thickening of the bladder wall, correlate with urinalysis and outpatient nonemergent cystoscopy to rule out transitional cell carcinoma.     3.3 x 1.9 cm fluid collection at the origin of the left inguinal canal could be postsurgical but needs to be correlated clinically. There is increased soft tissue along the left inguinal canal extending to the scrotum, correlate with possible hernia   surgical history.     The study was marked in EPIC for significant notification.              Workstation performed: [id number]              60 minutes was spent face to face with Chung Bill. and his daughter with greater than 50% of the time spent in counseling or coordination of care including discussions of introduction to Palliative and Supportive Care, and introduction to hospice. All of the patient's questions were answered during this discussion.

## 2023-08-03 ENCOUNTER — TELEPHONE (OUTPATIENT)
Age: 78
End: 2023-08-03

## 2023-08-03 NOTE — TELEPHONE ENCOUNTER
Left voice mail for patient to call back to schedule appointment .    For a long office visit to get 02 level check

## 2023-08-07 ENCOUNTER — APPOINTMENT (OUTPATIENT)
Age: 78
End: 2023-08-07
Payer: COMMERCIAL

## 2023-08-07 ENCOUNTER — OFFICE VISIT (OUTPATIENT)
Age: 78
End: 2023-08-07
Payer: COMMERCIAL

## 2023-08-07 VITALS
HEART RATE: 98 BPM | TEMPERATURE: 96.5 F | RESPIRATION RATE: 18 BRPM | DIASTOLIC BLOOD PRESSURE: 58 MMHG | SYSTOLIC BLOOD PRESSURE: 100 MMHG | HEIGHT: 65 IN | OXYGEN SATURATION: 98 % | WEIGHT: 133.6 LBS | BODY MASS INDEX: 22.26 KG/M2

## 2023-08-07 DIAGNOSIS — R42 LIGHTHEADEDNESS: ICD-10-CM

## 2023-08-07 DIAGNOSIS — I10 ESSENTIAL HYPERTENSION: Chronic | ICD-10-CM

## 2023-08-07 DIAGNOSIS — F33.9 DEPRESSION, RECURRENT (HCC): ICD-10-CM

## 2023-08-07 DIAGNOSIS — R06.02 SOB (SHORTNESS OF BREATH) ON EXERTION: ICD-10-CM

## 2023-08-07 DIAGNOSIS — I25.5 ISCHEMIC CARDIOMYOPATHY: ICD-10-CM

## 2023-08-07 DIAGNOSIS — K62.5 RECTAL BLEEDING: ICD-10-CM

## 2023-08-07 DIAGNOSIS — R06.02 SOB (SHORTNESS OF BREATH) ON EXERTION: Primary | ICD-10-CM

## 2023-08-07 PROCEDURE — 85025 COMPLETE CBC W/AUTO DIFF WBC: CPT

## 2023-08-07 PROCEDURE — 36415 COLL VENOUS BLD VENIPUNCTURE: CPT

## 2023-08-07 PROCEDURE — 71046 X-RAY EXAM CHEST 2 VIEWS: CPT

## 2023-08-07 PROCEDURE — 83550 IRON BINDING TEST: CPT

## 2023-08-07 PROCEDURE — 83540 ASSAY OF IRON: CPT

## 2023-08-07 PROCEDURE — 99214 OFFICE O/P EST MOD 30 MIN: CPT

## 2023-08-07 PROCEDURE — 82728 ASSAY OF FERRITIN: CPT

## 2023-08-07 NOTE — PROGRESS NOTES
Diagnoses and all orders for this visit:    Rectal cancer [facility])       Rectal cancer [facility])  Patient presents for follow-up of rectal cancer. This was diagnosed when he was having rectal bleeding following an MI and percutaneous coronary intervention that he was started on dual antiplatelet therapy. He has since been diagnosed with a T3 N1 rectal cancer. This is low within 1 cm of the anal sphincter. He has questionable lesions in his liver and his lung but these are too small to characterize. He had been seen before prior to complete imaging workup. He has been presented at multidisciplinary conference and recommended for chemotherapy and radiation. He has seen hematology oncology and expressed a desire not to have chemotherapy and radiation. He presents today for surgical discussion. We reviewed again standard of care for his disease would be chemotherapy and radiation followed by surgery. We discussed that surgery for him will be quite high risk given his recent MI. He would need to be off of his dual antiplatelet therapy. There is also a chance that he has metastatic disease already and would require therapy. It appears that he has an aversion to chemotherapy and radiation secondary to watching his wife suffer from disease in the past.  He remains relatively noncommittal as to whether he wants any therapy done at all. Given this overall picture, I have again recommended chemotherapy and radiation as this is standard therapy and has been discussed at multidisciplinary conference. With this discussion he does express that if he is pursuing therapy he would pursue chemotherapy. This will also help to palliate his symptoms, as well as delay need for surgical intervention with a higher cardiac risk given his recent MI.      ARYAN Bergman. is here today for surgical discussion. The patient denies any anal discomfort but notes bleeding upon wiping.  He also notes some non localized abdominal pain and nausea. Last seen in the office on 6/8/2023 for evaluation of rectal cancer. Patient had a Flexible sigmoidoscopy on 5/20/2023 with Dr. Marv Crabtree, which showed rectal mass consistent with adenocarcinoma, two thirds circumference, 4 cm from the anal verge, extending 5 cm proximal.  Multiple small moderate diverticula in the sigmoid colon; no bleeding was identified.       Flexible sigmoidoscopy pathology reported:  A. Rectum, mass, biopsy:  -Adenocarcinoma, invasive, moderately differentiated. -Ancillary testing for mismatch repair (MMR) protein deficiency by immunohistochemical panel (MLH1, PMS2, MSH2, MSH6) is undertaken, the results will be issued in an addendum.     Addendum  RESULTS OF IMMUNOHISTOCHEMICAL ANALYSIS FOR MISMATCH REPAIR PROTEIN LOSS  INTERPRETATION: NO LOSS OF NUCLEAR EXPRESSION OF MMR PROTEINS: LOW PROBABILITY OF MSI-H        RESULTS:  Antibody            Clone                 Description                     Results  MLH1                 M1                     Mismatch repair protein  Intact nuclear expression  MSH2                [id number]        Mismatch repair protein  Intact nuclear expression  MSH6                44                      Mismatch repair protein  Intact nuclear expression  PMS2                 [id number]           Mismatch repair protein  Intact nuclear expression      MRI staging on 6/14/2023 showed:   Overall MRI stage: T3b, N1, Low rectal cancer (series 3 image 14)  MRF: clear (tumor margin >2 mm from MRF)  Sphincter involvement: No  Suspicious extra mesorectal lymph nodes:No  EMVI: Yes. Suspicious vascular invasion along the right lateral aspect (series 7 image 64)  For the purpose of radiation therapy planning, series 3 would be most useful.     Oncology consult on 6/28/2023 reveals patient to be opposed to chemotherapy or radiation therapy.          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[facility])    • Myocardial infarction [facility])    • Near syncope 11/14/2019   • Non-ST elevated myocardial infarction [facility])    • NSTEMI (non-ST elevated myocardial infarction) ([address]) 10/20/2019   • Pericarditis 10/24/2019   • Poison ivy    • Stroke [facility])      Past Surgical History:   Procedure Laterality Date   • ANGIOPLASTY     • CARDIAC CATHETERIZATION N/A 5/19/2023    Procedure: Cardiac pci;  Surgeon: Roshni Ryan MD;  Location: [facility] CARDIAC CATH LAB; Service: Cardiology   • CORONARY STENT PLACEMENT     • HERNIA REPAIR Left 4/28/2021    Procedure: OPEN REPAIR HERNIA INGUINAL LEFT WITH MESH;  Surgeon: Khadijah Ley MD;  Location: [facility] MAIN OR;  Service: General       Current Outpatient Medications:   •  aspirin (ECOTRIN LOW STRENGTH) 81 mg EC tablet, Take 1 tablet (81 mg total) by mouth daily Please purchase over the counter at her local pharmacy, Disp: 30 tablet, Rfl: 1  •  atorvastatin (LIPITOR) 80 mg tablet, Take 1 tablet (80 mg total) by mouth daily with dinner, Disp: 90 tablet, Rfl: 3  •  Blood Pressure KIT, by Does not apply route daily, Disp: 1 each, Rfl: 0  •  clopidogrel (PLAVIX) 75 mg tablet, take 1 tablet by mouth daily, Disp: 90 tablet, Rfl: 3  •  ferrous sulfate 324 (65 Fe) mg, Take 1 tablet (324 mg total) by mouth in the morning, Disp: 30 tablet, Rfl: 5  •  lisinopril (ZESTRIL) 2.5 mg tablet, take 1 tablet by mouth daily, Disp: 90 tablet, Rfl: 3  •  metoprolol succinate (TOPROL-XL) 25 mg 24 hr tablet, take 1 tablet by mouth daily, Disp: 90 tablet, Rfl: 3  •  tamsulosin (FLOMAX) 0.4 mg, Take 1 capsule (0.4 mg total) by mouth daily at bedtime, Disp: 90 capsule, Rfl: 3  Allergies as of 08/10/2023   • (No Known Allergies)     Review of Systems   Gastrointestinal: Positive for blood in stool. All other systems reviewed and are negative.     Vitals:    08/10/23 1129   BP: 122/64     Physical Exam  Constitutional:       Appearance: Normal appearance. HENT:      Head: Normocephalic and atraumatic. Eyes:      Extraocular Movements: Extraocular movements intact. Pupils: Pupils are equal, round, and reactive to light. Musculoskeletal:         General: Normal range of motion. Skin:     General: Skin is warm and dry. Neurological:      General: No focal deficit present. Mental Status: He is alert and oriented to person, place, and time. Psychiatric:         Mood and Affect: Mood normal.         Behavior: Behavior normal.         Thought Content:  Thought content normal.         Judgment: Judgment normal.

## 2023-08-07 NOTE — PROGRESS NOTES
Name: Kain Liu. : 1945      MRN: [id number]  Encounter Provider: Lori Becerra  Encounter Date: 2023   Encounter department: [facility] [place]    Assessment & Plan     1. SOB (shortness of breath) on exertion  -     CBC and differential; Future  -     XR chest pa & lateral; Future; Expected date: 2023  -     Iron Panel (Includes Ferritin, Iron Sat%, Iron, and TIBC); Future    2. Lightheadedness  -     CBC and differential; Future    3. Rectal bleeding  -     CBC and differential; Future  -     Iron Panel (Includes Ferritin, Iron Sat%, Iron, and TIBC); Future    Pulse ox checked sitting and was HR 69, O2 sat 100%. Pt in no respiratory distress. Pt stood up and felt a little lightheaded. HR up to 88, O2 sat 98%. Pt ambulated briefly around the tovar and he stated he felt a little SOB once returning to the room. HR was 85 and O2 sat 98%. Pt was not unsteady during ambulation. The patient's SOB does not appear to be an oxygenation issue. Pt reports profuse bleeding after bowel movements. He has not had labs since he was discharged from the hospital.  Pt is understandably hesitant to undergo a lot of testing and does not wish to go to the hospital.    After discussion with the patient and his daughter, Pt agreeable to get some lab work and CXR. CXR ordered to see if a pulmonary cause for his SOB could be found, though this is unlikely based on clinical picture. Will also get CBC and iron studies. Pt received iron infusions in the hospital, though his Hgb did not go below 10 and did not require blood transfusions. However, pt is more symptomatic at this time. Pt continues to be on Plavix and ASA for a CVA in the past along with his recent STEMI. Pt also on lisinopril and metoprolol s/p STEMI, BP maintaining around 100/58. He is already on low doses of these medications and in light of his recent STEMI, instructed patient to continue these. Will await results of labs. If patient experiences severe rectal bleeding, he was told to go to the nearest ED immediately. Again, pt is hesitant to do this as he doesn't want to spend his time in the hospital.    4. Depression, recurrent ([address])    Pt understandably feeling down about his recent serious diagnoses of rectal adenocarcinoma and not feeling well. He had an initial visit with palliative care last week and plans to continue to follow with them. 5. Essential hypertension    Continue lisinopril and metoprolol in light of recent STEMI. 6. Ischemic cardiomyopathy    EF improved from 20% s/p MI to 45% at his last echo. Follows up with cardiology in less than a month. Subjective      HPI     Pt is here for complaints of SOB. Daughter is in her room for support and to provide approximately 50% of the history. Patient was recently diagnosed with rectal adenocarcinoma. Does not want to have chemo therapy or radiation, but is considering surgery. Has an appointment with the surgeon on 10 August, 3 days from now. Patient recently saw palliative care nurse who recommended that he have his O2 checked in the office. Shortness of breath started getting worse over the past couple of weeks. Denies increased cough, fevers or chills, congestion, rhinorrhea, fevers, chills. SOB is worse with ambulation, walking around the store, and getting dressed. Improves with resting and catching his breath. Also feels lightheaded when he rises from a lying or seated position. Needs a minute for the feeling to pass. Patient reports he has a bowel movement every 2 to 3 days. When he has a bowel movement the days following he has a lot of bleeding from his rectum. Patient is resistant to going to the hospital to be evaluated for this.   Patient has not had his hemoglobin checked since he has been discharged from the hospital.    Pt also states he had bluish discoloration of his fingers around July 30th.  It was after an episode of severe rectal bleeding. He massaged them and it resolved. It happened back in May when he had his MI. Unknown trigger, denies feeling cold, no preceding paleness to his hands, no history of Raynaud's. Patient denies a history of COPD or asthma or other respiratory illness. Denies decreased appetite and believes he is drinking enough fluids. Denies weight loss or recent respiratory illness. Denies syncopal or presyncopal episodes. Next cardiology appointment is 9/1, urology appointment is 9/6, and GI appointment is 8/15. Review of Systems   Constitutional: Positive for activity change and fatigue. Negative for chills, diaphoresis, fever and unexpected weight change. HENT: Negative. Respiratory: Negative for cough, chest tightness, shortness of breath and wheezing. Cardiovascular: Negative for chest pain, palpitations and leg swelling. Gastrointestinal: Positive for anal bleeding and blood in stool. Negative for abdominal pain, constipation, diarrhea, nausea, rectal pain and vomiting. Genitourinary: Negative. Musculoskeletal: Negative. Skin: Positive for color change (Bluish discoloration to fingers). Negative for pallor and rash. Neurological: Positive for light-headedness. Negative for dizziness, syncope, facial asymmetry, weakness and headaches. Hematological: Does not bruise/bleed easily. All other systems reviewed and are negative.       Current Outpatient Medications on File Prior to Visit   Medication Sig   • atorvastatin (LIPITOR) 80 mg tablet Take 1 tablet (80 mg total) by mouth daily with dinner   • Blood Pressure KIT by Does not apply route daily   • clopidogrel (PLAVIX) 75 mg tablet take 1 tablet by mouth daily   • lisinopril (ZESTRIL) 2.5 mg tablet take 1 tablet by mouth daily   • metoprolol succinate (TOPROL-XL) 25 mg 24 hr tablet take 1 tablet by mouth daily   • tamsulosin (FLOMAX) 0.4 mg Take 1 capsule (0.4 mg total) by mouth daily at bedtime   • aspirin (ECOTRIN LOW STRENGTH) 81 mg EC tablet Take 1 tablet (81 mg total) by mouth daily Please purchase over the counter at her local pharmacy       Objective     /58 (BP Location: Right arm, Patient Position: Sitting, Cuff Size: Standard)   Pulse 98   Temp (!) 96.5 °F (35.8 °C) (Tympanic)   Resp 18   Ht 5' 5" (1.651 m)   Wt 60.6 kg (133 lb 9.6 oz)   SpO2 98%   BMI 22.23 kg/m²     Physical Exam  Vitals reviewed. Constitutional:       General: He is not in acute distress. Appearance: He is cachectic. He is not toxic-appearing or diaphoretic. HENT:      Head: Normocephalic and atraumatic. Right Ear: Hearing and external ear normal.      Left Ear: Hearing and external ear normal.      Nose: Nose normal.      Mouth/Throat:      Lips: Pink. Mouth: Mucous membranes are moist.   Eyes:      General: Lids are normal. Vision grossly intact. No scleral icterus. Extraocular Movements: Extraocular movements intact. Conjunctiva/sclera: Conjunctivae normal.      Pupils: Pupils are equal, round, and reactive to light. Cardiovascular:      Rate and Rhythm: Normal rate and regular rhythm. Pulses:           Radial pulses are 2+ on the right side and 2+ on the left side. Posterior tibial pulses are 2+ on the right side and 2+ on the left side. Heart sounds: Normal heart sounds. Pulmonary:      Effort: Pulmonary effort is normal. No respiratory distress. Breath sounds: Normal breath sounds. No stridor. Abdominal:      General: Abdomen is flat. Bowel sounds are normal.      Palpations: Abdomen is soft. Tenderness: There is no abdominal tenderness. Genitourinary:     Comments: Rectal exam deferred    Musculoskeletal:      Cervical back: Full passive range of motion without pain, normal range of motion and neck supple. Right lower leg: No edema. Left lower leg: No edema. Skin:     General: Skin is warm and dry.       Capillary Refill: Capillary refill takes less than 2 seconds. Coloration: Skin is not cyanotic, jaundiced, mottled or pale. Findings: No ecchymosis, petechiae or rash. Neurological:      General: No focal deficit present. Mental Status: He is alert and oriented to person, place, and time. Gait: Gait is intact. Psychiatric:         Mood and Affect: Mood is depressed. Behavior: Behavior is cooperative.        Hannah Roldan PA-C

## 2023-08-08 DIAGNOSIS — D50.9 IRON DEFICIENCY ANEMIA, UNSPECIFIED IRON DEFICIENCY ANEMIA TYPE: Primary | ICD-10-CM

## 2023-08-08 LAB
BASOPHILS # BLD AUTO: 0.05 THOUSANDS/ÂΜL (ref 0–0.1)
BASOPHILS NFR BLD AUTO: 1 % (ref 0–1)
EOSINOPHIL # BLD AUTO: 0.18 THOUSAND/ÂΜL (ref 0–0.61)
EOSINOPHIL NFR BLD AUTO: 3 % (ref 0–6)
ERYTHROCYTE [DISTWIDTH] IN BLOOD BY AUTOMATED COUNT: 16.7 % (ref 11.6–15.1)
FERRITIN SERPL-MCNC: 22 NG/ML (ref 24–336)
HCT VFR BLD AUTO: 32.8 % (ref 36.5–49.3)
HGB BLD-MCNC: 9.8 G/DL (ref 12–17)
IMM GRANULOCYTES # BLD AUTO: 0.02 THOUSAND/UL (ref 0–0.2)
IMM GRANULOCYTES NFR BLD AUTO: 0 % (ref 0–2)
IRON SATN MFR SERPL: 6 % (ref 20–50)
IRON SERPL-MCNC: 20 UG/DL (ref 65–175)
LYMPHOCYTES # BLD AUTO: 1.23 THOUSANDS/ÂΜL (ref 0.6–4.47)
LYMPHOCYTES NFR BLD AUTO: 20 % (ref 14–44)
MCH RBC QN AUTO: 26.5 PG (ref 26.8–34.3)
MCHC RBC AUTO-ENTMCNC: 29.9 G/DL (ref 31.4–37.4)
MCV RBC AUTO: 89 FL (ref 82–98)
MONOCYTES # BLD AUTO: 0.72 THOUSAND/ÂΜL (ref 0.17–1.22)
MONOCYTES NFR BLD AUTO: 12 % (ref 4–12)
NEUTROPHILS # BLD AUTO: 3.97 THOUSANDS/ÂΜL (ref 1.85–7.62)
NEUTS SEG NFR BLD AUTO: 64 % (ref 43–75)
NRBC BLD AUTO-RTO: 0 /100 WBCS
PLATELET # BLD AUTO: 189 THOUSANDS/UL (ref 149–390)
PMV BLD AUTO: 12.6 FL (ref 8.9–12.7)
RBC # BLD AUTO: 3.7 MILLION/UL (ref 3.88–5.62)
TIBC SERPL-MCNC: 328 UG/DL (ref 250–450)
WBC # BLD AUTO: 6.17 THOUSAND/UL (ref 4.31–10.16)

## 2023-08-08 RX ORDER — FERROUS SULFATE TAB EC 324 MG (65 MG FE EQUIVALENT) 324 (65 FE) MG
324 TABLET DELAYED RESPONSE ORAL DAILY
Qty: 30 TABLET | Refills: 5 | Status: SHIPPED | OUTPATIENT
Start: 2023-08-08

## 2023-08-10 ENCOUNTER — PATIENT OUTREACH (OUTPATIENT)
Dept: HEMATOLOGY ONCOLOGY | Facility: CLINIC | Age: 78
End: 2023-08-10

## 2023-08-10 ENCOUNTER — OFFICE VISIT (OUTPATIENT)
Age: 78
End: 2023-08-10
Payer: COMMERCIAL

## 2023-08-10 VITALS
BODY MASS INDEX: 22.16 KG/M2 | HEIGHT: 65 IN | WEIGHT: 133 LBS | DIASTOLIC BLOOD PRESSURE: 64 MMHG | SYSTOLIC BLOOD PRESSURE: 122 MMHG

## 2023-08-10 DIAGNOSIS — C20 RECTAL CANCER (HCC): Primary | ICD-10-CM

## 2023-08-10 PROCEDURE — 99214 OFFICE O/P EST MOD 30 MIN: CPT | Performed by: COLON & RECTAL SURGERY

## 2023-08-10 NOTE — ASSESSMENT & PLAN NOTE
Patient presents for follow-up of rectal cancer. This was diagnosed when he was having rectal bleeding following an MI and percutaneous coronary intervention that he was started on dual antiplatelet therapy. He has since been diagnosed with a T3 N1 rectal cancer. This is low within 1 cm of the anal sphincter. He has questionable lesions in his liver and his lung but these are too small to characterize. He had been seen before prior to complete imaging workup. He has been presented at multidisciplinary conference and recommended for chemotherapy and radiation. He has seen hematology oncology and expressed a desire not to have chemotherapy and radiation. He presents today for surgical discussion. We reviewed again standard of care for his disease would be chemotherapy and radiation followed by surgery. We discussed that surgery for him will be quite high risk given his recent MI. He would need to be off of his dual antiplatelet therapy. There is also a chance that he has metastatic disease already and would require therapy. It appears that he has an aversion to chemotherapy and radiation secondary to watching his wife suffer from disease in the past.  He remains relatively noncommittal as to whether he wants any therapy done at all. Given this overall picture, I have again recommended chemotherapy and radiation as this is standard therapy and has been discussed at multidisciplinary conference. With this discussion he does express that if he is pursuing therapy he would pursue chemotherapy.   This will also help to palliate his symptoms, as well as delay need for surgical intervention with a higher cardiac risk given his recent MI.

## 2023-08-10 NOTE — PROGRESS NOTES
Message received from Dr Juan Do that patient wants to  Have chemo. Pt previously saw Dr Marichuy Samayoa, but he does not go to [facility]. Pt is now scheduled to see Dr Valderrama Come 8/29/2023.   Port placement is now scheduled for 8/31/2023 [employer] left with pts daughter Ruben Linda with these appointment details     Pts daughter returned my call, all upcoming appointments confirmed  with her

## 2023-08-23 ENCOUNTER — TELEPHONE (OUTPATIENT)
Dept: RADIOLOGY | Facility: HOSPITAL | Age: 78
End: 2023-08-23

## 2023-08-23 RX ORDER — SODIUM CHLORIDE 9 MG/ML
75 INJECTION, SOLUTION INTRAVENOUS CONTINUOUS
Status: CANCELLED | OUTPATIENT
Start: 2023-08-23

## 2023-08-23 RX ORDER — CEFAZOLIN SODIUM 1 G/50ML
1000 SOLUTION INTRAVENOUS ONCE
Status: CANCELLED | OUTPATIENT
Start: 2023-08-23 | End: 2023-08-23

## 2023-08-23 NOTE — PRE-PROCEDURE INSTRUCTIONS
Pre-procedure Instructions for Interventional Radiology  [address] [address] [place]  [phone]    You are scheduled for a/an [facility] Placement. On Thursday 8/31/23. Your tentative arrival time is 0900. Short stay will notify you the day before your procedure with the exact arrival time and the location to arrive. To prepare for your procedure:  1. Please arrange for someone to drive you home after the procedure and stay with you until the next morning if you are instructed to do so. This is typically for patients receiving some type of sedative or anesthetic for the procedure. 2. DO NOT EAT OR DRINK ANYTHING after midnight on the evening before your procedure including candy & gum.  3. ONLY SIPS OF WATER with your medications are allowed on the morning of your procedure. 4. TAKE ALL OF YOUR REGULAR MEDICATIONS THE MORNING OF YOUR PROCEDURE with sips of water! We may call you to stop some of your blood sugar, blood pressure and blood thinning medications depending on the procedure. Please take all of these medications unless we instruct you to stop them. 5. If you have an allergy to x-ray dye, please contact Interventional Radiology for an x-ray dye preparation which usually consists of an oral steroid and Benadryl. The day of your procedure:  1. Bring a list of the medications you take at home. 2. Bring medications you take for breathing problems (such as inhalers), medications for chest pain, or both. 3. Bring a case for your glasses or contacts. 4. Bring your insurance card and a form of photo ID.  5. Please leave all valuables such as credit cards and jewelry at home. 6. Report to the registration desk in the main lobby at the [facility], Entrance B. Ask to be directed to [facility]. 7. While your procedure is being performed, your family may wait in the Radiology Waiting Room on the 1st floor in Radiology.   if they need to leave, they may provide a number to be called following the procedure. 8. Be prepared to stay overnight just in case. Sometimes procedures will indicate the need for further observation or treatment. 9. If you are scheduled for a follow-up visit with the Interventional Radiologist after your procedure, you will be called with a date and time. Special Instructions (Medications to stop taking before your procedure etc.):  Above reviewed with his daughter Gary Viera.

## 2023-08-29 ENCOUNTER — CONSULT (OUTPATIENT)
Dept: HEMATOLOGY ONCOLOGY | Facility: CLINIC | Age: 78
End: 2023-08-29
Payer: COMMERCIAL

## 2023-08-29 ENCOUNTER — HOSPITAL ENCOUNTER (OUTPATIENT)
Dept: CT IMAGING | Facility: HOSPITAL | Age: 78
Discharge: HOME | End: 2023-08-29
Attending: INTERNAL MEDICINE
Payer: COMMERCIAL

## 2023-08-29 ENCOUNTER — TELEPHONE (OUTPATIENT)
Dept: HEMATOLOGY ONCOLOGY | Facility: CLINIC | Age: 78
End: 2023-08-29

## 2023-08-29 VITALS
WEIGHT: 133.5 LBS | BODY MASS INDEX: 22.24 KG/M2 | TEMPERATURE: 98.2 F | HEART RATE: 78 BPM | RESPIRATION RATE: 16 BRPM | DIASTOLIC BLOOD PRESSURE: 84 MMHG | OXYGEN SATURATION: 100 % | SYSTOLIC BLOOD PRESSURE: 124 MMHG | HEIGHT: 65 IN

## 2023-08-29 DIAGNOSIS — C20 RECTAL CANCER (HCC): Primary | ICD-10-CM

## 2023-08-29 DIAGNOSIS — C20 RECTAL CANCER (HCC): ICD-10-CM

## 2023-08-29 PROCEDURE — G1004 CDSM NDSC: HCPCS

## 2023-08-29 PROCEDURE — 71260 CT THORAX DX C+: CPT

## 2023-08-29 PROCEDURE — 99214 OFFICE O/P EST MOD 30 MIN: CPT | Performed by: INTERNAL MEDICINE

## 2023-08-29 PROCEDURE — 74177 CT ABD & PELVIS W/CONTRAST: CPT

## 2023-08-29 RX ADMIN — IOHEXOL 100 ML: 350 INJECTION, SOLUTION INTRAVENOUS at 12:11

## 2023-08-29 NOTE — PROGRESS NOTES
[address] [facility] Formerly [facility] [address]    Zhanna Garcia.  [birth year]      PRIMARY HEMATOLOGIC/ONCOLOGIC DIAGNOSIS:  1. Adenocarcinoma of the rectum, invasive, moderately differentiated. NO LOSS OF NUCLEAR EXPRESSION OF MMR PROTEINS: LOW PROBABILITY OF MSI-H. Date of diagnosis 5/20/23. CEA normal at time of diagnosis -- 1.7 on 5/19/23. PATHOLOGY:   Case Report   Surgical Pathology Report                         Case: [id number]                                    Authorizing Provider: Jono Grove DO          Collected:           05/20/2023 1530               Ordering Location:     [facility] Received:            05/20/2023 1800                                      Intensive Care Unit                                                           Pathologist:           Teresa Laureano DO                                                             Specimen:    Rectum                                                                                     Addendum   RESULTS OF IMMUNOHISTOCHEMICAL ANALYSIS FOR MISMATCH REPAIR PROTEIN LOSS  INTERPRETATION: NO LOSS OF NUCLEAR EXPRESSION OF MMR PROTEINS: LOW PROBABILITY OF MSI-H        RESULTS:  Antibody            Clone                 Description                     Results  MLH1                 M1                     Mismatch repair protein  Intact nuclear expression  MSH2                [id number]        Mismatch repair protein  Intact nuclear expression  MSH6                40                      Mismatch repair protein  Intact nuclear expression  PMS2                 [id number]           Mismatch repair protein  Intact nuclear expression     Comment:  Background non-neoplastic tissue and/or internal control with intact nuclear expression.   [facility] Specimen ID: [id number], Evaluator: Sarah Garcia MD  These tests were developed and their performance characteristics determined by [facility]. [facility] may not be cleared or approved by the U.S. Food and Drug Administration.  The FDA determined that such clearance or approval is not necessary.  These tests are used for clinical purposes. [facility] should not be regarded as investigational or for research.  This laboratory has been approved by [place], designated as a high-complexity laboratory and is qualified to perform these tests.     Comments: Patients whose tumors demonstrate lack of expression of one or more DNA mismatch repair proteins might be at risk for Kenny Syndrome. This cancer susceptibility syndrome greatly increases the risk of synchronous and/or metachronous cancers in the affected patients and their family members. Normal expression of all proteins does not completely rule out familial cancer predisposition.     The [facility] Kenny Syndrome Surveillance Program Task Forces recommends that all patients with lack of expression of one or more DNA mismatch repair proteins and those with concerning personal or family history should undergo thorough evaluation, counseling and possibly genetic testing. Addendum electronically signed by Michell Smalls DO on 5/30/2023 at  2:34 PM   Final Diagnosis   A. Rectum, mass, biopsy:  -Adenocarcinoma, invasive, moderately differentiated. -Ancillary testing for mismatch repair (MMR) protein deficiency by immunohistochemical panel (MLH1, PMS2, MSH2, MSH6) is undertaken, the results will be issued in an addendum. Electronically signed by Michell Smalls DO on 5/26/2023 at  9:10 AM   Note    Intradepartmental consultation (Murtaza Hooker) is in agreement. Dr. Sherren Sins was notified by Dr. Michell Smalls via [employer] at 9:09am on 5/26/23.    Additional Information    All reported additional testing was performed with appropriately reactive controls.  These tests were developed and their performance characteristics determined by [facility] or appropriate performing facility, though some tests may be performed on tissues which have not been validated for performance characteristics (such as staining performed on alcohol exposed cell blocks and decalcified tissues).  Results should be interpreted with caution and in the context of the patients’ clinical condition. These tests may not be cleared or approved by the U.S. Food and Drug Administration, though the FDA has determined that such clearance or approval is not necessary. These tests are used for clinical purposes and they should not be regarded as investigational or for research. This laboratory has been approved by [place] 88, designated as a high-complexity laboratory and is qualified to perform these tests.     Interpretation performed at [facility]. [address] [place] [address] [place] [address]   Gross Description     A. The specimen is received in formalin, labeled with the patient's name and hospital number, and is designated " rectum". The specimen consists of multiple tan-pink soft tissue fragments measuring in aggregate 1.0 x 1.0 x 0.2 cm. Entirely submitted. One screened cassette.     Note: The estimated total formalin fixation time based upon information provided by the submitting clinician and the standard processing schedule is under 72 hours. MSequino       STAGING: Cancer Staging   Rectal cancer [facility])  Staging form: Colon and Rectum, AJCC 8th Edition  - Clinical stage from 5/18/2023: Stage IIIB (cT3, cN1, cM0) - Signed by Rene Hoffman DO on 6/28/2023  Microsatellite instability (MSI): Stable         Oncology History   Rectal cancer ([address])   5/18/2023 Initial Diagnosis    May 18, 2023 patient presented with BRBPR x1 week. Weight stable over 8 months. CT abdomen pelvis showed rectal mass with 2 suspicious mesorectal lymph nodes. 2 mm left lower lobe pulmonary nodule, 3 mm hepatic dome questionable lesion, 1 cm uncinate process cystic lesion.   CT chest showed 3 mm left lower lobe and right upper lobe nodules new compared to 2019. May 20 flexible sigmoidoscopy showed distal rectal mass. Biopsy of the mass showed moderately differentiated adenocarcinoma. MMR intact. June 14, 2023 MRI of the pelvis showed findings consistent with T3b, N1 low rectal cancer. Suspicion for vascular invasion. CBC on presentation normal WBC platelets. Hemoglobin 11.4, MCV 77. CMP showed albumin 3.3, otherwise normal.        Cancer Staged    Cancer Staging   No matching staging information was found for the patient. 5/18/2023 -  Cancer Staged    Staging form: Colon and Rectum, AJCC 8th Edition  - Clinical stage from 5/18/2023: Stage IIIB (cT3, cN1, cM0) - Signed by Jazmyne Grady DO on 6/28/2023  Microsatellite instability (MSI): Stable           INTERIM HISTORY:  Chanell Crisostomo is a 67yo male who presents for evaluation and management of adenocarcinoma of the rectum. Per records, patient has refused treatment in the past. He is willing to undergo treatment at this time.      PAST MEDICAL,PAST SURGICAL, FAMILY AND SOCIAL HISTORY:    Patient Active Problem List   Diagnosis   • CAD in native artery   • S/P primary angioplasty with coronary stent   • History of ischemic cardiomyopathy   • History of lacunar cerebrovascular accident (CVA)   • Non-recurrent unilateral inguinal hernia   • Essential hypertension   • Protein-calorie malnutrition, unspecified severity (HCC)   • Rectal bleeding   • Acute on chronic anemia   • Moderate protein-calorie malnutrition ([address])   • STEMI involving left anterior descending coronary artery ([address])   • Ischemic cardiomyopathy   • Renal cyst   • Microscopic hematuria   • Encounter to discuss test results   • Enlarged prostate   • Cerebrovascular accident (CVA) ([address])   • Phimosis   • Lower urinary tract symptoms (LUTS)   • Rectal cancer (HCC)   • Depression, recurrent ([address])     Past Medical History:   Diagnosis Date   • Hyperlipidemia    • Ischemic cardiomyopathy    • Lacunar infarction [facility])    • Myocardial infarction [facility])    • Near syncope 2019   • Non-ST elevated myocardial infarction [facility])    • NSTEMI (non-ST elevated myocardial infarction) ([address]) 10/20/2019   • Pericarditis 10/24/2019   • Poison ivy    • Stroke [facility])      Past Surgical History:   Procedure Laterality Date   • ANGIOPLASTY     • CARDIAC CATHETERIZATION N/A 2023    Procedure: Cardiac pci;  Surgeon: Rebecca Brittle, MD;  Location: [facility] CARDIAC CATH LAB; Service: Cardiology   • CORONARY STENT PLACEMENT     • HERNIA REPAIR Left 2021    Procedure: OPEN REPAIR HERNIA INGUINAL LEFT WITH MESH;  Surgeon: Ann Duran MD;  Location: [facility] MAIN OR;  Service: General     Family History   Problem Relation Age of Onset   • No Known Problems Mother    • Emphysema Father    • Heart disease Maternal Grandfather    • Heart attack Maternal Grandfather    • Emphysema Paternal Grandfather      Social History     Socioeconomic History   • Marital status:       Spouse name: Not on file   • Number of children: Not on file   • Years of education: Not on file   • Highest education level: Not on file   Occupational History   • Not on file   Tobacco Use   • Smoking status: Former     Packs/day: 1.00     Years: 15.00     Total pack years: 15.00     Types: Cigarettes     Quit date: 1969     Years since quittin.7   • Smokeless tobacco: Never   Vaping Use   • Vaping Use: Never used   Substance and Sexual Activity   • Alcohol use: Never   • Drug use: No   • Sexual activity: Not Currently     Partners: Female   Other Topics Concern   • Not on file   Social History Narrative   • Not on file     Social Determinants of Health     Financial Resource Strain: Low Risk  (10/17/2022)    Overall Financial Resource Strain (CARDIA)    • Difficulty of Paying Living Expenses: Not hard at all   Food Insecurity: No Food Insecurity (2023)    Hunger Vital Sign    • Worried About Running Out of Food in the Last Year: Never true    • Ran Out of Food in the Last Year: Never true   Transportation Needs: No Transportation Needs (5/19/2023)    PRAPARE - Transportation    • Lack of Transportation (Medical): No    • Lack of Transportation (Non-Medical): No   Physical Activity: Inactive (3/8/2021)    Exercise Vital Sign    • Days of Exercise per Week: 0 days    • Minutes of Exercise per Session: 0 min   Stress: No Stress Concern Present (3/8/2021)    [facility]    • Feeling of Stress : Not at all   Social Connections: Not on file   Intimate Partner Violence: Not on file   Housing Stability: Low Risk  (5/19/2023)    Housing Stability Vital Sign    • Unable to Pay for Housing in the Last Year: No    • Number of Places Lived in the Last Year: 1    • Unstable Housing in the Last Year: No       Current Outpatient Medications:   •  aspirin (ECOTRIN LOW STRENGTH) 81 mg EC tablet, Take 1 tablet (81 mg total) by mouth daily Please purchase over the counter at her local pharmacy, Disp: 30 tablet, Rfl: 1  •  atorvastatin (LIPITOR) 80 mg tablet, Take 1 tablet (80 mg total) by mouth daily with dinner, Disp: 90 tablet, Rfl: 3  •  Blood Pressure KIT, by Does not apply route daily, Disp: 1 each, Rfl: 0  •  clopidogrel (PLAVIX) 75 mg tablet, take 1 tablet by mouth daily, Disp: 90 tablet, Rfl: 3  •  ferrous sulfate 324 (65 Fe) mg, Take 1 tablet (324 mg total) by mouth in the morning, Disp: 30 tablet, Rfl: 5  •  lisinopril (ZESTRIL) 2.5 mg tablet, take 1 tablet by mouth daily, Disp: 90 tablet, Rfl: 3  •  metoprolol succinate (TOPROL-XL) 25 mg 24 hr tablet, take 1 tablet by mouth daily, Disp: 90 tablet, Rfl: 3  •  tamsulosin (FLOMAX) 0.4 mg, Take 1 capsule (0.4 mg total) by mouth daily at bedtime, Disp: 90 capsule, Rfl: 3  No Known Allergies  Vitals:    08/29/23 1112   Resp: 16       ROS:  CONSTITUTIONAL:  No fever. No chills. No dizziness. No weakness. EYES:  No pain, erythema, or discharge. No blurring of vision.   ENT:  No sore throat, URI symptoms. No epistaxis. No tinnitus. CARDIOVASCULAR:  No chest pain. No palpitations. No lower extremity edema. RESPIRATORY:  No shortness of breath, cough, pain with respiration, pleuritic chest pain. No hemoptysis. No dyspnea. No paroxysmal nocturnal dyspnea. GASTROINTESTINAL:  Normal appetite. No nausea, vomiting, diarrhea. No pain. No bloating. No melena. GENITOURINARY:  No frequency, urgency, nocturia. No hematuria or dysuria. MUSCULOSKELETAL:  No arthralgias or myalgias. INTEGUMENTARY:  No swelling. No bruising. No contusions. No abrasions. No lymphangitis. NEUROLOGIC:  No headache. No neck pain. No numbness or tingling of the extremities. No weakness. PSYCHIATRIC:  No confusion. ENDOCRINE:  No fatigue. No weakness. No history of thyroid, diabetes or adrenal problems. HEMATOLOGICAL:  No bleeding. No petechiae. No bruising.     PHYSICAL EXAM:    GENERAL: AAO x 3  HEENT: AT,NC  CVS: S1S2 RRR  LUNGS: CTA b/l  ABD: NT,ND, +BS  EXTR: no edema  NEURO: CN II-XII grossly intact    LABS:  I have reviewed pertinent labs:  CBC:   Lab Results   Component Value Date    WBC 6.17 08/07/2023    RBC 3.70 (L) 08/07/2023    HGB 9.8 (L) 08/07/2023    HCT 32.8 (L) 08/07/2023    MCV 89 08/07/2023     08/07/2023    MCH 26.5 (L) 08/07/2023    MCHC 29.9 (L) 08/07/2023    RDW 16.7 (H) 08/07/2023    MPV 12.6 08/07/2023    NEUTROABS 3.97 08/07/2023     CMP:   Lab Results   Component Value Date    SODIUM 138 08/07/2023    K 4.3 08/07/2023     08/07/2023    CO2 26 08/07/2023    AGAP 4 08/07/2023    BUN 23 08/07/2023    CREATININE 1.16 08/07/2023    GLUC 96 05/25/2023    GLUF 105 (H) 08/07/2023    CALCIUM 8.8 08/07/2023    AST 18 08/07/2023    ALT 25 08/07/2023    ALKPHOS 71 08/07/2023    TP 6.5 08/07/2023    ALB 3.2 (L) 08/07/2023    TBILI 0.30 08/07/2023    EGFR 60 08/07/2023     Liver Enzymes:   Lab Results   Component Value Date    AST 18 08/07/2023    ALT 25 08/07/2023    ALKPHOS 71 08/07/2023 TP 6.5 08/07/2023    ALB 3.2 (L) 08/07/2023    TBILI 0.30 08/07/2023    BILIDIR 0.16 10/20/2019     Vitamin B12   Lab Results   Component Value Date    APCGTCCC94 222 05/19/2023     Iron Study   Lab Results   Component Value Date    RETIC 43,800 05/19/2023    RETICCTPCT 1.07 05/19/2023    FERRITIN 22 (L) 08/07/2023    CONCFE 6 (L) 08/07/2023    TIBC 328 08/07/2023    IRON 20 (L) 08/07/2023     Folate   Lab Results   Component Value Date    FOLATE 21.8 05/19/2023     Magnesium   Lab Results   Component Value Date    MG 2.3 05/21/2023     Phosphorus   Lab Results   Component Value Date    PHOS 2.7 05/21/2023     Coagulation Panel   Lab Results   Component Value Date    DDIMER 0.28 10/20/2019    PROTIME 14.3 05/18/2023    INR 1.13 05/18/2023    PTT 28 05/18/2023       IMAGING:  XR chest pa & lateral    Result Date: 8/9/2023  Narrative: CHEST INDICATION:   R06.02: Shortness of breath. . COMPARISON:  Radiograph 9/25/2020 and CTA chest  May 23, 2023 EXAM PERFORMED/VIEWS:  XR CHEST PA & LATERAL FINDINGS: Cardiomediastinal silhouette appears unremarkable. The lungs are clear. No pneumothorax or pleural effusion. Osseous structures appear within normal limits for patient age. Impression: No acute cardiopulmonary disease. Resident: Melvin Cortez, the attending radiologist, have reviewed the images and agree with the final report above. Workstation performed: [id number]     I reviewed the above laboratory and imaging data. ASSESSMENT/PLAN:  1. Adenocarcinoma of the rectum, invasive, moderately differentiated. NO LOSS OF NUCLEAR EXPRESSION OF MMR PROTEINS: LOW PROBABILITY OF MSI-H. Date of diagnosis 5/20/23. CEA normal at time of diagnosis -- 1.7 on 5/19/23. The patient is willing to receive treatment now. He is scheduled for port placement 8/31/23. Prior imaging 5/2023 showed a possible 0.3 cm right hepatic dome lesion versus artifact (statistically most likely a benign lesion such as cyst or hemangioma).  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 MRI pelvis w/o contrast dated 6/14/23 showed stage: T3b, N1, low rectal cancer. MRF: clear (tumor margin >2 mm from MRF). No sphincter involvement. No suspicious extra mesorectal lymph nodes. Suspicious vascular invasion along the right lateral aspect  The patient will need re-staging scans. Further recommendations will be based on the results of the re-staging scans. Will obtain [facility] oncology testing. F/u in 1 week.

## 2023-08-29 NOTE — TELEPHONE ENCOUNTER
Received message from radiology that patient has significant findings on his CT. I forwarded message to provider to make her aware.

## 2023-08-30 ENCOUNTER — TELEPHONE (OUTPATIENT)
Dept: PALLIATIVE MEDICINE | Facility: CLINIC | Age: 78
End: 2023-08-30

## 2023-08-30 ENCOUNTER — TELEPHONE (OUTPATIENT)
Dept: HEMATOLOGY ONCOLOGY | Facility: CLINIC | Age: 78
End: 2023-08-30

## 2023-08-30 ENCOUNTER — APPOINTMENT (OUTPATIENT)
Dept: LAB | Facility: CLINIC | Age: 78
End: 2023-08-30
Payer: COMMERCIAL

## 2023-08-30 ENCOUNTER — TELEMEDICINE (OUTPATIENT)
Dept: PALLIATIVE MEDICINE | Facility: CLINIC | Age: 78
End: 2023-08-30
Payer: COMMERCIAL

## 2023-08-30 ENCOUNTER — TELEPHONE (OUTPATIENT)
Facility: HOSPITAL | Age: 78
End: 2023-08-30

## 2023-08-30 DIAGNOSIS — Z51.5 PALLIATIVE CARE ENCOUNTER: ICD-10-CM

## 2023-08-30 DIAGNOSIS — C20 RECTAL CANCER (HCC): Primary | ICD-10-CM

## 2023-08-30 DIAGNOSIS — E46 PROTEIN-CALORIE MALNUTRITION, UNSPECIFIED SEVERITY (HCC): ICD-10-CM

## 2023-08-30 DIAGNOSIS — Z71.89 ADVANCED CARE PLANNING/COUNSELING DISCUSSION: ICD-10-CM

## 2023-08-30 DIAGNOSIS — R39.9 LOWER URINARY TRACT SYMPTOMS (LUTS): ICD-10-CM

## 2023-08-30 LAB — PSA SERPL-MCNC: 1.65 NG/ML (ref 0–4)

## 2023-08-30 PROCEDURE — 36415 COLL VENOUS BLD VENIPUNCTURE: CPT

## 2023-08-30 PROCEDURE — 99213 OFFICE O/P EST LOW 20 MIN: CPT | Performed by: NURSE PRACTITIONER

## 2023-08-30 PROCEDURE — 84153 ASSAY OF PSA TOTAL: CPT

## 2023-08-30 NOTE — H&P (VIEW-ONLY)
Outpatient Virtual Regular Visit - Follow-up with Palliative and Supportive Care  Dawna Marinelli. 69 y.o. male [id number]    Intake:    Reason for virtual visit is patient request.      After connecting through [email or web address], the patient was identified by name and date of birth. Dawna Marinelli. or their agent was informed that this was a telemedicine visit and that the visit is being conducted through the [employer]. He agrees to proceed. .  My office door was closed. No one else was in the room. They acknowledged consent and understanding of privacy and security of the video platform. The patient or agent has agreed to participate and understands they can discontinue the visit at any time. Assessment & Plan  Problem List Items Addressed This Visit        Digestive    Rectal cancer ([address]) - Primary       Other    Protein-calorie malnutrition, unspecified severity ([address])   Other Visit Diagnoses     Advanced care planning/counseling discussion        Palliative care encounter            • Rectal cancer- patient was informed he is not a candidate for surgery. He is planned to have a port placed tomorrow and meet with Oncology to discuss treatment. • Protein calorie malnutrition- stable. He is tolerating regular diet, decreased appetite. Monitor  • Goals of Care/Advance care planning discussion- Patient states that since he is not a surgical candidate, he will have cancer treatment because as he understands, without treatment, his life expectance is in months, with treatment, 6 months to 1 year. • Palliative Care Encounter- symptom management as needed and ongoing goals of care discussions. • Psychosocial support- supportive listening provided to patient and daughter. He would like to meet with Dante Oswald LCSW for therapy visits due to illness.       Medications adjusted this encounter:  Requested Prescriptions      No prescriptions requested or ordered in this encounter     No orders of the defined types were placed in this encounter. There are no discontinued medications. Madelin Mejia was seen today for symptoms and planning cares related to above illnesses. I have reviewed the patient's controlled substance dispensing history in the Prescription Drug Monitoring Program in compliance with the [facility] regulations before prescribing any controlled substances. They are invited to continue to follow with us. If there are questions or concerns, please contact us through our clinic/answering service 24 hours a day, seven days a week. HUSSAIN Moreau  [facility] Palliative and Supportive Care  [phone]        Visit Information    Accompanied By: Family member    Source of History: Self, Family member    History Limitations: None      Chief Complaint  No chief complaint on file. History of Present Illness      Madelin Mejia is a 69 y.o. male who presents in follow up of symptoms related to rectal cancer. Pertinent issues include: assessment of goals of care     Patient states he did meet with Cardiology and was told that he is not a surgical candidate. He did meet with Oncology and has made the decision to have cancer treatment because as he understands, without treatment, his life expectance is in months, with treatment, 6 months to 1 year. Past Medical History:   Diagnosis Date   • Hyperlipidemia    • Ischemic cardiomyopathy    • Lacunar infarction [facility])    • Myocardial infarction [facility])    • Near syncope 11/14/2019   • Non-ST elevated myocardial infarction [facility])    • NSTEMI (non-ST elevated myocardial infarction) ([address]) 10/20/2019   • Pericarditis 10/24/2019   • Poison ivy    • Stroke [facility])      Past Surgical History:   Procedure Laterality Date   • ANGIOPLASTY     • CARDIAC CATHETERIZATION N/A 5/19/2023    Procedure: Cardiac pci;  Surgeon: Omega Patel MD;  Location: [facility] CARDI[facility] CATH LAB;   Service: Cardiology   • CORONARY STENT PLACEMENT     • HERNIA REPAIR Left 4/28/2021    Procedure: OPEN REPAIR HERNIA INGUINAL LEFT WITH MESH;  Surgeon: Halima Ruelas MD;  Location: [facility] MAIN OR;  Service: General     Current Outpatient Medications   Medication Sig Dispense Refill   • aspirin (ECOTRIN LOW STRENGTH) 81 mg EC tablet Take 1 tablet (81 mg total) by mouth daily Please purchase over the counter at her local pharmacy 30 tablet 1   • atorvastatin (LIPITOR) 80 mg tablet Take 1 tablet (80 mg total) by mouth daily with dinner 90 tablet 3   • Blood Pressure KIT by Does not apply route daily 1 each 0   • clopidogrel (PLAVIX) 75 mg tablet take 1 tablet by mouth daily 90 tablet 3   • ferrous sulfate 324 (65 Fe) mg Take 1 tablet (324 mg total) by mouth in the morning 30 tablet 5   • lisinopril (ZESTRIL) 2.5 mg tablet take 1 tablet by mouth daily 90 tablet 3   • metoprolol succinate (TOPROL-XL) 25 mg 24 hr tablet take 1 tablet by mouth daily 90 tablet 3   • tamsulosin (FLOMAX) 0.4 mg Take 1 capsule (0.4 mg total) by mouth daily at bedtime 90 capsule 3     No current facility-administered medications for this visit. No Known Allergies    Review of Systems   Constitutional: Positive for fatigue. Respiratory: Positive for shortness of breath. Cardiovascular: Negative for chest pain. Gastrointestinal:        Rectal bleeding   Neurological: Positive for weakness. Video Exam  There were no vitals filed for this visit. Physical Exam  Vitals reviewed: appears chronically ill. Pulmonary:      Effort: Pulmonary effort is normal.   Neurological:      Mental Status: He is alert and oriented to person, place, and time. Psychiatric:         Mood and Affect: Mood normal.         I spent 25+ minutes was spent during this virtual visit by Alexandr face to face with Jaspal Min and his daughter with greater than 50% of the time spent in counseling or coordination of care including discussions of symptom management and goals of care discussions . All of the patient's or agent's questions were answered during this discussion. Encounter provider HUSSAIN Booth, located at:  [address]  [phone]    Recent Visits  No visits were found meeting these conditions. Showing recent visits within past 7 days and meeting all other requirements  Future Appointments  No visits were found meeting these conditions. Showing future appointments within next 150 days and meeting all other requirements       VIRTUAL VISIT DISCLAIMER    Kate Renee. or their agent has consented to an online visit or consultation. They understands that the online visit is based solely on information provided by the patient or agent, and that, in the absence of a face-to-face physical evaluation by the physician, the diagnosis they receive is both limited and provisional in terms of accuracy and completeness. This is not intended to replace a full medical face-to-face evaluation by the physician. Kate Renee. or their agent understands and accepts these terms. The patient or their agent was informed this is a billable service.

## 2023-08-30 NOTE — PROGRESS NOTES
Outpatient Virtual Regular Visit - Follow-up with Palliative and Supportive Care  Louann Sun 69 y.o. male [id number]    Intake:    Reason for virtual visit is patient request.      After connecting through [employer], the patient was identified by name and date of birth. Louann Sun or their agent was informed that this was a telemedicine visit and that the visit is being conducted through the [employer]. He agrees to proceed. .  My office door was closed. No one else was in the room. They acknowledged consent and understanding of privacy and security of the video platform. The patient or agent has agreed to participate and understands they can discontinue the visit at any time. Assessment & Plan  Problem List Items Addressed This Visit        Digestive    Rectal cancer ([address]) - Primary       Other    Protein-calorie malnutrition, unspecified severity ([address])   Other Visit Diagnoses     Advanced care planning/counseling discussion        Palliative care encounter            • Rectal cancer- patient was informed he is not a candidate for surgery. He is planned to have a port placed tomorrow and meet with Oncology to discuss treatment. • Protein calorie malnutrition- stable. He is tolerating regular diet, decreased appetite. Monitor  • Goals of Care/Advance care planning discussion- Patient states that since he is not a surgical candidate, he will have cancer treatment because as he understands, without treatment, his life expectance is in months, with treatment, 6 months to 1 year. • Palliative Care Encounter- symptom management as needed and ongoing goals of care discussions. • Psychosocial support- supportive listening provided to patient and daughter. He would like to meet with Kalie Strauss LCSW for therapy visits due to illness.       Medications adjusted this encounter:  Requested Prescriptions      No prescriptions requested or ordered in this encounter     No orders of the defined types were placed in this encounter. There are no discontinued medications. Ariella Stanley was seen today for symptoms and planning cares related to above illnesses. I have reviewed the patient's controlled substance dispensing history in the Prescription Drug Monitoring Program in compliance with the [facility] regulations before prescribing any controlled substances. They are invited to continue to follow with us. If there are questions or concerns, please contact us through our clinic/answering service 24 hours a day, seven days a week. HUSSAIN Mustafa  [facility] Palliative and Supportive [facility]  [phone]        Visit Information    Accompanied By: Family member    Source of History: Self, Family member    History Limitations: None      Chief Complaint  No chief complaint on file. History of Present Illness      Ariella Stanley is a 69 y.o. male who presents in follow up of symptoms related to rectal cancer. Pertinent issues include: assessment of goals of care     Patient states he did meet with Cardiology and was told that he is not a surgical candidate. He did meet with Oncology and has made the decision to have cancer treatment because as he understands, without treatment, his life expectance is in months, with treatment, 6 months to 1 year. Past Medical History:   Diagnosis Date   • Hyperlipidemia    • Ischemic cardiomyopathy    • Lacunar infarction [facility])    • Myocardial infarction [facility])    • Near syncope 11/14/2019   • Non-ST elevated myocardial infarction [facility])    • NSTEMI (non-ST elevated myocardial infarction) ([address] 10/20/2019   • Pericarditis 10/24/2019   • Poison ivy    • Stroke [facility])      Past Surgical History:   Procedure Laterality Date   • ANGIOPLASTY     • CARDIAC CATHETERIZATION N/A 5/19/2023    Procedure: Cardiac pci;  Surgeon: Lilliam Sampson MD;  Location: [facility] CARD[facility]AC [facility];   Service: Cardiology   • CORONARY STENT PLACEMENT     • HERNIA REPAIR Left 4/28/2021    Procedure: OPEN REPAIR HERNIA INGUINAL LEFT WITH MESH;  Surgeon: Amy Jauregui MD;  Location: [facility] MAIN OR;  Service: General     Current Outpatient Medications   Medication Sig Dispense Refill   • aspirin (ECOTRIN LOW STRENGTH) 81 mg EC tablet Take 1 tablet (81 mg total) by mouth daily Please purchase over the counter at her local pharmacy 30 tablet 1   • atorvastatin (LIPITOR) 80 mg tablet Take 1 tablet (80 mg total) by mouth daily with dinner 90 tablet 3   • Blood Pressure KIT by Does not apply route daily 1 each 0   • clopidogrel (PLAVIX) 75 mg tablet take 1 tablet by mouth daily 90 tablet 3   • ferrous sulfate 324 (65 Fe) mg Take 1 tablet (324 mg total) by mouth in the morning 30 tablet 5   • lisinopril (ZESTRIL) 2.5 mg tablet take 1 tablet by mouth daily 90 tablet 3   • metoprolol succinate (TOPROL-XL) 25 mg 24 hr tablet take 1 tablet by mouth daily 90 tablet 3   • tamsulosin (FLOMAX) 0.4 mg Take 1 capsule (0.4 mg total) by mouth daily at bedtime 90 capsule 3     No current facility-administered medications for this visit. No Known Allergies    Review of Systems   Constitutional: Positive for fatigue. Respiratory: Positive for shortness of breath. Cardiovascular: Negative for chest pain. Gastrointestinal:        Rectal bleeding   Neurological: Positive for weakness. Video Exam  There were no vitals filed for this visit. Physical Exam  Vitals reviewed: appears chronically ill. Pulmonary:      Effort: Pulmonary effort is normal.   Neurological:      Mental Status: He is alert and oriented to person, place, and time. Psychiatric:         Mood and Affect: Mood normal.         I spent 25+ minutes was spent during this virtual visit by Alexandr face to face with Gabrielle Agosto. and his daughter with greater than 50% of the time spent in counseling or coordination of care including discussions of symptom management and goals of care discussions . All of the patient's or agent's questions were answered during this discussion. Encounter provider HUSSAIN Johnson, located at:  [address] [place] [address]  [phone]    Recent Visits  No visits were found meeting these conditions. Showing recent visits within past 7 days and meeting all other requirements  Future Appointments  No visits were found meeting these conditions. Showing future appointments within next 150 days and meeting all other requirements       VIRTUAL VISIT DISCLAIMER    Marisol Dobbins or their agent has consented to an online visit or consultation. They understands that the online visit is based solely on information provided by the patient or agent, and that, in the absence of a face-to-face physical evaluation by the physician, the diagnosis they receive is both limited and provisional in terms of accuracy and completeness. This is not intended to replace a full medical face-to-face evaluation by the physician. Marisol Dobbins or their agent understands and accepts these terms. The patient or their agent was informed this is a billable service.

## 2023-08-30 NOTE — TELEPHONE ENCOUNTER
LVM for patient to call office to let us know if he is interested in a Radiation Oncology Consult. Phone number was provided.

## 2023-08-31 ENCOUNTER — HOSPITAL ENCOUNTER (OUTPATIENT)
Dept: NON INVASIVE DIAGNOSTICS | Facility: HOSPITAL | Age: 78
Discharge: HOME | End: 2023-08-31
Attending: INTERNAL MEDICINE | Admitting: RADIOLOGY
Payer: COMMERCIAL

## 2023-08-31 VITALS
RESPIRATION RATE: 17 BRPM | OXYGEN SATURATION: 99 % | HEART RATE: 61 BPM | SYSTOLIC BLOOD PRESSURE: 115 MMHG | TEMPERATURE: 97.8 F | HEIGHT: 70 IN | DIASTOLIC BLOOD PRESSURE: 56 MMHG | BODY MASS INDEX: 19.13 KG/M2 | WEIGHT: 133.6 LBS

## 2023-08-31 DIAGNOSIS — C20 RECTAL CANCER (HCC): ICD-10-CM

## 2023-08-31 LAB
INR PPP: 1.07 (ref 0.84–1.19)
PROTHROMBIN TIME: 14.6 SECONDS (ref 11.6–14.5)

## 2023-08-31 PROCEDURE — 76937 US GUIDE VASCULAR ACCESS: CPT | Performed by: RADIOLOGY

## 2023-08-31 PROCEDURE — 85610 PROTHROMBIN TIME: CPT | Performed by: RADIOLOGY

## 2023-08-31 PROCEDURE — 36561 INSERT TUNNELED CV CATH: CPT

## 2023-08-31 PROCEDURE — C1788 PORT, INDWELLING, IMP: HCPCS

## 2023-08-31 PROCEDURE — 36561 INSERT TUNNELED CV CATH: CPT | Performed by: RADIOLOGY

## 2023-08-31 PROCEDURE — 76937 US GUIDE VASCULAR ACCESS: CPT

## 2023-08-31 PROCEDURE — 77001 FLUOROGUIDE FOR VEIN DEVICE: CPT | Performed by: RADIOLOGY

## 2023-08-31 PROCEDURE — 77001 FLUOROGUIDE FOR VEIN DEVICE: CPT

## 2023-08-31 PROCEDURE — 99152 MOD SED SAME PHYS/QHP 5/>YRS: CPT | Performed by: RADIOLOGY

## 2023-08-31 RX ORDER — MIDAZOLAM HYDROCHLORIDE 2 MG/2ML
INJECTION, SOLUTION INTRAMUSCULAR; INTRAVENOUS AS NEEDED
Status: COMPLETED | OUTPATIENT
Start: 2023-08-31 | End: 2023-08-31

## 2023-08-31 RX ORDER — SODIUM CHLORIDE 9 MG/ML
75 INJECTION, SOLUTION INTRAVENOUS CONTINUOUS
Status: DISCONTINUED | OUTPATIENT
Start: 2023-08-31 | End: 2023-09-01 | Stop reason: HOSPADM

## 2023-08-31 RX ORDER — LIDOCAINE HYDROCHLORIDE AND EPINEPHRINE 10; 10 MG/ML; UG/ML
INJECTION, SOLUTION INFILTRATION; PERINEURAL AS NEEDED
Status: COMPLETED | OUTPATIENT
Start: 2023-08-31 | End: 2023-08-31

## 2023-08-31 RX ORDER — FENTANYL CITRATE 50 UG/ML
INJECTION, SOLUTION INTRAMUSCULAR; INTRAVENOUS AS NEEDED
Status: COMPLETED | OUTPATIENT
Start: 2023-08-31 | End: 2023-08-31

## 2023-08-31 RX ORDER — CEFAZOLIN SODIUM 1 G/50ML
1000 SOLUTION INTRAVENOUS ONCE
Status: COMPLETED | OUTPATIENT
Start: 2023-08-31 | End: 2023-08-31

## 2023-08-31 RX ADMIN — LIDOCAINE HYDROCHLORIDE,EPINEPHRINE BITARTRATE 10 ML: 10; .01 INJECTION, SOLUTION INFILTRATION; PERINEURAL at 12:19

## 2023-08-31 RX ADMIN — MIDAZOLAM HYDROCHLORIDE 1 MG: 1 INJECTION, SOLUTION INTRAMUSCULAR; INTRAVENOUS at 11:52

## 2023-08-31 RX ADMIN — FENTANYL CITRATE 50 MCG: 50 INJECTION, SOLUTION INTRAMUSCULAR; INTRAVENOUS at 12:03

## 2023-08-31 RX ADMIN — MIDAZOLAM HYDROCHLORIDE 1 MG: 1 INJECTION, SOLUTION INTRAMUSCULAR; INTRAVENOUS at 12:03

## 2023-08-31 RX ADMIN — CEFAZOLIN SODIUM 1000 MG: 1 SOLUTION INTRAVENOUS at 09:12

## 2023-08-31 RX ADMIN — Medication 5 ML: at 12:14

## 2023-08-31 RX ADMIN — FENTANYL CITRATE 50 MCG: 50 INJECTION, SOLUTION INTRAMUSCULAR; INTRAVENOUS at 11:52

## 2023-08-31 RX ADMIN — SODIUM CHLORIDE 75 ML/HR: 0.9 INJECTION, SOLUTION INTRAVENOUS at 09:12

## 2023-08-31 RX ADMIN — MIDAZOLAM HYDROCHLORIDE 1 MG: 1 INJECTION, SOLUTION INTRAMUSCULAR; INTRAVENOUS at 12:07

## 2023-08-31 NOTE — BRIEF OP NOTE (RAD/CATH)
INTERVENTIONAL RADIOLOGY PROCEDURE NOTE    Date: 8/31/2023    Procedure:   Procedure Summary     Date: 08/31/23 Room / Location: [address] [place] [address] Cardiac Cath Lab    Anesthesia Start:  Anesthesia Stop:     Procedure: IR PORT PLACEMENT Diagnosis:       Rectal cancer ([address])      (rectal cancer)    Scheduled Providers:  Responsible Provider:     Anesthesia Type: Not recorded ASA Status: Not recorded          Preoperative diagnosis:   1. Rectal cancer ([address])         Postoperative diagnosis: Same. Surgeon: Ruthie Caro MD     Assistant: None. No qualified resident was available. Blood loss: Minimal    Specimens: None     Findings: Right chest port placement, catheter flushes and aspirates and is ready for immediate use. Complications: None immediate.     Anesthesia: conscious sedation

## 2023-08-31 NOTE — DISCHARGE INSTRUCTIONS
Implanted Venous Access Port     WHAT YOU NEED TO KNOW:   An implanted venous access port is a device used to give treatments and take blood. It may also be called a central venous access device (CVAD). The port is a small container that is placed under your skin, usually in your upper chest. The port is attached to a catheter that enters a large vein. DISCHARGE INSTRUCTIONS:   Resume your normal diet. Small sips of flat soda will help with mild nausea. Prevent an infection:   Wash your hands often. Use soap and water. Clean your hands before and after you care for your port. Remind everyone who cares for your port to wash their hands. Check your skin for infection every day. Look for redness, swelling, or fluid oozing from the port site. Care for your port:   1. You may shower beginning 48 hours after procedure. 2.  Leave glue in place. 3. It is normal for some bruising to occur. 4. Use Tylenol for pain. 5. Limit use of arm on the side that your port was placed. Lift nothing heavier than 5 pounds for 1 week, and then gradually increase activity as tolerated. 6. DO NOT apply ointment, lotion or cream to port site until incision is healed. Allow glue to fall off. DO NOT attempt to peel glue from skin even it it begins to flake. 7. After the port incision is healed you may swim, bathe. Notify the Interventional Radiologist if you have any of the followin. Fever above 101 F    2. Increased redness or swelling after 1st day. 3. Increased pain after 1st day. 4. Any sign of infection (drainage from port site, skin separation, hot to touch). 5. Persistent nausea or vomiting. Contact Interventional Radiology at [phone] [facility] PATIENTS: Contact Interventional Radiology at [phone]) ([address] Located within [facility] 281: Contact Interventional Radiology at [phone]).

## 2023-08-31 NOTE — INTERVAL H&P NOTE
Update: (This section must be completed if the H&P was completed greater than 24 hrs to procedure or admission)    H&P reviewed. After examining the patient, I find no changed to the H&P since it had been written. Patient re-evaluated.  Accept as history and physical.    Lakisha Abbott MD/August 31, 2023/10:41 AM

## 2023-09-01 ENCOUNTER — OFFICE VISIT (OUTPATIENT)
Dept: CARDIOLOGY CLINIC | Facility: CLINIC | Age: 78
End: 2023-09-01
Payer: COMMERCIAL

## 2023-09-01 VITALS
DIASTOLIC BLOOD PRESSURE: 60 MMHG | SYSTOLIC BLOOD PRESSURE: 112 MMHG | HEIGHT: 70 IN | HEART RATE: 86 BPM | RESPIRATION RATE: 16 BRPM | BODY MASS INDEX: 19.47 KG/M2 | OXYGEN SATURATION: 100 % | WEIGHT: 136 LBS

## 2023-09-01 DIAGNOSIS — I25.10 CAD IN NATIVE ARTERY: Primary | Chronic | ICD-10-CM

## 2023-09-01 PROCEDURE — 99215 OFFICE O/P EST HI 40 MIN: CPT | Performed by: INTERNAL MEDICINE

## 2023-09-01 NOTE — PROGRESS NOTES
Cardiology Office Note    Dilcia James. 69 y.o. male MRN: [id number]    09/01/23          Assessment:  1. CAD s/p PCI to dLAD and PTCA to D1 (10/2019) and BMS to pLAD (5/19/23)   2. ICM with EF: 45%  3. Rectal CA with ongoing GIB/ progressive anemia  4. Hx of lacunar CVA  5. Hypertension    Plan:  · Given ongoing GIB, progressive anemia, and exertional dyspnea, will discontinue plavix and cont aspirin monotherapy. He is over 3 months post PCI. Cont atorvastatin  · LVEF has recovered to 45%  · Cont lisinopril and toprol-xl   · He is following closely with Colorectal surgery and Oncology. Ambulatory blood pressure monitoring and maintaining a low sodium diet was advised. · He was advised to notify us with the onset of cardiac symptoms. Follow up: 3-4 months or sooner as needed    1. CAD in native artery            HPI: Dilcia Rosario is a 69y.o. year old male with history of CAD s/p PCI, ICM with EF recovery, and history of lacunar CVA who presents for routine follow-up. Past cardiac evaluation:  · CAD s/p NSTEMI s/p PCI to the dLAD and PTCA to D1 (10/2019)  · TTE 11/2019: EF 50-55%; improved from 40-45%   · He presented to the ED in 9/2020 with vertigo. He was hypertensive on presentation and was started on lisinopril 2.5 mg daily which precipitated hypotension and it was subsequently discontinued. He presented to the ED in May with rectal bleeding and was noted to have a rectal mass. He was undergoing colonoscopy prep and had an anterior STEMI. He underwent LHC with BMS placement to the proximal LAD. TTE revealed EF: 20%. He was started on GDMT. Repeat TTE on 7/13/2023 revealed EF recovery to 45%. He established care with colorectal surgery. He ws not considered a surgical candidate and is scheduled to start chemotherapy. CT revealed lesions suspicions for metastasis. On presentation today he reports continued melena, hematochezia and exertional dyspnea. Hb continues to trend downward. He is on iron suppletments. Family History: grandfather with history of CAD          No Known Allergies      Current Outpatient Medications:   •  aspirin (ECOTRIN LOW STRENGTH) 81 mg EC tablet, Take 1 tablet (81 mg total) by mouth daily Please purchase over the counter at her local pharmacy, Disp: 30 tablet, Rfl: 1  •  atorvastatin (LIPITOR) 80 mg tablet, Take 1 tablet (80 mg total) by mouth daily with dinner, Disp: 90 tablet, Rfl: 3  •  Blood Pressure KIT, by Does not apply route daily, Disp: 1 each, Rfl: 0  •  ferrous sulfate 324 (65 Fe) mg, Take 1 tablet (324 mg total) by mouth in the morning, Disp: 30 tablet, Rfl: 5  •  lisinopril (ZESTRIL) 2.5 mg tablet, take 1 tablet by mouth daily, Disp: 90 tablet, Rfl: 3  •  metoprolol succinate (TOPROL-XL) 25 mg 24 hr tablet, take 1 tablet by mouth daily, Disp: 90 tablet, Rfl: 3  •  tamsulosin (FLOMAX) 0.4 mg, Take 1 capsule (0.4 mg total) by mouth daily at bedtime, Disp: 90 capsule, Rfl: 3  No current facility-administered medications for this visit. Past Medical History:   Diagnosis Date   • Hyperlipidemia    • Ischemic cardiomyopathy    • Lacunar infarction [facility])    • Myocardial infarction [facility])    • Near syncope 11/14/2019   • Non-ST elevated myocardial infarction [facility])    • NSTEMI (non-ST elevated myocardial infarction) ([address]) 10/20/2019   • Pericarditis 10/24/2019   • Poison ivy    • Stroke [facility])        Family History   Problem Relation Age of Onset   • No Known Problems Mother    • Emphysema Father    • Heart disease Maternal Grandfather    • Heart attack Maternal Grandfather    • Emphysema Paternal Grandfather        Past Surgical History:   Procedure Laterality Date   • ANGIOPLASTY     • CARDIAC CATHETERIZATION N/A 5/19/2023    Procedure: Cardiac pci;  Surgeon: Doroteo Opitz, MD;  Location: [facility] CARDIAC CATH LAB;   Service: Cardiology   • CORONARY STENT PLACEMENT     • HERNIA REPAIR Left 4/28/2021    Procedure: OPEN REPAIR HERNIA INGUINAL LEFT WITH MESH; Surgeon: Bennett Burks MD;  Location: [facility] MAIN OR;  Service: General   • IR PORT PLACEMENT  2023       Social History     Socioeconomic History   • Marital status:      Spouse name: Not on file   • Number of children: Not on file   • Years of education: Not on file   • Highest education level: Not on file   Occupational History   • Not on file   Tobacco Use   • Smoking status: Former     Packs/day: 1.00     Years: 15.00     Total pack years: 15.00     Types: Cigarettes     Quit date: 1969     Years since quittin.7   • Smokeless tobacco: Never   Vaping Use   • Vaping Use: Never used   Substance and Sexual Activity   • Alcohol use: Never   • Drug use: No   • Sexual activity: Not Currently     Partners: Female   Other Topics Concern   • Not on file   Social History Narrative   • Not on file     Social Determinants of Health     Financial Resource Strain: Low Risk  (10/17/2022)    Overall Financial Resource Strain (CARDIA)    • Difficulty of Paying Living Expenses: Not hard at all   Food Insecurity: No Food Insecurity (2023)    Hunger Vital Sign    • Worried About Running Out of Food in the Last Year: Never true    • Ran Out of Food in the Last Year: Never true   Transportation Needs: No Transportation Needs (2023)    PRAPARE - Transportation    • Lack of Transportation (Medical): No    • Lack of Transportation (Non-Medical):  No   Physical Activity: Inactive (3/8/2021)    Exercise Vital Sign    • Days of Exercise per Week: 0 days    • Minutes of Exercise per Session: 0 min   Stress: No Stress Concern Present (3/8/2021)    [address]    • Feeling of Stress : Not at all   Social Connections: Not on file   Intimate Partner Violence: Not on file   Housing Stability: [address]  (2023)    Housing Stability Vital Sign    • Unable to Pay for Housing in the Last Year: No    • Number of [address] in the Last Year: 1    • Unstable Housing in the Last Year: No       Review of Systems   Constitutional: Positive for malaise/fatigue. Negative for diaphoresis, weight gain and weight loss. HENT: Negative for congestion. Cardiovascular: Positive for dyspnea on exertion. Negative for chest pain, irregular heartbeat, leg swelling, near-syncope, orthopnea, palpitations, paroxysmal nocturnal dyspnea and syncope. Respiratory: Negative for shortness of breath, sleep disturbances due to breathing and snoring. Hematologic/Lymphatic: Does not bruise/bleed easily. Skin: Negative for rash. Musculoskeletal: Negative for myalgias. Gastrointestinal: Negative for nausea and vomiting. Neurological: Negative for excessive daytime sleepiness and light-headedness. Psychiatric/Behavioral: The patient is not nervous/anxious. Vitals: /60 (BP Location: Left arm, Patient Position: Sitting, Cuff Size: Standard)   Pulse 86   Resp 16   Ht 5' 10" (1.778 m)   Wt 61.7 kg (136 lb)   SpO2 100%   BMI 19.51 kg/m²       Physical Exam:     GEN: Alert and oriented x 3, in no acute distress. Well appearing and well nourished. HEENT: Sclera anicteric, conjunctivae pink, mucous membranes moist. Oropharynx clear. NECK: Supple, no carotid bruits, no significant JVD. Trachea midline, no thyromegaly. HEART: Regular rhythm, normal S1 and S2, no murmurs, clicks, gallops or rubs. PMI nondisplaced, no thrills. LUNGS: Clear to auscultation bilaterally; no wheezes, rales, or rhonchi. No increased work of breathing or signs of respiratory distress. ABDOMEN: Soft, nontender, nondistended, normoactive bowel sounds. EXTREMITIES: Skin warm and well perfused, no clubbing, cyanosis, or edema. NEURO: No focal findings. Normal speech. Mood and affect normal.   SKIN: Normal without suspicious lesions on exposed skin. Total time spent 40 minutes.  This included chart preparation, review of cardiovascular studies, and labs/ imaging studies when applicable. 50% of the time was spent in counseling and coordination of care.

## 2023-09-06 ENCOUNTER — APPOINTMENT (EMERGENCY)
Dept: CT IMAGING | Facility: HOSPITAL | Age: 78
End: 2023-09-06
Payer: COMMERCIAL

## 2023-09-06 ENCOUNTER — HOSPITAL ENCOUNTER (EMERGENCY)
Facility: HOSPITAL | Age: 78
Discharge: HOME | End: 2023-09-06
Attending: EMERGENCY MEDICINE
Payer: COMMERCIAL

## 2023-09-06 ENCOUNTER — TELEPHONE (OUTPATIENT)
Dept: HEMATOLOGY ONCOLOGY | Facility: CLINIC | Age: 78
End: 2023-09-06

## 2023-09-06 ENCOUNTER — APPOINTMENT (EMERGENCY)
Dept: RADIOLOGY | Facility: HOSPITAL | Age: 78
End: 2023-09-06
Payer: COMMERCIAL

## 2023-09-06 ENCOUNTER — OFFICE VISIT (OUTPATIENT)
Dept: HEMATOLOGY ONCOLOGY | Facility: CLINIC | Age: 78
End: 2023-09-06
Payer: COMMERCIAL

## 2023-09-06 VITALS
SYSTOLIC BLOOD PRESSURE: 129 MMHG | WEIGHT: 135 LBS | RESPIRATION RATE: 20 BRPM | TEMPERATURE: 97.9 F | BODY MASS INDEX: 19.37 KG/M2 | DIASTOLIC BLOOD PRESSURE: 60 MMHG | HEART RATE: 62 BPM | OXYGEN SATURATION: 99 %

## 2023-09-06 VITALS
TEMPERATURE: 98.1 F | HEART RATE: 68 BPM | RESPIRATION RATE: 18 BRPM | DIASTOLIC BLOOD PRESSURE: 74 MMHG | HEIGHT: 70 IN | SYSTOLIC BLOOD PRESSURE: 112 MMHG | WEIGHT: 135 LBS | OXYGEN SATURATION: 100 % | BODY MASS INDEX: 19.33 KG/M2

## 2023-09-06 DIAGNOSIS — R06.02 SOB (SHORTNESS OF BREATH): ICD-10-CM

## 2023-09-06 DIAGNOSIS — R06.00 DYSPNEA: Primary | ICD-10-CM

## 2023-09-06 DIAGNOSIS — C20 RECTAL CANCER (HCC): Primary | ICD-10-CM

## 2023-09-06 LAB
2HR DELTA HS TROPONIN: 1 NG/L
ALBUMIN SERPL BCP-MCNC: 4 G/DL (ref 3.5–5)
ALP SERPL-CCNC: 65 U/L (ref 34–104)
ALT SERPL W P-5'-P-CCNC: 9 U/L (ref 7–52)
ANION GAP SERPL CALCULATED.3IONS-SCNC: 5 MMOL/L
AST SERPL W P-5'-P-CCNC: 15 U/L (ref 13–39)
BASOPHILS # BLD AUTO: 0.03 THOUSANDS/ÂΜL (ref 0–0.1)
BASOPHILS NFR BLD AUTO: 1 % (ref 0–1)
BILIRUB SERPL-MCNC: 0.53 MG/DL (ref 0.2–1)
BUN SERPL-MCNC: 18 MG/DL (ref 5–25)
CALCIUM SERPL-MCNC: 9.6 MG/DL (ref 8.4–10.2)
CARDIAC TROPONIN I PNL SERPL HS: 13 NG/L
CARDIAC TROPONIN I PNL SERPL HS: 14 NG/L
CHLORIDE SERPL-SCNC: 105 MMOL/L (ref 96–108)
CO2 SERPL-SCNC: 26 MMOL/L (ref 21–32)
CREAT SERPL-MCNC: 1.14 MG/DL (ref 0.6–1.3)
EOSINOPHIL # BLD AUTO: 0.14 THOUSAND/ÂΜL (ref 0–0.61)
EOSINOPHIL NFR BLD AUTO: 3 % (ref 0–6)
ERYTHROCYTE [DISTWIDTH] IN BLOOD BY AUTOMATED COUNT: 15.6 % (ref 11.6–15.1)
GFR SERPL CREATININE-BSD FRML MDRD: 61 ML/MIN/1.73SQ M
GLUCOSE SERPL-MCNC: 96 MG/DL (ref 65–140)
HCT VFR BLD AUTO: 34.4 % (ref 36.5–49.3)
HGB BLD-MCNC: 10.6 G/DL (ref 12–17)
IMM GRANULOCYTES # BLD AUTO: 0.02 THOUSAND/UL (ref 0–0.2)
IMM GRANULOCYTES NFR BLD AUTO: 0 % (ref 0–2)
INR PPP: 1.06 (ref 0.84–1.19)
LYMPHOCYTES # BLD AUTO: 0.98 THOUSANDS/ÂΜL (ref 0.6–4.47)
LYMPHOCYTES NFR BLD AUTO: 19 % (ref 14–44)
MCH RBC QN AUTO: 25.1 PG (ref 26.8–34.3)
MCHC RBC AUTO-ENTMCNC: 30.8 G/DL (ref 31.4–37.4)
MCV RBC AUTO: 82 FL (ref 82–98)
MONOCYTES # BLD AUTO: 0.59 THOUSAND/ÂΜL (ref 0.17–1.22)
MONOCYTES NFR BLD AUTO: 11 % (ref 4–12)
NEUTROPHILS # BLD AUTO: 3.43 THOUSANDS/ÂΜL (ref 1.85–7.62)
NEUTS SEG NFR BLD AUTO: 66 % (ref 43–75)
NRBC BLD AUTO-RTO: 0 /100 WBCS
PLATELET # BLD AUTO: 171 THOUSANDS/UL (ref 149–390)
PMV BLD AUTO: 11.8 FL (ref 8.9–12.7)
POTASSIUM SERPL-SCNC: 4.9 MMOL/L (ref 3.5–5.3)
PROT SERPL-MCNC: 6.8 G/DL (ref 6.4–8.4)
PROTHROMBIN TIME: 14.4 SECONDS (ref 11.6–14.5)
RBC # BLD AUTO: 4.22 MILLION/UL (ref 3.88–5.62)
SODIUM SERPL-SCNC: 136 MMOL/L (ref 135–147)
WBC # BLD AUTO: 5.19 THOUSAND/UL (ref 4.31–10.16)

## 2023-09-06 PROCEDURE — 80053 COMPREHEN METABOLIC PANEL: CPT | Performed by: EMERGENCY MEDICINE

## 2023-09-06 PROCEDURE — 84484 ASSAY OF TROPONIN QUANT: CPT | Performed by: EMERGENCY MEDICINE

## 2023-09-06 PROCEDURE — 71045 X-RAY EXAM CHEST 1 VIEW: CPT

## 2023-09-06 PROCEDURE — 94640 AIRWAY INHALATION TREATMENT: CPT

## 2023-09-06 PROCEDURE — 99285 EMERGENCY DEPT VISIT HI MDM: CPT

## 2023-09-06 PROCEDURE — 85025 COMPLETE CBC W/AUTO DIFF WBC: CPT | Performed by: EMERGENCY MEDICINE

## 2023-09-06 PROCEDURE — 99215 OFFICE O/P EST HI 40 MIN: CPT | Performed by: INTERNAL MEDICINE

## 2023-09-06 PROCEDURE — 71275 CT ANGIOGRAPHY CHEST: CPT

## 2023-09-06 PROCEDURE — 74177 CT ABD & PELVIS W/CONTRAST: CPT

## 2023-09-06 PROCEDURE — 93005 ELECTROCARDIOGRAM TRACING: CPT

## 2023-09-06 PROCEDURE — 85610 PROTHROMBIN TIME: CPT | Performed by: EMERGENCY MEDICINE

## 2023-09-06 PROCEDURE — 36415 COLL VENOUS BLD VENIPUNCTURE: CPT | Performed by: EMERGENCY MEDICINE

## 2023-09-06 PROCEDURE — 99285 EMERGENCY DEPT VISIT HI MDM: CPT | Performed by: EMERGENCY MEDICINE

## 2023-09-06 RX ORDER — ALBUTEROL SULFATE 90 UG/1
2 AEROSOL, METERED RESPIRATORY (INHALATION) EVERY 6 HOURS PRN
Qty: 8.5 G | Refills: 0 | Status: SHIPPED | OUTPATIENT
Start: 2023-09-06

## 2023-09-06 RX ORDER — DEXTROSE MONOHYDRATE 50 MG/ML
20 INJECTION, SOLUTION INTRAVENOUS ONCE
OUTPATIENT
Start: 2023-09-20

## 2023-09-06 RX ORDER — ALBUTEROL SULFATE 90 UG/1
2 AEROSOL, METERED RESPIRATORY (INHALATION) ONCE
Status: COMPLETED | OUTPATIENT
Start: 2023-09-06 | End: 2023-09-06

## 2023-09-06 RX ORDER — PANTOPRAZOLE SODIUM 40 MG/1
40 TABLET, DELAYED RELEASE ORAL DAILY
Qty: 30 TABLET | Refills: 0 | Status: SHIPPED | OUTPATIENT
Start: 2023-09-06 | End: 2023-10-06

## 2023-09-06 RX ORDER — FLUOROURACIL 50 MG/ML
320 INJECTION, SOLUTION INTRAVENOUS ONCE
OUTPATIENT
Start: 2023-09-20

## 2023-09-06 RX ORDER — PREDNISONE 20 MG/1
60 TABLET ORAL DAILY
Qty: 15 TABLET | Refills: 0 | Status: SHIPPED | OUTPATIENT
Start: 2023-09-06 | End: 2023-09-11

## 2023-09-06 RX ORDER — SUCRALFATE 1 G/1
1 TABLET ORAL 4 TIMES DAILY
Qty: 120 TABLET | Refills: 0 | Status: SHIPPED | OUTPATIENT
Start: 2023-09-06 | End: 2023-10-06

## 2023-09-06 RX ORDER — ALBUTEROL SULFATE 2.5 MG/3ML
2.5 SOLUTION RESPIRATORY (INHALATION) ONCE
Status: COMPLETED | OUTPATIENT
Start: 2023-09-06 | End: 2023-09-06

## 2023-09-06 RX ORDER — PREDNISONE 20 MG/1
60 TABLET ORAL ONCE
Status: COMPLETED | OUTPATIENT
Start: 2023-09-06 | End: 2023-09-06

## 2023-09-06 RX ORDER — SODIUM CHLORIDE 9 MG/ML
20 INJECTION, SOLUTION INTRAVENOUS ONCE AS NEEDED
OUTPATIENT
Start: 2023-09-20

## 2023-09-06 RX ADMIN — IOHEXOL 100 ML: 350 INJECTION, SOLUTION INTRAVENOUS at 13:58

## 2023-09-06 RX ADMIN — ALBUTEROL SULFATE 2.5 MG: 2.5 SOLUTION RESPIRATORY (INHALATION) at 17:22

## 2023-09-06 RX ADMIN — PREDNISONE 60 MG: 20 TABLET ORAL at 17:19

## 2023-09-06 RX ADMIN — ALBUTEROL SULFATE 2 PUFF: 90 AEROSOL, METERED RESPIRATORY (INHALATION) at 17:20

## 2023-09-06 NOTE — TELEPHONE ENCOUNTER
----- Message from Saman Hyman MD sent at 9/6/2023 12:19 PM EDT -----    Patient sent to the ED due to CP and SOB. If everything checks out ok he will need to be scheduled for tx next week. I will see him prior to that. Plan for [facility]OL[facility].   I entered the plan so we can get everything through [employer]

## 2023-09-06 NOTE — PROGRESS NOTES
[address] [facility] [place] [address]    Chang Muller.  [birth year]      PRIMARY HEMATOLOGIC/ONCOLOGIC DIAGNOSIS:  1. Adenocarcinoma of the rectum, invasive, moderately differentiated. NO LOSS OF NUCLEAR EXPRESSION OF MMR PROTEINS: LOW PROBABILITY OF MSI-H. Date of diagnosis 5/20/23. CEA normal at time of diagnosis -- 1.7 on 5/19/23. PATHOLOGY:   Case Report   Surgical Pathology Report                         Case: [id number]                                    Authorizing Provider: Nolan Fairchild DO          Collected:           05/20/2023 1530               Ordering Location:     [facility] Received:            05/20/2023 1800                                      Intensive Care Unit                                                           Pathologist:           Rc Serna DO                                                             Specimen:    Rectum                                                                                     Addendum   RESULTS OF IMMUNOHISTOCHEMICAL ANALYSIS FOR MISMATCH REPAIR PROTEIN LOSS  INTERPRETATION: NO LOSS OF NUCLEAR EXPRESSION OF MMR PROTEINS: LOW PROBABILITY OF MSI-H        RESULTS:  Antibody            Clone                 Description                     Results  MLH1                 M1                     Mismatch repair protein  Intact nuclear expression  MSH2                [id number]        Mismatch repair protein  Intact nuclear expression  MSH6                40                      Mismatch repair protein  Intact nuclear expression  PMS2                 [id number]           Mismatch repair protein  Intact nuclear expression     Comment:  Background non-neoplastic tissue and/or internal control with intact nuclear expression.   [facility] Specimen ID: [id number], Evaluator: Adán Hauser MD  These tests were developed and their performance characteristics determined by [facility]. [facility] may not be cleared or approved by the U.S. Food and Drug Administration.  The FDA determined that such clearance or approval is not necessary.  These tests are used for clinical purposes. [facility] should not be regarded as investigational or for research.  This laboratory has been approved by [place] [id number], designated as a high-complexity laboratory and is qualified to perform these tests.     Comments: Patients whose tumors demonstrate lack of expression of one or more DNA mismatch repair proteins might be at risk for Kenny Syndrome. This cancer susceptibility syndrome greatly increases the risk of synchronous and/or metachronous cancers in the affected patients and their family members. Normal expression of all proteins does not completely rule out familial cancer predisposition.     The [facility] Kenny Syndrome Surveillance Program Task Forces recommends that all patients with lack of expression of one or more DNA mismatch repair proteins and those with concerning personal or family history should undergo thorough evaluation, counseling and possibly genetic testing. Addendum electronically signed by Zahra Lim DO on 5/30/2023 at  2:34 PM   Final Diagnosis   A. Rectum, mass, biopsy:  -Adenocarcinoma, invasive, moderately differentiated. -Ancillary testing for mismatch repair (MMR) protein deficiency by immunohistochemical panel (MLH1, PMS2, MSH2, MSH6) is undertaken, the results will be issued in an addendum. Electronically signed by Zahra Lim DO on 5/26/2023 at  9:10 AM   Note    Intradepartmental consultation (Liliam Swain) is in agreement. Dr. Yoandy Lagunas was notified by Dr. Zahra Lim via iconDialt at 9:09am on 5/26/23.    Additional Information    All reported additional testing was performed with appropriately reactive controls.  These tests were developed and their performance characteristics determined by [facility] or appropriate performing facility, though some tests may be performed on tissues which have not been validated for performance characteristics (such as staining performed on alcohol exposed cell blocks and decalcified tissues).  Results should be interpreted with caution and in the context of the patients’ clinical condition. These tests may not be cleared or approved by the U.S. Food and Drug Administration, though the FDA has determined that such clearance or approval is not necessary. These tests are used for clinical purposes and they should not be regarded as investigational or for research. This laboratory has been approved by [facility], designated as a high-complexity laboratory and is qualified to perform these tests.     Interpretation performed at [address]. [address] [place] [address] Road [address]   Gross Description     A. The specimen is received in formalin, labeled with the patient's name and hospital number, and is designated " rectum". The specimen consists of multiple tan-pink soft tissue fragments measuring in aggregate 1.0 x 1.0 x 0.2 cm. Entirely submitted. One screened cassette.     Note: The estimated total formalin fixation time based upon information provided by the submitting clinician and the standard processing schedule is under 72 hours. MSequino       STAGING: Cancer Staging   Rectal cancer [facility])  Staging form: Colon and Rectum, AJCC 8th Edition  - Clinical stage from 5/18/2023: Stage IIIB (cT3, cN1, cM0) - Signed by Lary Chaudhari DO on 6/28/2023  Microsatellite instability (MSI): Stable         Oncology History   Rectal cancer ([address])   5/18/2023 Initial Diagnosis    May 18, 2023 patient presented with BRBPR x1 week. Weight stable over 8 months. CT abdomen pelvis showed rectal mass with 2 suspicious mesorectal lymph nodes. 2 mm left lower lobe pulmonary nodule, 3 mm hepatic dome questionable lesion, 1 cm uncinate process cystic lesion.   CT chest showed 3 mm left lower lobe and right upper lobe nodules new compared to 2019. May 20 flexible sigmoidoscopy showed distal rectal mass. Biopsy of the mass showed moderately differentiated adenocarcinoma. MMR intact. June 14, 2023 MRI of the pelvis showed findings consistent with T3b, N1 low rectal cancer. Suspicion for vascular invasion. CBC on presentation normal WBC platelets. Hemoglobin 11.4, MCV 77. CMP showed albumin 3.3, otherwise normal.        Cancer Staged    Cancer Staging   No matching staging information was found for the patient. 5/18/2023 -  Cancer Staged    Staging form: Colon and Rectum, AJCC 8th Edition  - Clinical stage from 5/18/2023: Stage IIIB (cT3, cN1, cM0) - Signed by Gavino Patel DO on 6/28/2023  Microsatellite instability (MSI): Stable           INTERIM HISTORY:  Susie Lux is a 67yo male who presents for evaluation and management of adenocarcinoma of the rectum. Per records, patient has refused treatment in the past. He is willing to undergo treatment at this time. C/o SOB and GERD. Indigestion symptoms occur with food intake.      PAST MEDICAL,PAST SURGICAL, FAMILY AND SOCIAL HISTORY:    Patient Active Problem List   Diagnosis   • CAD in native artery   • S/P primary angioplasty with coronary stent   • History of ischemic cardiomyopathy   • History of lacunar cerebrovascular accident (CVA)   • Non-recurrent unilateral inguinal hernia   • Essential hypertension   • Protein-calorie malnutrition, unspecified severity (HCC)   • Rectal bleeding   • Acute on chronic anemia   • Moderate protein-calorie malnutrition ([address])   • STEMI involving left anterior descending coronary artery ([address])   • Ischemic cardiomyopathy   • Renal cyst   • Microscopic hematuria   • Encounter to discuss test results   • Enlarged prostate   • Cerebrovascular accident (CVA) ([address])   • Phimosis   • Lower urinary tract symptoms (LUTS)   • Rectal cancer (HCC)   • Depression, recurrent ([address])     Past Medical History:   Diagnosis Date   • Hyperlipidemia • Ischemic cardiomyopathy    • Lacunar infarction [facility])    • Myocardial infarction [facility])    • Near syncope 2019   • Non-ST elevated myocardial infarction [facility])    • NSTEMI (non-ST elevated myocardial infarction) ([address]) 10/20/2019   • Pericarditis 10/24/2019   • Poison ivy    • Stroke [facility])      Past Surgical History:   Procedure Laterality Date   • ANGIOPLASTY     • CARDIAC CATHETERIZATION N/A 2023    Procedure: Cardiac pci;  Surgeon: Oanh Alvarez MD;  Location: [address] [place] [address] CATH LAB; Service: Cardiology   • CORONARY STENT PLACEMENT     • HERNIA REPAIR Left 2021    Procedure: OPEN REPAIR HERNIA INGUINAL LEFT WITH MESH;  Surgeon: Keke Tolliver MD;  Location: [facility] OR;  Service: General   • IR PORT PLACEMENT  2023     Family History   Problem Relation Age of Onset   • No Known Problems Mother    • Emphysema Father    • Heart disease Maternal Grandfather    • Heart attack Maternal Grandfather    • Emphysema Paternal Grandfather      Social History     Socioeconomic History   • Marital status:       Spouse name: Not on file   • Number of children: Not on file   • Years of education: Not on file   • Highest education level: Not on file   Occupational History   • Not on file   Tobacco Use   • Smoking status: Former     Packs/day: 1.00     Years: 15.00     Total pack years: 15.00     Types: Cigarettes     Quit date: 1969     Years since quittin.7   • Smokeless tobacco: Never   Vaping Use   • Vaping Use: Never used   Substance and Sexual Activity   • Alcohol use: Never   • Drug use: No   • Sexual activity: Not Currently     Partners: Female   Other Topics Concern   • Not on file   Social History Narrative   • Not on file     Social Determinants of Health     Financial Resource Strain: Low Risk  (10/17/2022)    Overall Financial Resource Strain (CARDIA)    • Difficulty of Paying Living Expenses: Not hard at all   Food Insecurity: No Food Insecurity (2023)    Hunger Vital Sign • Worried About [place] in the Last Year: Never true    • Ran Out of Food in the Last Year: Never true   Transportation Needs: No Transportation Needs (5/19/2023)    PRAPARE - Transportation    • Lack of Transportation (Medical): No    • Lack of Transportation (Non-Medical): No   Physical Activity: Inactive (3/8/2021)    Exercise Vital Sign    • Days of Exercise per Week: 0 days    • Minutes of Exercise per Session: 0 min   Stress: No Stress Concern Present (3/8/2021)    [facility]    • Feeling of Stress : Not at all   Social Connections: Not on file   Intimate Partner Violence: Not on file   Housing Stability: [address]  (5/19/2023)    Housing Stability Vital Sign    • Unable to Pay for Housing in the Last Year: No    • Number of [address] in the Last Year: 1    • Unstable Housing in the Last Year: No       Current Outpatient Medications:   •  aspirin (ECOTRIN LOW STRENGTH) 81 mg EC tablet, Take 1 tablet (81 mg total) by mouth daily Please purchase over the counter at her local pharmacy, Disp: 30 tablet, Rfl: 1  •  atorvastatin (LIPITOR) 80 mg tablet, Take 1 tablet (80 mg total) by mouth daily with dinner, Disp: 90 tablet, Rfl: 3  •  Blood Pressure KIT, by Does not apply route daily, Disp: 1 each, Rfl: 0  •  ferrous sulfate 324 (65 Fe) mg, Take 1 tablet (324 mg total) by mouth in the morning, Disp: 30 tablet, Rfl: 5  •  lisinopril (ZESTRIL) 2.5 mg tablet, take 1 tablet by mouth daily, Disp: 90 tablet, Rfl: 3  •  metoprolol succinate (TOPROL-XL) 25 mg 24 hr tablet, take 1 tablet by mouth daily, Disp: 90 tablet, Rfl: 3  •  tamsulosin (FLOMAX) 0.4 mg, Take 1 capsule (0.4 mg total) by mouth daily at bedtime, Disp: 90 capsule, Rfl: 3  No Known Allergies  Vitals:    09/06/23 1053   BP: 112/74   Pulse: 68   Resp: 18   Temp: 98.1 °F (36.7 °C)   SpO2: 100%       ROS:  CONSTITUTIONAL:  No fever. No chills. No dizziness. No weakness.   EYES:  No pain, erythema, or discharge. No blurring of vision. ENT:  No sore throat, URI symptoms. No epistaxis. No tinnitus. CARDIOVASCULAR:  No chest pain. No palpitations. No lower extremity edema. RESPIRATORY:  No shortness of breath, cough, pain with respiration, pleuritic chest pain. No hemoptysis. No dyspnea. No paroxysmal nocturnal dyspnea. GASTROINTESTINAL:  Normal appetite. No nausea, vomiting, diarrhea. No pain. No bloating. No melena. GENITOURINARY:  No frequency, urgency, nocturia. No hematuria or dysuria. MUSCULOSKELETAL:  No arthralgias or myalgias. INTEGUMENTARY:  No swelling. No bruising. No contusions. No abrasions. No lymphangitis. NEUROLOGIC:  No headache. No neck pain. No numbness or tingling of the extremities. No weakness. PSYCHIATRIC:  No confusion. ENDOCRINE:  No fatigue. No weakness. No history of thyroid, diabetes or adrenal problems. HEMATOLOGICAL:  No bleeding. No petechiae. No bruising.     PHYSICAL EXAM:    GENERAL: AAO x 3, moderate distress  HEENT: AT,NC  CVS: S1S2 RRR  LUNGS: b/l breath sounds  ABD: NT,ND, +BS  EXTR: no edema  NEURO: CN II-XII grossly intact    LABS:  I have reviewed pertinent labs:  CBC:   Lab Results   Component Value Date    WBC 6.17 08/07/2023    RBC 3.70 (L) 08/07/2023    HGB 9.8 (L) 08/07/2023    HCT 32.8 (L) 08/07/2023    MCV 89 08/07/2023     08/07/2023    MCH 26.5 (L) 08/07/2023    MCHC 29.9 (L) 08/07/2023    RDW 16.7 (H) 08/07/2023    MPV 12.6 08/07/2023    NEUTROABS 3.97 08/07/2023     CMP:   Lab Results   Component Value Date    SODIUM 138 08/07/2023    K 4.3 08/07/2023     08/07/2023    CO2 26 08/07/2023    AGAP 4 08/07/2023    BUN 23 08/07/2023    CREATININE 1.16 08/07/2023    GLUC 96 05/25/2023    GLUF 105 (H) 08/07/2023    CALCIUM 8.8 08/07/2023    AST 18 08/07/2023    ALT 25 08/07/2023    ALKPHOS 71 08/07/2023    TP 6.5 08/07/2023    ALB 3.2 (L) 08/07/2023    TBILI 0.30 08/07/2023    EGFR 60 08/07/2023     Liver Enzymes:   Lab Results Component Value Date    AST 18 08/07/2023    ALT 25 08/07/2023    ALKPHOS 71 08/07/2023    TP 6.5 08/07/2023    ALB 3.2 (L) 08/07/2023    TBILI 0.30 08/07/2023    BILIDIR 0.16 10/20/2019     Vitamin B12   Lab Results   Component Value Date    LVFSXHZN73 222 05/19/2023     Iron Study   Lab Results   Component Value Date    RETIC 43,800 05/19/2023    RETICCTPCT 1.07 05/19/2023    FERRITIN 22 (L) 08/07/2023    CONCFE 6 (L) 08/07/2023    TIBC 328 08/07/2023    IRON 20 (L) 08/07/2023     Folate   Lab Results   Component Value Date    FOLATE 21.8 05/19/2023     Magnesium   Lab Results   Component Value Date    MG 2.3 05/21/2023     Phosphorus   Lab Results   Component Value Date    PHOS 2.7 05/21/2023     Coagulation Panel   Lab Results   Component Value Date    DDIMER 0.28 10/20/2019    PROTIME 14.6 (H) 08/31/2023    INR 1.07 08/31/2023    PTT 28 05/18/2023       IMAGING:  XR chest pa & lateral    Result Date: 8/9/2023  Narrative: CHEST INDICATION:   R06.02: Shortness of breath. . COMPARISON:  Radiograph 9/25/2020 and CTA chest  May 23, 2023 EXAM PERFORMED/VIEWS:  XR CHEST PA & LATERAL FINDINGS: Cardiomediastinal silhouette appears unremarkable. The lungs are clear. No pneumothorax or pleural effusion. Osseous structures appear within normal limits for patient age. Impression: No acute cardiopulmonary disease. Resident: Juliocesar Mac, the attending radiologist, have reviewed the images and agree with the final report above. Workstation performed: [id number]     I reviewed the above laboratory and imaging data. ASSESSMENT/PLAN:  1. Adenocarcinoma of the rectum, invasive, moderately differentiated. NO LOSS OF NUCLEAR EXPRESSION OF MMR PROTEINS: LOW PROBABILITY OF MSI-H. Date of diagnosis 5/20/23. CEA normal at time of diagnosis -- 1.7 on 5/19/23. The patient is willing to receive treatment now. He is scheduled for port placement 8/31/23.    Prior imaging 5/2023 showed a possible 0.3 cm right hepatic dome lesion versus artifact (statistically most likely a benign lesion such as cyst or hemangioma). 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 MRI pelvis w/o contrast dated 6/14/23 showed stage: T3b, N1, low rectal cancer. MRF: clear (tumor margin >2 mm from MRF). No sphincter involvement. No suspicious extra mesorectal lymph nodes. Suspicious vascular invasion along the right lateral aspect. [facility] oncology testing pending. CT TAP dated 8/29/23--numerous bilateral pulmonary nodules which have increased in size/conspicuity from prior exam suspicious for metastasis. Redemonstration of subannular primary rectal malignancy, grossly similar to prior exam. Multiple suspicious mesorectal lymph nodes, one having mildly enlarged since prior exams. Stable borderline enlarged aortocaval lymph node, indeterminate for metastasis. New posterior right hepatic dome lesion, separate from a previously described lesion also in the hepatic dome which is not visualized on this exam. Metastasis not excluded. Recommend further characterization with hepatic MRI. 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 Cholelithiasis. S/p port placement. Plan to initiate treatment with FOLFOX ( dose reduced due to  Anticipated tolerance, will increase dose w/ later treatments if pat is able to tolerate). However, during today's visit the patient was not able to speak without making frequent pauses to breathe. I am concerned for PE in this patient with stage IV rectal ca and acute SOB. CT Chest 8/23 reviewed-- no findings to explain his symptoms. The patient reports GERD symptoms but would need to be evaluated for acute MI. ECHO 7/13/23--the left ventricular ejection fraction is 45%. Systolic function is mildly reduced.

## 2023-09-07 ENCOUNTER — VBI (OUTPATIENT)
Age: 78
End: 2023-09-07

## 2023-09-07 NOTE — TELEPHONE ENCOUNTER
09/07/23 1:58 PM    Patient contacted post ED visit, [facility] department spoke with patient/caregiver and outreach was successful. Thank you.   Eloise Garnica  [facility]

## 2023-09-09 NOTE — ED PROVIDER NOTES
History  Chief Complaint   Patient presents with   • Shortness of Breath     Patient reports he has been short of breath for "months" Patient reports diagnosis of rectal cancer in May and has been feeling unwell since then. Oncologist sent him here today. 69 y.o.  male  Patient presents with:  Shortness of Breath: Patient reports he has been short of breath for "months" Patient reports diagnosis of rectal cancer in May and has been feeling unwell since then. Oncologist sent him here today.      Past Medical History:  No date: Hyperlipidemia  No date: Ischemic cardiomyopathy  No date: Lacunar infarction ([address])  No date: Myocardial infarction ([address])  11/14/2019: Near syncope  No date: Non-ST elevated myocardial infarction [facility])  10/20/2019: NSTEMI (non-ST elevated myocardial infarction) ([address])  10/24/2019: Pericarditis  No date: Poison ivy  No date: Stroke [facility]) Active Ambulatory Problems    CAD in native artery         Date Noted: 11/06/2019      S/P primary angioplasty with coronary stent         Date Noted: 11/06/2019      History of ischemic cardiomyopathy         Date Noted: 11/06/2019      History of lacunar cerebrovascular accident (CVA)         Date Noted: 11/19/2019      Non-recurrent unilateral inguinal hernia         Date Noted: 08/12/2020      Essential hypertension         Date Noted: 09/25/2020      Protein-calorie malnutrition, unspecified severity ([address])         Date Noted: 05/05/2023      Rectal bleeding         Date Noted: 05/18/2023      Acute on chronic anemia         Date Noted: 05/18/2023      Moderate protein-calorie malnutrition ([address])         Date Noted: 05/19/2023      STEMI involving left anterior descending coronary artery [facility])         Date Noted: 05/19/2023      Ischemic cardiomyopathy         Date Noted: 05/22/2023      Renal cyst         Date Noted: 05/30/2023      Microscopic hematuria         Date Noted: 05/30/2023      Encounter to discuss test results         Date Noted: 05/30/2023      Enlarged prostate         Date Noted: 05/30/2023      Cerebrovascular accident (CVA) [facility])         Date Noted: 05/30/2023      Phimosis         Date Noted: 05/31/2023      Lower urinary tract symptoms (LUTS)         Date Noted: 05/31/2023      Rectal cancer ([address])         Date Noted: 06/08/2023      Depression, recurrent ([address])         Date Noted: 08/07/2023    Resolved Ambulatory Problems  No Resolved Ambulatory Problems  Past Medical History:  No date: Hyperlipidemia  No date: Lacunar infarction ([address])  No date: Myocardial infarction ([address])  11/14/2019: Near syncope  No date: Non-ST elevated myocardial infarction ([address])  10/20/2019: NSTEMI (non-ST elevated myocardial infarction) ([address])  10/24/2019: Pericarditis  No date: Poison ivy  No date: Stroke [facility])           History provided by:  Patient   used: No    Shortness of Breath  Severity:  Mild  Onset quality:  Gradual  Timing:  Constant  Progression:  Worsening  Chronicity:  New  Context: not animal exposure, not emotional upset, not occupational exposure and not URI    Worsened by:  Nothing  Ineffective treatments:  None tried  Associated symptoms: abdominal pain    Associated symptoms: no ear pain, no hemoptysis and no PND        Prior to Admission Medications   Prescriptions Last Dose Informant Patient Reported? Taking?    Blood Pressure KIT  Self No No   Sig: by Does not apply route daily   aspirin (ECOTRIN LOW STRENGTH) 81 mg EC tablet  Self No No   Sig: Take 1 tablet (81 mg total) by mouth daily Please purchase over the counter at her local pharmacy   atorvastatin (LIPITOR) 80 mg tablet  Self No No   Sig: Take 1 tablet (80 mg total) by mouth daily with dinner   ferrous sulfate 324 (65 Fe) mg  Self No No   Sig: Take 1 tablet (324 mg total) by mouth in the morning   lisinopril (ZESTRIL) 2.5 mg tablet  Self No No   Sig: take 1 tablet by mouth daily   metoprolol succinate (TOPROL-XL) 25 mg 24 hr tablet  Self No No   Sig: take 1 tablet by mouth daily   tamsulosin (FLOMAX) 0.4 mg  Self No No   Sig: Take 1 capsule (0.4 mg total) by mouth daily at bedtime      Facility-Administered Medications: None       Past Medical History:   Diagnosis Date   • Hyperlipidemia    • Ischemic cardiomyopathy    • Lacunar infarction [facility])    • Myocardial infarction [facility])    • Near syncope 2019   • Non-ST elevated myocardial infarction [facility])    • NSTEMI (non-ST elevated myocardial infarction) ([address]) 10/20/2019   • Pericarditis 10/24/2019   • Poison ivy    • Stroke [facility])        Past Surgical History:   Procedure Laterality Date   • ANGIOPLASTY     • CARDIAC CATHETERIZATION N/A 2023    Procedure: Cardiac pci;  Surgeon: María Boucher MD;  Location: [address] [place] [address] CATH LAB; Service: Cardiology   • CORONARY STENT PLACEMENT     • HERNIA REPAIR Left 2021    Procedure: OPEN REPAIR HERNIA INGUINAL LEFT WITH MESH;  Surgeon: Usha Momin MD;  Location: [facility] OR;  Service: General   • IR PORT PLACEMENT  2023       Family History   Problem Relation Age of Onset   • No Known Problems Mother    • Emphysema Father    • Heart disease Maternal Grandfather    • Heart attack Maternal Grandfather    • Emphysema Paternal Grandfather      I have reviewed and agree with the history as documented. E-Cigarette/Vaping   • E-Cigarette Use Never User      E-Cigarette/Vaping Substances   • Nicotine No    • THC No    • CBD No    • Flavoring No    • Other No    • Unknown No      Social History     Tobacco Use   • Smoking status: Former     Packs/day: 1.00     Years: 15.00     Total pack years: 15.00     Types: Cigarettes     Quit date: 1969     Years since quittin.7   • Smokeless tobacco: Never   Vaping Use   • Vaping Use: Never used   Substance Use Topics   • Alcohol use: Never   • Drug use: No       Review of Systems   HENT: Negative for ear pain. Respiratory: Positive for shortness of breath. Negative for hemoptysis. Cardiovascular: Negative for PND. Gastrointestinal: Positive for abdominal pain. All other systems reviewed and are negative. Physical Exam  Physical Exam  Vitals and nursing note reviewed. Constitutional:       General: He is not in acute distress. Appearance: He is well-developed. He is not diaphoretic. HENT:      Head: Normocephalic and atraumatic. Right Ear: External ear normal.      Left Ear: External ear normal.   Eyes:      General: No scleral icterus. Right eye: No discharge. Left eye: No discharge. Conjunctiva/sclera: Conjunctivae normal.   Neck:      Thyroid: No thyromegaly. Vascular: No JVD. Trachea: No tracheal deviation. Cardiovascular:      Rate and Rhythm: Normal rate and regular rhythm. Pulmonary:      Effort: Pulmonary effort is normal. No respiratory distress. Breath sounds: Normal breath sounds. No stridor. No wheezing or rales. Abdominal:      General: Bowel sounds are normal. There is no distension. Palpations: Abdomen is soft. Tenderness: There is no abdominal tenderness. Musculoskeletal:         General: No tenderness or deformity. Normal range of motion. Cervical back: Normal range of motion and neck supple. Skin:     General: Skin is warm and dry. Neurological:      Mental Status: He is alert and oriented to person, place, and time. Cranial Nerves: No cranial nerve deficit.       Coordination: Coordination normal.   Psychiatric:         Behavior: Behavior normal.         Vital Signs  ED Triage Vitals   Temperature Pulse Respirations Blood Pressure SpO2   09/06/23 1144 09/06/23 1150 09/06/23 1144 09/06/23 1144 09/06/23 1144   97.9 °F (36.6 °C) 57 18 131/60 100 %      Temp src Heart Rate Source Patient Position - Orthostatic VS BP Location FiO2 (%)   -- -- 09/06/23 1615 09/06/23 1615 --     Lying Left arm       Pain Score       --                  Vitals:    09/06/23 1230 09/06/23 1300 09/06/23 1330 09/06/23 1615   BP: 140/63 131/65 123/63 129/60   Pulse: 55 56 58 62   Patient Position - Orthostatic VS:    Lying         Visual Acuity      ED Medications  Medications   iohexol (OMNIPAQUE) 350 MG/ML injection (SINGLE-DOSE) 100 mL (100 mL Intravenous Given 9/6/23 1358)   albuterol inhalation solution 2.5 mg (2.5 mg Nebulization Given 9/6/23 1722)   albuterol (PROVENTIL HFA,VENTOLIN HFA) inhaler 2 puff (2 puffs Inhalation Given 9/6/23 1720)   predniSONE tablet 60 mg (60 mg Oral Given 9/6/23 1719)       Diagnostic Studies  Results Reviewed     Procedure Component Value Units Date/Time    HS Troponin I 2hr [[id number]]  (Normal) Collected: 09/06/23 1605    Lab Status: Final result Specimen: Blood from Arm, Right Updated: 09/06/23 1636     hs TnI 2hr 14 ng/L      Delta 2hr hsTnI 1 ng/L     HS Troponin 0hr (reflex protocol) [[id number]]  (Normal) Collected: 09/06/23 1306    Lab Status: Final result Specimen: Blood from Arm, Right Updated: 09/06/23 1339     hs TnI 0hr 13 ng/L     Comprehensive metabolic panel [[id number]] Collected: 09/06/23 1306    Lab Status: Final result Specimen: Blood from Arm, Right Updated: 09/06/23 1332     Sodium 136 mmol/L      Potassium 4.9 mmol/L      Chloride 105 mmol/L      CO2 26 mmol/L      ANION GAP 5 mmol/L      BUN 18 mg/dL      Creatinine 1.14 mg/dL      Glucose 96 mg/dL      Calcium 9.6 mg/dL      AST 15 U/L      ALT 9 U/L      Alkaline Phosphatase 65 U/L      Total Protein 6.8 g/dL      Albumin 4.0 g/dL      Total Bilirubin 0.53 mg/dL      eGFR 61 ml/min/1.73sq m     Narrative:      [place] guidelines for Chronic Kidney Disease (CKD):   •  Stage 1 with normal or high GFR (GFR > 90 mL/min/1.73 square meters)  •  Stage 2 Mild CKD (GFR = 60-89 mL/min/1.73 square meters)  •  Stage 3A Moderate CKD (GFR = 45-59 mL/min/1.73 square meters)  •  Stage 3B Moderate CKD (GFR = 30-44 mL/min/1.73 square meters)  •  Stage 4 Severe CKD (GFR = 15-29 mL/min/1.73 square meters)  •  Stage 5 End Stage CKD (GFR <15 mL/min/1.73 square meters)  Note: GFR calculation is accurate only with a steady state creatinine    Protime-INR [[id number]]  (Normal) Collected: 09/06/23 1306    Lab Status: Final result Specimen: Blood from Arm, Right Updated: 09/06/23 1326     Protime 14.4 seconds      INR 1.06    CBC and differential [[id number]]  (Abnormal) Collected: 09/06/23 1306    Lab Status: Final result Specimen: Blood from Arm, Right Updated: 09/06/23 1314     WBC 5.19 Thousand/uL      RBC 4.22 Million/uL      Hemoglobin 10.6 g/dL      Hematocrit 34.4 %      MCV 82 fL      MCH 25.1 pg      MCHC 30.8 g/dL      RDW 15.6 %      MPV 11.8 fL      Platelets 953 Thousands/uL      nRBC 0 /100 WBCs      Neutrophils Relative 66 %      Immat GRANS % 0 %      Lymphocytes Relative 19 %      Monocytes Relative 11 %      Eosinophils Relative 3 %      Basophils Relative 1 %      Neutrophils Absolute 3.43 Thousands/µL      Immature Grans Absolute 0.02 Thousand/uL      Lymphocytes Absolute 0.98 Thousands/µL      Monocytes Absolute 0.59 Thousand/µL      Eosinophils Absolute 0.14 Thousand/µL      Basophils Absolute 0.03 Thousands/µL                  PE Study with CT abdomen & pelvis with contrast   Final Result by Amarjit Stokes MD (09/06 1457)      1. No pulmonary embolism or other acute process in the chest.   2.  No acute findings in the abdomen or pelvis. 3.  Cholelithiasis without evidence of acute cholecystitis. 4.  Small hiatal hernia. 5.  Known rectal malignancy and suspected pulmonary metastasis, grossly stable from recent prior study. 6.  Mesorectal lymphadenopathy and borderline enlarged right hilar and aortocaval nodes are also unchanged. 7.  Unchanged indeterminate right hepatic lesion, possible additional site of metastasis. This may be further characterized by hepatic MRI if it would impact clinical management. 8.  Stable pancreatic cyst, recommendation remains for 2-year follow-up.                   Workstation performed: [id number]         XR chest 1 view portable   Final Result by Author MD Markie (09/06 1557)      No focal consolidation, pleural effusion, or pneumothorax. Resident: Donya Cortez, the attending radiologist, have reviewed the images and agree with the final report above. Workstation performed: [id number]                    Procedures  Procedures         ED Course                               SBIRT 22yo+    Flowsheet Row Most Recent Value   Initial Alcohol Screen: US AUDIT-C     1. How often do you have a drink containing alcohol? 0 Filed at: 09/06/2023 1230   2. How many drinks containing alcohol do you have on a typical day you are drinking? 0 Filed at: 09/06/2023 1230   3a. Male UNDER 65: How often do you have five or more drinks on one occasion? 0 Filed at: 09/06/2023 1230   Audit-C Score 0 Filed at: 09/06/2023 1230   MILA: How many times in the past year have you. .. Used an illegal drug or used a prescription medication for non-medical reasons? Never Filed at: 09/06/2023 1230                    Medical Decision Making  Dyspnea: acute illness or injury  Amount and/or Complexity of Data Reviewed  Labs: ordered. Radiology: ordered. Risk  Prescription drug management. Disposition  Final diagnoses:   Dyspnea     Time reflects when diagnosis was documented in both MDM as applicable and the Disposition within this note     Time User Action Codes Description Comment    9/6/2023  5:06 PM Kapil Cooper Add [R06.00] Dyspnea       ED Disposition     ED Disposition   Discharge    Condition   Stable    Date/Time   Wed Sep 6, 2023  5:06 PM    Comment   Zhanna Garcia. discharge to home/self care.                Follow-up Information     Follow up With Specialties Details Why [address], DO Internal Medicine   [address] [address] [address] [facility]  [phone]            Discharge Medication List as of 9/6/2023  5:07 PM START taking these medications    Details   albuterol (ProAir HFA) 90 mcg/act inhaler Inhale 2 puffs every 6 (six) hours as needed for wheezing, Starting Wed 9/6/2023, Normal      pantoprazole (PROTONIX) 40 mg tablet Take 1 tablet (40 mg total) by mouth daily, Starting Wed 9/6/2023, Until Fri 10/6/2023, Normal      predniSONE 20 mg tablet Take 3 tablets (60 mg total) by mouth daily for 5 days, Starting Wed 9/6/2023, Until Mon 9/11/2023, Normal      sucralfate (CARAFATE) 1 g tablet Take 1 tablet (1 g total) by mouth 4 (four) times a day, Starting Wed 9/6/2023, Until Fri 10/6/2023, Normal         CONTINUE these medications which have NOT CHANGED    Details   aspirin (ECOTRIN LOW STRENGTH) 81 mg EC tablet Take 1 tablet (81 mg total) by mouth daily Please purchase over the counter at her local pharmacy, Starting Tue 11/19/2019, Until Wed 9/6/2023, Normal      atorvastatin (LIPITOR) 80 mg tablet Take 1 tablet (80 mg total) by mouth daily with dinner, Starting Mon 10/17/2022, Normal      Blood Pressure KIT by Does not apply route daily, Starting Tue 11/19/2019, Normal      ferrous sulfate 324 (65 Fe) mg Take 1 tablet (324 mg total) by mouth in the morning, Starting Tue 8/8/2023, Normal      lisinopril (ZESTRIL) 2.5 mg tablet take 1 tablet by mouth daily, Normal      metoprolol succinate (TOPROL-XL) 25 mg 24 hr tablet take 1 tablet by mouth daily, Normal      tamsulosin (FLOMAX) 0.4 mg Take 1 capsule (0.4 mg total) by mouth daily at bedtime, Starting Wed 5/31/2023, Until Sat 5/25/2024, Normal             No discharge procedures on file.     PDMP Review       Value Time User    PDMP Reviewed  Yes 8/1/2023  3:37 PM Dallas Baltazar, [place]          ED Provider  Electronically Signed by           Sabrina Kendrick DO  09/09/23 1683

## 2023-09-10 LAB
ATRIAL RATE: 56 BPM
P AXIS: 61 DEGREES
PR INTERVAL: 162 MS
QRS AXIS: 79 DEGREES
QRSD INTERVAL: 78 MS
QT INTERVAL: 404 MS
QTC INTERVAL: 389 MS
T WAVE AXIS: 106 DEGREES
VENTRICULAR RATE: 56 BPM

## 2023-09-10 PROCEDURE — 93010 ELECTROCARDIOGRAM REPORT: CPT | Performed by: INTERNAL MEDICINE

## 2023-09-11 ENCOUNTER — RADIATION ONCOLOGY CONSULT (OUTPATIENT)
Dept: RADIATION ONCOLOGY | Facility: CLINIC | Age: 78
End: 2023-09-11
Attending: RADIOLOGY
Payer: COMMERCIAL

## 2023-09-11 VITALS
BODY MASS INDEX: 19.66 KG/M2 | DIASTOLIC BLOOD PRESSURE: 80 MMHG | WEIGHT: 137 LBS | SYSTOLIC BLOOD PRESSURE: 140 MMHG | TEMPERATURE: 97.1 F | OXYGEN SATURATION: 98 % | HEART RATE: 58 BPM | RESPIRATION RATE: 16 BRPM

## 2023-09-11 DIAGNOSIS — C20 RECTAL CANCER (HCC): Primary | ICD-10-CM

## 2023-09-11 DIAGNOSIS — C20 RECTAL CANCER (HCC): ICD-10-CM

## 2023-09-11 PROCEDURE — 99204 OFFICE O/P NEW MOD 45 MIN: CPT | Performed by: RADIOLOGY

## 2023-09-11 PROCEDURE — 99211 OFF/OP EST MAY X REQ PHY/QHP: CPT | Performed by: RADIOLOGY

## 2023-09-11 PROCEDURE — 77263 THER RADIOLOGY TX PLNG CPLX: CPT | Performed by: RADIOLOGY

## 2023-09-11 NOTE — LETTER
2023     Saman Hyman MD  [address]    Patient: Marisol Dobbins YOB: 1945   Date of Visit: 2023       Dear Dr. Herbert Cuevas: Thank you for referring Rachele Gonsalez to me for evaluation. Below are my notes for this consultation. If you have questions, please do not hesitate to call me. I look forward to following your patient along with you. Sincerely,        Kassie Colindres MD        CC: No Recipients    Kassie Colindres MD  2023  5:29 PM  Sign when Signing Visit  Consultation - Radiation Oncology      UJA:[id number] : 1945  Encounter: [id number]  Patient Information: Marisol Dobbins CHIEF COMPLAINT  Chief Complaint   Patient presents with   • Rectal Cancer   • Consult     Cancer Staging   Rectal cancer [facility])  Staging form: Colon and Rectum, AJCC 8th Edition  - Clinical stage from 2023: Stage IIIB (cT3, cN1, cM0) - Signed by Steven Cardoza DO on 2023  Microsatellite instability (MSI): Stable         History of Present Illness   Marisol Dobbins is a 69 y.o.  male with past medical history significant for CAD with acute STEMI s/p stent placement in May 2023 diagnosed with stage III rectal adenocarcinoma the same month and deferred definitive treatment at that time. He now presents with likely stage IV disease with continued bleeding primary. He is being referred by Dr. Herbert Cuevas since he is now interested in receiving treatment. He presents today for consult. Briefly, he presented to the ED on 23 with rectal bleeding for 1 week and acute MI.    23 Hospital admission  Ischemic cardiomyopathy  Acute STEMI underwent urgent catheterization. • Bare-metal stent was placed  Acute on chronic anemia secondary to bleeding rectal mass       23 CT A/P w contrast  Imaging findings highly concerning for a rectal neoplasm with suspicious 2 mesorectal lymph nodes.  Colonoscopy and rectal MRI examination are recommended for further evaluation. Moderate colonic stool burden without abnormal dilation, likely representing constipation. A 2 mm pulmonary nodule in the left lower lobe, new since 11/17/2019. CT chest follow-up should be obtained in 3 months. A possible 0.3 cm right hepatic dome lesion versus artifact. If this is a true lesion, this is statistically most likely a benign lesion such as cyst or hemangioma. However if the diagnosis of rectal malignancy is established, short-term follow-up with   postcontrast CT or MRI abdomen is recommended to exclude possibility of a developing metastatic lesion. 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 MRI abdomen MRCP with and without contrast is recommended in 1 year to assess for stability. 5/20/23 Flex sigmoidoscopy  • Mass (traversable) measuring 6 cm x 4 cm in the distal rectum observed during digital rectal exam, covering two thirds of the circumference; bleeding occurred before and after intervention; performed cold forceps biopsy. The distal tip of the tumor is 4 cm from the anal verge. Pathology demonstrated moderately differentiated with no loss of MMR protein expression. 6/8/23 Dr. Nahed Aguirre would likely be chemotherapy and chemotherapy with radiation  Given patient's age and comorbidities he is unsure if he wants any therapy. I have encouraged him to at least have the follow-ups done with MRI and medical oncology to finalize his staging work-up and discuss treatment. I can see him there after for surgical follow-up down the line. 6/14/23 MRI pelvis rectal cancer staging wo contrast  Unenhanced MRI of the Pelvis (Rectal Protocol)   Overall MRI stage: T3b, N1, Low rectal cancer  MRF: clear (tumor margin >2 mm from MRF)  Sphincter involvement: No  Suspicious extra mesorectal lymph nodes:No  EMVI: Yes.  Suspicious vascular invasion along the right lateral aspect   For the purpose of radiation therapy planning, series 3 would be most useful. 23 Dr. Gail Lu  locally advanced rectal adenocarcinoma, T3, N1 by MRI. reviewed that the current standard of care for patients in this scenario involves preoperative chemotherapy followed by RT chemo followed by resection. Patient declines chemo or radiation therapy based on previous experience with his wife who  of cancer. We reviewed that this is understandable though is an unreliable predictor of potential toxicities in his case. We reviewed that surgery alone could be considered but that the chance of cure is considered significantly lower. Considering all of this and having previously discussed with his family the patient remains opposed to chemotherapy or radiation therapy. He would consider surgery if this is felt acceptable from surgical and cardiology viewpoints. 23 Rectal/GI Multidisciplinary Conference   Recommendation is for CORNELL. Patient expressed to colorectal surgeon as well as medical oncologist that he is not interested in receiving chemotherapy/radiation therapy. The patient's wife had cancer years ago and passed away. He does not want treatment after watching what his wife went through. -Patient is interested in surgery upfront which the recommendation for surgery would be an abdominoperineal resection (APR). Patient not a surgical candidate at this time since patient recently had MI and his EF is 20%. -Since patient is declining systemic therapy, recommendation for palliative care to manage patient's symptoms. 8/10/23 Dr. Yolette Olivarez   He has seen hematology oncology and expressed a desire not to have chemotherapy and radiation. He presents today for surgical discussion. surgery for him will be quite high risk given his recent MI. He would need to be off of his dual antiplatelet therapy. There is also a chance that he has metastatic disease already and would require therapy.    He remains relatively noncommittal as to whether he wants any therapy done at all. Given this overall picture, I have again recommended chemotherapy and radiation as this is standard therapy and has been discussed at multidisciplinary conference. With this discussion he does express that if he is pursuing therapy he would pursue chemotherapy. This will also help to palliate his symptoms, as well as delay need for surgical intervention with a higher cardiac risk given his recent MI.     8/29/23 CT C/A/P w contrast  1. Numerous bilateral pulmonary nodules which have increased in size/conspicuity from prior exam are suspicious for metastasis. 2.  Redemonstration of subannular primary rectal malignancy, grossly similar to prior exam.  3.  Multiple suspicious mesorectal lymph nodes, one having mildly enlarged since prior exams. Stable borderline enlarged aortocaval lymph node, indeterminate for metastasis. 4.  New posterior right hepatic dome lesion, separate from a previously described lesion also in the hepatic dome which is not visualized on this exam. Metastasis not excluded. Recommend further characterization with hepatic MRI. 5.  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 Recommend follow-up with pancreatic CT or MRI in 2 years. 6.  Cholelithiasis. 8/30/23 Palliative Care     9/6/23 Dr. Worrell Client   The patient is willing to receive treatment now. He is scheduled for port placement 8/31/23. Plan to initiate treatment with FOLFOX ( dose reduced due to  Anticipated tolerance, will increase dose w/ later treatments if pat is able to tolerate). during today's visit the patient was not able to speak without making frequent pauses to breathe. I am concerned for PE in this patient with stage IV rectal ca and acute SOB. Follow up 1 week     9/6/23 ED evaluation for shortness of breath. Discharged on prednisone.       Currently, the patient denies shortness of breath, progressive dyspnea on exertion, or cough. He denies fever. He denies abdominal or pelvic pain. He continues to have rectal bleeding daily. He notes blood with and without bowel movements multiple times a day. He could not give an estimate on volume or number of episodes per day, but notes >4 today. Blood is dark to black at this time since starting iron supplementation. He alternates between diarrhea and constipation. He denies rectal pain, pain with defecation, or fecal incontinence. He denies lightheadedness, dizziness, chest pain, or palpitations. He continues to have anemia, but last Hgb >10. He denies significant urinary symptoms including dysuria, hematuria, or incontinence. He denies lower extremity edema. Upcomin23 Dr. Herbert Cuevas  23 Palliative Care  23 MRI    Historical Information   Oncology History   Rectal cancer ([address])   2023 Initial Diagnosis    May 18, 2023 patient presented with BRBPR x1 week. Weight stable over 8 months. CT abdomen pelvis showed rectal mass with 2 suspicious mesorectal lymph nodes. 2 mm left lower lobe pulmonary nodule, 3 mm hepatic dome questionable lesion, 1 cm uncinate process cystic lesion. CT chest showed 3 mm left lower lobe and right upper lobe nodules new compared to 2019. May 20 flexible sigmoidoscopy showed distal rectal mass. Biopsy of the mass showed moderately differentiated adenocarcinoma. MMR intact. 2023 MRI of the pelvis showed findings consistent with T3b, N1 low rectal cancer. Suspicion for vascular invasion. CBC on presentation normal WBC platelets. Hemoglobin 11.4, MCV 77. CMP showed albumin 3.3, otherwise normal.        Cancer Staged    Cancer Staging   No matching staging information was found for the patient.        2023 -  Cancer Staged    Staging form: Colon and Rectum, AJCC 8th Edition  - Clinical stage from 2023: Stage IIIB (cT3, cN1, cM0) - Signed by Steven Cardoza DO on 2023  Microsatellite instability (MSI): Stable       5/20/2023 Biopsy    A. Rectum, mass, biopsy:  -Adenocarcinoma, invasive, moderately differentiated. -Ancillary testing for mismatch repair (MMR) protein deficiency by immunohistochemical panel (MLH1, PMS2, MSH2, MSH6) is undertaken, the results will be issued in an addendum.     RESULTS OF IMMUNOHISTOCHEMICAL ANALYSIS FOR MISMATCH REPAIR PROTEIN LOSS  INTERPRETATION: NO LOSS OF NUCLEAR EXPRESSION OF MMR PROTEINS: LOW PROBABILITY OF MSI-H        RESULTS:  Antibody            Clone                 Description                     Results  MLH1                 M1                     Mismatch repair protein  Intact nuclear expression  MSH2                [id number]        Mismatch repair protein  Intact nuclear expression  MSH6                44                      Mismatch repair protein  Intact nuclear expression  PMS2                 [id number]           Mismatch repair protein  Intact nuclear expression        9/14/2023 -  Chemotherapy    alteplase (CATHFLO), 2 mg, Intracatheter, Every 1 Minute as needed, 0 of 12 cycles  fluorouracil (ADRUCIL), 320 mg/m2 = 565 mg (80 % of original dose 400 mg/m2), Intravenous, Once, 0 of 12 cycles  Dose modification: 320 mg/m2 (original dose 400 mg/m2, Cycle 1)  leucovorin calcium IVPB, 400 mg/m2 = 708 mg, Intravenous, Once, 0 of 12 cycles  oxaliplatin (ELOXATIN) chemo infusion, 75 mg/m2 = 132.75 mg (88.2 % of original dose 85 mg/m2), Intravenous, Once, 0 of 12 cycles  Dose modification: 75 mg/m2 (original dose 85 mg/m2, Cycle 1, Reason: Anticipated Tolerance)  fluorouracil (ADRUCIL) ambulatory infusion Soln, 1,200 mg/m2/day, Intravenous, Over 46 hours, 0 of 12 cycles           Past Medical History:   Diagnosis Date   • Hyperlipidemia    • Ischemic cardiomyopathy    • Lacunar infarction [facility])    • Myocardial infarction [facility])    • Near syncope 11/14/2019   • Non-ST elevated myocardial infarction [facility])    • NSTEMI (non-ST elevated myocardial infarction) ([address] [facility]) 10/20/2019   • Pericarditis 10/24/2019   • Poison ivy    • Rectal cancer ([address] [facility])    • Stroke [facility])      Past Surgical History:   Procedure Laterality Date   • ANGIOPLASTY     • CARDIAC CATHETERIZATION N/A 2023    Procedure: Cardiac pci;  Surgeon: Shaheen Veloz MD;  Location: [facility] CARDIAC CATH LAB;   Service: Cardiology   • CORONARY STENT PLACEMENT     • HERNIA REPAIR Left 2021    Procedure: OPEN REPAIR HERNIA INGUINAL LEFT WITH MESH;  Surgeon: Grisel Gonsalves MD;  Location: [facility] MAIN OR;  Service: General   • IR PORT PLACEMENT  2023       Family History   Problem Relation Age of Onset   • Breast cancer Mother    • Emphysema Father    • Heart disease Maternal Grandfather    • Heart attack Maternal Grandfather    • Emphysema Paternal Grandfather        Social History   Social History     Substance and Sexual Activity   Alcohol Use Never     Social History     Substance and Sexual Activity   Drug Use No     Social History     Tobacco Use   Smoking Status Former   • Packs/day: 1.00   • Years: 15.00   • Total pack years: 15.00   • Types: Cigarettes   • Quit date: 1969   • Years since quittin.8   Smokeless Tobacco Never         Meds/Allergies     Current Outpatient Medications:   •  albuterol (ProAir HFA) 90 mcg/act inhaler, Inhale 2 puffs every 6 (six) hours as needed for wheezing, Disp: 8.5 g, Rfl: 0  •  aspirin (ECOTRIN LOW STRENGTH) 81 mg EC tablet, Take 1 tablet (81 mg total) by mouth daily Please purchase over the counter at her local pharmacy, Disp: 30 tablet, Rfl: 1  •  atorvastatin (LIPITOR) 80 mg tablet, Take 1 tablet (80 mg total) by mouth daily with dinner, Disp: 90 tablet, Rfl: 3  •  Blood Pressure KIT, by Does not apply route daily, Disp: 1 each, Rfl: 0  •  ferrous sulfate 324 (65 Fe) mg, Take 1 tablet (324 mg total) by mouth in the morning, Disp: 30 tablet, Rfl: 5  •  lisinopril (ZESTRIL) 2.5 mg tablet, take 1 tablet by mouth daily, Disp: 90 tablet, Rfl: 3  •  metoprolol succinate (TOPROL-XL) 25 mg 24 hr tablet, take 1 tablet by mouth daily, Disp: 90 tablet, Rfl: 3  •  pantoprazole (PROTONIX) 40 mg tablet, Take 1 tablet (40 mg total) by mouth daily, Disp: 30 tablet, Rfl: 0  •  predniSONE 20 mg tablet, Take 3 tablets (60 mg total) by mouth daily for 5 days, Disp: 15 tablet, Rfl: 0  •  sucralfate (CARAFATE) 1 g tablet, Take 1 tablet (1 g total) by mouth 4 (four) times a day, Disp: 120 tablet, Rfl: 0  •  tamsulosin (FLOMAX) 0.4 mg, Take 1 capsule (0.4 mg total) by mouth daily at bedtime, Disp: 90 capsule, Rfl: 3  No Known Allergies      Review of Systems   Constitutional: Positive for activity change (due to irregular bowel and recteal bleeding) and fatigue (some days). HENT: Positive for dental problem (full dentures) and tinnitus. Eyes: Positive for visual disturbance (occasional double vision). Respiratory: Positive for cough (dry). Cardiovascular: Negative. Gastrointestinal: Positive for anal bleeding, constipation, diarrhea and nausea (occasional). Negative for abdominal pain and rectal pain. Sometimes has bowel incontinence   Genitourinary: Negative. Musculoskeletal: Positive for back pain. Skin: Negative. Allergic/Immunologic: Negative. Neurological: Positive for light-headedness, numbness (right fingers) and headaches. Hematological: Negative. Psychiatric/Behavioral: Positive for sleep disturbance. Negative for suicidal ideas. The patient is nervous/anxious. OBJECTIVE:   /80   Pulse 58   Temp (!) 97.1 °F (36.2 °C)   Resp 16   Wt 62.1 kg (137 lb)   SpO2 98%   BMI 19.66 kg/m²   Performance Status: Karnofsky: 79 - Cares for self; unable to carry on normal activity or do normal work     Physical Exam  Vitals and nursing note reviewed. Constitutional:       General: He is not in acute distress. Appearance: He is well-developed. Cardiovascular:      Rate and Rhythm: Normal rate and regular rhythm.    Pulmonary:      Breath sounds: No wheezing, rhonchi or rales. Abdominal:      General: There is no distension. Palpations: Abdomen is soft. There is no mass. Tenderness: There is no abdominal tenderness. Genitourinary:     Comments: REDDY deferred given significant rectal bleeding  Musculoskeletal:      Right lower leg: No edema. Left lower leg: No edema. Lymphadenopathy:      Cervical: No cervical adenopathy. Upper Body:      Right upper body: No supraclavicular adenopathy. Left upper body: No supraclavicular adenopathy. Lower Body: No right inguinal adenopathy. No left inguinal adenopathy. Neurological:      Mental Status: He is alert and oriented to person, place, and time. Gait: Gait normal.         RESULTS  Lab Results    Chemistry        Component Value Date/Time    K 4.9 09/06/2023 1306     09/06/2023 1306    CO2 26 09/06/2023 1306    BUN 18 09/06/2023 1306    CREATININE 1.14 09/06/2023 1306        Component Value Date/Time    CALCIUM 9.6 09/06/2023 1306    ALKPHOS 65 09/06/2023 1306    AST 15 09/06/2023 1306    ALT 9 09/06/2023 1306            Lab Results   Component Value Date    WBC 5.19 09/06/2023    HGB 10.6 (L) 09/06/2023    HCT 34.4 (L) 09/06/2023    MCV 82 09/06/2023     09/06/2023         Imaging Studies  PE Study with CT abdomen & pelvis with contrast    Result Date: 9/6/2023  Narrative: CT PULMONARY ANGIOGRAM OF THE CHEST AND CT ABDOMEN AND PELVIS WITH INTRAVENOUS CONTRAST INDICATION:   cancer history and not drinking. COMPARISON: CT chest abdomen pelvis 8/29/2023. TECHNIQUE:  CT examination of the chest, abdomen and pelvis was performed. Thin section CT angiographic technique was used in the chest in order to evaluate for pulmonary embolus and coronal 3D MIP postprocessing was performed on the acquisition scanner. Multiplanar 2D reformatted images were created from the source data.  This examination, like all CT scans performed in the [facility], was performed utilizing techniques to minimize radiation dose exposure, including the use of iterative reconstruction and automated exposure control. Radiation dose length product (DLP) for this visit:  844 mGy-cm IV Contrast:  100 mL of iohexol (OMNIPAQUE) Enteric Contrast:  Enteric contrast was not administered. FINDINGS: CHEST PULMONARY ARTERIAL TREE:  No pulmonary embolus is seen. LUNGS: Numerous bilateral pulmonary nodules are grossly similar to recent exam. No new focal opacity. Mild dependent atelectasis. There is no tracheal or endobronchial lesion. PLEURA: No pneumothorax or pleural effusion. HEART/AORTA:  Heart is unremarkable for patient's age. No thoracic aortic aneurysm. Coronary artery and aortic calcifications. MEDIASTINUM AND NANCY: Enlarged right lower hilar lymph node measuring 1 cm (series 7 image 131). Small hiatal hernia. CHEST WALL AND LOWER NECK:  Unremarkable. ABDOMEN LIVER/BILIARY TREE: Unchanged indeterminate hypoenhancing lesion in the posterior right hepatic dome (series 2 image 250). GALLBLADDER:  There are gallstone(s) within the gallbladder, without pericholecystic inflammatory changes. SPLEEN:  Unremarkable. PANCREAS: Stable 1 cm cystic lesion in the uncinate process. ADRENAL GLANDS:  Unremarkable. KIDNEYS/URETERS:  One or more simple renal cyst(s) is noted. Otherwise unremarkable kidneys. No hydronephrosis. STOMACH AND BOWEL: Small hiatal hernia. No dilated loops of bowel. Known rectal mass, grossly similar to recent prior exam. APPENDIX:  No findings to suggest appendicitis. ABDOMINOPELVIC CAVITY:  No ascites. No pneumoperitoneum. Unchanged mesorectal lymphadenopathy and borderline enlarged aortocaval node. Stable fluid collection at the internal left inguinal ring. VESSELS: No abdominal aortic aneurysm. Atherosclerotic calcifications. Patent portal vein. PELVIS REPRODUCTIVE ORGANS:  Unremarkable for patient's age. URINARY BLADDER:  Unremarkable.  ABDOMINAL WALL/INGUINAL REGIONS: Unremarkable. OSSEOUS STRUCTURES:  No acute fracture or destructive osseous lesion. Impression: 1. No pulmonary embolism or other acute process in the chest. 2.  No acute findings in the abdomen or pelvis. 3.  Cholelithiasis without evidence of acute cholecystitis. 4.  Small hiatal hernia. 5.  Known rectal malignancy and suspected pulmonary metastasis, grossly stable from recent prior study. 6.  Mesorectal lymphadenopathy and borderline enlarged right hilar and aortocaval nodes are also unchanged. 7.  Unchanged indeterminate right hepatic lesion, possible additional site of metastasis. This may be further characterized by hepatic MRI if it would impact clinical management. 8.  Stable pancreatic cyst, recommendation remains for 2-year follow-up. Workstation performed: [id number]       CT chest abdomen pelvis w contrast    Result Date: 8/29/2023  Narrative: CT CHEST, ABDOMEN AND PELVIS WITH IV CONTRAST INDICATION:   C20: Malignant neoplasm of rectum. COMPARISON: Rectal cancer staging MRI 6/14/2023 CT abdomen pelvis 5/18/2023. CTA chest 5/23/2023 TECHNIQUE: CT examination of the chest, abdomen and pelvis was performed. Multiplanar 2D reformatted images were created from the source data. This examination, like all CT scans performed in the [facility], was performed utilizing techniques to minimize radiation dose exposure, including the use of iterative reconstruction and automated exposure control. Radiation dose length product (DLP) for this visit:  641 mGy-cm IV Contrast:  100 mL of iohexol (OMNIPAQUE) Enteric Contrast: Enteric contrast was administered.  FINDINGS: CHEST LUNGS: Numerous small bilateral pulmonary nodules are present which have increased in size/conspicuity from the previous exam. For example, in the right upper lobe on series 3 images 89 and 92 on the current exam are two 4 mm nodules which previously measured 2 mm on series 2 image 91 of the 5/23/2023 exam. Also within the left lower lobe on series 3 image 183 of the current exam is a 5 mm nodule which previously measured sub-4 mm on series 2 image 170 of the 5/23/2023 exam. PLEURA:  Unremarkable. HEART/GREAT VESSELS: Heart is unremarkable for patient's age. No thoracic aortic aneurysm. Aortic and coronary artery calcifications MEDIASTINUM AND NANCY: Small hiatal hernia. CHEST WALL AND LOWER NECK:  Unremarkable. ABDOMEN LIVER/BILIARY TREE: 1 cm hypoenhancing lesion in the posterior right hepatic dome (series 2 image 99). This appears separate from the previously visualized lesion of the 5/18/2023 CT, which itself is not seen on this exam. GALLBLADDER:  There are gallstone(s) within the gallbladder, without pericholecystic inflammatory changes. SPLEEN:  Unremarkable. PANCREAS: Stable 1 cm cystic lesion in the uncinate process (series 2 image 142). ADRENAL GLANDS:  Unremarkable. KIDNEYS/URETERS:  One or more simple renal cyst(s) is noted. Otherwise unremarkable kidneys. No hydronephrosis. STOMACH AND BOWEL: Small hiatal hernia. Normal course and caliber of the stomach and duodenum. Small duodenal diverticulum is present. No dilated loops of bowel. Colonic diverticulosis without evidence of acute diverticulitis. Known partially annular rectal malignancy, measuring approximately 5 cm in craniocaudal length, grossly similar to previous exams. APPENDIX:  A normal appendix was visualized. ABDOMINOPELVIC CAVITY:  No ascites. No pneumoperitoneum. Increased size of at least 1 mesorectal lymph node which now measures 0.9 cm (series 2 image 241), previously 0.5 cm on the prior CT of 5/18/2023 and staging MRI. Stable 1 cm borderline enlarged aortocaval lymph node (series 2 image 163). Stable simple fluid collection at the left inguinal ring, likely postoperative seroma. VESSELS: No abdominal aortic aneurysm. Atherosclerotic calcifications. Patent portal vein. PELVIS REPRODUCTIVE ORGANS:  Unremarkable for patient's age.  URINARY BLADDER: Unremarkable. ABDOMINAL WALL/INGUINAL REGIONS:  Unremarkable. OSSEOUS STRUCTURES:  No acute fracture or destructive osseous lesion. Degenerative changes throughout the spine. Impression: 1. Numerous bilateral pulmonary nodules which have increased in size/conspicuity from prior exam are suspicious for metastasis. 2.  Redemonstration of subannular primary rectal malignancy, grossly similar to prior exam. 3.  Multiple suspicious mesorectal lymph nodes, one having mildly enlarged since prior exams. Stable borderline enlarged aortocaval lymph node, indeterminate for metastasis. 4.  New posterior right hepatic dome lesion, separate from a previously described lesion also in the hepatic dome which is not visualized on this exam. Metastasis not excluded. Recommend further characterization with hepatic MRI. 5.  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 Recommend follow-up with pancreatic CT or MRI in 2 years. 6.  Cholelithiasis. The study was marked in EPIC for significant notification.  Workstation performed: [id number]        Pathology:Collected 5/20/2023 15:30      Status: Edited Result - FINAL      Visible to patient: Yes (not seen)      Dx: Rectal bleeding; Rectal mass; STEMI (...      1 Result Note      Component    Case Report   Surgical Pathology Report                         Case: [id number]                                    Authorizing Provider:  Elaine Bernard DO          Collected:           05/20/2023 1530               Ordering Location:     [facility] Received:            05/20/2023 1800                                      Intensive Care Unit                                                           Pathologist:           Owen Silva DO                                                             Specimen:    Rectum                                                                                     Addendum   RESULTS OF IMMUNOHISTOCHEMICAL ANALYSIS FOR MISMATCH REPAIR PROTEIN LOSS  INTERPRETATION: NO LOSS OF NUCLEAR EXPRESSION OF MMR PROTEINS: LOW PROBABILITY OF MSI-H        RESULTS:  Antibody            Clone                 Description                     Results  MLH1                 M1                     Mismatch repair protein  Intact nuclear expression  MSH2                [id number]        Mismatch repair protein  Intact nuclear expression  MSH6                40                      Mismatch repair protein  Intact nuclear expression  PMS2                 [id number]           Mismatch repair protein  Intact nuclear expression     Comment:  Background non-neoplastic tissue and/or internal control with intact nuclear expression. [facility] Specimen ID: [id number], Evaluator:  Annabelle Javier MD  These tests were developed and their performance characteristics determined by [facility]. They may not be cleared or approved by the U.S. Food and Drug Administration. The FDA determined that such clearance or approval is not necessary. These tests are used for clinical purposes. They should not be regarded as investigational or for research. This laboratory has been approved by [place] 88, designated as a high-complexity laboratory and is qualified to perform these tests. Comments: Patients whose tumors demonstrate lack of expression of one or more DNA mismatch repair proteins might be at risk for Kenny Syndrome. This cancer susceptibility syndrome greatly increases the risk of synchronous and/or metachronous cancers in the affected patients and their family members. Normal expression of all proteins does not completely rule out familial cancer predisposition. The [address] Task Forces recommends that all patients with lack of expression of one or more DNA mismatch repair proteins and those with concerning personal or family history should undergo thorough evaluation, counseling and possibly genetic testing. Addendum electronically signed by Bartolo Davila DO on 5/30/2023 at [phone]   Final Diagnosis   A. Rectum, mass, biopsy:  -Adenocarcinoma, invasive, moderately differentiated. -Ancillary testing for mismatch repair (MMR) protein deficiency by immunohistochemical panel (MLH1, PMS2, MSH2, MSH6) is undertaken, the results will be issued in an addendum. Electronically signed by Bartolo Davila DO on 5/26/2023 at 0910              ASSESSMENT  1. Rectal cancer [facility])  Ambulatory referral to Radiation Oncology        Cancer Staging   Rectal cancer [facility])  Staging form: Colon and Rectum, AJCC 8th Edition  - Clinical stage from 5/18/2023: Stage IIIB (cT3, cN1, cM0) - Signed by Elicia Gandhi DO on 6/28/2023  Microsatellite instability (MSI): Stable        PLAN/DISCUSSION  Madelin Mejia is a 69 y.o. man with past medical history significant for CAD with acute STEMI s/p stent placement in May 2023 diagnosed with stage III rectal adenocarcinoma at the same time. He deferred definitive treatment at that time. Most recent imaging is highly suspicious, but not definitive, for stage IV disease with pulmonary metastases and possible liver metastases. He has symptomatic primary with continued significant rectal bleeding. He is planned for FOLFOX chemotherapy. We discussed palliative radiation options today. We discussed that radiation can be done, short course palliative regimen of 25Gy in 5 fractions. This would be done in an effort to reduce tumor bulk, stop bleeding, and slow progression. This could be done prior to initiation of chemotherapy and would likely address tumor bleeding much more quickly than chemotherapy alone. Alternatively, chemoradiation to 50Gy in 25 fraction to gross tumor would be an alternative treatment. This would be palliative, but would be expected to have more tumor reduction, improved durability, and also stop tumor bleeding.   The treatment would, however, be done after chemotherapy or delay full dose systemic therapy. The pros and cons of each approach were discussed with patient and his daughter. The rationale and potential benefits, as well as the risks and acute and late side effects and potential toxicities of radiation were discussed with the patient and his daughter at length. Side effects discussed included, but were not limited to: fatigue, skin erythema,  hyperpigmentation, diarrhea, irritative urinary symptoms, bowel injury. They were given the opportunity to ask questions and all questions were answered to their satisfaction. They were open to either regimen and deferred to our judgement. Given his age, comorbidities, symptoms, and likely distant metastatic disease I am leaning towards short course radiation. They have follow-up visit with Dr. Rogelio Kaur on Wednesday. We will plan to discuss with Dr. Rogelio Kaur and also follow-up on Wednesday as well for coordination and initiation of treatment. Total Time Spent  48 minutes spent reviewing EMR in preparation for visit, with the patient, coordination with other providers, and documentation. Greater than 50% of total time was spent with the patient and/or family for counseling and/or coordination of care. Ian Villa MD  9/11/2023,2:07 PM      Portions of the record may have been created with voice recognition software. Occasional wrong word or "sound a like" substitutions may have occurred due to the inherent limitations of voice recognition software. Read the chart carefully and recognize, using context, where substitutions have occurred. Addendum:  I spoke with Dr. Rogelio Kaur regarding timing and type of radiation versus systemic therapy. Given his presentation, she agreed with palliative short course radiation prior to initiation of systemic FOLFOX therapy. We will contact patient for CT simulation this week and plan to begin radiation soon thereafter.

## 2023-09-11 NOTE — Clinical Note
Harry Brady, we will scan him this Wednesday with goal to begin and finish radiation next week. Will give you definitive timeline by end of week. Thanks.

## 2023-09-11 NOTE — PROGRESS NOTES
Savana Edgar [birth year] is a 69 y.o. male who was diagnosed with adenocarcinoma of the rectum. He presented to the ED on 5/18/23 with rectal bleeding for 1 week and acute MI. He underwent flexible sigmoidoscopy and initially opted to not undergo chemotherapy and radiation therapy. It was determined that he was not a surgical candidate. He is being referred by Dr. Marlena Shoemaker since he is now interested in receiving treatment. He presents today for consult. 5/18/23 Hospital admission  Ischemic cardiomyopathy  Acute STEMI underwent urgent catheterization. • Bare-metal stent was placed  Acute on chronic anemia secondary to bleeding rectal mass  Rectal mass underwent biopsy concerning for malignancy      5/18/23 CT A/P w contrast  Imaging findings highly concerning for a rectal neoplasm with suspicious 2 mesorectal lymph nodes. Colonoscopy and rectal MRI examination are recommended for further evaluation. Moderate colonic stool burden without abnormal dilation, likely representing constipation. A 2 mm pulmonary nodule in the left lower lobe, new since 11/17/2019. CT chest without contrast in 12 months is recommended to assess for stability according to Fleischner criteria. However, if there is established rectal malignancy, earlier CT chest   follow-up should be obtained in 3 months. A possible 0.3 cm right hepatic dome lesion versus artifact. If this is a true lesion, this is statistically most likely a benign lesion such as cyst or hemangioma. However if the diagnosis of rectal malignancy is established, short-term follow-up with   postcontrast CT or MRI abdomen is recommended to exclude possibility of a developing metastatic lesion. 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 MRI abdomen MRCP with and without contrast is recommended in 1 year to assess for stability.       5/20/23 Flex sigmoidoscopy  • Mass (traversable) measuring 6 cm x 4 cm in the distal rectum observed during digital rectal exam, covering two thirds of the circumference; bleeding occurred before and after intervention; performed cold forceps biopsy. The distal tip of the tumor is 4 cm from the anal verge. • Multiple small moderate diverticula in the sigmoid colon; no bleeding was identified    23 CTA chest pe study  No acute findings; specifically, no acute pulmonary arterial embolism. Indeterminate 3 mm left lower lobe and right upper lobe nodules new since the 2019 study. 23 Dr. Gabby Lundy would likely be chemotherapy and chemotherapy with radiation  Given patient's age and comorbidities he is unsure if he wants any therapy. I have encouraged him to at least have the follow-ups done with MRI and medical oncology to finalize his staging work-up and discuss treatment. I can see him there after for surgical follow-up down the line. 23 MRI pelvis rectal cancer staging wo contrast  Unenhanced MRI of the Pelvis (Rectal Protocol)   Overall MRI stage: T3b, N1, Low rectal cancer (series 3 image 14)  MRF: clear (tumor margin >2 mm from MRF)  Sphincter involvement: No  Suspicious extra mesorectal lymph nodes:No  EMVI: Yes. Suspicious vascular invasion along the right lateral aspect (series 7 image 64)   For the purpose of radiation therapy planning, series 3 would be most useful. 23 Dr. Jahaira Manuel  locally advanced rectal adenocarcinoma, T3, N1 by MRI. reviewed that the current standard of care for patients in this scenario involves preoperative chemotherapy followed by RT chemo followed by resection. Patient declines chemo or radiation therapy based on previous experience with his wife who  of cancer. We reviewed that this is understandable though is an unreliable predictor of potential toxicities in his case. We reviewed that surgery alone could be considered but that the chance of cure is considered significantly lower.   Considering all of this and having previously discussed with his family the patient remains opposed to chemotherapy or radiation therapy. He would consider surgery if this is felt acceptable from surgical and cardiology viewpoints. 6/29/23 Rectal/GI Multidisciplinary Conference   Recommendation is for [facility]. Patient expressed to colorectal surgeon as well as medical oncologist that he is not interested in receiving chemotherapy/radiation therapy. The patient's wife had cancer years ago and passed away. He does not want treatment after watching what his wife went through. -Patient is interested in surgery upfront which the recommendation for surgery would be an abdominoperineal resection (APR). Patient not a surgical candidate at this time since patient recently had MI and his EF is 20%. -Since patient is declining systemic therapy, recommendation for palliative care to manage patient's symptoms. 8/10/23 Dr. Roxi Ruiz   He has seen hematology oncology and expressed a desire not to have chemotherapy and radiation. He presents today for surgical discussion. surgery for him will be quite high risk given his recent MI. He would need to be off of his dual antiplatelet therapy. There is also a chance that he has metastatic disease already and would require therapy. He remains relatively noncommittal as to whether he wants any therapy done at all. Given this overall picture, I have again recommended chemotherapy and radiation as this is standard therapy and has been discussed at multidisciplinary conference. With this discussion he does express that if he is pursuing therapy he would pursue chemotherapy. This will also help to palliate his symptoms, as well as delay need for surgical intervention with a higher cardiac risk given his recent MI.    8/29/23 CT C/A/P w contrast  1. Numerous bilateral pulmonary nodules which have increased in size/conspicuity from prior exam are suspicious for metastasis.   2.  Redemonstration of subannular primary rectal malignancy, grossly similar to prior exam.  3.  Multiple suspicious mesorectal lymph nodes, one having mildly enlarged since prior exams. Stable borderline enlarged aortocaval lymph node, indeterminate for metastasis. 4.  New posterior right hepatic dome lesion, separate from a previously described lesion also in the hepatic dome which is not visualized on this exam. Metastasis not excluded. Recommend further characterization with hepatic MRI. 5.  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 Recommend follow-up with pancreatic CT or MRI in 2 years. 6.  Cholelithiasis. 23 Palliative Care    23 Dr. Raz Marks   The patient is willing to receive treatment now. He is scheduled for port placement 23. Plan to initiate treatment with FOLFOX ( dose reduced due to  Anticipated tolerance, will increase dose w/ later treatments if pat is able to tolerate). during today's visit the patient was not able to speak without making frequent pauses to breathe. I am concerned for PE in this patient with stage IV rectal ca and acute SOB. Follow up 1 week      23 ED    Upcomin23 Dr. Raz Marks  23 Palliative Care  23 MRI      Oncology History   Rectal cancer ([address])   2023 Initial Diagnosis    May 18, 2023 patient presented with BRBPR x1 week. Weight stable over 8 months. CT abdomen pelvis showed rectal mass with 2 suspicious mesorectal lymph nodes. 2 mm left lower lobe pulmonary nodule, 3 mm hepatic dome questionable lesion, 1 cm uncinate process cystic lesion. CT chest showed 3 mm left lower lobe and right upper lobe nodules new compared to 2019. May 20 flexible sigmoidoscopy showed distal rectal mass. Biopsy of the mass showed moderately differentiated adenocarcinoma. MMR intact. 2023 MRI of the pelvis showed findings consistent with T3b, N1 low rectal cancer. Suspicion for vascular invasion.   CBC on presentation normal WBC platelets. Hemoglobin 11.4, MCV 77. CMP showed albumin 3.3, otherwise normal.        Cancer Staged    Cancer Staging   No matching staging information was found for the patient. 5/18/2023 -  Cancer Staged    Staging form: Colon and Rectum, AJCC 8th Edition  - Clinical stage from 5/18/2023: Stage IIIB (cT3, cN1, cM0) - Signed by Griselda Ewing, DO on 6/28/2023  Microsatellite instability (MSI): Stable       5/20/2023 Biopsy    A. Rectum, mass, biopsy:  -Adenocarcinoma, invasive, moderately differentiated. -Ancillary testing for mismatch repair (MMR) protein deficiency by immunohistochemical panel (MLH1, PMS2, MSH2, MSH6) is undertaken, the results will be issued in an addendum.     RESULTS OF IMMUNOHISTOCHEMICAL ANALYSIS FOR MISMATCH REPAIR PROTEIN LOSS  INTERPRETATION: NO LOSS OF NUCLEAR EXPRESSION OF MMR PROTEINS: LOW PROBABILITY OF MSI-H        RESULTS:  Antibody            Clone                 Description                     Results  MLH1                 M1                     Mismatch repair protein  Intact nuclear expression  MSH2                [id number]        Mismatch repair protein  Intact nuclear expression  MSH6                44                      Mismatch repair protein  Intact nuclear expression  PMS2                 [id number]           Mismatch repair protein  Intact nuclear expression        9/14/2023 -  Chemotherapy    alteplase (CATHFLO), 2 mg, Intracatheter, Every 1 Minute as needed, 0 of 12 cycles  fluorouracil (ADRUCIL), 320 mg/m2 = 565 mg (80 % of original dose 400 mg/m2), Intravenous, Once, 0 of 12 cycles  Dose modification: 320 mg/m2 (original dose 400 mg/m2, Cycle 1)  leucovorin calcium IVPB, 400 mg/m2 = 708 mg, Intravenous, Once, 0 of 12 cycles  oxaliplatin (ELOXATIN) chemo infusion, 75 mg/m2 = 132.75 mg (88.2 % of original dose 85 mg/m2), Intravenous, Once, 0 of 12 cycles  Dose modification: 75 mg/m2 (original dose 85 mg/m2, Cycle 1, Reason: Anticipated Tolerance)  fluorouracil (ADRUCIL) ambulatory infusion Soln, 1,200 mg/m2/day, Intravenous, Over 46 hours, 0 of 12 cycles         Review of Systems:  Review of Systems   Constitutional: Positive for activity change (due to irregular bowel and recteal bleeding) and fatigue (some days). HENT: Positive for dental problem (full dentures) and tinnitus. Eyes: Positive for visual disturbance (occasional double vision). Respiratory: Positive for cough (dry). Cardiovascular: Negative. Gastrointestinal: Positive for anal bleeding, constipation, diarrhea and nausea (occasional). Negative for abdominal pain and rectal pain. Sometimes has bowel incontinence   Genitourinary: Negative. Musculoskeletal: Positive for back pain. Skin: Negative. Allergic/Immunologic: Negative. Neurological: Positive for light-headedness, numbness (right fingers) and headaches. Hematological: Negative. Psychiatric/Behavioral: Positive for sleep disturbance. Negative for suicidal ideas. The patient is nervous/anxious.         Clinical Trial: no          Pain assessment: 0    PFT: N/A    Prior Radiation: no    Teaching: NIH book, side effects, SIM    MST: completed    Implantable Devices (Port, pacemaker, pain stimulator): right chest port    Hip Replacement: no    Health Maintenance   Topic Date Due   • COVID-19 Vaccine (2 - Moderna series) 05/25/2021   • Fall Risk  10/17/2023   • Medicare Annual Wellness Visit (AWV)  10/17/2023   • Pneumococcal Vaccine: 65+ Years (1 - PCV) 10[birth year]17/2042 (Originally 4/14/1951)   • Influenza Vaccine (1) 10/17/2042 (Originally 9/1/2023)   • Depression Remission PHQ  02/07/2024   • BMI: Adult  09/06/2024   • Hepatitis C Screening  Completed   • HIB Vaccine  Aged Out   • IPV Vaccine  Aged Out   • Hepatitis A Vaccine  Aged Out   • Meningococcal ACWY Vaccine  Aged Out   • HPV Vaccine  Aged Out   • Colorectal Cancer Screening  Discontinued       Past Medical History:   Diagnosis Date   • Hyperlipidemia    • Ischemic cardiomyopathy    • Lacunar infarction [facility])    • Myocardial infarction [facility])    • Near syncope 2019   • Non-ST elevated myocardial infarction [facility])    • NSTEMI (non-ST elevated myocardial infarction) ([address]) 10/20/2019   • Pericarditis 10/24/2019   • Poison ivy    • Stroke [facility])        Past Surgical History:   Procedure Laterality Date   • ANGIOPLASTY     • CARDIAC CATHETERIZATION N/A 2023    Procedure: Cardiac pci;  Surgeon: Shankar Robert MD;  Location: [address] [place] [address] CATH LAB;   Service: Cardiology   • CORONARY STENT PLACEMENT     • HERNIA REPAIR Left 2021    Procedure: OPEN REPAIR HERNIA INGUINAL LEFT WITH MESH;  Surgeon: Debora Chapman MD;  Location: [facility] OR;  Service: General   • IR PORT PLACEMENT  2023       Family History   Problem Relation Age of Onset   • No Known Problems Mother    • Emphysema Father    • Heart disease Maternal Grandfather    • Heart attack Maternal Grandfather    • Emphysema Paternal Grandfather        Social History     Tobacco Use   • Smoking status: Former     Packs/day: 1.00     Years: 15.00     Total pack years: 15.00     Types: Cigarettes     Quit date: 1969     Years since quittin.8   • Smokeless tobacco: Never   Vaping Use   • Vaping Use: Never used   Substance Use Topics   • Alcohol use: Never   • Drug use: No          Current Outpatient Medications:   •  albuterol (ProAir HFA) 90 mcg/act inhaler, Inhale 2 puffs every 6 (six) hours as needed for wheezing, Disp: 8.5 g, Rfl: 0  •  aspirin (ECOTRIN LOW STRENGTH) 81 mg EC tablet, Take 1 tablet (81 mg total) by mouth daily Please purchase over the counter at her local pharmacy, Disp: 30 tablet, Rfl: 1  •  atorvastatin (LIPITOR) 80 mg tablet, Take 1 tablet (80 mg total) by mouth daily with dinner, Disp: 90 tablet, Rfl: 3  •  Blood Pressure KIT, by Does not apply route daily, Disp: 1 each, Rfl: 0  •  ferrous sulfate 324 (65 Fe) mg, Take 1 tablet (324 mg total) by mouth in the morning, Disp: 30 tablet, Rfl: 5  •  lisinopril (ZESTRIL) 2.5 mg tablet, take 1 tablet by mouth daily, Disp: 90 tablet, Rfl: 3  •  metoprolol succinate (TOPROL-XL) 25 mg 24 hr tablet, take 1 tablet by mouth daily, Disp: 90 tablet, Rfl: 3  •  pantoprazole (PROTONIX) 40 mg tablet, Take 1 tablet (40 mg total) by mouth daily, Disp: 30 tablet, Rfl: 0  •  predniSONE 20 mg tablet, Take 3 tablets (60 mg total) by mouth daily for 5 days, Disp: 15 tablet, Rfl: 0  •  sucralfate (CARAFATE) 1 g tablet, Take 1 tablet (1 g total) by mouth 4 (four) times a day, Disp: 120 tablet, Rfl: 0  •  tamsulosin (FLOMAX) 0.4 mg, Take 1 capsule (0.4 mg total) by mouth daily at bedtime, Disp: 90 capsule, Rfl: 3    No Known Allergies     There were no vitals filed for this visit.

## 2023-09-11 NOTE — PROGRESS NOTES
Consultation - Radiation Oncology      HCT:[id number] : 1945  Encounter: [id number]  Patient Information: Lenon Dose. CHIEF COMPLAINT  Chief Complaint   Patient presents with   • Rectal Cancer   • Consult     Cancer Staging   Rectal cancer [facility])  Staging form: Colon and Rectum, AJCC 8th Edition  - Clinical stage from 2023: Stage IIIB (cT3, cN1, cM0) - Signed by Paula Zhang DO on 2023  Microsatellite instability (MSI): Stable         History of Present Illness   Lenon Dose. is a 69 y.o.  male with past medical history significant for CAD with acute STEMI s/p stent placement in May 2023 diagnosed with stage III rectal adenocarcinoma the same month and deferred definitive treatment at that time. He now presents with likely stage IV disease with continued bleeding primary. He is being referred by Dr. Deja Brooks since he is now interested in receiving treatment. He presents today for consult. Briefly, he presented to the ED on 23 with rectal bleeding for 1 week and acute MI.    23 Hospital admission  Ischemic cardiomyopathy  Acute STEMI underwent urgent catheterization. • Bare-metal stent was placed  Acute on chronic anemia secondary to bleeding rectal mass       23 CT A/P w contrast  Imaging findings highly concerning for a rectal neoplasm with suspicious 2 mesorectal lymph nodes. Colonoscopy and rectal MRI examination are recommended for further evaluation.   Moderate colonic stool burden without abnormal dilation, likely representing constipation.   A 2 mm pulmonary nodule in the left lower lobe, new since 2019. CT chest follow-up should be obtained in 3 months.   A possible 0.3 cm right hepatic dome lesion versus artifact. If this is a true lesion, this is statistically most likely a benign lesion such as cyst or hemangioma.  However if the diagnosis of rectal malignancy is established, short-term follow-up with   postcontrast CT or MRI abdomen is recommended to exclude possibility of a developing metastatic lesion.   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 MRI abdomen MRCP with and without contrast is recommended in 1 year to assess for stability.        23 Flex sigmoidoscopy  • Mass (traversable) measuring 6 cm x 4 cm in the distal rectum observed during digital rectal exam, covering two thirds of the circumference; bleeding occurred before and after intervention; performed cold forceps biopsy. The distal tip of the tumor is 4 cm from the anal verge. Pathology demonstrated moderately differentiated with no loss of MMR protein expression.       23 Dr. Azeb Stevens would likely be chemotherapy and chemotherapy with radiation  Given patient's age and comorbidities he is unsure if he wants any therapy.  I have encouraged him to at least have the follow-ups done with MRI and medical oncology to finalize his staging work-up and discuss treatment. I can see him there after for surgical follow-up down the line.     23 MRI pelvis rectal cancer staging wo contrast  Unenhanced MRI of the Pelvis (Rectal Protocol)   Overall MRI stage: T3b, N1, Low rectal cancer  MRF: clear (tumor margin >2 mm from MRF)  Sphincter involvement: No  Suspicious extra mesorectal lymph nodes:No  EMVI: Yes. Suspicious vascular invasion along the right lateral aspect   For the purpose of radiation therapy planning, series 3 would be most useful.     23 Dr. Jeremiah Mullen  locally advanced rectal adenocarcinoma, T3, N1 by MRI. reviewed that the current standard of care for patients in this scenario involves preoperative chemotherapy followed by RT chemo followed by resection. Patient declines chemo or radiation therapy based on previous experience with his wife who  of cancer.  We reviewed that this is understandable though is an unreliable predictor of potential toxicities in his case.   We reviewed that surgery alone could be considered but that the chance of cure is considered significantly lower. Considering all of this and having previously discussed with his family the patient remains opposed to chemotherapy or radiation therapy. Prasad Stein would consider surgery if this is felt acceptable from surgical and cardiology viewpoints.     6/29/23 Rectal/GI Multidisciplinary Conference   Recommendation is for CORNELL. Patient expressed to colorectal surgeon as well as medical oncologist that he is not interested in receiving chemotherapy/radiation therapy. The patient's wife had cancer years ago and passed away. He does not want treatment after watching what his wife went through. -Patient is interested in surgery upfront which the recommendation for surgery would be an abdominoperineal resection (APR). Patient not a surgical candidate at this time since patient recently had MI and his EF is 20%. -Since patient is declining systemic therapy, recommendation for palliative care to manage patient's symptoms.       8/10/23 Dr. Cheryl Hammond has seen hematology oncology and expressed a desire not to have chemotherapy and radiation. Prasad Stein presents today for surgical discussion. surgery for him will be quite high risk given his recent MI.  He would need to be off of his dual antiplatelet therapy. Apple Santos is also a chance that he has metastatic disease already and would require therapy. He remains relatively noncommittal as to whether he wants any therapy done at all. Given this overall picture, I have again recommended chemotherapy and radiation as this is standard therapy and has been discussed at multidisciplinary conference.    With this discussion he does express that if he is pursuing therapy he would pursue chemotherapy.  This will also help to palliate his symptoms, as well as delay need for surgical intervention with a higher cardiac risk given his recent MI.     8/29/23 CT C/A/P w contrast  1.  Numerous bilateral pulmonary nodules which have increased in size/conspicuity from prior exam are suspicious for metastasis. 2.  Redemonstration of subannular primary rectal malignancy, grossly similar to prior exam.  3.  Multiple suspicious mesorectal lymph nodes, one having mildly enlarged since prior exams. Stable borderline enlarged aortocaval lymph node, indeterminate for metastasis. 4.  New posterior right hepatic dome lesion, separate from a previously described lesion also in the hepatic dome which is not visualized on this exam. Metastasis not excluded. Recommend further characterization with hepatic MRI. 5.  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 Recommend follow-up with pancreatic CT or MRI in 2 years. 6.  Cholelithiasis.     8/30/23 Palliative Care     9/6/23 Dr. Poppy Jimenez patient is willing to receive treatment now. He is scheduled for port placement 8/31/23. Plan to initiate treatment with FOLFOX ( dose reduced due to  Anticipated tolerance, will increase dose w/ later treatments if pat is able to tolerate). during today's visit the patient was not able to speak without making frequent pauses to breathe. I am concerned for PE in this patient with stage IV rectal ca and acute SOB. Follow up 1 week     9/6/23 ED evaluation for shortness of breath. Discharged on prednisone. Currently, the patient denies shortness of breath, progressive dyspnea on exertion, or cough. He denies fever. He denies abdominal or pelvic pain. He continues to have rectal bleeding daily. He notes blood with and without bowel movements multiple times a day. He could not give an estimate on volume or number of episodes per day, but notes >4 today. Blood is dark to black at this time since starting iron supplementation. He alternates between diarrhea and constipation. He denies rectal pain, pain with defecation, or fecal incontinence. He denies lightheadedness, dizziness, chest pain, or palpitations.   He continues to have anemia, but last Hgb >10. He denies significant urinary symptoms including dysuria, hematuria, or incontinence. He denies lower extremity edema.     Upcomin23 Dr. Eder Richey  23 Palliative Care  23 MRI    Historical Information   Oncology History   Rectal cancer ([address])   2023 Initial Diagnosis    May 18, 2023 patient presented with BRBPR x1 week. Weight stable over 8 months. CT abdomen pelvis showed rectal mass with 2 suspicious mesorectal lymph nodes. 2 mm left lower lobe pulmonary nodule, 3 mm hepatic dome questionable lesion, 1 cm uncinate process cystic lesion. CT chest showed 3 mm left lower lobe and right upper lobe nodules new compared to 2019. May 20 flexible sigmoidoscopy showed distal rectal mass. Biopsy of the mass showed moderately differentiated adenocarcinoma. MMR intact. 2023 MRI of the pelvis showed findings consistent with T3b, N1 low rectal cancer. Suspicion for vascular invasion. CBC on presentation normal WBC platelets. Hemoglobin 11.4, MCV 77. CMP showed albumin 3.3, otherwise normal.        Cancer Staged    Cancer Staging   No matching staging information was found for the patient. 2023 -  Cancer Staged    Staging form: Colon and Rectum, AJCC 8th Edition  - Clinical stage from 2023: Stage IIIB (cT3, cN1, cM0) - Signed by Abril Chamorro DO on 2023  Microsatellite instability (MSI): Stable       2023 Biopsy    A. Rectum, mass, biopsy:  -Adenocarcinoma, invasive, moderately differentiated. -Ancillary testing for mismatch repair (MMR) protein deficiency by immunohistochemical panel (MLH1, PMS2, MSH2, MSH6) is undertaken, the results will be issued in an addendum.     RESULTS OF IMMUNOHISTOCHEMICAL ANALYSIS FOR MISMATCH REPAIR PROTEIN LOSS  INTERPRETATION: NO LOSS OF NUCLEAR EXPRESSION OF MMR PROTEINS: LOW PROBABILITY OF MSI-H        RESULTS:  Antibody            Clone                 Description Results  MLH1                 M1                     Mismatch repair protein  Intact nuclear expression  MSH2                [id number]        Mismatch repair protein  Intact nuclear expression  MSH6                44                      Mismatch repair protein  Intact nuclear expression  PMS2                 [id number]           Mismatch repair protein  Intact nuclear expression        9/14/2023 -  Chemotherapy    alteplase (CATHFLO), 2 mg, Intracatheter, Every 1 Minute as needed, 0 of 12 cycles  fluorouracil (ADRUCIL), 320 mg/m2 = 565 mg (80 % of original dose 400 mg/m2), Intravenous, Once, 0 of 12 cycles  Dose modification: 320 mg/m2 (original dose 400 mg/m2, Cycle 1)  leucovorin calcium IVPB, 400 mg/m2 = 708 mg, Intravenous, Once, 0 of 12 cycles  oxaliplatin (ELOXATIN) chemo infusion, 75 mg/m2 = 132.75 mg (88.2 % of original dose 85 mg/m2), Intravenous, Once, 0 of 12 cycles  Dose modification: 75 mg/m2 (original dose 85 mg/m2, Cycle 1, Reason: Anticipated Tolerance)  fluorouracil (ADRUCIL) ambulatory infusion Soln, 1,200 mg/m2/day, Intravenous, Over 46 hours, 0 of 12 cycles           Past Medical History:   Diagnosis Date   • Hyperlipidemia    • Ischemic cardiomyopathy    • Lacunar infarction [facility])    • Myocardial infarction ([address])    • Near syncope 11/14/2019   • Non-ST elevated myocardial infarction [facility])    • NSTEMI (non-ST elevated myocardial infarction) ([address]) 10/20/2019   • Pericarditis 10/24/2019   • Poison ivy    • Rectal cancer ([address])    • Stroke [facility])      Past Surgical History:   Procedure Laterality Date   • ANGIOPLASTY     • CARDIAC CATHETERIZATION N/A 5/19/2023    Procedure: Cardiac pci;  Surgeon: María Boucher MD;  Location: [facility] CARDIAC CATH LAB;   Service: Cardiology   • CORONARY STENT PLACEMENT     • HERNIA REPAIR Left 4/28/2021    Procedure: OPEN REPAIR HERNIA INGUINAL LEFT WITH MESH;  Surgeon: Usha Momin MD;  Location: [facility] MAIN OR;  Service: General   • IR PORT PLACEMENT  8/31/2023       Family History   Problem Relation Age of Onset   • Breast cancer Mother    • Emphysema Father    • Heart disease Maternal Grandfather    • Heart attack Maternal Grandfather    • Emphysema Paternal Grandfather        Social History   Social History     Substance and Sexual Activity   Alcohol Use Never     Social History     Substance and Sexual Activity   Drug Use No     Social History     Tobacco Use   Smoking Status Former   • Packs/day: 1.00   • Years: 15.00   • Total pack years: 15.00   • Types: Cigarettes   • Quit date: 1969   • Years since quittin.8   Smokeless Tobacco Never         Meds/Allergies     Current Outpatient Medications:   •  albuterol (ProAir HFA) 90 mcg/act inhaler, Inhale 2 puffs every 6 (six) hours as needed for wheezing, Disp: 8.5 g, Rfl: 0  •  aspirin (ECOTRIN LOW STRENGTH) 81 mg EC tablet, Take 1 tablet (81 mg total) by mouth daily Please purchase over the counter at her local pharmacy, Disp: 30 tablet, Rfl: 1  •  atorvastatin (LIPITOR) 80 mg tablet, Take 1 tablet (80 mg total) by mouth daily with dinner, Disp: 90 tablet, Rfl: 3  •  Blood Pressure KIT, by Does not apply route daily, Disp: 1 each, Rfl: 0  •  ferrous sulfate 324 (65 Fe) mg, Take 1 tablet (324 mg total) by mouth in the morning, Disp: 30 tablet, Rfl: 5  •  lisinopril (ZESTRIL) 2.5 mg tablet, take 1 tablet by mouth daily, Disp: 90 tablet, Rfl: 3  •  metoprolol succinate (TOPROL-XL) 25 mg 24 hr tablet, take 1 tablet by mouth daily, Disp: 90 tablet, Rfl: 3  •  pantoprazole (PROTONIX) 40 mg tablet, Take 1 tablet (40 mg total) by mouth daily, Disp: 30 tablet, Rfl: 0  •  predniSONE 20 mg tablet, Take 3 tablets (60 mg total) by mouth daily for 5 days, Disp: 15 tablet, Rfl: 0  •  sucralfate (CARAFATE) 1 g tablet, Take 1 tablet (1 g total) by mouth 4 (four) times a day, Disp: 120 tablet, Rfl: 0  •  tamsulosin (FLOMAX) 0.4 mg, Take 1 capsule (0.4 mg total) by mouth daily at bedtime, Disp: 90 capsule, Rfl: 3  No Known Allergies      Review of Systems   Constitutional: Positive for activity change (due to irregular bowel and recteal bleeding) and fatigue (some days). HENT: Positive for dental problem (full dentures) and tinnitus. Eyes: Positive for visual disturbance (occasional double vision). Respiratory: Positive for cough (dry). Cardiovascular: Negative. Gastrointestinal: Positive for anal bleeding, constipation, diarrhea and nausea (occasional). Negative for abdominal pain and rectal pain. Sometimes has bowel incontinence   Genitourinary: Negative. Musculoskeletal: Positive for back pain. Skin: Negative. Allergic/Immunologic: Negative. Neurological: Positive for light-headedness, numbness (right fingers) and headaches. Hematological: Negative. Psychiatric/Behavioral: Positive for sleep disturbance. Negative for suicidal ideas. The patient is nervous/anxious. OBJECTIVE:   /80   Pulse 58   Temp (!) 97.1 °F (36.2 °C)   Resp 16   Wt 62.1 kg (137 lb)   SpO2 98%   BMI 19.66 kg/m²   Performance Status: Karnofsky: 79 - Cares for self; unable to carry on normal activity or do normal work     Physical Exam  Vitals and nursing note reviewed. Constitutional:       General: He is not in acute distress. Appearance: He is well-developed. Cardiovascular:      Rate and Rhythm: Normal rate and regular rhythm. Pulmonary:      Breath sounds: No wheezing, rhonchi or rales. Abdominal:      General: There is no distension. Palpations: Abdomen is soft. There is no mass. Tenderness: There is no abdominal tenderness. Genitourinary:     Comments: REDDY deferred given significant rectal bleeding  Musculoskeletal:      Right lower leg: No edema. Left lower leg: No edema. Lymphadenopathy:      Cervical: No cervical adenopathy. Upper Body:      Right upper body: No supraclavicular adenopathy. Left upper body: No supraclavicular adenopathy.       Lower Body: No right inguinal adenopathy. No left inguinal adenopathy. Neurological:      Mental Status: He is alert and oriented to person, place, and time. Gait: Gait normal.         RESULTS  Lab Results    Chemistry        Component Value Date/Time    K 4.9 09/06/2023 1306     09/06/2023 1306    CO2 26 09/06/2023 1306    BUN 18 09/06/2023 1306    CREATININE 1.14 09/06/2023 1306        Component Value Date/Time    CALCIUM 9.6 09/06/2023 1306    ALKPHOS 65 09/06/2023 1306    AST 15 09/06/2023 1306    ALT 9 09/06/2023 1306            Lab Results   Component Value Date    WBC 5.19 09/06/2023    HGB 10.6 (L) 09/06/2023    HCT 34.4 (L) 09/06/2023    MCV 82 09/06/2023     09/06/2023         Imaging Studies  PE Study with CT abdomen & pelvis with contrast    Result Date: 9/6/2023  Narrative: CT PULMONARY ANGIOGRAM OF THE CHEST AND CT ABDOMEN AND PELVIS WITH INTRAVENOUS CONTRAST INDICATION:   cancer history and not drinking. COMPARISON: CT chest abdomen pelvis 8/29/2023. TECHNIQUE:  CT examination of the chest, abdomen and pelvis was performed. Thin section CT angiographic technique was used in the chest in order to evaluate for pulmonary embolus and coronal 3D MIP postprocessing was performed on the acquisition scanner. Multiplanar 2D reformatted images were created from the source data. This examination, like all CT scans performed in the [facility], was performed utilizing techniques to minimize radiation dose exposure, including the use of iterative reconstruction and automated exposure control. Radiation dose length product (DLP) for this visit:  844 mGy-cm IV Contrast:  100 mL of iohexol (OMNIPAQUE) Enteric Contrast:  Enteric contrast was not administered. FINDINGS: CHEST PULMONARY ARTERIAL TREE:  No pulmonary embolus is seen. LUNGS: Numerous bilateral pulmonary nodules are grossly similar to recent exam. No new focal opacity. Mild dependent atelectasis.  There is no tracheal or endobronchial lesion. PLEURA: No pneumothorax or pleural effusion. HEART/AORTA:  Heart is unremarkable for patient's age. No thoracic aortic aneurysm. Coronary artery and aortic calcifications. MEDIASTINUM AND NANCY: Enlarged right lower hilar lymph node measuring 1 cm (series 7 image 131). Small hiatal hernia. CHEST WALL AND LOWER NECK:  Unremarkable. ABDOMEN LIVER/BILIARY TREE: Unchanged indeterminate hypoenhancing lesion in the posterior right hepatic dome (series 2 image 250). GALLBLADDER:  There are gallstone(s) within the gallbladder, without pericholecystic inflammatory changes. SPLEEN:  Unremarkable. PANCREAS: Stable 1 cm cystic lesion in the uncinate process. ADRENAL GLANDS:  Unremarkable. KIDNEYS/URETERS:  One or more simple renal cyst(s) is noted. Otherwise unremarkable kidneys. No hydronephrosis. STOMACH AND BOWEL: Small hiatal hernia. No dilated loops of bowel. Known rectal mass, grossly similar to recent prior exam. APPENDIX:  No findings to suggest appendicitis. ABDOMINOPELVIC CAVITY:  No ascites. No pneumoperitoneum. Unchanged mesorectal lymphadenopathy and borderline enlarged aortocaval node. Stable fluid collection at the internal left inguinal ring. VESSELS: No abdominal aortic aneurysm. Atherosclerotic calcifications. Patent portal vein. PELVIS REPRODUCTIVE ORGANS:  Unremarkable for patient's age. URINARY BLADDER:  Unremarkable. ABDOMINAL WALL/INGUINAL REGIONS:  Unremarkable. OSSEOUS STRUCTURES:  No acute fracture or destructive osseous lesion. Impression: 1. No pulmonary embolism or other acute process in the chest. 2.  No acute findings in the abdomen or pelvis. 3.  Cholelithiasis without evidence of acute cholecystitis. 4.  Small hiatal hernia. 5.  Known rectal malignancy and suspected pulmonary metastasis, grossly stable from recent prior study. 6.  Mesorectal lymphadenopathy and borderline enlarged right hilar and aortocaval nodes are also unchanged.  7.  Unchanged indeterminate right hepatic lesion, possible additional site of metastasis. This may be further characterized by hepatic MRI if it would impact clinical management. 8.  Stable pancreatic cyst, recommendation remains for 2-year follow-up. Workstation performed: [id number]       CT chest abdomen pelvis w contrast    Result Date: 8/29/2023  Narrative: CT CHEST, ABDOMEN AND PELVIS WITH IV CONTRAST INDICATION:   C20: Malignant neoplasm of rectum. COMPARISON: Rectal cancer staging MRI 6/14/2023 CT abdomen pelvis 5/18/2023. CTA chest 5/23/2023 TECHNIQUE: CT examination of the chest, abdomen and pelvis was performed. Multiplanar 2D reformatted images were created from the source data. This examination, like all CT scans performed in the [facility], was performed utilizing techniques to minimize radiation dose exposure, including the use of iterative reconstruction and automated exposure control. Radiation dose length product (DLP) for this visit:  641 mGy-cm IV Contrast:  100 mL of iohexol (OMNIPAQUE) Enteric Contrast: Enteric contrast was administered. FINDINGS: CHEST LUNGS: Numerous small bilateral pulmonary nodules are present which have increased in size/conspicuity from the previous exam. For example, in the right upper lobe on series 3 images 89 and 92 on the current exam are two 4 mm nodules which previously measured 2 mm on series 2 image 91 of the 5/23/2023 exam. Also within the left lower lobe on series 3 image 183 of the current exam is a 5 mm nodule which previously measured sub-4 mm on series 2 image 170 of the 5/23/2023 exam. PLEURA:  Unremarkable. HEART/GREAT VESSELS: Heart is unremarkable for patient's age. No thoracic aortic aneurysm. Aortic and coronary artery calcifications MEDIASTINUM AND NANCY: Small hiatal hernia. CHEST WALL AND LOWER NECK:  Unremarkable. ABDOMEN LIVER/BILIARY TREE: 1 cm hypoenhancing lesion in the posterior right hepatic dome (series 2 image 99).  This appears separate from the previously visualized lesion of the 5/18/2023 CT, which itself is not seen on this exam. GALLBLADDER:  There are gallstone(s) within the gallbladder, without pericholecystic inflammatory changes. SPLEEN:  Unremarkable. PANCREAS: Stable 1 cm cystic lesion in the uncinate process (series 2 image 142). ADRENAL GLANDS:  Unremarkable. KIDNEYS/URETERS:  One or more simple renal cyst(s) is noted. Otherwise unremarkable kidneys. No hydronephrosis. STOMACH AND BOWEL: Small hiatal hernia. Normal course and caliber of the stomach and duodenum. Small duodenal diverticulum is present. No dilated loops of bowel. Colonic diverticulosis without evidence of acute diverticulitis. Known partially annular rectal malignancy, measuring approximately 5 cm in craniocaudal length, grossly similar to previous exams. APPENDIX:  A normal appendix was visualized. ABDOMINOPELVIC CAVITY:  No ascites. No pneumoperitoneum. Increased size of at least 1 mesorectal lymph node which now measures 0.9 cm (series 2 image 241), previously 0.5 cm on the prior CT of 5/18/2023 and staging MRI. Stable 1 cm borderline enlarged aortocaval lymph node (series 2 image 163). Stable simple fluid collection at the left inguinal ring, likely postoperative seroma. VESSELS: No abdominal aortic aneurysm. Atherosclerotic calcifications. Patent portal vein. PELVIS REPRODUCTIVE ORGANS:  Unremarkable for patient's age. URINARY BLADDER:  Unremarkable. ABDOMINAL WALL/INGUINAL REGIONS:  Unremarkable. OSSEOUS STRUCTURES:  No acute fracture or destructive osseous lesion. Degenerative changes throughout the spine. Impression: 1. Numerous bilateral pulmonary nodules which have increased in size/conspicuity from prior exam are suspicious for metastasis. 2.  Redemonstration of subannular primary rectal malignancy, grossly similar to prior exam. 3.  Multiple suspicious mesorectal lymph nodes, one having mildly enlarged since prior exams.  Stable borderline enlarged aortocaval lymph node, indeterminate for metastasis. 4.  New posterior right hepatic dome lesion, separate from a previously described lesion also in the hepatic dome which is not visualized on this exam. Metastasis not excluded. Recommend further characterization with hepatic MRI. 5.  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 Recommend follow-up with pancreatic CT or MRI in 2 years. 6.  Cholelithiasis. The study was marked in EPIC for significant notification.  Workstation performed: [id number]        Pathology:Collected 5/20/2023 15:30      Status: Edited Result - FINAL      Visible to patient: Yes (not seen)      Dx: Rectal bleeding; Rectal mass; STEMI (...      1 Result Note      Component    Case Report   Surgical Pathology Report                         Case: [id number]                                    Authorizing Provider:  Lore Escobar DO          Collected:           05/20/2023 1530               Ordering Location:     [address] [place] [address] Received:            05/20/2023 1800                                      Intensive Care Unit                                                           Pathologist:           Cheryle Hull DO                                                             Specimen:    Rectum                                                                                     Addendum   RESULTS OF IMMUNOHISTOCHEMICAL ANALYSIS FOR MISMATCH REPAIR PROTEIN LOSS  INTERPRETATION: NO LOSS OF NUCLEAR EXPRESSION OF MMR PROTEINS: LOW PROBABILITY OF MSI-H        RESULTS:  Antibody            Clone                 Description                     Results  MLH1                 M1                     Mismatch repair protein  Intact nuclear expression  MSH2                [id number]        Mismatch repair protein  Intact nuclear expression  MSH6                40                      Mismatch repair protein  Intact nuclear expression  PMS2                 [id number]           Mismatch repair protein  Intact nuclear expression     Comment:  Background non-neoplastic tissue and/or internal control with intact nuclear expression. [facility] Specimen ID: [id number], Evaluator:  Nevaeh Marie MD  These tests were developed and their performance characteristics determined by [facility]. They may not be cleared or approved by the U.S. Food and Drug Administration. The FDA determined that such clearance or approval is not necessary. These tests are used for clinical purposes. They should not be regarded as investigational or for research. This laboratory has been approved by [place] [id number], designated as a high-complexity laboratory and is qualified to perform these tests. Comments: Patients whose tumors demonstrate lack of expression of one or more DNA mismatch repair proteins might be at risk for Kenny Syndrome. This cancer susceptibility syndrome greatly increases the risk of synchronous and/or metachronous cancers in the affected patients and their family members. Normal expression of all proteins does not completely rule out familial cancer predisposition. The [facility] [address] Task Forces recommends that all patients with lack of expression of one or more DNA mismatch repair proteins and those with concerning personal or family history should undergo thorough evaluation, counseling and possibly genetic testing. Addendum electronically signed by Tori Angelucci, DO on 5/30/2023 at [phone]   Final Diagnosis   A. Rectum, mass, biopsy:  -Adenocarcinoma, invasive, moderately differentiated. -Ancillary testing for mismatch repair (MMR) protein deficiency by immunohistochemical panel (MLH1, PMS2, MSH2, MSH6) is undertaken, the results will be issued in an addendum. Electronically signed by Tori Angelucci, DO on 5/26/2023 at 0910              ASSESSMENT  1.  Rectal cancer [facility])  Ambulatory referral to Radiation Oncology        Cancer Staging   Rectal cancer [facility])  Staging form: Colon and Rectum, AJCC 8th Edition  - Clinical stage from 5/18/2023: Stage IIIB (cT3, cN1, cM0) - Signed by Desmond Mckay DO on 6/28/2023  Microsatellite instability (MSI): Stable        PLAN/DISCUSSION  Zhanna Anderson is a 69 y.o. man with past medical history significant for CAD with acute STEMI s/p stent placement in May 2023 diagnosed with stage III rectal adenocarcinoma at the same time. He deferred definitive treatment at that time. Most recent imaging is highly suspicious, but not definitive, for stage IV disease with pulmonary metastases and possible liver metastases. He has symptomatic primary with continued significant rectal bleeding. He is planned for FOLFOX chemotherapy. We discussed palliative radiation options today. We discussed that radiation can be done, short course palliative regimen of 25Gy in 5 fractions. This would be done in an effort to reduce tumor bulk, stop bleeding, and slow progression. This could be done prior to initiation of chemotherapy and would likely address tumor bleeding much more quickly than chemotherapy alone. Alternatively, chemoradiation to 50Gy in 25 fraction to gross tumor would be an alternative treatment. This would be palliative, but would be expected to have more tumor reduction, improved durability, and also stop tumor bleeding. The treatment would, however, be done after chemotherapy or delay full dose systemic therapy. The pros and cons of each approach were discussed with patient and his daughter. The rationale and potential benefits, as well as the risks and acute and late side effects and potential toxicities of radiation were discussed with the patient and his daughter at length. Side effects discussed included, but were not limited to: fatigue, skin erythema,  hyperpigmentation, diarrhea, irritative urinary symptoms, bowel injury.     They were given the opportunity to ask questions and all questions were answered to their satisfaction. They were open to either regimen and deferred to our judgement. Given his age, comorbidities, symptoms, and likely distant metastatic disease I am leaning towards short course radiation. They have follow-up visit with Dr. Raz Marks on Wednesday. We will plan to discuss with Dr. Raz Marks and also follow-up on Wednesday as well for coordination and initiation of treatment. Total Time Spent  48 minutes spent reviewing EMR in preparation for visit, with the patient, coordination with other providers, and documentation. Greater than 50% of total time was spent with the patient and/or family for counseling and/or coordination of care. Rasheed Riley MD  9/11/2023,2:07 PM      Portions of the record may have been created with voice recognition software. Occasional wrong word or "sound a like" substitutions may have occurred due to the inherent limitations of voice recognition software. Read the chart carefully and recognize, using context, where substitutions have occurred. Addendum:  I spoke with Dr. Raz Marks regarding timing and type of radiation versus systemic therapy. Given his presentation, she agreed with palliative short course radiation prior to initiation of systemic FOLFOX therapy. We will contact patient for CT simulation this week and plan to begin radiation soon thereafter.

## 2023-09-13 ENCOUNTER — OFFICE VISIT (OUTPATIENT)
Dept: HEMATOLOGY ONCOLOGY | Facility: CLINIC | Age: 78
End: 2023-09-13
Payer: COMMERCIAL

## 2023-09-13 ENCOUNTER — APPOINTMENT (OUTPATIENT)
Dept: RADIATION ONCOLOGY | Facility: CLINIC | Age: 78
End: 2023-09-13
Attending: RADIOLOGY
Payer: COMMERCIAL

## 2023-09-13 VITALS
BODY MASS INDEX: 18.9 KG/M2 | SYSTOLIC BLOOD PRESSURE: 124 MMHG | RESPIRATION RATE: 18 BRPM | HEART RATE: 63 BPM | DIASTOLIC BLOOD PRESSURE: 82 MMHG | HEIGHT: 70 IN | WEIGHT: 132 LBS | TEMPERATURE: 98.3 F | OXYGEN SATURATION: 92 %

## 2023-09-13 DIAGNOSIS — C20 RECTAL CANCER (HCC): Primary | ICD-10-CM

## 2023-09-13 PROCEDURE — 77332 RADIATION TREATMENT AID(S): CPT | Performed by: RADIOLOGY

## 2023-09-13 PROCEDURE — 77290 THER RAD SIMULAJ FIELD CPLX: CPT | Performed by: RADIOLOGY

## 2023-09-13 PROCEDURE — 99214 OFFICE O/P EST MOD 30 MIN: CPT | Performed by: INTERNAL MEDICINE

## 2023-09-13 NOTE — PROGRESS NOTES
[address] [facility] [place] [facility] [place] [address]    Rico Parikh.  [birth year]      PRIMARY HEMATOLOGIC/ONCOLOGIC DIAGNOSIS:  1. Adenocarcinoma of the rectum, invasive, moderately differentiated. NO LOSS OF NUCLEAR EXPRESSION OF MMR PROTEINS: LOW PROBABILITY OF MSI-H. Date of diagnosis 5/20/23. CEA normal at time of diagnosis -- 1.7 on 5/19/23. PATHOLOGY:   Case Report   Surgical Pathology Report                         Case: [id number]                                    Authorizing Provider: Mamadou Marx DO          Collected:           05/20/2023 1530               Ordering Location:     [facility] Received:            05/20/2023 1800                                      Intensive Care Unit                                                           Pathologist:           Willy Youngblood DO                                                             Specimen:    Rectum                                                                                     Addendum   RESULTS OF IMMUNOHISTOCHEMICAL ANALYSIS FOR MISMATCH REPAIR PROTEIN LOSS  INTERPRETATION: NO LOSS OF NUCLEAR EXPRESSION OF MMR PROTEINS: LOW PROBABILITY OF MSI-H        RESULTS:  Antibody            Clone                 Description                     Results  MLH1                 M1                     Mismatch repair protein  Intact nuclear expression  MSH2                [id number]        Mismatch repair protein  Intact nuclear expression  MSH6                40                      Mismatch repair protein  Intact nuclear expression  PMS2                 [id number]           Mismatch repair protein  Intact nuclear expression     Comment:  Background non-neoplastic tissue and/or internal control with intact nuclear expression.   [facility] Specimen ID: [id number], Evaluator: Nahum Orellana MD  These tests were developed and their performance characteristics determined by [facility]. [facility] may not be cleared or approved by the U.S. Food and Drug Administration.  The FDA determined that such clearance or approval is not necessary.  These tests are used for clinical purposes. [facility] should not be regarded as investigational or for research.  This laboratory has been approved by [place] [id number], designated as a high-complexity laboratory and is qualified to perform these tests.     Comments: Patients whose tumors demonstrate lack of expression of one or more DNA mismatch repair proteins might be at risk for Kenny Syndrome. This cancer susceptibility syndrome greatly increases the risk of synchronous and/or metachronous cancers in the affected patients and their family members. Normal expression of all proteins does not completely rule out familial cancer predisposition.     The [facility] Kenny Syndrome Surveillance Program Task Forces recommends that all patients with lack of expression of one or more DNA mismatch repair proteins and those with concerning personal or family history should undergo thorough evaluation, counseling and possibly genetic testing. Addendum electronically signed by Grayson Mcguire DO on 5/30/2023 at  2:34 PM   Final Diagnosis   A. Rectum, mass, biopsy:  -Adenocarcinoma, invasive, moderately differentiated. -Ancillary testing for mismatch repair (MMR) protein deficiency by immunohistochemical panel (MLH1, PMS2, MSH2, MSH6) is undertaken, the results will be issued in an addendum. Electronically signed by Grayson Mcguire DO on 5/26/2023 at  9:10 AM   Note    Intradepartmental consultation (Andrew Melgoza) is in agreement. Dr. Court Díaz was notified by Dr. Grayson Mcguire via [employer] at 9:09am on 5/26/23.    Additional Information    All reported additional testing was performed with appropriately reactive controls.  These tests were developed and their performance characteristics determined by [facility] or appropriate performing facility, though some tests may be performed on tissues which have not been validated for performance characteristics (such as staining performed on alcohol exposed cell blocks and decalcified tissues).  Results should be interpreted with caution and in the context of the patients’ clinical condition. These tests may not be cleared or approved by the U.S. Food and Drug Administration, though the FDA has determined that such clearance or approval is not necessary. These tests are used for clinical purposes and they should not be regarded as investigational or for research. This laboratory has been approved by [place], designated as a high-complexity laboratory and is qualified to perform these tests.     Interpretation performed at [facility] [address]. [address] [place] [address] [place] [address]   Gross Description     A. The specimen is received in formalin, labeled with the patient's name and hospital number, and is designated " rectum". The specimen consists of multiple tan-pink soft tissue fragments measuring in aggregate 1.0 x 1.0 x 0.2 cm. Entirely submitted. One screened cassette.     Note: The estimated total formalin fixation time based upon information provided by the submitting clinician and the standard processing schedule is under 72 hours. MSequino       STAGING: Cancer Staging   Rectal cancer [facility])  Staging form: Colon and Rectum, AJCC 8th Edition  - Clinical stage from 5/18/2023: Stage IIIB (cT3, cN1, cM0) - Signed by Stephanie Mcdonald DO on 6/28/2023  Microsatellite instability (MSI): Stable         Oncology History   Rectal cancer ([address])   5/18/2023 Initial Diagnosis    May 18, 2023 patient presented with BRBPR x1 week. Weight stable over 8 months. CT abdomen pelvis showed rectal mass with 2 suspicious mesorectal lymph nodes. 2 mm left lower lobe pulmonary nodule, 3 mm hepatic dome questionable lesion, 1 cm uncinate process cystic lesion.   CT chest showed 3 mm left lower lobe and right upper lobe nodules new compared to 2019. May 20 flexible sigmoidoscopy showed distal rectal mass. Biopsy of the mass showed moderately differentiated adenocarcinoma. MMR intact. June 14, 2023 MRI of the pelvis showed findings consistent with T3b, N1 low rectal cancer. Suspicion for vascular invasion. CBC on presentation normal WBC platelets. Hemoglobin 11.4, MCV 77. CMP showed albumin 3.3, otherwise normal.        Cancer Staged    Cancer Staging   No matching staging information was found for the patient. 5/18/2023 -  Cancer Staged    Staging form: Colon and Rectum, AJCC 8th Edition  - Clinical stage from 5/18/2023: Stage IIIB (cT3, cN1, cM0) - Signed by Shannan Unger DO on 6/28/2023  Microsatellite instability (MSI): Stable       5/20/2023 Biopsy    A. Rectum, mass, biopsy:  -Adenocarcinoma, invasive, moderately differentiated. -Ancillary testing for mismatch repair (MMR) protein deficiency by immunohistochemical panel (MLH1, PMS2, MSH2, MSH6) is undertaken, the results will be issued in an addendum.     RESULTS OF IMMUNOHISTOCHEMICAL ANALYSIS FOR MISMATCH REPAIR PROTEIN LOSS  INTERPRETATION: NO LOSS OF NUCLEAR EXPRESSION OF MMR PROTEINS: LOW PROBABILITY OF MSI-H        RESULTS:  Antibody            Clone                 Description                     Results  MLH1                 M1                     Mismatch repair protein  Intact nuclear expression  MSH2                [id number]        Mismatch repair protein  Intact nuclear expression  MSH6                44                      Mismatch repair protein  Intact nuclear expression  PMS2                 [id number]           Mismatch repair protein  Intact nuclear expression        9/14/2023 -  Chemotherapy    alteplase (CATHFLO), 2 mg, Intracatheter, Every 1 Minute as needed, 0 of 12 cycles  fluorouracil (ADRUCIL), 320 mg/m2 = 565 mg (80 % of original dose 400 mg/m2), Intravenous, Once, 0 of 12 cycles  Dose modification: 320 mg/m2 (original dose 400 mg/m2, Cycle 1)  leucovorin calcium IVPB, 400 mg/m2 = 708 mg, Intravenous, Once, 0 of 12 cycles  oxaliplatin (ELOXATIN) chemo infusion, 75 mg/m2 = 132.75 mg (88.2 % of original dose 85 mg/m2), Intravenous, Once, 0 of 12 cycles  Dose modification: 75 mg/m2 (original dose 85 mg/m2, Cycle 1, Reason: Anticipated Tolerance)  fluorouracil (ADRUCIL) ambulatory infusion Soln, 1,200 mg/m2/day, Intravenous, Over 46 hours, 0 of 12 cycles         INTERIM HISTORY:  Mike Carl is a 67yo male who presents for evaluation and management of adenocarcinoma of the rectum. Per records, patient has refused treatment in the past. He is willing to undergo treatment at this time. He underwent recent evaluation in the ED for SOB. He was discharged on prednisone.      PAST MEDICAL,PAST SURGICAL, FAMILY AND SOCIAL HISTORY:    Patient Active Problem List   Diagnosis   • CAD in native artery   • S/P primary angioplasty with coronary stent   • History of ischemic cardiomyopathy   • History of lacunar cerebrovascular accident (CVA)   • Non-recurrent unilateral inguinal hernia   • Essential hypertension   • Protein-calorie malnutrition, unspecified severity (HCC)   • Rectal bleeding   • Acute on chronic anemia   • Moderate protein-calorie malnutrition ([address])   • STEMI involving left anterior descending coronary artery ([address])   • Ischemic cardiomyopathy   • Renal cyst   • Microscopic hematuria   • Encounter to discuss test results   • Enlarged prostate   • Cerebrovascular accident (CVA) ([address])   • Phimosis   • Lower urinary tract symptoms (LUTS)   • Rectal cancer (HCC)   • Depression, recurrent ([address])     Past Medical History:   Diagnosis Date   • Hyperlipidemia    • Ischemic cardiomyopathy    • Lacunar infarction [facility])    • Myocardial infarction ([address])    • Near syncope 11/14/2019   • Non-ST elevated myocardial infarction [facility])    • NSTEMI (non-ST elevated myocardial infarction) ([address]) 10/20/2019   • Pericarditis 10/24/2019   • Poison ivy    • Rectal cancer [facility])    • Stroke [facility])      Past Surgical History:   Procedure Laterality Date   • ANGIOPLASTY     • CARDIAC CATHETERIZATION N/A 2023    Procedure: Cardiac pci;  Surgeon: Aniyah Nicole MD;  Location: [facility] CARDIAC CATH LAB; Service: Cardiology   • CORONARY STENT PLACEMENT     • HERNIA REPAIR Left 2021    Procedure: OPEN REPAIR HERNIA INGUINAL LEFT WITH MESH;  Surgeon: Giovanni Colon MD;  Location: [facility] MAIN OR;  Service: General   • IR PORT PLACEMENT  2023     Family History   Problem Relation Age of Onset   • Breast cancer Mother    • Emphysema Father    • Heart disease Maternal Grandfather    • Heart attack Maternal Grandfather    • Emphysema Paternal Grandfather      Social History     Socioeconomic History   • Marital status:      Spouse name: Not on file   • Number of children: Not on file   • Years of education: Not on file   • Highest education level: Not on file   Occupational History   • Not on file   Tobacco Use   • Smoking status: Former     Packs/day: 1.00     Years: 15.00     Total pack years: 15.00     Types: Cigarettes     Quit date: 1969     Years since quittin.8   • Smokeless tobacco: Never   Vaping Use   • Vaping Use: Never used   Substance and Sexual Activity   • Alcohol use: Never   • Drug use: No   • Sexual activity: Not Currently     Partners: Female   Other Topics Concern   • Not on file   Social History Narrative   • Not on file     Social Determinants of Health     Financial Resource Strain: Low Risk  (10/17/2022)    Overall Financial Resource Strain (CARDIA)    • Difficulty of Paying Living Expenses: Not hard at all   Food Insecurity: No Food Insecurity (2023)    Hunger Vital Sign    • Worried About Running Out of Food in the Last Year: Never true    • Ran Out of Food in the Last Year: Never true   Transportation Needs: No Transportation Needs (2023)    PRAPARE - Transportation    • Lack of Transportation (Medical):  No    • Lack of Transportation (Non-Medical):  No   Physical Activity: Inactive (3/8/2021)    Exercise Vital Sign    • Days of Exercise per Week: 0 days    • Minutes of Exercise per Session: 0 min   Stress: No Stress Concern Present (3/8/2021)    [facility]    • Feeling of Stress : Not at all   Social Connections: Not on file   Intimate Partner Violence: Not on file   Housing Stability: Low Risk  (5/19/2023)    Housing Stability Vital Sign    • Unable to Pay for Housing in the Last Year: No    • Number of Places Lived in the Last Year: 1    • Unstable Housing in the Last Year: No       Current Outpatient Medications:   •  albuterol (ProAir HFA) 90 mcg/act inhaler, Inhale 2 puffs every 6 (six) hours as needed for wheezing, Disp: 8.5 g, Rfl: 0  •  atorvastatin (LIPITOR) 80 mg tablet, Take 1 tablet (80 mg total) by mouth daily with dinner, Disp: 90 tablet, Rfl: 3  •  Blood Pressure KIT, by Does not apply route daily, Disp: 1 each, Rfl: 0  •  ferrous sulfate 324 (65 Fe) mg, Take 1 tablet (324 mg total) by mouth in the morning, Disp: 30 tablet, Rfl: 5  •  lisinopril (ZESTRIL) 2.5 mg tablet, take 1 tablet by mouth daily, Disp: 90 tablet, Rfl: 3  •  metoprolol succinate (TOPROL-XL) 25 mg 24 hr tablet, take 1 tablet by mouth daily, Disp: 90 tablet, Rfl: 3  •  pantoprazole (PROTONIX) 40 mg tablet, Take 1 tablet (40 mg total) by mouth daily, Disp: 30 tablet, Rfl: 0  •  sucralfate (CARAFATE) 1 g tablet, Take 1 tablet (1 g total) by mouth 4 (four) times a day, Disp: 120 tablet, Rfl: 0  •  tamsulosin (FLOMAX) 0.4 mg, Take 1 capsule (0.4 mg total) by mouth daily at bedtime, Disp: 90 capsule, Rfl: 3  •  aspirin (ECOTRIN LOW STRENGTH) 81 mg EC tablet, Take 1 tablet (81 mg total) by mouth daily Please purchase over the counter at her local pharmacy, Disp: 30 tablet, Rfl: 1  No Known Allergies  Vitals:    09/13/23 1207   BP: 124/82   Pulse: 63   Resp: 18   Temp: 98.3 °F (36.8 °C)   SpO2: 92% ROS:  CONSTITUTIONAL:  No fever. No chills. No dizziness. No weakness. EYES:  No pain, erythema, or discharge. No blurring of vision. ENT:  No sore throat, URI symptoms. No epistaxis. No tinnitus. CARDIOVASCULAR:  No chest pain. No palpitations. No lower extremity edema. RESPIRATORY:  No shortness of breath, cough, pain with respiration, pleuritic chest pain. No hemoptysis. No dyspnea. No paroxysmal nocturnal dyspnea. GASTROINTESTINAL:  Normal appetite. No nausea, vomiting, diarrhea. No pain. No bloating. No melena. GENITOURINARY:  No frequency, urgency, nocturia. No hematuria or dysuria. MUSCULOSKELETAL:  No arthralgias or myalgias. INTEGUMENTARY:  No swelling. No bruising. No contusions. No abrasions. No lymphangitis. NEUROLOGIC:  No headache. No neck pain. No numbness or tingling of the extremities. No weakness. PSYCHIATRIC:  No confusion. ENDOCRINE:  No fatigue. No weakness. No history of thyroid, diabetes or adrenal problems. HEMATOLOGICAL:  No bleeding. No petechiae. No bruising.     PHYSICAL EXAM:    GENERAL: AAO x 3, moderate distress  HEENT: AT,NC  CVS: S1S2 RRR  LUNGS: b/l breath sounds  ABD: NT,ND, +BS  EXTR: no edema  NEURO: CN II-XII grossly intact    LABS:  I have reviewed pertinent labs:  CBC:   Lab Results   Component Value Date    WBC 5.19 09/06/2023    RBC 4.22 09/06/2023    HGB 10.6 (L) 09/06/2023    HCT 34.4 (L) 09/06/2023    MCV 82 09/06/2023     09/06/2023    MCH 25.1 (L) 09/06/2023    MCHC 30.8 (L) 09/06/2023    RDW 15.6 (H) 09/06/2023    MPV 11.8 09/06/2023    NEUTROABS 3.43 09/06/2023     CMP:   Lab Results   Component Value Date    SODIUM 136 09/06/2023    K 4.9 09/06/2023     09/06/2023    CO2 26 09/06/2023    AGAP 5 09/06/2023    BUN 18 09/06/2023    CREATININE 1.14 09/06/2023    GLUC 96 09/06/2023    GLUF 105 (H) 08/07/2023    CALCIUM 9.6 09/06/2023    AST 15 09/06/2023    ALT 9 09/06/2023    ALKPHOS 65 09/06/2023    TP 6.8 09/06/2023    ALB 4.0 09/06/2023 TBILI 0.53 09/06/2023    EGFR 61 09/06/2023     Liver Enzymes:   Lab Results   Component Value Date    AST 15 09/06/2023    ALT 9 09/06/2023    ALKPHOS 65 09/06/2023    TP 6.8 09/06/2023    ALB 4.0 09/06/2023    TBILI 0.53 09/06/2023    BILIDIR 0.16 10/20/2019     Vitamin B12   Lab Results   Component Value Date    ZILNBFCL17 222 05/19/2023     Iron Study   Lab Results   Component Value Date    RETIC 43,800 05/19/2023    RETICCTPCT 1.07 05/19/2023    FERRITIN 22 (L) 08/07/2023    CONCFE 6 (L) 08/07/2023    TIBC 328 08/07/2023    IRON 20 (L) 08/07/2023     Folate   Lab Results   Component Value Date    FOLATE 21.8 05/19/2023     Magnesium   Lab Results   Component Value Date    MG 2.3 05/21/2023     Phosphorus   Lab Results   Component Value Date    PHOS 2.7 05/21/2023     Coagulation Panel   Lab Results   Component Value Date    DDIMER 0.28 10/20/2019    PROTIME 14.4 09/06/2023    INR 1.06 09/06/2023    PTT 28 05/18/2023       IMAGING:  XR chest pa & lateral    Result Date: 8/9/2023  Narrative: CHEST INDICATION:   R06.02: Shortness of breath. . COMPARISON:  Radiograph 9/25/2020 and CTA chest  May 23, 2023 EXAM PERFORMED/VIEWS:  XR CHEST PA & LATERAL FINDINGS: Cardiomediastinal silhouette appears unremarkable. The lungs are clear. No pneumothorax or pleural effusion. Osseous structures appear within normal limits for patient age. Impression: No acute cardiopulmonary disease. Resident: Gill Medrano, the attending radiologist, have reviewed the images and agree with the final report above. Workstation performed: [id number]     I reviewed the above laboratory and imaging data. ASSESSMENT/PLAN:  1. Adenocarcinoma of the rectum, invasive, moderately differentiated. NO LOSS OF NUCLEAR EXPRESSION OF MMR PROTEINS: LOW PROBABILITY OF MSI-H. Date of diagnosis 5/20/23. CEA normal at time of diagnosis -- 1.7 on 5/19/23. The patient is willing to receive treatment now.    Prior imaging 5/2023 showed a possible 0.3 cm right hepatic dome lesion versus artifact (statistically most likely a benign lesion such as cyst or hemangioma). 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 MRI pelvis w/o contrast dated 6/14/23 showed stage: T3b, N1, low rectal cancer. MRF: clear (tumor margin >2 mm from MRF). No sphincter involvement. No suspicious extra mesorectal lymph nodes. Suspicious vascular invasion along the right lateral aspect. [facility] oncology testing pending. CT TAP dated 8/29/23--numerous bilateral pulmonary nodules which have increased in size/conspicuity from prior exam suspicious for metastasis. Redemonstration of subannular primary rectal malignancy, grossly similar to prior exam. Multiple suspicious mesorectal lymph nodes, one having mildly enlarged since prior exams. Stable borderline enlarged aortocaval lymph node, indeterminate for metastasis. New posterior right hepatic dome lesion, separate from a previously described lesion also in the hepatic dome which is not visualized on this exam. Metastasis not excluded. Recommend further characterization with hepatic MRI. 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 Cholelithiasis. S/p port placement. Plan to initiate treatment with FOLFOX ( dose reduced due to  Anticipated tolerance, will increase dose w/ later treatments if pt is able to tolerate). Referred to [facility] for palliative RT due to rectal bleeding.

## 2023-09-14 PROBLEM — Z95.828 PORT-A-CATH IN PLACE: Status: ACTIVE | Noted: 2023-09-14

## 2023-09-14 NOTE — TELEPHONE ENCOUNTER
Attempted to call Erendira, no answer. I left a detailed voicemail letting her know that Dr. Nyasia Laguna and Dr. Jgina Shaver were in communication, that patient's RT was pushed out to the following week. I left my direct number for her to return phone call if she has any other questions or concerns.

## 2023-09-15 DIAGNOSIS — C20 RECTAL CANCER (HCC): Primary | ICD-10-CM

## 2023-09-18 ENCOUNTER — LAB REQUISITION (OUTPATIENT)
Dept: LAB | Facility: HOSPITAL | Age: 78
End: 2023-09-18

## 2023-09-18 ENCOUNTER — APPOINTMENT (EMERGENCY)
Dept: CT IMAGING | Facility: HOSPITAL | Age: 78
End: 2023-09-18
Payer: COMMERCIAL

## 2023-09-18 ENCOUNTER — HOSPITAL ENCOUNTER (EMERGENCY)
Facility: HOSPITAL | Age: 78
Discharge: HOME | End: 2023-09-18
Attending: EMERGENCY MEDICINE
Payer: COMMERCIAL

## 2023-09-18 ENCOUNTER — HOSPITAL ENCOUNTER (OUTPATIENT)
Dept: INFUSION CENTER | Facility: CLINIC | Age: 78
Discharge: HOME | End: 2023-09-18
Payer: COMMERCIAL

## 2023-09-18 ENCOUNTER — TELEPHONE (OUTPATIENT)
Dept: HEMATOLOGY ONCOLOGY | Facility: CLINIC | Age: 78
End: 2023-09-18

## 2023-09-18 VITALS
RESPIRATION RATE: 28 BRPM | HEART RATE: 74 BPM | DIASTOLIC BLOOD PRESSURE: 68 MMHG | TEMPERATURE: 98.4 F | OXYGEN SATURATION: 98 % | SYSTOLIC BLOOD PRESSURE: 118 MMHG

## 2023-09-18 DIAGNOSIS — K62.89 OTHER SPECIFIED DISEASES OF ANUS AND RECTUM: ICD-10-CM

## 2023-09-18 DIAGNOSIS — Z95.828 PORT-A-CATH IN PLACE: ICD-10-CM

## 2023-09-18 DIAGNOSIS — R51.9 HEADACHE: Primary | ICD-10-CM

## 2023-09-18 DIAGNOSIS — K62.5 HEMORRHAGE OF ANUS AND RECTUM: ICD-10-CM

## 2023-09-18 DIAGNOSIS — C20 RECTAL CANCER (HCC): Primary | ICD-10-CM

## 2023-09-18 DIAGNOSIS — I21.3 ST ELEVATION (STEMI) MYOCARDIAL INFARCTION OF UNSPECIFIED SITE (HCC): ICD-10-CM

## 2023-09-18 LAB
ALBUMIN SERPL BCP-MCNC: 3.5 G/DL (ref 3.5–5)
ALP SERPL-CCNC: 54 U/L (ref 34–104)
ALT SERPL W P-5'-P-CCNC: 11 U/L (ref 7–52)
ANION GAP SERPL CALCULATED.3IONS-SCNC: 7 MMOL/L
AST SERPL W P-5'-P-CCNC: 15 U/L (ref 13–39)
BASOPHILS # BLD AUTO: 0.02 THOUSANDS/ÂΜL (ref 0–0.1)
BASOPHILS NFR BLD AUTO: 0 % (ref 0–1)
BILIRUB SERPL-MCNC: 0.43 MG/DL (ref 0.2–1)
BUN SERPL-MCNC: 16 MG/DL (ref 5–25)
CALCIUM SERPL-MCNC: 8.6 MG/DL (ref 8.4–10.2)
CHLORIDE SERPL-SCNC: 106 MMOL/L (ref 96–108)
CO2 SERPL-SCNC: 24 MMOL/L (ref 21–32)
CREAT SERPL-MCNC: 0.95 MG/DL (ref 0.6–1.3)
EOSINOPHIL # BLD AUTO: 0.12 THOUSAND/ÂΜL (ref 0–0.61)
EOSINOPHIL NFR BLD AUTO: 2 % (ref 0–6)
ERYTHROCYTE [DISTWIDTH] IN BLOOD BY AUTOMATED COUNT: 15.9 % (ref 11.6–15.1)
GFR SERPL CREATININE-BSD FRML MDRD: 76 ML/MIN/1.73SQ M
GLUCOSE SERPL-MCNC: 85 MG/DL (ref 65–140)
HCT VFR BLD AUTO: 33.4 % (ref 36.5–49.3)
HGB BLD-MCNC: 10.4 G/DL (ref 12–17)
IMM GRANULOCYTES # BLD AUTO: 0.1 THOUSAND/UL (ref 0–0.2)
IMM GRANULOCYTES NFR BLD AUTO: 1 % (ref 0–2)
LYMPHOCYTES # BLD AUTO: 1.24 THOUSANDS/ÂΜL (ref 0.6–4.47)
LYMPHOCYTES NFR BLD AUTO: 16 % (ref 14–44)
MCH RBC QN AUTO: 24.8 PG (ref 26.8–34.3)
MCHC RBC AUTO-ENTMCNC: 31.1 G/DL (ref 31.4–37.4)
MCV RBC AUTO: 80 FL (ref 82–98)
MONOCYTES # BLD AUTO: 0.87 THOUSAND/ÂΜL (ref 0.17–1.22)
MONOCYTES NFR BLD AUTO: 11 % (ref 4–12)
NEUTROPHILS # BLD AUTO: 5.31 THOUSANDS/ÂΜL (ref 1.85–7.62)
NEUTS SEG NFR BLD AUTO: 70 % (ref 43–75)
NRBC BLD AUTO-RTO: 0 /100 WBCS
PLATELET # BLD AUTO: 182 THOUSANDS/UL (ref 149–390)
PMV BLD AUTO: 11.9 FL (ref 8.9–12.7)
POTASSIUM SERPL-SCNC: 4.1 MMOL/L (ref 3.5–5.3)
PROT SERPL-MCNC: 6.1 G/DL (ref 6.4–8.4)
RBC # BLD AUTO: 4.2 MILLION/UL (ref 3.88–5.62)
SODIUM SERPL-SCNC: 137 MMOL/L (ref 135–147)
WBC # BLD AUTO: 7.66 THOUSAND/UL (ref 4.31–10.16)

## 2023-09-18 PROCEDURE — 85025 COMPLETE CBC W/AUTO DIFF WBC: CPT | Performed by: INTERNAL MEDICINE

## 2023-09-18 PROCEDURE — 99283 EMERGENCY DEPT VISIT LOW MDM: CPT

## 2023-09-18 PROCEDURE — 70450 CT HEAD/BRAIN W/O DYE: CPT

## 2023-09-18 PROCEDURE — 80053 COMPREHEN METABOLIC PANEL: CPT | Performed by: INTERNAL MEDICINE

## 2023-09-18 PROCEDURE — 99284 EMERGENCY DEPT VISIT MOD MDM: CPT | Performed by: EMERGENCY MEDICINE

## 2023-09-18 NOTE — ED PROVIDER NOTES
History  Chief Complaint   Patient presents with   • Headache     Patient hx of CA. Patient reports increasing of frequency of headaches recently in the last week or so. Oncologist sent in for eval.      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 No preceding trauma. No confusion or seizure. Additional history from family at bedside. Prior to Admission Medications   Prescriptions Last Dose Informant Patient Reported? Taking? Blood Pressure KIT  Self No No   Sig: by Does not apply route daily   albuterol (ProAir HFA) 90 mcg/act inhaler  Self No No   Sig: Inhale 2 puffs every 6 (six) hours as needed for wheezing   aspirin (ECOTRIN LOW STRENGTH) 81 mg EC tablet  Self No No   Sig: Take 1 tablet (81 mg total) by mouth daily Please purchase over the counter at her local pharmacy   atorvastatin (LIPITOR) 80 mg tablet  Self No No   Sig: Take 1 tablet (80 mg total) by mouth daily with dinner   ferrous sulfate 324 (65 Fe) mg  Self No No   Sig: Take 1 tablet (324 mg total) by mouth in the morning   fluorouracil 4,250 mg in CADD/Elastomeric Infusion Device   No No   Sig: Infuse 4,250 mg (1,200 mg/m2/day x 1.77 m2) into a catheter in a vein via infusion device over 46 hours for 2 days  Infusion planned for September 20, 2023.    lisinopril (ZESTRIL) 2.5 mg tablet  Self No No   Sig: take 1 tablet by mouth daily   metoprolol succinate (TOPROL-XL) 25 mg 24 hr tablet  Self No No   Sig: take 1 tablet by mouth daily   pantoprazole (PROTONIX) 40 mg tablet  Self No No   Sig: Take 1 tablet (40 mg total) by mouth daily   sucralfate (CARAFATE) 1 g tablet  Self No No   Sig: Take 1 tablet (1 g total) by mouth 4 (four) times a day   tamsulosin (FLOMAX) 0.4 mg  Self No No   Sig: Take 1 capsule (0.4 mg total) by mouth daily at bedtime Facility-Administered Medications: None       Past Medical History:   Diagnosis Date   • Hyperlipidemia    • Ischemic cardiomyopathy    • Lacunar infarction [facility])    • Myocardial infarction [facility])    • Near syncope 2019   • Non-ST elevated myocardial infarction [facility])    • NSTEMI (non-ST elevated myocardial infarction) ([facility]) 10/20/2019   • Pericarditis 10/24/2019   • Poison ivy    • Rectal cancer ([facility])    • Stroke [facility])        Past Surgical History:   Procedure Laterality Date   • ANGIOPLASTY     • CARDIAC CATHETERIZATION N/A 2023    Procedure: Cardiac pci;  Surgeon: Aniyah Nicole MD;  Location: [facility] CARDIAC [facility]ATH LAB; Service: Cardiology   • CORONARY STENT PLACEMENT     • HERNIA REPAIR Left 2021    Procedure: OPEN REPAIR HERNIA INGUINAL LEFT WITH MESH;  Surgeon: Giovanni Colon MD;  Location: [facility] MAIN OR;  Service: General   • IR PORT PLACEMENT  2023       Family History   Problem Relation Age of Onset   • Breast cancer Mother    • Emphysema Father    • Heart disease Maternal Grandfather    • Heart attack Maternal Grandfather    • Emphysema Paternal Grandfather      I have reviewed and agree with the history as documented. E-Cigarette/Vaping   • E-Cigarette Use Never User      E-Cigarette/Vaping Substances   • Nicotine No    • THC No    • CBD No    • Flavoring No    • Other No    • Unknown No      Social History     Tobacco Use   • Smoking status: Former     Packs/day: 1.00     Years: 15.00     Total pack years: 15.00     Types: Cigarettes     Quit date: 1969     Years since quittin.8   • Smokeless tobacco: Never   Vaping Use   • Vaping Use: Never used   Substance Use Topics   • Alcohol use: Never   • Drug use: No       Review of Systems   Neurological: Positive for headaches. Negative for dizziness, tremors, seizures, syncope, facial asymmetry, speech difficulty, weakness, light-headedness and numbness. All other systems reviewed and are negative.       Physical Exam  Physical Exam  Vitals and nursing note reviewed. Constitutional:       General: He is not in acute distress. Appearance: Normal appearance. He is well-developed. He is not ill-appearing, toxic-appearing or diaphoretic. HENT:      Head: Normocephalic and atraumatic. Eyes:      Conjunctiva/sclera: Conjunctivae normal.      Pupils: Pupils are equal, round, and reactive to light. Neck:      Vascular: No JVD. Cardiovascular:      Rate and Rhythm: Normal rate and regular rhythm. Heart sounds: Normal heart sounds. No murmur heard. No friction rub. No gallop. Pulmonary:      Effort: Pulmonary effort is normal. No respiratory distress. Breath sounds: Normal breath sounds. No stridor. No wheezing, rhonchi or rales. Chest:      Chest wall: No tenderness. Abdominal:      General: There is no distension. Palpations: Abdomen is soft. Tenderness: There is no abdominal tenderness. Musculoskeletal:         General: No tenderness or deformity. Normal range of motion. Cervical back: Normal range of motion and neck supple. Skin:     General: Skin is warm and dry. Capillary Refill: Capillary refill takes less than 2 seconds. Coloration: Skin is not jaundiced or pale. Findings: No bruising, erythema, lesion or rash. Neurological:      General: No focal deficit present. Mental Status: He is alert and oriented to person, place, and time. Cranial Nerves: No cranial nerve deficit. Sensory: No sensory deficit. Motor: No weakness or abnormal muscle tone.       Coordination: Coordination normal.      Gait: Gait normal.         Vital Signs  ED Triage Vitals [09/18/23 1239]   Temperature Pulse Respirations Blood Pressure SpO2   98.4 °F (36.9 °C) 74 (!) 28 118/68 98 %      Temp Source Heart Rate Source Patient Position - Orthostatic VS BP Location FiO2 (%)   Temporal Monitor Sitting Left arm --      Pain Score       --           Vitals:    09/18/23 1239   BP: 118/68 Pulse: 74   Patient Position - Orthostatic VS: Sitting         Visual Acuity      ED Medications  Medications - No data to display    Diagnostic Studies  Results Reviewed     None                 CT head without contrast   Final Result by Carlos Gutiérrez DO (09/18 1519)      No acute intracranial abnormality. Stable chronic microangiopathic changes within the brain. Workstation performed: [id number]                    Procedures  Procedures         ED Course                                             Medical Decision Making  Headache: complicated acute illness or injury     Details: ddx includes stroke, cerebral edema, intracrnial hemorrhage, intracranial metastases  Amount and/or Complexity of Data Reviewed  Radiology: ordered and independent interpretation performed. Disposition  Final diagnoses:   Headache     Time reflects when diagnosis was documented in both MDM as applicable and the Disposition within this note     Time User Action Codes Description Comment    9/18/2023  3:31 PM Juan Ramon oWoten Add [R51.9] Headache       ED Disposition     ED Disposition   Discharge    Condition   Stable    Date/Time   Mon Sep 18, 2023  3:31 PM    Comment   Marisol Dobbins discharge to home/self care.                Follow-up Information     Follow up With Specialties Details Why Contact Info Additional Information    [address] [place] [address] Emergency Department Emergency Medicine  If symptoms worsen [address] Emergency Department, [place], [place], [address]          Discharge Medication List as of 9/18/2023  3:32 PM      CONTINUE these medications which have NOT CHANGED    Details   albuterol (ProAir HFA) 90 mcg/act inhaler Inhale 2 puffs every 6 (six) hours as needed for wheezing, Starting Wed 9/6/2023, Normal      aspirin (ECOTRIN LOW STRENGTH) 81 mg EC tablet Take 1 tablet (81 mg total) by mouth daily Please purchase over the counter at her local pharmacy, Starting Tue 11/19/2019, Until Mon 9/11/2023, Normal      atorvastatin (LIPITOR) 80 mg tablet Take 1 tablet (80 mg total) by mouth daily with dinner, Starting Mon 10/17/2022, Normal      Blood Pressure KIT by Does not apply route daily, Starting Tue 11/19/2019, Normal      ferrous sulfate 324 (65 Fe) mg Take 1 tablet (324 mg total) by mouth in the morning, Starting Tue 8/8/2023, Normal      fluorouracil 4,250 mg in CADD/Elastomeric Infusion Device Infuse 4,250 mg (1,200 mg/m2/day x 1.77 m2) into a catheter in a vein via infusion device over 46 hours for 2 days  Infusion planned for September 20, 2023., Starting Wed 9/20/2023, Until Fri 9/22/2023, Print      lisinopril (ZESTRIL) 2.5 mg tablet take 1 tablet by mouth daily, Normal      metoprolol succinate (TOPROL-XL) 25 mg 24 hr tablet take 1 tablet by mouth daily, Normal      pantoprazole (PROTONIX) 40 mg tablet Take 1 tablet (40 mg total) by mouth daily, Starting Wed 9/6/2023, Until Fri 10/6/2023, Normal      sucralfate (CARAFATE) 1 g tablet Take 1 tablet (1 g total) by mouth 4 (four) times a day, Starting Wed 9/6/2023, Until Fri 10/6/2023, Normal      tamsulosin (FLOMAX) 0.4 mg Take 1 capsule (0.4 mg total) by mouth daily at bedtime, Starting Wed 5/31/2023, Until Sat 5/25/2024, Normal             No discharge procedures on file.     PDMP Review       Value Time User    PDMP Reviewed  Yes 8/1/2023  3:37 PM Chucho Grey, [place]          ED Provider  Electronically Signed by           Carlos Manuel Whitley MD  09/18/23 5922

## 2023-09-18 NOTE — PROGRESS NOTES
Pt presents for lab work. Pt c/o frontal headache that has been worsening over the past week. Pt states no relief from OTC meds. Pt states that he has had double vision but that has been on going. Pt states that he made physician aware and had an appt with the eye doctor that he had to cancel. Pt stating that he wants to know what is going on. Advised pt to go to ED for further evaluation and possible CT scan. Pt verbalizes understanding. Port flushed with blood return noted. Blood work drawn and sent to lab. Saline locked and de accessed. Pt aware of future appt on 9/20/23 at 11 AM. AVS declined.

## 2023-09-18 NOTE — TELEPHONE ENCOUNTER
Received voicemail:    "Michaela Jair. My I'm calling from my dad, Pennie Patrick. He's with Doctor Christie Talamantes. I have a question. So he's been having headaches and you had mentioned it to his radiation doctor and she had said that he possibly might need a CT scan or MRI at some point. Anyway, his headaches are getting a little bit more often than what they were, and he seems a little concerned and thinks he should get it checked out. I don't know, Should I have one of the doctors scheduled them him something? Or should I just take him to the? If you could call me back with just some kind of an idea of what to do, I'd really, really appreciate it. Again, my dad's name is Salvatore Sultana. My name is Erenidra. My phone number is [phone]. Thank you so much."    Returned phone call to Daughter, Mitali Gomez. She reports patient has been having headaches that are happening more frequently for patient that are not always relieved with OTC medications. She states she spoke to someone from radiation they suggested patient have a scan completed to assess. I consulted with Dr. Christie Talamantes, she instructed me to tell daughter to have patient to go to ER for evaluation. Erendira reports they will go over this afternoon.

## 2023-09-19 ENCOUNTER — VBI (OUTPATIENT)
Age: 78
End: 2023-09-19

## 2023-09-19 PROCEDURE — 77334 RADIATION TREATMENT AID(S): CPT | Performed by: RADIOLOGY

## 2023-09-19 PROCEDURE — 77295 3-D RADIOTHERAPY PLAN: CPT | Performed by: RADIOLOGY

## 2023-09-19 PROCEDURE — 77300 RADIATION THERAPY DOSE PLAN: CPT | Performed by: RADIOLOGY

## 2023-09-19 NOTE — TELEPHONE ENCOUNTER
09/19/23 10:08 AM    Patient contacted post ED visit, first outreach attempt made. Message was left for patient to return a call to the VBI Department at [facility]: Phone [phone]. Thank you.   Luis Randall MA  [facility]

## 2023-09-20 ENCOUNTER — HOSPITAL ENCOUNTER (OUTPATIENT)
Dept: INFUSION CENTER | Facility: CLINIC | Age: 78
Discharge: HOME | End: 2023-09-20
Payer: COMMERCIAL

## 2023-09-20 ENCOUNTER — PATIENT OUTREACH (OUTPATIENT)
Dept: CASE MANAGEMENT | Facility: HOSPITAL | Age: 78
End: 2023-09-20

## 2023-09-20 VITALS
BODY MASS INDEX: 19.27 KG/M2 | SYSTOLIC BLOOD PRESSURE: 116 MMHG | TEMPERATURE: 97.7 F | HEIGHT: 70 IN | RESPIRATION RATE: 20 BRPM | WEIGHT: 134.6 LBS | DIASTOLIC BLOOD PRESSURE: 61 MMHG | HEART RATE: 76 BPM

## 2023-09-20 DIAGNOSIS — C20 RECTAL CANCER (HCC): Primary | ICD-10-CM

## 2023-09-20 PROCEDURE — G0498 CHEMO EXTEND IV INFUS W/PUMP: HCPCS

## 2023-09-20 PROCEDURE — 96367 TX/PROPH/DG ADDL SEQ IV INF: CPT

## 2023-09-20 PROCEDURE — 96413 CHEMO IV INFUSION 1 HR: CPT

## 2023-09-20 PROCEDURE — 96415 CHEMO IV INFUSION ADDL HR: CPT

## 2023-09-20 PROCEDURE — 96368 THER/DIAG CONCURRENT INF: CPT

## 2023-09-20 PROCEDURE — 96411 CHEMO IV PUSH ADDL DRUG: CPT

## 2023-09-20 RX ORDER — SODIUM CHLORIDE 9 MG/ML
20 INJECTION, SOLUTION INTRAVENOUS ONCE AS NEEDED
Status: DISCONTINUED | OUTPATIENT
Start: 2023-09-20 | End: 2023-09-23 | Stop reason: HOSPADM

## 2023-09-20 RX ORDER — DEXTROSE MONOHYDRATE 50 MG/ML
20 INJECTION, SOLUTION INTRAVENOUS ONCE
Status: COMPLETED | OUTPATIENT
Start: 2023-09-20 | End: 2023-09-20

## 2023-09-20 RX ORDER — FLUOROURACIL 50 MG/ML
320 INJECTION, SOLUTION INTRAVENOUS ONCE
Status: COMPLETED | OUTPATIENT
Start: 2023-09-20 | End: 2023-09-20

## 2023-09-20 RX ADMIN — FLUOROURACIL 565 MG: 50 INJECTION, SOLUTION INTRAVENOUS at 14:48

## 2023-09-20 RX ADMIN — DEXTROSE 20 ML/HR: 5 SOLUTION INTRAVENOUS at 11:46

## 2023-09-20 RX ADMIN — OXALIPLATIN 132.75 MG: 5 INJECTION, SOLUTION INTRAVENOUS at 12:30

## 2023-09-20 RX ADMIN — DEXAMETHASONE SODIUM PHOSPHATE: 10 INJECTION, SOLUTION INTRAMUSCULAR; INTRAVENOUS at 11:46

## 2023-09-20 RX ADMIN — LEUCOVORIN CALCIUM 700 MG: 350 INJECTION, POWDER, LYOPHILIZED, FOR SOLUTION INTRAMUSCULAR; INTRAVENOUS at 12:30

## 2023-09-20 NOTE — TELEPHONE ENCOUNTER
09/20/23 9:28 AM    Patient contacted post ED visit, second outreach attempt made. Message was left for patient to return a call to the VBI Department at [facility]: Phone [phone]. Thank you.   Roverto Desir MA  [facility]

## 2023-09-20 NOTE — PROGRESS NOTES
Pt to clinic for FOLFOX. Pt reports mild intermittent 1/10 chest pain at home, but not currently experiencing. Pt asked if he is able to take his nitro while being treated which chemo. Spoke with office RN, Alireza Case who said per Dr. Karrie Alanis, pt is okay to take nitro while being treated with chemo. Tolerated infusion without complications. Pt educated on pump mechanics and safety. Pump connected to pt port and secured to pt. Aware of next appointment. AVS printed.

## 2023-09-20 NOTE — PROGRESS NOTES
Biopsychosocial and Barriers Assessment    Cancer Diagnosis: rectal, stage IIIB  Home/Cell Phone: [phone]  Emergency Contact: daughter Erendira  Marital Status:   Interpretation concerns, speaks another language, preferred language: speaks [place]  Cultural concerns: none noted  Ability to read or write: independent    Caregiver/Support: daughter Zehra Purdy, adult granddaughter lives next door  Children:  Child/Elder care: NA    Housing: lives in a mobile home local to [address] [place] [address], pays lot rent  Home Setup:   Lives With: johnathon Krishna  Daily Living Activities: independent most of the time, needs occasional assist d/t bleeding, BM issues  Durable Medical Equipment: none noted  Ambulation: independent    Preferred Pharmacy:   High co-pays with insurance: has concerns, see note  High co-pays with medication coverage: see note  No medication coverage: NA    Primary Care Provider: Dr. Alexandre Sierra  Hx of [address] [facility] [address]: none  Hx of Short term rehab: none  Mental Health Hx: none noted  Substance Abuse Hx:   Employment: retired, SS income only  [address] Status/Location: not a   Ability to pay bills: concerns, see note  POA/LW/AD:  Transportation Plan/Concerns: family will assist, only one car, see note. What do you know about your Cancer Diagnosis    What has your doctor told you about your cancer diagnosis:    What has your doctor told you about your cancer treatment:    What specific concerns do you have about your diagnosis and treatment:    Have you been made aware of any hair loss associated with treatment:    Additional Comments:      MSW s/w pt and his daughter in the infusion center earlier today, pt is here for his first day of tx after initially being hesitant to do any treatment at all. He tells me that he is most concerned about side effects and how to manage this. Pt lives in a mobile home with his daughter, and his adult granddaughter lives next door.   He shared that he has had two previous heart attacks and is worried that his chemo will cause another one. He has nitro pills at home and is asking if he should take these if any symptoms start at home, I did ask his RN today to reach out to the office for their instructions. He does follow with cardiology as well. Pt tells me that in general, he does well at home and is independent, however he notes that he is having issues with BM lately, that it comes on him quickly and he has go immediately. He does note some bleeding with BMs occasionally as well. Pt does have a few concerns that we addressed today. He has outstanding hospital bills and tells me that he only receives SS income, which does not cover all of their bills. His daughter was working until she hurt herself, she is not on STD and does not have an income. Pt has a car with a payment right now, he cannot drive however his daughter and granddaughter do not have a vehicle right now, so they are all sharing this one car. I did provide him the [place] [id number] [place] [address] application today as medical bills seems to be his main concern. They tell me that they pay lot rent and are current on that. Seeing as how they have one car, there is a good chance pt will need transportation assistance in the future. We completed a [employer] application today and I have submitted it by email to have on file. Pt's daughter will reach out as needed for transportation and is aware that 48 hours notice is the minimum required. At this time, pt has no other needs or concerns. I will provide them my direct contact information and will continue to assist as needed moving forward.

## 2023-09-21 ENCOUNTER — APPOINTMENT (OUTPATIENT)
Dept: RADIATION ONCOLOGY | Facility: CLINIC | Age: 78
End: 2023-09-21
Attending: RADIOLOGY
Payer: COMMERCIAL

## 2023-09-21 ENCOUNTER — TELEPHONE (OUTPATIENT)
Dept: NUTRITION | Facility: CLINIC | Age: 78
End: 2023-09-21

## 2023-09-21 NOTE — TELEPHONE ENCOUNTER
Contacted [facility] to set up nutrition consult after receiving notification by Infusion RN on 9/20/23 that pt is appropriate for oncology nutrition care (reason for referral: rectal cancer ). Spoke with Favio Figueroa' daughter Jasmine Mccullough, introduced self and oncology nutrition services available. Initial oncology nutrition consultation set up for 10/2/23 during Favio Figueroa' next infusion.

## 2023-09-22 ENCOUNTER — HOSPITAL ENCOUNTER (OUTPATIENT)
Dept: INFUSION CENTER | Facility: CLINIC | Age: 78
Discharge: HOME | End: 2023-09-22

## 2023-09-22 DIAGNOSIS — C20 RECTAL CANCER (HCC): Primary | ICD-10-CM

## 2023-09-22 NOTE — PROGRESS NOTES
Patient reports to  infusion center for elastomeric pump disconnect after 46 hours of running, pump appears empty and deflated. Patient offers no complaints,  port flushed after disconnect, positive blood return noted, port deaccessed and band-aid placed on site. Patient aware of next appointment, AVS printed and reviewed.

## 2023-09-25 ENCOUNTER — APPOINTMENT (OUTPATIENT)
Dept: RADIATION ONCOLOGY | Facility: CLINIC | Age: 78
End: 2023-09-25
Attending: RADIOLOGY
Payer: COMMERCIAL

## 2023-09-25 PROCEDURE — 77280 THER RAD SIMULAJ FIELD SMPL: CPT | Performed by: RADIOLOGY

## 2023-09-25 PROCEDURE — 77331 SPECIAL RADIATION DOSIMETRY: CPT | Performed by: RADIOLOGY

## 2023-09-25 PROCEDURE — 77387 GUIDANCE FOR RADJ TX DLVR: CPT | Performed by: RADIOLOGY

## 2023-09-25 PROCEDURE — 77412 RADIATION TX DELIVERY LVL 3: CPT | Performed by: RADIOLOGY

## 2023-09-25 PROCEDURE — 77427 RADIATION TX MANAGEMENT X5: CPT | Performed by: RADIOLOGY

## 2023-09-26 ENCOUNTER — HOSPITAL ENCOUNTER (OUTPATIENT)
Dept: MRI IMAGING | Facility: HOSPITAL | Age: 78
Discharge: HOME | End: 2023-09-26
Attending: INTERNAL MEDICINE
Payer: COMMERCIAL

## 2023-09-26 ENCOUNTER — APPOINTMENT (OUTPATIENT)
Dept: RADIATION ONCOLOGY | Facility: CLINIC | Age: 78
End: 2023-09-26
Payer: COMMERCIAL

## 2023-09-26 DIAGNOSIS — C20 RECTAL CANCER (HCC): ICD-10-CM

## 2023-09-26 PROCEDURE — G1004 CDSM NDSC: HCPCS

## 2023-09-26 PROCEDURE — A9585 GADOBUTROL INJECTION: HCPCS | Performed by: INTERNAL MEDICINE

## 2023-09-26 PROCEDURE — 74183 MRI ABD W/O CNTR FLWD CNTR: CPT

## 2023-09-26 PROCEDURE — 77387 GUIDANCE FOR RADJ TX DLVR: CPT | Performed by: RADIOLOGY

## 2023-09-26 PROCEDURE — 77412 RADIATION TX DELIVERY LVL 3: CPT | Performed by: RADIOLOGY

## 2023-09-26 RX ORDER — GADOBUTROL 604.72 MG/ML
5 INJECTION INTRAVENOUS
Status: COMPLETED | OUTPATIENT
Start: 2023-09-26 | End: 2023-09-26

## 2023-09-26 RX ADMIN — GADOBUTROL 5 ML: 604.72 INJECTION INTRAVENOUS at 14:25

## 2023-09-27 ENCOUNTER — TELEPHONE (OUTPATIENT)
Dept: HEMATOLOGY ONCOLOGY | Facility: CLINIC | Age: 78
End: 2023-09-27

## 2023-09-27 ENCOUNTER — TELEMEDICINE (OUTPATIENT)
Dept: PALLIATIVE MEDICINE | Facility: CLINIC | Age: 78
End: 2023-09-27

## 2023-09-27 ENCOUNTER — APPOINTMENT (OUTPATIENT)
Dept: RADIATION ONCOLOGY | Facility: CLINIC | Age: 78
End: 2023-09-27
Payer: COMMERCIAL

## 2023-09-27 ENCOUNTER — OFFICE VISIT (OUTPATIENT)
Dept: HEMATOLOGY ONCOLOGY | Facility: CLINIC | Age: 78
End: 2023-09-27
Payer: COMMERCIAL

## 2023-09-27 ENCOUNTER — APPOINTMENT (OUTPATIENT)
Dept: RADIATION ONCOLOGY | Facility: CLINIC | Age: 78
End: 2023-09-27
Attending: RADIOLOGY
Payer: COMMERCIAL

## 2023-09-27 VITALS
OXYGEN SATURATION: 97 % | RESPIRATION RATE: 18 BRPM | DIASTOLIC BLOOD PRESSURE: 82 MMHG | BODY MASS INDEX: 19.18 KG/M2 | TEMPERATURE: 98.2 F | HEART RATE: 76 BPM | HEIGHT: 70 IN | WEIGHT: 134 LBS | SYSTOLIC BLOOD PRESSURE: 126 MMHG

## 2023-09-27 DIAGNOSIS — C20 RECTAL CANCER (HCC): Primary | ICD-10-CM

## 2023-09-27 DIAGNOSIS — E46 PROTEIN-CALORIE MALNUTRITION, UNSPECIFIED SEVERITY (HCC): ICD-10-CM

## 2023-09-27 DIAGNOSIS — K12.30 MUCOSITIS: ICD-10-CM

## 2023-09-27 DIAGNOSIS — Z51.5 PALLIATIVE CARE ENCOUNTER: ICD-10-CM

## 2023-09-27 DIAGNOSIS — Z71.89 ADVANCED CARE PLANNING/COUNSELING DISCUSSION: ICD-10-CM

## 2023-09-27 PROCEDURE — 77014 CHG CT GUIDANCE RADIATION THERAPY FLDS PLACEMENT: CPT | Performed by: RADIOLOGY

## 2023-09-27 PROCEDURE — 77387 GUIDANCE FOR RADJ TX DLVR: CPT | Performed by: RADIOLOGY

## 2023-09-27 PROCEDURE — 77412 RADIATION TX DELIVERY LVL 3: CPT | Performed by: RADIOLOGY

## 2023-09-27 PROCEDURE — 99215 OFFICE O/P EST HI 40 MIN: CPT | Performed by: INTERNAL MEDICINE

## 2023-09-27 RX ORDER — DIPHENHYDRAMINE HYDROCHLORIDE AND LIDOCAINE HYDROCHLORIDE AND ALUMINUM HYDROXIDE AND MAGNESIUM HYDRO
10 KIT EVERY 6 HOURS PRN
Qty: 237 ML | Refills: 0 | Status: SHIPPED | OUTPATIENT
Start: 2023-09-27

## 2023-09-27 NOTE — TELEPHONE ENCOUNTER
Returned phone call to [facility] in radiology to let her know we are able to see results in the system and patient has appt with provider today.

## 2023-09-27 NOTE — PROGRESS NOTES
Outpatient Virtual Regular Visit - Follow-up with Palliative and Supportive Care  Dalton Miller 69 y.o. male [id number]    Intake:    Reason for virtual visit is patient request.      After connecting through luma-id, the patient was identified by name and date of birth. Dalton Miller or their agent was informed that this was a telemedicine visit and that the visit is being conducted through the [employer]). He agrees to proceed. .  My office door was closed. No one else was in the room. They acknowledged consent and understanding of privacy and security of the video platform. The patient or agent has agreed to participate and understands they can discontinue the visit at any time. Assessment & Plan  Problem List Items Addressed This Visit        Digestive    Rectal cancer ([address]) - Primary       Other    Protein-calorie malnutrition, unspecified severity ([address])   Other Visit Diagnoses     Advanced care planning/counseling discussion        Palliative care encounter            · Rectal cancer- continue to follow with Oncology for management and treatment  · Protein-calorie malnutrition- states appetite is good, no nausea or vomiting. Monitor  · Palliative Care Encounter- ongoing symptom management. · Goals of Care- patient is treatment focused with no limitations at this time  · Psychosocial support- supportive listening provided    Medications adjusted this encounter:  Requested Prescriptions      No prescriptions requested or ordered in this encounter     No orders of the defined types were placed in this encounter. There are no discontinued medications. Dalton Miller was seen today for symptoms and planning cares related to above illnesses. I have reviewed the patient's controlled substance dispensing history in the Prescription Drug Monitoring Program in compliance with the [facility] regulations before prescribing any controlled substances.     They are invited to continue to follow with us. If there are questions or concerns, please contact us through our clinic/answering service 24 hours a day, seven days a week. HUSSAIN Dickerson  [facility]  [phone]        Visit Information    Accompanied By: Family member    Source of History: Self, Family member    History Limitations: None      Chief Complaint  No chief complaint on file. History of Present Illness      Nj Dent is a 69 y.o. male who presents in follow up of symptoms related to rectal cancer. Pertinent issues include: symptom management, pain, neoplasm related, depression or anxiety, nausea, fatigue, assessment of goals of care. Patient is currently receiving chemotherapy and radiation therapy, states he is having a rough day today, diarrheal stool X 1, weakness and fatigue. Denies pain, appetite is good, no nausea or vomiting. Has appointments this afternoon with Medical Oncology and then radiation therapy. He will complete radiation therapy on 09/29/2023. Past Medical History:   Diagnosis Date   • Hyperlipidemia    • Ischemic cardiomyopathy    • Lacunar infarction [facility])    • Myocardial infarction [facility])    • Near syncope 11/14/2019   • Non-ST elevated myocardial infarction [facility])    • NSTEMI (non-ST elevated myocardial infarction) ([address] [facility]) 10/20/2019   • Pericarditis 10/24/2019   • Poison ivy    • Rectal cancer ([address] [facility])    • Stroke [facility])      Past Surgical History:   Procedure Laterality Date   • ANGIOPLASTY     • CARDIAC CATHETERIZATION N/A 5/19/2023    Procedure: Cardiac pci;  Surgeon: Ten Kumar MD;  Location: [facility] CARDIAC CATH LAB;   Service: Cardiology   • CORONARY STENT PLACEMENT     • HERNIA REPAIR Left 4/28/2021    Procedure: OPEN REPAIR HERNIA INGUINAL LEFT WITH MESH;  Surgeon: Anish Dozier MD;  Location: [facility] MAIN OR;  Service: General   • IR PORT PLACEMENT  8/31/2023     Current Outpatient Medications   Medication Sig Dispense Refill   • albuterol (ProAir HFA) 90 mcg/act inhaler Inhale 2 puffs every 6 (six) hours as needed for wheezing 8.5 g 0   • aspirin (ECOTRIN LOW STRENGTH) 81 mg EC tablet Take 1 tablet (81 mg total) by mouth daily Please purchase over the counter at her local pharmacy 30 tablet 1   • atorvastatin (LIPITOR) 80 mg tablet Take 1 tablet (80 mg total) by mouth daily with dinner 90 tablet 3   • Blood Pressure KIT by Does not apply route daily 1 each 0   • ferrous sulfate 324 (65 Fe) mg Take 1 tablet (324 mg total) by mouth in the morning 30 tablet 5   • lisinopril (ZESTRIL) 2.5 mg tablet take 1 tablet by mouth daily 90 tablet 3   • metoprolol succinate (TOPROL-XL) 25 mg 24 hr tablet take 1 tablet by mouth daily 90 tablet 3   • pantoprazole (PROTONIX) 40 mg tablet Take 1 tablet (40 mg total) by mouth daily 30 tablet 0   • sucralfate (CARAFATE) 1 g tablet Take 1 tablet (1 g total) by mouth 4 (four) times a day 120 tablet 0   • tamsulosin (FLOMAX) 0.4 mg Take 1 capsule (0.4 mg total) by mouth daily at bedtime 90 capsule 3     No current facility-administered medications for this visit. No Known Allergies    Review of Systems   Constitutional: Positive for fatigue. Respiratory: Negative for shortness of breath. Cardiovascular: Negative for chest pain. Gastrointestinal: Positive for blood in stool and diarrhea. Negative for abdominal pain, nausea and vomiting. Neurological: Positive for weakness. Video Exam  There were no vitals filed for this visit. Physical Exam  Pulmonary:      Effort: Pulmonary effort is normal.   Neurological:      Mental Status: He is alert and oriented to person, place, and time.          CT chest abdomen pelvis w contrast  Status: Final result     PACS Images     Show images for CT chest abdomen pelvis w contrast  Study Result    Narrative & Impression   CT CHEST, ABDOMEN AND PELVIS WITH IV CONTRAST     INDICATION:   C20: Malignant neoplasm of rectum.     COMPARISON: Rectal cancer staging MRI 6/14/2023  CT abdomen pelvis 5/18/2023. CTA chest 5/23/2023     TECHNIQUE: CT examination of the chest, abdomen and pelvis was performed. Multiplanar 2D reformatted images were created from the source data.     This examination, like all CT scans performed in the [place], was performed utilizing techniques to minimize radiation dose exposure, including the use of iterative reconstruction and automated exposure control. Radiation dose length   product (DLP) for this visit:  641 mGy-cm     IV Contrast:  100 mL of iohexol (OMNIPAQUE)  Enteric Contrast: Enteric contrast was administered.     FINDINGS:     CHEST     LUNGS: Numerous small bilateral pulmonary nodules are present which have increased in size/conspicuity from the previous exam. For example, in the right upper lobe on series 3 images 89 and 92 on the current exam are two 4 mm nodules which previously   measured 2 mm on series 2 image 91 of the 5/23/2023 exam. Also within the left lower lobe on series 3 image 183 of the current exam is a 5 mm nodule which previously measured sub-4 mm on series 2 image 170 of the 5/23/2023 exam.     PLEURA:  Unremarkable.     HEART/GREAT VESSELS: Heart is unremarkable for patient's age. No thoracic aortic aneurysm. Aortic and coronary artery calcifications     MEDIASTINUM AND NANCY: Small hiatal hernia.     CHEST WALL AND LOWER NECK:  Unremarkable.     ABDOMEN     LIVER/BILIARY TREE: 1 cm hypoenhancing lesion in the posterior right hepatic dome (series 2 image 99). This appears separate from the previously visualized lesion of the 5/18/2023 CT, which itself is not seen on this exam.     GALLBLADDER:  There are gallstone(s) within the gallbladder, without pericholecystic inflammatory changes.     SPLEEN:  Unremarkable.     PANCREAS: Stable 1 cm cystic lesion in the uncinate process (series 2 image 142).    ADRENAL GLANDS:  Unremarkable.     KIDNEYS/URETERS:  One or more simple renal cyst(s) is noted. Otherwise unremarkable kidneys. No hydronephrosis.     STOMACH AND BOWEL: Small hiatal hernia. Normal course and caliber of the stomach and duodenum. Small duodenal diverticulum is present. No dilated loops of bowel. Colonic diverticulosis without evidence of acute diverticulitis. Known partially annular   rectal malignancy, measuring approximately 5 cm in craniocaudal length, grossly similar to previous exams.     APPENDIX:  A normal appendix was visualized.     ABDOMINOPELVIC CAVITY:  No ascites. No pneumoperitoneum. Increased size of at least 1 mesorectal lymph node which now measures 0.9 cm (series 2 image 241), previously 0.5 cm on the prior CT of 5/18/2023 and staging MRI. Stable 1 cm borderline enlarged   aortocaval lymph node (series 2 image 163).    Stable simple fluid collection at the left inguinal ring, likely postoperative seroma.     VESSELS: No abdominal aortic aneurysm. Atherosclerotic calcifications. Patent portal vein.     PELVIS     REPRODUCTIVE ORGANS:  Unremarkable for patient's age.     URINARY BLADDER:  Unremarkable.     ABDOMINAL WALL/INGUINAL REGIONS:  Unremarkable.     OSSEOUS STRUCTURES:  No acute fracture or destructive osseous lesion. Degenerative changes throughout the spine.     IMPRESSION:     1. Numerous bilateral pulmonary nodules which have increased in size/conspicuity from prior exam are suspicious for metastasis. 2.  Redemonstration of subannular primary rectal malignancy, grossly similar to prior exam.  3.  Multiple suspicious mesorectal lymph nodes, one having mildly enlarged since prior exams. Stable borderline enlarged aortocaval lymph node, indeterminate for metastasis. 4.  New posterior right hepatic dome lesion, separate from a previously described lesion also in the hepatic dome which is not visualized on this exam. Metastasis not excluded. Recommend further characterization with hepatic MRI.   5.  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 Recommend follow-up with pancreatic CT or MRI in 2 years. 6.  Cholelithiasis.     The study was marked in EPIC for significant notification.     Workstation performed: [id number]   XR chest 1 view portable  Status: Final result     PACS Images     Show images for XR chest 1 view portable  Study Result    Narrative & Impression   CHEST     INDICATION:   dyspnea.     COMPARISON: Chest radiograph 8/7/2023     EXAM PERFORMED/VIEWS:  XR CHEST PORTABLE  AP semierect        FINDINGS: Right-sided chest port with tip projecting over the superior cavoatrial junction     Cardiomediastinal silhouette appears unremarkable.     Pulmonary nodules are better evident on prior and subsequent chest CT. No focal consolidation, pleural effusion or pneumothorax.     Osseous structures appear within normal limits for patient age.     IMPRESSION:     No focal consolidation, pleural effusion, or pneumothorax.                 Resident: Juan Luis Rendon I, the attending radiologist, have reviewed the images and agree with the final report above.     Workstation performed: [id number]     PE Study with CT abdomen & pelvis with contrast  Status: Final result     PACS Images     Show images for PE Study with CT abdomen & pelvis with contrast  Study Result    Narrative & Impression   CT PULMONARY ANGIOGRAM OF THE CHEST AND CT ABDOMEN AND PELVIS WITH INTRAVENOUS CONTRAST     INDICATION:   cancer history and not drinking.     COMPARISON: CT chest abdomen pelvis 8/29/2023.     TECHNIQUE:  CT examination of the chest, abdomen and pelvis was performed. Thin section CT angiographic technique was used in the chest in order to evaluate for pulmonary embolus and coronal 3D MIP postprocessing was performed on the acquisition   scanner.  Multiplanar 2D reformatted images were created from the source data.     This examination, like all CT scans performed in the [facility], was performed utilizing techniques to minimize radiation dose exposure, including the use of iterative reconstruction and automated exposure control. Radiation dose length   product (DLP) for this visit:  844 mGy-cm     IV Contrast:  100 mL of iohexol (OMNIPAQUE)  Enteric Contrast:  Enteric contrast was not administered.     FINDINGS:     CHEST     PULMONARY ARTERIAL TREE:  No pulmonary embolus is seen.     LUNGS: Numerous bilateral pulmonary nodules are grossly similar to recent exam. No new focal opacity. Mild dependent atelectasis. There is no tracheal or endobronchial lesion.     PLEURA: No pneumothorax or pleural effusion.     HEART/AORTA:  Heart is unremarkable for patient's age. No thoracic aortic aneurysm. Coronary artery and aortic calcifications.     MEDIASTINUM AND NANCY:  Enlarged right lower hilar lymph node measuring 1 cm (series 7 image 131). Small hiatal hernia.     CHEST WALL AND LOWER NECK:  Unremarkable.     ABDOMEN     LIVER/BILIARY TREE: Unchanged indeterminate hypoenhancing lesion in the posterior right hepatic dome (series 2 image 250).    GALLBLADDER:  There are gallstone(s) within the gallbladder, without pericholecystic inflammatory changes.     SPLEEN:  Unremarkable.     PANCREAS: Stable 1 cm cystic lesion in the uncinate process.     ADRENAL GLANDS:  Unremarkable.     KIDNEYS/URETERS:  One or more simple renal cyst(s) is noted. Otherwise unremarkable kidneys. No hydronephrosis.     STOMACH AND BOWEL: Small hiatal hernia. No dilated loops of bowel. Known rectal mass, grossly similar to recent prior exam.     APPENDIX:  No findings to suggest appendicitis.     ABDOMINOPELVIC CAVITY:  No ascites. No pneumoperitoneum. Unchanged mesorectal lymphadenopathy and borderline enlarged aortocaval node.     Stable fluid collection at the internal left inguinal ring.     VESSELS: No abdominal aortic aneurysm. Atherosclerotic calcifications.  Patent portal vein.     PELVIS     REPRODUCTIVE ORGANS:  Unremarkable for patient's age.     URINARY BLADDER: Unremarkable.     ABDOMINAL WALL/INGUINAL REGIONS:  Unremarkable.     OSSEOUS STRUCTURES:  No acute fracture or destructive osseous lesion.     IMPRESSION:     1. No pulmonary embolism or other acute process in the chest.  2.  No acute findings in the abdomen or pelvis. 3.  Cholelithiasis without evidence of acute cholecystitis. 4.  Small hiatal hernia. 5.  Known rectal malignancy and suspected pulmonary metastasis, grossly stable from recent prior study. 6.  Mesorectal lymphadenopathy and borderline enlarged right hilar and aortocaval nodes are also unchanged. 7.  Unchanged indeterminate right hepatic lesion, possible additional site of metastasis. This may be further characterized by hepatic MRI if it would impact clinical management. 8.  Stable pancreatic cyst, recommendation remains for 2-year follow-up.                Workstation performed: [id number]     CT head without contrast  Status: Final result     PACS Images     Show images for CT head without contrast  Study Result    Narrative & Impression   CT BRAIN - WITHOUT CONTRAST     INDICATION:   headache, cancer patient.     COMPARISON: 9/25/2020     TECHNIQUE:  CT examination of the brain was performed. Multiplanar 2D reformatted images were created from the source data.     Radiation dose length product (DLP) for this visit:  835 mGy-cm . This examination, like all CT scans performed in the [facility], was performed utilizing techniques to minimize radiation dose exposure, including the use of iterative   reconstruction and automated exposure control.     IMAGE QUALITY:  Diagnostic.     FINDINGS:     PARENCHYMA: Decreased attenuation is noted in periventricular and subcortical white matter demonstrating an appearance that is statistically most likely to represent mild microangiopathic change; this appearance is similar when compared to most recent   prior examination.  Old lacunar infarcts within the basal ganglia.     No CT signs of acute infarction. No intracranial mass, mass effect or midline shift. No acute parenchymal hemorrhage.     VENTRICLES AND EXTRA-AXIAL SPACES:  Normal for the patient's age.     VISUALIZED ORBITS: Normal visualized orbits.     PARANASAL SINUSES: Normal visualized paranasal sinuses.     CALVARIUM AND EXTRACRANIAL SOFT TISSUES:  Normal.     IMPRESSION:     No acute intracranial abnormality. Stable chronic microangiopathic changes within the brain.                 Workstation performed: [id number]     MRI abdomen w wo contrast and mrcp  Status: Final result     PACS Images     Show images for MRI abdomen w wo contrast and mrcp  Study Result    Narrative & Impression   MRI OF THE ABDOMEN WITH AND WITHOUT CONTRAST WITH MRCP     INDICATION: 69 years / Male  C20: Malignant neoplasm of rectum.     COMPARISON: CT chest, abdomen, pelvis 9/6/2023.     TECHNIQUE:  Multiplanar/multisequence MRI of the abdomen with 3D MRCP was performed before and after administration of contrast. Imaging performed on 1.5T MRI     IV Contrast:  5 mL of Gadobutrol injection (SINGLE-DOSE)     FINDINGS:     LOWER CHEST:   Small hiatal hernia.     LIVER:  Normal in size and configuration. A 1.2 cm lesion in segment 7 near the hepatic dome with intermediate T2 hyperintensity (9/11), T1 hypointensity (12/21), and rim enhancement on postcontrast imaging (13/31, 13/291), suspicious for a hepatic metastasis. The hepatic veins and portal veins are patent.        BILE DUCTS:  No intrahepatic or extrahepatic bile duct dilation. Common bile duct is normal in caliber. No choledocholithiasis, biliary stricture or suspicious mass.     GALLBLADDER: Cholelithiasis. No gallbladder wall thickening or pericholecystic fluid.     PANCREAS: Stable 1.0 cm pancreatic uncinate process cystic lesion (6/19, 8/26) without worrisome features such as suspicious enhancement or solid components, unchanged in size since 5/1/2023.  No main pancreatic duct dilatation.     ADRENAL GLANDS:  Unremarkable.     SPLEEN:  Normal.     KIDNEYS/PROXIMAL URETERS:  No hydroureteronephrosis. No suspicious renal mass. Bilateral simple renal cysts.     BOWEL:   No dilated loops of bowel.     PERITONEUM/RETROPERITONEUM:  No ascites.     LYMPH NODES:  No abdominal lymphadenopathy.     VASCULAR STRUCTURES:  No aneurysm.     ABDOMINAL WALL:  Unremarkable.     OSSEOUS STRUCTURES:  No suspicious osseous lesion.        IMPRESSION:     A 1.2 cm rim-enhancing T1 hypointense hepatic lesion in segment 7 near the hepatic dome, suspicious for a hepatic metastasis.     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 For simple cyst(s) less than 1.5 cm, recommend followup every 2 year for 5 times or to age 80,   whichever comes first. Followup can stop at age 80 or can switch over to 80 year or older algorithm. Recommend next followup in 2 years. Preferred imaging modality: abdomen MRI and MRCP with and without IV contrast, or triple phase abdomen CT with IV   contrast, or abdomen MRI and MRCP without IV contrast.     The study was marked in EPIC for significant notification.     Workstation performed: [id number]             I spent 30+ minutes was spent during this virtual visit by Alexandr, face to face with Shamir Brantley. and his daughter with greater than 50% of the time spent in counseling or coordination of care including discussions of symptom management . All of the patient's or agent's questions were answered during this discussion. Encounter provider HUSSAIN Grenewood, located at:  [address]  [phone]    Recent Visits  No visits were found meeting these conditions.   Showing recent visits within past 7 days and meeting all other requirements  Future Appointments  No visits were found meeting these conditions. Showing future appointments within next 150 days and meeting all other requirements       VIRTUAL VISIT DISCLAIMER    Andrade Dwyer. or their agent has consented to an online visit or consultation. They understands that the online visit is based solely on information provided by the patient or agent, and that, in the absence of a face-to-face physical evaluation by the physician, the diagnosis they receive is both limited and provisional in terms of accuracy and completeness. This is not intended to replace a full medical face-to-face evaluation by the physician. Andrade Dwyer. or their agent understands and accepts these terms. The patient or their agent was informed this is a billable service.

## 2023-09-27 NOTE — TELEPHONE ENCOUNTER
Lvm with time and date of new infusion appt, patient aware schedule has been updated on [employer]. Patient has my teams number to return my call and confirm.

## 2023-09-27 NOTE — TELEPHONE ENCOUNTER
Patient Call    Who are you speaking with? [facility] radiology    If it is not the patient, are they listed on an active communication consent form? N/A   What is the reason for this call? Significant finding of mri   Does this require a call back? Yes   If a call back is required, please list best call back number [phone]   If a call back is required, advise that a message will be forwarded to their care team and someone will return their call as soon as possible. Did you relay this information to the patient?  Yes

## 2023-09-27 NOTE — PROGRESS NOTES
[address] [facility]ATOLOG[facility]COL[facility]Y SPECIAL[facility]  [address] [facility] [id number]    Chang Muller.  [birth year]      PRIMARY HEMATOLOGIC/ONCOLOGIC DIAGNOSIS:  1. Adenocarcinoma of the rectum, invasive, moderately differentiated. NO LOSS OF NUCLEAR EXPRESSION OF MMR PROTEINS: LOW PROBABILITY OF MSI-H. NRAS mutated. Date of diagnosis 5/20/23. CEA normal at time of diagnosis -- 1.7 on 5/19/23. PATHOLOGY:   Case Report   Surgical Pathology Report                         Case: [id number]                                    Authorizing Provider: Brad Nickerson DO          Collected:           05/20/2023 1530               Ordering Location:     [facility] Received:            05/20/2023 1800                                      Intensive Care Unit                                                           Pathologist:           Rc Serna DO                                                             Specimen:    Rectum                                                                                     Addendum   RESULTS OF IMMUNOHISTOCHEMICAL ANALYSIS FOR MISMATCH REPAIR PROTEIN LOSS  INTERPRETATION: NO LOSS OF NUCLEAR EXPRESSION OF MMR PROTEINS: LOW PROBABILITY OF MSI-H        RESULTS:  Antibody            Clone                 Description                     Results  MLH1                 M1                     Mismatch repair protein  Intact nuclear expression  MSH2                [id number]        Mismatch repair protein  Intact nuclear expression  MSH6                40                      Mismatch repair protein  Intact nuclear expression  PMS2                 [id number]           Mismatch repair protein  Intact nuclear expression     Comment:  Background non-neoplastic tissue and/or internal control with intact nuclear expression.   [facility] Specimen ID: [id number], Evaluator: Adán Hauser MD  These tests were developed and their performance characteristics determined by [facility]. [facility] may not be cleared or approved by the U.S. Food and Drug Administration.  The FDA determined that such clearance or approval is not necessary.  These tests are used for clinical purposes. [facility] should not be regarded as investigational or for research.  This laboratory has been approved by [place] [address], designated as a high-complexity laboratory and is qualified to perform these tests.     Comments: Patients whose tumors demonstrate lack of expression of one or more DNA mismatch repair proteins might be at risk for Kenny Syndrome. This cancer susceptibility syndrome greatly increases the risk of synchronous and/or metachronous cancers in the affected patients and their family members. Normal expression of all proteins does not completely rule out familial cancer predisposition.     The [facility] Kenny Syndrome Surveillance Program Task Forces recommends that all patients with lack of expression of one or more DNA mismatch repair proteins and those with concerning personal or family history should undergo thorough evaluation, counseling and possibly genetic testing. Addendum electronically signed by Owen Silva DO on 5/30/2023 at  2:34 PM   Final Diagnosis   A. Rectum, mass, biopsy:  -Adenocarcinoma, invasive, moderately differentiated. -Ancillary testing for mismatch repair (MMR) protein deficiency by immunohistochemical panel (MLH1, PMS2, MSH2, MSH6) is undertaken, the results will be issued in an addendum. Electronically signed by Owen Silva DO on 5/26/2023 at  9:10 AM   Note    Intradepartmental consultation ([facility]) is in agreement. Dr. Elaine Bernard was notified by Dr. Owen Silva via [employer] at 9:09am on 5/26/23.    Additional Information    All reported additional testing was performed with appropriately reactive controls.  These tests were developed and their performance characteristics determined by [facility] or appropriate performing facility, though some tests may be performed on tissues which have not been validated for performance characteristics (such as staining performed on alcohol exposed cell blocks and decalcified tissues).  Results should be interpreted with caution and in the context of the patients’ clinical condition. These tests may not be cleared or approved by the U.S. Food and Drug Administration, though the FDA has determined that such clearance or approval is not necessary. These tests are used for clinical purposes and they should not be regarded as investigational or for research. This laboratory has been approved by [place], designated as a high-complexity laboratory and is qualified to perform these tests.     Interpretation performed at [facility]. [address] [place] [address]   Gross Description     A. The specimen is received in formalin, labeled with the patient's name and hospital number, and is designated " rectum". The specimen consists of multiple tan-pink soft tissue fragments measuring in aggregate 1.0 x 1.0 x 0.2 cm. Entirely submitted. One screened cassette.     Note: The estimated total formalin fixation time based upon information provided by the submitting clinician and the standard processing schedule is under 72 hours. MSequino       STAGING: Cancer Staging   Rectal cancer [facility])  Staging form: Colon and Rectum, AJCC 8th Edition  - Clinical stage from 5/18/2023: Stage IIIB (cT3, cN1, cM0) - Signed by Steven Cardoza DO on 6/28/2023  Microsatellite instability (MSI): Stable         Oncology History   Rectal cancer ([address])   5/18/2023 Initial Diagnosis    May 18, 2023 patient presented with BRBPR x1 week. Weight stable over 8 months. CT abdomen pelvis showed rectal mass with 2 suspicious mesorectal lymph nodes. 2 mm left lower lobe pulmonary nodule, 3 mm hepatic dome questionable lesion, 1 cm uncinate process cystic lesion.   CT chest showed 3 mm left lower lobe and right upper lobe nodules new compared to 2019. May 20 flexible sigmoidoscopy showed distal rectal mass. Biopsy of the mass showed moderately differentiated adenocarcinoma. MMR intact. June 14, 2023 MRI of the pelvis showed findings consistent with T3b, N1 low rectal cancer. Suspicion for vascular invasion. CBC on presentation normal WBC platelets. Hemoglobin 11.4, MCV 77. CMP showed albumin 3.3, otherwise normal.        Cancer Staged    Cancer Staging   No matching staging information was found for the patient. 5/18/2023 -  Cancer Staged    Staging form: Colon and Rectum, AJCC 8th Edition  - Clinical stage from 5/18/2023: Stage IIIB (cT3, cN1, cM0) - Signed by Nickie Sandoval DO on 6/28/2023  Microsatellite instability (MSI): Stable       5/20/2023 Biopsy    A. Rectum, mass, biopsy:  -Adenocarcinoma, invasive, moderately differentiated. -Ancillary testing for mismatch repair (MMR) protein deficiency by immunohistochemical panel (MLH1, PMS2, MSH2, MSH6) is undertaken, the results will be issued in an addendum.     RESULTS OF IMMUNOHISTOCHEMICAL ANALYSIS FOR MISMATCH REPAIR PROTEIN LOSS  INTERPRETATION: NO LOSS OF NUCLEAR EXPRESSION OF MMR PROTEINS: LOW PROBABILITY OF MSI-H        RESULTS:  Antibody            Clone                 Description                     Results  MLH1                 M1                     Mismatch repair protein  Intact nuclear expression  MSH2                [id number]        Mismatch repair protein  Intact nuclear expression  MSH6                44                      Mismatch repair protein  Intact nuclear expression  PMS2                 [id number]           Mismatch repair protein  Intact nuclear expression        9/20/2023 -  Chemotherapy    alteplase (CATHFLO), 2 mg, Intracatheter, Every 1 Minute as needed, 1 of 12 cycles  fluorouracil (ADRUCIL), 320 mg/m2 = 565 mg (80 % of original dose 400 mg/m2), Intravenous, Once, 1 of 12 cycles  Dose modification: 320 mg/m2 (original dose 400 mg/m2, Cycle 1)  Administration: 565 mg (9/20/2023)  leucovorin calcium IVPB, 708 mg, Intravenous, Once, 1 of 12 cycles  Administration: 700 mg (9/20/2023)  oxaliplatin (ELOXATIN) chemo infusion, 75 mg/m2 = 132.75 mg (88.2 % of original dose 85 mg/m2), Intravenous, Once, 1 of 12 cycles  Dose modification: 75 mg/m2 (original dose 85 mg/m2, Cycle 1, Reason: Anticipated Tolerance)  Administration: 132.75 mg (9/20/2023)  fluorouracil (ADRUCIL) ambulatory infusion Soln, 1,200 mg/m2/day = 4,250 mg, Intravenous, Over 46 hours, 1 of 12 cycles         INTERIM HISTORY:  Pravin Aguilar is a 69yo male who presents for evaluation and management of adenocarcinoma of the rectum. Pt received C1D1 mFOLFOX 6 on 9/20/23. Tolerated treatment well. No complaints. Initiated RT 9/25/23 and will complete 9/29/23.      PAST MEDICAL,PAST SURGICAL, FAMILY AND SOCIAL HISTORY:    Patient Active Problem List   Diagnosis   • CAD in native artery   • S/P primary angioplasty with coronary stent   • History of ischemic cardiomyopathy   • History of lacunar cerebrovascular accident (CVA)   • Non-recurrent unilateral inguinal hernia   • Essential hypertension   • Protein-calorie malnutrition, unspecified severity (HCC)   • Rectal bleeding   • Acute on chronic anemia   • Moderate protein-calorie malnutrition (HCC)   • STEMI involving left anterior descending coronary artery (HCC)   • Ischemic cardiomyopathy   • Renal cyst   • Microscopic hematuria   • Encounter to discuss test results   • Enlarged prostate   • Cerebrovascular accident (CVA) ([address])   • Phimosis   • Lower urinary tract symptoms (LUTS)   • Rectal cancer (HCC)   • Depression, recurrent ([address])   • Port-A-Cath in place     Past Medical History:   Diagnosis Date   • Hyperlipidemia    • Ischemic cardiomyopathy    • Lacunar infarction [facility])    • Myocardial infarction ([address])    • Near syncope 11/14/2019   • Non-ST elevated myocardial infarction [facility])    • NSTEMI (non-ST elevated myocardial infarction) ([address] [facility]) 10/20/2019   • Pericarditis 10/24/2019   • Poison ivy    • Rectal cancer ([address] [facility])    • Stroke [facility])      Past Surgical History:   Procedure Laterality Date   • ANGIOPLASTY     • CARDIAC CATHETERIZATION N/A 2023    Procedure: Cardiac pci;  Surgeon: Harsha Bean MD;  Location: [facility] CARDIAC CATH LAB; Service: Cardiology   • CORONARY STENT PLACEMENT     • HERNIA REPAIR Left 2021    Procedure: OPEN REPAIR HERNIA INGUINAL LEFT WITH MESH;  Surgeon: Jean Schofield MD;  Location: [facility] MAIN OR;  Service: General   • IR PORT PLACEMENT  2023     Family History   Problem Relation Age of Onset   • Breast cancer Mother    • Emphysema Father    • Heart disease Maternal Grandfather    • Heart attack Maternal Grandfather    • Emphysema Paternal Grandfather      Social History     Socioeconomic History   • Marital status:       Spouse name: Not on file   • Number of children: Not on file   • Years of education: Not on file   • Highest education level: Not on file   Occupational History   • Not on file   Tobacco Use   • Smoking status: Former     Packs/day: 1.00     Years: 15.00     Total pack years: 15.00     Types: Cigarettes     Quit date: 1969     Years since quittin.8   • Smokeless tobacco: Never   Vaping Use   • Vaping Use: Never used   Substance and Sexual Activity   • Alcohol use: Never   • Drug use: No   • Sexual activity: Not Currently     Partners: Female   Other Topics Concern   • Not on file   Social History Narrative   • Not on file     Social Determinants of Health     Financial Resource Strain: Low Risk  (10/17/2022)    Overall Financial Resource Strain (CARDIA)    • Difficulty of Paying Living Expenses: Not hard at all   Food Insecurity: No Food Insecurity (2023)    Hunger Vital Sign    • Worried About Running Out of Food in the Last Year: Never true    • Ran Out of Food in the Last Year: Never true   Transportation Needs: No Transportation Needs (2023) PRAPARE - Transportation    • Lack of Transportation (Medical): No    • Lack of Transportation (Non-Medical):  No   Physical Activity: Inactive (3/8/2021)    Exercise Vital Sign    • Days of Exercise per Week: 0 days    • Minutes of Exercise per Session: 0 min   Stress: No Stress Concern Present (3/8/2021)    [facility]    • Feeling of Stress : Not at all   Social Connections: Not on file   Intimate Partner Violence: Not on file   Housing Stability: Low Risk  (5/19/2023)    Housing Stability Vital Sign    • Unable to Pay for Housing in the Last Year: No    • Number of Places Lived in the Last Year: 1    • Unstable Housing in the Last Year: No       Current Outpatient Medications:   •  albuterol (ProAir HFA) 90 mcg/act inhaler, Inhale 2 puffs every 6 (six) hours as needed for wheezing, Disp: 8.5 g, Rfl: 0  •  atorvastatin (LIPITOR) 80 mg tablet, Take 1 tablet (80 mg total) by mouth daily with dinner, Disp: 90 tablet, Rfl: 3  •  Blood Pressure KIT, by Does not apply route daily, Disp: 1 each, Rfl: 0  •  ferrous sulfate 324 (65 Fe) mg, Take 1 tablet (324 mg total) by mouth in the morning, Disp: 30 tablet, Rfl: 5  •  lisinopril (ZESTRIL) 2.5 mg tablet, take 1 tablet by mouth daily, Disp: 90 tablet, Rfl: 3  •  metoprolol succinate (TOPROL-XL) 25 mg 24 hr tablet, take 1 tablet by mouth daily, Disp: 90 tablet, Rfl: 3  •  pantoprazole (PROTONIX) 40 mg tablet, Take 1 tablet (40 mg total) by mouth daily, Disp: 30 tablet, Rfl: 0  •  sucralfate (CARAFATE) 1 g tablet, Take 1 tablet (1 g total) by mouth 4 (four) times a day, Disp: 120 tablet, Rfl: 0  •  tamsulosin (FLOMAX) 0.4 mg, Take 1 capsule (0.4 mg total) by mouth daily at bedtime, Disp: 90 capsule, Rfl: 3  •  aspirin (ECOTRIN LOW STRENGTH) 81 mg EC tablet, Take 1 tablet (81 mg total) by mouth daily Please purchase over the counter at her local pharmacy, Disp: 30 tablet, Rfl: 1  No current facility-administered medications for this visit. No Known Allergies  Vitals:    09/27/23 1242   BP: 126/82   Pulse: 76   Resp: 18   Temp: 98.2 °F (36.8 °C)   SpO2: 97%       ROS:  CONSTITUTIONAL:  No fever. No chills. No dizziness. No weakness. EYES:  No pain, erythema, or discharge. No blurring of vision. ENT:  No sore throat, URI symptoms. No epistaxis. No tinnitus. CARDIOVASCULAR:  No chest pain. No palpitations. No lower extremity edema. RESPIRATORY:  No shortness of breath, cough, pain with respiration, pleuritic chest pain. No hemoptysis. No dyspnea. No paroxysmal nocturnal dyspnea. GASTROINTESTINAL:  Normal appetite. No nausea, vomiting, diarrhea. No pain. No bloating. No melena. GENITOURINARY:  No frequency, urgency, nocturia. No hematuria or dysuria. MUSCULOSKELETAL:  No arthralgias or myalgias. INTEGUMENTARY:  No swelling. No bruising. No contusions. No abrasions. No lymphangitis. NEUROLOGIC:  No headache. No neck pain. No numbness or tingling of the extremities. No weakness. PSYCHIATRIC:  No confusion. ENDOCRINE:  No fatigue. No weakness. No history of thyroid, diabetes or adrenal problems. HEMATOLOGICAL:  No bleeding. No petechiae. No bruising.     PHYSICAL EXAM:    GENERAL: AAO x 3, moderate distress  HEENT: AT,NC  CVS: S1S2 RRR  LUNGS: b/l breath sounds  ABD: NT,ND, +BS  EXTR: no edema  NEURO: CN II-XII grossly intact    LABS:  I have reviewed pertinent labs:  CBC:   Lab Results   Component Value Date    WBC 7.66 09/18/2023    RBC 4.20 09/18/2023    HGB 10.4 (L) 09/18/2023    HCT 33.4 (L) 09/18/2023    MCV 80 (L) 09/18/2023     09/18/2023    MCH 24.8 (L) 09/18/2023    MCHC 31.1 (L) 09/18/2023    RDW 15.9 (H) 09/18/2023    MPV 11.9 09/18/2023    NEUTROABS 5.31 09/18/2023     CMP:   Lab Results   Component Value Date    SODIUM 137 09/18/2023    K 4.1 09/18/2023     09/18/2023    CO2 24 09/18/2023    AGAP 7 09/18/2023    BUN 16 09/18/2023    CREATININE 0.95 09/18/2023    GLUC 85 09/18/2023    GLUF 105 (H) 08/07/2023    CALCIUM 8.6 09/18/2023    AST 15 09/18/2023    ALT 11 09/18/2023    ALKPHOS 54 09/18/2023    TP 6.1 (L) 09/18/2023    ALB 3.5 09/18/2023    TBILI 0.43 09/18/2023    EGFR 76 09/18/2023     Liver Enzymes:   Lab Results   Component Value Date    AST 15 09/18/2023    ALT 11 09/18/2023    ALKPHOS 54 09/18/2023    TP 6.1 (L) 09/18/2023    ALB 3.5 09/18/2023    TBILI 0.43 09/18/2023    BILIDIR 0.16 10/20/2019     Vitamin B12   Lab Results   Component Value Date    UDDBAXVK46 222 05/19/2023     Iron Study   Lab Results   Component Value Date    RETIC 43,800 05/19/2023    RETICCTPCT 1.07 05/19/2023    FERRITIN 22 (L) 08/07/2023    CONCFE 6 (L) 08/07/2023    TIBC 328 08/07/2023    IRON 20 (L) 08/07/2023     Folate   Lab Results   Component Value Date    FOLATE 21.8 05/19/2023     Magnesium   Lab Results   Component Value Date    MG 2.3 05/21/2023     Phosphorus   Lab Results   Component Value Date    PHOS 2.7 05/21/2023     Coagulation Panel   Lab Results   Component Value Date    DDIMER 0.28 10/20/2019    PROTIME 14.4 09/06/2023    INR 1.06 09/06/2023    PTT 28 05/18/2023       IMAGING:  XR chest pa & lateral    Result Date: 8/9/2023  Narrative: CHEST INDICATION:   R06.02: Shortness of breath. . COMPARISON:  Radiograph 9/25/2020 and CTA chest  May 23, 2023 EXAM PERFORMED/VIEWS:  XR CHEST PA & LATERAL FINDINGS: Cardiomediastinal silhouette appears unremarkable. The lungs are clear. No pneumothorax or pleural effusion. Osseous structures appear within normal limits for patient age. Impression: No acute cardiopulmonary disease. Resident: Zaida Funes, the attending radiologist, have reviewed the images and agree with the final report above. Workstation performed: [id number]     I reviewed the above laboratory and imaging data. ASSESSMENT/PLAN:  1. Adenocarcinoma of the rectum, invasive, moderately differentiated.  NO LOSS OF NUCLEAR EXPRESSION OF MMR PROTEINS: LOW PROBABILITY OF MSI-H. NRAS mutated. Date of diagnosis 5/20/23. CEA normal at time of diagnosis -- 1.7 on 5/19/23. The patient is willing to receive treatment now. Prior imaging 5/2023 showed a possible 0.3 cm right hepatic dome lesion versus artifact (statistically most likely a benign lesion such as cyst or hemangioma). 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 MRI pelvis w/o contrast dated 6/14/23 showed stage: T3b, N1, low rectal cancer. MRF: clear (tumor margin >2 mm from MRF). No sphincter involvement. No suspicious extra mesorectal lymph nodes. Suspicious vascular invasion along the right lateral aspect. Caris oncology testing pending. CT TAP dated 8/29/23--numerous bilateral pulmonary nodules which have increased in size/conspicuity from prior exam suspicious for metastasis. Redemonstration of subannular primary rectal malignancy, grossly similar to prior exam. Multiple suspicious mesorectal lymph nodes, one having mildly enlarged since prior exams. Stable borderline enlarged aortocaval lymph node, indeterminate for metastasis. New posterior right hepatic dome lesion, separate from a previously described lesion also in the hepatic dome which is not visualized on this exam. Metastasis not excluded. Recommend further characterization with hepatic MRI. 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 Cholelithiasis. S/p port placement. Plan to initiate treatment with FOLFOX ( dose reduced due to  Anticipated tolerance, will increase dose w/ later treatments if pt is able to tolerate). Referred to [facility] for palliative RT due to rectal bleeding. S/p C1D1 mFOLFOX 6 on 9/20/23. Tolerated treatment well. No complaints. Initiated RT 9/25/23 and will complete 9/29/23. C2 D1 FOLFOX planned for 10/9/23. Mouth sore. Magic swizzle prescribed.

## 2023-09-27 NOTE — TELEPHONE ENCOUNTER
----- Message from Marcianne Lesch, MD sent at 9/27/2023  1:07 PM EDT -----  Lauren Goodman,    Can you let infusion know to switch his next treatment to 10/9/23.

## 2023-09-28 ENCOUNTER — APPOINTMENT (OUTPATIENT)
Dept: RADIATION ONCOLOGY | Facility: CLINIC | Age: 78
End: 2023-09-28
Attending: RADIOLOGY
Payer: COMMERCIAL

## 2023-09-28 PROCEDURE — 77014 CHG CT GUIDANCE RADIATION THERAPY FLDS PLACEMENT: CPT | Performed by: RADIOLOGY

## 2023-09-28 PROCEDURE — 77387 GUIDANCE FOR RADJ TX DLVR: CPT | Performed by: RADIOLOGY

## 2023-09-28 PROCEDURE — 77412 RADIATION TX DELIVERY LVL 3: CPT | Performed by: RADIOLOGY

## 2023-09-29 ENCOUNTER — APPOINTMENT (OUTPATIENT)
Dept: RADIATION ONCOLOGY | Facility: CLINIC | Age: 78
End: 2023-09-29
Attending: RADIOLOGY
Payer: COMMERCIAL

## 2023-09-29 PROCEDURE — 77336 RADIATION PHYSICS CONSULT: CPT | Performed by: RADIOLOGY

## 2023-09-29 PROCEDURE — 77412 RADIATION TX DELIVERY LVL 3: CPT | Performed by: RADIOLOGY

## 2023-09-29 PROCEDURE — 77387 GUIDANCE FOR RADJ TX DLVR: CPT | Performed by: RADIOLOGY

## 2023-10-02 ENCOUNTER — TELEPHONE (OUTPATIENT)
Dept: NUTRITION | Facility: CLINIC | Age: 78
End: 2023-10-02

## 2023-10-02 LAB — SCAN RESULT: NORMAL

## 2023-10-02 NOTE — TELEPHONE ENCOUNTER
Min Jeffreyison was scheduled for oncology nutrition consultation today during his infusion. This apt was cancelled via auto reminder system, per chart review pts infusion was also cancelled for today. Contacted Erendira to see if she would like me to r/s nutrition apt to infusion day next week 10/10. LVM with reason for call and RD contact info.

## 2023-10-03 DIAGNOSIS — C20 RECTAL CANCER (HCC): Primary | ICD-10-CM

## 2023-10-06 ENCOUNTER — HOSPITAL ENCOUNTER (OUTPATIENT)
Dept: INFUSION CENTER | Facility: CLINIC | Age: 78
End: 2023-10-06
Payer: COMMERCIAL

## 2023-10-06 DIAGNOSIS — C20 RECTAL CANCER (HCC): Primary | ICD-10-CM

## 2023-10-06 DIAGNOSIS — Z95.828 PORT-A-CATH IN PLACE: ICD-10-CM

## 2023-10-06 LAB
ALBUMIN SERPL BCP-MCNC: 3.5 G/DL (ref 3.5–5)
ALP SERPL-CCNC: 61 U/L (ref 34–104)
ALT SERPL W P-5'-P-CCNC: 9 U/L (ref 7–52)
ANION GAP SERPL CALCULATED.3IONS-SCNC: 5 MMOL/L
AST SERPL W P-5'-P-CCNC: 14 U/L (ref 13–39)
BASOPHILS # BLD AUTO: 0.02 THOUSANDS/ÂΜL (ref 0–0.1)
BASOPHILS NFR BLD AUTO: 1 % (ref 0–1)
BILIRUB SERPL-MCNC: 0.57 MG/DL (ref 0.2–1)
BUN SERPL-MCNC: 16 MG/DL (ref 5–25)
CALCIUM SERPL-MCNC: 8.5 MG/DL (ref 8.4–10.2)
CHLORIDE SERPL-SCNC: 109 MMOL/L (ref 96–108)
CO2 SERPL-SCNC: 25 MMOL/L (ref 21–32)
CREAT SERPL-MCNC: 0.9 MG/DL (ref 0.6–1.3)
EOSINOPHIL # BLD AUTO: 0.18 THOUSAND/ÂΜL (ref 0–0.61)
EOSINOPHIL NFR BLD AUTO: 7 % (ref 0–6)
ERYTHROCYTE [DISTWIDTH] IN BLOOD BY AUTOMATED COUNT: 17.5 % (ref 11.6–15.1)
GFR SERPL CREATININE-BSD FRML MDRD: 82 ML/MIN/1.73SQ M
GLUCOSE SERPL-MCNC: 98 MG/DL (ref 65–140)
HCT VFR BLD AUTO: 32 % (ref 36.5–49.3)
HGB BLD-MCNC: 9.6 G/DL (ref 12–17)
IMM GRANULOCYTES # BLD AUTO: 0.03 THOUSAND/UL (ref 0–0.2)
IMM GRANULOCYTES NFR BLD AUTO: 1 % (ref 0–2)
LYMPHOCYTES # BLD AUTO: 0.75 THOUSANDS/ÂΜL (ref 0.6–4.47)
LYMPHOCYTES NFR BLD AUTO: 29 % (ref 14–44)
MCH RBC QN AUTO: 24.6 PG (ref 26.8–34.3)
MCHC RBC AUTO-ENTMCNC: 30 G/DL (ref 31.4–37.4)
MCV RBC AUTO: 82 FL (ref 82–98)
MONOCYTES # BLD AUTO: 0.43 THOUSAND/ÂΜL (ref 0.17–1.22)
MONOCYTES NFR BLD AUTO: 17 % (ref 4–12)
NEUTROPHILS # BLD AUTO: 1.2 THOUSANDS/ÂΜL (ref 1.85–7.62)
NEUTS SEG NFR BLD AUTO: 45 % (ref 43–75)
NRBC BLD AUTO-RTO: 0 /100 WBCS
PLATELET # BLD AUTO: 142 THOUSANDS/UL (ref 149–390)
PMV BLD AUTO: 11.8 FL (ref 8.9–12.7)
POTASSIUM SERPL-SCNC: 3.6 MMOL/L (ref 3.5–5.3)
PROT SERPL-MCNC: 5.7 G/DL (ref 6.4–8.4)
RBC # BLD AUTO: 3.91 MILLION/UL (ref 3.88–5.62)
SODIUM SERPL-SCNC: 139 MMOL/L (ref 135–147)
WBC # BLD AUTO: 2.61 THOUSAND/UL (ref 4.31–10.16)

## 2023-10-06 PROCEDURE — 80053 COMPREHEN METABOLIC PANEL: CPT | Performed by: INTERNAL MEDICINE

## 2023-10-06 PROCEDURE — 85025 COMPLETE CBC W/AUTO DIFF WBC: CPT | Performed by: INTERNAL MEDICINE

## 2023-10-06 NOTE — PROGRESS NOTES
Pt to clinic for labs drawn via port. Pt offers no complaints today. Pt aware of next appointment. Pt port accessed, flushed, and de-accessed with positive blood returned. On port assessment, it was noted that there was some mild redness and irritation where a bandage adhesive would be, pt reported bandage was itchy when it was last applied. AVS was offered, pt declined. Pt ambulated out of clinic safely.

## 2023-10-09 ENCOUNTER — PATIENT OUTREACH (OUTPATIENT)
Dept: CASE MANAGEMENT | Facility: HOSPITAL | Age: 78
End: 2023-10-09

## 2023-10-10 ENCOUNTER — HOSPITAL ENCOUNTER (OUTPATIENT)
Dept: INFUSION CENTER | Facility: CLINIC | Age: 78
Discharge: HOME | End: 2023-10-10

## 2023-10-10 ENCOUNTER — TELEPHONE (OUTPATIENT)
Dept: HEMATOLOGY ONCOLOGY | Facility: CLINIC | Age: 78
End: 2023-10-10

## 2023-10-10 RX ORDER — FLUOROURACIL 50 MG/ML
320 INJECTION, SOLUTION INTRAVENOUS ONCE
Status: CANCELLED | OUTPATIENT
Start: 2023-10-17

## 2023-10-10 RX ORDER — DEXTROSE MONOHYDRATE 50 MG/ML
20 INJECTION, SOLUTION INTRAVENOUS ONCE
Status: CANCELLED | OUTPATIENT
Start: 2023-10-17

## 2023-10-10 RX ORDER — SODIUM CHLORIDE 9 MG/ML
20 INJECTION, SOLUTION INTRAVENOUS ONCE AS NEEDED
Status: CANCELLED | OUTPATIENT
Start: 2023-10-17

## 2023-10-10 NOTE — TELEPHONE ENCOUNTER
Patient Call    Who are you speaking with? Child    If it is not the patient, are they listed on an active communication consent form? Yes   What is the reason for this call? Patients daughter calling back in regards to message left about not having treatment today   Does this require a call back? Yes   If a call back is required, please list [facility] call back number [phone]   If a call back is required, advise that a message will be forwarded to their care team and someone will return their call as soon as possible. Did you relay this information to the patient?  Yes

## 2023-10-10 NOTE — TELEPHONE ENCOUNTER
Returned phone call to daughter, Shaniqua Abreu. She was inquiring why patient's treatment is being deferred by a week. I let her know that his [place] is on the lower end, Dr. Jermaine Gonzalez wants to give it time to come back up. Per Dr. Srinivasa Ramos request, I did ask if patient has completed radiation, she confirmed he did. Dr. Jermaine Gonzalez considering adding neulasta to patient's treatment plan. Will f/u regarding that.

## 2023-10-16 ENCOUNTER — HOSPITAL ENCOUNTER (OUTPATIENT)
Dept: INFUSION CENTER | Facility: CLINIC | Age: 78
Discharge: HOME | End: 2023-10-16
Payer: COMMERCIAL

## 2023-10-16 ENCOUNTER — TELEPHONE (OUTPATIENT)
Dept: HEMATOLOGY ONCOLOGY | Facility: CLINIC | Age: 78
End: 2023-10-16

## 2023-10-16 DIAGNOSIS — Z95.828 PORT-A-CATH IN PLACE: Primary | ICD-10-CM

## 2023-10-16 DIAGNOSIS — C20 RECTAL CANCER (HCC): Primary | ICD-10-CM

## 2023-10-16 DIAGNOSIS — C20 RECTAL CANCER (HCC): ICD-10-CM

## 2023-10-16 LAB
ALBUMIN SERPL BCP-MCNC: 3.6 G/DL (ref 3.5–5)
ALP SERPL-CCNC: 67 U/L (ref 34–104)
ALT SERPL W P-5'-P-CCNC: 9 U/L (ref 7–52)
ANION GAP SERPL CALCULATED.3IONS-SCNC: 4 MMOL/L
AST SERPL W P-5'-P-CCNC: 12 U/L (ref 13–39)
BASOPHILS # BLD AUTO: 0.04 THOUSANDS/ÂΜL (ref 0–0.1)
BASOPHILS NFR BLD AUTO: 1 % (ref 0–1)
BILIRUB SERPL-MCNC: 0.47 MG/DL (ref 0.2–1)
BUN SERPL-MCNC: 16 MG/DL (ref 5–25)
CALCIUM SERPL-MCNC: 8.6 MG/DL (ref 8.4–10.2)
CHLORIDE SERPL-SCNC: 109 MMOL/L (ref 96–108)
CO2 SERPL-SCNC: 26 MMOL/L (ref 21–32)
CREAT SERPL-MCNC: 1.06 MG/DL (ref 0.6–1.3)
EOSINOPHIL # BLD AUTO: 0.21 THOUSAND/ÂΜL (ref 0–0.61)
EOSINOPHIL NFR BLD AUTO: 4 % (ref 0–6)
ERYTHROCYTE [DISTWIDTH] IN BLOOD BY AUTOMATED COUNT: 17.6 % (ref 11.6–15.1)
GFR SERPL CREATININE-BSD FRML MDRD: 67 ML/MIN/1.73SQ M
GLUCOSE SERPL-MCNC: 90 MG/DL (ref 65–140)
HCT VFR BLD AUTO: 35.2 % (ref 36.5–49.3)
HGB BLD-MCNC: 10.7 G/DL (ref 12–17)
IMM GRANULOCYTES # BLD AUTO: 0.06 THOUSAND/UL (ref 0–0.2)
IMM GRANULOCYTES NFR BLD AUTO: 1 % (ref 0–2)
LYMPHOCYTES # BLD AUTO: 1.32 THOUSANDS/ÂΜL (ref 0.6–4.47)
LYMPHOCYTES NFR BLD AUTO: 23 % (ref 14–44)
MCH RBC QN AUTO: 24.7 PG (ref 26.8–34.3)
MCHC RBC AUTO-ENTMCNC: 30.4 G/DL (ref 31.4–37.4)
MCV RBC AUTO: 81 FL (ref 82–98)
MONOCYTES # BLD AUTO: 0.86 THOUSAND/ÂΜL (ref 0.17–1.22)
MONOCYTES NFR BLD AUTO: 15 % (ref 4–12)
NEUTROPHILS # BLD AUTO: 3.14 THOUSANDS/ÂΜL (ref 1.85–7.62)
NEUTS SEG NFR BLD AUTO: 56 % (ref 43–75)
NRBC BLD AUTO-RTO: 0 /100 WBCS
PLATELET # BLD AUTO: 185 THOUSANDS/UL (ref 149–390)
PMV BLD AUTO: 12.2 FL (ref 8.9–12.7)
POTASSIUM SERPL-SCNC: 4.3 MMOL/L (ref 3.5–5.3)
PROT SERPL-MCNC: 6 G/DL (ref 6.4–8.4)
RBC # BLD AUTO: 4.34 MILLION/UL (ref 3.88–5.62)
SODIUM SERPL-SCNC: 139 MMOL/L (ref 135–147)
WBC # BLD AUTO: 5.63 THOUSAND/UL (ref 4.31–10.16)

## 2023-10-16 PROCEDURE — 85025 COMPLETE CBC W/AUTO DIFF WBC: CPT | Performed by: INTERNAL MEDICINE

## 2023-10-16 PROCEDURE — 80053 COMPREHEN METABOLIC PANEL: CPT | Performed by: INTERNAL MEDICINE

## 2023-10-16 NOTE — TELEPHONE ENCOUNTER
Attempted to call patient's daughter, no answer. I left a detailed voicemail stating that neulasta was added on to patient's treatment for this week to help stimulate his bone marrow and increase his counts so that his next treatment doesn't get delayed. I let her know that it would be on Friday 10/20 and that the infusion would reach out with a time, if they are able to accommodate that day. I left my direct number for her to return my call with any questions or concerns.

## 2023-10-17 ENCOUNTER — HOSPITAL ENCOUNTER (OUTPATIENT)
Dept: INFUSION CENTER | Facility: CLINIC | Age: 78
Discharge: HOME | End: 2023-10-17
Payer: COMMERCIAL

## 2023-10-17 VITALS
WEIGHT: 135 LBS | RESPIRATION RATE: 18 BRPM | SYSTOLIC BLOOD PRESSURE: 111 MMHG | HEIGHT: 70 IN | DIASTOLIC BLOOD PRESSURE: 64 MMHG | HEART RATE: 63 BPM | TEMPERATURE: 97.8 F | BODY MASS INDEX: 19.33 KG/M2

## 2023-10-17 DIAGNOSIS — C20 RECTAL CANCER (HCC): Primary | ICD-10-CM

## 2023-10-17 RX ORDER — FLUOROURACIL 50 MG/ML
320 INJECTION, SOLUTION INTRAVENOUS ONCE
Status: COMPLETED | OUTPATIENT
Start: 2023-10-17 | End: 2023-10-17

## 2023-10-17 RX ORDER — DEXTROSE MONOHYDRATE 50 MG/ML
20 INJECTION, SOLUTION INTRAVENOUS ONCE
Status: COMPLETED | OUTPATIENT
Start: 2023-10-17 | End: 2023-10-17

## 2023-10-17 RX ORDER — SODIUM CHLORIDE 9 MG/ML
20 INJECTION, SOLUTION INTRAVENOUS ONCE AS NEEDED
Status: DISCONTINUED | OUTPATIENT
Start: 2023-10-17 | End: 2023-10-20 | Stop reason: HOSPADM

## 2023-10-17 RX ADMIN — LEUCOVORIN CALCIUM 700 MG: 350 INJECTION, POWDER, LYOPHILIZED, FOR SOLUTION INTRAMUSCULAR; INTRAVENOUS at 14:37

## 2023-10-17 RX ADMIN — DEXTROSE 20 ML/HR: 5 SOLUTION INTRAVENOUS at 14:01

## 2023-10-17 RX ADMIN — OXALIPLATIN 132.75 MG: 5 INJECTION, SOLUTION INTRAVENOUS at 14:37

## 2023-10-17 RX ADMIN — FLUOROURACIL 565 MG: 50 INJECTION, SOLUTION INTRAVENOUS at 16:40

## 2023-10-17 RX ADMIN — DEXAMETHASONE SODIUM PHOSPHATE: 10 INJECTION, SOLUTION INTRAMUSCULAR; INTRAVENOUS at 14:01

## 2023-10-17 NOTE — PROGRESS NOTES
Pt presents for chemo therapy, reports fatigue.  Tolerating infusion thus far, report provided to [place], [place]

## 2023-10-17 NOTE — PROGRESS NOTES
Pt tolerated remaining infusion without complications. Pt port flushed with positive blood returned. Pt aware of next appointment for cadd d/c. AVS received by pt. Pt ambulated out of clinic safely.

## 2023-10-18 ENCOUNTER — TELEPHONE (OUTPATIENT)
Dept: HEMATOLOGY ONCOLOGY | Facility: CLINIC | Age: 78
End: 2023-10-18

## 2023-10-18 NOTE — TELEPHONE ENCOUNTER
Received voicemail:    "Azalea Solorzano, this is Erendira Schmitt calling from my dad Ignacia Cason. He had his chemo yesterday. The reason I'm calling is that he had chills earlier this morning and now he said he's feeling warm and I checked his temp and it was 101.5. I don't know if I should bring him in to get looked at or what do you want me to do with him? If you could give me a call back, I'd really appreciate it. Again, my name is Erendira. My phone number is [phone] and I'm calling for my dad, Chelsi Desir. Thank you."    Returned phone call to patient's daughter, Ag Saavedra. She reports patient is experiencing chills, increased fatigue and a fever of 101.5. Patient denies SOB, nausea/vomiting and diarrhea and did not take any Tylenol. I instructed patient to take Tylenol and take is essentially every 8 hours as needed to resolve the fever and to apply ice packs under the arm pit area as well. I also instructed him to increase his fluid intake. If symptoms do not resolve and worsen, I instructed Erendira to have patient go to ER for evaluation. Erendira and patient verbalized understanding and has no other questions or concerns at this moment. They are aware I will call tomorrow to check in.

## 2023-10-19 ENCOUNTER — HOSPITAL ENCOUNTER (OUTPATIENT)
Dept: INFUSION CENTER | Facility: CLINIC | Age: 78
Discharge: HOME | End: 2023-10-19

## 2023-10-19 DIAGNOSIS — C20 RECTAL CANCER (HCC): Primary | ICD-10-CM

## 2023-10-19 NOTE — PROGRESS NOTES
Pt presents for home infusion pump disconnection. Pt reporting fever yesterday that is now resolved. Reservoir volume 0mL. Port flushed and de accessed without difficulty. AVS declined, next appointment reviewed.

## 2023-10-20 ENCOUNTER — HOSPITAL ENCOUNTER (OUTPATIENT)
Dept: INFUSION CENTER | Facility: CLINIC | Age: 78
End: 2023-10-20
Payer: COMMERCIAL

## 2023-10-20 ENCOUNTER — TELEPHONE (OUTPATIENT)
Dept: HEMATOLOGY ONCOLOGY | Facility: CLINIC | Age: 78
End: 2023-10-20

## 2023-10-20 DIAGNOSIS — C20 RECTAL CANCER (HCC): Primary | ICD-10-CM

## 2023-10-20 RX ADMIN — PEGFILGRASTIM 6 MG: 6 INJECTION SUBCUTANEOUS at 15:17

## 2023-10-20 NOTE — PROGRESS NOTES
Patient presents for neulasta injection. Patient offers  complaints of fatigue. Pt educated on neulasta injection and aware that he can use tylenol and claritin for joint pain side effects. Injection placed in right arm, tolerated well. AVS printed. Next appointment reviewed.

## 2023-10-20 NOTE — TELEPHONE ENCOUNTER
Attempted to call patient's daughter, Jessica Herman, no answer. I left a voicemail inquiring how Pito Araiza is feeling, as he was experiencing a fever the other day. I instructed her to return my phone call at her earliest convenience.

## 2023-10-25 DIAGNOSIS — C20 RECTAL CANCER (HCC): Primary | ICD-10-CM

## 2023-10-27 DIAGNOSIS — R06.00 DYSPNEA: ICD-10-CM

## 2023-10-27 RX ORDER — SUCRALFATE 1 G/1
1 TABLET ORAL 4 TIMES DAILY
Qty: 120 TABLET | Refills: 3 | Status: SHIPPED | OUTPATIENT
Start: 2023-10-27 | End: 2024-02-24

## 2023-10-30 ENCOUNTER — CLINICAL SUPPORT (OUTPATIENT)
Dept: RADIATION ONCOLOGY | Facility: CLINIC | Age: 78
End: 2023-10-30
Attending: RADIOLOGY
Payer: COMMERCIAL

## 2023-10-30 ENCOUNTER — HOSPITAL ENCOUNTER (OUTPATIENT)
Dept: INFUSION CENTER | Facility: CLINIC | Age: 78
Discharge: HOME | End: 2023-10-30
Payer: COMMERCIAL

## 2023-10-30 VITALS
HEIGHT: 70 IN | TEMPERATURE: 97 F | HEART RATE: 68 BPM | RESPIRATION RATE: 18 BRPM | SYSTOLIC BLOOD PRESSURE: 120 MMHG | DIASTOLIC BLOOD PRESSURE: 68 MMHG | WEIGHT: 131.5 LBS | BODY MASS INDEX: 18.83 KG/M2

## 2023-10-30 DIAGNOSIS — C20 RECTAL CANCER (HCC): Primary | ICD-10-CM

## 2023-10-30 DIAGNOSIS — Z95.828 PORT-A-CATH IN PLACE: Primary | ICD-10-CM

## 2023-10-30 DIAGNOSIS — C20 RECTAL CANCER (HCC): ICD-10-CM

## 2023-10-30 LAB
ALBUMIN SERPL BCP-MCNC: 3.7 G/DL (ref 3.5–5)
ALP SERPL-CCNC: 84 U/L (ref 34–104)
ALT SERPL W P-5'-P-CCNC: 8 U/L (ref 7–52)
ANION GAP SERPL CALCULATED.3IONS-SCNC: 5 MMOL/L
ANISOCYTOSIS BLD QL SMEAR: PRESENT
AST SERPL W P-5'-P-CCNC: 13 U/L (ref 13–39)
BASOPHILS # BLD MANUAL: 0 THOUSAND/UL (ref 0–0.1)
BASOPHILS NFR MAR MANUAL: 0 % (ref 0–1)
BILIRUB SERPL-MCNC: 0.39 MG/DL (ref 0.2–1)
BUN SERPL-MCNC: 14 MG/DL (ref 5–25)
CALCIUM SERPL-MCNC: 8.7 MG/DL (ref 8.4–10.2)
CHLORIDE SERPL-SCNC: 107 MMOL/L (ref 96–108)
CO2 SERPL-SCNC: 25 MMOL/L (ref 21–32)
CREAT SERPL-MCNC: 1.01 MG/DL (ref 0.6–1.3)
EOSINOPHIL # BLD MANUAL: 0.38 THOUSAND/UL (ref 0–0.4)
EOSINOPHIL NFR BLD MANUAL: 2 % (ref 0–6)
ERYTHROCYTE [DISTWIDTH] IN BLOOD BY AUTOMATED COUNT: 19.1 % (ref 11.6–15.1)
GFR SERPL CREATININE-BSD FRML MDRD: 71 ML/MIN/1.73SQ M
GLUCOSE SERPL-MCNC: 130 MG/DL (ref 65–140)
HCT VFR BLD AUTO: 36.3 % (ref 36.5–49.3)
HGB BLD-MCNC: 11.1 G/DL (ref 12–17)
LYMPHOCYTES # BLD AUTO: 1.88 THOUSAND/UL (ref 0.6–4.47)
LYMPHOCYTES # BLD AUTO: 10 % (ref 14–44)
MACROCYTES BLD QL AUTO: PRESENT
MCH RBC QN AUTO: 24.3 PG (ref 26.8–34.3)
MCHC RBC AUTO-ENTMCNC: 30.6 G/DL (ref 31.4–37.4)
MCV RBC AUTO: 79 FL (ref 82–98)
MICROCYTES BLD QL AUTO: PRESENT
MONOCYTES # BLD AUTO: 1.69 THOUSAND/UL (ref 0–1.22)
MONOCYTES NFR BLD: 9 % (ref 4–12)
NEUTROPHILS # BLD MANUAL: 14.87 THOUSAND/UL (ref 1.85–7.62)
NEUTS BAND NFR BLD MANUAL: 4 % (ref 0–8)
NEUTS SEG NFR BLD AUTO: 75 % (ref 43–75)
PLATELET # BLD AUTO: 152 THOUSANDS/UL (ref 149–390)
PLATELET BLD QL SMEAR: ADEQUATE
PMV BLD AUTO: 11.4 FL (ref 8.9–12.7)
POTASSIUM SERPL-SCNC: 3.9 MMOL/L (ref 3.5–5.3)
PROT SERPL-MCNC: 6.3 G/DL (ref 6.4–8.4)
RBC # BLD AUTO: 4.57 MILLION/UL (ref 3.88–5.62)
RBC MORPH BLD: PRESENT
SODIUM SERPL-SCNC: 137 MMOL/L (ref 135–147)
TOXIC GRANULES BLD QL SMEAR: PRESENT
WBC # BLD AUTO: 18.82 THOUSAND/UL (ref 4.31–10.16)

## 2023-10-30 PROCEDURE — 99024 POSTOP FOLLOW-UP VISIT: CPT | Performed by: RADIOLOGY

## 2023-10-30 PROCEDURE — 80053 COMPREHEN METABOLIC PANEL: CPT | Performed by: INTERNAL MEDICINE

## 2023-10-30 PROCEDURE — 85027 COMPLETE CBC AUTOMATED: CPT | Performed by: INTERNAL MEDICINE

## 2023-10-30 PROCEDURE — 99211 OFF/OP EST MAY X REQ PHY/QHP: CPT | Performed by: RADIOLOGY

## 2023-10-30 PROCEDURE — 85007 BL SMEAR W/DIFF WBC COUNT: CPT | Performed by: INTERNAL MEDICINE

## 2023-10-30 NOTE — PROGRESS NOTES
Dawna Marinelli [birth year] is a 69 y.o. male with past medical history significant for CAD with acute STEMI s/p stent placement in May 2023 with stage IV rectal adenocarcinoma associated with large, bleeding primary tumor. He started FOLFOX chemotherapy and recently completed a course of palliative radiation between cycles on 9/29/23. He presents today for follow up. The patient tolerated radiation well. He continued to have episodes of diarrhea and rectal bleeding, but suffered no significant toxicity from treatment during therapy. Follow up visit   10/17/23 Cycle 2- Modified FOLFOX6       Upcoming:  10/31/23 Dr. Ferny Aguayo        Oncology History   Rectal cancer [facility])   5/18/2023 Initial Diagnosis    May 18, 2023 patient presented with BRBPR x1 week. Weight stable over 8 months. CT abdomen pelvis showed rectal mass with 2 suspicious mesorectal lymph nodes. 2 mm left lower lobe pulmonary nodule, 3 mm hepatic dome questionable lesion, 1 cm uncinate process cystic lesion. CT chest showed 3 mm left lower lobe and right upper lobe nodules new compared to 2019. May 20 flexible sigmoidoscopy showed distal rectal mass. Biopsy of the mass showed moderately differentiated adenocarcinoma. MMR intact. June 14, 2023 MRI of the pelvis showed findings consistent with T3b, N1 low rectal cancer. Suspicion for vascular invasion. CBC on presentation normal WBC platelets. Hemoglobin 11.4, MCV 77. CMP showed albumin 3.3, otherwise normal.        Cancer Staged    Cancer Staging   No matching staging information was found for the patient. 5/18/2023 -  Cancer Staged    Staging form: Colon and Rectum, AJCC 8th Edition  - Clinical stage from 5/18/2023: Stage IIIB (cT3, cN1, cM0) - Signed by Inez Roy DO on 6/28/2023  Microsatellite instability (MSI): Stable       5/20/2023 Biopsy    A. Rectum, mass, biopsy:  -Adenocarcinoma, invasive, moderately differentiated.   -Ancillary testing for mismatch repair (MMR) protein deficiency by immunohistochemical panel (MLH1, PMS2, MSH2, MSH6) is undertaken, the results will be issued in an addendum.     RESULTS OF IMMUNOHISTOCHEMICAL ANALYSIS FOR MISMATCH REPAIR PROTEIN LOSS  INTERPRETATION: NO LOSS OF NUCLEAR EXPRESSION OF MMR PROTEINS: LOW PROBABILITY OF MSI-H        RESULTS:  Antibody            Clone                 Description                     Results  MLH1                 M1                     Mismatch repair protein  Intact nuclear expression  MSH2                [id number]        Mismatch repair protein  Intact nuclear expression  MSH6                44                      Mismatch repair protein  Intact nuclear expression  PMS2                 [id number]           Mismatch repair protein  Intact nuclear expression        9/20/2023 -  Chemotherapy    alteplase (CATHFLO), 2 mg, Intracatheter, Every 1 Minute as needed, 2 of 12 cycles  pegfilgrastim (NEULASTA), 6 mg, Subcutaneous, Once, 1 of 1 cycle  Administration: 6 mg (10/20/2023)  fluorouracil (ADRUCIL), 320 mg/m2 = 565 mg (80 % of original dose 400 mg/m2), Intravenous, Once, 2 of 12 cycles  Dose modification: 320 mg/m2 (original dose 400 mg/m2, Cycle 1)  Administration: 565 mg (9/20/2023), 565 mg (10/17/2023)  leucovorin calcium IVPB, 708 mg, Intravenous, Once, 2 of 12 cycles  Administration: 700 mg (9/20/2023), 700 mg (10/17/2023)  oxaliplatin (ELOXATIN) chemo infusion, 75 mg/m2 = 132.75 mg (88.2 % of original dose 85 mg/m2), Intravenous, Once, 2 of 12 cycles  Dose modification: 75 mg/m2 (original dose 85 mg/m2, Cycle 1, Reason: Anticipated Tolerance)  Administration: 132.75 mg (9/20/2023), 132.75 mg (10/17/2023)  fluorouracil (ADRUCIL) ambulatory infusion Soln, 1,200 mg/m2/day = 4,250 mg, Intravenous, Over 46 hours, 2 of 12 cycles     9/25/2023 - 9/29/2023 Radiation    Treatments:  Course: C1  Plan ID Energy Fractions Dose per Fraction (cGy) Dose Correction (cGy) Total Dose Delivered (cGy) Elapsed Days   Pelvis 10X/6X 5 / 5 500 0 2,500 4    Treatment Dates:  9/25/2023 - 9/29/2023. Review of Systems:  Review of Systems   Constitutional:  Positive for activity change and fatigue (some days). HENT:  Positive for dental problem (full dentures). Negative for tinnitus. Eyes:  Negative for visual disturbance. Wearing glasses   Respiratory:  Positive for cough (dry cough) and shortness of breath (COTTO, seldom). Cardiovascular: Negative. Gastrointestinal:  Negative for abdominal pain, anal bleeding, constipation, diarrhea, nausea and rectal pain. BM's q2-3 days, soft, formed   Genitourinary: Negative. Musculoskeletal:  Negative for back pain and gait problem. Skin: Negative. Allergic/Immunologic: Negative. Neurological:  Negative for light-headedness, numbness and headaches. Hematological: Negative. Psychiatric/Behavioral:  Positive for dysphoric mood and sleep disturbance (r/t indigestion). Negative for suicidal ideas.         Clinical Trial: no      Teaching:     Health Maintenance   Topic Date Due    COVID-19 Vaccine (2 - Moderna risk series) 04/27/2021    Fall Risk  10/17/2023    Medicare Annual Wellness Visit (AWV)  10/17/2023    Pneumococcal Vaccine: 65+ Years (1 - PCV) 10/17/2042 (Originally 4/14/1951)    Influenza Vaccine (1) 10/17/2042 (Originally 9/1/2023)    Depression Remission PHQ  03/11/2024    BMI: Adult  10/17/2024    Hepatitis C Screening  Completed    HIB Vaccine  Aged Out    IPV Vaccine  Aged Out    Hepatitis A Vaccine  Aged Out    Meningococcal ACWY Vaccine  Aged Out    HPV Vaccine  Aged Out    Colorectal Cancer Screening  Discontinued     Patient Active Problem List   Diagnosis    CAD in native artery    S/P primary angioplasty with coronary stent    History of ischemic cardiomyopathy    History of lacunar cerebrovascular accident (CVA)    Non-recurrent unilateral inguinal hernia    Essential hypertension    Protein-calorie malnutrition, unspecified severity ([address]) Rectal bleeding    Acute on chronic anemia    Moderate protein-calorie malnutrition (HCC)    STEMI involving left anterior descending coronary artery (HCC)    Ischemic cardiomyopathy    Renal cyst    Microscopic hematuria    Encounter to discuss test results    Enlarged prostate    Cerebrovascular accident (CVA) ([address])    Phimosis    Lower urinary tract symptoms (LUTS)    Rectal cancer (HCC)    Depression, recurrent ([address])    Port-A-Cath in place     Past Medical History:   Diagnosis Date    Hyperlipidemia     Ischemic cardiomyopathy     Lacunar infarction ([address])     Myocardial infarction ([address])     Near syncope 2019    Non-ST elevated myocardial infarction [facility])     NSTEMI (non-ST elevated myocardial infarction) ([address]) 10/20/2019    Pericarditis 10/24/2019    Poison ivy     Rectal cancer ([address])     Stroke [facility])      Past Surgical History:   Procedure Laterality Date    ANGIOPLASTY      CARDIAC CATHETERIZATION N/A 2023    Procedure: Cardiac pci;  Surgeon: Cayden Morales MD;  Location: [facility] CARDI[facility]; Service: Cardiology    CORONARY STENT PLACEMENT      HERNIA REPAIR Left 2021    Procedure: OPEN REPAIR HERNIA INGUINAL LEFT WITH MESH;  Surgeon: Prashant Key MD;  Location: [facility] MAIN OR;  Service: General    IR PORT PLACEMENT  2023     Family History   Problem Relation Age of Onset    Breast cancer Mother     Emphysema Father     Heart disease Maternal Grandfather     Heart attack Maternal Grandfather     Emphysema Paternal Grandfather      Social History     Socioeconomic History    Marital status:       Spouse name: Not on file    Number of children: Not on file    Years of education: Not on file    Highest education level: Not on file   Occupational History    Not on file   Tobacco Use    Smoking status: Former     Packs/day: 1.00     Years: 15.00     Total pack years: 15.00     Types: Cigarettes     Quit date: 1969     Years since quittin.9    Smokeless tobacco: Never Vaping Use    Vaping Use: Never used   Substance and Sexual Activity    Alcohol use: Never    Drug use: No    Sexual activity: Not Currently     Partners: Female   Other Topics Concern    Not on file   Social History Narrative    Not on file     Social Determinants of Health     Financial Resource Strain: Low Risk  (10/17/2022)    Overall Financial Resource Strain (CARDIA)     Difficulty of Paying Living Expenses: Not hard at all   Food Insecurity: No Food Insecurity (5/19/2023)    Hunger Vital Sign     Worried About Running Out of Food in the Last Year: Never true     Ran Out of Food in the Last Year: Never true   Transportation Needs: No Transportation Needs (5/19/2023)    PRAPARE - Transportation     Lack of Transportation (Medical): No     Lack of Transportation (Non-Medical):  No   Physical Activity: Inactive (3/8/2021)    Exercise Vital Sign     Days of Exercise per Week: 0 days     Minutes of Exercise per Session: 0 min   Stress: No Stress Concern Present (3/8/2021)    [address] [facility]     Feeling of Stress : Not at all   Social Connections: Not on file   Intimate Partner Violence: Not on file   Housing Stability: [address]  (5/19/2023)    Housing Stability Vital Sign     Unable to Pay for Housing in the Last Year: No     Number of Places Lived in the Last Year: 1     Unstable Housing in the Last Year: No       Current Outpatient Medications:     albuterol (ProAir HFA) 90 mcg/act inhaler, Inhale 2 puffs every 6 (six) hours as needed for wheezing, Disp: 8.5 g, Rfl: 0    aspirin (ECOTRIN LOW STRENGTH) 81 mg EC tablet, Take 1 tablet (81 mg total) by mouth daily Please purchase over the counter at her local pharmacy, Disp: 30 tablet, Rfl: 1    atorvastatin (LIPITOR) 80 mg tablet, Take 1 tablet (80 mg total) by mouth daily with dinner, Disp: 90 tablet, Rfl: 3    Blood Pressure KIT, by Does not apply route daily, Disp: 1 each, Rfl: 0    diphenhydramine, lidocaine, Al/Mg hydroxide, simethicone (Magic Mouthwash) SUSP, Swish and spit 10 mL every 6 (six) hours as needed for mouth pain or discomfort, Disp: 237 mL, Rfl: 0    ferrous sulfate 324 (65 Fe) mg, Take 1 tablet (324 mg total) by mouth in the morning, Disp: 30 tablet, Rfl: 5    [START ON 10/31/2023] fluorouracil 4,250 mg in CADD/Elastomeric Infusion Device, Infuse 4,250 mg (1,200 mg/m2/day x 1.77 m2) into a catheter in a vein via infusion device over 46 hours for 2 days  Infusion planned for October 31, 2023., Disp: 1 each, Rfl: 0    lisinopril (ZESTRIL) 2.5 mg tablet, take 1 tablet by mouth daily, Disp: 90 tablet, Rfl: 3    metoprolol succinate (TOPROL-XL) 25 mg 24 hr tablet, take 1 tablet by mouth daily, Disp: 90 tablet, Rfl: 3    pantoprazole (PROTONIX) 40 mg tablet, Take 1 tablet (40 mg total) by mouth daily, Disp: 30 tablet, Rfl: 0    sucralfate (CARAFATE) 1 g tablet, Take 1 tablet (1 g total) by mouth 4 (four) times a day, Disp: 120 tablet, Rfl: 3    tamsulosin (FLOMAX) 0.4 mg, Take 1 capsule (0.4 mg total) by mouth daily at bedtime, Disp: 90 capsule, Rfl: 3  Allergies   Allergen Reactions    Wound Dressing Adhesive Hives     Noted with Plastic adhesive bandage brand Curity     There were no vitals filed for this visit.

## 2023-10-30 NOTE — PROGRESS NOTES
Follow-up - Radiation Oncology   [facility]. [birth year] 69 y.o. male [id number]      History of Present Illness   Cancer Staging   Rectal cancer [facility])  Staging form: Colon and Rectum, AJCC 8th Edition  - Clinical stage from 5/18/2023: Stage IIIB (cT3, cN1, cM0) - Signed by Susy Stringer DO on 6/28/2023  Microsatellite instability (MSI): Stable      [facility]. is a 69 y.o. man with past medical history significant for CAD with acute STEMI s/p stent placement in May 2023 diagnosed with stage IV rectal adenocarcinoma associated with large, bleeding primary tumor in September 2023. He started FOLFOX chemotherapy and completed a course of palliative radiation to 25Gy in 5 fractions between cycles on 9/29/23. He presents today for follow up. The patient notes that all rectal bleeding has stopped at this time. He denies pain with defecation, rectal urgency, or tenesmus. He denies abdominal cramping or pain. He denies nausea or vomiting. He is having bowel movements every few days. 10/17/23 Cycle 2- Modified FOLFOX6       Upcoming:  10/31/23 Dr. Sherwood [facility]   Oncology History   Rectal cancer [facility])   5/18/2023 Initial Diagnosis    May 18, 2023 patient presented with BRBPR x1 week. Weight stable over 8 months. CT abdomen pelvis showed rectal mass with 2 suspicious mesorectal lymph nodes. 2 mm left lower lobe pulmonary nodule, 3 mm hepatic dome questionable lesion, 1 cm uncinate process cystic lesion. CT chest showed 3 mm left lower lobe and right upper lobe nodules new compared to 2019. May 20 flexible sigmoidoscopy showed distal rectal mass. Biopsy of the mass showed moderately differentiated adenocarcinoma. MMR intact. June 14, 2023 MRI of the pelvis showed findings consistent with T3b, N1 low rectal cancer. Suspicion for vascular invasion. CBC on presentation normal WBC platelets. Hemoglobin 11.4, MCV 77.   CMP showed albumin 3.3, otherwise normal. Cancer Staged    Cancer Staging   No matching staging information was found for the patient. 5/18/2023 -  Cancer Staged    Staging form: Colon and Rectum, AJCC 8th Edition  - Clinical stage from 5/18/2023: Stage IIIB (cT3, cN1, cM0) - Signed by Stephanie Mcdonald DO on 6/28/2023  Microsatellite instability (MSI): Stable       5/20/2023 Biopsy    A. Rectum, mass, biopsy:  -Adenocarcinoma, invasive, moderately differentiated. -Ancillary testing for mismatch repair (MMR) protein deficiency by immunohistochemical panel (MLH1, PMS2, MSH2, MSH6) is undertaken, the results will be issued in an addendum.     RESULTS OF IMMUNOHISTOCHEMICAL ANALYSIS FOR MISMATCH REPAIR PROTEIN LOSS  INTERPRETATION: NO LOSS OF NUCLEAR EXPRESSION OF MMR PROTEINS: LOW PROBABILITY OF MSI-H        RESULTS:  Antibody            Clone                 Description                     Results  MLH1                 M1                     Mismatch repair protein  Intact nuclear expression  MSH2                [id number]        Mismatch repair protein  Intact nuclear expression  MSH6                44                      Mismatch repair protein  Intact nuclear expression  PMS2                 [id number]           Mismatch repair protein  Intact nuclear expression        9/20/2023 -  Chemotherapy    alteplase (CATHFLO), 2 mg, Intracatheter, Every 1 Minute as needed, 2 of 12 cycles  pegfilgrastim (NEULASTA), 6 mg, Subcutaneous, Once, 1 of 1 cycle  Administration: 6 mg (10/20/2023)  fluorouracil (ADRUCIL), 320 mg/m2 = 565 mg (80 % of original dose 400 mg/m2), Intravenous, Once, 2 of 12 cycles  Dose modification: 320 mg/m2 (original dose 400 mg/m2, Cycle 1)  Administration: 565 mg (9/20/2023), 565 mg (10/17/2023)  leucovorin calcium IVPB, 708 mg, Intravenous, Once, 2 of 12 cycles  Administration: 700 mg (9/20/2023), 700 mg (10/17/2023)  oxaliplatin (ELOXATIN) chemo infusion, 75 mg/m2 = 132.75 mg (88.2 % of original dose 85 mg/m2), Intravenous, Once, 2 of 12 cycles  Dose modification: 75 mg/m2 (original dose 85 mg/m2, Cycle 1, Reason: Anticipated Tolerance)  Administration: 132.75 mg (2023), 132.75 mg (10/17/2023)  fluorouracil (ADRUCIL) ambulatory infusion Soln, 1,200 mg/m2/day = 4,250 mg, Intravenous, Over 46 hours, 2 of 12 cycles     2023 - 2023 Radiation    Treatments:  Course: C1  Plan ID Energy Fractions Dose per Fraction (cGy) Dose Correction (cGy) Total Dose Delivered (cGy) Elapsed Days   Pelvis 10X/6X 5 /  500 0 2,500 4    Treatment Dates:  2023 - 2023. Past Medical History:   Diagnosis Date    Hyperlipidemia     Ischemic cardiomyopathy     Lacunar infarction [facility])     Myocardial infarction [facility])     Near syncope 2019    Non-ST elevated myocardial infarction [facility])     NSTEMI (non-ST elevated myocardial infarction) ([address]) 10/20/2019    Pericarditis 10/24/2019    Poison ivy     Rectal cancer ([address])     Stroke [facility])      Past Surgical History:   Procedure Laterality Date    ANGIOPLASTY      CARDIAC CATHETERIZATION N/A 2023    Procedure: Cardiac pci;  Surgeon: Aniyah Nicole MD;  Location: [address] [place] [address] CATH LAB;   Service: Cardiology    CORONARY STENT PLACEMENT      HERNIA REPAIR Left 2021    Procedure: OPEN REPAIR HERNIA INGUINAL LEFT WITH MESH;  Surgeon: Giovanni Colon MD;  Location: [facility] MAIN OR;  Service: General    IR PORT PLACEMENT  2023       Social History   Social History     Substance and Sexual Activity   Alcohol Use Never     Social History     Substance and Sexual Activity   Drug Use No     Social History     Tobacco Use   Smoking Status Former    Packs/day: 1.00    Years: 15.00    Total pack years: 15.00    Types: Cigarettes    Quit date: 1969    Years since quittin.9   Smokeless Tobacco Never         Meds/Allergies     Current Outpatient Medications:     albuterol (ProAir HFA) 90 mcg/act inhaler, Inhale 2 puffs every 6 (six) hours as needed for wheezing, Disp: 8.5 g, Rfl: 0    aspirin (ECOTRIN LOW STRENGTH) 81 mg EC tablet, Take 1 tablet (81 mg total) by mouth daily Please purchase over the counter at her local pharmacy, Disp: 30 tablet, Rfl: 1    atorvastatin (LIPITOR) 80 mg tablet, Take 1 tablet (80 mg total) by mouth daily with dinner, Disp: 90 tablet, Rfl: 3    diphenhydramine, lidocaine, Al/Mg hydroxide, simethicone (Magic Mouthwash) SUSP, Swish and spit 10 mL every 6 (six) hours as needed for mouth pain or discomfort, Disp: 237 mL, Rfl: 0    ferrous sulfate 324 (65 Fe) mg, Take 1 tablet (324 mg total) by mouth in the morning, Disp: 30 tablet, Rfl: 5    [START ON 10/31/2023] fluorouracil 4,250 mg in CADD/Elastomeric Infusion Device, Infuse 4,250 mg (1,200 mg/m2/day x 1.77 m2) into a catheter in a vein via infusion device over 46 hours for 2 days  Infusion planned for October 31, 2023., Disp: 1 each, Rfl: 0    lisinopril (ZESTRIL) 2.5 mg tablet, take 1 tablet by mouth daily, Disp: 90 tablet, Rfl: 3    metoprolol succinate (TOPROL-XL) 25 mg 24 hr tablet, take 1 tablet by mouth daily, Disp: 90 tablet, Rfl: 3    tamsulosin (FLOMAX) 0.4 mg, Take 1 capsule (0.4 mg total) by mouth daily at bedtime, Disp: 90 capsule, Rfl: 3    Blood Pressure KIT, by Does not apply route daily, Disp: 1 each, Rfl: 0    pantoprazole (PROTONIX) 40 mg tablet, Take 1 tablet (40 mg total) by mouth daily, Disp: 30 tablet, Rfl: 0    sucralfate (CARAFATE) 1 g tablet, Take 1 tablet (1 g total) by mouth 4 (four) times a day (Patient not taking: Reported on 10/30/2023), Disp: 120 tablet, Rfl: 3  Allergies   Allergen Reactions    Wound Dressing Adhesive Hives     Noted with Plastic adhesive bandage brand Curity         Review of Systems   Constitutional:  Positive for activity change and fatigue (some days). HENT:  Positive for dental problem (full dentures). Negative for tinnitus. Eyes:  Negative for visual disturbance.         Wearing glasses   Respiratory:  Positive for cough (dry cough) and shortness of breath (COTTO, seldom). Cardiovascular: Negative. Gastrointestinal:  Negative for abdominal pain, anal bleeding, constipation, diarrhea, nausea and rectal pain. BM's q2-3 days, soft, formed   Genitourinary: Negative. Musculoskeletal:  Negative for back pain and gait problem. Skin: Negative. Allergic/Immunologic: Negative. Neurological:  Negative for light-headedness, numbness and headaches. Hematological: Negative. Psychiatric/Behavioral:  Positive for dysphoric mood and sleep disturbance (r/t indigestion). Negative for suicidal ideas. OBJECTIVE:   /68 (BP Location: Left arm, Patient Position: Sitting)   Pulse 68   Temp (!) 97 °F (36.1 °C) (Temporal)   Resp 18   Ht 5' 9.92" (1.776 m)   Wt 59.6 kg (131 lb 8 oz)   BMI 18.91 kg/m²   Karnofsky: 80 - Normal activity with effort; some signs or symptoms of disease    Physical Exam  Vitals and nursing note reviewed. Constitutional:       General: He is not in acute distress. Appearance: He is well-developed. Cardiovascular:      Rate and Rhythm: Normal rate and regular rhythm. Pulmonary:      Breath sounds: No wheezing, rhonchi or rales. Abdominal:      General: There is no distension. Palpations: Abdomen is soft. Tenderness: There is no abdominal tenderness. Musculoskeletal:      Right lower leg: No edema. Left lower leg: No edema. Lymphadenopathy:      Upper Body:      Right upper body: No supraclavicular adenopathy. Left upper body: No supraclavicular adenopathy. Neurological:      Mental Status: He is alert and oriented to person, place, and time. Gait: Gait normal.        Assessment/Plan:  Bennett Medley is a 69 y.o. man with stage IV rectal adenocarcinoma associated with large, bleeding primary tumor in September 2023. He started FOLFOX chemotherapy and completed a course of palliative radiation to 25Gy in 5 fractions between cycles on 9/29/23. He has recovered well from radiation. He has had improvement in symptoms from primary tumor. He continues on systemic therapy with next dose in about 1 week. The patient will continue on systemic therapy with Medical Oncology. We will be happy to see him again should the need arise. Arsenio Jacobsen MD  10/30/2023,2:50 PM    Portions of the record may have been created with voice recognition software. Occasional wrong word or "sound a like" substitutions may have occurred due to the inherent limitations of voice recognition software. Read the chart carefully and recognize, using context, where substitutions have occurred.

## 2023-10-31 ENCOUNTER — OFFICE VISIT (OUTPATIENT)
Dept: HEMATOLOGY ONCOLOGY | Facility: CLINIC | Age: 78
End: 2023-10-31
Payer: COMMERCIAL

## 2023-10-31 ENCOUNTER — HOSPITAL ENCOUNTER (OUTPATIENT)
Dept: INFUSION CENTER | Facility: CLINIC | Age: 78
Discharge: HOME | End: 2023-10-31
Payer: COMMERCIAL

## 2023-10-31 VITALS
BODY MASS INDEX: 19.04 KG/M2 | TEMPERATURE: 98 F | OXYGEN SATURATION: 97 % | DIASTOLIC BLOOD PRESSURE: 62 MMHG | WEIGHT: 133 LBS | RESPIRATION RATE: 18 BRPM | SYSTOLIC BLOOD PRESSURE: 118 MMHG | HEART RATE: 68 BPM | HEIGHT: 70 IN

## 2023-10-31 VITALS
HEIGHT: 70 IN | SYSTOLIC BLOOD PRESSURE: 117 MMHG | WEIGHT: 132.4 LBS | HEART RATE: 67 BPM | RESPIRATION RATE: 18 BRPM | DIASTOLIC BLOOD PRESSURE: 60 MMHG | TEMPERATURE: 97.2 F | BODY MASS INDEX: 18.96 KG/M2

## 2023-10-31 DIAGNOSIS — C20 RECTAL CANCER (HCC): Primary | ICD-10-CM

## 2023-10-31 PROCEDURE — 96368 THER/DIAG CONCURRENT INF: CPT

## 2023-10-31 PROCEDURE — 99215 OFFICE O/P EST HI 40 MIN: CPT | Performed by: INTERNAL MEDICINE

## 2023-10-31 PROCEDURE — 1159F MED LIST DOCD IN RCRD: CPT | Performed by: INTERNAL MEDICINE

## 2023-10-31 PROCEDURE — 96413 CHEMO IV INFUSION 1 HR: CPT

## 2023-10-31 PROCEDURE — 96367 TX/PROPH/DG ADDL SEQ IV INF: CPT

## 2023-10-31 PROCEDURE — 96415 CHEMO IV INFUSION ADDL HR: CPT

## 2023-10-31 PROCEDURE — G0498 CHEMO EXTEND IV INFUS W/PUMP: HCPCS

## 2023-10-31 PROCEDURE — 1160F RVW MEDS BY RX/DR IN RCRD: CPT | Performed by: INTERNAL MEDICINE

## 2023-10-31 RX ORDER — FLUOROURACIL 50 MG/ML
320 INJECTION, SOLUTION INTRAVENOUS ONCE
Status: CANCELLED | OUTPATIENT
Start: 2023-10-31

## 2023-10-31 RX ORDER — DEXTROSE MONOHYDRATE 50 MG/ML
20 INJECTION, SOLUTION INTRAVENOUS ONCE
Status: COMPLETED | OUTPATIENT
Start: 2023-10-31 | End: 2023-10-31

## 2023-10-31 RX ORDER — DEXTROSE MONOHYDRATE 50 MG/ML
20 INJECTION, SOLUTION INTRAVENOUS ONCE
Status: CANCELLED | OUTPATIENT
Start: 2023-10-31

## 2023-10-31 RX ORDER — SODIUM CHLORIDE 9 MG/ML
20 INJECTION, SOLUTION INTRAVENOUS ONCE AS NEEDED
Status: CANCELLED | OUTPATIENT
Start: 2023-10-31

## 2023-10-31 RX ORDER — SODIUM CHLORIDE 9 MG/ML
20 INJECTION, SOLUTION INTRAVENOUS ONCE AS NEEDED
Status: DISCONTINUED | OUTPATIENT
Start: 2023-10-31 | End: 2023-11-03 | Stop reason: HOSPADM

## 2023-10-31 RX ADMIN — OXALIPLATIN 133 MG: 5 INJECTION, SOLUTION INTRAVENOUS at 12:53

## 2023-10-31 RX ADMIN — DEXAMETHASONE SODIUM PHOSPHATE: 10 INJECTION, SOLUTION INTRAMUSCULAR; INTRAVENOUS at 11:34

## 2023-10-31 RX ADMIN — SODIUM CHLORIDE 20 ML/HR: 0.9 INJECTION, SOLUTION INTRAVENOUS at 11:24

## 2023-10-31 RX ADMIN — DEXTROSE 20 ML/HR: 5 SOLUTION INTRAVENOUS at 12:08

## 2023-10-31 RX ADMIN — LEUCOVORIN CALCIUM 700 MG: 350 INJECTION, POWDER, LYOPHILIZED, FOR SOLUTION INTRAMUSCULAR; INTRAVENOUS at 12:53

## 2023-10-31 NOTE — PROGRESS NOTES
Pt to clinic for FOLFOX minus 5FU bolus and elastometric pump hook-up. Offers no complaints today. Pump hooked up to pt's port and clamps open to run. Pt tolerated infusions and pump hook-up without complications. Aware of when to return for pump disconnect. AVS printed and reviewed.

## 2023-10-31 NOTE — PROGRESS NOTES
TIME OUT: received call from Alejandra Hoover RN stating per Dr. Steffen Peralta pt is to not receive 5FU bolus today. Medication removed from treatment plan. [facility] pharmacy made aware.

## 2023-10-31 NOTE — PROGRESS NOTES
[address] HEMATOLOGY ONCOLOGY SPECIALISTS [facility] [address]    Juanita Lakeshore.  [birth year]      PRIMARY HEMATOLOGIC/ONCOLOGIC DIAGNOSIS:  1. Adenocarcinoma of the rectum, invasive, moderately differentiated. NO LOSS OF NUCLEAR EXPRESSION OF MMR PROTEINS: LOW PROBABILITY OF MSI-H. NRAS mutated. Date of diagnosis 5/20/23. CEA normal at time of diagnosis -- 1.7 on 5/19/23. PATHOLOGY:   Case Report   Surgical Pathology Report                         Case: [id number]                                    Authorizing Provider:  Carlos Ramos DO          Collected:           05/20/2023 1530               Ordering Location:     [facility] Received:            05/20/2023 1800                                      Intensive Care Unit                                                           Pathologist:           Millicent Harp DO                                                             Specimen:    Rectum                                                                                     Addendum   RESULTS OF IMMUNOHISTOCHEMICAL ANALYSIS FOR MISMATCH REPAIR PROTEIN LOSS  INTERPRETATION: NO LOSS OF NUCLEAR EXPRESSION OF MMR PROTEINS: LOW PROBABILITY OF MSI-H        RESULTS:  Antibody            Clone                 Description                     Results  MLH1                 M1                     Mismatch repair protein  Intact nuclear expression  MSH2                [id number]        Mismatch repair protein  Intact nuclear expression  MSH6                40                      Mismatch repair protein  Intact nuclear expression  PMS2                 [id number]           Mismatch repair protein  Intact nuclear expression     Comment:  Background non-neoplastic tissue and/or internal control with intact nuclear expression.   [facility] Specimen ID: [id number], Evaluator:  Deadra Denver, MD  These tests were developed and their performance characteristics determined by [facility]. They may not be cleared or approved by the U.S. Food and Drug Administration. The FDA determined that such clearance or approval is not necessary. These tests are used for clinical purposes. They should not be regarded as investigational or for research. This laboratory has been approved by [facility], designated as a high-complexity laboratory and is qualified to perform these tests. Comments: Patients whose tumors demonstrate lack of expression of one or more DNA mismatch repair proteins might be at risk for Kenny Syndrome. This cancer susceptibility syndrome greatly increases the risk of synchronous and/or metachronous cancers in the affected patients and their family members. Normal expression of all proteins does not completely rule out familial cancer predisposition. The [facility] [address] Task Forces recommends that all patients with lack of expression of one or more DNA mismatch repair proteins and those with concerning personal or family history should undergo thorough evaluation, counseling and possibly genetic testing. Addendum electronically signed by Jeffry Harris DO on 5/30/2023 at  2:34 PM   Final Diagnosis   A. Rectum, mass, biopsy:  -Adenocarcinoma, invasive, moderately differentiated. -Ancillary testing for mismatch repair (MMR) protein deficiency by immunohistochemical panel (MLH1, PMS2, MSH2, MSH6) is undertaken, the results will be issued in an addendum. Electronically signed by Jeffry Harris DO on 5/26/2023 at  9:10 AM   Note    Intradepartmental consultation (Meaghan Lee) is in agreement. Dr. Aquiles Pace was notified by Dr. Jeffry Harris via [employer]et[employer] at 9:09am on 5/26/23. Additional Information    All reported additional testing was performed with appropriately reactive controls.   These tests were developed and their performance characteristics determined by [facility] or appropriate performing facility, though some tests may be performed on tissues which have not been validated for performance characteristics (such as staining performed on alcohol exposed cell blocks and decalcified tissues). Results should be interpreted with caution and in the context of the patients’ clinical condition. These tests may not be cleared or approved by the U.S. Food and Drug Administration, though the FDA has determined that such clearance or approval is not necessary. These tests are used for clinical purposes and they should not be regarded as investigational or for research. This laboratory has been approved by [place] 88, designated as a high-complexity laboratory and is qualified to perform these tests. Interpretation performed at [facility] [address] [place] [address] [place] [address]   Gross Description     A. The specimen is received in formalin, labeled with the patient's name and hospital number, and is designated " rectum". The specimen consists of multiple tan-pink soft tissue fragments measuring in aggregate 1.0 x 1.0 x 0.2 cm. Entirely submitted. One screened cassette. Note: The estimated total formalin fixation time based upon information provided by the submitting clinician and the standard processing schedule is under 72 hours. MSequino       STAGING: Cancer Staging   Rectal cancer [facility])  Staging form: Colon and Rectum, AJCC 8th Edition  - Clinical stage from 5/18/2023: Stage IIIB (cT3, cN1, cM0) - Signed by Elicia Gandhi DO on 6/28/2023  Microsatellite instability (MSI): Stable         Oncology History   Rectal cancer ([address])   5/18/2023 Initial Diagnosis    May 18, 2023 patient presented with BRBPR x1 week. Weight stable over 8 months. CT abdomen pelvis showed rectal mass with 2 suspicious mesorectal lymph nodes. 2 mm left lower lobe pulmonary nodule, 3 mm hepatic dome questionable lesion, 1 cm uncinate process cystic lesion.   CT chest showed 3 mm left lower lobe and right upper lobe nodules new compared to 2019. May 20 flexible sigmoidoscopy showed distal rectal mass. Biopsy of the mass showed moderately differentiated adenocarcinoma. MMR intact. June 14, 2023 MRI of the pelvis showed findings consistent with T3b, N1 low rectal cancer. Suspicion for vascular invasion. CBC on presentation normal WBC platelets. Hemoglobin 11.4, MCV 77. CMP showed albumin 3.3, otherwise normal.        Cancer Staged    Cancer Staging   No matching staging information was found for the patient. 5/18/2023 -  Cancer Staged    Staging form: Colon and Rectum, AJCC 8th Edition  - Clinical stage from 5/18/2023: Stage IIIB (cT3, cN1, cM0) - Signed by Jazmyne Grady DO on 6/28/2023  Microsatellite instability (MSI): Stable       5/20/2023 Biopsy    A. Rectum, mass, biopsy:  -Adenocarcinoma, invasive, moderately differentiated. -Ancillary testing for mismatch repair (MMR) protein deficiency by immunohistochemical panel (MLH1, PMS2, MSH2, MSH6) is undertaken, the results will be issued in an addendum.     RESULTS OF IMMUNOHISTOCHEMICAL ANALYSIS FOR MISMATCH REPAIR PROTEIN LOSS  INTERPRETATION: NO LOSS OF NUCLEAR EXPRESSION OF MMR PROTEINS: LOW PROBABILITY OF MSI-H        RESULTS:  Antibody            Clone                 Description                     Results  MLH1                 M1                     Mismatch repair protein  Intact nuclear expression  MSH2                [id number]        Mismatch repair protein  Intact nuclear expression  MSH6                44                      Mismatch repair protein  Intact nuclear expression  PMS2                 [id number]           Mismatch repair protein  Intact nuclear expression        9/20/2023 -  Chemotherapy    alteplase (CATHFLO), 2 mg, Intracatheter, Every 1 Minute as needed, 2 of 12 cycles  pegfilgrastim (NEULASTA), 6 mg, Subcutaneous, Once, 1 of 1 cycle  Administration: 6 mg (10/20/2023)  fluorouracil (ADRUCIL), 320 mg/m2 = 565 mg (80 % of original dose 400 mg/m2), Intravenous, Once, 2 of 12 cycles  Dose modification: 320 mg/m2 (original dose 400 mg/m2, Cycle 1)  Administration: 565 mg (9/20/2023), 565 mg (10/17/2023)  leucovorin calcium IVPB, 708 mg, Intravenous, Once, 2 of 12 cycles  Administration: 700 mg (9/20/2023), 700 mg (10/17/2023)  oxaliplatin (ELOXATIN) chemo infusion, 75 mg/m2 = 132.75 mg (88.2 % of original dose 85 mg/m2), Intravenous, Once, 2 of 12 cycles  Dose modification: 75 mg/m2 (original dose 85 mg/m2, Cycle 1, Reason: Anticipated Tolerance)  Administration: 132.75 mg (9/20/2023), 132.75 mg (10/17/2023)  fluorouracil (ADRUCIL) ambulatory infusion Soln, 1,200 mg/m2/day = 4,250 mg, Intravenous, Over 46 hours, 2 of 12 cycles     9/25/2023 - 9/29/2023 Radiation    Treatments:  Course: C1  Plan ID Energy Fractions Dose per Fraction (cGy) Dose Correction (cGy) Total Dose Delivered (cGy) Elapsed Days   Pelvis 10X/6X 5 / 5 500 0 2,500 4    Treatment Dates:  9/25/2023 - 9/29/2023. INTERIM HISTORY:  Francie Prajapati is a 69yo male who presents for evaluation and management of adenocarcinoma of the rectum. Pt received C1D1 mFOLFOX 6 on 9/20/23. C2 D1 administered 10/17/23. Tolerated treatment well w/ Grade 1 fatigue. No other complaints. Initiated RT 9/25/23 and completed 9/29/23. Prior to C3 today.      PAST MEDICAL,PAST SURGICAL, FAMILY AND SOCIAL HISTORY:    Patient Active Problem List   Diagnosis    CAD in native artery    S/P primary angioplasty with coronary stent    History of ischemic cardiomyopathy    History of lacunar cerebrovascular accident (CVA)    Non-recurrent unilateral inguinal hernia    Essential hypertension    Protein-calorie malnutrition, unspecified severity (HCC)    Rectal bleeding    Acute on chronic anemia    Moderate protein-calorie malnutrition (HCC)    STEMI involving left anterior descending coronary artery (HCC)    Ischemic cardiomyopathy    Renal cyst    Microscopic hematuria    Encounter to discuss test results    Enlarged prostate    Cerebrovascular accident (CVA) ([address])    Phimosis    Lower urinary tract symptoms (LUTS)    Rectal cancer (HCC)    Depression, recurrent ([address])    Port-A-Cath in place     Past Medical History:   Diagnosis Date    Hyperlipidemia     Ischemic cardiomyopathy     Lacunar infarction ([address])     Myocardial infarction ([address])     Near syncope 2019    Non-ST elevated myocardial infarction [facility])     NSTEMI (non-ST elevated myocardial infarction) ([address]) 10/20/2019    Pericarditis 10/24/2019    Poison ivy     Rectal cancer ([address])     Stroke [facility])      Past Surgical History:   Procedure Laterality Date    ANGIOPLASTY      CARDIAC CATHETERIZATION N/A 2023    Procedure: Cardiac pci;  Surgeon: Doroteo Opitz, MD;  Location: [address] [place] [address] CATH LAB; Service: Cardiology    CORONARY STENT PLACEMENT      HERNIA REPAIR Left 2021    Procedure: OPEN REPAIR HERNIA INGUINAL LEFT WITH MESH;  Surgeon: Jacoby Esquivel MD;  Location: [facility] MAIN OR;  Service: General    IR PORT PLACEMENT  2023     Family History   Problem Relation Age of Onset    Breast cancer Mother     Emphysema Father     Heart disease Maternal Grandfather     Heart attack Maternal Grandfather     Emphysema Paternal Grandfather      Social History     Socioeconomic History    Marital status:       Spouse name: Not on file    Number of children: Not on file    Years of education: Not on file    Highest education level: Not on file   Occupational History    Not on file   Tobacco Use    Smoking status: Former     Packs/day: 1.00     Years: 15.00     Total pack years: 15.00     Types: Cigarettes     Quit date: 1969     Years since quittin.9    Smokeless tobacco: Never   Vaping Use    Vaping Use: Never used   Substance and Sexual Activity    Alcohol use: Never    Drug use: No    Sexual activity: Not Currently     Partners: Female   Other Topics Concern    Not on file   Social History Narrative    Not on file Social Determinants of Health     Financial Resource Strain: Low Risk  (10/17/2022)    Overall Financial Resource Strain (CARDIA)     Difficulty of Paying Living Expenses: Not hard at all   Food Insecurity: No Food Insecurity (5/19/2023)    Hunger Vital Sign     Worried About Running Out of Food in the Last Year: Never true     Ran Out of Food in the Last Year: Never true   Transportation Needs: No Transportation Needs (5/19/2023)    PRAPARE - Transportation     Lack of Transportation (Medical): No     Lack of Transportation (Non-Medical):  No   Physical Activity: Inactive (3/8/2021)    Exercise Vital Sign     Days of Exercise per Week: 0 days     Minutes of Exercise per Session: 0 min   Stress: No Stress Concern Present (3/8/2021)    [facility]     Feeling of Stress : Not at all   Social Connections: Not on file   Intimate Partner Violence: Not on file   Housing Stability: Low Risk  (5/19/2023)    Housing Stability Vital Sign     Unable to Pay for Housing in the Last Year: No     Number of Places Lived in the Last Year: 1     Unstable Housing in the Last Year: No       Current Outpatient Medications:     albuterol (ProAir HFA) 90 mcg/act inhaler, Inhale 2 puffs every 6 (six) hours as needed for wheezing, Disp: 8.5 g, Rfl: 0    atorvastatin (LIPITOR) 80 mg tablet, Take 1 tablet (80 mg total) by mouth daily with dinner, Disp: 90 tablet, Rfl: 3    Blood Pressure KIT, by Does not apply route daily, Disp: 1 each, Rfl: 0    diphenhydramine, lidocaine, Al/Mg hydroxide, simethicone (Magic Mouthwash) SUSP, Swish and spit 10 mL every 6 (six) hours as needed for mouth pain or discomfort, Disp: 237 mL, Rfl: 0    ferrous sulfate 324 (65 Fe) mg, Take 1 tablet (324 mg total) by mouth in the morning, Disp: 30 tablet, Rfl: 5    fluorouracil 4,250 mg in CADD/Elastomeric Infusion Device, Infuse 4,250 mg (1,200 mg/m2/day x 1.77 m2) into a catheter in a vein via infusion device over 46 hours for 2 days  Infusion planned for October 31, 2023., Disp: 1 each, Rfl: 0    lisinopril (ZESTRIL) 2.5 mg tablet, take 1 tablet by mouth daily, Disp: 90 tablet, Rfl: 3    metoprolol succinate (TOPROL-XL) 25 mg 24 hr tablet, take 1 tablet by mouth daily, Disp: 90 tablet, Rfl: 3    tamsulosin (FLOMAX) 0.4 mg, Take 1 capsule (0.4 mg total) by mouth daily at bedtime, Disp: 90 capsule, Rfl: 3    aspirin (ECOTRIN LOW STRENGTH) 81 mg EC tablet, Take 1 tablet (81 mg total) by mouth daily Please purchase over the counter at her local pharmacy, Disp: 30 tablet, Rfl: 1    pantoprazole (PROTONIX) 40 mg tablet, Take 1 tablet (40 mg total) by mouth daily, Disp: 30 tablet, Rfl: 0    sucralfate (CARAFATE) 1 g tablet, Take 1 tablet (1 g total) by mouth 4 (four) times a day (Patient not taking: Reported on 10/30/2023), Disp: 120 tablet, Rfl: 3  No current facility-administered medications for this visit. Facility-Administered Medications Ordered in Other Visits:     alteplase (CATHFLO) injection 2 mg, 2 mg, Intracatheter, Q1MIN PRN, Maulik Greer MD  Allergies   Allergen Reactions    Wound Dressing Adhesive Hives     Noted with Plastic adhesive bandage brand Curity     Vitals:    10/31/23 0823   BP: 118/62   Pulse: 68   Resp: 18   Temp: 98 °F (36.7 °C)   SpO2: 97%       ROS:  CONSTITUTIONAL:  No fever. No chills. No dizziness. No weakness. EYES:  No pain, erythema, or discharge. No blurring of vision. ENT:  No sore throat, URI symptoms. No epistaxis. No tinnitus. CARDIOVASCULAR:  No chest pain. No palpitations. No lower extremity edema. RESPIRATORY:  No shortness of breath, cough, pain with respiration, pleuritic chest pain. No hemoptysis. No dyspnea. No paroxysmal nocturnal dyspnea. GASTROINTESTINAL:  Normal appetite. No nausea, vomiting, diarrhea. No pain. No bloating. No melena. GENITOURINARY:  No frequency, urgency, nocturia. No hematuria or dysuria.   MUSCULOSKELETAL:  No arthralgias or myalgias. INTEGUMENTARY:  No swelling. No bruising. No contusions. No abrasions. No lymphangitis. NEUROLOGIC:  No headache. No neck pain. No numbness or tingling of the extremities. No weakness. PSYCHIATRIC:  No confusion. ENDOCRINE:  No fatigue. No weakness. No history of thyroid, diabetes or adrenal problems. HEMATOLOGICAL:  No bleeding. No petechiae. No bruising.     PHYSICAL EXAM:    GENERAL: AAO x 3, moderate distress  HEENT: AT,NC  CVS: S1S2 RRR  LUNGS: b/l breath sounds  ABD: NT,ND, +BS  EXTR: no edema  NEURO: CN II-XII grossly intact    LABS:  I have reviewed pertinent labs:  CBC:   Lab Results   Component Value Date    WBC 18.82 (H) 10/30/2023    RBC 4.57 10/30/2023    HGB 11.1 (L) 10/30/2023    HCT 36.3 (L) 10/30/2023    MCV 79 (L) 10/30/2023     10/30/2023    MCH 24.3 (L) 10/30/2023    MCHC 30.6 (L) 10/30/2023    RDW 19.1 (H) 10/30/2023    MPV 11.4 10/30/2023    NEUTROABS 3.14 10/16/2023     CMP:   Lab Results   Component Value Date    SODIUM 137 10/30/2023    K 3.9 10/30/2023     10/30/2023    CO2 25 10/30/2023    AGAP 5 10/30/2023    BUN 14 10/30/2023    CREATININE 1.01 10/30/2023    GLUC 130 10/30/2023    GLUF 105 (H) 08/07/2023    CALCIUM 8.7 10/30/2023    AST 13 10/30/2023    ALT 8 10/30/2023    ALKPHOS 84 10/30/2023    TP 6.3 (L) 10/30/2023    ALB 3.7 10/30/2023    TBILI 0.39 10/30/2023    EGFR 71 10/30/2023     Liver Enzymes:   Lab Results   Component Value Date    AST 13 10/30/2023    ALT 8 10/30/2023    ALKPHOS 84 10/30/2023    TP 6.3 (L) 10/30/2023    ALB 3.7 10/30/2023    TBILI 0.39 10/30/2023    BILIDIR 0.16 10/20/2019     Vitamin B12   Lab Results   Component Value Date    MHVQXNQE07 222 05/19/2023     Iron Study   Lab Results   Component Value Date    RETIC 43,800 05/19/2023    RETICCTPCT 1.07 05/19/2023    FERRITIN 22 (L) 08/07/2023    CONCFE 6 (L) 08/07/2023    TIBC 328 08/07/2023    IRON 20 (L) 08/07/2023     Folate   Lab Results   Component Value Date    FOLATE 21.8 05/19/2023     Magnesium   Lab Results   Component Value Date    MG 2.3 05/21/2023     Phosphorus   Lab Results   Component Value Date    PHOS 2.7 05/21/2023     Coagulation Panel   Lab Results   Component Value Date    DDIMER 0.28 10/20/2019    PROTIME 14.4 09/06/2023    INR 1.06 09/06/2023    PTT 28 05/18/2023       IMAGING:  XR chest pa & lateral    Result Date: 8/9/2023  Narrative: CHEST INDICATION:   R06.02: Shortness of breath. . COMPARISON:  Radiograph 9/25/2020 and CTA chest  May 23, 2023 EXAM PERFORMED/VIEWS:  XR CHEST PA & LATERAL FINDINGS: Cardiomediastinal silhouette appears unremarkable. The lungs are clear. No pneumothorax or pleural effusion. Osseous structures appear within normal limits for patient age. Impression: No acute cardiopulmonary disease. Resident: Honey Leal, the attending radiologist, have reviewed the images and agree with the final report above. Workstation performed: [id number]     I reviewed the above laboratory and imaging data. ASSESSMENT/PLAN:  1. Adenocarcinoma of the rectum, invasive, moderately differentiated. NO LOSS OF NUCLEAR EXPRESSION OF MMR PROTEINS: LOW PROBABILITY OF MSI-H. NRAS mutated. Date of diagnosis 5/20/23. CEA normal at time of diagnosis -- 1.7 on 5/19/23. The patient is willing to receive treatment now. Prior imaging 5/2023 showed a possible 0.3 cm right hepatic dome lesion versus artifact (statistically most likely a benign lesion such as cyst or hemangioma). 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 MRI pelvis w/o contrast dated 6/14/23 showed stage: T3b, N1, low rectal cancer. MRF: clear (tumor margin >2 mm from MRF). No sphincter involvement. No suspicious extra mesorectal lymph nodes. Suspicious vascular invasion along the right lateral aspect. [facility] oncology testing pending.   CT TAP dated 8/29/23--numerous bilateral pulmonary nodules which have increased in size/conspicuity from prior exam suspicious for metastasis. Redemonstration of subannular primary rectal malignancy, grossly similar to prior exam. Multiple suspicious mesorectal lymph nodes, one having mildly enlarged since prior exams. Stable borderline enlarged aortocaval lymph node, indeterminate for metastasis. New posterior right hepatic dome lesion, separate from a previously described lesion also in the hepatic dome which is not visualized on this exam. Metastasis not excluded. Recommend further characterization with hepatic MRI. 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 Cholelithiasis. S/p port placement. Plan to initiate treatment with FOLFOX ( dose reduced due to  Anticipated tolerance, will increase dose w/ later treatments if pt is able to tolerate). Referred to [facility] for palliative RT due to rectal bleeding. S/p C1D1 mFOLFOX 6 on 9/20/23. Tolerated treatment well. No complaints. Initiated RT 9/25/23 and completed 9/29/23. C2 D1 FOLFOX planned for 10/9/23. C2D1 administered 10/17/23 due to neutropenia. Neulasta administered 10/20/23. C3D1 to be administered 10/31/21. -5FU bolus discontinued.

## 2023-11-02 ENCOUNTER — HOSPITAL ENCOUNTER (OUTPATIENT)
Dept: INFUSION CENTER | Facility: CLINIC | Age: 78
Discharge: HOME | End: 2023-11-02

## 2023-11-02 DIAGNOSIS — C20 RECTAL CANCER (HCC): Primary | ICD-10-CM

## 2023-11-02 NOTE — PROGRESS NOTES
Pt here for Elastomeric pump disconnect. Pt tolerated at home infusion. Pump is noted to be deflated after 46 hours of infusion. Port flushed and blood return noted and was de accessed. Pt aware of future appt on 11/13 at 1330 pm. Calendar printed and given to pt.

## 2023-11-07 DIAGNOSIS — C20 RECTAL CANCER (HCC): Primary | ICD-10-CM

## 2023-11-09 ENCOUNTER — PATIENT OUTREACH (OUTPATIENT)
Dept: CASE MANAGEMENT | Facility: HOSPITAL | Age: 78
End: 2023-11-09

## 2023-11-09 NOTE — PROGRESS NOTES
Pt's daughter Ed Marking r/o to [facility] to let me know their shared family care is in the shop and may need a new transmission, she is asking for [facility] to be set up for pt's appts next week. We agreed that she will call me next week when she has more information if they need additional rides. Reminded her how the service works and what to expect. Email sent to [facility] for pt's appts on 11/13, 14, and 16. Pt has previously completed a Terms of Service agreement. No other needs noted at this time.

## 2023-11-13 ENCOUNTER — HOSPITAL ENCOUNTER (OUTPATIENT)
Dept: INFUSION CENTER | Facility: CLINIC | Age: 78
Discharge: HOME | End: 2023-11-13
Payer: COMMERCIAL

## 2023-11-13 DIAGNOSIS — C20 RECTAL CANCER (HCC): ICD-10-CM

## 2023-11-13 DIAGNOSIS — Z95.828 PORT-A-CATH IN PLACE: Primary | ICD-10-CM

## 2023-11-13 LAB
ALBUMIN SERPL BCP-MCNC: 3.9 G/DL (ref 3.5–5)
ALP SERPL-CCNC: 63 U/L (ref 34–104)
ALT SERPL W P-5'-P-CCNC: 20 U/L (ref 7–52)
ANION GAP SERPL CALCULATED.3IONS-SCNC: 5 MMOL/L
AST SERPL W P-5'-P-CCNC: 20 U/L (ref 13–39)
BASOPHILS # BLD AUTO: 0.03 THOUSANDS/ÂΜL (ref 0–0.1)
BASOPHILS NFR BLD AUTO: 1 % (ref 0–1)
BILIRUB SERPL-MCNC: 0.48 MG/DL (ref 0.2–1)
BUN SERPL-MCNC: 20 MG/DL (ref 5–25)
CALCIUM SERPL-MCNC: 8.8 MG/DL (ref 8.4–10.2)
CHLORIDE SERPL-SCNC: 109 MMOL/L (ref 96–108)
CO2 SERPL-SCNC: 24 MMOL/L (ref 21–32)
CREAT SERPL-MCNC: 0.97 MG/DL (ref 0.6–1.3)
EOSINOPHIL # BLD AUTO: 0.04 THOUSAND/ÂΜL (ref 0–0.61)
EOSINOPHIL NFR BLD AUTO: 1 % (ref 0–6)
ERYTHROCYTE [DISTWIDTH] IN BLOOD BY AUTOMATED COUNT: 21.4 % (ref 11.6–15.1)
GFR SERPL CREATININE-BSD FRML MDRD: 74 ML/MIN/1.73SQ M
GLUCOSE SERPL-MCNC: 95 MG/DL (ref 65–140)
HCT VFR BLD AUTO: 33.5 % (ref 36.5–49.3)
HGB BLD-MCNC: 10.2 G/DL (ref 12–17)
IMM GRANULOCYTES # BLD AUTO: 0.04 THOUSAND/UL (ref 0–0.2)
IMM GRANULOCYTES NFR BLD AUTO: 1 % (ref 0–2)
LYMPHOCYTES # BLD AUTO: 0.99 THOUSANDS/ÂΜL (ref 0.6–4.47)
LYMPHOCYTES NFR BLD AUTO: 20 % (ref 14–44)
MCH RBC QN AUTO: 24.4 PG (ref 26.8–34.3)
MCHC RBC AUTO-ENTMCNC: 30.4 G/DL (ref 31.4–37.4)
MCV RBC AUTO: 80 FL (ref 82–98)
MONOCYTES # BLD AUTO: 0.96 THOUSAND/ÂΜL (ref 0.17–1.22)
MONOCYTES NFR BLD AUTO: 20 % (ref 4–12)
NEUTROPHILS # BLD AUTO: 2.83 THOUSANDS/ÂΜL (ref 1.85–7.62)
NEUTS SEG NFR BLD AUTO: 57 % (ref 43–75)
NRBC BLD AUTO-RTO: 0 /100 WBCS
PLATELET # BLD AUTO: 155 THOUSANDS/UL (ref 149–390)
POTASSIUM SERPL-SCNC: 4 MMOL/L (ref 3.5–5.3)
PROT SERPL-MCNC: 5.8 G/DL (ref 6.4–8.4)
RBC # BLD AUTO: 4.18 MILLION/UL (ref 3.88–5.62)
SODIUM SERPL-SCNC: 138 MMOL/L (ref 135–147)
WBC # BLD AUTO: 4.89 THOUSAND/UL (ref 4.31–10.16)

## 2023-11-13 PROCEDURE — 80053 COMPREHEN METABOLIC PANEL: CPT | Performed by: INTERNAL MEDICINE

## 2023-11-13 PROCEDURE — 85025 COMPLETE CBC W/AUTO DIFF WBC: CPT | Performed by: INTERNAL MEDICINE

## 2023-11-14 ENCOUNTER — HOSPITAL ENCOUNTER (OUTPATIENT)
Dept: INFUSION CENTER | Facility: CLINIC | Age: 78
Discharge: HOME | End: 2023-11-14
Payer: COMMERCIAL

## 2023-11-14 VITALS
HEART RATE: 66 BPM | BODY MASS INDEX: 19.27 KG/M2 | SYSTOLIC BLOOD PRESSURE: 130 MMHG | TEMPERATURE: 97.3 F | RESPIRATION RATE: 16 BRPM | HEIGHT: 70 IN | OXYGEN SATURATION: 98 % | DIASTOLIC BLOOD PRESSURE: 72 MMHG | WEIGHT: 134.6 LBS

## 2023-11-14 DIAGNOSIS — C20 RECTAL CANCER (HCC): Primary | ICD-10-CM

## 2023-11-14 PROCEDURE — 96413 CHEMO IV INFUSION 1 HR: CPT

## 2023-11-14 PROCEDURE — 96415 CHEMO IV INFUSION ADDL HR: CPT

## 2023-11-14 PROCEDURE — 96367 TX/PROPH/DG ADDL SEQ IV INF: CPT

## 2023-11-14 PROCEDURE — 96368 THER/DIAG CONCURRENT INF: CPT

## 2023-11-14 PROCEDURE — G0498 CHEMO EXTEND IV INFUS W/PUMP: HCPCS

## 2023-11-14 RX ORDER — SODIUM CHLORIDE 9 MG/ML
20 INJECTION, SOLUTION INTRAVENOUS ONCE AS NEEDED
Status: DISCONTINUED | OUTPATIENT
Start: 2023-11-14 | End: 2023-11-17 | Stop reason: HOSPADM

## 2023-11-14 RX ORDER — DEXTROSE MONOHYDRATE 50 MG/ML
20 INJECTION, SOLUTION INTRAVENOUS ONCE
Status: COMPLETED | OUTPATIENT
Start: 2023-11-14 | End: 2023-11-14

## 2023-11-14 RX ORDER — SODIUM CHLORIDE 9 MG/ML
20 INJECTION, SOLUTION INTRAVENOUS ONCE AS NEEDED
Status: CANCELLED | OUTPATIENT
Start: 2023-11-14

## 2023-11-14 RX ORDER — DEXTROSE MONOHYDRATE 50 MG/ML
20 INJECTION, SOLUTION INTRAVENOUS ONCE
Status: CANCELLED | OUTPATIENT
Start: 2023-11-14

## 2023-11-14 RX ADMIN — DEXAMETHASONE SODIUM PHOSPHATE: 10 INJECTION, SOLUTION INTRAMUSCULAR; INTRAVENOUS at 14:22

## 2023-11-14 RX ADMIN — DEXTROSE 20 ML/HR: 5 SOLUTION INTRAVENOUS at 14:22

## 2023-11-14 RX ADMIN — LEUCOVORIN CALCIUM 700 MG: 350 INJECTION, POWDER, LYOPHILIZED, FOR SOLUTION INTRAMUSCULAR; INTRAVENOUS at 15:06

## 2023-11-14 RX ADMIN — OXALIPLATIN 132.75 MG: 5 INJECTION, SOLUTION INTRAVENOUS at 15:06

## 2023-11-14 NOTE — PLAN OF CARE
Problem: Knowledge Deficit  Goal: Patient/family/caregiver demonstrates understanding of disease process, treatment plan, medications, and discharge instructions  Description: Complete learning assessment and assess knowledge base.   Interventions:  - Provide teaching at level of understanding  - Provide teaching via preferred learning methods  11/14/2023 1343 by Washington Angel RN  Outcome: Progressing  11/14/2023 1343 by Washington Angel RN  Outcome: Progressing

## 2023-11-14 NOTE — PROGRESS NOTES
Pt to clinic for FOLFOX with no 5-FU push. Pt offers no complaints today. Tolerated infusion without complications. Pump attached to pt port with all clamps open. Aware of next appointment on 11/16/23 for pump disconnect. AVS declined.

## 2023-11-15 ENCOUNTER — PATIENT OUTREACH (OUTPATIENT)
Dept: CASE MANAGEMENT | Facility: HOSPITAL | Age: 78
End: 2023-11-15

## 2023-11-15 NOTE — PROGRESS NOTES
Charlie a VM from pt's daughter Gentry Webb asking for pt to be on the [facility] schedule for the week of 11/27, as they don't have a specific answer on the car just yet. Called her back but got her VM, LM to let her know I will get this set up for them. I also mentioned in my VM that his upcoming PCP appt on 12/4 would not be an option for [facility] to take him, as it is not an oncology appt. Email sent to [facility] with the appts for 11/27-11/30, they confirmed it will be scheduled. No other needs noted at this time.

## 2023-11-16 ENCOUNTER — HOSPITAL ENCOUNTER (OUTPATIENT)
Dept: INFUSION CENTER | Facility: CLINIC | Age: 78
Discharge: HOME | End: 2023-11-16

## 2023-11-16 VITALS — HEART RATE: 70 BPM | DIASTOLIC BLOOD PRESSURE: 55 MMHG | SYSTOLIC BLOOD PRESSURE: 104 MMHG | RESPIRATION RATE: 16 BRPM

## 2023-11-16 DIAGNOSIS — C20 RECTAL CANCER (HCC): Primary | ICD-10-CM

## 2023-11-16 NOTE — PROGRESS NOTES
Pt here for Chemo ball disconnect. Chris Montemayor appears deflated and ran for 46+ hours. Pt stated that his vision seems a little blurry at present time and this is new for him. Jack Christianson RN is aware and she said that if his symptoms get worse then to go to ER and call the office. As we were talking he stated that his vision was getting better after he was disconnected from the chemo ball. He is aware to call office . Port flushed and de-accessed. AVS given. [facility] called. Aj Carpenter out in stable condition.

## 2023-11-21 DIAGNOSIS — C20 RECTAL CANCER (HCC): Primary | ICD-10-CM

## 2023-11-21 PROBLEM — T45.1X5A CHEMOTHERAPY INDUCED NEUTROPENIA: Status: ACTIVE | Noted: 2023-11-21

## 2023-11-21 PROBLEM — D70.1 CHEMOTHERAPY INDUCED NEUTROPENIA: Status: ACTIVE | Noted: 2023-11-21

## 2023-11-24 ENCOUNTER — HOSPITAL ENCOUNTER (INPATIENT)
Facility: HOSPITAL | Age: 78
LOS: 1 days | Discharge: HOME | DRG: 103 | End: 2023-11-26
Attending: EMERGENCY MEDICINE | Admitting: FAMILY MEDICINE
Payer: COMMERCIAL

## 2023-11-24 ENCOUNTER — APPOINTMENT (EMERGENCY)
Dept: CT IMAGING | Facility: HOSPITAL | Age: 78
DRG: 103 | End: 2023-11-24
Payer: COMMERCIAL

## 2023-11-24 ENCOUNTER — TELEPHONE (OUTPATIENT)
Dept: OTHER | Facility: OTHER | Age: 78
End: 2023-11-24

## 2023-11-24 ENCOUNTER — TELEPHONE (OUTPATIENT)
Dept: OTHER | Facility: HOSPITAL | Age: 78
End: 2023-11-24

## 2023-11-24 DIAGNOSIS — Z86.73 HISTORY OF LACUNAR CEREBROVASCULAR ACCIDENT (CVA): ICD-10-CM

## 2023-11-24 DIAGNOSIS — H53.8 BLURRY VISION: ICD-10-CM

## 2023-11-24 DIAGNOSIS — R51.9 HEADACHE: Primary | ICD-10-CM

## 2023-11-24 DIAGNOSIS — I63.9 CEREBROVASCULAR ACCIDENT (CVA), UNSPECIFIED MECHANISM (HCC): ICD-10-CM

## 2023-11-24 DIAGNOSIS — R20.2 FACIAL PARESTHESIA: ICD-10-CM

## 2023-11-24 LAB
2HR DELTA HS TROPONIN: 0 NG/L
ALBUMIN SERPL BCP-MCNC: 3.7 G/DL (ref 3.5–5)
ALP SERPL-CCNC: 59 U/L (ref 34–104)
ALT SERPL W P-5'-P-CCNC: 17 U/L (ref 7–52)
ANION GAP SERPL CALCULATED.3IONS-SCNC: 5 MMOL/L
APTT PPP: 29 SECONDS (ref 23–37)
AST SERPL W P-5'-P-CCNC: 19 U/L (ref 13–39)
BASOPHILS # BLD AUTO: 0.03 THOUSANDS/ÂΜL (ref 0–0.1)
BASOPHILS NFR BLD AUTO: 1 % (ref 0–1)
BILIRUB SERPL-MCNC: 0.58 MG/DL (ref 0.2–1)
BUN SERPL-MCNC: 20 MG/DL (ref 5–25)
CALCIUM SERPL-MCNC: 8.8 MG/DL (ref 8.4–10.2)
CARDIAC TROPONIN I PNL SERPL HS: 11 NG/L
CARDIAC TROPONIN I PNL SERPL HS: 11 NG/L
CHLORIDE SERPL-SCNC: 109 MMOL/L (ref 96–108)
CO2 SERPL-SCNC: 24 MMOL/L (ref 21–32)
CREAT SERPL-MCNC: 0.98 MG/DL (ref 0.6–1.3)
EOSINOPHIL # BLD AUTO: 0.11 THOUSAND/ÂΜL (ref 0–0.61)
EOSINOPHIL NFR BLD AUTO: 2 % (ref 0–6)
ERYTHROCYTE [DISTWIDTH] IN BLOOD BY AUTOMATED COUNT: 22.9 % (ref 11.6–15.1)
GFR SERPL CREATININE-BSD FRML MDRD: 74 ML/MIN/1.73SQ M
GLUCOSE SERPL-MCNC: 129 MG/DL (ref 65–140)
HCT VFR BLD AUTO: 35 % (ref 36.5–49.3)
HGB BLD-MCNC: 10.8 G/DL (ref 12–17)
IMM GRANULOCYTES # BLD AUTO: 0.04 THOUSAND/UL (ref 0–0.2)
IMM GRANULOCYTES NFR BLD AUTO: 1 % (ref 0–2)
INR PPP: 1.09 (ref 0.84–1.19)
LYMPHOCYTES # BLD AUTO: 0.92 THOUSANDS/ÂΜL (ref 0.6–4.47)
LYMPHOCYTES NFR BLD AUTO: 14 % (ref 14–44)
MCH RBC QN AUTO: 24.7 PG (ref 26.8–34.3)
MCHC RBC AUTO-ENTMCNC: 30.9 G/DL (ref 31.4–37.4)
MCV RBC AUTO: 80 FL (ref 82–98)
MONOCYTES # BLD AUTO: 0.97 THOUSAND/ÂΜL (ref 0.17–1.22)
MONOCYTES NFR BLD AUTO: 15 % (ref 4–12)
NEUTROPHILS # BLD AUTO: 4.34 THOUSANDS/ÂΜL (ref 1.85–7.62)
NEUTS SEG NFR BLD AUTO: 67 % (ref 43–75)
NRBC BLD AUTO-RTO: 0 /100 WBCS
PLATELET # BLD AUTO: 164 THOUSANDS/UL (ref 149–390)
PMV BLD AUTO: 10.6 FL (ref 8.9–12.7)
POTASSIUM SERPL-SCNC: 4.3 MMOL/L (ref 3.5–5.3)
PROT SERPL-MCNC: 5.8 G/DL (ref 6.4–8.4)
PROTHROMBIN TIME: 14.7 SECONDS (ref 11.6–14.5)
RBC # BLD AUTO: 4.37 MILLION/UL (ref 3.88–5.62)
SODIUM SERPL-SCNC: 138 MMOL/L (ref 135–147)
WBC # BLD AUTO: 6.41 THOUSAND/UL (ref 4.31–10.16)

## 2023-11-24 PROCEDURE — 36415 COLL VENOUS BLD VENIPUNCTURE: CPT | Performed by: EMERGENCY MEDICINE

## 2023-11-24 PROCEDURE — 71275 CT ANGIOGRAPHY CHEST: CPT

## 2023-11-24 PROCEDURE — 80053 COMPREHEN METABOLIC PANEL: CPT | Performed by: EMERGENCY MEDICINE

## 2023-11-24 PROCEDURE — 93005 ELECTROCARDIOGRAM TRACING: CPT

## 2023-11-24 PROCEDURE — 84484 ASSAY OF TROPONIN QUANT: CPT | Performed by: EMERGENCY MEDICINE

## 2023-11-24 PROCEDURE — 70496 CT ANGIOGRAPHY HEAD: CPT

## 2023-11-24 PROCEDURE — G1004 CDSM NDSC: HCPCS

## 2023-11-24 PROCEDURE — 70498 CT ANGIOGRAPHY NECK: CPT

## 2023-11-24 PROCEDURE — 85610 PROTHROMBIN TIME: CPT | Performed by: EMERGENCY MEDICINE

## 2023-11-24 PROCEDURE — 99284 EMERGENCY DEPT VISIT MOD MDM: CPT

## 2023-11-24 PROCEDURE — 85025 COMPLETE CBC W/AUTO DIFF WBC: CPT | Performed by: EMERGENCY MEDICINE

## 2023-11-24 PROCEDURE — 85730 THROMBOPLASTIN TIME PARTIAL: CPT | Performed by: EMERGENCY MEDICINE

## 2023-11-24 RX ORDER — SODIUM CHLORIDE 9 MG/ML
3 INJECTION INTRAVENOUS
Status: DISCONTINUED | OUTPATIENT
Start: 2023-11-24 | End: 2023-11-25

## 2023-11-24 RX ADMIN — IOHEXOL 130 ML: 350 INJECTION, SOLUTION INTRAVENOUS at 21:24

## 2023-11-24 NOTE — TELEPHONE ENCOUNTER
Was messaged by call center that pt's daughter called with concern for Mr. Andrea Bah experiencing progressive headaches and blurry vision. Chart reviewed. Mr. 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 I called Mr. Andrea Bah. Daughter Erendira explained that Mr. Andrea Bah has been experiencing headaches and blurry vision for almost the past 2 months. Initially, vision trouble was attributed to patient not having correct prescription for glasses, but even after getting the new glasses, patient continues to have persistent headaches and vision trouble. He has been taking tylenol with mild relief. Patient has also had mild difficulty with balance and facial tingling; denied any fall episodes. Patient with nausea sensation for a few days immediately following each chemo session, but otherwise denies any persistent nausea or episodes of vomiting. Recommended that the pt be evaluated in the ED with brain imaging (MR brain with and without contrast) to evaluate for brain mets. Given that it's the weekend, didn't want the patient to wait till Monday to evaluate for brain mets with OP imaging, because if there is any brain mets with associated vasogenic edema or midline shift, he would need to be started on steroids, etc. Daughter said that she will take the father to the [place] ED. Of note, patient got a CT head 2 months ago (on 9/18/23), but this was a CT without contrast, which is suboptimal for evaluating for brain mets. Forwarding this to pt's primary oncology team so that they are also aware of the above situation.

## 2023-11-24 NOTE — TELEPHONE ENCOUNTER
Patient's Daughter Ronnell Valero called today regarding Headaches off and on for the last 2 weeks and  Blurry Vision.      Paged the on call Provider Via [facility]

## 2023-11-25 ENCOUNTER — APPOINTMENT (INPATIENT)
Dept: MRI IMAGING | Facility: HOSPITAL | Age: 78
DRG: 103 | End: 2023-11-25
Payer: COMMERCIAL

## 2023-11-25 PROBLEM — R51.9 HEADACHE: Status: ACTIVE | Noted: 2023-11-25

## 2023-11-25 LAB
4HR DELTA HS TROPONIN: 1 NG/L
ANION GAP SERPL CALCULATED.3IONS-SCNC: 4 MMOL/L
ATRIAL RATE: 69 BPM
ATRIAL RATE: 72 BPM
ATRIAL RATE: 73 BPM
BUN SERPL-MCNC: 19 MG/DL (ref 5–25)
CALCIUM SERPL-MCNC: 8.5 MG/DL (ref 8.4–10.2)
CARDIAC TROPONIN I PNL SERPL HS: 12 NG/L
CHLORIDE SERPL-SCNC: 110 MMOL/L (ref 96–108)
CHOLEST SERPL-MCNC: 84 MG/DL
CO2 SERPL-SCNC: 25 MMOL/L (ref 21–32)
CREAT SERPL-MCNC: 1.05 MG/DL (ref 0.6–1.3)
ERYTHROCYTE [DISTWIDTH] IN BLOOD BY AUTOMATED COUNT: 23.1 % (ref 11.6–15.1)
EST. AVERAGE GLUCOSE BLD GHB EST-MCNC: 128 MG/DL
GFR SERPL CREATININE-BSD FRML MDRD: 68 ML/MIN/1.73SQ M
GLUCOSE SERPL-MCNC: 92 MG/DL (ref 65–140)
HBA1C MFR BLD: 6.1 %
HCT VFR BLD AUTO: 31.9 % (ref 36.5–49.3)
HDLC SERPL-MCNC: 37 MG/DL
HGB BLD-MCNC: 10.1 G/DL (ref 12–17)
LDLC SERPL CALC-MCNC: 30 MG/DL (ref 0–100)
MCH RBC QN AUTO: 25.2 PG (ref 26.8–34.3)
MCHC RBC AUTO-ENTMCNC: 31.7 G/DL (ref 31.4–37.4)
MCV RBC AUTO: 80 FL (ref 82–98)
P AXIS: 58 DEGREES
P AXIS: 67 DEGREES
P AXIS: 67 DEGREES
PLATELET # BLD AUTO: 147 THOUSANDS/UL (ref 149–390)
PLATELET # BLD AUTO: 147 THOUSANDS/UL (ref 149–390)
PMV BLD AUTO: 10.2 FL (ref 8.9–12.7)
PMV BLD AUTO: 10.4 FL (ref 8.9–12.7)
POTASSIUM SERPL-SCNC: 3.9 MMOL/L (ref 3.5–5.3)
PR INTERVAL: 156 MS
PR INTERVAL: 168 MS
PR INTERVAL: 168 MS
QRS AXIS: 76 DEGREES
QRS AXIS: 76 DEGREES
QRS AXIS: 78 DEGREES
QRSD INTERVAL: 74 MS
QRSD INTERVAL: 74 MS
QRSD INTERVAL: 76 MS
QT INTERVAL: 362 MS
QT INTERVAL: 376 MS
QT INTERVAL: 390 MS
QTC INTERVAL: 398 MS
QTC INTERVAL: 402 MS
QTC INTERVAL: 427 MS
RBC # BLD AUTO: 4.01 MILLION/UL (ref 3.88–5.62)
SODIUM SERPL-SCNC: 139 MMOL/L (ref 135–147)
T WAVE AXIS: 100 DEGREES
T WAVE AXIS: 92 DEGREES
T WAVE AXIS: 99 DEGREES
TRIGL SERPL-MCNC: 85 MG/DL
VENTRICULAR RATE: 69 BPM
VENTRICULAR RATE: 72 BPM
VENTRICULAR RATE: 73 BPM
WBC # BLD AUTO: 5.95 THOUSAND/UL (ref 4.31–10.16)

## 2023-11-25 PROCEDURE — 93010 ELECTROCARDIOGRAM REPORT: CPT | Performed by: INTERNAL MEDICINE

## 2023-11-25 PROCEDURE — 99223 1ST HOSP IP/OBS HIGH 75: CPT | Performed by: INTERNAL MEDICINE

## 2023-11-25 PROCEDURE — 80061 LIPID PANEL: CPT | Performed by: NURSE PRACTITIONER

## 2023-11-25 PROCEDURE — 83036 HEMOGLOBIN GLYCOSYLATED A1C: CPT | Performed by: NURSE PRACTITIONER

## 2023-11-25 PROCEDURE — 99285 EMERGENCY DEPT VISIT HI MDM: CPT | Performed by: EMERGENCY MEDICINE

## 2023-11-25 PROCEDURE — 85027 COMPLETE CBC AUTOMATED: CPT | Performed by: NURSE PRACTITIONER

## 2023-11-25 PROCEDURE — 85049 AUTOMATED PLATELET COUNT: CPT | Performed by: NURSE PRACTITIONER

## 2023-11-25 PROCEDURE — 99223 1ST HOSP IP/OBS HIGH 75: CPT | Performed by: PSYCHIATRY & NEUROLOGY

## 2023-11-25 PROCEDURE — 70551 MRI BRAIN STEM W/O DYE: CPT

## 2023-11-25 PROCEDURE — 80048 BASIC METABOLIC PNL TOTAL CA: CPT | Performed by: NURSE PRACTITIONER

## 2023-11-25 RX ORDER — ASPIRIN 81 MG/1
81 TABLET, CHEWABLE ORAL DAILY
Status: DISCONTINUED | OUTPATIENT
Start: 2023-11-25 | End: 2023-11-26 | Stop reason: HOSPADM

## 2023-11-25 RX ORDER — LISINOPRIL 2.5 MG/1
2.5 TABLET ORAL DAILY
Status: DISCONTINUED | OUTPATIENT
Start: 2023-11-25 | End: 2023-11-26 | Stop reason: HOSPADM

## 2023-11-25 RX ORDER — ALBUTEROL SULFATE 90 UG/1
2 AEROSOL, METERED RESPIRATORY (INHALATION) EVERY 6 HOURS PRN
Status: DISCONTINUED | OUTPATIENT
Start: 2023-11-25 | End: 2023-11-26 | Stop reason: HOSPADM

## 2023-11-25 RX ORDER — PANTOPRAZOLE SODIUM 40 MG/1
40 TABLET, DELAYED RELEASE ORAL DAILY
Status: DISCONTINUED | OUTPATIENT
Start: 2023-11-25 | End: 2023-11-26 | Stop reason: HOSPADM

## 2023-11-25 RX ORDER — METOPROLOL SUCCINATE 25 MG/1
25 TABLET, EXTENDED RELEASE ORAL DAILY
Status: DISCONTINUED | OUTPATIENT
Start: 2023-11-25 | End: 2023-11-26 | Stop reason: HOSPADM

## 2023-11-25 RX ORDER — FERROUS SULFATE 325(65) MG
325 TABLET ORAL
Status: DISCONTINUED | OUTPATIENT
Start: 2023-11-25 | End: 2023-11-26 | Stop reason: HOSPADM

## 2023-11-25 RX ORDER — ATORVASTATIN CALCIUM 40 MG/1
80 TABLET, FILM COATED ORAL EVERY EVENING
Status: DISCONTINUED | OUTPATIENT
Start: 2023-11-25 | End: 2023-11-26 | Stop reason: HOSPADM

## 2023-11-25 RX ORDER — ENOXAPARIN SODIUM 100 MG/ML
40 INJECTION SUBCUTANEOUS DAILY
Status: DISCONTINUED | OUTPATIENT
Start: 2023-11-25 | End: 2023-11-26 | Stop reason: HOSPADM

## 2023-11-25 RX ORDER — TAMSULOSIN HYDROCHLORIDE 0.4 MG/1
0.4 CAPSULE ORAL
Status: DISCONTINUED | OUTPATIENT
Start: 2023-11-25 | End: 2023-11-26 | Stop reason: HOSPADM

## 2023-11-25 RX ORDER — DIPHENHYDRAMINE HYDROCHLORIDE AND LIDOCAINE HYDROCHLORIDE AND ALUMINUM HYDROXIDE AND MAGNESIUM HYDRO
10 KIT EVERY 6 HOURS PRN
Status: DISCONTINUED | OUTPATIENT
Start: 2023-11-25 | End: 2023-11-26 | Stop reason: HOSPADM

## 2023-11-25 RX ADMIN — PANTOPRAZOLE SODIUM 40 MG: 40 TABLET, DELAYED RELEASE ORAL at 08:37

## 2023-11-25 RX ADMIN — TAMSULOSIN HYDROCHLORIDE 0.4 MG: 0.4 CAPSULE ORAL at 21:58

## 2023-11-25 RX ADMIN — ASPIRIN 81 MG: 81 TABLET, CHEWABLE ORAL at 08:36

## 2023-11-25 RX ADMIN — METOPROLOL SUCCINATE 25 MG: 25 TABLET, EXTENDED RELEASE ORAL at 08:37

## 2023-11-25 RX ADMIN — ATORVASTATIN CALCIUM 80 MG: 40 TABLET, FILM COATED ORAL at 17:47

## 2023-11-25 RX ADMIN — LISINOPRIL 2.5 MG: 2.5 TABLET ORAL at 08:36

## 2023-11-25 RX ADMIN — FERROUS SULFATE TAB 325 MG (65 MG ELEMENTAL FE) 325 MG: 325 (65 FE) TAB at 08:36

## 2023-11-25 NOTE — H&P
[address]  H&P  Name: Kylah Berg. 69 y.o. male I MRN: [id number]  Unit/Bed#: -01 I Date of Admission: 11/24/2023   Date of Service: 11/25/2023 I Hospital Day: 0      Assessment/Plan   * Headache  Assessment & Plan  Patient presenting with headache, facial numbness, and blurred vision in patient with known history of rectal cancer getting active chemotherapy treatments  Discussed with oncology recommended patient come to ED for further evaluation via imaging given concern for brain mets  CTA head and neck completed in ED no acute findings; stable neck findings in comparison to prior imaging in 2020. Chronic lacunar infarct   Diff dx: cva vs brain mets  Will initiate stroke pathway for completeness if MRI brain negative for CVA can discontinue pathway  PT/OT evaluation  Neurology consult  Treat headache symptoms   Will start IVF  Can trial toradol for headache pain    Rectal cancer [facility])  Assessment & Plan  Recent chemo ball disconnection on 11/16  Continue with out patient follow up with oncology     Moderate protein-calorie malnutrition ([address])  Assessment & Plan  Malnutrition Findings:                                 BMI Findings: Body mass index is 18.88 kg/m².    Evident by bmi of 18.88  Nutrition evaluation recs appreciated     Essential hypertension  Assessment & Plan  BP reasonable  Continue to lisinopril and metoprolol with parameters  Continue to monitor     History of lacunar cerebrovascular accident (CVA)  Assessment & Plan  Continue asa and statin  See plan under headache     History of ischemic cardiomyopathy  Assessment & Plan  Mildly reduced EF of 45% on echo from 7/2023  Monitored closely on IVF for volume overload not currently on diuretics  Continue lisinopril and metoprolol     CAD in native artery  Assessment & Plan  Known hx continue ASA and statin         VTE Pharmacologic Prophylaxis: VTE Score: 6 High Risk (Score >/= 5) - Pharmacological DVT Prophylaxis Ordered: enoxaparin (Lovenox). Sequential Compression Devices Ordered. Code Status: Level 3 - DNAR and DNI   Discussion with family: Updated  (daughter) via phone. Anticipated Length of Stay: Patient will be admitted on an inpatient basis with an anticipated length of stay of greater than 2 midnights secondary to headache in patient with cancer history. Total Time Spent on Date of Encounter in care of patient: 45 mins. This time was spent on one or more of the following: performing physical exam; counseling and coordination of care; obtaining or reviewing history; documenting in the medical record; reviewing/ordering tests, medications or procedures; communicating with other healthcare professionals and discussing with patient's family/caregivers. Chief Complaint: facial numbness and tingling    History of Present Illness:  Artur Potter is a 69 y.o. male with a PMH of cancer, cardiomyopathy, cva, htn, cad who presents with headache, blurred vision, facial numbness, and tingling. Patient reports going to [facility] center to have his chemo ball removed on 11/16/2023 when he started experiencing blurred vision that lasted several hours then stopped. Patient has intermittent headaches at baseline and felt no different that day however 2 days later started with frontal headache pain in the left frontal region of his head that extended over the right side of his face. He describes a numbness of his lips more on the left and a tingling sensation on the right. Patient reported today prior to coming in he started having trouble lifting his left leg and feels weaker as well. Review of Systems:  Review of Systems   Constitutional:  Positive for activity change and fatigue. Negative for appetite change, diaphoresis and fever. HENT: Negative. Respiratory: Negative. Negative for shortness of breath. Cardiovascular:  Negative for chest pain, palpitations and leg swelling. Gastrointestinal: Negative. Negative for abdominal pain, blood in stool, nausea and vomiting. Neurological:  Positive for weakness, numbness and headaches. Negative for dizziness, facial asymmetry and light-headedness. Psychiatric/Behavioral: Negative. Past Medical and Surgical History:   Past Medical History:   Diagnosis Date    Hyperlipidemia     Ischemic cardiomyopathy     Lacunar infarction ([address])     Myocardial infarction ([address])     Near syncope 11/14/2019    Non-ST elevated myocardial infarction [facility])     NSTEMI (non-ST elevated myocardial infarction) ([address]) 10/20/2019    Pericarditis 10/24/2019    Poison ivy     Rectal cancer ([address])     Stroke [facility])        Past Surgical History:   Procedure Laterality Date    ANGIOPLASTY      CARDIAC CATHETERIZATION N/A 5/19/2023    Procedure: Cardiac pci;  Surgeon: Odilia Najjar, MD;  Location: [address] [place] [address] CATH LAB; Service: Cardiology    CORONARY STENT PLACEMENT      HERNIA REPAIR Left 4/28/2021    Procedure: OPEN REPAIR HERNIA INGUINAL LEFT WITH MESH;  Surgeon: Emily Whittington MD;  Location: [facility] [place] OR;  Service: General    IR PORT PLACEMENT  8/31/2023       Meds/Allergies:  Prior to Admission medications    Medication Sig Start Date End Date Taking?  Authorizing Provider   atorvastatin (LIPITOR) 80 mg tablet Take 1 tablet (80 mg total) by mouth daily with dinner 10/17/22  Yes Jhoan Morin,    diphenhydramine, lidocaine, Al/Mg hydroxide, simethicone (Magic Mouthwash) SUSP Swish and spit 10 mL every 6 (six) hours as needed for mouth pain or discomfort 9/27/23  Yes Susanne Altamirano MD   ferrous sulfate 324 (65 Fe) mg Take 1 tablet (324 mg total) by mouth in the morning 8/8/23  Yes Hannah Garcia PA-C   sucralfate (CARAFATE) 1 g tablet Take 1 tablet (1 g total) by mouth 4 (four) times a day 10/27/23 2/24/24 Yes Jhoan Morin,    albuterol (ProAir HFA) 90 mcg/act inhaler Inhale 2 puffs every 6 (six) hours as needed for wheezing 9/6/23   Kimber Goldberg Rossana Alonzo DO   aspirin (ECOTRIN LOW STRENGTH) 81 mg EC tablet Take 1 tablet (81 mg total) by mouth daily Please purchase over the counter at her local pharmacy 11/19/19 10/30/23  Josué Benito MD   Blood Pressure KIT by Does not apply route daily 19   Josué Benito MD   fluorouracil 4,250 mg in CADD/Elastomeric Infusion Device Infuse 4,250 mg (1,200 mg/m2/day x 1.77 m2) into a catheter in a vein via infusion device over 46 hours for 2 days  Infusion planned for 2023. 23  Susanne Altamirano MD   lisinopril (ZESTRIL) 2.5 mg tablet take 1 tablet by mouth daily 23   Reno De La Rosa MD   metoprolol succinate (TOPROL-XL) 25 mg 24 hr tablet take 1 tablet by mouth daily 23   Reno De La Rosa MD   pantoprazole (PROTONIX) 40 mg tablet Take 1 tablet (40 mg total) by mouth daily 9/6/23 10/6/23  Ashley Neal DO   tamsulosin [facility]) 0.4 mg Take 1 capsule (0.4 mg total) by mouth daily at bedtime 23  Aaron Galindo MD     I have reviewed home medications with patient personally. Allergies: Allergies   Allergen Reactions    Wound Dressing Adhesive Hives     Noted with Plastic adhesive bandage brand Curity       Social History:  Marital Status:     Occupation:    Patient Pre-hospital Living Situation: Home  Patient Pre-hospital Level of Mobility: walks  Patient Pre-hospital Diet Restrictions:    Substance Use History:   Social History     Substance and Sexual Activity   Alcohol Use Never     Social History     Tobacco Use   Smoking Status Former    Packs/day: 1.00    Years: 15.00    Total pack years: 15.00    Types: Cigarettes    Quit date: 1969    Years since quittin.0   Smokeless Tobacco Never     Social History     Substance and Sexual Activity   Drug Use No       Family History:  Family History   Problem Relation Age of Onset    Breast cancer Mother     Emphysema Father     Heart disease Maternal Grandfather     Heart attack Maternal Grandfather     Emphysema Paternal Grandfather        Physical Exam:     Vitals:   Blood Pressure: 139/73 (11/25/23 0119)  Pulse: 64 (11/25/23 0119)  Temperature: 97.5 °F (36.4 °C) (11/25/23 0119)  Temp Source: Oral (11/25/23 0119)  Respirations: 20 (11/25/23 0119)  Height: 5' 10" (177.8 cm) (11/25/23 0119)  Weight - Scale: 59.7 kg (131 lb 9.8 oz) (11/25/23 0119)  SpO2: 100 % (11/25/23 0119)    Physical Exam  Vitals and nursing note reviewed. Constitutional:       General: He is not in acute distress. Appearance: He is underweight. HENT:      Head: Normocephalic and atraumatic. Eyes:      Conjunctiva/sclera: Conjunctivae normal.   Cardiovascular:      Rate and Rhythm: Normal rate and regular rhythm. Heart sounds: No murmur heard. Pulmonary:      Effort: Pulmonary effort is normal. No respiratory distress. Breath sounds: Normal breath sounds. Abdominal:      Palpations: Abdomen is soft. Tenderness: There is no abdominal tenderness. Musculoskeletal:         General: No swelling. Cervical back: Neck supple. Skin:     General: Skin is warm and dry. Capillary Refill: Capillary refill takes less than 2 seconds. Neurological:      Mental Status: He is alert. Motor: Weakness present.       Comments: Reports dullness sensation to touch on left side of face    Psychiatric:         Mood and Affect: Mood normal.           Additional Data:     Lab Results:  Results from last 7 days   Lab Units 11/24/23 1940   WBC Thousand/uL 6.41   HEMOGLOBIN g/dL 10.8*   HEMATOCRIT % 35.0*   PLATELETS Thousands/uL 164   NEUTROS PCT % 67   LYMPHS PCT % 14   MONOS PCT % 15*   EOS PCT % 2     Results from last 7 days   Lab Units 11/24/23 1940   SODIUM mmol/L 138   POTASSIUM mmol/L 4.3   CHLORIDE mmol/L 109*   CO2 mmol/L 24   BUN mg/dL 20   CREATININE mg/dL 0.98   ANION GAP mmol/L 5   CALCIUM mg/dL 8.8   ALBUMIN g/dL 3.7   TOTAL BILIRUBIN mg/dL 0.58   ALK PHOS U/L 59   ALT U/L 17 AST U/L 19   GLUCOSE RANDOM mg/dL 129     Results from last 7 days   Lab Units 11/24/23  1940   INR  1.09                   Lines/Drains:  Invasive Devices       Central Venous Catheter Line  Duration             Port A Cath Right Chest -- days              Peripheral Intravenous Line  Duration             Peripheral IV 11/24/23 Right Antecubital 1 day    Peripheral IV 11/24/23 Right Forearm <1 day                    Central Line:  Goal for removal: N/A - Chronic PICC           Imaging: Reviewed radiology reports from this admission including: CT head  CTA ED chest PE study   Final Result by Zac Campos MD (11/24 2922)      1. No acute pulmonary embolism or thoracic aortic aneurysm/dissection. Mild scattered calcific atherosclerosis. 2.  Multiple bilateral pulmonary nodules measuring 4 mm or less, similar compared to the prior exam. Cannot exclude metastatic disease in this patient with history of malignancy. 3.  A few nonspecific subcentimeter mediastinal and right hilar lymph nodes are again seen without significant change. 4.  Stable small hiatal hernia and stable nonspecific subcentimeter mesenteric lymph nodes adjacent to the distal esophagus and gastroesophageal junction region. 5.  Cholelithiasis again noted without evidence for acute cholecystitis. 6.  No acute intrathoracic abnormalities noted with ancillary findings detailed above. Workstation performed: [id number]         CTA head and neck with and without contrast   Final Result by Anoop Blankenship MD (11/24 7079)      No acute intracranial abnormality. Chronic lacunar infarcts and microangiopathic changes as above      Stable CTA examination of the head and neck compared to prior study dated 9/25/2020. No evidence of hemodynamically significant stenosis or large vessel occlusion within the major vessels of the [place].  Stable 2 mm probable infundibulum at the level of the right ICA terminus and at the ACOM/A2 junction. No significant stenosis of the cervical carotid or vertebral arteries. Workstation performed: [id number]         MRI Inpatient Order    (Results Pending)       EKG and Other Studies Reviewed on Admission:   EKG: NSR. HR 72.    ** Please Note: This note has been constructed using a voice recognition system.  **

## 2023-11-25 NOTE — UTILIZATION REVIEW
Initial Clinical Review    Admission: Date/Time/Statement:   Admission Orders (From admission, onward)       Ordered        11/25/23 0026  Inpatient Admission  Once                          Orders Placed This Encounter   Procedures    Inpatient Admission     Standing Status:   Standing     Number of Occurrences:   1     Order Specific Question:   Level of Care     Answer:   Med Surg [16]     Order Specific Question:   Estimated length of stay     Answer:   More than 2 Midnights     Order Specific Question:   Certification     Answer:   I certify that inpatient services are medically necessary for this patient for a duration of greater than two midnights. See H&P and MD Progress Notes for additional information about the patient's course of treatment. ED Arrival Information       Expected   -    Arrival   11/24/2023 18:32    Acuity   Urgent              Means of arrival   Walk-In    Escorted by   Family Member    Service   Hospitalist    Admission type   Emergency              Arrival complaint   headache and blurred vision             Chief Complaint   Patient presents with    Headache     Advised to go to the ER by hematologist due to headaches and blurred vision over the past few months. Currently receiving chemo for renal cancer concerned of possible mets to the brain. Initial Presentation: 69 y.o. male with a PMH of cancer, cardiomyopathy, CVA, HTN and CAD who presents to the ED from home on 11/24/23 for evaluation of  headache, blurred vision, facial numbness, and tingling. Patient reports going to infusion center to have his chemo ball removed on 11/16/2023 when he started experiencing blurred vision that lasted several hours then stopped. Patient has intermittent headaches at baseline and felt no different that day however two days later started with frontal headache pain in the left frontal region of his head that extended over the right side of his face.  He describes a numbness of his lips more on the left and a tingling sensation on the right. Patient reported today prior to coming in he started having trouble lifting his left leg and feels weaker as well. PE: Alert. Reports dullness, sensation to touch on left side of face. 11/25 Inpatient admission for evaluation and treatment of headache:  Initiate Stroke pathway, MRI brain, IVF, PT/OT eval, Neurology consult. 11/25 Neurology consult:  Unclear etiology of headache, currently is at 2/10 and he does not want treatment for it. He does have an underlying history of rectal CA and recommend obtaining MRI brain with/without to rule out brain metastatic disease. If patient's headache is to worsen can trial migraine cocktail at that point however currently he wants to defer. In terms of the blurry vision there is no diurnal presentation, no reported bulbar symptoms or SOB or intermittent weakness. No history of MG. PE:  A&O x3, visual fields intact, no aphasia, no dysarthria, 5/5 throughout with exception of L hip flexors 5-/5 remaining L leg 5/5 strength. 11/26  Day 3: Has surpassed a 2nd midnight with active treatments and services, which include neuro checks, telemetry monitoring, MRI done, report pending.        ED Triage Vitals   Temperature Pulse Respirations Blood Pressure SpO2   11/24/23 1838 11/24/23 1838 11/24/23 1838 11/24/23 1838 11/24/23 1838   98 °F (36.7 °C) 95 18 142/69 98 %      Temp Source Heart Rate Source Patient Position - Orthostatic VS BP Location FiO2 (%)   11/24/23 1838 11/24/23 1838 11/24/23 1838 11/24/23 1838 --   Temporal Monitor Sitting Left arm       Pain Score       11/24/23 1849       2          Wt Readings from Last 1 Encounters:   11/25/23 59.7 kg (131 lb 9.8 oz)     Additional Vital Signs:         Date/Time Temp Pulse Resp BP MAP (mmHg) SpO2 O2 Device   11/26/23 1100 -- 69 -- 97/63 74 98 % --   11/26/23 10:58:49 -- 71 -- 97/63 74 99 % --   11/26/23 08:42:17 -- 75 -- 104/64 77 98 % --   11/26/23 0839 -- 75 -- 104/64 -- -- --   11/26/23 07:29:07 97.9 °F (36.6 °C) 67 16 110/63 79 97 % --   11/26/23 0300 97.9 °F (36.6 °C) 63 16 116/63 -- 97 % None (Room air)   11/25/23 2300 97.6 °F (36.4 °C) 62 16 106/48 Abnormal  67 97 % None (Room air)   11/25/23 22:53:18 97.6 °F (36.4 °C) 75 16 106/48 Abnormal  67 97 % --   11/25/23 1900 98.1 °F (36.7 °C) 64 18 112/58 -- 99 % None (Room air)   11/25/23 17:47:16 98.9 °F (37.2 °C) 71 16 113/55 74 98 % --   11/25/23 15:14:03 98.1 °F (36.7 °C) 65 17 108/62 77 99 % --   11/25/23 1500 98.7 °F (37.1 °C) 73 18 107/59 75 -- --   11/25/23 1436 -- 67 16 -- -- -- --   11/25/23 1330 97.8 °F (36.6 °C) 72 18 149/87 80 98 % None (Room air)   11/25/23 1130 98.3 °F (36.8 °C) 70 17 151/85 85 99 % None (Room air)   11/25/23 0930 97.8 °F (36.6 °C) 64 16 130/69 89 99 % None (Room air)     Date/Time Temp Pulse Resp BP MAP (mmHg) SpO2 O2 Device   11/25/23 08:39:27 -- 69 -- 130/69 89 99 % --   11/25/23 0836 -- 69 -- 130/69 -- -- --   11/25/23 0730 97.8 °F (36.6 °C) 72 16 131/73 -- 98 % None (Room air)   11/25/23 0530 97.9 °F (36.6 °C) 64 16 127/75 -- 99 % None (Room air)   11/25/23 0430 98.1 °F (36.7 °C) 65 16 125/65 -- 99 % None (Room air)   11/25/23 0330 98.3 °F (36.8 °C) 63 16 129/72 -- 99 % None (Room air)   11/25/23 0230 97.8 °F (36.6 °C) 78 16 138/76 97 98 % None (Room air)   11/25/23 02:22:32 97.8 °F (36.6 °C) 71 14 138/76 97 99 % --   11/25/23 0215 -- 71 -- -- -- 98 % --   11/25/23 0200 -- 71 -- -- -- 99 % --   11/25/23 01:19:28 97.5 °F (36.4 °C) 64 20 139/73 95 100 % None (Room air)   11/25/23 0030 -- 74 20 158/82 113 99 % --   11/24/23 2330 -- 61 20 119/60 83 98 % --   11/24/23 2315 -- 62 20 -- -- 99 % --   11/24/23 2300 -- 64 20 110/61 81 98 % --   11/24/23 2215 -- 78 20 -- -- 98 % --   11/24/23 2100 -- 67 20 96/55 70 97 % --   11/24/23 2045 -- 70 20 -- -- 97 % --   11/24/23 2030 -- 73 20 108/57 76 97 % None (Room air)   11/24/23 1930 -- 69 -- 108/59 81 98 % --   11/24/23 1915 -- 73 18 -- -- 100 % None (Room air)       Date and Time Eye Opening Best Verbal Response Best Motor Response [place] Coma Scale Score   11/26/23 0300 4 5 6 15   11/25/23 2300 4 5 6 15   11/25/23 1900 4 5 6 15   11/25/23 1514 4 5 6 15   11/25/23 0530 4 5 6 15   11/25/23 0430 4 5 6 15   11/25/23 0330 4 5 6 15   11/25/23 0230 4 5 6 15   11/24/23 1948 4 5 6 15   11/24/23 1849 4 5 6 15       Pertinent Labs/Diagnostic Test Results:         11/25 MRI brain: Report pending. CTA ED chest PE study   Final Result by Rema Funes MD (11/24 0346)      1. No acute pulmonary embolism or thoracic aortic aneurysm/dissection. Mild scattered calcific atherosclerosis. 2.  Multiple bilateral pulmonary nodules measuring 4 mm or less, similar compared to the prior exam. Cannot exclude metastatic disease in this patient with history of malignancy. 3.  A few nonspecific subcentimeter mediastinal and right hilar lymph nodes are again seen without significant change. 4.  Stable small hiatal hernia and stable nonspecific subcentimeter mesenteric lymph nodes adjacent to the distal esophagus and gastroesophageal junction region. 5.  Cholelithiasis again noted without evidence for acute cholecystitis. 6.  No acute intrathoracic abnormalities noted with ancillary findings detailed above. Workstation performed: [id number]         CTA head and neck with and without contrast   Final Result by Bailey Lindquist MD (11/24 6530)      No acute intracranial abnormality. Chronic lacunar infarcts and microangiopathic changes as above      Stable CTA examination of the head and neck compared to prior study dated 9/25/2020. No evidence of hemodynamically significant stenosis or large vessel occlusion within the major vessels of the [place]. Stable 2 mm probable infundibulum at the level of the right ICA terminus and at the ACOM/A2 junction. No significant stenosis of the cervical carotid or vertebral arteries.       Workstation performed: [id number]                11/24 EKG #3:    Normal sinus rhythm  Septal infarct (cited on or before 06-SEP-2023)  Abnormal ECG  When compared with ECG of 24-NOV-2023 21:57, (unconfirmed)  No significant change was found    11/24 EKG #2:    Normal sinus rhythm  Low voltage QRS  Septal infarct (cited on or before 06-SEP-2023)  Abnormal ECG  When compared with ECG of 24-NOV-2023 19:45, (unconfirmed)  No significant change was found    11/24 EKG #1:     Normal sinus rhythm  Low voltage QRS  Septal infarct (cited on or before 06-SEP-2023)  Abnormal ECG  When compared with ECG of 06-SEP-2023 11:49,  No significant change was found        Results from last 7 days   Lab Units 11/26/23  0506 11/25/23 0514 11/25/23 0216 11/24/23 1940   WBC Thousand/uL 5.15 5.95  --  6.41   HEMOGLOBIN g/dL 10.3* 10.1*  --  10.8*   HEMATOCRIT % 32.9* 31.9*  --  35.0*   PLATELETS Thousands/uL 128* 147* 147* 164   NEUTROS ABS Thousands/µL 3.12  --   --  4.34         Results from last 7 days   Lab Units 11/26/23  0506 11/25/23 0514 11/24/23 1940   SODIUM mmol/L 138 139 138   POTASSIUM mmol/L 4.0 3.9 4.3   CHLORIDE mmol/L 110* 110* 109*   CO2 mmol/L 23 25 24   ANION GAP mmol/L 5 4 5   BUN mg/dL 15 19 20   CREATININE mg/dL 1.02 1.05 0.98   EGFR ml/min/1.73sq m 70 68 74   CALCIUM mg/dL 8.8 8.5 8.8     Results from last 7 days   Lab Units 11/24/23  1940   AST U/L 19   ALT U/L 17   ALK PHOS U/L 59   TOTAL PROTEIN g/dL 5.8*   ALBUMIN g/dL 3.7   TOTAL BILIRUBIN mg/dL 0.58         Results from last 7 days   Lab Units 11/26/23  0506 11/25/23 0514 11/24/23 1940   GLUCOSE RANDOM mg/dL 92 92 129           Results from last 7 days   Lab Units 11/24/23 2339 11/24/23 2159 11/24/23  1940   HS TNI 0HR ng/L  --   --  11   HS TNI 2HR ng/L  --  11  --    HSTNI D2 ng/L  --  0  --    HS TNI 4HR ng/L 12  --   --    HSTNI D4 ng/L 1  --   --          Results from last 7 days   Lab Units 11/24/23 1940   PROTIME seconds 14.7*   INR  1.09   PTT seconds 29 ED Treatment:   Medication Administration from 11/24/2023 1832 to 11/25/2023 0111         Date/Time Order Dose Route Action     11/24/2023 2124 EST iohexol (OMNIPAQUE) 350 MG/ML injection (MULTI-DOSE) 130 mL 130 mL Intravenous Given          Past Medical History:   Diagnosis Date    Hyperlipidemia     Ischemic cardiomyopathy     Lacunar infarction ([address])     Myocardial infarction ([address])     Near syncope 11/14/2019    Non-ST elevated myocardial infarction [facility])     NSTEMI (non-ST elevated myocardial infarction) ([address]) 10/20/2019    Pericarditis 10/24/2019    Poison ivy     Rectal cancer ([address])     Stroke ([address])      Present on Admission:   CAD in native artery   Essential hypertension   Moderate protein-calorie malnutrition (HCC)   Rectal cancer ([address])      Admitting Diagnosis: Blurry vision [H53.8]  Headache [R51.9]  Facial paresthesia [R20.2]  Age/Sex: 69 y.o. male      Admission Orders: Baseline NIH stroke scale on admission, reassess Q24H x 2 D, Nursing dysphagia assessment prior to staring diet, neuro checks, telemetry, MRI brain, ECHO, PT/OT eval, SCD. Scheduled Medications:  aspirin, 81 mg, Oral, Daily  atorvastatin, 80 mg, Oral, QPM  enoxaparin, 40 mg, Subcutaneous, Daily  ferrous sulfate, 325 mg, Oral, Daily With Breakfast  lisinopril, 2.5 mg, Oral, Daily  metoprolol succinate, 25 mg, Oral, Daily  pantoprazole, 40 mg, Oral, Daily  tamsulosin, 0.4 mg, Oral, HS      Continuous IV Infusions:     PRN Meds:  albuterol, 2 puff, Inhalation, Q6H PRN  diphenhydramine, lidocaine, Al/Mg hydroxide, simethicone, 10 mL, Swish & Spit, Q6H PRN        IP CONSULT TO PHYSICAL MEDICINE REHAB  IP CONSULT TO NEUROLOGY  IP CONSULT TO CASE MANAGEMENT  IP CONSULT TO NUTRITION SERVICES    Network Utilization Review Department  ATTENTION: Please call with any questions or concerns to [phone] and carefully listen to the prompts so that you are directed to the right person.  All voicemails are confidential.   For Discharge needs, contact Care Management DC Support Team at [phone] opt. 2  Send all requests for admission clinical reviews, approved or denied determinations and any other requests to dedicated fax number below belonging to the campus where the patient is receiving treatment.  List of dedicated fax numbers for the Facilities:  [place] DENIALS (Administrative/Medical Necessity) [phone]   DISCHARGE SUPPORT TEAM (NETWORK) [address] [facility] (Maternity/NICU/Pediatrics) [phone]   [address] [place] [address] Drive [address] [place] [address] [address] [facility] [phone]   [facility] [address] [place] [address] [address] [address] [facility] [address] [facility] [address] [phone]

## 2023-11-25 NOTE — RESPIRATORY THERAPY NOTE
RT Protocol Note  Charo Guillen. 69 y.o. male MRN: [id number]  Unit/Bed#: -01 Encounter: [id number]    Assessment    Principal Problem:    Headache  Active Problems:    CAD in native artery    History of ischemic cardiomyopathy    History of lacunar cerebrovascular accident (CVA)    Essential hypertension    Moderate protein-calorie malnutrition (HCC)    Rectal cancer ([address])    Physical Exam:   Assessment Type: Assess only  General Appearance: Awake  Respiratory Pattern: Normal  Chest Assessment: Chest expansion symmetrical  Bilateral Breath Sounds: Diminished, Clear  O2 Device: ra    Vitals:  Blood pressure 130/69, pulse 67, temperature 97.8 °F (36.6 °C), temperature source Oral, resp. rate 16, height 5' 10" (1.778 m), weight 59.7 kg (131 lb 9.8 oz), SpO2 99 %. O2 Device: ra     Plan    Respiratory Plan: No distress/Pulmonary history        Resp Comments: pt admitted for headache and facial numbness.  no known pulm hx has PRN MDI at home will cont w PRN MDI

## 2023-11-25 NOTE — PLAN OF CARE
Problem: PAIN - ADULT  Goal: Verbalizes/displays adequate comfort level or baseline comfort level  Description: Interventions:  - Encourage patient to monitor pain and request assistance  - Assess pain using appropriate pain scale  - Administer analgesics based on type and severity of pain and evaluate response  - Implement non-pharmacological measures as appropriate and evaluate response  - Consider cultural and social influences on pain and pain management  - Notify physician/advanced practitioner if interventions unsuccessful or patient reports new pain  Outcome: Progressing     Problem: INFECTION - ADULT  Goal: Absence or prevention of progression during hospitalization  Description: INTERVENTIONS:  - Assess and monitor for signs and symptoms of infection  - Monitor lab/diagnostic results  - Monitor all insertion sites, i.e. indwelling lines, tubes, and drains  - Monitor endotracheal if appropriate and nasal secretions for changes in amount and color  - [place] appropriate cooling/warming therapies per order  - Administer medications as ordered  - Instruct and encourage patient and family to use good hand hygiene technique  - Identify and instruct in appropriate isolation precautions for identified infection/condition  Outcome: Progressing  Goal: Absence of fever/infection during neutropenic period  Description: INTERVENTIONS:  - Monitor WBC    Outcome: Progressing     Problem: SAFETY ADULT  Goal: Patient will remain free of falls  Description: INTERVENTIONS:  - Educate patient/family on patient safety including physical limitations  - Instruct patient to call for assistance with activity   - Consult OT/PT to assist with strengthening/mobility   - Keep Call bell within reach  - Keep bed low and locked with side rails adjusted as appropriate  - Keep care items and personal belongings within reach  - Initiate and maintain comfort rounds  - Make Fall Risk Sign visible to staff  - Offer Toileting every 2 Hours, in advance of need  - Initiate/Maintain bedalarm  - Obtain necessary fall risk management equipment:   - Apply yellow socks and bracelet for high fall risk patients  - Consider moving patient to room near nurses station  Outcome: Progressing  Goal: Maintain or return to baseline ADL function  Description: INTERVENTIONS:  -  Assess patient's ability to carry out ADLs; assess patient's baseline for ADL function and identify physical deficits which impact ability to perform ADLs (bathing, care of mouth/teeth, toileting, grooming, dressing, etc.)  - Assess/evaluate cause of self-care deficits   - Assess range of motion  - Assess patient's mobility; develop plan if impaired  - Assess patient's need for assistive devices and provide as appropriate  - Encourage maximum independence but intervene and supervise when necessary  - Involve family in performance of ADLs  - Assess for home care needs following discharge   - Consider OT consult to assist with ADL evaluation and planning for discharge  - Provide patient education as appropriate  Outcome: Progressing  Goal: Maintains/Returns to pre admission functional level  Description: INTERVENTIONS:  - Perform AM-PAC 6 Click Basic Mobility/ Daily Activity assessment daily.  - Set and communicate daily mobility goal to care team and patient/family/caregiver. - Collaborate with rehabilitation services on mobility goals if consulted  - Perform Range of Motion 3 times a day. - Reposition patient every 2 hours.   - Dangle patient 3 times a day  - Stand patient 3 times a day  - Ambulate patient 3 times a day  - Out of bed to chair 3 times a day   - Out of bed for meals 3 times a day  - Out of bed for toileting  - Record patient progress and toleration of activity level   Outcome: Progressing     Problem: DISCHARGE PLANNING  Goal: Discharge to home or other facility with appropriate resources  Description: INTERVENTIONS:  - Identify barriers to discharge w/patient and caregiver  - Arrange for needed discharge resources and transportation as appropriate  - Identify discharge learning needs (meds, wound care, etc.)  - Arrange for interpretive services to assist at discharge as needed  - Refer to Case Management Department for coordinating discharge planning if the patient needs post-hospital services based on physician/advanced practitioner order or complex needs related to functional status, cognitive ability, or social support system  Outcome: Progressing     Problem: Knowledge Deficit  Goal: Patient/family/caregiver demonstrates understanding of disease process, treatment plan, medications, and discharge instructions  Description: Complete learning assessment and assess knowledge base.   Interventions:  - Provide teaching at level of understanding  - Provide teaching via preferred learning methods  Outcome: Progressing     Problem: NEUROSENSORY - ADULT  Goal: Achieves stable or improved neurological status  Description: INTERVENTIONS  - Monitor and report changes in neurological status  - Monitor vital signs such as temperature, blood pressure, glucose, and any other labs ordered   - Initiate measures to prevent increased intracranial pressure  - Monitor for seizure activity and implement precautions if appropriate      Outcome: Progressing  Goal: Remains free of injury related to seizures activity  Description: INTERVENTIONS  - Maintain airway, patient safety  and administer oxygen as ordered  - Monitor patient for seizure activity, document and report duration and description of seizure to physician/advanced practitioner  - If seizure occurs,  ensure patient safety during seizure  - Reorient patient post seizure  - Seizure pads on all 4 side rails  - Instruct patient/family to notify RN of any seizure activity including if an aura is experienced  - Instruct patient/family to call for assistance with activity based on nursing assessment  - Administer anti-seizure medications if ordered    Outcome: Progressing  Goal: Achieves maximal functionality and self care  Description: INTERVENTIONS  - Monitor swallowing and airway patency with patient fatigue and changes in neurological status  - Encourage and assist patient to increase activity and self care.    - Encourage visually impaired, hearing impaired and aphasic patients to use assistive/communication devices  Outcome: Progressing     Problem: CARDIOVASCULAR - ADULT  Goal: Maintains optimal cardiac output and hemodynamic stability  Description: INTERVENTIONS:  - Monitor I/O, vital signs and rhythm  - Monitor for S/S and trends of decreased cardiac output  - Administer and titrate ordered vasoactive medications to optimize hemodynamic stability  - Assess quality of pulses, skin color and temperature  - Assess for signs of decreased coronary artery perfusion  - Instruct patient to report change in severity of symptoms  Outcome: Progressing  Goal: Absence of cardiac dysrhythmias or at baseline rhythm  Description: INTERVENTIONS:  - Continuous cardiac monitoring, vital signs, obtain 12 lead EKG if ordered  - Administer antiarrhythmic and heart rate control medications as ordered  - Monitor electrolytes and administer replacement therapy as ordered  Outcome: Progressing     Problem: GASTROINTESTINAL - ADULT  Goal: Minimal or absence of nausea and/or vomiting  Description: INTERVENTIONS:  - Administer IV fluids if ordered to ensure adequate hydration  - Maintain NPO status until nausea and vomiting are resolved  - Nasogastric tube if ordered  - Administer ordered antiemetic medications as needed  - Provide nonpharmacologic comfort measures as appropriate  - Advance diet as tolerated, if ordered  - Consider nutrition services referral to assist patient with adequate nutrition and appropriate food choices  Outcome: Progressing  Goal: Maintains or returns to baseline bowel function  Description: INTERVENTIONS:  - Assess bowel function  - Encourage oral fluids to ensure adequate hydration  - Administer IV fluids if ordered to ensure adequate hydration  - Administer ordered medications as needed  - Encourage mobilization and activity  - Consider nutritional services referral to assist patient with adequate nutrition and appropriate food choices  Outcome: Progressing  Goal: Maintains adequate nutritional intake  Description: INTERVENTIONS:  - Monitor percentage of each meal consumed  - Identify factors contributing to decreased intake, treat as appropriate  - Assist with meals as needed  - Monitor I&O, weight, and lab values if indicated  - Obtain nutrition services referral as needed  Outcome: Progressing  Goal: Oral mucous membranes remain intact  Description: INTERVENTIONS  - Assess oral mucosa and hygiene practices  - Implement preventative oral hygiene regimen  - Implement oral medicated treatments as ordered  - Initiate Nutrition services referral as needed  Outcome: Progressing     Problem: MUSCULOSKELETAL - ADULT  Goal: Maintain or return mobility to safest level of function  Description: INTERVENTIONS:  - Assess patient's ability to carry out ADLs; assess patient's baseline for ADL function and identify physical deficits which impact ability to perform ADLs (bathing, care of mouth/teeth, toileting, grooming, dressing, etc.)  - Assess/evaluate cause of self-care deficits   - Assess range of motion  - Assess patient's mobility  - Assess patient's need for assistive devices and provide as appropriate  - Encourage maximum independence but intervene and supervise when necessary  - Involve family in performance of ADLs  - Assess for home care needs following discharge   - Consider OT consult to assist with ADL evaluation and planning for discharge  - Provide patient education as appropriate  Outcome: Progressing  Goal: Maintain proper alignment of affected body part  Description: INTERVENTIONS:  - Support, maintain and protect limb and body alignment  - Provide patient/ family with appropriate education  Outcome: Progressing     Problem: Neurological Deficit  Goal: Neurological status is stable or improving  Description: Interventions:  - Monitor and assess patient's level of consciousness, motor function, sensory function, and level of assistance needed for ADLs. - Monitor and report changes from baseline. Collaborate with interdisciplinary team to initiate plan and implement interventions as ordered. - Provide and maintain a safe environment. - Consider seizure precautions. - Consider fall precautions. - Consider aspiration precautions. - Consider bleeding precautions. Outcome: Progressing     Problem: Activity Intolerance/Impaired Mobility  Goal: Mobility/activity is maintained at optimum level for patient  Description: Interventions:  - Assess and monitor patient  barriers to mobility and need for assistive/adaptive devices. - Assess patient's emotional response to limitations. - Collaborate with interdisciplinary team and initiate plans and interventions as ordered. - Encourage independent activity per ability.  - Maintain proper body alignment. - Perform active/passive rom as tolerated/ordered. - Plan activities to conserve energy.  - Turn patient as appropriate  Outcome: Progressing     Problem: Communication Impairment  Goal: Ability to express needs and understand communication  Description: Assess patient's communication skills and ability to understand information. Patient will demonstrate use of effective communication techniques, alternative methods of communication and understanding even if not able to speak. - Encourage communication and provide alternate methods of communication as needed. - Collaborate with case management/ for discharge needs. - Include patient/family/caregiver in decisions related to communication.   Outcome: Progressing     Problem: Potential for Aspiration  Goal: Non-ventilated patient's risk of aspiration is minimized  Description: Assess and monitor vital signs, respiratory status, and labs (WBC). Monitor for signs of aspiration (tachypnea, cough, rales, wheezing, cyanosis, fever). - Assess and monitor patient's ability to swallow. - Place patient up in chair to eat if possible. - HOB up at 90 degrees to eat if unable to get patient up into chair.  - Supervise patient during oral intake. - Instruct patient/ family to take small bites. - Instruct patient/ family to take small single sips when taking liquids. - Follow patient-specific strategies generated by speech pathologist.  Outcome: Progressing  Goal: Ventilated patient's risk of aspiration is minimized  Description: Assess and monitor vital signs, respiratory status, airway cuff pressure, and labs (WBC). Monitor for signs of aspiration (tachypnea, cough, rales, wheezing, cyanosis, fever). - Elevate head of bed 30 degrees if patient has tube feeding.  - Monitor tube feeding. Outcome: Progressing     Problem: Nutrition  Goal: Nutrition/Hydration status is improving  Description: Monitor and assess patient's nutrition/hydration status for malnutrition (ex- brittle hair, bruises, dry skin, pale skin and conjunctiva, muscle wasting, smooth red tongue, and disorientation). Collaborate with interdisciplinary team and initiate plan and interventions as ordered. Monitor patient's weight and dietary intake as ordered or per policy. Utilize nutrition screening tool and intervene per policy. Determine patient's food preferences and provide high-protein, high-caloric foods as appropriate. - Assist patient with eating.  - Allow adequate time for meals.  - Encourage patient to take dietary supplement as ordered. - Collaborate with clinical nutritionist.  - Include patient/family/caregiver in decisions related to nutrition.   Outcome: Progressing     Problem: Nutrition/Hydration-ADULT  Goal: Nutrient/Hydration intake appropriate for improving, restoring or maintaining nutritional needs  Description: Monitor and assess patient's nutrition/hydration status for malnutrition. Collaborate with interdisciplinary team and initiate plan and interventions as ordered. Monitor patient's weight and dietary intake as ordered or per policy. Utilize nutrition screening tool and intervene as necessary. Determine patient's food preferences and provide high-protein, high-caloric foods as appropriate.      INTERVENTIONS:  - Monitor oral intake, urinary output, labs, and treatment plans  - Assess nutrition and hydration status and recommend course of action  - Evaluate amount of meals eaten  - Assist patient with eating if necessary   - Allow adequate time for meals  - Recommend/ encourage appropriate diets, oral nutritional supplements, and vitamin/mineral supplements  - Order, calculate, and assess calorie counts as needed  - Recommend, monitor, and adjust tube feedings and TPN/PPN based on assessed needs  - Assess need for intravenous fluids  - Provide specific nutrition/hydration education as appropriate  - Include patient/family/caregiver in decisions related to nutrition  Outcome: Progressing     Problem: PAIN - ADULT  Goal: Verbalizes/displays adequate comfort level or baseline comfort level  Description: Interventions:  - Encourage patient to monitor pain and request assistance  - Assess pain using appropriate pain scale  - Administer analgesics based on type and severity of pain and evaluate response  - Implement non-pharmacological measures as appropriate and evaluate response  - Consider cultural and social influences on pain and pain management  - Notify physician/advanced practitioner if interventions unsuccessful or patient reports new pain  Outcome: Progressing     Problem: INFECTION - ADULT  Goal: Absence or prevention of progression during hospitalization  Description: INTERVENTIONS:  - Assess and monitor for signs and symptoms of infection  - Monitor lab/diagnostic results  - Monitor all insertion sites, i.e. indwelling lines, tubes, and drains  - Monitor endotracheal if appropriate and nasal secretions for changes in amount and color  - [place] appropriate cooling/warming therapies per order  - Administer medications as ordered  - Instruct and encourage patient and family to use good hand hygiene technique  - Identify and instruct in appropriate isolation precautions for identified infection/condition  Outcome: Progressing  Goal: Absence of fever/infection during neutropenic period  Description: INTERVENTIONS:  - Monitor WBC    Outcome: Progressing     Problem: SAFETY ADULT  Goal: Patient will remain free of falls  Description: INTERVENTIONS:  - Educate patient/family on patient safety including physical limitations  - Instruct patient to call for assistance with activity   - Consult OT/PT to assist with strengthening/mobility   - Keep Call bell within reach  - Keep bed low and locked with side rails adjusted as appropriate  - Keep care items and personal belongings within reach  - Initiate and maintain comfort rounds  - Make Fall Risk Sign visible to staff  - Offer Toileting every 2 Hours, in advance of need  - Initiate/Maintain bedalarm  - Obtain necessary fall risk management equipment:   - Apply yellow socks and bracelet for high fall risk patients  - Consider moving patient to room near nurses station  Outcome: Progressing  Goal: Maintain or return to baseline ADL function  Description: INTERVENTIONS:  -  Assess patient's ability to carry out ADLs; assess patient's baseline for ADL function and identify physical deficits which impact ability to perform ADLs (bathing, care of mouth/teeth, toileting, grooming, dressing, etc.)  - Assess/evaluate cause of self-care deficits   - Assess range of motion  - Assess patient's mobility; develop plan if impaired  - Assess patient's need for assistive devices and provide as appropriate  - Encourage maximum independence but intervene and supervise when necessary  - Involve family in performance of ADLs  - Assess for home care needs following discharge   - Consider OT consult to assist with ADL evaluation and planning for discharge  - Provide patient education as appropriate  Outcome: Progressing  Goal: Maintains/Returns to pre admission functional level  Description: INTERVENTIONS:  - Perform AM-PAC 6 Click Basic Mobility/ Daily Activity assessment daily.  - Set and communicate daily mobility goal to care team and patient/family/caregiver. - Collaborate with rehabilitation services on mobility goals if consulted  - Perform Range of Motion 3 times a day. - Reposition patient every 2 hours. - Dangle patient 3 times a day  - Stand patient 3 times a day  - Ambulate patient 3 times a day  - Out of bed to chair 3 times a day   - Out of bed for meals 3 times a day  - Out of bed for toileting  - Record patient progress and toleration of activity level   Outcome: Progressing     Problem: DISCHARGE PLANNING  Goal: Discharge to home or other facility with appropriate resources  Description: INTERVENTIONS:  - Identify barriers to discharge w/patient and caregiver  - Arrange for needed discharge resources and transportation as appropriate  - Identify discharge learning needs (meds, wound care, etc.)  - Arrange for interpretive services to assist at discharge as needed  - Refer to Case Management Department for coordinating discharge planning if the patient needs post-hospital services based on physician/advanced practitioner order or complex needs related to functional status, cognitive ability, or social support system  Outcome: Progressing     Problem: Knowledge Deficit  Goal: Patient/family/caregiver demonstrates understanding of disease process, treatment plan, medications, and discharge instructions  Description: Complete learning assessment and assess knowledge base.   Interventions:  - Provide teaching at level of understanding  - Provide teaching via preferred learning methods  Outcome: Progressing     Problem: NEUROSENSORY - ADULT  Goal: Achieves stable or improved neurological status  Description: INTERVENTIONS  - Monitor and report changes in neurological status  - Monitor vital signs such as temperature, blood pressure, glucose, and any other labs ordered   - Initiate measures to prevent increased intracranial pressure  - Monitor for seizure activity and implement precautions if appropriate      Outcome: Progressing  Goal: Remains free of injury related to seizures activity  Description: INTERVENTIONS  - Maintain airway, patient safety  and administer oxygen as ordered  - Monitor patient for seizure activity, document and report duration and description of seizure to physician/advanced practitioner  - If seizure occurs,  ensure patient safety during seizure  - Reorient patient post seizure  - Seizure pads on all 4 side rails  - Instruct patient/family to notify RN of any seizure activity including if an aura is experienced  - Instruct patient/family to call for assistance with activity based on nursing assessment  - Administer anti-seizure medications if ordered    Outcome: Progressing  Goal: Achieves maximal functionality and self care  Description: INTERVENTIONS  - Monitor swallowing and airway patency with patient fatigue and changes in neurological status  - Encourage and assist patient to increase activity and self care.    - Encourage visually impaired, hearing impaired and aphasic patients to use assistive/communication devices  Outcome: Progressing     Problem: CARDIOVASCULAR - ADULT  Goal: Maintains optimal cardiac output and hemodynamic stability  Description: INTERVENTIONS:  - Monitor I/O, vital signs and rhythm  - Monitor for S/S and trends of decreased cardiac output  - Administer and titrate ordered vasoactive medications to optimize hemodynamic stability  - Assess quality of pulses, skin color and temperature  - Assess for signs of decreased coronary artery perfusion  - Instruct patient to report change in severity of symptoms  Outcome: Progressing  Goal: Absence of cardiac dysrhythmias or at baseline rhythm  Description: INTERVENTIONS:  - Continuous cardiac monitoring, vital signs, obtain 12 lead EKG if ordered  - Administer antiarrhythmic and heart rate control medications as ordered  - Monitor electrolytes and administer replacement therapy as ordered  Outcome: Progressing     Problem: GASTROINTESTINAL - ADULT  Goal: Minimal or absence of nausea and/or vomiting  Description: INTERVENTIONS:  - Administer IV fluids if ordered to ensure adequate hydration  - Maintain NPO status until nausea and vomiting are resolved  - Nasogastric tube if ordered  - Administer ordered antiemetic medications as needed  - Provide nonpharmacologic comfort measures as appropriate  - Advance diet as tolerated, if ordered  - Consider nutrition services referral to assist patient with adequate nutrition and appropriate food choices  Outcome: Progressing  Goal: Maintains or returns to baseline bowel function  Description: INTERVENTIONS:  - Assess bowel function  - Encourage oral fluids to ensure adequate hydration  - Administer IV fluids if ordered to ensure adequate hydration  - Administer ordered medications as needed  - Encourage mobilization and activity  - Consider nutritional services referral to assist patient with adequate nutrition and appropriate food choices  Outcome: Progressing  Goal: Maintains adequate nutritional intake  Description: INTERVENTIONS:  - Monitor percentage of each meal consumed  - Identify factors contributing to decreased intake, treat as appropriate  - Assist with meals as needed  - Monitor I&O, weight, and lab values if indicated  - Obtain nutrition services referral as needed  Outcome: Progressing  Goal: Oral mucous membranes remain intact  Description: INTERVENTIONS  - Assess oral mucosa and hygiene practices  - Implement preventative oral hygiene regimen  - Implement oral medicated treatments as ordered  - Initiate Nutrition services referral as needed  Outcome: Progressing     Problem: MUSCULOSKELETAL - ADULT  Goal: Maintain or return mobility to safest level of function  Description: INTERVENTIONS:  - Assess patient's ability to carry out ADLs; assess patient's baseline for ADL function and identify physical deficits which impact ability to perform ADLs (bathing, care of mouth/teeth, toileting, grooming, dressing, etc.)  - Assess/evaluate cause of self-care deficits   - Assess range of motion  - Assess patient's mobility  - Assess patient's need for assistive devices and provide as appropriate  - Encourage maximum independence but intervene and supervise when necessary  - Involve family in performance of ADLs  - Assess for home care needs following discharge   - Consider OT consult to assist with ADL evaluation and planning for discharge  - Provide patient education as appropriate  Outcome: Progressing  Goal: Maintain proper alignment of affected body part  Description: INTERVENTIONS:  - Support, maintain and protect limb and body alignment  - Provide patient/ family with appropriate education  Outcome: Progressing     Problem: Neurological Deficit  Goal: Neurological status is stable or improving  Description: Interventions:  - Monitor and assess patient's level of consciousness, motor function, sensory function, and level of assistance needed for ADLs. - Monitor and report changes from baseline. Collaborate with interdisciplinary team to initiate plan and implement interventions as ordered. - Provide and maintain a safe environment. - Consider seizure precautions. - Consider fall precautions. - Consider aspiration precautions. - Consider bleeding precautions. Outcome: Progressing     Problem:  Activity Intolerance/Impaired Mobility  Goal: Mobility/activity is maintained at optimum level for patient  Description: Interventions:  - Assess and monitor patient  barriers to mobility and need for assistive/adaptive devices. - Assess patient's emotional response to limitations. - Collaborate with interdisciplinary team and initiate plans and interventions as ordered. - Encourage independent activity per ability.  - Maintain proper body alignment. - Perform active/passive rom as tolerated/ordered. - Plan activities to conserve energy.  - Turn patient as appropriate  Outcome: Progressing     Problem: Communication Impairment  Goal: Ability to express needs and understand communication  Description: Assess patient's communication skills and ability to understand information. Patient will demonstrate use of effective communication techniques, alternative methods of communication and understanding even if not able to speak. - Encourage communication and provide alternate methods of communication as needed. - Collaborate with case management/ for discharge needs. - Include patient/family/caregiver in decisions related to communication. Outcome: Progressing     Problem: Potential for Aspiration  Goal: Non-ventilated patient's risk of aspiration is minimized  Description: Assess and monitor vital signs, respiratory status, and labs (WBC). Monitor for signs of aspiration (tachypnea, cough, rales, wheezing, cyanosis, fever). - Assess and monitor patient's ability to swallow. - Place patient up in chair to eat if possible. - HOB up at 90 degrees to eat if unable to get patient up into chair.  - Supervise patient during oral intake. - Instruct patient/ family to take small bites. - Instruct patient/ family to take small single sips when taking liquids.   - Follow patient-specific strategies generated by speech pathologist.  Outcome: Progressing  Goal: Ventilated patient's risk of aspiration is minimized  Description: Assess and monitor vital signs, respiratory status, airway cuff pressure, and labs (WBC). Monitor for signs of aspiration (tachypnea, cough, rales, wheezing, cyanosis, fever). - Elevate head of bed 30 degrees if patient has tube feeding.  - Monitor tube feeding. Outcome: Progressing     Problem: Nutrition  Goal: Nutrition/Hydration status is improving  Description: Monitor and assess patient's nutrition/hydration status for malnutrition (ex- brittle hair, bruises, dry skin, pale skin and conjunctiva, muscle wasting, smooth red tongue, and disorientation). Collaborate with interdisciplinary team and initiate plan and interventions as ordered. Monitor patient's weight and dietary intake as ordered or per policy. Utilize nutrition screening tool and intervene per policy. Determine patient's food preferences and provide high-protein, high-caloric foods as appropriate. - Assist patient with eating.  - Allow adequate time for meals.  - Encourage patient to take dietary supplement as ordered. - Collaborate with clinical nutritionist.  - Include patient/family/caregiver in decisions related to nutrition. Outcome: Progressing     Problem: Nutrition/Hydration-ADULT  Goal: Nutrient/Hydration intake appropriate for improving, restoring or maintaining nutritional needs  Description: Monitor and assess patient's nutrition/hydration status for malnutrition. Collaborate with interdisciplinary team and initiate plan and interventions as ordered. Monitor patient's weight and dietary intake as ordered or per policy. Utilize nutrition screening tool and intervene as necessary. Determine patient's food preferences and provide high-protein, high-caloric foods as appropriate.      INTERVENTIONS:  - Monitor oral intake, urinary output, labs, and treatment plans  - Assess nutrition and hydration status and recommend course of action  - Evaluate amount of meals eaten  - Assist patient with eating if necessary   - Allow adequate time for meals  - Recommend/ encourage appropriate diets, oral nutritional supplements, and vitamin/mineral supplements  - Order, calculate, and assess calorie counts as needed  - Recommend, monitor, and adjust tube feedings and TPN/PPN based on assessed needs  - Assess need for intravenous fluids  - Provide specific nutrition/hydration education as appropriate  - Include patient/family/caregiver in decisions related to nutrition  Outcome: Progressing

## 2023-11-25 NOTE — ASSESSMENT & PLAN NOTE
Mildly reduced EF of 45% on echo from 7/2023  Monitored closely on IVF for volume overload not currently on diuretics  Continue lisinopril and metoprolol

## 2023-11-25 NOTE — CASE MANAGEMENT
Case Management Assessment & Discharge Planning Note    Patient name Sid Olivas.   Location /-01 MRN [id number]  : 1945 Date 2023       Current Admission Date: 2023  Current Admission Diagnosis:Headache   Patient Active Problem List    Diagnosis Date Noted    Headache 2023    Chemotherapy induced neutropenia  2023    Port-A-Cath in place 2023    Depression, recurrent ([address]) 2023    Rectal cancer ([address]) 2023    Phimosis 2023    Lower urinary tract symptoms (LUTS) 2023    Renal cyst 2023    Microscopic hematuria 2023    Encounter to discuss test results 2023    Enlarged prostate 2023    Cerebrovascular accident (CVA) ([address]) 2023    Ischemic cardiomyopathy 2023    Moderate protein-calorie malnutrition ([address]) 2023    STEMI involving left anterior descending coronary artery ([address]) 2023    Rectal bleeding 2023    Acute on chronic anemia 2023    Protein-calorie malnutrition, unspecified severity ([address]) 2023    Essential hypertension 2020    Non-recurrent unilateral inguinal hernia 2020    History of lacunar cerebrovascular accident (CVA) 2019    CAD in native artery 2019    S/P primary angioplasty with coronary stent 2019    History of ischemic cardiomyopathy 2019      LOS (days): 0  Geometric Mean LOS (GMLOS) (days):   Days to GMLOS:     OBJECTIVE:    Risk of Unplanned Readmission Score: 17.32         Current admission status: Inpatient       Preferred Pharmacy:   [address] [place] [address] #[id number] [facility], [address] [place] [id number]  Phone: [phone] Fax: [address], [address]  [address] [place] [address]  Phone: [phone] Fax: [phone]    [facility] [address] [facility], [address] [facility],[address]  [place] [address] [place] [address]  Phone: [phone] Fax: [phone]    Primary Care Provider: Olesya Ruggiero DO    Primary Insurance: [employer] [facility] REP  Secondary Insurance:     ASSESSMENT:  [address] [facility], [address] Representative - Daughter   Primary Phone: [phone] (Home)                 Advance Directives  Does patient have a [address]?: Yes  Does patient have Advance Directives?: Yes  Advance Directives: Living will, Power of  for health care  Primary Contact: daughter Erendira         Readmission Root Cause  30 Day Readmission: No    Patient Information  Admitted from[de-identified] Home  Mental Status: Alert  During Assessment patient was accompanied by: Not accompanied during assessment  Assessment information provided by[de-identified] Patient  Primary Caregiver: Self  Support Systems: Daughter (daughter Layton Grajeda)  [place] of Residence: [address] [facility] do you live in?: [address] entry access options.  Select all that apply.: Stairs  Number of steps to enter home.: 3  Do the steps have railings?: Yes  Type of Current Residence: [address] [place] [address] [address] Arrangements: Lives w/ Daughter  Is patient a ?: Yes  Is patient active with [facility])?: No    Activities of Daily Living Prior to Admission  Functional Status: Independent  Completes ADLs independently?: Yes  Ambulates independently?: Yes  Does patient use assisted devices?: No  Does patient currently own DME?: No  Does patient have a history of Outpatient Therapy (PT/OT)?: Yes  Does the patient have a history of Short-Term Rehab?: No  Does patient have a history of HHC?: No  Does patient currently have [facility]?: No         Patient Information Continued  Income Source: Pension/longterm  Does patient have prescription coverage?: Yes  Does patient receive dialysis treatments?: No  Does patient have a history of substance abuse?: No  Does patient have a history of Mental Health Diagnosis?: No    PHQ 2/9 Screening Reviewed PHQ 2/9 Depression Screening Score?: No    Means of Transportation  Means of Transport to Appts[de-identified] Family transport      Housing Stability: Low Risk  (5/19/2023)    Housing Stability Vital Sign     Unable to Pay for Housing in the Last Year: No     Number of Places Lived in the Last Year: 1     Unstable Housing in the Last Year: No   Food Insecurity: No Food Insecurity (5/19/2023)    Hunger Vital Sign     Worried About Running Out of Food in the Last Year: Never true     Ran Out of Food in the Last Year: Never true   Transportation Needs: No Transportation Needs (5/19/2023)    PRAPARE - Transportation     Lack of Transportation (Medical): No     Lack of Transportation (Non-Medical): No   Utilities: Not on file       DISCHARGE DETAILS:    Discharge planning discussed with[de-identified] patient  Freedom of Choice: Yes  Comments - Freedom of Choice: Pt declining VNA services. He lives with his daughter Fermín Julien who is a CNA and provides any needed assistance.   His granddaughter who lives across the street is also a CNA and can also help if needed  CM contacted family/caregiver?: No- see comments (pt will update his daughter Fermín Julien himself after the MRI and neurologist consult)  Were Treatment Team discharge recommendations reviewed with patient/caregiver?: Yes  Did patient/caregiver verbalize understanding of patient care needs?: Yes  Were patient/caregiver advised of the risks associated with not following Treatment Team discharge recommendations?: Yes    Contacts  Patient Contacts: Erendira  Relationship to Patient[de-identified] [facility]          Is the patient interested in [address] at discharge?: No    DME Referral Provided  Referral made for DME?: No    Other Referral/Resources/Interventions Provided:  Referral Comments: No anticipated d/c needs -daughter Fermín Julien is a CNA and provides any needed assistance    Would you like to participate in our [address] service program?  : No - Declined    Treatment Team Recommendation: Home  Discharge Destination Plan[de-identified] Home  Transport at Discharge : Family

## 2023-11-25 NOTE — ASSESSMENT & PLAN NOTE
Patient presenting with headache, facial numbness, and blurred vision in patient with known history of rectal cancer getting active chemotherapy treatments  Discussed with oncology recommended patient come to ED for further evaluation via imaging given concern for brain mets  CTA head and neck completed in ED no acute findings; stable neck findings in comparison to prior imaging in 2020. Chronic lacunar infarct   Diff dx: cva vs brain mets  Will initiate stroke pathway for completeness if MRI brain negative for CVA can discontinue pathway  PT/OT evaluation  Neurology consult  Treat headache symptoms   Patient currently asymptomatic from headache perspective.    Prn tylenol and monitoring

## 2023-11-25 NOTE — ASSESSMENT & PLAN NOTE
Malnutrition Findings:                                 BMI Findings: Body mass index is 18.88 kg/m².    Evident by bmi of 18.88  Nutrition evaluation recs appreciated

## 2023-11-25 NOTE — SPEECH THERAPY NOTE
Consult received and chart reviewed. Per chart review, and discussion with RN, pt passed RN dysphagia assessment and is tolerating regular diet with thin liquids with no s/s of aspiration. No dysarthria or language deficits noted or reported. Therefore, formal speech/swallow evaluation not indicated at this time. If new concerns arise, please reconsult.      Montrell Zavala M.S., [address] [facility]  Speech Language Pathologist   Available via [address] [place] [address] #[id number]  [place] #[id number]

## 2023-11-25 NOTE — NUTRITION
11/25/23 1445   Biochemical Data,Medical Tests, and Procedures   Biochemical Data/Medical Tests/Procedures Lab values reviewed; Meds reviewed   Labs (Comment) 11/25/23 HgA1c 6.1, HDL 37, Cl 110   Meds (Comment) atorvastatin, ferrous sulfate, lisinopril, protonix   Nutrition-Focused Physical Exam   Nutrition-Focused Physical Exam Findings RN skin assessment reviewed; No edema documented; No skin issues documented   Medical-Related Concerns HTN, malnutrition, CAD, CVA, rectal cancer on chemotherapy   Current PO Intake   Current Diet Order Cardiac diet, thin liquids. Estimated calorie intake compared to estimated need Anticipate PO needs are not met in setting of increased calorie/protein needs. Recommendations/Interventions   Malnutrition/BMI Present No   Summary Consult - Stroke; ST consulted. Presents with headache. Stroke pathway. Past medical history significant for HTN, malnutrition, CAD, CVA, rectal cancer on chemotherapy. Weight history reviewed. No significant change. No edema. No pressure areas. Prescribed a Cardiac diet, thin liquids. Not indicated for formal ST evaluation at this time. Pt out of room at time of visits. Stroke unconfirmed as of yet. Continue PO diet as prescribed. Will add Ensure Compact TID to provide additional calories/protein in setting of increased needs. RD to follow PRN.    Interventions/Recommendations Continue current diet order;Supplement initiate;Monitor I & O's   Education Assessment   Education Education not indicated at this time   Patient Nutrition Goals   Goal Increase kcal/PRO intake

## 2023-11-25 NOTE — ASSESSMENT & PLAN NOTE
Nora Hernandez. is a 69 y.o.  male with a pertinent history of Rectal cA, prior Stroke ( L frontal/parietal) HTN, CAD  who presented on 11/24 with a headache, blurred vision and facial numbness and tinging. Patient was at the infusion center on 11/16 when he started experiencing blurred vision which lasted for several hours and then stopped. On arrival to the ED. Nadean Flavors did not reveal any acute intracranial abnormalities. Known stable 2mm infundibulum at the level of the R ICA terminus. On further history patient reports he has had blurry vision and mild 2/10 headache since last Thursday over a week ago. The blurry vision is not constant however is present most of the day. He does feel that it improves when closing the left eye. No double vision. His headache is predominately in his face. No dysphagia or speech issues reported. On exam A&O x3, visual fields intact, no aphasia, no dysarthria, 5/5 throughout with exception of L hip flexors 5-/5 remaining L leg 5/5 strength. Impression: Unclear etiology of headache, currently is at 2/10 and he does not want treatment for it. He does have an underlying history of rectal CA and recommend obtaining MRI brain with/without to rule out brain metastatic disease. If patient's headache is to worsen can trial migraine cocktail at that point however currently he wants to defer. In terms of the blurry vision there is no diurnal presentation, no reported bulbar symptoms or SOB or intermittent weakness. No history of MG.

## 2023-11-25 NOTE — ED PROVIDER NOTES
History  Chief Complaint   Patient presents with    Headache     Advised to go to the ER by hematologist due to headaches and blurred vision over the past few months. Currently receiving chemo for renal cancer concerned of possible mets to the brain. 70 y/o male, hx of renal cancer with mets to the liver and lung currently on chemo,  presents to the ED for headache, facial numbness, and blurred vision. Patient states that he has had a headache x months. States that over the last week he has had right sided facial numbness and blurred vision. He denies any facial droop, speech changes, or n/t/w of his extremities. He spoke with his hem/onc doctor who recommended he come to the ED for MRI to r/o mets to the brain. He also states that he has had chest pain and sob x weeks. Denies any known sick contacts. No other complaints. History provided by:  Patient      Prior to Admission Medications   Prescriptions Last Dose Informant Patient Reported? Taking?    Blood Pressure KIT  Self No No   Sig: by Does not apply route daily   albuterol (ProAir HFA) 90 mcg/act inhaler  Self No No   Sig: Inhale 2 puffs every 6 (six) hours as needed for wheezing   aspirin (ECOTRIN LOW STRENGTH) 81 mg EC tablet  Self No No   Sig: Take 1 tablet (81 mg total) by mouth daily Please purchase over the counter at her local pharmacy   atorvastatin (LIPITOR) 80 mg tablet  Self No No   Sig: Take 1 tablet (80 mg total) by mouth daily with dinner   diphenhydramine, lidocaine, Al/Mg hydroxide, simethicone (Magic Mouthwash) SUSP  Self No No   Sig: Swish and spit 10 mL every 6 (six) hours as needed for mouth pain or discomfort   ferrous sulfate 324 (65 Fe) mg  Self No No   Sig: Take 1 tablet (324 mg total) by mouth in the morning   fluorouracil 4,250 mg in CADD/Elastomeric Infusion Device   No No   Sig: Infuse 4,250 mg (1,200 mg/m2/day x 1.77 m2) into a catheter in a vein via infusion device over 46 hours for 2 days  Infusion planned for November 28, 2023. lisinopril (ZESTRIL) 2.5 mg tablet  Self No No   Sig: take 1 tablet by mouth daily   metoprolol succinate (TOPROL-XL) 25 mg 24 hr tablet  Self No No   Sig: take 1 tablet by mouth daily   pantoprazole (PROTONIX) 40 mg tablet  Self No No   Sig: Take 1 tablet (40 mg total) by mouth daily   sucralfate (CARAFATE) 1 g tablet  Self No No   Sig: Take 1 tablet (1 g total) by mouth 4 (four) times a day   Patient not taking: Reported on 10/30/2023   tamsulosin (FLOMAX) 0.4 mg  Self No No   Sig: Take 1 capsule (0.4 mg total) by mouth daily at bedtime      Facility-Administered Medications: None       Past Medical History:   Diagnosis Date    Hyperlipidemia     Ischemic cardiomyopathy     Lacunar infarction ([address])     Myocardial infarction ([address])     Near syncope 11/14/2019    Non-ST elevated myocardial infarction [facility])     NSTEMI (non-ST elevated myocardial infarction) ([address]) 10/20/2019    Pericarditis 10/24/2019    Poison ivy     Rectal cancer ([address])     Stroke [facility])        Past Surgical History:   Procedure Laterality Date    ANGIOPLASTY      CARDIAC CATHETERIZATION N/A 5/19/2023    Procedure: Cardiac pci;  Surgeon: Elaine Hampton MD;  Location: [address] [place] [address] CATH LAB; Service: Cardiology    CORONARY STENT PLACEMENT      HERNIA REPAIR Left 4/28/2021    Procedure: OPEN REPAIR HERNIA INGUINAL LEFT WITH MESH;  Surgeon: Megha Kearns MD;  Location: [facility] MAIN OR;  Service: General    IR PORT PLACEMENT  8/31/2023       Family History   Problem Relation Age of Onset    Breast cancer Mother     Emphysema Father     Heart disease Maternal Grandfather     Heart attack Maternal Grandfather     Emphysema Paternal Grandfather      I have reviewed and agree with the history as documented.     E-Cigarette/Vaping    E-Cigarette Use Never User      E-Cigarette/Vaping Substances    Nicotine No     THC No     CBD No     Flavoring No     Other No     Unknown No      Social History     Tobacco Use    Smoking status: Former     Packs/day: 1.00     Years: 15.00     Total pack years: 15.00     Types: Cigarettes     Quit date: 1969     Years since quittin.0    Smokeless tobacco: Never   Vaping Use    Vaping Use: Never used   Substance Use Topics    Alcohol use: Never    Drug use: No       Review of Systems   Constitutional:  Negative for chills and fever. HENT:  Negative for congestion, ear pain and sore throat. Eyes:  Negative for pain and visual disturbance. Respiratory:  Negative for cough, shortness of breath and wheezing. Cardiovascular:  Negative for chest pain and leg swelling. Gastrointestinal:  Negative for abdominal pain, diarrhea, nausea and vomiting. Genitourinary:  Negative for dysuria, frequency, hematuria and urgency. Musculoskeletal:  Negative for neck pain and neck stiffness. Skin:  Negative for rash and wound. Neurological:  Positive for headaches. Negative for weakness and numbness. Psychiatric/Behavioral:  Negative for agitation and confusion. All other systems reviewed and are negative. Physical Exam  Physical Exam  Vitals and nursing note reviewed. Constitutional:       Appearance: He is well-developed. HENT:      Head: Normocephalic and atraumatic. Eyes:      Pupils: Pupils are equal, round, and reactive to light. Cardiovascular:      Rate and Rhythm: Normal rate and regular rhythm. Pulmonary:      Effort: Pulmonary effort is normal.      Breath sounds: Normal breath sounds. Abdominal:      General: Bowel sounds are normal.      Palpations: Abdomen is soft. Musculoskeletal:         General: Normal range of motion. Cervical back: Normal range of motion and neck supple. Skin:     General: Skin is warm and dry. Neurological:      General: No focal deficit present. Mental Status: He is alert and oriented to person, place, and time.       Comments: No focal deficits         Vital Signs  ED Triage Vitals   Temperature Pulse Respirations Blood Pressure SpO2   23 1838 11/24/23 1838 11/24/23 1838 11/24/23 1838 11/24/23 1838   98 °F (36.7 °C) 95 18 142/69 98 %      Temp Source Heart Rate Source Patient Position - Orthostatic VS BP Location FiO2 (%)   11/24/23 1838 11/24/23 1838 11/24/23 1838 11/24/23 1838 --   Temporal Monitor Sitting Left arm       Pain Score       11/24/23 1849       2           Vitals:    11/24/23 2315 11/24/23 2330 11/25/23 0030 11/25/23 0119   BP:  119/60 158/82 139/73   Pulse: 62 61 74 64   Patient Position - Orthostatic VS:    Lying         Visual Acuity  Visual Acuity      Flowsheet Row Most Recent Value   L Pupil Size (mm) 3   R Pupil Size (mm) 3            ED Medications  Medications   sodium chloride (PF) 0.9 % injection 3 mL (has no administration in time range)   iohexol (OMNIPAQUE) 350 MG/ML injection (MULTI-DOSE) 130 mL (130 mL Intravenous Given 11/24/23 2124)       Diagnostic Studies  Results Reviewed       Procedure Component Value Units Date/Time    HS Troponin I 4hr [[id number]]  (Normal) Collected: 11/24/23 2339    Lab Status: Final result Specimen: Blood from Arm, Right Updated: 11/25/23 0011     hs TnI 4hr 12 ng/L      Delta 4hr hsTnI 1 ng/L     HS Troponin I 2hr [[id number]]  (Normal) Collected: 11/24/23 2159    Lab Status: Final result Specimen: Blood from Arm, Right Updated: 11/24/23 2231     hs TnI 2hr 11 ng/L      Delta 2hr hsTnI 0 ng/L     APTT [[id number]]  (Normal) Collected: 11/24/23 1940    Lab Status: Final result Specimen: Blood from Arm, Right Updated: 11/24/23 2015     PTT 29 seconds     Protime-INR [[id number]]  (Abnormal) Collected: 11/24/23 1940    Lab Status: Final result Specimen: Blood from Arm, Right Updated: 11/24/23 2015     Protime 14.7 seconds      INR 1.09    HS Troponin 0hr (reflex protocol) [[id number]]  (Normal) Collected: 11/24/23 1940    Lab Status: Final result Specimen: Blood from Arm, Right Updated: 11/24/23 2014     hs TnI 0hr 11 ng/L     Comprehensive metabolic panel [[id number]]  (Abnormal) Collected: 11/24/23 1940    Lab Status: Final result Specimen: Blood from Arm, Right Updated: 11/24/23 2004     Sodium 138 mmol/L      Potassium 4.3 mmol/L      Chloride 109 mmol/L      CO2 24 mmol/L      ANION GAP 5 mmol/L      BUN 20 mg/dL      Creatinine 0.98 mg/dL      Glucose 129 mg/dL      Calcium 8.8 mg/dL      AST 19 U/L      ALT 17 U/L      Alkaline Phosphatase 59 U/L      Total Protein 5.8 g/dL      Albumin 3.7 g/dL      Total Bilirubin 0.58 mg/dL      eGFR 74 ml/min/1.73sq m     Narrative:      [facility] ([facility]D):     Stage 1 with normal or high GFR (GFR > 90 mL/min/1.73 square meters)    Stage 2 Mild CKD (GFR = 60-89 mL/min/1.73 square meters)    Stage 3A Moderate CKD (GFR = 45-59 mL/min/1.73 square meters)    Stage 3B Moderate CKD (GFR = 30-44 mL/min/1.73 square meters)    Stage 4 Severe CKD (GFR = 15-29 mL/min/1.73 square meters)    Stage 5 End Stage CKD (GFR <15 mL/min/1.73 square meters)  Note: GFR calculation is accurate only with a steady state creatinine    CBC and differential [[id number]]  (Abnormal) Collected: 11/24/23 1940    Lab Status: Final result Specimen: Blood from Arm, Right Updated: 11/24/23 1948     WBC 6.41 Thousand/uL      RBC 4.37 Million/uL      Hemoglobin 10.8 g/dL      Hematocrit 35.0 %      MCV 80 fL      MCH 24.7 pg      MCHC 30.9 g/dL      RDW 22.9 %      MPV 10.6 fL      Platelets 772 Thousands/uL      nRBC 0 /100 WBCs      Neutrophils Relative 67 %      Immat GRANS % 1 %      Lymphocytes Relative 14 %      Monocytes Relative 15 %      Eosinophils Relative 2 %      Basophils Relative 1 %      Neutrophils Absolute 4.34 Thousands/µL      Immature Grans Absolute 0.04 Thousand/uL      Lymphocytes Absolute 0.92 Thousands/µL      Monocytes Absolute 0.97 Thousand/µL      Eosinophils Absolute 0.11 Thousand/µL      Basophils Absolute 0.03 Thousands/µL                    CTA ED chest PE study   Final Result by Clarisse Kumar MD (11/24 9277)      1. No acute pulmonary embolism or thoracic aortic aneurysm/dissection. Mild scattered calcific atherosclerosis. 2.  Multiple bilateral pulmonary nodules measuring 4 mm or less, similar compared to the prior exam. Cannot exclude metastatic disease in this patient with history of malignancy. 3.  A few nonspecific subcentimeter mediastinal and right hilar lymph nodes are again seen without significant change. 4.  Stable small hiatal hernia and stable nonspecific subcentimeter mesenteric lymph nodes adjacent to the distal esophagus and gastroesophageal junction region. 5.  Cholelithiasis again noted without evidence for acute cholecystitis. 6.  No acute intrathoracic abnormalities noted with ancillary findings detailed above. Workstation performed: [id number]         CTA head and neck with and without contrast   Final Result by Jus Baker MD (11/24 0034)      No acute intracranial abnormality. Chronic lacunar infarcts and microangiopathic changes as above      Stable CTA examination of the head and neck compared to prior study dated 9/25/2020. No evidence of hemodynamically significant stenosis or large vessel occlusion within the major vessels of the [place]. Stable 2 mm probable infundibulum at the level of the right ICA terminus and at the ACOM/A2 junction. No significant stenosis of the cervical carotid or vertebral arteries. Workstation performed: [id number]                    Procedures  Procedures         ED Course  ED Course as of 11/25/23 0149   Fri Nov 24, 2023   1907 Headaches daily x months along with right sided facial tingling x 1 week. On chemo pump for rectal cancer with mets to liver and lungs  1 week                                 SBIRT 20yo+      Flowsheet Row Most Recent Value   Initial Alcohol Screen: US AUDIT-C     1. How often do you have a drink containing alcohol? 0 Filed at: 11/24/2023 8038   2.  How many drinks containing alcohol do you have on a typical day you are drinking? 0 Filed at: 11/24/2023 1848   3a. Male UNDER 65: How often do you have five or more drinks on one occasion? 0 Filed at: 11/24/2023 1848   3b. FEMALE Any Age, or MALE 65+: How often do you have 4 or more drinks on one occassion? 0 Filed at: 11/24/2023 1848   Audit-C Score 0 Filed at: 11/24/2023 5707   MILA: How many times in the past year have you. .. Used an illegal drug or used a prescription medication for non-medical reasons? Never Filed at: 11/24/2023 1848                      Medical Decision Making  68 y/o male presents with headache, cp, and sob- will get labs, ct scans, and reassess. Amount and/or Complexity of Data Reviewed  Labs: ordered. Radiology: ordered. Risk  Prescription drug management. Decision regarding hospitalization. Disposition  Final diagnoses:   Headache   Facial paresthesia   Blurry vision     Time reflects when diagnosis was documented in both MDM as applicable and the Disposition within this note       Time User Action Codes Description Comment    11/25/2023 12:25 AM Tari Jewels A Add [R51.9] Headache     11/25/2023 12:25 AM Tari Jewels A Add [R20.2] Facial paresthesia     11/25/2023 12:26 AM Luh Ball Add [H53.8] Blurry vision           ED Disposition       ED Disposition   Admit    Condition   Stable    Date/Time   Sat Nov 25, 2023 0025    Comment   Case was discussed with [facility] and the patient's admission status was agreed to be Admission Status: inpatient status to the service of Dr. Anthony Garcia . Follow-up Information    None         Current Discharge Medication List        CONTINUE these medications which have NOT CHANGED    Details   albuterol (ProAir HFA) 90 mcg/act inhaler Inhale 2 puffs every 6 (six) hours as needed for wheezing  Qty: 8.5 g, Refills: 0    Comments: Substitution to a formulary equivalent within the same pharmaceutical class is authorized.   Associated Diagnoses: Dyspnea aspirin (ECOTRIN LOW STRENGTH) 81 mg EC tablet Take 1 tablet (81 mg total) by mouth daily Please purchase over the counter at her local pharmacy  Qty: 30 tablet, Refills: 1    Associated Diagnoses: NSTEMI (non-ST elevated myocardial infarction) (HCC)      atorvastatin (LIPITOR) 80 mg tablet Take 1 tablet (80 mg total) by mouth daily with dinner  Qty: 90 tablet, Refills: 3    Associated Diagnoses: CAD in native artery      Blood Pressure KIT by Does not apply route daily  Qty: 1 each, Refills: 0    Associated Diagnoses: Dizziness      diphenhydramine, lidocaine, Al/Mg hydroxide, simethicone (Magic Mouthwash) SUSP Swish and spit 10 mL every 6 (six) hours as needed for mouth pain or discomfort  Qty: 237 mL, Refills: 0    Associated Diagnoses: Rectal cancer ([address]); Mucositis      ferrous sulfate 324 (65 Fe) mg Take 1 tablet (324 mg total) by mouth in the morning  Qty: 30 tablet, Refills: 5    Associated Diagnoses: Iron deficiency anemia, unspecified iron deficiency anemia type      fluorouracil 4,250 mg in CADD/Elastomeric Infusion Device Infuse 4,250 mg (1,200 mg/m2/day x 1.77 m2) into a catheter in a vein via infusion device over 46 hours for 2 days  Infusion planned for November 28, 2023.   Qty: 1 each, Refills: 0    Associated Diagnoses: Rectal cancer ([address])      lisinopril (ZESTRIL) 2.5 mg tablet take 1 tablet by mouth daily  Qty: 90 tablet, Refills: 3    Associated Diagnoses: Ischemic cardiomyopathy      metoprolol succinate (TOPROL-XL) 25 mg 24 hr tablet take 1 tablet by mouth daily  Qty: 90 tablet, Refills: 3    Associated Diagnoses: STEMI involving left anterior descending coronary artery (HCC)      pantoprazole (PROTONIX) 40 mg tablet Take 1 tablet (40 mg total) by mouth daily  Qty: 30 tablet, Refills: 0    Associated Diagnoses: Dyspnea      sucralfate (CARAFATE) 1 g tablet Take 1 tablet (1 g total) by mouth 4 (four) times a day  Qty: 120 tablet, Refills: 3    Associated Diagnoses: Dyspnea      tamsulosin (FLOMAX) 0.4 mg Take 1 capsule (0.4 mg total) by mouth daily at bedtime  Qty: 90 capsule, Refills: 3    Associated Diagnoses: Lower urinary tract symptoms (LUTS)             No discharge procedures on file.     PDMP Review         Value Time User    PDMP Reviewed  Yes 8/1/2023  3:37 PM Ishaan Hill, [address] [place] [address]            ED Provider  Electronically Signed by             Lowella Goodpasture, DO  11/25/23 0149

## 2023-11-25 NOTE — CONSULTS
Name: Dalton Miller Age & Sex: 69 y.o. male   MRN: [id number]  Unit/Bed#: -01   Encounter: [id number]  Length of Stay: 0    Dalton Miller will need follow up in in 6 weeks with general Attending, AP or resident . He will not require outpatient neurological testing. ASSESSMENT & PLAN     * Headache  Assessment & Plan  Dalton Miller is a 69 y.o.  male with a pertinent history of Rectal cA, prior Stroke ( L frontal/parietal) HTN, CAD  who presented on 11/24 with a headache, blurred vision and facial numbness and tinging. Patient was at the infusion center on 11/16 when he started experiencing blurred vision which lasted for several hours and then stopped. On arrival to the ED. Millicent Flair did not reveal any acute intracranial abnormalities. Known stable 2mm infundibulum at the level of the R ICA terminus. On further history patient reports he has had blurry vision and mild 2/10 headache since last Thursday over a week ago. The blurry vision is not constant however is present most of the day. He does feel that it improves when closing the left eye. No double vision. His headache is predominately in his face. No dysphagia or speech issues reported. On exam A&O x3, visual fields intact, no aphasia, no dysarthria, 5/5 throughout with exception of L hip flexors 5-/5 remaining L leg 5/5 strength. Impression: Unclear etiology of headache, currently is at 2/10 and he does not want treatment for it. He does have an underlying history of rectal CA and recommend obtaining MRI brain with/without to rule out brain metastatic disease. If patient's headache is to worsen can trial migraine cocktail at that point however currently he wants to defer. In terms of the blurry vision there is no diurnal presentation, no reported bulbar symptoms or SOB or intermittent weakness. No history of MG.          SUBJECTIVE     Reason for Consult / Principal Problem: Headache   Hx and PE limited by: none     HPI: Nhi Alcantar is a 69 y.o.  male with a pertinent history of Rectal cA, prior Stroke ( L frontal/parietal) HTN, CAD  who presented on 11/24 with a headache, blurred vision and facial numbness and tinging. Patient was at the infusion center on 11/16 when he started experiencing blurred vision which lasted for several hours and then stopped. He has headaches at baseline and the headache at the time felt similar to his prior headaches, however 2 days ago he began having frontal headache pain in the left frontal and then extending over to the right side of his face. He reported having tingling in his lips. On arrival to the ED. Alexa Marcelinaley did not reveal any acute intracranial abnormalities. Known stable 2mm infundibulum at the level of the R ICA terminus. On further history patient reports he has had blurry vision and mild 2/10 headache since last Thursday over a week ago. The blurry vision is not constant however is present most of the day. He does feel that it improves when closing the left eye. No double vision. His headache is predominately in his face. No dysphagia or speech issues reported. He also reports having left leg weakness that started today. At baseline he does not use any assistance and ambulates without issues.         Inpatient consult to Neurology  Consult performed by: John Lynch MD  Consult ordered by: HUSSAIN Goldberg        Historical Information   Past Medical History:   Diagnosis Date    Hyperlipidemia     Ischemic cardiomyopathy     Lacunar infarction [facility])     Myocardial infarction [facility])     Near syncope 11/14/2019    Non-ST elevated myocardial infarction [facility])     NSTEMI (non-ST elevated myocardial infarction) ([address]) 10/20/2019    Pericarditis 10/24/2019    Poison ivy     Rectal cancer ([address])     Stroke [facility])      Past Surgical History:   Procedure Laterality Date    ANGIOPLASTY      CARDIAC CATHETERIZATION N/A 5/19/2023    Procedure: Cardiac pci;  Surgeon: Emily Pearson MD; Location: [facility];   Service: Cardiology    CORONARY STENT PLACEMENT      HERNIA REPAIR Left 2021    Procedure: OPEN REPAIR HERNIA INGUINAL LEFT WITH MESH;  Surgeon: Rajeev Bar MD;  Location: [facility] MAIN OR;  Service: General    IR PORT PLACEMENT  2023     Social History   Social History     Substance and Sexual Activity   Alcohol Use Never     Social History     Substance and Sexual Activity   Drug Use No     E-Cigarette/Vaping    E-Cigarette Use Never User      E-Cigarette/Vaping Substances    Nicotine No     THC No     CBD No     Flavoring No     Other No     Unknown No      Social History     Tobacco Use   Smoking Status Former    Packs/day: 1.00    Years: 15.00    Total pack years: 15.00    Types: Cigarettes    Quit date: 1969    Years since quittin.0   Smokeless Tobacco Never     Family History:   Family History   Problem Relation Age of Onset    Breast cancer Mother     Emphysema Father     Heart disease Maternal Grandfather     Heart attack Maternal Grandfather     Emphysema Paternal Grandfather      Meds/Allergies   all current active meds have been reviewed, current meds:   Current Facility-Administered Medications   Medication Dose Route Frequency    albuterol (PROVENTIL HFA,VENTOLIN HFA) inhaler 2 puff  2 puff Inhalation Q6H PRN    aspirin chewable tablet 81 mg  81 mg Oral Daily    atorvastatin (LIPITOR) tablet 80 mg  80 mg Oral QPM    diphenhydramine, lidocaine, Al/Mg hydroxide, simethicone (Magic Mouthwash) oral solution 10 mL  10 mL Swish & Spit Q6H PRN    enoxaparin (LOVENOX) subcutaneous injection 40 mg  40 mg Subcutaneous Daily    ferrous sulfate tablet 325 mg  325 mg Oral Daily With Breakfast    lisinopril (ZESTRIL) tablet 2.5 mg  2.5 mg Oral Daily    metoprolol succinate (TOPROL-XL) 24 hr tablet 25 mg  25 mg Oral Daily    pantoprazole (PROTONIX) EC tablet 40 mg  40 mg Oral Daily    tamsulosin (FLOMAX) capsule 0.4 mg  0.4 mg Oral HS   , and PTA meds:   Prior to Admission Medications   Prescriptions Last Dose Informant Patient Reported? Taking? Blood Pressure KIT  Self No No   Sig: by Does not apply route daily   albuterol (ProAir HFA) 90 mcg/act inhaler More than a month Self No No   Sig: Inhale 2 puffs every 6 (six) hours as needed for wheezing   aspirin (ECOTRIN LOW STRENGTH) 81 mg EC tablet  Self No No   Sig: Take 1 tablet (81 mg total) by mouth daily Please purchase over the counter at her local pharmacy   atorvastatin (LIPITOR) 80 mg tablet 11/24/2023 Self No Yes   Sig: Take 1 tablet (80 mg total) by mouth daily with dinner   diphenhydramine, lidocaine, Al/Mg hydroxide, simethicone (Magic Mouthwash) SUSP 11/24/2023 Self No Yes   Sig: Swish and spit 10 mL every 6 (six) hours as needed for mouth pain or discomfort   ferrous sulfate 324 (65 Fe) mg 11/24/2023 Self No Yes   Sig: Take 1 tablet (324 mg total) by mouth in the morning   fluorouracil 4,250 mg in CADD/Elastomeric Infusion Device Unknown  No No   Sig: Infuse 4,250 mg (1,200 mg/m2/day x 1.77 m2) into a catheter in a vein via infusion device over 46 hours for 2 days  Infusion planned for November 28, 2023. lisinopril (ZESTRIL) 2.5 mg tablet Unknown Self No No   Sig: take 1 tablet by mouth daily   metoprolol succinate (TOPROL-XL) 25 mg 24 hr tablet Unknown Self No No   Sig: take 1 tablet by mouth daily   pantoprazole (PROTONIX) 40 mg tablet  Self No No   Sig: Take 1 tablet (40 mg total) by mouth daily   sucralfate (CARAFATE) 1 g tablet 11/24/2023 Self No Yes   Sig: Take 1 tablet (1 g total) by mouth 4 (four) times a day   tamsulosin (FLOMAX) 0.4 mg Unknown Self No No   Sig: Take 1 capsule (0.4 mg total) by mouth daily at bedtime      Facility-Administered Medications: None     Allergies   Allergen Reactions    Wound Dressing Adhesive Hives     Noted with Plastic adhesive bandage brand Curity       Review of previous medical records was  completed.        Review of Systems   Constitutional:  Negative for chills, fatigue and fever. Eyes:  Positive for visual disturbance (blurry vision). Respiratory:  Negative for chest tightness, shortness of breath and wheezing. Cardiovascular:  Negative for chest pain, palpitations and leg swelling. Gastrointestinal:  Negative for abdominal distention, abdominal pain, anal bleeding, blood in stool, constipation, diarrhea, nausea and vomiting. Genitourinary:  Negative for difficulty urinating and hematuria. Neurological:  Positive for headaches (mild headache 2/10). OBJECTIVE     Patient ID: Chacha Gustafson is a 69 y.o. male. Vitals:   Vitals:    23 0530 23 0730 23 0836 23 0839   BP: 127/75 131/73 130/69 130/69   BP Location: Left arm Left arm     Pulse: 64 72 69 69   Resp: 16 16     Temp: 97.9 °F (36.6 °C) 97.8 °F (36.6 °C)     TempSrc: Oral Oral     SpO2: 99% 98%  99%   Weight:       Height:          Body mass index is 18.88 kg/m². Intake/Output Summary (Last 24 hours) at 2023 1401  Last data filed at 2023 0230  Gross per 24 hour   Intake 120 ml   Output --   Net 120 ml       Temperature:   Temp (24hrs), Av.9 °F (36.6 °C), Min:97.5 °F (36.4 °C), Max:98.3 °F (36.8 °C)    Temperature: 97.8 °F (36.6 °C)    Invasive Devices: Invasive Devices       Central Venous Catheter Line  Duration             Port A Cath Right Chest -- days              Peripheral Intravenous Line  Duration             Peripheral IV 23 Right Antecubital 1 day    Peripheral IV 23 Right Forearm <1 day                    Physical Exam  Vitals and nursing note reviewed. Constitutional:       Appearance: Normal appearance. HENT:      Head: Normocephalic and atraumatic. Nose: Nose normal.   Eyes:      Extraocular Movements: Extraocular movements intact. Pupils: Pupils are equal, round, and reactive to light. Neurological:      Mental Status: He is alert and oriented to person, place, and time.       Cranial Nerves: Cranial nerves 2-12 are intact. Coordination: Finger-Nose-Finger Test and Heel to [employer] normal.   Psychiatric:         Speech: Speech normal.          Neurologic Exam     Mental Status   Oriented to person, place, and time. Follows 2 step commands. Attention: normal.   Speech: speech is normal   Level of consciousness: alert  Able to name object. Able to repeat. Cranial Nerves   Cranial nerves II through XII intact. CN II   Visual fields full to confrontation. CN III, IV, VI   Pupils are equal, round, and reactive to light. Nystagmus: none   Diplopia: none  Conjugate gaze: present    CN V   Facial sensation intact. CN XI   CN XI normal.     CN XII   CN XII normal.   Tongue: not atrophic  Fasciculations: absent  Tongue deviation: none    Motor Exam   Muscle bulk: normal  Overall muscle tone: normal  Right arm pronator drift: absent  Left arm pronator drift: absent    Strength   Strength 5/5 except as noted. Left lower extremity 5-/5      Sensory Exam   Light touch normal.     Gait, Coordination, and Reflexes     Coordination   Finger to nose coordination: normal  Heel to shin coordination: normal    Tremor   Resting tremor: absent  Intention tremor: absent  Action tremor: absent    Reflexes   Reflexes 2+ except as noted. Right plantar: normal  Left plantar: normal       LABORATORY DATA     Labs: I have personally reviewed pertinent reports.     Results from last 7 days   Lab Units 11/25/23  0514 11/25/23 0216 11/24/23 1940   WBC Thousand/uL 5.95  --  6.41   HEMOGLOBIN g/dL 10.1*  --  10.8*   HEMATOCRIT % 31.9*  --  35.0*   PLATELETS Thousands/uL 147* 147* 164   NEUTROS PCT %  --   --  67   MONOS PCT %  --   --  15*   EOS PCT %  --   --  2      Results from last 7 days   Lab Units 11/25/23 0514 11/24/23 1940   POTASSIUM mmol/L 3.9 4.3   CHLORIDE mmol/L 110* 109*   CO2 mmol/L 25 24   BUN mg/dL 19 20   CREATININE mg/dL 1.05 0.98   CALCIUM mg/dL 8.5 8.8   ALK PHOS U/L  --  59   ALT U/L  --  17   AST U/L  --  19              Results from last 7 days   Lab Units 11/24/23  1940   INR  1.09   PTT seconds 29               IMAGING & DIAGNOSTIC TESTING     Radiology Results: I have personally reviewed pertinent reports. CTA ED chest PE study   Final Result by Fredric Simmonds, MD (11/24 6658)      1. No acute pulmonary embolism or thoracic aortic aneurysm/dissection. Mild scattered calcific atherosclerosis. 2.  Multiple bilateral pulmonary nodules measuring 4 mm or less, similar compared to the prior exam. Cannot exclude metastatic disease in this patient with history of malignancy. 3.  A few nonspecific subcentimeter mediastinal and right hilar lymph nodes are again seen without significant change. 4.  Stable small hiatal hernia and stable nonspecific subcentimeter mesenteric lymph nodes adjacent to the distal esophagus and gastroesophageal junction region. 5.  Cholelithiasis again noted without evidence for acute cholecystitis. 6.  No acute intrathoracic abnormalities noted with ancillary findings detailed above. Workstation performed: [id number]         CTA head and neck with and without contrast   Final Result by April Gonzalez MD (11/24 4465)      No acute intracranial abnormality. Chronic lacunar infarcts and microangiopathic changes as above      Stable CTA examination of the head and neck compared to prior study dated 9/25/2020. No evidence of hemodynamically significant stenosis or large vessel occlusion within the major vessels of the [place]. Stable 2 mm probable infundibulum at the level of the right ICA terminus and at the ACOM/A2 junction. No significant stenosis of the cervical carotid or vertebral arteries. Workstation performed: [id number]         MRI brain wo contrast    (Results Pending)       Other Diagnostic Testing: I have personally reviewed pertinent reports.       ACTIVE MEDICATIONS     Current Facility-Administered Medications   Medication Dose Route Frequency    albuterol (PROVENTIL HFA,VENTOLIN HFA) inhaler 2 puff  2 puff Inhalation Q6H PRN    aspirin chewable tablet 81 mg  81 mg Oral Daily    atorvastatin (LIPITOR) tablet 80 mg  80 mg Oral QPM    diphenhydramine, lidocaine, Al/Mg hydroxide, simethicone (Magic Mouthwash) oral solution 10 mL  10 mL Swish & Spit Q6H PRN    enoxaparin (LOVENOX) subcutaneous injection 40 mg  40 mg Subcutaneous Daily    ferrous sulfate tablet 325 mg  325 mg Oral Daily With Breakfast    lisinopril (ZESTRIL) tablet 2.5 mg  2.5 mg Oral Daily    metoprolol succinate (TOPROL-XL) 24 hr tablet 25 mg  25 mg Oral Daily    pantoprazole (PROTONIX) EC tablet 40 mg  40 mg Oral Daily    tamsulosin (FLOMAX) capsule 0.4 mg  0.4 mg Oral HS       Prior to Admission medications    Medication Sig Start Date End Date Taking?  Authorizing Provider   atorvastatin (LIPITOR) 80 mg tablet Take 1 tablet (80 mg total) by mouth daily with dinner 10/17/22  Yes Jhoan Morin, DO   diphenhydramine, lidocaine, Al/Mg hydroxide, simethicone (Magic Mouthwash) SUSP Swish and spit 10 mL every 6 (six) hours as needed for mouth pain or discomfort 9/27/23  Yes Mariah Xavier MD   ferrous sulfate 324 (65 Fe) mg Take 1 tablet (324 mg total) by mouth in the morning 8/8/23  Yes Hannah Rodriguez PA-C   sucralfate (CARAFATE) 1 g tablet Take 1 tablet (1 g total) by mouth 4 (four) times a day 10/27/23 2/24/24 Yes Jhoan Morin,    albuterol (ProAir HFA) 90 mcg/act inhaler Inhale 2 puffs every 6 (six) hours as needed for wheezing 9/6/23   Yvon Ron,    aspirin (ECOTRIN LOW STRENGTH) 81 mg EC tablet Take 1 tablet (81 mg total) by mouth daily Please purchase over the counter at her local pharmacy 11/19/19 10/30/23  Olivier De La Rosa MD   Blood Pressure KIT by Does not apply route daily 11/19/19   Oliveir De La Rosa MD   fluorouracil 4,250 mg in CADD/Elastomeric Infusion Device Infuse 4,250 mg (1,200 mg/m2/day x 1.77 m2) into a catheter in a vein via infusion device over 46 hours for 2 days  Infusion planned for November 28, 2023. 11/28/23 11/30/23  Charmayne Ruff, MD   lisinopril (ZESTRIL) 2.5 mg tablet take 1 tablet by mouth daily 6/26/23   Andra Guerrero MD   metoprolol succinate (TOPROL-XL) 25 mg 24 hr tablet take 1 tablet by mouth daily 6/26/23   Andra Guerrero MD   pantoprazole (PROTONIX) 40 mg tablet Take 1 tablet (40 mg total) by mouth daily 9/6/23 10/6/23  Derrell Jameson DO   tamsulosin [facility]) 0.4 mg Take 1 capsule (0.4 mg total) by mouth daily at bedtime 5/31/23 5/25/24  Myesha Sanford MD       CODE STATUS & ADVANCED DIRECTIVES     Code Status: Level 3 - DNAR and DNI  Advance Directive and Living Will:      Power of : Yes  POLST:        VTE Pharmacologic Prophylaxis: Enoxaparin (Lovenox)      Kevin Palomares MD  [facility]

## 2023-11-25 NOTE — PLAN OF CARE
Problem: PAIN - ADULT  Goal: Verbalizes/displays adequate comfort level or baseline comfort level  Description: Interventions:  - Encourage patient to monitor pain and request assistance  - Assess pain using appropriate pain scale  - Administer analgesics based on type and severity of pain and evaluate response  - Implement non-pharmacological measures as appropriate and evaluate response  - Consider cultural and social influences on pain and pain management  - Notify physician/advanced practitioner if interventions unsuccessful or patient reports new pain  Outcome: Progressing     Problem: INFECTION - ADULT  Goal: Absence or prevention of progression during hospitalization  Description: INTERVENTIONS:  - Assess and monitor for signs and symptoms of infection  - Monitor lab/diagnostic results  - Monitor all insertion sites, i.e. indwelling lines, tubes, and drains  - Monitor endotracheal if appropriate and nasal secretions for changes in amount and color  - Hugo appropriate cooling/warming therapies per order  - Administer medications as ordered  - Instruct and encourage patient and family to use good hand hygiene technique  - Identify and instruct in appropriate isolation precautions for identified infection/condition  Outcome: Progressing

## 2023-11-26 VITALS
SYSTOLIC BLOOD PRESSURE: 97 MMHG | WEIGHT: 131.61 LBS | HEIGHT: 70 IN | HEART RATE: 69 BPM | OXYGEN SATURATION: 98 % | RESPIRATION RATE: 17 BRPM | TEMPERATURE: 98.7 F | BODY MASS INDEX: 18.84 KG/M2 | DIASTOLIC BLOOD PRESSURE: 63 MMHG

## 2023-11-26 LAB
ANION GAP SERPL CALCULATED.3IONS-SCNC: 5 MMOL/L
BASOPHILS # BLD AUTO: 0.03 THOUSANDS/ÂΜL (ref 0–0.1)
BASOPHILS NFR BLD AUTO: 1 % (ref 0–1)
BUN SERPL-MCNC: 15 MG/DL (ref 5–25)
CALCIUM SERPL-MCNC: 8.8 MG/DL (ref 8.4–10.2)
CHLORIDE SERPL-SCNC: 110 MMOL/L (ref 96–108)
CO2 SERPL-SCNC: 23 MMOL/L (ref 21–32)
CREAT SERPL-MCNC: 1.02 MG/DL (ref 0.6–1.3)
EOSINOPHIL # BLD AUTO: 0.15 THOUSAND/ÂΜL (ref 0–0.61)
EOSINOPHIL NFR BLD AUTO: 3 % (ref 0–6)
ERYTHROCYTE [DISTWIDTH] IN BLOOD BY AUTOMATED COUNT: 23.3 % (ref 11.6–15.1)
GFR SERPL CREATININE-BSD FRML MDRD: 70 ML/MIN/1.73SQ M
GLUCOSE SERPL-MCNC: 92 MG/DL (ref 65–140)
HCT VFR BLD AUTO: 32.9 % (ref 36.5–49.3)
HGB BLD-MCNC: 10.3 G/DL (ref 12–17)
IMM GRANULOCYTES # BLD AUTO: 0.05 THOUSAND/UL (ref 0–0.2)
IMM GRANULOCYTES NFR BLD AUTO: 1 % (ref 0–2)
LYMPHOCYTES # BLD AUTO: 0.89 THOUSANDS/ÂΜL (ref 0.6–4.47)
LYMPHOCYTES NFR BLD AUTO: 17 % (ref 14–44)
MCH RBC QN AUTO: 25.4 PG (ref 26.8–34.3)
MCHC RBC AUTO-ENTMCNC: 31.3 G/DL (ref 31.4–37.4)
MCV RBC AUTO: 81 FL (ref 82–98)
MONOCYTES # BLD AUTO: 0.91 THOUSAND/ÂΜL (ref 0.17–1.22)
MONOCYTES NFR BLD AUTO: 18 % (ref 4–12)
NEUTROPHILS # BLD AUTO: 3.12 THOUSANDS/ÂΜL (ref 1.85–7.62)
NEUTS SEG NFR BLD AUTO: 60 % (ref 43–75)
NRBC BLD AUTO-RTO: 0 /100 WBCS
PLATELET # BLD AUTO: 128 THOUSANDS/UL (ref 149–390)
PMV BLD AUTO: 10.8 FL (ref 8.9–12.7)
POTASSIUM SERPL-SCNC: 4 MMOL/L (ref 3.5–5.3)
RBC # BLD AUTO: 4.06 MILLION/UL (ref 3.88–5.62)
SODIUM SERPL-SCNC: 138 MMOL/L (ref 135–147)
WBC # BLD AUTO: 5.15 THOUSAND/UL (ref 4.31–10.16)

## 2023-11-26 PROCEDURE — 99238 HOSP IP/OBS DSCHRG MGMT 30/<: CPT | Performed by: INTERNAL MEDICINE

## 2023-11-26 PROCEDURE — 85025 COMPLETE CBC W/AUTO DIFF WBC: CPT | Performed by: INTERNAL MEDICINE

## 2023-11-26 PROCEDURE — 80048 BASIC METABOLIC PNL TOTAL CA: CPT | Performed by: INTERNAL MEDICINE

## 2023-11-26 PROCEDURE — 97166 OT EVAL MOD COMPLEX 45 MIN: CPT

## 2023-11-26 RX ADMIN — ASPIRIN 81 MG: 81 TABLET, CHEWABLE ORAL at 08:39

## 2023-11-26 RX ADMIN — ENOXAPARIN SODIUM 40 MG: 40 INJECTION SUBCUTANEOUS at 08:39

## 2023-11-26 RX ADMIN — PANTOPRAZOLE SODIUM 40 MG: 40 TABLET, DELAYED RELEASE ORAL at 08:39

## 2023-11-26 RX ADMIN — FERROUS SULFATE TAB 325 MG (65 MG ELEMENTAL FE) 325 MG: 325 (65 FE) TAB at 08:39

## 2023-11-26 NOTE — UTILIZATION REVIEW
NOTIFICATION OF INPATIENT ADMISSION   AUTHORIZATION REQUEST   [facility]  Tax ID: [id number]  NPI: [id number] ATTENDING PROVIDER:  Attending Name and NPI#: Trenton Mcleod Md [[id number]]  Address: Bambi Mejía, [address] [place] [address]  Phone: [phone]     ADMISSION INFORMATION:  [facility]   Place of Service Code: 21  Inpatient Admission Date/Time: 11/25/23 12:26 AM  Discharge Date/Time: No discharge date for patient encounter. Admitting Diagnosis Code/Description:  Blurry vision [H53.8]  Headache [R51.9]  Facial paresthesia [R20.2]     UTILIZATION REVIEW CONTACT:  Valerie Stern Utilization   Network Utilization Review Department  Phone: [phone]  Fax [phone]  Email: [email or web address]  Contact for approvals/pending authorizations, clinical reviews, and discharge. PHYSICIAN ADVISORY SERVICES:  Medical Necessity Denial & [id number]-Fajj Review  Phone: [phone]  Fax: [phone]  Email: [email or web address]     DISCHARGE SUPPORT TEAM:  For Patients Discharge Needs & Updates  Phone: [phone] opt. 2 Fax: [phone]  Email: [email or web address]

## 2023-11-26 NOTE — PLAN OF CARE
Problem: PAIN - ADULT  Goal: Verbalizes/displays adequate comfort level or baseline comfort level  Description: Interventions:  - Encourage patient to monitor pain and request assistance  - Assess pain using appropriate pain scale  - Administer analgesics based on type and severity of pain and evaluate response  - Implement non-pharmacological measures as appropriate and evaluate response  - Consider cultural and social influences on pain and pain management  - Notify physician/advanced practitioner if interventions unsuccessful or patient reports new pain  Outcome: Progressing     Problem: INFECTION - ADULT  Goal: Absence or prevention of progression during hospitalization  Description: INTERVENTIONS:  - Assess and monitor for signs and symptoms of infection  - Monitor lab/diagnostic results  - Monitor all insertion sites, i.e. indwelling lines, tubes, and drains  - Monitor endotracheal if appropriate and nasal secretions for changes in amount and color  - [place] appropriate cooling/warming therapies per order  - Administer medications as ordered  - Instruct and encourage patient and family to use good hand hygiene technique  - Identify and instruct in appropriate isolation precautions for identified infection/condition  Outcome: Progressing  Goal: Absence of fever/infection during neutropenic period  Description: INTERVENTIONS:  - Monitor WBC    Outcome: Progressing     Problem: SAFETY ADULT  Goal: Patient will remain free of falls  Description: INTERVENTIONS:  - Educate patient/family on patient safety including physical limitations  - Instruct patient to call for assistance with activity   - Consult OT/PT to assist with strengthening/mobility   - Keep Call bell within reach  - Keep bed low and locked with side rails adjusted as appropriate  - Keep care items and personal belongings within reach  - Initiate and maintain comfort rounds  - Make Fall Risk Sign visible to staff  - Offer Toileting every 2 Hours, in advance of need  - Initiate/Maintain bedalarm  - Obtain necessary fall risk management equipment:   - Apply yellow socks and bracelet for high fall risk patients  - Consider moving patient to room near nurses station  Outcome: Progressing  Goal: Maintain or return to baseline ADL function  Description: INTERVENTIONS:  -  Assess patient's ability to carry out ADLs; assess patient's baseline for ADL function and identify physical deficits which impact ability to perform ADLs (bathing, care of mouth/teeth, toileting, grooming, dressing, etc.)  - Assess/evaluate cause of self-care deficits   - Assess range of motion  - Assess patient's mobility; develop plan if impaired  - Assess patient's need for assistive devices and provide as appropriate  - Encourage maximum independence but intervene and supervise when necessary  - Involve family in performance of ADLs  - Assess for home care needs following discharge   - Consider OT consult to assist with ADL evaluation and planning for discharge  - Provide patient education as appropriate  Outcome: Progressing  Goal: Maintains/Returns to pre admission functional level  Description: INTERVENTIONS:  - Perform AM-PAC 6 Click Basic Mobility/ Daily Activity assessment daily.  - Set and communicate daily mobility goal to care team and patient/family/caregiver. - Collaborate with rehabilitation services on mobility goals if consulted  - Perform Range of Motion 3 times a day. - Reposition patient every 2 hours.   - Dangle patient 3 times a day  - Stand patient 3 times a day  - Ambulate patient 3 times a day  - Out of bed to chair 3 times a day   - Out of bed for meals 3 times a day  - Out of bed for toileting  - Record patient progress and toleration of activity level   Outcome: Progressing     Problem: DISCHARGE PLANNING  Goal: Discharge to home or other facility with appropriate resources  Description: INTERVENTIONS:  - Identify barriers to discharge w/patient and caregiver  - Arrange for needed discharge resources and transportation as appropriate  - Identify discharge learning needs (meds, wound care, etc.)  - Arrange for interpretive services to assist at discharge as needed  - Refer to Case Management Department for coordinating discharge planning if the patient needs post-hospital services based on physician/advanced practitioner order or complex needs related to functional status, cognitive ability, or social support system  Outcome: Progressing     Problem: Knowledge Deficit  Goal: Patient/family/caregiver demonstrates understanding of disease process, treatment plan, medications, and discharge instructions  Description: Complete learning assessment and assess knowledge base.   Interventions:  - Provide teaching at level of understanding  - Provide teaching via preferred learning methods  Outcome: Progressing     Problem: NEUROSENSORY - ADULT  Goal: Achieves stable or improved neurological status  Description: INTERVENTIONS  - Monitor and report changes in neurological status  - Monitor vital signs such as temperature, blood pressure, glucose, and any other labs ordered   - Initiate measures to prevent increased intracranial pressure  - Monitor for seizure activity and implement precautions if appropriate      Outcome: Progressing  Goal: Remains free of injury related to seizures activity  Description: INTERVENTIONS  - Maintain airway, patient safety  and administer oxygen as ordered  - Monitor patient for seizure activity, document and report duration and description of seizure to physician/advanced practitioner  - If seizure occurs,  ensure patient safety during seizure  - Reorient patient post seizure  - Seizure pads on all 4 side rails  - Instruct patient/family to notify RN of any seizure activity including if an aura is experienced  - Instruct patient/family to call for assistance with activity based on nursing assessment  - Administer anti-seizure medications if ordered    Outcome: Progressing  Goal: Achieves maximal functionality and self care  Description: INTERVENTIONS  - Monitor swallowing and airway patency with patient fatigue and changes in neurological status  - Encourage and assist patient to increase activity and self care.    - Encourage visually impaired, hearing impaired and aphasic patients to use assistive/communication devices  Outcome: Progressing     Problem: CARDIOVASCULAR - ADULT  Goal: Maintains optimal cardiac output and hemodynamic stability  Description: INTERVENTIONS:  - Monitor I/O, vital signs and rhythm  - Monitor for S/S and trends of decreased cardiac output  - Administer and titrate ordered vasoactive medications to optimize hemodynamic stability  - Assess quality of pulses, skin color and temperature  - Assess for signs of decreased coronary artery perfusion  - Instruct patient to report change in severity of symptoms  Outcome: Progressing  Goal: Absence of cardiac dysrhythmias or at baseline rhythm  Description: INTERVENTIONS:  - Continuous cardiac monitoring, vital signs, obtain 12 lead EKG if ordered  - Administer antiarrhythmic and heart rate control medications as ordered  - Monitor electrolytes and administer replacement therapy as ordered  Outcome: Progressing     Problem: GASTROINTESTINAL - ADULT  Goal: Minimal or absence of nausea and/or vomiting  Description: INTERVENTIONS:  - Administer IV fluids if ordered to ensure adequate hydration  - Maintain NPO status until nausea and vomiting are resolved  - Nasogastric tube if ordered  - Administer ordered antiemetic medications as needed  - Provide nonpharmacologic comfort measures as appropriate  - Advance diet as tolerated, if ordered  - Consider nutrition services referral to assist patient with adequate nutrition and appropriate food choices  Outcome: Progressing  Goal: Maintains or returns to baseline bowel function  Description: INTERVENTIONS:  - Assess bowel function  - Encourage oral fluids to ensure adequate hydration  - Administer IV fluids if ordered to ensure adequate hydration  - Administer ordered medications as needed  - Encourage mobilization and activity  - Consider nutritional services referral to assist patient with adequate nutrition and appropriate food choices  Outcome: Progressing  Goal: Maintains adequate nutritional intake  Description: INTERVENTIONS:  - Monitor percentage of each meal consumed  - Identify factors contributing to decreased intake, treat as appropriate  - Assist with meals as needed  - Monitor I&O, weight, and lab values if indicated  - Obtain nutrition services referral as needed  Outcome: Progressing  Goal: Oral mucous membranes remain intact  Description: INTERVENTIONS  - Assess oral mucosa and hygiene practices  - Implement preventative oral hygiene regimen  - Implement oral medicated treatments as ordered  - Initiate Nutrition services referral as needed  Outcome: Progressing     Problem: MUSCULOSKELETAL - ADULT  Goal: Maintain or return mobility to safest level of function  Description: INTERVENTIONS:  - Assess patient's ability to carry out ADLs; assess patient's baseline for ADL function and identify physical deficits which impact ability to perform ADLs (bathing, care of mouth/teeth, toileting, grooming, dressing, etc.)  - Assess/evaluate cause of self-care deficits   - Assess range of motion  - Assess patient's mobility  - Assess patient's need for assistive devices and provide as appropriate  - Encourage maximum independence but intervene and supervise when necessary  - Involve family in performance of ADLs  - Assess for home care needs following discharge   - Consider OT consult to assist with ADL evaluation and planning for discharge  - Provide patient education as appropriate  Outcome: Progressing  Goal: Maintain proper alignment of affected body part  Description: INTERVENTIONS:  - Support, maintain and protect limb and body alignment  - Provide patient/ family with appropriate education  Outcome: Progressing     Problem: Neurological Deficit  Goal: Neurological status is stable or improving  Description: Interventions:  - Monitor and assess patient's level of consciousness, motor function, sensory function, and level of assistance needed for ADLs. - Monitor and report changes from baseline. Collaborate with interdisciplinary team to initiate plan and implement interventions as ordered. - Provide and maintain a safe environment. - Consider seizure precautions. - Consider fall precautions. - Consider aspiration precautions. - Consider bleeding precautions. Outcome: Progressing     Problem: Activity Intolerance/Impaired Mobility  Goal: Mobility/activity is maintained at optimum level for patient  Description: Interventions:  - Assess and monitor patient  barriers to mobility and need for assistive/adaptive devices. - Assess patient's emotional response to limitations. - Collaborate with interdisciplinary team and initiate plans and interventions as ordered. - Encourage independent activity per ability.  - Maintain proper body alignment. - Perform active/passive rom as tolerated/ordered. - Plan activities to conserve energy.  - Turn patient as appropriate  Outcome: Progressing     Problem: Communication Impairment  Goal: Ability to express needs and understand communication  Description: Assess patient's communication skills and ability to understand information. Patient will demonstrate use of effective communication techniques, alternative methods of communication and understanding even if not able to speak. - Encourage communication and provide alternate methods of communication as needed. - Collaborate with case management/ for discharge needs. - Include patient/family/caregiver in decisions related to communication.   Outcome: Progressing     Problem: Potential for Aspiration  Goal: Non-ventilated patient's risk of aspiration is minimized  Description: Assess and monitor vital signs, respiratory status, and labs (WBC). Monitor for signs of aspiration (tachypnea, cough, rales, wheezing, cyanosis, fever). - Assess and monitor patient's ability to swallow. - Place patient up in chair to eat if possible. - HOB up at 90 degrees to eat if unable to get patient up into chair.  - Supervise patient during oral intake. - Instruct patient/ family to take small bites. - Instruct patient/ family to take small single sips when taking liquids. - Follow patient-specific strategies generated by speech pathologist.  Outcome: Progressing  Goal: Ventilated patient's risk of aspiration is minimized  Description: Assess and monitor vital signs, respiratory status, airway cuff pressure, and labs (WBC). Monitor for signs of aspiration (tachypnea, cough, rales, wheezing, cyanosis, fever). - Elevate head of bed 30 degrees if patient has tube feeding.  - Monitor tube feeding. Outcome: Progressing     Problem: Nutrition  Goal: Nutrition/Hydration status is improving  Description: Monitor and assess patient's nutrition/hydration status for malnutrition (ex- brittle hair, bruises, dry skin, pale skin and conjunctiva, muscle wasting, smooth red tongue, and disorientation). Collaborate with interdisciplinary team and initiate plan and interventions as ordered. Monitor patient's weight and dietary intake as ordered or per policy. Utilize nutrition screening tool and intervene per policy. Determine patient's food preferences and provide high-protein, high-caloric foods as appropriate. - Assist patient with eating.  - Allow adequate time for meals.  - Encourage patient to take dietary supplement as ordered. - Collaborate with clinical nutritionist.  - Include patient/family/caregiver in decisions related to nutrition.   Outcome: Progressing     Problem: Nutrition/Hydration-ADULT  Goal: Nutrient/Hydration intake appropriate for improving, restoring or maintaining nutritional needs  Description: Monitor and assess patient's nutrition/hydration status for malnutrition. Collaborate with interdisciplinary team and initiate plan and interventions as ordered. Monitor patient's weight and dietary intake as ordered or per policy. Utilize nutrition screening tool and intervene as necessary. Determine patient's food preferences and provide high-protein, high-caloric foods as appropriate.      INTERVENTIONS:  - Monitor oral intake, urinary output, labs, and treatment plans  - Assess nutrition and hydration status and recommend course of action  - Evaluate amount of meals eaten  - Assist patient with eating if necessary   - Allow adequate time for meals  - Recommend/ encourage appropriate diets, oral nutritional supplements, and vitamin/mineral supplements  - Order, calculate, and assess calorie counts as needed  - Recommend, monitor, and adjust tube feedings and TPN/PPN based on assessed needs  - Assess need for intravenous fluids  - Provide specific nutrition/hydration education as appropriate  - Include patient/family/caregiver in decisions related to nutrition  Outcome: Progressing

## 2023-11-26 NOTE — PLAN OF CARE
Problem: OCCUPATIONAL THERAPY ADULT  Goal: Performs self-care activities at highest level of function for planned discharge setting. See evaluation for individualized goals. Description: Treatment Interventions: ADL retraining, Functional transfer training, UE strengthening/ROM, Endurance training, Patient/family training, Compensatory technique education, Activityengagement          See flowsheet documentation for full assessment, interventions and recommendations. Note: Limitation: Decreased ADL status, Decreased UE strength, Decreased endurance, Decreased self-care trans, Decreased high-level ADLs, Decreased sensation  Prognosis: Good  Assessment: Patient is a 69 y.o. male seen for OT evaluation s/p admit to [facility]  on 11/24/2023 w/Headache. Commorbidities affecting patient's functional performance at time of assessment include: CAD, hx of lacunar CVA, HTN, malnutrition, and rectal cancer. Orders placed for OT evaluation and treatment and up and OOB as tolerated . Performed at least two patient identifiers during session including name and wristband. Prior to admission, Patient reporting being independent with ADLs, ambulatory with no AD, and lives with family. Personal factors affecting patient at time of initial evaluation include: steps to enter, difficulty performing ADLs, and difficulty performing IADLs. Upon evaluation, patient requires supervision assist for UB ADLs, minimal  assist for LB ADLs, transfers and functional ambulation in room and bathroom with supervision-CGA, with the use of  no AD . Patient is oriented x 4. Occupational performance is affected by the following deficits: decreased muscle strength, dynamic sit/ stand balance deficit with poor standing tolerance time for self care and functional mobility, and delayed righting and equilibrium reactions.  Patient to benefit from continued Occupational Therapy treatment while in the hospital to address deficits as defined above and maximize level of functional independence with ADLs and functional mobility. Occupational Performance areas to address include: bathing/ shower, dressing, toilet hygiene, transfer to all surfaces, functional ambulation, medication routine/ management, IADLS: Household maintenance, IADLs: safety procedures, and IADLs: meal prep/ clean up. From OT standpoint, recommendation at time of d/c would be Level III (minimim resource intensity).      Rehab Resource Intensity Level, OT: III (Minimum Resource Intensity)        Francis Gao OTD, OTR/L

## 2023-11-26 NOTE — OCCUPATIONAL THERAPY NOTE
Occupational Therapy Evaluation      Zak Urban.    11/26/2023    Patient Active Problem List   Diagnosis    CAD in native artery    S/P primary angioplasty with coronary stent    History of ischemic cardiomyopathy    History of lacunar cerebrovascular accident (CVA)    Non-recurrent unilateral inguinal hernia    Essential hypertension    Protein-calorie malnutrition, unspecified severity (HCC)    Rectal bleeding    Acute on chronic anemia    Moderate protein-calorie malnutrition (HCC)    STEMI involving left anterior descending coronary artery (HCC)    Ischemic cardiomyopathy    Renal cyst    Microscopic hematuria    Encounter to discuss test results    Enlarged prostate    Cerebrovascular accident (CVA) ([address])    Phimosis    Lower urinary tract symptoms (LUTS)    Rectal cancer (HCC)    Depression, recurrent ([address])    Port-A-Cath in place    Chemotherapy induced neutropenia     Headache       Past Medical History:   Diagnosis Date    Hyperlipidemia     Ischemic cardiomyopathy     Lacunar infarction ([address])     Myocardial infarction ([address])     Near syncope 11/14/2019    Non-ST elevated myocardial infarction [facility])     NSTEMI (non-ST elevated myocardial infarction) ([address]) 10/20/2019    Pericarditis 10/24/2019    Poison ivy     Rectal cancer ([address])     Stroke [facility])        Past Surgical History:   Procedure Laterality Date    ANGIOPLASTY      CARDIAC CATHETERIZATION N/A 5/19/2023    Procedure: Cardiac pci;  Surgeon: Darrel Stein MD;  Location: [facility] CARDIAC [facility]ATH LAB; Service: Cardiology    CORONARY STENT PLACEMENT      HERNIA REPAIR Left 4/28/2021    Procedure: OPEN REPAIR HERNIA INGUINAL LEFT WITH MESH;  Surgeon: Patricia Lr MD;  Location: [facility] MAIN OR;  Service: General    IR PORT PLACEMENT  8/31/2023 11/26/23 1018   OT Last Visit   OT Visit Date 11/26/23   Note Type   Note type Evaluation   Additional Comments Pt agreeable to OT eval. Upon arrival pt supine in bed with HOB elevated.    Pain Assessment   Pain Assessment Tool 0-10   Pain Score No Pain   Restrictions/Precautions   Weight Bearing Precautions Per Order No   Other Precautions Fall Risk;Telemetry   Home Living   Type of [facility] One level;Performs ADLs on one level; Able to live on main level with bedroom/bathroom;Stairs to enter with rails  (3 TAI)   Bathroom Shower/Tub Tub/shower unit   [employer] Raised   Bathroom Equipment   (no DME reported)   [address]   (no AD used at baseline)   Prior Function   Level of [place] Independent with ADLs; Independent with functional mobility   Lives With Daughter;Family   Receives Help From Family   IADLs Family/Friend/Other provides meals; Family/Friend/Other provides transportation; Independent with medication management   Falls in the last 6 months 0   Vocational Retired   Lifestyle   Autonomy Patient reporting being independent with ADLs, ambulatory with no AD, and lives with family   Reciprocal Relationships Daughter and grandchildren   Service to Others Retired   ADL   Eating Assistance 6  Modified independent   [address]   [address]  [address] [place] [address]  [address] [facility] 5  [address] [facility] 4  [address] [place] [address]  5  Supervision/Setup   Additional Comments ADL levels based on functional performance during OT eval.   Bed Mobility   Supine to Sit 5  Supervision   Additional items Assist x 1;HOB elevated; Bedrails; Increased time required   Sit to Supine 5  Supervision   Additional items Assist x 1;HOB elevated; Bedrails; Increased time required   Additional Comments Pt denied lightheaded/dizziness with transitional movements   Transfers   Sit to Stand 5  Supervision   Additional items Assist x 1;Bedrails; Increased time required   Stand to Sit 5  Supervision   Additional items Assist x 1;Bedrails; Increased time required   Functional Mobility   Functional Mobility   (CGA)   Additional Comments Pt ambulated household distance in hallway with no overt SOB. Pt grossly unsteady but no AD used. Balance   Static Sitting Good   Dynamic Sitting Fair +   Static Standing Fair +   Dynamic Standing Fair   Activity Tolerance   Activity Tolerance Patient tolerated treatment well   RUE Assessment   RUE Assessment WFL  (full AROM, 4/5 MMT)   LUE Assessment   LUE Assessment WFL  (full AROM, 4/5 MMT)   Hand Function   Gross Motor Coordination Functional   Fine Motor Coordination Functional   Sensation   Light Touch   (decreased sensation in face reported)   Vision-Basic Assessment   Current Vision Wears glasses all the time   Cognition   Overall Cognitive Status [facility]   Arousal/Participation Alert; Responsive; Cooperative   Attention Within functional limits   Orientation Level Oriented X4   Memory Within functional limits   Following Commands Follows all commands and directions without difficulty   Assessment   Limitation Decreased ADL status; Decreased UE strength;Decreased endurance;Decreased self-care trans;Decreased high-level ADLs; Decreased sensation   Prognosis Good   Assessment Patient is a 69 y.o. male seen for OT evaluation s/p admit to [facility]  on 11/24/2023 w/Headache. Commorbidities affecting patient's functional performance at time of assessment include: CAD, hx of lacunar CVA, HTN, malnutrition, and rectal cancer. Orders placed for OT evaluation and treatment and up and OOB as tolerated . Performed at least two patient identifiers during session including name and wristband. Prior to admission, Patient reporting being independent with ADLs, ambulatory with no AD, and lives with family. Personal factors affecting patient at time of initial evaluation include: steps to enter, difficulty performing ADLs, and difficulty performing IADLs.  Upon evaluation, patient requires supervision assist for UB ADLs, minimal assist for LB ADLs, transfers and functional ambulation in room and bathroom with supervision-CGA, with the use of  no AD . Patient is oriented x 4. Occupational performance is affected by the following deficits: decreased muscle strength, dynamic sit/ stand balance deficit with poor standing tolerance time for self care and functional mobility, and delayed righting and equilibrium reactions. Patient to benefit from continued Occupational Therapy treatment while in the hospital to address deficits as defined above and maximize level of functional independence with ADLs and functional mobility. Occupational Performance areas to address include: bathing/ shower, dressing, toilet hygiene, transfer to all surfaces, functional ambulation, medication routine/ management, IADLS: Household maintenance, IADLs: safety procedures, and IADLs: meal prep/ clean up. From OT standpoint, recommendation at time of d/c would be Level III (minimim resource intensity). Goals   Patient Goals to go home today   Plan   Treatment Interventions ADL retraining;Functional transfer training;UE strengthening/ROM; Endurance training;Patient/family training; Compensatory technique education; Activityengagement   Goal Expiration Date 12/06/23   OT Treatment Day 0   OT Frequency 1-2x/wk   Discharge Recommendation   Rehab Resource Intensity Level, OT III (Minimum Resource Intensity)   AM-PAC Daily Activity Inpatient   Lower Body Dressing 3   Bathing 3   Toileting 3   Upper Body Dressing 3   Grooming 4   Eating 4   Daily Activity Raw Score 20   Daily Activity Standardized Score (Calc for Raw Score >=11) 42.03   AM-PAC Applied Cognition Inpatient   Following a Speech/Presentation 4   Understanding Ordinary Conversation 4   Taking Medications 4   Remembering Where Things Are Placed or Put Away 4   Remembering List of 4-5 Errands 4   Taking Care of Complicated Tasks 4   Applied Cognition Raw Score 24   Applied Cognition Standardized Score 62.21 Modified Sinan Scale   Modified [place] Scale 3   End of Consult   Patient Position at End of Consult Supine; All needs within reach   Nurse Communication Nurse aware of consult     GOALS:    *ADL transfers with (I) for inc'd independence with ADLs/purposeful tasks    *UB ADL with (I) for inc'd independence with self cares    *LB ADL with (I) using AE prn for inc'd independence with self cares    *Toileting with (I) for clothing management and hygiene for return to PLOF with personal care    *Increase stand tolerance x 5  m for inc'd tolerance with standing purposeful tasks    *Participate in 10m UE therex to increase overall stamina/activity tolerance for purposeful tasks    *Bed mobility- (I) for inc'd independence to manage own comfort and initiate EOB & OOB purposeful tasks    *Patient will verbalize 3 safety awareness/ principles to prevent falls in the home setting. *Patient will increase OOB/sitting tolerance to 2-4 hours per day to increase participation in self-care and leisure tasks with no s/s of exertion.      Binta Shen, TIEN, OTR/L

## 2023-11-26 NOTE — PLAN OF CARE
Problem: PAIN - ADULT  Goal: Verbalizes/displays adequate comfort level or baseline comfort level  Description: Interventions:  - Encourage patient to monitor pain and request assistance  - Assess pain using appropriate pain scale  - Administer analgesics based on type and severity of pain and evaluate response  - Implement non-pharmacological measures as appropriate and evaluate response  - Consider cultural and social influences on pain and pain management  - Notify physician/advanced practitioner if interventions unsuccessful or patient reports new pain  Outcome: Progressing     Problem: INFECTION - ADULT  Goal: Absence or prevention of progression during hospitalization  Description: INTERVENTIONS:  - Assess and monitor for signs and symptoms of infection  - Monitor lab/diagnostic results  - Monitor all insertion sites, i.e. indwelling lines, tubes, and drains  - Monitor endotracheal if appropriate and nasal secretions for changes in amount and color  - [place] appropriate cooling/warming therapies per order  - Administer medications as ordered  - Instruct and encourage patient and family to use good hand hygiene technique  - Identify and instruct in appropriate isolation precautions for identified infection/condition  Outcome: Progressing

## 2023-11-26 NOTE — ASSESSMENT & PLAN NOTE
Patient presenting with headache, facial numbness, and blurred vision in patient with known history of rectal cancer getting active chemotherapy treatments  Discussed with oncology recommended patient come to ED for further evaluation via imaging given concern for brain mets  CTA head and neck completed in ED no acute findings; stable neck findings in comparison to prior imaging in 2020. Chronic lacunar infarct   MRI revealed no mass or stroke  Neurology consulted and recommendation appreciated  Patient currently asymptomatic from headache perspective.    Outpatient follow-up with neurology

## 2023-11-26 NOTE — DISCHARGE SUMMARY
[address]  Discharge- Dawna Marinelli. [birth year], 69 y.o. male MRN: [id number]  Unit/Bed#: -01 Encounter: [id number]  Primary Care Provider: Marjorie Peralta DO   Date and time admitted to hospital: 11/24/2023  6:40 PM    Rectal cancer [facility])  Assessment & Plan  Recent chemo ball disconnection on 11/16  Continue with out patient follow up with oncology     Moderate protein-calorie malnutrition [facility])  Assessment & Plan  Malnutrition Findings:                                 BMI Findings: Body mass index is 18.88 kg/m². Evident by bmi of 18.88  Nutrition evaluation recs appreciated     Essential hypertension  Assessment & Plan  BP reasonable  Continue to lisinopril and metoprolol with parameters  Continue to monitor     History of lacunar cerebrovascular accident (CVA)  Assessment & Plan  Continue asa and statin  See plan under headache     History of ischemic cardiomyopathy  Assessment & Plan  Mildly reduced EF of 45% on echo from 7/2023  Monitored closely on IVF for volume overload not currently on diuretics  Continue lisinopril and metoprolol     CAD in native artery  Assessment & Plan  Known hx continue ASA and statin    * Headache  Assessment & Plan  Patient presenting with headache, facial numbness, and blurred vision in patient with known history of rectal cancer getting active chemotherapy treatments  Discussed with oncology recommended patient come to ED for further evaluation via imaging given concern for brain mets  CTA head and neck completed in ED no acute findings; stable neck findings in comparison to prior imaging in 2020. Chronic lacunar infarct   MRI revealed no mass or stroke  Neurology consulted and recommendation appreciated  Patient currently asymptomatic from headache perspective.    Outpatient follow-up with neurology              Medical Problems       Resolved Problems  Date Reviewed: 11/26/2023   None         Admission Date:   Admission Orders (From admission, onward)       Ordered        11/25/23 0026  Inpatient Admission  Once                            Admitting Diagnosis: Blurry vision [H53.8]  Headache [R51.9]  Facial paresthesia [R20.2]    HPI:   Jn Mendiola is a 69 y.o. male with a PMH of cancer, cardiomyopathy, cva, htn, cad who presents with headache, blurred vision, facial numbness, and tingling. Patient reports going to infusion center to have his chemo ball removed on 11/16/2023 when he started experiencing blurred vision that lasted several hours then stopped. Patient has intermittent headaches at baseline and felt no different that day however 2 days later started with frontal headache pain in the left frontal region of his head that extended over the right side of his face. He describes a numbness of his lips more on the left and a tingling sensation on the right. Patient reported today prior to coming in he started having trouble lifting his left leg and feels weaker as well     Procedures Performed: No orders of the defined types were placed in this encounter. Summary of Hospital Course:   Patient was admitted for headache, blurred vision and facial numbness. Stroke pathway was initiated. Neurology was consulted. CT head and CTA head and neck were negative for acute intracranial abnormalities. MRI without contrast revealed no mass or stroke. Currently patient states that headache has gone. Patient feels better. Patient is safe for discharge. Advised patient outpatient follow-up with PCP and oncology. Ambulatory referral patient to neurology. Significant Findings, Care, Treatment and Services Provided:   MRI brain wo contrast    Result Date: 11/25/2023  Impression: 1. No acute infarction, intracranial hemorrhage or mass effect. 2. Moderate, chronic microangiopathy is stable. Workstation performed: [id number]     CTA ED chest PE study    Result Date: 11/24/2023  Impression: 1.   No acute pulmonary embolism or thoracic aortic aneurysm/dissection. Mild scattered calcific atherosclerosis. 2.  Multiple bilateral pulmonary nodules measuring 4 mm or less, similar compared to the prior exam. Cannot exclude metastatic disease in this patient with history of malignancy. 3.  A few nonspecific subcentimeter mediastinal and right hilar lymph nodes are again seen without significant change. 4.  Stable small hiatal hernia and stable nonspecific subcentimeter mesenteric lymph nodes adjacent to the distal esophagus and gastroesophageal junction region. 5.  Cholelithiasis again noted without evidence for acute cholecystitis. 6.  No acute intrathoracic abnormalities noted with ancillary findings detailed above. Workstation performed: [id number]     CTA head and neck with and without contrast    Result Date: 11/24/2023  Impression: No acute intracranial abnormality. Chronic lacunar infarcts and microangiopathic changes as above Stable CTA examination of the head and neck compared to prior study dated 9/25/2020. No evidence of hemodynamically significant stenosis or large vessel occlusion within the major vessels of the [place]. Stable 2 mm probable infundibulum at the level of the right ICA terminus and at the ACOM/A2 junction. No significant stenosis of the cervical carotid or vertebral arteries. Workstation performed: [id number]       Complications: none    Condition at Discharge: good         Discharge instructions/Information to patient and family:   See after visit summary for information provided to patient and family. Provisions for Follow-Up Care:  See after visit summary for information related to follow-up care and any pertinent home health orders. PCP: Katie Medrano DO    Disposition: Home    Planned Readmission: No    Discharge Statement   I spent 30 minutes discharging the patient. This time was spent on the day of discharge. I had direct contact with the patient on the day of discharge.  Additional documentation is required if more than 30 minutes were spent on discharge. Discharge Medications:  See after visit summary for reconciled discharge medications provided to patient and family.

## 2023-11-27 ENCOUNTER — HOSPITAL ENCOUNTER (OUTPATIENT)
Dept: INFUSION CENTER | Facility: CLINIC | Age: 78
Discharge: HOME | End: 2023-11-27
Payer: COMMERCIAL

## 2023-11-27 ENCOUNTER — TRANSITIONAL CARE MANAGEMENT (OUTPATIENT)
Age: 78
End: 2023-11-27

## 2023-11-27 DIAGNOSIS — C20 RECTAL CANCER (HCC): ICD-10-CM

## 2023-11-27 DIAGNOSIS — Z95.828 PORT-A-CATH IN PLACE: Primary | ICD-10-CM

## 2023-11-27 LAB
ALBUMIN SERPL BCP-MCNC: 3.7 G/DL (ref 3.5–5)
ALP SERPL-CCNC: 57 U/L (ref 34–104)
ALT SERPL W P-5'-P-CCNC: 15 U/L (ref 7–52)
ANION GAP SERPL CALCULATED.3IONS-SCNC: 7 MMOL/L
AST SERPL W P-5'-P-CCNC: 17 U/L (ref 13–39)
BASOPHILS # BLD AUTO: 0.04 THOUSANDS/ÂΜL (ref 0–0.1)
BASOPHILS NFR BLD AUTO: 1 % (ref 0–1)
BILIRUB SERPL-MCNC: 0.62 MG/DL (ref 0.2–1)
BUN SERPL-MCNC: 24 MG/DL (ref 5–25)
CALCIUM SERPL-MCNC: 8.8 MG/DL (ref 8.4–10.2)
CHLORIDE SERPL-SCNC: 106 MMOL/L (ref 96–108)
CO2 SERPL-SCNC: 23 MMOL/L (ref 21–32)
CREAT SERPL-MCNC: 1.14 MG/DL (ref 0.6–1.3)
EOSINOPHIL # BLD AUTO: 0.09 THOUSAND/ÂΜL (ref 0–0.61)
EOSINOPHIL NFR BLD AUTO: 1 % (ref 0–6)
ERYTHROCYTE [DISTWIDTH] IN BLOOD BY AUTOMATED COUNT: 23.7 % (ref 11.6–15.1)
GFR SERPL CREATININE-BSD FRML MDRD: 61 ML/MIN/1.73SQ M
GLUCOSE SERPL-MCNC: 154 MG/DL (ref 65–140)
HCT VFR BLD AUTO: 34.7 % (ref 36.5–49.3)
HGB BLD-MCNC: 10.7 G/DL (ref 12–17)
IMM GRANULOCYTES # BLD AUTO: 0.04 THOUSAND/UL (ref 0–0.2)
IMM GRANULOCYTES NFR BLD AUTO: 1 % (ref 0–2)
LYMPHOCYTES # BLD AUTO: 0.75 THOUSANDS/ÂΜL (ref 0.6–4.47)
LYMPHOCYTES NFR BLD AUTO: 12 % (ref 14–44)
MCH RBC QN AUTO: 25.1 PG (ref 26.8–34.3)
MCHC RBC AUTO-ENTMCNC: 30.8 G/DL (ref 31.4–37.4)
MCV RBC AUTO: 81 FL (ref 82–98)
MONOCYTES # BLD AUTO: 0.84 THOUSAND/ÂΜL (ref 0.17–1.22)
MONOCYTES NFR BLD AUTO: 13 % (ref 4–12)
NEUTROPHILS # BLD AUTO: 4.71 THOUSANDS/ÂΜL (ref 1.85–7.62)
NEUTS SEG NFR BLD AUTO: 72 % (ref 43–75)
NRBC BLD AUTO-RTO: 0 /100 WBCS
PLATELET # BLD AUTO: 113 THOUSANDS/UL (ref 149–390)
POTASSIUM SERPL-SCNC: 4 MMOL/L (ref 3.5–5.3)
PROT SERPL-MCNC: 6.1 G/DL (ref 6.4–8.4)
RBC # BLD AUTO: 4.27 MILLION/UL (ref 3.88–5.62)
SODIUM SERPL-SCNC: 136 MMOL/L (ref 135–147)
WBC # BLD AUTO: 6.47 THOUSAND/UL (ref 4.31–10.16)

## 2023-11-27 PROCEDURE — 80053 COMPREHEN METABOLIC PANEL: CPT | Performed by: INTERNAL MEDICINE

## 2023-11-27 PROCEDURE — 85025 COMPLETE CBC W/AUTO DIFF WBC: CPT | Performed by: INTERNAL MEDICINE

## 2023-11-27 RX ORDER — SODIUM CHLORIDE 9 MG/ML
20 INJECTION, SOLUTION INTRAVENOUS ONCE AS NEEDED
Status: CANCELLED | OUTPATIENT
Start: 2023-11-28

## 2023-11-27 RX ORDER — DEXTROSE MONOHYDRATE 50 MG/ML
20 INJECTION, SOLUTION INTRAVENOUS ONCE
Status: CANCELLED | OUTPATIENT
Start: 2023-11-28

## 2023-11-28 ENCOUNTER — HOSPITAL ENCOUNTER (OUTPATIENT)
Dept: INFUSION CENTER | Facility: CLINIC | Age: 78
Discharge: HOME | End: 2023-11-28
Payer: COMMERCIAL

## 2023-11-28 VITALS
DIASTOLIC BLOOD PRESSURE: 59 MMHG | RESPIRATION RATE: 18 BRPM | HEIGHT: 70 IN | SYSTOLIC BLOOD PRESSURE: 108 MMHG | BODY MASS INDEX: 18.97 KG/M2 | WEIGHT: 132.5 LBS | TEMPERATURE: 96.9 F

## 2023-11-28 DIAGNOSIS — C20 RECTAL CANCER (HCC): Primary | ICD-10-CM

## 2023-11-28 PROCEDURE — 96367 TX/PROPH/DG ADDL SEQ IV INF: CPT

## 2023-11-28 PROCEDURE — 96415 CHEMO IV INFUSION ADDL HR: CPT

## 2023-11-28 PROCEDURE — 96368 THER/DIAG CONCURRENT INF: CPT

## 2023-11-28 PROCEDURE — 96413 CHEMO IV INFUSION 1 HR: CPT

## 2023-11-28 PROCEDURE — G0498 CHEMO EXTEND IV INFUS W/PUMP: HCPCS

## 2023-11-28 RX ORDER — DEXTROSE MONOHYDRATE 50 MG/ML
20 INJECTION, SOLUTION INTRAVENOUS ONCE
Status: COMPLETED | OUTPATIENT
Start: 2023-11-28 | End: 2023-11-28

## 2023-11-28 RX ORDER — SODIUM CHLORIDE 9 MG/ML
20 INJECTION, SOLUTION INTRAVENOUS ONCE AS NEEDED
Status: DISCONTINUED | OUTPATIENT
Start: 2023-11-28 | End: 2023-12-01 | Stop reason: HOSPADM

## 2023-11-28 RX ADMIN — DEXAMETHASONE SODIUM PHOSPHATE: 10 INJECTION, SOLUTION INTRAMUSCULAR; INTRAVENOUS at 09:45

## 2023-11-28 RX ADMIN — LEUCOVORIN CALCIUM 700 MG: 350 INJECTION, POWDER, LYOPHILIZED, FOR SOLUTION INTRAMUSCULAR; INTRAVENOUS at 10:28

## 2023-11-28 RX ADMIN — OXALIPLATIN 132.75 MG: 5 INJECTION, SOLUTION INTRAVENOUS at 10:28

## 2023-11-28 RX ADMIN — DEXTROSE 20 ML/HR: 5 SOLUTION INTRAVENOUS at 09:45

## 2023-11-28 NOTE — UTILIZATION REVIEW
NOTIFICATION OF ADMISSION DISCHARGE   This is a Notification of Discharge from [facility]. Please be advised that this patient has been discharge from our facility. Below you will find the admission and discharge date and time including the patient’s disposition. UTILIZATION REVIEW CONTACT:  Yana Guadalupe  Utilization   Network Utilization Review Department  Phone: [phone] x carefully listen to the prompts. All voicemails are confidential.  Email: [email or web address]     ADMISSION INFORMATION  PRESENTATION DATE: 11/24/2023  6:40 PM  OBERVATION ADMISSION DATE:   INPATIENT ADMISSION DATE: 11/25/23 12:26 AM   DISCHARGE DATE: 11/26/2023  2:28 PM   DISPOSITION:Home/Self Care    Network Utilization Review Department  ATTENTION: Please call with any questions or concerns to [phone] and carefully listen to the prompts so that you are directed to the right person. All voicemails are confidential.   For Discharge needs, contact Care Management DC Support Team at [phone] opt. 2  Send all requests for admission clinical reviews, approved or denied determinations and any other requests to dedicated fax number below belonging to the campus where the patient is receiving treatment.  List of dedicated fax numbers for the Facilities:  [place] DENIALS (Administrative/Medical Necessity) [phone]   DISCHARGE SUPPORT TEAM (Network) [phone] [address] [facility] (Maternity/NICU/Pediatrics) [phone]   [address] [facility] [address] [place] [address] [address]  [phone]

## 2023-11-28 NOTE — PROGRESS NOTES
Pt tolerated treatment today, denies any discomforts, good blood return noted. Pt connected to Elastomeric ball, clamps open and verified with Timmons Ring. Pt aware to return on Thursday at 11am for cadd d/c. Star notified. Pt discharged.

## 2023-11-29 ENCOUNTER — TELEPHONE (OUTPATIENT)
Dept: HEMATOLOGY ONCOLOGY | Facility: CLINIC | Age: 78
End: 2023-11-29

## 2023-11-29 ENCOUNTER — OFFICE VISIT (OUTPATIENT)
Dept: HEMATOLOGY ONCOLOGY | Facility: CLINIC | Age: 78
End: 2023-11-29
Payer: COMMERCIAL

## 2023-11-29 VITALS
OXYGEN SATURATION: 99 % | RESPIRATION RATE: 16 BRPM | WEIGHT: 132 LBS | DIASTOLIC BLOOD PRESSURE: 56 MMHG | BODY MASS INDEX: 18.9 KG/M2 | SYSTOLIC BLOOD PRESSURE: 102 MMHG | TEMPERATURE: 99.1 F | HEIGHT: 70 IN | HEART RATE: 57 BPM

## 2023-11-29 DIAGNOSIS — C20 RECTAL CANCER (HCC): Primary | ICD-10-CM

## 2023-11-29 PROCEDURE — 99214 OFFICE O/P EST MOD 30 MIN: CPT | Performed by: INTERNAL MEDICINE

## 2023-11-29 NOTE — PROGRESS NOTES
[address] [facility] [address]    Savana Edgar.  4/17/1720      PRIMARY HEMATOLOGIC/ONCOLOGIC DIAGNOSIS:  1. Adenocarcinoma of the rectum, invasive, moderately differentiated. NO LOSS OF NUCLEAR EXPRESSION OF MMR PROTEINS: LOW PROBABILITY OF MSI-H. NRAS mutated. Date of diagnosis 5/20/23. CEA normal at time of diagnosis -- 1.7 on 5/19/23. PATHOLOGY:   Case Report   Surgical Pathology Report                         Case: [id number]                                    Authorizing Provider:  Stephan Siu DO          Collected:           05/20/2023 1530               Ordering Location:     [facility] Received:            05/20/2023 1800                                      Intensive Care Unit                                                           Pathologist:           Jimbo Reveles DO                                                             Specimen:    Rectum                                                                                     Addendum   RESULTS OF IMMUNOHISTOCHEMICAL ANALYSIS FOR MISMATCH REPAIR PROTEIN LOSS  INTERPRETATION: NO LOSS OF NUCLEAR EXPRESSION OF MMR PROTEINS: LOW PROBABILITY OF MSI-H        RESULTS:  Antibody            Clone                 Description                     Results  MLH1                 M1                     Mismatch repair protein  Intact nuclear expression  MSH2                [id number]        Mismatch repair protein  Intact nuclear expression  MSH6                40                      Mismatch repair protein  Intact nuclear expression  PMS2                 [id number]           Mismatch repair protein  Intact nuclear expression     Comment:  Background non-neoplastic tissue and/or internal control with intact nuclear expression.   [facility] Specimen ID: [id number], Evaluator:  Alvaro Barcenas MD  These tests were developed and their performance characteristics determined by [facility]. They may not be cleared or approved by the U.S. Food and Drug Administration. The FDA determined that such clearance or approval is not necessary. These tests are used for clinical purposes. They should not be regarded as investigational or for research. This laboratory has been approved by [place] 88, designated as a high-complexity laboratory and is qualified to perform these tests. Comments: Patients whose tumors demonstrate lack of expression of one or more DNA mismatch repair proteins might be at risk for Kenny Syndrome. This cancer susceptibility syndrome greatly increases the risk of synchronous and/or metachronous cancers in the affected patients and their family members. Normal expression of all proteins does not completely rule out familial cancer predisposition. The [facility] [address] Task Forces recommends that all patients with lack of expression of one or more DNA mismatch repair proteins and those with concerning personal or family history should undergo thorough evaluation, counseling and possibly genetic testing. Addendum electronically signed by Aleta Colvin DO on 5/30/2023 at  2:34 PM   Final Diagnosis   A. Rectum, mass, biopsy:  -Adenocarcinoma, invasive, moderately differentiated. -Ancillary testing for mismatch repair (MMR) protein deficiency by immunohistochemical panel (MLH1, PMS2, MSH2, MSH6) is undertaken, the results will be issued in an addendum. Electronically signed by Aleta Colvin DO on 5/26/2023 at  9:10 AM   Note    Intradepartmental consultation (Dot Samaniego) is in agreement. Dr. Radha Jones was notified by Dr. Aleta Colvin via [employer] at 9:09am on 5/26/23. Additional Information    All reported additional testing was performed with appropriately reactive controls.   These tests were developed and their performance characteristics determined by [facility] or appropriate performing facility, though some tests may be performed on tissues which have not been validated for performance characteristics (such as staining performed on alcohol exposed cell blocks and decalcified tissues). Results should be interpreted with caution and in the context of the patients’ clinical condition. These tests may not be cleared or approved by the U.S. Food and Drug Administration, though the FDA has determined that such clearance or approval is not necessary. These tests are used for clinical purposes and they should not be regarded as investigational or for research. This laboratory has been approved by [place] 88, designated as a high-complexity laboratory and is qualified to perform these tests. Interpretation performed at [facility] [address] [place] [address]   Gross Description     A. The specimen is received in formalin, labeled with the patient's name and hospital number, and is designated " rectum". The specimen consists of multiple tan-pink soft tissue fragments measuring in aggregate 1.0 x 1.0 x 0.2 cm. Entirely submitted. One screened cassette. Note: The estimated total formalin fixation time based upon information provided by the submitting clinician and the standard processing schedule is under 72 hours. MSequino       STAGING: Cancer Staging   Rectal cancer [facility])  Staging form: Colon and Rectum, AJCC 8th Edition  - Clinical stage from 5/18/2023: Stage IIIB (cT3, cN1, cM0) - Signed by Carol Miguel DO on 6/28/2023  Microsatellite instability (MSI): Stable         Oncology History   Rectal cancer ([address])   5/18/2023 Initial Diagnosis    May 18, 2023 patient presented with BRBPR x1 week. Weight stable over 8 months. CT abdomen pelvis showed rectal mass with 2 suspicious mesorectal lymph nodes. 2 mm left lower lobe pulmonary nodule, 3 mm hepatic dome questionable lesion, 1 cm uncinate process cystic lesion.   CT chest showed 3 mm left lower lobe and right upper lobe nodules new compared to 2019. May 20 flexible sigmoidoscopy showed distal rectal mass. Biopsy of the mass showed moderately differentiated adenocarcinoma. MMR intact. June 14, 2023 MRI of the pelvis showed findings consistent with T3b, N1 low rectal cancer. Suspicion for vascular invasion. CBC on presentation normal WBC platelets. Hemoglobin 11.4, MCV 77. CMP showed albumin 3.3, otherwise normal.        Cancer Staged    Cancer Staging   No matching staging information was found for the patient. 5/18/2023 -  Cancer Staged    Staging form: Colon and Rectum, AJCC 8th Edition  - Clinical stage from 5/18/2023: Stage IIIB (cT3, cN1, cM0) - Signed by Stephanie Mcdonald DO on 6/28/2023  Microsatellite instability (MSI): Stable       5/20/2023 Biopsy    A. Rectum, mass, biopsy:  -Adenocarcinoma, invasive, moderately differentiated. -Ancillary testing for mismatch repair (MMR) protein deficiency by immunohistochemical panel (MLH1, PMS2, MSH2, MSH6) is undertaken, the results will be issued in an addendum.     RESULTS OF IMMUNOHISTOCHEMICAL ANALYSIS FOR MISMATCH REPAIR PROTEIN LOSS  INTERPRETATION: NO LOSS OF NUCLEAR EXPRESSION OF MMR PROTEINS: LOW PROBABILITY OF MSI-H        RESULTS:  Antibody            Clone                 Description                     Results  MLH1                 M1                     Mismatch repair protein  Intact nuclear expression  MSH2                [id number]        Mismatch repair protein  Intact nuclear expression  MSH6                44                      Mismatch repair protein  Intact nuclear expression  PMS2                 [id number]           Mismatch repair protein  Intact nuclear expression        9/20/2023 -  Chemotherapy    alteplase (CATHFLO), 2 mg, Intracatheter, Every 1 Minute as needed, 5 of 12 cycles  pegfilgrastim (NEULASTA), 6 mg, Subcutaneous, Once, 1 of 1 cycle  Administration: 6 mg (10/20/2023)  fluorouracil (ADRUCIL), 320 mg/m2 = 565 mg (80 % of original dose 400 mg/m2), Intravenous, Once, 2 of 2 cycles  Dose modification: 320 mg/m2 (original dose 400 mg/m2, Cycle 1)  Administration: 565 mg (9/20/2023), 565 mg (10/17/2023)  leucovorin calcium IVPB, 708 mg, Intravenous, Once, 5 of 12 cycles  Administration: 700 mg (9/20/2023), 700 mg (10/17/2023), 700 mg (10/31/2023), 700 mg (11/14/2023), 700 mg (11/28/2023)  oxaliplatin (ELOXATIN) chemo infusion, 75 mg/m2 = 132.75 mg (88.2 % of original dose 85 mg/m2), Intravenous, Once, 5 of 12 cycles  Dose modification: 75 mg/m2 (original dose 85 mg/m2, Cycle 1, Reason: Anticipated Tolerance)  Administration: 132.75 mg (9/20/2023), 132.75 mg (10/17/2023), 133 mg (10/31/2023), 132.75 mg (11/14/2023), 132.75 mg (11/28/2023)  fluorouracil (ADRUCIL) ambulatory infusion Soln, 1,200 mg/m2/day = 4,250 mg, Intravenous, Over 46 hours, 5 of 12 cycles     9/25/2023 - 9/29/2023 Radiation    Treatments:  Course: C1  Plan ID Energy Fractions Dose per Fraction (cGy) Dose Correction (cGy) Total Dose Delivered (cGy) Elapsed Days   Pelvis 10X/6X 5 / 5 500 0 2,500 4    Treatment Dates:  9/25/2023 - 9/29/2023. INTERIM HISTORY:  Susie Lux is a 69yo male who presents for evaluation and management of adenocarcinoma of the rectum. Pt received C1D1 mFOLFOX 6 on 9/20/23. C2 D1 administered 10/17/23. Tolerated treatment well w/ Grade 1 fatigue. No other complaints. Initiated RT 9/25/23 and completed 9/29/23. Tolerating treatment well. Receiving C5.      PAST MEDICAL,PAST SURGICAL, FAMILY AND SOCIAL HISTORY:    Patient Active Problem List   Diagnosis    CAD in native artery    S/P primary angioplasty with coronary stent    History of ischemic cardiomyopathy    History of lacunar cerebrovascular accident (CVA)    Non-recurrent unilateral inguinal hernia    Essential hypertension    Protein-calorie malnutrition, unspecified severity (HCC)    Rectal bleeding    Acute on chronic anemia    Moderate protein-calorie malnutrition ([address]) STEMI involving left anterior descending coronary artery [facility])    Ischemic cardiomyopathy    Renal cyst    Microscopic hematuria    Encounter to discuss test results    Enlarged prostate    Cerebrovascular accident (CVA) ([address])    Phimosis    Lower urinary tract symptoms (LUTS)    Rectal cancer (HCC)    Depression, recurrent ([address])    Port-A-Cath in place    Chemotherapy induced neutropenia     Headache     Past Medical History:   Diagnosis Date    Hyperlipidemia     Ischemic cardiomyopathy     Lacunar infarction ([address])     Myocardial infarction ([address])     Near syncope 2019    Non-ST elevated myocardial infarction [facility])     NSTEMI (non-ST elevated myocardial infarction) ([address]) 10/20/2019    Pericarditis 10/24/2019    Poison ivy     Rectal cancer ([address])     Stroke [facility])      Past Surgical History:   Procedure Laterality Date    ANGIOPLASTY      CARDIAC CATHETERIZATION N/A 2023    Procedure: Cardiac pci;  Surgeon: Ella Wharton MD;  Location: [address] [place] [address] CATH LAB; Service: Cardiology    CORONARY STENT PLACEMENT      HERNIA REPAIR Left 2021    Procedure: OPEN REPAIR HERNIA INGUINAL LEFT WITH MESH;  Surgeon: Ted Kahn MD;  Location: [facility] MAIN OR;  Service: General    IR PORT PLACEMENT  2023     Family History   Problem Relation Age of Onset    Breast cancer Mother     Emphysema Father     Heart disease Maternal Grandfather     Heart attack Maternal Grandfather     Emphysema Paternal Grandfather      Social History     Socioeconomic History    Marital status:       Spouse name: Not on file    Number of children: Not on file    Years of education: Not on file    Highest education level: Not on file   Occupational History    Not on file   Tobacco Use    Smoking status: Former     Packs/day: 1.00     Years: 15.00     Total pack years: 15.00     Types: Cigarettes     Quit date: 1969     Years since quittin.0    Smokeless tobacco: Never   Vaping Use    Vaping Use: Never used   Substance and Sexual Activity    Alcohol use: Never    Drug use: No    Sexual activity: Not Currently     Partners: Female   Other Topics Concern    Not on file   Social History Narrative    Not on file     Social Determinants of Health     Financial Resource Strain: Low Risk  (10/17/2022)    Overall Financial Resource Strain (CARDIA)     Difficulty of Paying Living Expenses: Not hard at all   Food Insecurity: No Food Insecurity (5/19/2023)    Hunger Vital Sign     Worried About Running Out of Food in the Last Year: Never true     Ran Out of Food in the Last Year: Never true   Transportation Needs: No Transportation Needs (5/19/2023)    PRAPARE - Transportation     Lack of Transportation (Medical): No     Lack of Transportation (Non-Medical):  No   Physical Activity: Inactive (3/8/2021)    Exercise Vital Sign     Days of Exercise per Week: 0 days     Minutes of Exercise per Session: 0 min   Stress: No Stress Concern Present (3/8/2021)    [address] [facility]     Feeling of Stress : Not at all   Social Connections: Not on file   Intimate Partner Violence: Not on file   Housing Stability: Low Risk  (5/19/2023)    Housing Stability Vital Sign     Unable to Pay for Housing in the Last Year: No     Number of Places Lived in the Last Year: 1     Unstable Housing in the Last Year: No       Current Outpatient Medications:     albuterol (ProAir HFA) 90 mcg/act inhaler, Inhale 2 puffs every 6 (six) hours as needed for wheezing, Disp: 8.5 g, Rfl: 0    atorvastatin (LIPITOR) 80 mg tablet, Take 1 tablet (80 mg total) by mouth daily with dinner, Disp: 90 tablet, Rfl: 3    Blood Pressure KIT, by Does not apply route daily, Disp: 1 each, Rfl: 0    diphenhydramine, lidocaine, Al/Mg hydroxide, simethicone (Magic Mouthwash) SUSP, Swish and spit 10 mL every 6 (six) hours as needed for mouth pain or discomfort, Disp: 237 mL, Rfl: 0    ferrous sulfate 324 (65 Fe) mg, Take 1 tablet (324 mg total) by mouth in the morning, Disp: 30 tablet, Rfl: 5    fluorouracil 4,250 mg in CADD/Elastomeric Infusion Device, Infuse 4,250 mg (1,200 mg/m2/day x 1.77 m2) into a catheter in a vein via infusion device over 46 hours for 2 days  Infusion planned for November 28, 2023., Disp: 1 each, Rfl: 0    lisinopril (ZESTRIL) 2.5 mg tablet, take 1 tablet by mouth daily, Disp: 90 tablet, Rfl: 3    sucralfate (CARAFATE) 1 g tablet, Take 1 tablet (1 g total) by mouth 4 (four) times a day, Disp: 120 tablet, Rfl: 3    tamsulosin (FLOMAX) 0.4 mg, Take 1 capsule (0.4 mg total) by mouth daily at bedtime, Disp: 90 capsule, Rfl: 3    aspirin (ECOTRIN LOW STRENGTH) 81 mg EC tablet, Take 1 tablet (81 mg total) by mouth daily Please purchase over the counter at her local pharmacy, Disp: 30 tablet, Rfl: 1    metoprolol succinate (TOPROL-XL) 25 mg 24 hr tablet, take 1 tablet by mouth daily, Disp: 90 tablet, Rfl: 3    pantoprazole (PROTONIX) 40 mg tablet, Take 1 tablet (40 mg total) by mouth daily, Disp: 30 tablet, Rfl: 0  No current facility-administered medications for this visit. Facility-Administered Medications Ordered in Other Visits:     alteplase (CATHFLO) injection 2 mg, 2 mg, Intracatheter, Q1MIN PRN, Jamshid Colby MD    alteplase (CATHFLO) injection 2 mg, 2 mg, Intracatheter, Q1MIN PRN, Jamshid Colby MD    sodium chloride 0.9 % infusion, 20 mL/hr, Intravenous, Once PRN, Jamshid Colby MD  Allergies   Allergen Reactions    Wound Dressing Adhesive Hives     Noted with Plastic adhesive bandage brand Curity     Vitals:    11/29/23 1538   BP: 102/56   Pulse: 57   Resp: 16   Temp: 99.1 °F (37.3 °C)   SpO2: 99%       ROS:  CONSTITUTIONAL:  No fever. No chills. No dizziness. No weakness. EYES:  No pain, erythema, or discharge. No blurring of vision. ENT:  No sore throat, URI symptoms. No epistaxis. No tinnitus. CARDIOVASCULAR:  No chest pain. No palpitations. No lower extremity edema.   RESPIRATORY:  No shortness of breath, cough, pain with respiration, pleuritic chest pain. No hemoptysis. No dyspnea. No paroxysmal nocturnal dyspnea. GASTROINTESTINAL:  Normal appetite. No nausea, vomiting, diarrhea. No pain. No bloating. No melena. GENITOURINARY:  No frequency, urgency, nocturia. No hematuria or dysuria. MUSCULOSKELETAL:  No arthralgias or myalgias. INTEGUMENTARY:  No swelling. No bruising. No contusions. No abrasions. No lymphangitis. NEUROLOGIC:  No headache. No neck pain. No numbness or tingling of the extremities. No weakness. PSYCHIATRIC:  No confusion. ENDOCRINE:  No fatigue. No weakness. No history of thyroid, diabetes or adrenal problems. HEMATOLOGICAL:  No bleeding. No petechiae. No bruising.     PHYSICAL EXAM:    GENERAL: AAO x 3, moderate distress  HEENT: AT,NC  CVS: S1S2 RRR  LUNGS: b/l breath sounds  ABD: NT,ND, +BS  EXTR: no edema  NEURO: CN II-XII grossly intact    LABS:  I have reviewed pertinent labs:  CBC:   Lab Results   Component Value Date    WBC 6.47 11/27/2023    RBC 4.27 11/27/2023    HGB 10.7 (L) 11/27/2023    HCT 34.7 (L) 11/27/2023    MCV 81 (L) 11/27/2023     (L) 11/27/2023    MCH 25.1 (L) 11/27/2023    MCHC 30.8 (L) 11/27/2023    RDW 23.7 (H) 11/27/2023    MPV 10.8 11/26/2023    NEUTROABS 4.71 11/27/2023     CMP:   Lab Results   Component Value Date    SODIUM 136 11/27/2023    K 4.0 11/27/2023     11/27/2023    CO2 23 11/27/2023    AGAP 7 11/27/2023    BUN 24 11/27/2023    CREATININE 1.14 11/27/2023    GLUC 154 (H) 11/27/2023    GLUF 105 (H) 08/07/2023    CALCIUM 8.8 11/27/2023    AST 17 11/27/2023    ALT 15 11/27/2023    ALKPHOS 57 11/27/2023    TP 6.1 (L) 11/27/2023    ALB 3.7 11/27/2023    TBILI 0.62 11/27/2023    EGFR 61 11/27/2023     Liver Enzymes:   Lab Results   Component Value Date    AST 17 11/27/2023    ALT 15 11/27/2023    ALKPHOS 57 11/27/2023    TP 6.1 (L) 11/27/2023    ALB 3.7 11/27/2023    TBILI 0.62 11/27/2023    BILIDIR 0.16 10/20/2019     Vitamin B12   Lab Results   Component Value Date    KUOGJJUM30 222 05/19/2023     Iron Study   Lab Results   Component Value Date    RETIC 43,800 05/19/2023    RETICCTPCT 1.07 05/19/2023    FERRITIN 22 (L) 08/07/2023    CONCFE 6 (L) 08/07/2023    TIBC 328 08/07/2023    IRON 20 (L) 08/07/2023     Folate   Lab Results   Component Value Date    FOLATE 21.8 05/19/2023     Magnesium   Lab Results   Component Value Date    MG 2.3 05/21/2023     Phosphorus   Lab Results   Component Value Date    PHOS 2.7 05/21/2023     Coagulation Panel   Lab Results   Component Value Date    DDIMER 0.28 10/20/2019    PROTIME 14.7 (H) 11/24/2023    INR 1.09 11/24/2023    PTT 29 11/24/2023       IMAGING:  XR chest pa & lateral    Result Date: 8/9/2023  Narrative: CHEST INDICATION:   R06.02: Shortness of breath. . COMPARISON:  Radiograph 9/25/2020 and CTA chest  May 23, 2023 EXAM PERFORMED/VIEWS:  XR CHEST PA & LATERAL FINDINGS: Cardiomediastinal silhouette appears unremarkable. The lungs are clear. No pneumothorax or pleural effusion. Osseous structures appear within normal limits for patient age. Impression: No acute cardiopulmonary disease. Resident: Simi Hill, the attending radiologist, have reviewed the images and agree with the final report above. Workstation performed: [id number]     I reviewed the above laboratory and imaging data. ASSESSMENT/PLAN:  1. Adenocarcinoma of the rectum, invasive, moderately differentiated. NO LOSS OF NUCLEAR EXPRESSION OF MMR PROTEINS: LOW PROBABILITY OF MSI-H. NRAS mutated. Date of diagnosis 5/20/23. CEA normal at time of diagnosis -- 1.7 on 5/19/23. The patient is willing to receive treatment now. Prior imaging 5/2023 showed a possible 0.3 cm right hepatic dome lesion versus artifact (statistically most likely a benign lesion such as cyst or hemangioma). 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  MRI pelvis w/o contrast dated 6/14/23 showed stage: T3b, N1, low rectal cancer. MRF: clear (tumor margin >2 mm from MRF). No sphincter involvement. No suspicious extra mesorectal lymph nodes. Suspicious vascular invasion along the right lateral aspect. [facility] oncology testing pending. CT TAP dated 8/29/23--numerous bilateral pulmonary nodules which have increased in size/conspicuity from prior exam suspicious for metastasis. Redemonstration of subannular primary rectal malignancy, grossly similar to prior exam. Multiple suspicious mesorectal lymph nodes, one having mildly enlarged since prior exams. Stable borderline enlarged aortocaval lymph node, indeterminate for metastasis. New posterior right hepatic dome lesion, separate from a previously described lesion also in the hepatic dome which is not visualized on this exam. Metastasis not excluded. Recommend further characterization with hepatic MRI. 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 Cholelithiasis. S/p port placement. Plan to initiate treatment with FOLFOX ( dose reduced due to  Anticipated tolerance, will increase dose w/ later treatments if pt is able to tolerate). Referred to [facility] for palliative RT due to rectal bleeding. S/p C1D1 mFOLFOX 6 on 9/20/23. Tolerated treatment well. No complaints. Initiated RT 9/25/23 and completed 9/29/23. C2 D1 FOLFOX planned for 10/9/23. C2D1 administered 10/17/23 due to neutropenia. Neulasta administered 10/20/23 for chemotherapy induced neutropenia. C3D1 administered 10/31/23. -5FU bolus discontinued. C4D1 administered 11/14/23, C5D1 administered 11/28/23. Tolerating treatment well. Will obtain re-staging scans. 2. Chemotherapy induced neutropenia. S/p neulasta.

## 2023-11-30 ENCOUNTER — HOSPITAL ENCOUNTER (OUTPATIENT)
Dept: INFUSION CENTER | Facility: CLINIC | Age: 78
Discharge: HOME | End: 2023-11-30

## 2023-11-30 DIAGNOSIS — C20 RECTAL CANCER (HCC): Primary | ICD-10-CM

## 2023-11-30 NOTE — PROGRESS NOTES
Pt to clinic for cadd d/c. Chemo ball appears empty and deflated, ran for 46+ hours. Pt offers no complaints today. Tolerated infusion without complications. Pt aware of next appointment. Pt port flushed and de-accessed with positive blood returned. AVS was received by pt. Pt ambulated out of clinic safely.  STAR notified that pt is ready for

## 2023-12-04 ENCOUNTER — OFFICE VISIT (OUTPATIENT)
Age: 78
End: 2023-12-04
Payer: COMMERCIAL

## 2023-12-04 VITALS
BODY MASS INDEX: 18.21 KG/M2 | HEART RATE: 66 BPM | OXYGEN SATURATION: 96 % | HEIGHT: 70 IN | DIASTOLIC BLOOD PRESSURE: 70 MMHG | TEMPERATURE: 97 F | WEIGHT: 127.2 LBS | RESPIRATION RATE: 19 BRPM | SYSTOLIC BLOOD PRESSURE: 112 MMHG

## 2023-12-04 DIAGNOSIS — H53.8 BLURRY VISION: Primary | ICD-10-CM

## 2023-12-04 DIAGNOSIS — D64.9 ACUTE ON CHRONIC ANEMIA: ICD-10-CM

## 2023-12-04 DIAGNOSIS — I25.10 CAD IN NATIVE ARTERY: Chronic | ICD-10-CM

## 2023-12-04 DIAGNOSIS — E44.0 MODERATE PROTEIN-CALORIE MALNUTRITION (HCC): ICD-10-CM

## 2023-12-04 DIAGNOSIS — T45.1X5A CHEMOTHERAPY INDUCED NEUTROPENIA: ICD-10-CM

## 2023-12-04 DIAGNOSIS — D69.6 THROMBOCYTOPENIA (HCC): ICD-10-CM

## 2023-12-04 DIAGNOSIS — D70.1 CHEMOTHERAPY INDUCED NEUTROPENIA: ICD-10-CM

## 2023-12-04 DIAGNOSIS — C20 RECTAL CANCER (HCC): ICD-10-CM

## 2023-12-04 PROBLEM — R39.9 LOWER URINARY TRACT SYMPTOMS (LUTS): Status: RESOLVED | Noted: 2023-05-31 | Resolved: 2023-12-04

## 2023-12-04 PROBLEM — Z86.73 HISTORY OF CVA (CEREBROVASCULAR ACCIDENT): Chronic | Status: ACTIVE | Noted: 2023-05-30

## 2023-12-04 PROBLEM — I21.02 STEMI INVOLVING LEFT ANTERIOR DESCENDING CORONARY ARTERY (HCC): Status: RESOLVED | Noted: 2023-05-19 | Resolved: 2023-12-04

## 2023-12-04 PROBLEM — K62.5 RECTAL BLEEDING: Status: RESOLVED | Noted: 2023-05-18 | Resolved: 2023-12-04

## 2023-12-04 PROBLEM — R51.9 HEADACHE: Status: RESOLVED | Noted: 2023-11-25 | Resolved: 2023-12-04

## 2023-12-04 PROBLEM — R31.29 MICROSCOPIC HEMATURIA: Status: RESOLVED | Noted: 2023-05-30 | Resolved: 2023-12-04

## 2023-12-04 PROBLEM — Z71.2 ENCOUNTER TO DISCUSS TEST RESULTS: Status: RESOLVED | Noted: 2023-05-30 | Resolved: 2023-12-04

## 2023-12-04 PROCEDURE — 99495 TRANSJ CARE MGMT MOD F2F 14D: CPT | Performed by: INTERNAL MEDICINE

## 2023-12-04 NOTE — PATIENT INSTRUCTIONS
Chronic Hypertension   AMBULATORY CARE:   Hypertension is considered chronic  when it continues for 3 months or longer. Hypertension that continues causes your heart to work much harder than normal, which may lead to heart damage. Even if you have hypertension for years, lifestyle changes, medicines, or both may help lower your blood pressure. Call your local emergency number [phone] in the [address]) or have someone call if:   You have chest pain. You have any of the following signs of a heart attack:      Squeezing, pressure, or pain in your chest    You may  also have any of the following:     Discomfort or pain in your back, neck, jaw, stomach, or arm    Shortness of breath    Nausea or vomiting    Lightheadedness or a sudden cold sweat    You become confused or have difficulty speaking. You suddenly feel lightheaded or have trouble breathing. Seek care immediately if:   You have a severe headache or vision loss. You have weakness in an arm or leg. Call your doctor or cardiologist if:   You feel faint, dizzy, confused, or drowsy. You have been taking your blood pressure medicine but your pressure is higher than your provider says it should be. You have questions or concerns about your condition or care. Treatment for chronic hypertension  may include medicine to lower your blood pressure and cholesterol levels. A low cholesterol level helps prevent heart disease and makes it easier to control your blood pressure. Heart disease can make your blood pressure harder to control. You may also need to make lifestyle changes. What you need to know about the stages of hypertension:  Your healthcare provider will give you a blood pressure goal based on your age, health, and risk for cardiovascular disease. The following are general guidelines on the stages of hypertension:  Normal blood pressure is 119/79 or lower .  Your provider may only check your blood pressure each year if it stays at a normal level.    Elevated blood pressure is 120/79 to 129/79 . This is sometimes called prehypertension. Your provider may suggest lifestyle changes to help lower your blood pressure to a normal level. He or she may then check it again in 3 to 6 months. Stage 1 hypertension is 130/80  to 139/89 . Your provider may recommend lifestyle changes, medication, and checks every 3 to 6 months until your blood pressure is controlled. Stage 2 hypertension is 140/90 or higher . Your provider will recommend lifestyle changes and have you take 2 kinds of hypertension medicines. You will also need to have your blood pressure checked monthly until it is controlled. Manage chronic hypertension:   Check your blood pressure at home. Do not smoke, have caffeine, or exercise for at least 30 minutes before you check your blood pressure. Sit and rest for 5 minutes before you check your blood pressure. Extend your arm and support it on a flat surface. Your arm should be at the same level as your heart. Follow the directions that came with your blood pressure monitor. Check your blood pressure 2 times, 1 minute apart, before you take your medicine in the morning. Also check your blood pressure before your evening meal. Keep a record of your readings and bring it to your follow-up visits. Your healthcare provider may use the readings to make changes to your treatment plan. Manage any other health conditions you have. Health conditions such as diabetes can increase your risk for hypertension. Follow your provider's instructions and take all your medicines as directed. Talk to your provider about any new health conditions you have recently developed. Ask about all medicines. Certain medicines can increase your blood pressure. Examples include oral birth control pills, decongestants, herbal supplements, and NSAIDs, such as ibuprofen. Your provider can tell you which medicines are safe for you to take.  This includes prescription and over-the-counter medicines. Lifestyle changes you can make to lower your blood pressure: Your provider may want you to make more lifestyle changes if you are having trouble controlling your blood pressure. This may feel difficult over time, especially if you think you are making good changes but your pressure is still high. It might help to focus on one new change at a time. For example, try to add 1 more day of exercise, or exercise for an extra 10 minutes on 2 days. Small changes can make a big difference. Your healthcare provider can also refer you to specialists such as a dietitian who can help you make small changes. Your family members may be included in helping you learn to create lifestyle changes, such as the following:     Limit sodium (salt) as directed. Too much sodium can affect your fluid balance. Check labels to find low-sodium or no-salt-added foods. Some low-sodium foods use potassium salts for flavor. Too much potassium can also cause health problems. Your provider will tell you how much sodium and potassium are safe for you to have in a day. He or she may recommend that you limit sodium to 2,300 mg a day. Follow the meal plan recommended by your provider. A dietitian or your provider can give you more information on low-sodium plans or the DASH (Dietary Approaches to Stop Hypertension) eating plan. The DASH plan is low in sodium, processed sugar, unhealthy fats, and total fat. It is high in potassium, calcium, and fiber. These can be found in vegetables, fruit, and whole-grain foods. Be physically active throughout the day. Physical activity, such as exercise, can help control your blood pressure and your weight. Be physically active for at least 30 minutes per day, on most days of the week. Include aerobic activity, such as walking or riding a bicycle. Also include strength training at least 2 times each week.  Your provider can help you create a physical activity plan. Decrease stress. This may help lower your blood pressure. Learn ways to relax, such as deep breathing or listening to music. Limit alcohol as directed. Alcohol can increase your blood pressure. A drink of alcohol is 12 ounces of beer, 5 ounces of wine, or 1½ ounces of liquor. Your provider can help you set daily and weekly drink limits. He or she may recommend no alcohol if your blood pressure stays higher than goal even with medicine or other measures. Ask your provider for information if you need help to quit. Do not smoke. Nicotine and other chemicals in cigarettes and cigars can increase your blood pressure and also cause lung damage. Ask your provider for information if you currently smoke and need help to quit. E-cigarettes or smokeless tobacco still contain nicotine. Talk to your provider before you use these products. Follow up with your doctor or cardiologist as directed: You will need to return to have your blood pressure checked and to have other lab tests done. Write down your questions so you remember to ask them during your visits. © Copyright Jennifer Posada 2023 Information is for End User's use only and may not be sold, redistributed or otherwise used for commercial purposes. The above information is an  only. It is not intended as medical advice for individual conditions or treatments. Talk to your doctor, nurse or pharmacist before following any medical regimen to see if it is safe and effective for you.

## 2023-12-04 NOTE — PROGRESS NOTES
Assessment & Plan     1. Blurry vision, headache     The patient's hospital records were reviewed. No concerning findings on neuroimaging. Symptoms are resolved at this time and should continue current medications as prescribed. He will continue following oncology regularly for his cancer care. 2. Rectal cancer ([address])    The patient with known history of rectal cancer. He is currently on chemotherapy and has received radiation in the past. He follows with hematology/oncology at [facility] on a regular basis. He has surveillance imaging ordered for later this month. 3. Thrombocytopenia ([address])    This is likely secondary to number 2 and getting underlying chemotherapy. We will continue to monitor. 4. Moderate protein-calorie malnutrition ([address])    The patient should work on increasing his overall protein and caloric intake as best he can. 5. Chemotherapy induced neutropenia     He has had to receive Neulasta in the past. Recent white blood cell counts have been normal.    6. Acute on chronic anemia    His anemia is stable at this time and is monitored regularly by his oncologist.    7. CAD in native artery    This is stable without angina. He will continue aspirin, atorvastatin, and lisinopril. Return in about 4 months (around 4/4/2024) for Subsequent AWV. Subjective     Transitional Care Management Review:   Vashti Valencia is a 69 y.o. male here for TCM follow up. During the TCM phone call patient stated:  TCM Call       Date and time call was made  11/27/2023 11:30 AM    Hospital care reviewed  Records reviewed    Patient was hospitialized at  [facility]    Date of Admission  11/24/23    Date of discharge  11/26/23    Diagnosis  Headache    Disposition  Home    Were the patients medications reviewed and updated  Yes    Current Symptoms  None          TCM Call       Post hospital issues  None    Scheduled for follow up?   Yes    Patient refusal reason  for convenience, will keep 12/6/19 appt, which is the same day as appt w/cardiology. Patients specialists  Cardiologist    Cardiologist name  Roderic GoltzDO    Cardiologist contact #  [phone]    Did you obtain your prescribed medications  --  midodrine    Do you need help managing your prescriptions or medications  No    Is transportation to your appointment needed  No    I have advised the patient to call PCP with any new or worsening symptoms  [facility]  Family members    Are you recieving any outpatient services  No    What type of services  cardiac rehab, PFT, PT, Nurtrition Services    Are you recieving home care services  No    Are you using any community resources  No    Current waiver services  No    Have you fallen in the last 12 months  No    Interperter language line needed  No    Counseling  Family    Counseling topics  Importance of RX compliance          Jeffry Lovell is a 52-year-old male who presents for transition of care management visit. He presents for follow-up and was recently hospitalized at [address] [place] [address] from 11/24/2023 to 11/26/2023. He has a history of rectal cancer, cardiomyopathy, stroke, hypertension, and coronary artery disease. He had presented with headache, blurry vision, facial numbness, and tingling. He was going to the infusion center to have chemotherapy ball removed on 11/16/2023 when he started to experience these symptoms. At his baseline, he does get some intermittent headaches, but felt this episode was more concerning. In the hospital, a stroke pathway was initiated, and neurology saw him. There were no acute findings on CTA head and neck. MRI was also performed without contrast, and this revealed no mass or any evidence of acute stroke. His headache did resolve during hospitalization, and he was feeling better, so he was able to be safely discharged. He was given a referral to see neurology as an outpatient.  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 Initial diagnosis was made back on 05/18/2023. He is a status post ports placement and being treated with FOLFOX. Referred to radiation oncology for palliative radiation therapy. His blood work is being continuously monitored. He has had chemotherapy-induced neutropenia and has had to receive Neulasta in the past. He has surveillance imaging ordered with a CT chest, abdomen, and pelvis for later this month. The patient has seen palliative care in the past and currently does not require any controlled substances to treat his symptoms. He is not being treated with any benzodiazepines or opioid medications. He has no future follow-up with palliative care. He follows most commonly with hematology/oncology for his cancer diagnosis. He was recently in the hospital with headaches, blurry vision, numbness, and tingling. He has been doing better. He has completed radiation. He is still getting FOLFOX. He is scheduled for a surveillance CT scan on Wednesday. He takes iron pills. His appetite is good. He walks a lot. Objective     /70 (BP Location: Left arm, Patient Position: Sitting, Cuff Size: Standard)   Pulse 66   Temp (!) 97 °F (36.1 °C) (Tympanic)   Resp 19   Ht 5' 10" (1.778 m)   Wt 57.7 kg (127 lb 3.2 oz)   SpO2 96%   BMI 18.25 kg/m²      Physical Exam  Constitutional:       General: He is not in acute distress. Comments: Loss of muscle mass   Cardiovascular:      Rate and Rhythm: Normal rate and regular rhythm. Heart sounds: No murmur heard. Pulmonary:      Effort: Pulmonary effort is normal. No respiratory distress. Breath sounds: No wheezing. Abdominal:      General: Bowel sounds are normal. There is no distension. Tenderness: There is no abdominal tenderness. Musculoskeletal:      Right lower leg: No edema. Left lower leg: No edema. Neurological:      Mental Status: He is alert. Medications have been reviewed by provider in current encounter    Results: The patient had an MRI of the abdomen last 9/26/2023, which showed a 1.2 cm rim enhancing T1 hypodense hepatic lesion in segment seven near the hepatic dome, suspicious for hepatic metastasis. 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 For simple cysts less than 1.5 cm, it is recommended to follow up every 2 years for five times or to the age of 80, whichever comes first. His A1c result was 6.1%. Transcribed for Miranda Enrique DO, by Hero Nash on 12/04/23 at 10:07 AM. Powered by [employer].

## 2023-12-05 ENCOUNTER — TELEPHONE (OUTPATIENT)
Dept: HEMATOLOGY ONCOLOGY | Facility: CLINIC | Age: 78
End: 2023-12-05

## 2023-12-05 ENCOUNTER — PATIENT OUTREACH (OUTPATIENT)
Dept: CASE MANAGEMENT | Facility: HOSPITAL | Age: 78
End: 2023-12-05

## 2023-12-05 NOTE — TELEPHONE ENCOUNTER
Called patient with new appt scheduled for 12/20 @2 pm . Left message with all info and also with hope line tel number

## 2023-12-05 NOTE — PROGRESS NOTES
Pt's daughter called today to let me know they are getting a new car toward the end of this month and she would like to have [facility] set up for pt's appts for the next two weeks. His son will be here to visit for his oncology appt on 12/20 and will bring him to that. I emailed Nimesh Garcia with his appts from 12/11-12/28 which are confirmed as scheduled. I did let her know that if they need to cancel they just need to call and let us know. She denies any other needs at this time.

## 2023-12-06 ENCOUNTER — HOSPITAL ENCOUNTER (OUTPATIENT)
Dept: RADIOLOGY | Facility: MEDICAL CENTER | Age: 78
Discharge: HOME | End: 2023-12-06
Payer: COMMERCIAL

## 2023-12-06 DIAGNOSIS — C20 RECTAL CANCER (HCC): ICD-10-CM

## 2023-12-06 PROCEDURE — 74177 CT ABD & PELVIS W/CONTRAST: CPT

## 2023-12-06 PROCEDURE — 71260 CT THORAX DX C+: CPT

## 2023-12-06 RX ADMIN — IOHEXOL 100 ML: 350 INJECTION, SOLUTION INTRAVENOUS at 16:03

## 2023-12-07 DIAGNOSIS — C20 RECTAL CANCER (HCC): Primary | ICD-10-CM

## 2023-12-11 ENCOUNTER — TELEPHONE (OUTPATIENT)
Dept: HEMATOLOGY ONCOLOGY | Facility: CLINIC | Age: 78
End: 2023-12-11

## 2023-12-11 ENCOUNTER — HOSPITAL ENCOUNTER (OUTPATIENT)
Dept: INFUSION CENTER | Facility: CLINIC | Age: 78
Discharge: HOME | End: 2023-12-11
Payer: COMMERCIAL

## 2023-12-11 DIAGNOSIS — Z95.828 PORT-A-CATH IN PLACE: Primary | ICD-10-CM

## 2023-12-11 DIAGNOSIS — C20 RECTAL CANCER (HCC): ICD-10-CM

## 2023-12-11 DIAGNOSIS — C20 RECTAL CANCER (HCC): Primary | ICD-10-CM

## 2023-12-11 LAB
ALBUMIN SERPL BCP-MCNC: 3.7 G/DL (ref 3.5–5)
ALP SERPL-CCNC: 63 U/L (ref 34–104)
ALT SERPL W P-5'-P-CCNC: 28 U/L (ref 7–52)
ANION GAP SERPL CALCULATED.3IONS-SCNC: 6 MMOL/L
AST SERPL W P-5'-P-CCNC: 31 U/L (ref 13–39)
BASOPHILS # BLD AUTO: 0.03 THOUSANDS/ÂΜL (ref 0–0.1)
BASOPHILS NFR BLD AUTO: 1 % (ref 0–1)
BILIRUB SERPL-MCNC: 0.55 MG/DL (ref 0.2–1)
BUN SERPL-MCNC: 16 MG/DL (ref 5–25)
CALCIUM SERPL-MCNC: 8.7 MG/DL (ref 8.4–10.2)
CHLORIDE SERPL-SCNC: 110 MMOL/L (ref 96–108)
CO2 SERPL-SCNC: 23 MMOL/L (ref 21–32)
CREAT SERPL-MCNC: 0.97 MG/DL (ref 0.6–1.3)
EOSINOPHIL # BLD AUTO: 0.28 THOUSAND/ÂΜL (ref 0–0.61)
EOSINOPHIL NFR BLD AUTO: 6 % (ref 0–6)
ERYTHROCYTE [DISTWIDTH] IN BLOOD BY AUTOMATED COUNT: 24.8 % (ref 11.6–15.1)
GFR SERPL CREATININE-BSD FRML MDRD: 74 ML/MIN/1.73SQ M
GLUCOSE SERPL-MCNC: 108 MG/DL (ref 65–140)
HCT VFR BLD AUTO: 35.2 % (ref 36.5–49.3)
HGB BLD-MCNC: 11.6 G/DL (ref 12–17)
IMM GRANULOCYTES # BLD AUTO: 0.02 THOUSAND/UL (ref 0–0.2)
IMM GRANULOCYTES NFR BLD AUTO: 0 % (ref 0–2)
LYMPHOCYTES # BLD AUTO: 0.91 THOUSANDS/ÂΜL (ref 0.6–4.47)
LYMPHOCYTES NFR BLD AUTO: 20 % (ref 14–44)
MCH RBC QN AUTO: 27.1 PG (ref 26.8–34.3)
MCHC RBC AUTO-ENTMCNC: 33 G/DL (ref 31.4–37.4)
MCV RBC AUTO: 82 FL (ref 82–98)
MONOCYTES # BLD AUTO: 0.67 THOUSAND/ÂΜL (ref 0.17–1.22)
MONOCYTES NFR BLD AUTO: 15 % (ref 4–12)
NEUTROPHILS # BLD AUTO: 2.6 THOUSANDS/ÂΜL (ref 1.85–7.62)
NEUTS SEG NFR BLD AUTO: 58 % (ref 43–75)
NRBC BLD AUTO-RTO: 0 /100 WBCS
PLATELET # BLD AUTO: 70 THOUSANDS/UL (ref 149–390)
POTASSIUM SERPL-SCNC: 3.6 MMOL/L (ref 3.5–5.3)
PROT SERPL-MCNC: 6.1 G/DL (ref 6.4–8.4)
RBC # BLD AUTO: 4.28 MILLION/UL (ref 3.88–5.62)
SODIUM SERPL-SCNC: 139 MMOL/L (ref 135–147)
WBC # BLD AUTO: 4.51 THOUSAND/UL (ref 4.31–10.16)

## 2023-12-11 PROCEDURE — 85025 COMPLETE CBC W/AUTO DIFF WBC: CPT | Performed by: INTERNAL MEDICINE

## 2023-12-11 PROCEDURE — 80053 COMPREHEN METABOLIC PANEL: CPT | Performed by: INTERNAL MEDICINE

## 2023-12-11 NOTE — TELEPHONE ENCOUNTER
Dr. Marlena Shoemaker reviewed patient's labs, platelet count low, she wants to defer patient's tx by one week. Called to make daughter aware and called infusion as well. Will forward to infusion schedulers to change appointment to reflect deferment.

## 2023-12-11 NOTE — PROGRESS NOTES
Pt presents for port labs, offers no complaints, port accessed, labs drawn, port flushed and de-accessed.  AVS declined, reviewed next appt, [facility] transport notified, d/c from unit stable

## 2023-12-12 ENCOUNTER — HOSPITAL ENCOUNTER (OUTPATIENT)
Dept: INFUSION CENTER | Facility: CLINIC | Age: 78
Discharge: HOME | End: 2023-12-12

## 2023-12-13 DIAGNOSIS — I25.10 CAD IN NATIVE ARTERY: Chronic | ICD-10-CM

## 2023-12-13 RX ORDER — ATORVASTATIN CALCIUM 80 MG/1
80 TABLET, FILM COATED ORAL
Qty: 90 TABLET | Refills: 3 | Status: SHIPPED | OUTPATIENT
Start: 2023-12-13

## 2023-12-18 ENCOUNTER — HOSPITAL ENCOUNTER (OUTPATIENT)
Dept: INFUSION CENTER | Facility: CLINIC | Age: 78
Discharge: HOME | End: 2023-12-18
Payer: COMMERCIAL

## 2023-12-18 DIAGNOSIS — C20 RECTAL CANCER (HCC): ICD-10-CM

## 2023-12-18 DIAGNOSIS — C20 RECTAL CANCER (HCC): Primary | ICD-10-CM

## 2023-12-18 DIAGNOSIS — Z95.828 PORT-A-CATH IN PLACE: Primary | ICD-10-CM

## 2023-12-18 LAB
ALBUMIN SERPL BCP-MCNC: 3.6 G/DL (ref 3.5–5)
ALP SERPL-CCNC: 64 U/L (ref 34–104)
ALT SERPL W P-5'-P-CCNC: 23 U/L (ref 7–52)
ANION GAP SERPL CALCULATED.3IONS-SCNC: 5 MMOL/L
AST SERPL W P-5'-P-CCNC: 25 U/L (ref 13–39)
BASOPHILS # BLD AUTO: 0.02 THOUSANDS/ÂΜL (ref 0–0.1)
BASOPHILS NFR BLD AUTO: 1 % (ref 0–1)
BILIRUB SERPL-MCNC: 0.44 MG/DL (ref 0.2–1)
BUN SERPL-MCNC: 16 MG/DL (ref 5–25)
CALCIUM SERPL-MCNC: 8.6 MG/DL (ref 8.4–10.2)
CHLORIDE SERPL-SCNC: 107 MMOL/L (ref 96–108)
CO2 SERPL-SCNC: 24 MMOL/L (ref 21–32)
CREAT SERPL-MCNC: 0.85 MG/DL (ref 0.6–1.3)
EOSINOPHIL # BLD AUTO: 0.12 THOUSAND/ÂΜL (ref 0–0.61)
EOSINOPHIL NFR BLD AUTO: 4 % (ref 0–6)
ERYTHROCYTE [DISTWIDTH] IN BLOOD BY AUTOMATED COUNT: 24.7 % (ref 11.6–15.1)
GFR SERPL CREATININE-BSD FRML MDRD: 84 ML/MIN/1.73SQ M
GLUCOSE SERPL-MCNC: 125 MG/DL (ref 65–140)
HCT VFR BLD AUTO: 35.2 % (ref 36.5–49.3)
HGB BLD-MCNC: 11 G/DL (ref 12–17)
IMM GRANULOCYTES # BLD AUTO: 0.01 THOUSAND/UL (ref 0–0.2)
IMM GRANULOCYTES NFR BLD AUTO: 0 % (ref 0–2)
LYMPHOCYTES # BLD AUTO: 0.92 THOUSANDS/ÂΜL (ref 0.6–4.47)
LYMPHOCYTES NFR BLD AUTO: 31 % (ref 14–44)
MCH RBC QN AUTO: 26.3 PG (ref 26.8–34.3)
MCHC RBC AUTO-ENTMCNC: 31.3 G/DL (ref 31.4–37.4)
MCV RBC AUTO: 84 FL (ref 82–98)
MONOCYTES # BLD AUTO: 0.67 THOUSAND/ÂΜL (ref 0.17–1.22)
MONOCYTES NFR BLD AUTO: 22 % (ref 4–12)
NEUTROPHILS # BLD AUTO: 1.26 THOUSANDS/ÂΜL (ref 1.85–7.62)
NEUTS SEG NFR BLD AUTO: 42 % (ref 43–75)
NRBC BLD AUTO-RTO: 0 /100 WBCS
PLATELET # BLD AUTO: 82 THOUSANDS/UL (ref 149–390)
POTASSIUM SERPL-SCNC: 4 MMOL/L (ref 3.5–5.3)
PROT SERPL-MCNC: 6 G/DL (ref 6.4–8.4)
RBC # BLD AUTO: 4.18 MILLION/UL (ref 3.88–5.62)
SODIUM SERPL-SCNC: 136 MMOL/L (ref 135–147)
WBC # BLD AUTO: 3 THOUSAND/UL (ref 4.31–10.16)

## 2023-12-18 PROCEDURE — 80053 COMPREHEN METABOLIC PANEL: CPT | Performed by: INTERNAL MEDICINE

## 2023-12-18 PROCEDURE — 85025 COMPLETE CBC W/AUTO DIFF WBC: CPT | Performed by: INTERNAL MEDICINE

## 2023-12-18 NOTE — PROGRESS NOTES
Pt to clinic for labs drawn via port. Pt offers no complaints today.Pt port accessed, flushed, and de-accessed with positive blood returned. AVS was received by pt. Pt aware of next appt on 12/19/2023 at 2 pm. Pt ambulated out of clinic safely.

## 2023-12-19 ENCOUNTER — HOSPITAL ENCOUNTER (OUTPATIENT)
Dept: INFUSION CENTER | Facility: CLINIC | Age: 78
Discharge: HOME | End: 2023-12-19

## 2023-12-19 ENCOUNTER — TELEPHONE (OUTPATIENT)
Dept: HEMATOLOGY ONCOLOGY | Facility: CLINIC | Age: 78
End: 2023-12-19

## 2023-12-19 NOTE — TELEPHONE ENCOUNTER
Dr. Hinojosa reviewed patient's labs, patient's platelets are low, she does not want patient to get treated and to be deferred by one week.    Called daughter Erendira to make her aware that patient should not go for tx today and that Dr. Hinojosa will discuss possible dosage change at appointment tomorrow. Erendira verbalized understanding, will make patient aware and has no other questions or concerns at this moment.

## 2023-12-19 NOTE — TELEPHONE ENCOUNTER
Lvm on [employer] answering machine with date & time of infusions. Patient is seeing doctor tomorrow at 2PM and will receive a print out. Patient and hanane also aware schedule has been updated on [employer]

## 2023-12-20 ENCOUNTER — OFFICE VISIT (OUTPATIENT)
Dept: HEMATOLOGY ONCOLOGY | Facility: CLINIC | Age: 78
End: 2023-12-20
Payer: COMMERCIAL

## 2023-12-20 ENCOUNTER — APPOINTMENT (OUTPATIENT)
Dept: LAB | Facility: HOSPITAL | Age: 78
End: 2023-12-20
Attending: INTERNAL MEDICINE
Payer: COMMERCIAL

## 2023-12-20 ENCOUNTER — TELEPHONE (OUTPATIENT)
Dept: HEMATOLOGY ONCOLOGY | Facility: CLINIC | Age: 78
End: 2023-12-20

## 2023-12-20 VITALS
HEIGHT: 70 IN | BODY MASS INDEX: 19.26 KG/M2 | SYSTOLIC BLOOD PRESSURE: 118 MMHG | DIASTOLIC BLOOD PRESSURE: 72 MMHG | WEIGHT: 134.5 LBS | RESPIRATION RATE: 16 BRPM | HEART RATE: 68 BPM | TEMPERATURE: 98.2 F

## 2023-12-20 DIAGNOSIS — C20 RECTAL CANCER (HCC): ICD-10-CM

## 2023-12-20 DIAGNOSIS — R06.02 SOB (SHORTNESS OF BREATH): ICD-10-CM

## 2023-12-20 DIAGNOSIS — R06.02 SOB (SHORTNESS OF BREATH): Primary | ICD-10-CM

## 2023-12-20 DIAGNOSIS — I42.9 CARDIOMYOPATHY, UNSPECIFIED TYPE (HCC): ICD-10-CM

## 2023-12-20 DIAGNOSIS — C20 RECTAL CANCER (HCC): Primary | ICD-10-CM

## 2023-12-20 LAB — D DIMER PPP FEU-MCNC: 1.05 UG/ML FEU

## 2023-12-20 PROCEDURE — 36415 COLL VENOUS BLD VENIPUNCTURE: CPT

## 2023-12-20 PROCEDURE — 85379 FIBRIN DEGRADATION QUANT: CPT

## 2023-12-20 PROCEDURE — 99215 OFFICE O/P EST HI 40 MIN: CPT | Performed by: INTERNAL MEDICINE

## 2023-12-20 NOTE — PROGRESS NOTES
[facility] [place]  [address] [address]    Minesh Pandya JrElias  1945      PRIMARY HEMATOLOGIC/ONCOLOGIC DIAGNOSIS:  1. Adenocarcinoma of the rectum, invasive, moderately differentiated. NO LOSS OF NUCLEAR EXPRESSION OF MMR PROTEINS: LOW PROBABILITY OF MSI-H. NRAS mutated. Date of diagnosis 5/20/23. CEA normal at time of diagnosis -- 1.7 on 5/19/23.     PATHOLOGY:   Case Report   Surgical Pathology Report                         Case: [id number]                                    Authorizing Provider:  Amador Ca DO          Collected:           05/20/2023 1530               Ordering Location:     [facility] Received:            05/20/2023 1800                                      Intensive Care Unit                                                           Pathologist:           Guille Washington DO                                                             Specimen:    Rectum                                                                                     Addendum   RESULTS OF IMMUNOHISTOCHEMICAL ANALYSIS FOR MISMATCH REPAIR PROTEIN LOSS  INTERPRETATION: NO LOSS OF NUCLEAR EXPRESSION OF MMR PROTEINS: LOW PROBABILITY OF MSI-H        RESULTS:  Antibody            Clone                 Description                     Results  MLH1                 M1                     Mismatch repair protein  Intact nuclear expression  MSH2                [id number]        Mismatch repair protein  Intact nuclear expression  MSH6                44                      Mismatch repair protein  Intact nuclear expression  PMS2                 [id number]           Mismatch repair protein  Intact nuclear expression     Comment:  Background non-neoplastic tissue and/or internal control with intact nuclear expression.  [facility] Specimen ID: [id number], Evaluator:  Padilla Ling MD  These tests were developed and their performance  characteristics determined by [employer].  They may not be cleared or approved by the U.S. Food and Drug Administration.  The FDA determined that such clearance or approval is not necessary.  These tests are used for clinical purposes.  They should not be regarded as investigational or for research.  This laboratory has been approved by CLIA 88, designated as a high-complexity laboratory and is qualified to perform these tests.     Comments: Patients whose tumors demonstrate lack of expression of one or more DNA mismatch repair proteins might be at risk for Kenny Syndrome. This cancer susceptibility syndrome greatly increases the risk of synchronous and/or metachronous cancers in the affected patients and their family members. Normal expression of all proteins does not completely rule out familial cancer predisposition.     The [facility] Kenny Syndrome Surveillance Program Task Forces recommends that all patients with lack of expression of one or more DNA mismatch repair proteins and those with concerning personal or family history should undergo thorough evaluation, counseling and possibly genetic testing.    Addendum electronically signed by Guille Washington DO on 5/30/2023 at  2:34 PM   Final Diagnosis   A. Rectum, mass, biopsy:  -Adenocarcinoma, invasive, moderately differentiated.  -Ancillary testing for mismatch repair (MMR) protein deficiency by immunohistochemical panel (MLH1, PMS2, MSH2, MSH6) is undertaken, the results will be issued in an addendum.   Electronically signed by Guille Washington DO on 5/26/2023 at  9:10 AM   Note    Intradepartmental consultation (LX) is in agreement.  Dr. Amador Ca was notified by Dr. Guille Washington via For[employer] at 9:09am on 5/26/23.   Additional Information    All reported additional testing was performed with appropriately reactive controls.  These tests were developed and their performance characteristics determined by [facility] or  "appropriate performing facility, though some tests may be performed on tissues which have not been validated for performance characteristics (such as staining performed on alcohol exposed cell blocks and decalcified tissues).  Results should be interpreted with caution and in the context of the patients’ clinical condition. These tests may not be cleared or approved by the U.S. Food and Drug Administration, though the FDA has determined that such clearance or approval is not necessary. These tests are used for clinical purposes and they should not be regarded as investigational or for research. This laboratory has been approved by IA 88, designated as a high-complexity laboratory and is qualified to perform these tests.     Interpretation performed at [facility] [address] [address]   Gross Description     A. The specimen is received in formalin, labeled with the patient's name and hospital number, and is designated \" rectum\".  The specimen consists of multiple tan-pink soft tissue fragments measuring in aggregate 1.0 x 1.0 x 0.2 cm.  Entirely submitted. One screened cassette.     Note: The estimated total formalin fixation time based upon information provided by the submitting clinician and the standard processing schedule is under 72 hours.  MSequino       STAGING: Cancer Staging   Rectal cancer (HCC)  Staging form: Colon and Rectum, AJCC 8th Edition  - Clinical stage from 5/18/2023: Stage IIIB (cT3, cN1, cM0) - Signed by Arnaldo Mar DO on 6/28/2023  Microsatellite instability (MSI): Stable         Oncology History   Rectal cancer (HCC)   5/18/2023 Initial Diagnosis    May 18, 2023 patient presented with BRBPR x1 week.  Weight stable over 8 months.  CT abdomen pelvis showed rectal mass with 2 suspicious mesorectal lymph nodes.  2 mm left lower lobe pulmonary nodule, 3 mm hepatic dome questionable lesion, 1 cm uncinate process cystic lesion.  CT chest showed 3 mm left lower lobe " and right upper lobe nodules new compared to 2019.  May 20 flexible sigmoidoscopy showed distal rectal mass.  Biopsy of the mass showed moderately differentiated adenocarcinoma.  MMR intact.  June 14, 2023 MRI of the pelvis showed findings consistent with T3b, N1 low rectal cancer.  Suspicion for vascular invasion.  CBC on presentation normal WBC platelets.  Hemoglobin 11.4, MCV 77.  CMP showed albumin 3.3, otherwise normal.        Cancer Staged    Cancer Staging   No matching staging information was found for the patient.       5/18/2023 -  Cancer Staged    Staging form: Colon and Rectum, AJCC 8th Edition  - Clinical stage from 5/18/2023: Stage IIIB (cT3, cN1, cM0) - Signed by Arnaldo Mar DO on 6/28/2023  Microsatellite instability (MSI): Stable       5/20/2023 Biopsy    A. Rectum, mass, biopsy:  -Adenocarcinoma, invasive, moderately differentiated.  -Ancillary testing for mismatch repair (MMR) protein deficiency by immunohistochemical panel (MLH1, PMS2, MSH2, MSH6) is undertaken, the results will be issued in an addendum.    RESULTS OF IMMUNOHISTOCHEMICAL ANALYSIS FOR MISMATCH REPAIR PROTEIN LOSS  INTERPRETATION: NO LOSS OF NUCLEAR EXPRESSION OF MMR PROTEINS: LOW PROBABILITY OF MSI-H        RESULTS:  Antibody            Clone                 Description                     Results  MLH1                 M1                     Mismatch repair protein  Intact nuclear expression  MSH2                [id number]        Mismatch repair protein  Intact nuclear expression  MSH6                44                      Mismatch repair protein  Intact nuclear expression  PMS2                 [id number]           Mismatch repair protein  Intact nuclear expression        9/20/2023 -  Chemotherapy    alteplase (CATHFLO), 2 mg, Intracatheter, Every 1 Minute as needed, 6 of 12 cycles  pegfilgrastim (NEULASTA), 6 mg, Subcutaneous, Once, 1 of 1 cycle  Administration: 6 mg (10/20/2023)  fluorouracil (ADRUCIL), 320 mg/m2 = 565 mg  (80 % of original dose 400 mg/m2), Intravenous, Once, 2 of 2 cycles  Dose modification: 320 mg/m2 (original dose 400 mg/m2, Cycle 1)  Administration: 565 mg (9/20/2023), 565 mg (10/17/2023)  leucovorin calcium IVPB, 708 mg, Intravenous, Once, 6 of 6 cycles  Administration: 700 mg (9/20/2023), 700 mg (10/17/2023), 700 mg (10/31/2023), 700 mg (11/14/2023), 700 mg (11/28/2023)  oxaliplatin (ELOXATIN) chemo infusion, 75 mg/m2 = 132.75 mg (88.2 % of original dose 85 mg/m2), Intravenous, Once, 6 of 12 cycles  Dose modification: 75 mg/m2 (original dose 85 mg/m2, Cycle 1, Reason: Anticipated Tolerance)  Administration: 132.75 mg (9/20/2023), 132.75 mg (10/17/2023), 133 mg (10/31/2023), 132.75 mg (11/14/2023), 132.75 mg (11/28/2023)  fluorouracil (ADRUCIL) ambulatory infusion Soln, 1,200 mg/m2/day = 4,250 mg, Intravenous, Over 46 hours, 6 of 12 cycles  Dose modification: 960 mg/m2/day (80 % of original dose 1,200 mg/m2/day, Cycle 6, Reason: Treatment Parameters Not Met)     9/25/2023 - 9/29/2023 Radiation    Treatments:  Course: C1  Plan ID Energy Fractions Dose per Fraction (cGy) Dose Correction (cGy) Total Dose Delivered (cGy) Elapsed Days   Pelvis 10X/6X 5 / 5 500 0 2,500 4    Treatment Dates:  9/25/2023 - 9/29/2023.          INTERIM HISTORY:  Minesh Law is a 77yo male who presents for evaluation and management of adenocarcinoma of the rectum. Pt received C1D1 mFOLFOX 6 on 9/20/23. C2 D1 administered 10/17/23. Tolerated treatment well w/ Grade 1 fatigue. No other complaints. Initiated RT 9/25/23 and completed 9/29/23. Tolerating treatment well. He was discharged 11/26/23 after admission for headache, facial numbness, and blurred vision. S/p C5 11/28/23. C6 delayed by 2 weeks due to thrombocytopenia. The patient reports increased SOB at rest and with exertion. Has an inhaler at home but has not used it. No chest pain. No near syncopal episodes. No LE swelling. No signs of fluid overload.     PAST MEDICAL,PAST SURGICAL,  FAMILY AND SOCIAL HISTORY:    Patient Active Problem List   Diagnosis    CAD in native artery    S/P primary angioplasty with coronary stent    History of ischemic cardiomyopathy    History of lacunar cerebrovascular accident (CVA)    Non-recurrent unilateral inguinal hernia    Essential hypertension    Protein-calorie malnutrition, unspecified severity (HCC)    Acute on chronic anemia    Moderate protein-calorie malnutrition (HCC)    Ischemic cardiomyopathy    Renal cyst    Enlarged prostate    History of CVA (cerebrovascular accident)    Phimosis    Rectal cancer (HCC)    Depression, recurrent (HCC)    Port-A-Cath in place    Chemotherapy induced neutropenia     Thrombocytopenia (HCC)     Past Medical History:   Diagnosis Date    Hyperlipidemia     Ischemic cardiomyopathy     Lacunar infarction (HCC)     Myocardial infarction (HCC)     Near syncope 11/14/2019    Non-ST elevated myocardial infarction (HCC)     NSTEMI (non-ST elevated myocardial infarction) (HCC) 10/20/2019    Pericarditis 10/24/2019    Poison ivy     Rectal cancer (HCC)     STEMI involving left anterior descending coronary artery (HCC) 05/19/2023    Stroke (HCC)      Past Surgical History:   Procedure Laterality Date    ANGIOPLASTY      CARDIAC CATHETERIZATION N/A 5/19/2023    Procedure: Cardiac pci;  Surgeon: Ted Núñez MD;  Location: [facility] CARDIAC [facility]ATH [facility];  Service: Cardiology    CORONARY STENT PLACEMENT      HERNIA REPAIR Left 4/28/2021    Procedure: OPEN REPAIR HERNIA INGUINAL LEFT WITH MESH;  Surgeon: Carlos Nguyen MD;  Location: [facility] MAIN OR;  Service: General    IR PORT PLACEMENT  8/31/2023     Family History   Problem Relation Age of Onset    Breast cancer Mother     Emphysema Father     Heart disease Maternal Grandfather     Heart attack Maternal Grandfather     Emphysema Paternal Grandfather      Social History     Socioeconomic History    Marital status:      Spouse name: Not on file    Number of children: Not on file    Years  of education: Not on file    Highest education level: Not on file   Occupational History    Not on file   Tobacco Use    Smoking status: Former     Current packs/day: 0.00     Average packs/day: 1 pack/day for 15.0 years (15.0 ttl pk-yrs)     Types: Cigarettes     Start date: 1954     Quit date: 1969     Years since quittin.0    Smokeless tobacco: Never   Vaping Use    Vaping status: Never Used   Substance and Sexual Activity    Alcohol use: Never    Drug use: No    Sexual activity: Not Currently     Partners: Female   Other Topics Concern    Not on file   Social History Narrative    Not on file     Social Determinants of Health     Financial Resource Strain: Low Risk  (10/17/2022)    Overall Financial Resource Strain (CARDIA)     Difficulty of Paying Living Expenses: Not hard at all   Food Insecurity: No Food Insecurity (2023)    Hunger Vital Sign     Worried About Running Out of Food in the Last Year: Never true     Ran Out of Food in the Last Year: Never true   Transportation Needs: No Transportation Needs (2023)    PRAPARE - Transportation     Lack of Transportation (Medical): No     Lack of Transportation (Non-Medical): No   Physical Activity: Inactive (3/8/2021)    Exercise Vital Sign     Days of Exercise per Week: 0 days     Minutes of Exercise per Session: 0 min   Stress: No Stress Concern Present (2023)    [employer] - Occupational Stress Questionnaire     Feeling of Stress : Not at all   Social Connections: Not on file   Intimate Partner Violence: Not on file   Housing Stability: Low Risk  (2023)    Housing Stability Vital Sign     Unable to Pay for Housing in the Last Year: No     Number of Places Lived in the Last Year: 1     Unstable Housing in the Last Year: No       Current Outpatient Medications:     atorvastatin (LIPITOR) 80 mg tablet, take 1 tablet by mouth once daily WITH DINNER, Disp: 90 tablet, Rfl: 3    Blood Pressure KIT, by Does  not apply route daily, Disp: 1 each, Rfl: 0    diphenhydramine, lidocaine, Al/Mg hydroxide, simethicone (Magic Mouthwash) SUSP, Swish and spit 10 mL every 6 (six) hours as needed for mouth pain or discomfort, Disp: 237 mL, Rfl: 0    ferrous sulfate 324 (65 Fe) mg, Take 1 tablet (324 mg total) by mouth in the morning, Disp: 30 tablet, Rfl: 5    [START ON 12/27/2023] fluorouracil 3,300 mg in CADD/Elastomeric Infusion Device, Infuse 3,300 mg (960 mg/m2/day x 1.72 m2) into a catheter in a vein via infusion device over 46 hours for 2 days  Infusion planned for December 27, 2023., Disp: 1 each, Rfl: 0    fluorouracil 4,130 mg in CADD/Elastomeric Infusion Device, Infuse 4,130 mg (1,200 mg/m2/day x 1.72 m2) into a catheter in a vein via infusion device over 46 hours for 2 days  Infusion planned for December 19, 2023., Disp: 1 each, Rfl: 0    [START ON 12/27/2023] fluorouracil 4,130 mg in CADD/Elastomeric Infusion Device, Infuse 4,130 mg (1,200 mg/m2/day x 1.72 m2) into a catheter in a vein via infusion device over 46 hours for 2 days  Infusion planned for December 27, 2023., Disp: 1 each, Rfl: 0    lisinopril (ZESTRIL) 2.5 mg tablet, take 1 tablet by mouth daily, Disp: 90 tablet, Rfl: 3    metoprolol succinate (TOPROL-XL) 25 mg 24 hr tablet, take 1 tablet by mouth daily, Disp: 90 tablet, Rfl: 3    sucralfate (CARAFATE) 1 g tablet, Take 1 tablet (1 g total) by mouth 4 (four) times a day, Disp: 120 tablet, Rfl: 3    tamsulosin (FLOMAX) 0.4 mg, Take 1 capsule (0.4 mg total) by mouth daily at bedtime, Disp: 90 capsule, Rfl: 3    aspirin (ECOTRIN LOW STRENGTH) 81 mg EC tablet, Take 1 tablet (81 mg total) by mouth daily Please purchase over the counter at her local pharmacy, Disp: 30 tablet, Rfl: 1    pantoprazole (PROTONIX) 40 mg tablet, Take 1 tablet (40 mg total) by mouth daily, Disp: 30 tablet, Rfl: 0  No current facility-administered medications for this visit.    Facility-Administered Medications Ordered in Other Visits:      alteplase (CATHFLO) injection 2 mg, 2 mg, Intracatheter, Q1MIN PRN, Martha Hinojosa MD  Allergies   Allergen Reactions    Wound Dressing Adhesive Hives     Noted with Plastic adhesive bandage brand Curity     Vitals:    12/20/23 1414   BP: 118/72   Pulse: 68   Resp: 16   Temp: 98.2 °F (36.8 °C)       ROS:  CONSTITUTIONAL:  No fever. No chills. No dizziness. No weakness.  EYES:  No pain, erythema, or discharge. No blurring of vision.  ENT:  No sore throat, URI symptoms. No epistaxis. No tinnitus.  CARDIOVASCULAR:  No chest pain. No palpitations. No lower extremity edema.  RESPIRATORY:  No shortness of breath, cough, pain with respiration, pleuritic chest pain. No hemoptysis. No dyspnea. No paroxysmal nocturnal dyspnea.  GASTROINTESTINAL:  Normal appetite. No nausea, vomiting, diarrhea. No pain. No bloating. No melena.  GENITOURINARY:  No frequency, urgency, nocturia. No hematuria or dysuria.  MUSCULOSKELETAL:  No arthralgias or myalgias.  INTEGUMENTARY:  No swelling. No bruising. No contusions. No abrasions. No lymphangitis.  NEUROLOGIC:  No headache. No neck pain. No numbness or tingling of the extremities. No weakness.  PSYCHIATRIC:  No confusion.  ENDOCRINE:  No fatigue. No weakness. No history of thyroid, diabetes or adrenal problems.  HEMATOLOGICAL:  No bleeding. No petechiae. No bruising.    PHYSICAL EXAM:    GENERAL: AAO x 3, moderate distress  HEENT: AT,NC  CVS: S1S2 RRR  LUNGS: b/l breath sounds  ABD: NT,ND, +BS  EXTR: no edema  NEURO: CN II-XII grossly intact    LABS:  I have reviewed pertinent labs:  CBC:   Lab Results   Component Value Date    WBC 3.00 (L) 12/18/2023    RBC 4.18 12/18/2023    HGB 11.0 (L) 12/18/2023    HCT 35.2 (L) 12/18/2023    MCV 84 12/18/2023    PLT 82 (L) 12/18/2023    MCH 26.3 (L) 12/18/2023    MCHC 31.3 (L) 12/18/2023    RDW 24.7 (H) 12/18/2023    MPV 10.8 11/26/2023    NEUTROABS 1.26 (L) 12/18/2023     CMP:   Lab Results   Component Value Date    SODIUM 136 12/18/2023    K 4.0  12/18/2023     12/18/2023    CO2 24 12/18/2023    AGAP 5 12/18/2023    BUN 16 12/18/2023    CREATININE 0.85 12/18/2023    GLUC 125 12/18/2023    GLUF 105 (H) 08/07/2023    CALCIUM 8.6 12/18/2023    AST 25 12/18/2023    ALT 23 12/18/2023    ALKPHOS 64 12/18/2023    TP 6.0 (L) 12/18/2023    ALB 3.6 12/18/2023    TBILI 0.44 12/18/2023    EGFR 84 12/18/2023     Liver Enzymes:   Lab Results   Component Value Date    AST 25 12/18/2023    ALT 23 12/18/2023    ALKPHOS 64 12/18/2023    TP 6.0 (L) 12/18/2023    ALB 3.6 12/18/2023    TBILI 0.44 12/18/2023    BILIDIR 0.16 10/20/2019     Vitamin B12   Lab Results   Component Value Date    YTAZYVSI39 222 05/19/2023     Iron Study   Lab Results   Component Value Date    RETIC 43,800 05/19/2023    RETICCTPCT 1.07 05/19/2023    FERRITIN 22 (L) 08/07/2023    CONCFE 6 (L) 08/07/2023    TIBC 328 08/07/2023    IRON 20 (L) 08/07/2023     Folate   Lab Results   Component Value Date    FOLATE 21.8 05/19/2023     Magnesium   Lab Results   Component Value Date    MG 2.3 05/21/2023     Phosphorus   Lab Results   Component Value Date    PHOS 2.7 05/21/2023     Coagulation Panel   Lab Results   Component Value Date    DDIMER 0.28 10/20/2019    PROTIME 14.7 (H) 11/24/2023    INR 1.09 11/24/2023    PTT 29 11/24/2023       IMAGING:  XR chest pa & lateral    Result Date: 8/9/2023  Narrative: CHEST INDICATION:   R06.02: Shortness of breath. . COMPARISON:  Radiograph 9/25/2020 and CTA chest  May 23, 2023 EXAM PERFORMED/VIEWS:  XR CHEST PA & LATERAL FINDINGS: Cardiomediastinal silhouette appears unremarkable. The lungs are clear.  No pneumothorax or pleural effusion. Osseous structures appear within normal limits for patient age.     Impression: No acute cardiopulmonary disease. Resident: APARNA ARGUELLES I, the attending radiologist, have reviewed the images and agree with the final report above. Workstation performed: [id number]     I reviewed the above laboratory and imaging  data.    ASSESSMENT/PLAN:  1.Adenocarcinoma of the rectum, invasive, moderately differentiated. NO LOSS OF NUCLEAR EXPRESSION OF MMR PROTEINS: LOW PROBABILITY OF MSI-H. NRAS mutated. Date of diagnosis 5/20/23. CEA normal at time of diagnosis -- 1.7 on 5/19/23.  The patient is willing to receive treatment now.   Prior imaging 5/2023 showed a possible 0.3 cm right hepatic dome lesion versus artifact (statistically most likely a benign lesion such as cyst or hemangioma). 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 MRI pelvis w/o contrast dated 6/14/23 showed stage: T3b, N1, low rectal cancer. MRF: clear (tumor margin >2 mm from MRF). No sphincter involvement. No suspicious extra mesorectal lymph nodes. Suspicious vascular invasion along the right lateral aspect.  [facility] oncology testing pending.  CT TAP dated 8/29/23--numerous bilateral pulmonary nodules which have increased in size/conspicuity from prior exam suspicious for metastasis. Redemonstration of subannular primary rectal malignancy, grossly similar to prior exam. Multiple suspicious mesorectal lymph nodes, one having mildly enlarged since prior exams. Stable borderline enlarged aortocaval lymph node, indeterminate for metastasis. New posterior right hepatic dome lesion, separate from a previously described lesion also in the hepatic dome which is not visualized on this exam. Metastasis not excluded. Recommend further characterization with hepatic MRI. 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 Cholelithiasis.  S/p port placement. Plan to initiate treatment with FOLFOX ( dose reduced due to  Anticipated tolerance, will increase dose w/ later treatments if pt is able to tolerate).  Referred to [facility] for palliative RT due to rectal bleeding.   S/p C1D1 mFOLFOX 6 on 9/20/23. Tolerated treatment well. No complaints. Initiated RT 9/25/23 and completed 9/29/23. C2 D1 FOLFOX planned for  10/9/23. C2D1 administered 10/17/23 due to neutropenia. Neulasta administered 10/20/23 for chemotherapy induced neutropenia. C3D1 administered 10/31/23. -5FU bolus discontinued. C4D1 administered 11/14/23, C5D1 administered 11/28/23. C6 delayed by 2 weeks due to thrombocytopenia. CADD pump dose reduced.  CT TAP 12/6/23--stable indexed pulmonary nodules; decrease in size of the indexed retroperitoneal and perirectal lymph nodes when compared to the previous CT; under distention of the rectum diminishing diagnostic assessment however there may be some circumferential wall thickening which may be related to underdistention or posttreatment changes; the previously noted lesion in the posterior right hepatic dome not visualized on this exam; unchanged cystic lesion in the uncinate process of the pancreas.  2. Chemotherapy induced neutropenia. S/p neulasta.  3. SOB at rest and with exertion. Symptoms started days after he underwent re-staging imaging on 12/6/23. ECHO 7/2023--the left ventricular ejection fraction is 45%. Will order repeat ECHO. Discussed that PE is a differential dx. Not willing to undergo CTA today. Willing to get a Ddimer drawn and get a CTA if Ddimer is elevated. Will take inhaler at home.   4. Thrombocytopenia. 5-FU bolus removed. CADD pump dose reduced.   Has inhaler

## 2023-12-20 NOTE — TELEPHONE ENCOUNTER
Title: Fluorouracil CADD    Date patient scheduled: 12/27    Original medication ordered: Fluorouracil CADD 1200 mg/m2/day    New Medication ordered: Fluorouracil CADD 960 mg/m2/day    Office RN notified patient?? No    Is the patient scheduled within 24 hours?? If yes, follow up with verbal telephone call.    Office RN to route to INF  pool. Infusion  pool routes to INF TECH POOL.    Infusion tech to receive message, confirm scheduled treatment duration matches ordered treatment duration or adjust accordingly, and re-link appointment request orders.    Infusion tech to notify pharmacy and finance.

## 2023-12-21 ENCOUNTER — TELEPHONE (OUTPATIENT)
Dept: HEMATOLOGY ONCOLOGY | Facility: CLINIC | Age: 78
End: 2023-12-21

## 2023-12-21 ENCOUNTER — HOSPITAL ENCOUNTER (OUTPATIENT)
Dept: CT IMAGING | Facility: HOSPITAL | Age: 78
End: 2023-12-21
Payer: COMMERCIAL

## 2023-12-21 DIAGNOSIS — R06.02 SOB (SHORTNESS OF BREATH): Primary | ICD-10-CM

## 2023-12-21 DIAGNOSIS — C20 RECTAL CANCER (HCC): ICD-10-CM

## 2023-12-21 DIAGNOSIS — R06.02 SOB (SHORTNESS OF BREATH): ICD-10-CM

## 2023-12-21 PROCEDURE — 71275 CT ANGIOGRAPHY CHEST: CPT

## 2023-12-21 PROCEDURE — G1004 CDSM NDSC: HCPCS

## 2023-12-21 RX ADMIN — IOHEXOL 50 ML: 350 INJECTION, SOLUTION INTRAVENOUS at 14:17

## 2023-12-21 NOTE — TELEPHONE ENCOUNTER
Reviewed with Dr. Hinojosa, patient refused to go to the ED on 12/20/23. TT message sent to Dr. Vanessa, on call provider to look for d-dimer results.     Left vm since d-dimer elevated to go to the ED. Call back number provided.

## 2023-12-21 NOTE — TELEPHONE ENCOUNTER
Left detailed VM for patient to let him know that Dr. Hinojosa would like him to get a stat CTA for SOB. I informed him that I would call back when it is scheduled. I left my direct teams number for him to callback.

## 2023-12-21 NOTE — TELEPHONE ENCOUNTER
Spoke with patient's daughter Erendira and she confirmed that she will take him to the CTA at 2:15pm at [facility]. I told her that we will call her with those results. Erendira verbalized understanding.

## 2023-12-26 ENCOUNTER — TELEPHONE (OUTPATIENT)
Dept: HEMATOLOGY ONCOLOGY | Facility: CLINIC | Age: 78
End: 2023-12-26

## 2023-12-26 ENCOUNTER — HOSPITAL ENCOUNTER (OUTPATIENT)
Dept: INFUSION CENTER | Facility: CLINIC | Age: 78
Discharge: HOME | End: 2023-12-26
Payer: COMMERCIAL

## 2023-12-26 DIAGNOSIS — Z95.828 PORT-A-CATH IN PLACE: Primary | ICD-10-CM

## 2023-12-26 DIAGNOSIS — C20 RECTAL CANCER (HCC): ICD-10-CM

## 2023-12-26 LAB
ALBUMIN SERPL BCP-MCNC: 3.6 G/DL (ref 3.5–5)
ALP SERPL-CCNC: 61 U/L (ref 34–104)
ALT SERPL W P-5'-P-CCNC: 20 U/L (ref 7–52)
ANION GAP SERPL CALCULATED.3IONS-SCNC: 5 MMOL/L
AST SERPL W P-5'-P-CCNC: 21 U/L (ref 13–39)
BASOPHILS # BLD AUTO: 0.03 THOUSANDS/ÂΜL (ref 0–0.1)
BASOPHILS NFR BLD AUTO: 1 % (ref 0–1)
BILIRUB SERPL-MCNC: 0.39 MG/DL (ref 0.2–1)
BUN SERPL-MCNC: 19 MG/DL (ref 5–25)
CALCIUM SERPL-MCNC: 8.7 MG/DL (ref 8.4–10.2)
CHLORIDE SERPL-SCNC: 111 MMOL/L (ref 96–108)
CO2 SERPL-SCNC: 24 MMOL/L (ref 21–32)
CREAT SERPL-MCNC: 0.91 MG/DL (ref 0.6–1.3)
EOSINOPHIL # BLD AUTO: 0.11 THOUSAND/ÂΜL (ref 0–0.61)
EOSINOPHIL NFR BLD AUTO: 4 % (ref 0–6)
ERYTHROCYTE [DISTWIDTH] IN BLOOD BY AUTOMATED COUNT: 23.6 % (ref 11.6–15.1)
GFR SERPL CREATININE-BSD FRML MDRD: 81 ML/MIN/1.73SQ M
GLUCOSE SERPL-MCNC: 122 MG/DL (ref 65–140)
HCT VFR BLD AUTO: 34.8 % (ref 36.5–49.3)
HGB BLD-MCNC: 10.5 G/DL (ref 12–17)
IMM GRANULOCYTES # BLD AUTO: 0.01 THOUSAND/UL (ref 0–0.2)
IMM GRANULOCYTES NFR BLD AUTO: 0 % (ref 0–2)
LYMPHOCYTES # BLD AUTO: 1.08 THOUSANDS/ÂΜL (ref 0.6–4.47)
LYMPHOCYTES NFR BLD AUTO: 38 % (ref 14–44)
MCH RBC QN AUTO: 26.2 PG (ref 26.8–34.3)
MCHC RBC AUTO-ENTMCNC: 30.2 G/DL (ref 31.4–37.4)
MCV RBC AUTO: 87 FL (ref 82–98)
MONOCYTES # BLD AUTO: 0.69 THOUSAND/ÂΜL (ref 0.17–1.22)
MONOCYTES NFR BLD AUTO: 24 % (ref 4–12)
NEUTROPHILS # BLD AUTO: 0.93 THOUSANDS/ÂΜL (ref 1.85–7.62)
NEUTS SEG NFR BLD AUTO: 33 % (ref 43–75)
NRBC BLD AUTO-RTO: 0 /100 WBCS
PLATELET # BLD AUTO: 114 THOUSANDS/UL (ref 149–390)
PMV BLD AUTO: 11.3 FL (ref 8.9–12.7)
POTASSIUM SERPL-SCNC: 4.1 MMOL/L (ref 3.5–5.3)
PROT SERPL-MCNC: 6 G/DL (ref 6.4–8.4)
RBC # BLD AUTO: 4.01 MILLION/UL (ref 3.88–5.62)
SODIUM SERPL-SCNC: 140 MMOL/L (ref 135–147)
WBC # BLD AUTO: 2.85 THOUSAND/UL (ref 4.31–10.16)

## 2023-12-26 PROCEDURE — 85025 COMPLETE CBC W/AUTO DIFF WBC: CPT | Performed by: INTERNAL MEDICINE

## 2023-12-26 PROCEDURE — 80053 COMPREHEN METABOLIC PANEL: CPT | Performed by: INTERNAL MEDICINE

## 2023-12-26 NOTE — PROGRESS NOTES
Pt to clinic for labs drawn via port. Pt offers no complaints today.Pt port accessed, flushed, and de-accessed with positive blood returned. AVS was received by pt. Pt aware of next appt on 12/27/2023 at 8:30 am. Pt ambulated out of clinic safely . [facility] notified pt is ready for .

## 2023-12-26 NOTE — TELEPHONE ENCOUNTER
Patient ANC below parameters to be able to proceed with tx tomorrow, called and made daughter aware for patient not to go to tx tomorrow. She verbalized understanding and has no other questions or concerns at this moment.     Will make infusion aware.

## 2023-12-27 ENCOUNTER — TELEPHONE (OUTPATIENT)
Dept: HEMATOLOGY ONCOLOGY | Facility: CLINIC | Age: 78
End: 2023-12-27

## 2023-12-27 ENCOUNTER — HOSPITAL ENCOUNTER (OUTPATIENT)
Dept: INFUSION CENTER | Facility: CLINIC | Age: 78
Discharge: HOME | End: 2023-12-27

## 2023-12-29 ENCOUNTER — TELEPHONE (OUTPATIENT)
Dept: INFUSION CENTER | Facility: CLINIC | Age: 78
End: 2023-12-29

## 2023-12-29 ENCOUNTER — HOSPITAL ENCOUNTER (OUTPATIENT)
Dept: INFUSION CENTER | Facility: CLINIC | Age: 78
End: 2023-12-29

## 2023-12-29 NOTE — TELEPHONE ENCOUNTER
Returned call to patient's daughter Erendira. She states there was mention of patient receiving an injection to boost his counts. I explained there are no notes in patient's chart regarding this and no orders placed for an injection. Erendira made aware Dr. Hinojosa will be returning to the office next week and that I will forward message to her RN to discuss.

## 2023-12-29 NOTE — TELEPHONE ENCOUNTER
Called pt to remind appt was cancelled today for CADD D/C due to tx being x. Pt daughter stated he was supposed to receive a shot today. We do not have any orders for an injection. Please advise.

## 2024-01-03 ENCOUNTER — TELEPHONE (OUTPATIENT)
Dept: HEMATOLOGY ONCOLOGY | Facility: CLINIC | Age: 79
End: 2024-01-03

## 2024-01-03 DIAGNOSIS — C20 RECTAL CANCER (HCC): Primary | ICD-10-CM

## 2024-01-03 NOTE — TELEPHONE ENCOUNTER
Returned phone call to daughter Erendira.  I let Erendira know that Dr. Hinojosa agreed to add neulasta injection, or whichever brand is approved by insurance, but patient will receive it next week with cycle 6.  She was appreciative of the phone call and has no other questions or concerns at this moment.

## 2024-01-03 NOTE — TELEPHONE ENCOUNTER
Per provider: Add neulasta for upcoming cycle next week.    Neulasta onpro approved per auth team, order added. Will inform infusion center so appt time can be adjusted.    Called daughter to make her aware, she was appreciative of phone call and has no other questions or concerns at this moment.

## 2024-01-08 ENCOUNTER — HOSPITAL ENCOUNTER (OUTPATIENT)
Dept: NON INVASIVE DIAGNOSTICS | Facility: MEDICAL CENTER | Age: 79
Discharge: HOME | End: 2024-01-08
Payer: COMMERCIAL

## 2024-01-08 ENCOUNTER — HOSPITAL ENCOUNTER (OUTPATIENT)
Dept: INFUSION CENTER | Facility: CLINIC | Age: 79
Discharge: HOME | End: 2024-01-08
Payer: COMMERCIAL

## 2024-01-08 VITALS
SYSTOLIC BLOOD PRESSURE: 118 MMHG | WEIGHT: 134 LBS | DIASTOLIC BLOOD PRESSURE: 72 MMHG | BODY MASS INDEX: 19.18 KG/M2 | HEIGHT: 70 IN | HEART RATE: 55 BPM

## 2024-01-08 DIAGNOSIS — I42.9 CARDIOMYOPATHY, UNSPECIFIED TYPE (HCC): ICD-10-CM

## 2024-01-08 DIAGNOSIS — Z95.828 PORT-A-CATH IN PLACE: Primary | ICD-10-CM

## 2024-01-08 DIAGNOSIS — C20 RECTAL CANCER (HCC): ICD-10-CM

## 2024-01-08 DIAGNOSIS — R06.02 SOB (SHORTNESS OF BREATH): ICD-10-CM

## 2024-01-08 LAB
ALBUMIN SERPL BCP-MCNC: 3.6 G/DL (ref 3.5–5)
ALP SERPL-CCNC: 63 U/L (ref 34–104)
ALT SERPL W P-5'-P-CCNC: 11 U/L (ref 7–52)
ANION GAP SERPL CALCULATED.3IONS-SCNC: 5 MMOL/L
AST SERPL W P-5'-P-CCNC: 15 U/L (ref 13–39)
BASOPHILS # BLD AUTO: 0.04 THOUSANDS/ÂΜL (ref 0–0.1)
BASOPHILS NFR BLD AUTO: 1 % (ref 0–1)
BILIRUB SERPL-MCNC: 0.42 MG/DL (ref 0.2–1)
BUN SERPL-MCNC: 20 MG/DL (ref 5–25)
CALCIUM SERPL-MCNC: 8.6 MG/DL (ref 8.4–10.2)
CHLORIDE SERPL-SCNC: 109 MMOL/L (ref 96–108)
CO2 SERPL-SCNC: 25 MMOL/L (ref 21–32)
CREAT SERPL-MCNC: 1.05 MG/DL (ref 0.6–1.3)
EOSINOPHIL # BLD AUTO: 0.15 THOUSAND/ÂΜL (ref 0–0.61)
EOSINOPHIL NFR BLD AUTO: 3 % (ref 0–6)
ERYTHROCYTE [DISTWIDTH] IN BLOOD BY AUTOMATED COUNT: 19.9 % (ref 11.6–15.1)
GFR SERPL CREATININE-BSD FRML MDRD: 68 ML/MIN/1.73SQ M
GLUCOSE SERPL-MCNC: 110 MG/DL (ref 65–140)
HCT VFR BLD AUTO: 35.8 % (ref 36.5–49.3)
HGB BLD-MCNC: 11.3 G/DL (ref 12–17)
IMM GRANULOCYTES # BLD AUTO: 0.01 THOUSAND/UL (ref 0–0.2)
IMM GRANULOCYTES NFR BLD AUTO: 0 % (ref 0–2)
LYMPHOCYTES # BLD AUTO: 1.36 THOUSANDS/ÂΜL (ref 0.6–4.47)
LYMPHOCYTES NFR BLD AUTO: 31 % (ref 14–44)
MCH RBC QN AUTO: 27.2 PG (ref 26.8–34.3)
MCHC RBC AUTO-ENTMCNC: 31.6 G/DL (ref 31.4–37.4)
MCV RBC AUTO: 86 FL (ref 82–98)
MONOCYTES # BLD AUTO: 0.56 THOUSAND/ÂΜL (ref 0.17–1.22)
MONOCYTES NFR BLD AUTO: 13 % (ref 4–12)
NEUTROPHILS # BLD AUTO: 2.24 THOUSANDS/ÂΜL (ref 1.85–7.62)
NEUTS SEG NFR BLD AUTO: 52 % (ref 43–75)
NRBC BLD AUTO-RTO: 0 /100 WBCS
PLATELET # BLD AUTO: 132 THOUSANDS/UL (ref 149–390)
PMV BLD AUTO: 10.6 FL (ref 8.9–12.7)
POTASSIUM SERPL-SCNC: 3.9 MMOL/L (ref 3.5–5.3)
PROT SERPL-MCNC: 6 G/DL (ref 6.4–8.4)
RA PRESSURE ESTIMATED: 5 MMHG
RBC # BLD AUTO: 4.15 MILLION/UL (ref 3.88–5.62)
SL CV LV EF: 50
SODIUM SERPL-SCNC: 139 MMOL/L (ref 135–147)
WBC # BLD AUTO: 4.36 THOUSAND/UL (ref 4.31–10.16)

## 2024-01-08 PROCEDURE — 93306 TTE W/DOPPLER COMPLETE: CPT

## 2024-01-08 PROCEDURE — 93306 TTE W/DOPPLER COMPLETE: CPT | Performed by: INTERNAL MEDICINE

## 2024-01-08 PROCEDURE — 85025 COMPLETE CBC W/AUTO DIFF WBC: CPT | Performed by: INTERNAL MEDICINE

## 2024-01-08 PROCEDURE — 80053 COMPREHEN METABOLIC PANEL: CPT | Performed by: INTERNAL MEDICINE

## 2024-01-08 RX ORDER — SODIUM CHLORIDE 9 MG/ML
20 INJECTION, SOLUTION INTRAVENOUS ONCE AS NEEDED
Status: CANCELLED | OUTPATIENT
Start: 2024-01-09

## 2024-01-08 RX ORDER — DEXTROSE MONOHYDRATE 50 MG/ML
20 INJECTION, SOLUTION INTRAVENOUS ONCE
Status: CANCELLED | OUTPATIENT
Start: 2024-01-09

## 2024-01-08 NOTE — PROGRESS NOTES
Pt presents for lab specimen collection via port a cath. Port accessed, labs drawn, saline locked and de accessed without difficulty. Declined AVS, next appointment 1/9 12:30pm.

## 2024-01-09 ENCOUNTER — HOSPITAL ENCOUNTER (OUTPATIENT)
Dept: INFUSION CENTER | Facility: CLINIC | Age: 79
Discharge: HOME | End: 2024-01-09
Payer: COMMERCIAL

## 2024-01-09 VITALS
HEART RATE: 60 BPM | TEMPERATURE: 96.5 F | WEIGHT: 136.2 LBS | DIASTOLIC BLOOD PRESSURE: 62 MMHG | HEIGHT: 70 IN | SYSTOLIC BLOOD PRESSURE: 142 MMHG | BODY MASS INDEX: 19.5 KG/M2

## 2024-01-09 DIAGNOSIS — C20 RECTAL CANCER (HCC): Primary | ICD-10-CM

## 2024-01-09 RX ORDER — DEXTROSE MONOHYDRATE 50 MG/ML
20 INJECTION, SOLUTION INTRAVENOUS ONCE
Status: COMPLETED | OUTPATIENT
Start: 2024-01-09 | End: 2024-01-09

## 2024-01-09 RX ORDER — SODIUM CHLORIDE 9 MG/ML
20 INJECTION, SOLUTION INTRAVENOUS ONCE AS NEEDED
Status: DISCONTINUED | OUTPATIENT
Start: 2024-01-09 | End: 2024-01-12 | Stop reason: HOSPADM

## 2024-01-09 RX ADMIN — DEXTROSE 20 ML/HR: 5 SOLUTION INTRAVENOUS at 13:15

## 2024-01-09 RX ADMIN — DEXAMETHASONE SODIUM PHOSPHATE: 10 INJECTION, SOLUTION INTRAMUSCULAR; INTRAVENOUS at 13:26

## 2024-01-09 RX ADMIN — LEUCOVORIN CALCIUM 688 MG: 350 INJECTION, POWDER, LYOPHILIZED, FOR SOLUTION INTRAMUSCULAR; INTRAVENOUS at 14:05

## 2024-01-09 RX ADMIN — OXALIPLATIN 111.8 MG: 5 INJECTION, SOLUTION INTRAVENOUS at 14:05

## 2024-01-09 NOTE — PROGRESS NOTES
Pt here for chemotherapy, mFOLFOX, oxali/leuco, no 5FU push, and chemo ball, offering no complaints.  Right port accessed with positive blood return noted throughout treatment.  Tolerated infusion without incident.  Port flushed and chemo ball connected.  Double checked with Katharine SAHNI RN.  Patient aware of disconnect time 1/11 1430.  AVS declined.  Walked out in stable condition

## 2024-01-11 ENCOUNTER — HOSPITAL ENCOUNTER (OUTPATIENT)
Dept: INFUSION CENTER | Facility: CLINIC | Age: 79
Discharge: HOME | End: 2024-01-11
Payer: COMMERCIAL

## 2024-01-11 DIAGNOSIS — C20 RECTAL CANCER (HCC): Primary | ICD-10-CM

## 2024-01-11 PROCEDURE — 96372 THER/PROPH/DIAG INJ SC/IM: CPT

## 2024-01-11 RX ADMIN — PEGFILGRASTIM 6 MG: KIT SUBCUTANEOUS at 14:39

## 2024-01-11 NOTE — PROGRESS NOTES
Patient reports to  infusion center for elastomeric pump disconnect after 46 hour of running, pump appears empty and deflated. Patient offers no specific complaints, but somewhat flat in affect.  Port flushed after disconnect, positive blood return noted, port deaccessed and band-aid placed on site. Neulasta onpro device education completed, onpro placed on to right abdomen and light blinking green with indicator full. Pt aware of when to remove device. Patient aware of next appointment on 1/22/24 at 1130am for labs, AVS printed.

## 2024-01-16 RX ORDER — DEXTROSE MONOHYDRATE 50 MG/ML
20 INJECTION, SOLUTION INTRAVENOUS ONCE
OUTPATIENT
Start: 2024-01-29

## 2024-01-16 RX ORDER — SODIUM CHLORIDE 9 MG/ML
20 INJECTION, SOLUTION INTRAVENOUS ONCE AS NEEDED
OUTPATIENT
Start: 2024-01-29

## 2024-01-17 ENCOUNTER — OFFICE VISIT (OUTPATIENT)
Dept: HEMATOLOGY ONCOLOGY | Facility: CLINIC | Age: 79
End: 2024-01-17
Payer: COMMERCIAL

## 2024-01-17 VITALS
RESPIRATION RATE: 16 BRPM | SYSTOLIC BLOOD PRESSURE: 128 MMHG | DIASTOLIC BLOOD PRESSURE: 68 MMHG | OXYGEN SATURATION: 94 % | BODY MASS INDEX: 19.26 KG/M2 | HEIGHT: 70 IN | WEIGHT: 134.5 LBS | HEART RATE: 82 BPM | TEMPERATURE: 98 F

## 2024-01-17 DIAGNOSIS — E44.0 MODERATE PROTEIN-CALORIE MALNUTRITION (HCC): ICD-10-CM

## 2024-01-17 DIAGNOSIS — I42.9 CARDIOMYOPATHY, UNSPECIFIED TYPE (HCC): ICD-10-CM

## 2024-01-17 DIAGNOSIS — C20 RECTAL CANCER (HCC): Primary | ICD-10-CM

## 2024-01-17 DIAGNOSIS — D69.6 THROMBOCYTOPENIA (HCC): ICD-10-CM

## 2024-01-17 DIAGNOSIS — D50.9 IRON DEFICIENCY ANEMIA, UNSPECIFIED IRON DEFICIENCY ANEMIA TYPE: ICD-10-CM

## 2024-01-17 PROCEDURE — 99214 OFFICE O/P EST MOD 30 MIN: CPT | Performed by: INTERNAL MEDICINE

## 2024-01-17 RX ORDER — FERROUS SULFATE 324(65)MG
324 TABLET, DELAYED RELEASE (ENTERIC COATED) ORAL DAILY
Qty: 30 TABLET | Refills: 5 | Status: SHIPPED | OUTPATIENT
Start: 2024-01-17

## 2024-01-17 NOTE — PROGRESS NOTES
Hematology/Oncology Outpatient Follow- up Note  Minesh Pandya Jr. 78 y.o. 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  2 status post 6 cycles of FOLFOX and radiation therapy x 5 doses to the rectum.  Plan: Patient will continue his treatments.  He is taking 75% dosage.  He is receiving 5-FU continuous infusion day 1 1 through 3 along with leucovorin and oxaliplatin.  The question will be what to do with him longer term.  Fortunately he is not having any further rectal bleeding he did receive 5 doses of treatment.  I think he should be rescanned to see where his lung nodule stands to determine if anything more aggressive beyond what he is getting will be given.      HPI: 78-year-old gentleman with rectal carcinoma.  He also has CAT scan and what appears to be increasing pulmonary nodules probably consistent with metastatic disease.  Last CAT scan showed them to be stable.  He is able to eat he is moving his bowels regularly no blood in urine or stools.  He is not short of breath at rest does get dyspneic with exertion.  He initially presented with blood in his bowel movements for a week and had a CAT scan of the pelvis showing rectal mass with mesorectal lymph nodes 2 mm left lower lobe pulmonary nodule 3 mm hepatic dome nodule.  The nodules in the right and the left lobes were new compared to 2019.  His MMR was intact on the biopsy showing moderately differentiated carcinoma distal rectal area.  An MRI of the pelvis showed T3b N1 low rectal cancer.  He was seen radiation therapy for 1 week 9/25 and 9/29.  He has had some thrombocytopenia and leukopenia this is improved.  He will begin using his seventh cycle of chemotherapy next week.  He has had a reduction in oxaliplatin as well as continuous infusion for 3 days 5-fluorouracil hopefully after that he will continue to tolerate better these treatments.      Interval  History:    Doing fairly well      Cancer Staging:  Cancer Staging   Rectal cancer (HCC)  Staging form: Colon and Rectum, AJCC 8th Edition  - Clinical stage from 5/18/2023: Stage IIIB (cT3, cN1, cM0) - Signed by Arnaldo Mar DO on 6/28/2023  Microsatellite instability (MSI): Stable      Molecular Testing:     Previous Hematologic/ Oncologic History:    Oncology History   Rectal cancer (HCC)   5/18/2023 Initial Diagnosis    May 18, 2023 patient presented with BRBPR x1 week.  Weight stable over 8 months.  CT abdomen pelvis showed rectal mass with 2 suspicious mesorectal lymph nodes.  2 mm left lower lobe pulmonary nodule, 3 mm hepatic dome questionable lesion, 1 cm uncinate process cystic lesion.  CT chest showed 3 mm left lower lobe and right upper lobe nodules new compared to 2019.  May 20 flexible sigmoidoscopy showed distal rectal mass.  Biopsy of the mass showed moderately differentiated adenocarcinoma.  MMR intact.  June 14, 2023 MRI of the pelvis showed findings consistent with T3b, N1 low rectal cancer.  Suspicion for vascular invasion.  CBC on presentation normal WBC platelets.  Hemoglobin 11.4, MCV 77.  CMP showed albumin 3.3, otherwise normal.        Cancer Staged    Cancer Staging   No matching staging information was found for the patient.       5/18/2023 -  Cancer Staged    Staging form: Colon and Rectum, AJCC 8th Edition  - Clinical stage from 5/18/2023: Stage IIIB (cT3, cN1, cM0) - Signed by Arnaldo Mar DO on 6/28/2023  Microsatellite instability (MSI): Stable       5/20/2023 Biopsy    A. Rectum, mass, biopsy:  -Adenocarcinoma, invasive, moderately differentiated.  -Ancillary testing for mismatch repair (MMR) protein deficiency by immunohistochemical panel (MLH1, PMS2, MSH2, MSH6) is undertaken, the results will be issued in an addendum.    RESULTS OF IMMUNOHISTOCHEMICAL ANALYSIS FOR MISMATCH REPAIR PROTEIN LOSS  INTERPRETATION: NO LOSS OF NUCLEAR EXPRESSION OF MMR PROTEINS: LOW  PROBABILITY OF MSI-H        RESULTS:  Antibody            Clone                 Description                     Results  MLH1                 M1                     Mismatch repair protein  Intact nuclear expression  MSH2                [id number]        Mismatch repair protein  Intact nuclear expression  MSH6                44                      Mismatch repair protein  Intact nuclear expression  PMS2                 [id number]           Mismatch repair protein  Intact nuclear expression        9/20/2023 -  Chemotherapy    alteplase (CATHFLO), 2 mg, Intracatheter, Every 1 Minute as needed, 6 of 12 cycles  pegfilgrastim (NEULASTA), 6 mg, Subcutaneous, Once, 1 of 1 cycle  Administration: 6 mg (10/20/2023)  pegfilgrastim (NEULASTA ONPRO), 6 mg, Subcutaneous, Once, 1 of 1 cycle  Administration: 6 mg (1/11/2024)  fluorouracil (ADRUCIL), 320 mg/m2 = 565 mg (80 % of original dose 400 mg/m2), Intravenous, Once, 2 of 2 cycles  Dose modification: 320 mg/m2 (original dose 400 mg/m2, Cycle 1)  Administration: 565 mg (9/20/2023), 565 mg (10/17/2023)  leucovorin calcium IVPB, 708 mg, Intravenous, Once, 6 of 12 cycles  Administration: 700 mg (9/20/2023), 700 mg (10/17/2023), 700 mg (10/31/2023), 700 mg (11/14/2023), 700 mg (11/28/2023), 688 mg (1/9/2024)  oxaliplatin (ELOXATIN) chemo infusion, 75 mg/m2 = 132.75 mg (88.2 % of original dose 85 mg/m2), Intravenous, Once, 6 of 12 cycles  Dose modification: 75 mg/m2 (original dose 85 mg/m2, Cycle 1, Reason: Anticipated Tolerance), 65.025 mg/m2 (76.5 % of original dose 85 mg/m2, Cycle 6, Reason: Other (see comments), Comment: Neutropenia), 65 mg/m2 (original dose 85 mg/m2, Cycle 6, Reason: Dose modified as per discussion with consulting physician, Comment: neutropenia - changing to flat 65 mg/m2)  Administration: 132.75 mg (9/20/2023), 132.75 mg (10/17/2023), 133 mg (10/31/2023), 132.75 mg (11/14/2023), 132.75 mg (11/28/2023), 111.8 mg (1/9/2024)  fluorouracil (ADRUCIL) ambulatory  infusion Soln, 1,200 mg/m2/day = 4,250 mg, Intravenous, Over 46 hours, 6 of 12 cycles  Dose modification: 960 mg/m2/day (80 % of original dose 1,200 mg/m2/day, Cycle 6, Reason: Treatment Parameters Not Met)     9/25/2023 - 9/29/2023 Radiation    Treatments:  Course: C1  Plan ID Energy Fractions Dose per Fraction (cGy) Dose Correction (cGy) Total Dose Delivered (cGy) Elapsed Days   Pelvis 10X/6X 5 / 5 500 0 2,500 4    Treatment Dates:  9/25/2023 - 9/29/2023.          Current Hematologic/ Oncologic Treatment:       Cycle 1         Test Results:    Imaging: Echo complete w/ contrast if indicated    Result Date: 1/8/2024  Narrative:   Left Ventricle: Left ventricular cavity size is normal. Wall thickness is normal. The left ventricular ejection fraction is 50%. Systolic function is low normal. Wall motion is normal. Diastolic function is mildly abnormal, consistent with grade I (abnormal) relaxation.   Right Ventricle: Right ventricular cavity size is mildly dilated. Systolic function is normal.   Aortic Valve: There is mild regurgitation.   Mitral Valve: There is mild regurgitation.     CTA chest pe study    Result Date: 12/21/2023  Narrative: CTA - CHEST WITH IV CONTRAST - PULMONARY ANGIOGRAM INDICATION:   R06.02: Shortness of breath C20: Malignant neoplasm of rectum. COMPARISON: Multiple prior examinations including most recent CT performed December 6, 2023 TECHNIQUE: CTA examination of the chest was performed using angiographic technique according to a protocol specifically tailored to evaluate for pulmonary embolism.  Multiplanar 2D reformatted images were created from the source data. In addition, coronal 3D MIP postprocessing was performed on the acquisition scanner.  This examination was performed utilizing dual energy CT technique. Radiation dose length product (DLP) for this visit:  238.01 mGy-cm .  This examination, like all CT scans performed in the [facility], was performed utilizing  techniques to minimize radiation dose exposure, including the use of iterative  reconstruction and automated exposure control. IV Contrast:  50 mL of iohexol (OMNIPAQUE) FINDINGS: PULMONARY ARTERIAL TREE:  No pulmonary embolus is seen. LUNGS: Tiny 2 and 3 mm scattered pulmonary nodules are not significantly changed from December 6, 2023. Please see report of that examination for index lesion comparison on follow-up imaging. Subpleural dependent bibasilar atelectasis. PLEURA:  Unremarkable. HEART/GREAT VESSELS: Heavy coronary artery calcification. No thoracic aortic aneurysm. MEDIASTINUM AND NANCY: Small sliding-type hiatal hernia. Mild thickening of the wall of the mid and distal esophagus probably representing reflux esophagitis but unchanged from prior examination. Borderline 10 mm right hilar node on image 139 of series 304, unchanged from most recent prior examinations. No new or enlarging mediastinal or hilar nodes. CHEST WALL AND LOWER NECK:   Right chest port. No axillary lymphadenopathy. VISUALIZED STRUCTURES IN THE UPPER ABDOMEN: Calcified gallstones. OSSEOUS STRUCTURES:  No acute fracture or destructive osseous lesion.     Impression: No pulmonary embolus. No interval change in tiny 2 to 3 mm pulmonary nodules, and a borderline right hilar node. Sliding-type bowel hernia with thickening of the wall of the mid to lower esophagus suggesting reflux esophagitis. Cholelithiasis. Workstation performed: [id number]             Labs:   Lab Results   Component Value Date    WBC 4.36 01/08/2024    HGB 11.3 (L) 01/08/2024    HCT 35.8 (L) 01/08/2024    MCV 86 01/08/2024     (L) 01/08/2024     Lab Results   Component Value Date    K 3.9 01/08/2024     (H) 01/08/2024    CO2 25 01/08/2024    BUN 20 01/08/2024    CREATININE 1.05 01/08/2024    GLUF 105 (H) 08/07/2023    CALCIUM 8.6 01/08/2024    CORRECTEDCA 9.4 08/07/2023    AST 15 01/08/2024    ALT 11 01/08/2024    ALKPHOS 63 01/08/2024    EGFR 68 01/08/2024  "        No results found for: \"SPEP\", \"UPEP\"    Lab Results   Component Value Date    PSA 1.65 08/30/2023       Lab Results   Component Value Date    CEA 1.7 05/19/2023       No results found for: \"\"    No results found for: \"AFP\"    Lab Results   Component Value Date    IRON 20 (L) 08/07/2023    TIBC 328 08/07/2023    FERRITIN 22 (L) 08/07/2023       Lab Results   Component Value Date    [id number] 222 05/19/2023         ROS: Review of Systems   Constitutional: Negative.    Respiratory:  Positive for shortness of breath (Only known shortness of breath with exertion at rest he seems to be fairly stable.).    Gastrointestinal: Negative.    All other systems reviewed and are negative.        Current Medications: Reviewed  Allergies: Reviewed  PMH/FH/SH:  Reviewed      Physical Exam:    Body surface area is 1.76 meters squared.    Wt Readings from Last 3 Encounters:   01/17/24 61 kg (134 lb 8 oz)   01/09/24 61.8 kg (136 lb 3.2 oz)   01/08/24 60.8 kg (134 lb)        Temp Readings from Last 3 Encounters:   01/17/24 98 °F (36.7 °C) (Temporal)   01/09/24 (!) 96.5 °F (35.8 °C) (Temporal)   12/20/23 98.2 °F (36.8 °C) (Temporal)        BP Readings from Last 3 Encounters:   01/17/24 128/68   01/09/24 142/62   01/08/24 118/72         Pulse Readings from Last 3 Encounters:   01/17/24 82   01/09/24 60   01/08/24 55     @LASTSAO2(3)@      Physical Exam  Constitutional:       Appearance: Normal appearance. He is normal weight.   HENT:      Head: Normocephalic and atraumatic.   Eyes:      Extraocular Movements: Extraocular movements intact.      Conjunctiva/sclera: Conjunctivae normal.      Pupils: Pupils are equal, round, and reactive to light.   Cardiovascular:      Rate and Rhythm: Normal rate and regular rhythm.   Pulmonary:      Effort: Pulmonary effort is normal.      Breath sounds: Normal breath sounds.   Abdominal:      General: Abdomen is flat. Bowel sounds are normal.      Palpations: Abdomen is soft. "   Musculoskeletal:         General: Normal range of motion.      Cervical back: Normal range of motion and neck supple.   Skin:     General: Skin is warm and dry.   Neurological:      General: No focal deficit present.      Mental Status: He is alert and oriented to person, place, and time. Mental status is at baseline.     Thin has maintained his weight 135 pounds.  Deficits noted.      Goals and Barriers:  Current Goal: Prolong Survival from Cancer.   Barriers: None.      Patient's Capacity to Self Care:  Patient is able to self care.    Code Status: [unfilled]  Advance Directive and Living Will:      Power of : Yes

## 2024-01-19 ENCOUNTER — PATIENT OUTREACH (OUTPATIENT)
Dept: CASE MANAGEMENT | Facility: HOSPITAL | Age: 79
End: 2024-01-19

## 2024-01-19 NOTE — PROGRESS NOTES
Pt's daughter called concerned that pt needs [facility] transport for next week.  MSW confirmed with [facility] that pt is already on their schedule and called Erendira back to let her know.  She was appreciative of the return call and denies any other needs at this time.

## 2024-01-22 ENCOUNTER — TELEPHONE (OUTPATIENT)
Dept: HEMATOLOGY ONCOLOGY | Facility: CLINIC | Age: 79
End: 2024-01-22

## 2024-01-22 ENCOUNTER — HOSPITAL ENCOUNTER (OUTPATIENT)
Dept: INFUSION CENTER | Facility: CLINIC | Age: 79
Discharge: HOME | End: 2024-01-22
Payer: COMMERCIAL

## 2024-01-22 DIAGNOSIS — Z95.828 PORT-A-CATH IN PLACE: Primary | ICD-10-CM

## 2024-01-22 DIAGNOSIS — C20 RECTAL CANCER (HCC): ICD-10-CM

## 2024-01-22 DIAGNOSIS — C20 RECTAL CANCER (HCC): Primary | ICD-10-CM

## 2024-01-22 LAB
ALBUMIN SERPL BCP-MCNC: 3.5 G/DL (ref 3.5–5)
ALP SERPL-CCNC: 91 U/L (ref 34–104)
ALT SERPL W P-5'-P-CCNC: 12 U/L (ref 7–52)
ANION GAP SERPL CALCULATED.3IONS-SCNC: 7 MMOL/L
AST SERPL W P-5'-P-CCNC: 17 U/L (ref 13–39)
BASOPHILS # BLD MANUAL: 0 THOUSAND/UL (ref 0–0.1)
BASOPHILS NFR MAR MANUAL: 0 % (ref 0–1)
BILIRUB SERPL-MCNC: 0.4 MG/DL (ref 0.2–1)
BUN SERPL-MCNC: 12 MG/DL (ref 5–25)
CALCIUM SERPL-MCNC: 8.6 MG/DL (ref 8.4–10.2)
CHLORIDE SERPL-SCNC: 108 MMOL/L (ref 96–108)
CO2 SERPL-SCNC: 24 MMOL/L (ref 21–32)
CREAT SERPL-MCNC: 0.94 MG/DL (ref 0.6–1.3)
EOSINOPHIL # BLD MANUAL: 0.74 THOUSAND/UL (ref 0–0.4)
EOSINOPHIL NFR BLD MANUAL: 6 % (ref 0–6)
ERYTHROCYTE [DISTWIDTH] IN BLOOD BY AUTOMATED COUNT: 19.4 % (ref 11.6–15.1)
GFR SERPL CREATININE-BSD FRML MDRD: 77 ML/MIN/1.73SQ M
GLUCOSE SERPL-MCNC: 201 MG/DL (ref 65–140)
HCT VFR BLD AUTO: 36.9 % (ref 36.5–49.3)
HGB BLD-MCNC: 11.5 G/DL (ref 12–17)
LYMPHOCYTES # BLD AUTO: 1.72 THOUSAND/UL (ref 0.6–4.47)
LYMPHOCYTES # BLD AUTO: 14 % (ref 14–44)
MCH RBC QN AUTO: 27.1 PG (ref 26.8–34.3)
MCHC RBC AUTO-ENTMCNC: 31.2 G/DL (ref 31.4–37.4)
MCV RBC AUTO: 87 FL (ref 82–98)
MONOCYTES # BLD AUTO: 0.62 THOUSAND/UL (ref 0–1.22)
MONOCYTES NFR BLD: 5 % (ref 4–12)
NEUTROPHILS # BLD MANUAL: 9.24 THOUSAND/UL (ref 1.85–7.62)
NEUTS BAND NFR BLD MANUAL: 6 % (ref 0–8)
NEUTS SEG NFR BLD AUTO: 69 % (ref 43–75)
PLATELET # BLD AUTO: 82 THOUSANDS/UL (ref 149–390)
PLATELET BLD QL SMEAR: ABNORMAL
PMV BLD AUTO: 11.3 FL (ref 8.9–12.7)
POTASSIUM SERPL-SCNC: 3.7 MMOL/L (ref 3.5–5.3)
PROT SERPL-MCNC: 5.9 G/DL (ref 6.4–8.4)
RBC # BLD AUTO: 4.25 MILLION/UL (ref 3.88–5.62)
RBC MORPH BLD: PRESENT
SODIUM SERPL-SCNC: 139 MMOL/L (ref 135–147)
WBC # BLD AUTO: 12.32 THOUSAND/UL (ref 4.31–10.16)

## 2024-01-22 PROCEDURE — 85027 COMPLETE CBC AUTOMATED: CPT | Performed by: INTERNAL MEDICINE

## 2024-01-22 PROCEDURE — 85007 BL SMEAR W/DIFF WBC COUNT: CPT | Performed by: INTERNAL MEDICINE

## 2024-01-22 PROCEDURE — 80053 COMPREHEN METABOLIC PANEL: CPT | Performed by: INTERNAL MEDICINE

## 2024-01-22 NOTE — PROGRESS NOTES
Patient arrives to Infusion Center for port maintenance with labs. Patient offering no complaints. Port free of signs of infection. Port accessed, labs collected, flush performed. Patient given print out of upcoming appointments. Port de-accessed.     Next appointment: 1/23/24 @ 1200

## 2024-01-22 NOTE — TELEPHONE ENCOUNTER
Attempted to call patient's daughter, Erendira, no answer. I left a detailed voicemail letting her know that patient's platelet count is low and that the patient should not go for treatment tomorrow, that he is going to be deferred by one week. I instructed her to return my phone call if she has any other questions or concerns.

## 2024-01-23 ENCOUNTER — HOSPITAL ENCOUNTER (OUTPATIENT)
Dept: INFUSION CENTER | Facility: CLINIC | Age: 79
Discharge: HOME | End: 2024-01-23

## 2024-01-23 NOTE — TELEPHONE ENCOUNTER
Lvm with time and date of new infusion appt, patient aware schedule is updated on [facility]. Patient has my teams number to return my call and confirm appts.

## 2024-01-26 ENCOUNTER — HOSPITAL ENCOUNTER (OUTPATIENT)
Dept: INFUSION CENTER | Facility: CLINIC | Age: 79
End: 2024-01-26
Payer: COMMERCIAL

## 2024-01-26 DIAGNOSIS — C20 RECTAL CANCER (HCC): ICD-10-CM

## 2024-01-26 DIAGNOSIS — Z95.828 PORT-A-CATH IN PLACE: Primary | ICD-10-CM

## 2024-01-26 LAB
ALBUMIN SERPL BCP-MCNC: 3.8 G/DL (ref 3.5–5)
ALP SERPL-CCNC: 84 U/L (ref 34–104)
ALT SERPL W P-5'-P-CCNC: 14 U/L (ref 7–52)
ANION GAP SERPL CALCULATED.3IONS-SCNC: 8 MMOL/L
ANISOCYTOSIS BLD QL SMEAR: PRESENT
AST SERPL W P-5'-P-CCNC: 20 U/L (ref 13–39)
BASOPHILS # BLD MANUAL: 0 THOUSAND/UL (ref 0–0.1)
BASOPHILS NFR MAR MANUAL: 0 % (ref 0–1)
BILIRUB SERPL-MCNC: 0.46 MG/DL (ref 0.2–1)
BUN SERPL-MCNC: 22 MG/DL (ref 5–25)
BURR CELLS BLD QL SMEAR: PRESENT
CALCIUM SERPL-MCNC: 9.3 MG/DL (ref 8.4–10.2)
CHLORIDE SERPL-SCNC: 105 MMOL/L (ref 96–108)
CO2 SERPL-SCNC: 24 MMOL/L (ref 21–32)
CREAT SERPL-MCNC: 1.03 MG/DL (ref 0.6–1.3)
DACRYOCYTES BLD QL SMEAR: PRESENT
EOSINOPHIL # BLD MANUAL: 0.31 THOUSAND/UL (ref 0–0.4)
EOSINOPHIL NFR BLD MANUAL: 3 % (ref 0–6)
ERYTHROCYTE [DISTWIDTH] IN BLOOD BY AUTOMATED COUNT: 20.2 % (ref 11.6–15.1)
GFR SERPL CREATININE-BSD FRML MDRD: 69 ML/MIN/1.73SQ M
GLUCOSE SERPL-MCNC: 120 MG/DL (ref 65–140)
HCT VFR BLD AUTO: 40.6 % (ref 36.5–49.3)
HGB BLD-MCNC: 12.6 G/DL (ref 12–17)
LYMPHOCYTES # BLD AUTO: 1.44 THOUSAND/UL (ref 0.6–4.47)
LYMPHOCYTES # BLD AUTO: 11 % (ref 14–44)
MCH RBC QN AUTO: 27.2 PG (ref 26.8–34.3)
MCHC RBC AUTO-ENTMCNC: 31 G/DL (ref 31.4–37.4)
MCV RBC AUTO: 88 FL (ref 82–98)
METAMYELOCYTES NFR BLD MANUAL: 1 % (ref 0–1)
MONOCYTES # BLD AUTO: 0.72 THOUSAND/UL (ref 0–1.22)
MONOCYTES NFR BLD: 7 % (ref 4–12)
NEUTROPHILS # BLD MANUAL: 7.72 THOUSAND/UL (ref 1.85–7.62)
NEUTS BAND NFR BLD MANUAL: 2 % (ref 0–8)
NEUTS SEG NFR BLD AUTO: 73 % (ref 43–75)
OVALOCYTES BLD QL SMEAR: PRESENT
PLATELET # BLD AUTO: 123 THOUSANDS/UL (ref 149–390)
PLATELET BLD QL SMEAR: ABNORMAL
PMV BLD AUTO: 10.6 FL (ref 8.9–12.7)
POLYCHROMASIA BLD QL SMEAR: PRESENT
POTASSIUM SERPL-SCNC: 3.8 MMOL/L (ref 3.5–5.3)
PROT SERPL-MCNC: 6.5 G/DL (ref 6.4–8.4)
RBC # BLD AUTO: 4.63 MILLION/UL (ref 3.88–5.62)
RBC MORPH BLD: PRESENT
SODIUM SERPL-SCNC: 137 MMOL/L (ref 135–147)
VARIANT LYMPHS # BLD AUTO: 3 %
WBC # BLD AUTO: 10.29 THOUSAND/UL (ref 4.31–10.16)

## 2024-01-26 PROCEDURE — 80053 COMPREHEN METABOLIC PANEL: CPT | Performed by: INTERNAL MEDICINE

## 2024-01-26 PROCEDURE — 85007 BL SMEAR W/DIFF WBC COUNT: CPT | Performed by: INTERNAL MEDICINE

## 2024-01-26 PROCEDURE — 85027 COMPLETE CBC AUTOMATED: CPT | Performed by: INTERNAL MEDICINE

## 2024-01-26 NOTE — PROGRESS NOTES
Patient presents for central venous labs. Patient offers no complaints. Port accessed, flushed, saline locked, labs drawn, port flushed and de-accessed. Band-aid placed on site.  AVS printed, next appointment on 1/29/24 at 8am reviewed. [facility] emailed and made aware of next appt.

## 2024-01-29 ENCOUNTER — HOSPITAL ENCOUNTER (OUTPATIENT)
Dept: INFUSION CENTER | Facility: CLINIC | Age: 79
Discharge: HOME | End: 2024-01-29
Payer: COMMERCIAL

## 2024-01-29 VITALS
RESPIRATION RATE: 16 BRPM | DIASTOLIC BLOOD PRESSURE: 69 MMHG | BODY MASS INDEX: 18.7 KG/M2 | HEIGHT: 70 IN | TEMPERATURE: 96.8 F | SYSTOLIC BLOOD PRESSURE: 150 MMHG | WEIGHT: 130.6 LBS | HEART RATE: 62 BPM

## 2024-01-29 DIAGNOSIS — C20 RECTAL CANCER (HCC): Primary | ICD-10-CM

## 2024-01-29 PROCEDURE — 96415 CHEMO IV INFUSION ADDL HR: CPT

## 2024-01-29 PROCEDURE — 96413 CHEMO IV INFUSION 1 HR: CPT

## 2024-01-29 PROCEDURE — G0498 CHEMO EXTEND IV INFUS W/PUMP: HCPCS

## 2024-01-29 PROCEDURE — 96368 THER/DIAG CONCURRENT INF: CPT

## 2024-01-29 PROCEDURE — 96367 TX/PROPH/DG ADDL SEQ IV INF: CPT

## 2024-01-29 RX ORDER — SODIUM CHLORIDE 9 MG/ML
20 INJECTION, SOLUTION INTRAVENOUS ONCE AS NEEDED
Status: DISCONTINUED | OUTPATIENT
Start: 2024-01-29 | End: 2024-02-01 | Stop reason: HOSPADM

## 2024-01-29 RX ORDER — DEXTROSE MONOHYDRATE 50 MG/ML
20 INJECTION, SOLUTION INTRAVENOUS ONCE
Status: COMPLETED | OUTPATIENT
Start: 2024-01-29 | End: 2024-01-29

## 2024-01-29 RX ADMIN — DEXTROSE 20 ML/HR: 5 SOLUTION INTRAVENOUS at 09:00

## 2024-01-29 RX ADMIN — LEUCOVORIN CALCIUM 688 MG: 350 INJECTION, POWDER, LYOPHILIZED, FOR SOLUTION INTRAMUSCULAR; INTRAVENOUS at 09:31

## 2024-01-29 RX ADMIN — OXALIPLATIN 111.8 MG: 5 INJECTION, SOLUTION INTRAVENOUS at 09:31

## 2024-01-29 RX ADMIN — DEXAMETHASONE SODIUM PHOSPHATE: 10 INJECTION, SOLUTION INTRAMUSCULAR; INTRAVENOUS at 09:00

## 2024-01-31 ENCOUNTER — HOSPITAL ENCOUNTER (OUTPATIENT)
Dept: INFUSION CENTER | Facility: CLINIC | Age: 79
Discharge: HOME | End: 2024-01-31

## 2024-01-31 DIAGNOSIS — C20 RECTAL CANCER (HCC): Primary | ICD-10-CM

## 2024-01-31 NOTE — PROGRESS NOTES
Pt arrived for pump d/c. Pump appears deflated & removed @ 1000. Port flushed per protocol, good blood return noted. Confirmed next appt, AVS given

## 2024-02-06 RX ORDER — SODIUM CHLORIDE 9 MG/ML
20 INJECTION, SOLUTION INTRAVENOUS ONCE AS NEEDED
Status: CANCELLED | OUTPATIENT
Start: 2024-02-12

## 2024-02-06 RX ORDER — DEXTROSE MONOHYDRATE 50 MG/ML
20 INJECTION, SOLUTION INTRAVENOUS ONCE
Status: CANCELLED | OUTPATIENT
Start: 2024-02-12

## 2024-02-07 DIAGNOSIS — C20 RECTAL CANCER (HCC): Primary | ICD-10-CM

## 2024-02-09 ENCOUNTER — HOSPITAL ENCOUNTER (OUTPATIENT)
Dept: INFUSION CENTER | Facility: CLINIC | Age: 79
End: 2024-02-09
Payer: COMMERCIAL

## 2024-02-09 DIAGNOSIS — Z95.828 PORT-A-CATH IN PLACE: Primary | ICD-10-CM

## 2024-02-09 DIAGNOSIS — C20 RECTAL CANCER (HCC): ICD-10-CM

## 2024-02-09 LAB
ALBUMIN SERPL BCP-MCNC: 3.6 G/DL (ref 3.5–5)
ALP SERPL-CCNC: 68 U/L (ref 34–104)
ALT SERPL W P-5'-P-CCNC: 15 U/L (ref 7–52)
ANION GAP SERPL CALCULATED.3IONS-SCNC: 4 MMOL/L
AST SERPL W P-5'-P-CCNC: 20 U/L (ref 13–39)
BASOPHILS # BLD AUTO: 0.03 THOUSANDS/ÂΜL (ref 0–0.1)
BASOPHILS NFR BLD AUTO: 1 % (ref 0–1)
BILIRUB SERPL-MCNC: 0.47 MG/DL (ref 0.2–1)
BUN SERPL-MCNC: 20 MG/DL (ref 5–25)
CALCIUM SERPL-MCNC: 8.6 MG/DL (ref 8.4–10.2)
CHLORIDE SERPL-SCNC: 109 MMOL/L (ref 96–108)
CO2 SERPL-SCNC: 26 MMOL/L (ref 21–32)
CREAT SERPL-MCNC: 0.85 MG/DL (ref 0.6–1.3)
EOSINOPHIL # BLD AUTO: 0.15 THOUSAND/ÂΜL (ref 0–0.61)
EOSINOPHIL NFR BLD AUTO: 3 % (ref 0–6)
ERYTHROCYTE [DISTWIDTH] IN BLOOD BY AUTOMATED COUNT: 19.1 % (ref 11.6–15.1)
GFR SERPL CREATININE-BSD FRML MDRD: 84 ML/MIN/1.73SQ M
GLUCOSE SERPL-MCNC: 126 MG/DL (ref 65–140)
HCT VFR BLD AUTO: 36.4 % (ref 36.5–49.3)
HGB BLD-MCNC: 11.4 G/DL (ref 12–17)
IMM GRANULOCYTES # BLD AUTO: 0.01 THOUSAND/UL (ref 0–0.2)
IMM GRANULOCYTES NFR BLD AUTO: 0 % (ref 0–2)
LYMPHOCYTES # BLD AUTO: 1.11 THOUSANDS/ÂΜL (ref 0.6–4.47)
LYMPHOCYTES NFR BLD AUTO: 21 % (ref 14–44)
MCH RBC QN AUTO: 27.5 PG (ref 26.8–34.3)
MCHC RBC AUTO-ENTMCNC: 31.3 G/DL (ref 31.4–37.4)
MCV RBC AUTO: 88 FL (ref 82–98)
MONOCYTES # BLD AUTO: 0.76 THOUSAND/ÂΜL (ref 0.17–1.22)
MONOCYTES NFR BLD AUTO: 15 % (ref 4–12)
NEUTROPHILS # BLD AUTO: 3.15 THOUSANDS/ÂΜL (ref 1.85–7.62)
NEUTS SEG NFR BLD AUTO: 60 % (ref 43–75)
NRBC BLD AUTO-RTO: 0 /100 WBCS
PLATELET # BLD AUTO: 116 THOUSANDS/UL (ref 149–390)
PMV BLD AUTO: 10.9 FL (ref 8.9–12.7)
POTASSIUM SERPL-SCNC: 3.7 MMOL/L (ref 3.5–5.3)
PROT SERPL-MCNC: 6 G/DL (ref 6.4–8.4)
RBC # BLD AUTO: 4.15 MILLION/UL (ref 3.88–5.62)
SODIUM SERPL-SCNC: 139 MMOL/L (ref 135–147)
WBC # BLD AUTO: 5.21 THOUSAND/UL (ref 4.31–10.16)

## 2024-02-09 PROCEDURE — 80053 COMPREHEN METABOLIC PANEL: CPT | Performed by: INTERNAL MEDICINE

## 2024-02-09 PROCEDURE — 85025 COMPLETE CBC W/AUTO DIFF WBC: CPT | Performed by: INTERNAL MEDICINE

## 2024-02-09 NOTE — PROGRESS NOTES
Received for lab work and port maintenance. No complaints offered. Lab work drawn per md order. RPAC accessed without issue. Pt has appt scheduled for Monday 2/12. AVS printed and given to pt.

## 2024-02-12 ENCOUNTER — HOSPITAL ENCOUNTER (OUTPATIENT)
Dept: INFUSION CENTER | Facility: CLINIC | Age: 79
Discharge: HOME | End: 2024-02-12
Payer: COMMERCIAL

## 2024-02-12 VITALS
HEART RATE: 57 BPM | RESPIRATION RATE: 18 BRPM | SYSTOLIC BLOOD PRESSURE: 143 MMHG | DIASTOLIC BLOOD PRESSURE: 66 MMHG | WEIGHT: 130.6 LBS | HEIGHT: 70 IN | BODY MASS INDEX: 18.7 KG/M2 | TEMPERATURE: 96.2 F

## 2024-02-12 DIAGNOSIS — C20 RECTAL CANCER (HCC): Primary | ICD-10-CM

## 2024-02-12 PROCEDURE — 96368 THER/DIAG CONCURRENT INF: CPT

## 2024-02-12 PROCEDURE — 96415 CHEMO IV INFUSION ADDL HR: CPT

## 2024-02-12 PROCEDURE — 96367 TX/PROPH/DG ADDL SEQ IV INF: CPT

## 2024-02-12 PROCEDURE — 96413 CHEMO IV INFUSION 1 HR: CPT

## 2024-02-12 PROCEDURE — G0498 CHEMO EXTEND IV INFUS W/PUMP: HCPCS

## 2024-02-12 RX ORDER — SODIUM CHLORIDE 9 MG/ML
20 INJECTION, SOLUTION INTRAVENOUS ONCE AS NEEDED
Status: DISCONTINUED | OUTPATIENT
Start: 2024-02-12 | End: 2024-02-15 | Stop reason: HOSPADM

## 2024-02-12 RX ORDER — DEXTROSE MONOHYDRATE 50 MG/ML
20 INJECTION, SOLUTION INTRAVENOUS ONCE
Status: COMPLETED | OUTPATIENT
Start: 2024-02-12 | End: 2024-02-12

## 2024-02-12 RX ADMIN — LEUCOVORIN CALCIUM 688 MG: 350 INJECTION, POWDER, LYOPHILIZED, FOR SOLUTION INTRAMUSCULAR; INTRAVENOUS at 14:19

## 2024-02-12 RX ADMIN — OXALIPLATIN 111.8 MG: 5 INJECTION, SOLUTION INTRAVENOUS at 14:19

## 2024-02-12 RX ADMIN — DEXTROSE 20 ML/HR: 5 SOLUTION INTRAVENOUS at 13:39

## 2024-02-12 RX ADMIN — DEXAMETHASONE SODIUM PHOSPHATE: 10 INJECTION, SOLUTION INTRAMUSCULAR; INTRAVENOUS at 13:39

## 2024-02-14 ENCOUNTER — HOSPITAL ENCOUNTER (OUTPATIENT)
Dept: INFUSION CENTER | Facility: CLINIC | Age: 79
Discharge: HOME | End: 2024-02-14

## 2024-02-14 DIAGNOSIS — C20 RECTAL CANCER (HCC): Primary | ICD-10-CM

## 2024-02-14 NOTE — PROGRESS NOTES
Pt to clinic for elastometric pump disconnect. Offers no complaints today. Pump has been running for over 46 hours and appears empty and deflated. Pt tolerated pump disconnect without complications. Port de-accessed. Pt aware of next appointment on 2/23/24 at 1330. AVS declined.

## 2024-02-15 ENCOUNTER — OFFICE VISIT (OUTPATIENT)
Dept: HEMATOLOGY ONCOLOGY | Facility: CLINIC | Age: 79
End: 2024-02-15
Payer: COMMERCIAL

## 2024-02-15 VITALS
BODY MASS INDEX: 18.68 KG/M2 | OXYGEN SATURATION: 100 % | HEIGHT: 70 IN | HEART RATE: 72 BPM | SYSTOLIC BLOOD PRESSURE: 142 MMHG | TEMPERATURE: 97.7 F | DIASTOLIC BLOOD PRESSURE: 72 MMHG | WEIGHT: 130.5 LBS | RESPIRATION RATE: 16 BRPM

## 2024-02-15 DIAGNOSIS — Z95.5 S/P PRIMARY ANGIOPLASTY WITH CORONARY STENT: ICD-10-CM

## 2024-02-15 DIAGNOSIS — E46 PROTEIN-CALORIE MALNUTRITION, UNSPECIFIED SEVERITY (HCC): ICD-10-CM

## 2024-02-15 DIAGNOSIS — C20 RECTAL CANCER (HCC): Primary | ICD-10-CM

## 2024-02-15 PROCEDURE — 99214 OFFICE O/P EST MOD 30 MIN: CPT | Performed by: INTERNAL MEDICINE

## 2024-02-16 NOTE — PROGRESS NOTES
Hematology/Oncology Outpatient Follow- up Note  Minesh Pandya Jr. 78 y.o. male MRN: @ Encounter: [id number]        Date:  2/15/2024        Assessment / Plan:    1 rectal carcinoma T3 N1 question of M1 with lung nodules  2 status post 8 cycles of FOLFOX chemotherapy and radiation therapy x 5 doses to the rectum.  Plan: Patient should be represented at this point.  Scans show stable nodules since May.  2023.  The rectal area looks better although he still has the nodes there.  Original surgery was not done due to MRI which she is now greater than 6 months since.  His blood counts are fairly stable.  CEA is not helpful as it was 1.7 at the time of his presentation.  We will speak with radiation therapy.  Will get back with him.  I would recommend holding his treatment at least 1 time to get opinions from the tumor board.    HPI: 78-year-old gentleman with the above problem.  His biggest complaint is fatigue shortness of breath and weakness.  He is doing well otherwise moving bowels no bleeding.  Blood counts are fairly stable.  He is anxious to stop treatment.  He would like to be considered for surgery interventions.  Otherwise he is doing reasonably well.      Interval History: Note from 1/17/2024:   78-year-old gentleman with rectal carcinoma.  He also has CAT scan and what appears to be increasing pulmonary nodules probably consistent with metastatic disease.  Last CAT scan showed them to be stable.  He is able to eat he is moving his bowels regularly no blood in urine or stools.  He is not short of breath at rest does get dyspneic with exertion.  He initially presented with blood in his bowel movements for a week and had a CAT scan of the pelvis showing rectal mass with mesorectal lymph nodes 2 mm left lower lobe pulmonary nodule 3 mm hepatic dome nodule.  The nodules in the right and the left lobes were new compared to 2019.  His MMR was intact on the biopsy showing moderately differentiated carcinoma distal  rectal area.  An MRI of the pelvis showed T3b N1 low rectal cancer.  He was seen radiation therapy for 1 week 9/25 and 9/29.  He has had some thrombocytopenia and leukopenia this is improved.  He will begin using his seventh cycle of chemotherapy next week.  He has had a reduction in oxaliplatin as well as continuous infusion for 3 days 5-fluorouracil hopefully after that he will continue to tolerate better these treatments.           Cancer Staging:  Cancer Staging   Rectal cancer (HCC)  Staging form: Colon and Rectum, AJCC 8th Edition  - Clinical stage from 5/18/2023: Stage IIIB (cT3, cN1, cM0) - Signed by Arnaldo Mar DO on 6/28/2023  Microsatellite instability (MSI): Stable      Molecular Testing:     Previous Hematologic/ Oncologic History:    Oncology History   Rectal cancer (HCC)   5/18/2023 Initial Diagnosis    May 18, 2023 patient presented with BRBPR x1 week.  Weight stable over 8 months.  CT abdomen pelvis showed rectal mass with 2 suspicious mesorectal lymph nodes.  2 mm left lower lobe pulmonary nodule, 3 mm hepatic dome questionable lesion, 1 cm uncinate process cystic lesion.  CT chest showed 3 mm left lower lobe and right upper lobe nodules new compared to 2019.  May 20 flexible sigmoidoscopy showed distal rectal mass.  Biopsy of the mass showed moderately differentiated adenocarcinoma.  MMR intact.  June 14, 2023 MRI of the pelvis showed findings consistent with T3b, N1 low rectal cancer.  Suspicion for vascular invasion.  CBC on presentation normal WBC platelets.  Hemoglobin 11.4, MCV 77.  CMP showed albumin 3.3, otherwise normal.        Cancer Staged    Cancer Staging   No matching staging information was found for the patient.       5/18/2023 -  Cancer Staged    Staging form: Colon and Rectum, AJCC 8th Edition  - Clinical stage from 5/18/2023: Stage IIIB (cT3, cN1, cM0) - Signed by Arnaldo Mar DO on 6/28/2023  Microsatellite instability (MSI): Stable       5/20/2023 Biopsy    A.  Rectum, mass, biopsy:  -Adenocarcinoma, invasive, moderately differentiated.  -Ancillary testing for mismatch repair (MMR) protein deficiency by immunohistochemical panel (MLH1, PMS2, MSH2, MSH6) is undertaken, the results will be issued in an addendum.    RESULTS OF IMMUNOHISTOCHEMICAL ANALYSIS FOR MISMATCH REPAIR PROTEIN LOSS  INTERPRETATION: NO LOSS OF NUCLEAR EXPRESSION OF MMR PROTEINS: LOW PROBABILITY OF MSI-H        RESULTS:  Antibody            Clone                 Description                     Results  MLH1                 M1                     Mismatch repair protein  Intact nuclear expression  MSH2                [id number]        Mismatch repair protein  Intact nuclear expression  MSH6                44                      Mismatch repair protein  Intact nuclear expression  PMS2                 [id number]           Mismatch repair protein  Intact nuclear expression        9/20/2023 -  Chemotherapy    alteplase (CATHFLO), 2 mg, Intracatheter, Every 1 Minute as needed, 8 of 12 cycles  pegfilgrastim (NEULASTA), 6 mg, Subcutaneous, Once, 1 of 1 cycle  Administration: 6 mg (10/20/2023)  pegfilgrastim (NEULASTA ONPRO), 6 mg, Subcutaneous, Once, 1 of 1 cycle  Administration: 6 mg (1/11/2024)  fluorouracil (ADRUCIL), 320 mg/m2 = 565 mg (80 % of original dose 400 mg/m2), Intravenous, Once, 2 of 2 cycles  Dose modification: 320 mg/m2 (original dose 400 mg/m2, Cycle 1)  Administration: 565 mg (9/20/2023), 565 mg (10/17/2023)  leucovorin calcium IVPB, 708 mg, Intravenous, Once, 8 of 12 cycles  Administration: 700 mg (9/20/2023), 700 mg (10/17/2023), 700 mg (10/31/2023), 700 mg (11/14/2023), 700 mg (11/28/2023), 688 mg (1/9/2024), 688 mg (1/29/2024), 688 mg (2/12/2024)  oxaliplatin (ELOXATIN) chemo infusion, 75 mg/m2 = 132.75 mg (88.2 % of original dose 85 mg/m2), Intravenous, Once, 8 of 12 cycles  Dose modification: 75 mg/m2 (original dose 85 mg/m2, Cycle 1, Reason: Anticipated Tolerance), 65.025 mg/m2 (76.5 % of  original dose 85 mg/m2, Cycle 6, Reason: Other (see comments), Comment: Neutropenia), 65 mg/m2 (original dose 85 mg/m2, Cycle 6, Reason: Dose modified as per discussion with consulting physician, Comment: neutropenia - changing to flat 65 mg/m2)  Administration: 132.75 mg (9/20/2023), 132.75 mg (10/17/2023), 133 mg (10/31/2023), 132.75 mg (11/14/2023), 132.75 mg (11/28/2023), 111.8 mg (1/9/2024), 111.8 mg (1/29/2024), 111.8 mg (2/12/2024)  fluorouracil (ADRUCIL) ambulatory infusion Soln, 1,200 mg/m2/day = 4,250 mg, Intravenous, Over 46 hours, 8 of 12 cycles  Dose modification: 960 mg/m2/day (80 % of original dose 1,200 mg/m2/day, Cycle 6, Reason: Treatment Parameters Not Met)     9/25/2023 - 9/29/2023 Radiation    Treatments:  Course: C1  Plan ID Energy Fractions Dose per Fraction (cGy) Dose Correction (cGy) Total Dose Delivered (cGy) Elapsed Days   Pelvis 10X/6X 5 / 5 500 0 2,500 4    Treatment Dates:  9/25/2023 - 9/29/2023.          Current Hematologic/ Oncologic Treatment:       Cycle 1         Test Results:    Imaging: CT scan chest abdomen pelvis 12/6/2023:     Narrative & Impression   CT CHEST, ABDOMEN AND PELVIS WITH IV CONTRAST     INDICATION:   C20: Malignant neoplasm of rectum.     COMPARISON: Multiple studies dating back to 11/7/2019     TECHNIQUE: CT examination of the chest, abdomen and pelvis was performed. Multiplanar 2D reformatted images were created from the source data.     This examination, like all CT scans performed in the [facility], was performed utilizing techniques to minimize radiation dose exposure, including the use of iterative reconstruction and automated exposure control. Radiation dose length   product (DLP) for this visit:  434 mGy-cm     IV Contrast:  100 mL of iohexol (OMNIPAQUE)  Enteric Contrast: Enteric contrast was administered.     FINDINGS:     CHEST     LUNGS: Biapical pleural scarring. No tracheal or endobronchial lesion. Several noncalcified pulmonary  nodules are present bilaterally. Index nodules described below.     Right upper lobe noncalcified pulmonary nodule on series 3 image #52 measuring 2 mm. Unchanged from 11/17/2019  Right upper lobe noncalcified pulmonary nodule measuring 3 mm on series 3 image #99. Unchanged from 5/23/2023.  Subpleural lingular noncalcified pulmonary nodule measuring 3 mm on series 3 image #193. Unchanged from 5/23/2023  Left lower lobe noncalcified pulmonary nodule measuring 3 mm on series 3 image #188. Measured 4 mm on the study from 5/23/2023.     PLEURA:  Unremarkable.     HEART/GREAT VESSELS: Heavy atherosclerotic coronary artery calcification is noted.  Heart is otherwise unremarkable.  No thoracic aortic aneurysm.     MEDIASTINUM AND NANCY: There may be some wall thickening of the mid to distal thoracic esophagus. Small hiatal hernia.     CHEST WALL AND LOWER NECK: Vascular port in the right anterior chest wall with tip at the level of the superior atrial caval junction.     ABDOMEN     LIVER/BILIARY TREE: The hypoattenuating lesion seen on the posterior right hepatic lobe not definitively seen on this examination. No other worrisome enhancing intrahepatic mass. No biliary dilatation.     GALLBLADDER:  There are gallstone(s) within the gallbladder, without pericholecystic inflammatory changes.     SPLEEN:  Unremarkable.     PANCREAS: Pancreatic cyst in the uncinate process measuring 1 cm, stable.     ADRENAL GLANDS:  Unremarkable.     KIDNEYS/URETERS:  One or more simple renal cyst(s) is noted.  Otherwise unremarkable kidneys.  No hydronephrosis.     STOMACH AND BOWEL:  There is colonic diverticulosis without evidence of acute diverticulitis. Under distention of the rectum diminishing diagnostic assessment however there may be some circumferential wall thickening.     APPENDIX:  No findings to suggest appendicitis.     ABDOMINOPELVIC CAVITY:  No ascites.  No pneumoperitoneum. The perirectal lymph nodes have decreased in size as  compared to the previous examination. Index lymph node on the right on series 2 image #240 measuring 0.5 cm in short axis, previously measured   0.8 cm. Other lymph nodes estimated to measure 1 to 3 mm in short axis. Numerous retroperitoneal lymph nodes are present measuring less than 1 cm short axis. Of note is an anterior caval lymph node measuring 0.5 cm in short axis on series 2 image #161,   previously measured 0.8 cm.     VESSELS:  Atherosclerotic changes are present.  No evidence of aneurysm.     PELVIS     REPRODUCTIVE ORGANS:  Unremarkable for patient's age.     URINARY BLADDER: Under distended diminishing diagnostic assessment. No calculi.     ABDOMINAL WALL/INGUINAL REGIONS:  There is a small fat-containing umbilical hernia, unchanged from previous examination.     OSSEOUS STRUCTURES:  No acute fracture or destructive osseous lesion.  Spinal degenerative changes are noted.     IMPRESSION:     1. Stable indexed pulmonary nodules as discussed above. Based on current Fleischner Society 2017 Guidelines on incidental pulmonary nodule, patients with a known malignancy are at increased risk of metastasis and should receive followup CT at intervals   appropriate for the type of cancer and its risk of pulmonary metastases.  2. Decrease in size of the indexed retroperitoneal and perirectal lymph nodes when compared to the previous CT.  3. Under distention of the rectum diminishing diagnostic assessment however there may be some circumferential wall thickening which may be related to underdistention or posttreatment changes.  4. The previously noted lesion in the posterior right hepatic dome not visualized on this exam.  5. Unchanged cystic lesion in the uncinate process of the pancreas.  6. Additional findings discussed above.       Labs:   Lab Results   Component Value Date    WBC 5.21 02/09/2024    HGB 11.4 (L) 02/09/2024    HCT 36.4 (L) 02/09/2024    MCV 88 02/09/2024     (L) 02/09/2024     Lab Results  "  Component Value Date    K 3.7 02/09/2024     (H) 02/09/2024    CO2 26 02/09/2024    BUN 20 02/09/2024    CREATININE 0.85 02/09/2024    GLUF 105 (H) 08/07/2023    CALCIUM 8.6 02/09/2024    CORRECTEDCA 9.4 08/07/2023    AST 20 02/09/2024    ALT 15 02/09/2024    ALKPHOS 68 02/09/2024    EGFR 84 02/09/2024         No results found for: \"SPEP\", \"UPEP\"    Lab Results   Component Value Date    PSA 1.65 08/30/2023       Lab Results   Component Value Date    CEA 1.7 05/19/2023       No results found for: \"\"    No results found for: \"AFP\"    Lab Results   Component Value Date    IRON 20 (L) 08/07/2023    TIBC 328 08/07/2023    FERRITIN 22 (L) 08/07/2023       Lab Results   Component Value Date    [id number] 222 05/19/2023         ROS: Review of Systems   HENT: Negative.     Eyes: Negative.    Respiratory:  Positive for shortness of breath.    Cardiovascular: Negative.    Gastrointestinal: Negative.    Endocrine: Negative.    Genitourinary: Negative.    Musculoskeletal: Negative.    Skin: Negative.    Allergic/Immunologic: Negative.    Neurological: Negative.    Hematological: Negative.          Current Medications: Reviewed  Allergies: Reviewed  PMH/FH/SH:  Reviewed      Physical Exam:    Body surface area is 1.74 meters squared.    Wt Readings from Last 3 Encounters:   02/15/24 59.2 kg (130 lb 8 oz)   02/12/24 59.2 kg (130 lb 9.6 oz)   01/29/24 59.2 kg (130 lb 9.6 oz)        Temp Readings from Last 3 Encounters:   02/15/24 97.7 °F (36.5 °C) (Temporal)   02/12/24 (!) 96.2 °F (35.7 °C) (Temporal)   01/29/24 (!) 96.8 °F (36 °C) (Esophageal)        BP Readings from Last 3 Encounters:   02/15/24 142/72   02/12/24 143/66   01/29/24 150/69         Pulse Readings from Last 3 Encounters:   02/15/24 72   02/12/24 57   01/29/24 62     @LASTSAO2(3)@      Physical Exam  Constitutional:       Appearance: Normal appearance. He is normal weight.   HENT:      Head: Normocephalic and atraumatic.   Eyes:      Extraocular " Movements: Extraocular movements intact.      Conjunctiva/sclera: Conjunctivae normal.      Pupils: Pupils are equal, round, and reactive to light.   Cardiovascular:      Rate and Rhythm: Normal rate and regular rhythm.      Heart sounds: Normal heart sounds.   Pulmonary:      Effort: Pulmonary effort is normal.      Breath sounds: Normal breath sounds.   Abdominal:      General: Abdomen is flat. Bowel sounds are normal.      Palpations: Abdomen is soft.   Musculoskeletal:         General: Normal range of motion.      Cervical back: Normal range of motion and neck supple.   Skin:     General: Skin is warm and dry.   Neurological:      General: No focal deficit present.      Mental Status: He is alert and oriented to person, place, and time. Mental status is at baseline.     His abdomen is flat there is no pain or tenderness on palpation.      Goals and Barriers:  Current Goal: Prolong Survival from Cancer.   Barriers: None.      Patient's Capacity to Self Care:  Patient is able to self care.    Code Status: [unfilled]  Advance Directive and Living Will:      Power of : Yes

## 2024-02-20 DIAGNOSIS — C20 RECTAL CANCER (HCC): Primary | ICD-10-CM

## 2024-02-20 RX ORDER — SODIUM CHLORIDE 9 MG/ML
20 INJECTION, SOLUTION INTRAVENOUS ONCE AS NEEDED
Status: CANCELLED | OUTPATIENT
Start: 2024-03-11

## 2024-02-20 RX ORDER — DEXTROSE MONOHYDRATE 50 MG/ML
20 INJECTION, SOLUTION INTRAVENOUS ONCE
Status: CANCELLED | OUTPATIENT
Start: 2024-03-11

## 2024-02-23 ENCOUNTER — HOSPITAL ENCOUNTER (OUTPATIENT)
Dept: INFUSION CENTER | Facility: CLINIC | Age: 79
End: 2024-02-23
Payer: COMMERCIAL

## 2024-02-23 ENCOUNTER — TELEPHONE (OUTPATIENT)
Dept: HEMATOLOGY ONCOLOGY | Facility: CLINIC | Age: 79
End: 2024-02-23

## 2024-02-23 DIAGNOSIS — C20 RECTAL CANCER (HCC): ICD-10-CM

## 2024-02-23 DIAGNOSIS — Z95.828 PORT-A-CATH IN PLACE: Primary | ICD-10-CM

## 2024-02-23 DIAGNOSIS — C20 RECTAL CANCER (HCC): Primary | ICD-10-CM

## 2024-02-23 LAB
ALBUMIN SERPL BCP-MCNC: 3.4 G/DL (ref 3.5–5)
ALP SERPL-CCNC: 72 U/L (ref 34–104)
ALT SERPL W P-5'-P-CCNC: 17 U/L (ref 7–52)
ANION GAP SERPL CALCULATED.3IONS-SCNC: 5 MMOL/L
AST SERPL W P-5'-P-CCNC: 22 U/L (ref 13–39)
BASOPHILS # BLD AUTO: 0.02 THOUSANDS/ÂΜL (ref 0–0.1)
BASOPHILS NFR BLD AUTO: 1 % (ref 0–1)
BILIRUB SERPL-MCNC: 0.46 MG/DL (ref 0.2–1)
BUN SERPL-MCNC: 16 MG/DL (ref 5–25)
CALCIUM ALBUM COR SERPL-MCNC: 9.2 MG/DL (ref 8.3–10.1)
CALCIUM SERPL-MCNC: 8.7 MG/DL (ref 8.4–10.2)
CHLORIDE SERPL-SCNC: 108 MMOL/L (ref 96–108)
CO2 SERPL-SCNC: 25 MMOL/L (ref 21–32)
CREAT SERPL-MCNC: 0.84 MG/DL (ref 0.6–1.3)
EOSINOPHIL # BLD AUTO: 0.16 THOUSAND/ÂΜL (ref 0–0.61)
EOSINOPHIL NFR BLD AUTO: 4 % (ref 0–6)
ERYTHROCYTE [DISTWIDTH] IN BLOOD BY AUTOMATED COUNT: 18.1 % (ref 11.6–15.1)
GFR SERPL CREATININE-BSD FRML MDRD: 84 ML/MIN/1.73SQ M
GLUCOSE SERPL-MCNC: 152 MG/DL (ref 65–140)
HCT VFR BLD AUTO: 36.9 % (ref 36.5–49.3)
HGB BLD-MCNC: 11.9 G/DL (ref 12–17)
IMM GRANULOCYTES # BLD AUTO: 0.02 THOUSAND/UL (ref 0–0.2)
IMM GRANULOCYTES NFR BLD AUTO: 1 % (ref 0–2)
LYMPHOCYTES # BLD AUTO: 1.04 THOUSANDS/ÂΜL (ref 0.6–4.47)
LYMPHOCYTES NFR BLD AUTO: 27 % (ref 14–44)
MCH RBC QN AUTO: 28.4 PG (ref 26.8–34.3)
MCHC RBC AUTO-ENTMCNC: 32.2 G/DL (ref 31.4–37.4)
MCV RBC AUTO: 88 FL (ref 82–98)
MONOCYTES # BLD AUTO: 0.65 THOUSAND/ÂΜL (ref 0.17–1.22)
MONOCYTES NFR BLD AUTO: 17 % (ref 4–12)
NEUTROPHILS # BLD AUTO: 2.02 THOUSANDS/ÂΜL (ref 1.85–7.62)
NEUTS SEG NFR BLD AUTO: 50 % (ref 43–75)
NRBC BLD AUTO-RTO: 0 /100 WBCS
PLATELET # BLD AUTO: 85 THOUSANDS/UL (ref 149–390)
PMV BLD AUTO: 10.9 FL (ref 8.9–12.7)
POTASSIUM SERPL-SCNC: 3.8 MMOL/L (ref 3.5–5.3)
PROT SERPL-MCNC: 6 G/DL (ref 6.4–8.4)
RBC # BLD AUTO: 4.19 MILLION/UL (ref 3.88–5.62)
SODIUM SERPL-SCNC: 138 MMOL/L (ref 135–147)
WBC # BLD AUTO: 3.91 THOUSAND/UL (ref 4.31–10.16)

## 2024-02-23 PROCEDURE — 85025 COMPLETE CBC W/AUTO DIFF WBC: CPT | Performed by: INTERNAL MEDICINE

## 2024-02-23 PROCEDURE — 80053 COMPREHEN METABOLIC PANEL: CPT | Performed by: INTERNAL MEDICINE

## 2024-02-23 NOTE — TELEPHONE ENCOUNTER
Called and spoke with Erendira, advised her per Dr Lau, pt's treatmetnt 2/26 will be deferred. Pt will be treated again as scheduled on 3/11. She voiced understandign.

## 2024-02-23 NOTE — PROGRESS NOTES
Pt here for labs, offering no complaints.  Right  port accessed with positive blood return noted, labs drawn, port flushed and de-accessed without incident.  AVS declined.  Aware of next appt 2/26 @ 1230.  Walked out in stable condition.

## 2024-02-26 ENCOUNTER — HOSPITAL ENCOUNTER (OUTPATIENT)
Dept: INFUSION CENTER | Facility: CLINIC | Age: 79
Discharge: HOME | End: 2024-02-26

## 2024-02-27 ENCOUNTER — DOCUMENTATION (OUTPATIENT)
Dept: HEMATOLOGY ONCOLOGY | Facility: CLINIC | Age: 79
End: 2024-02-27

## 2024-02-27 NOTE — PROGRESS NOTES
In-basket message received from Dr. Moncada to add patient to the [facility] on 2/29/2024. Chart reviewed and prep completed.

## 2024-02-29 ENCOUNTER — DOCUMENTATION (OUTPATIENT)
Dept: HEMATOLOGY ONCOLOGY | Facility: CLINIC | Age: 79
End: 2024-02-29

## 2024-02-29 NOTE — PROGRESS NOTES
RECTAL/GI MULTIDISCIPLINARY CASE REVIEW    DATE: 2/29/2024      PRESENTING DOCTOR: Dr. Moncada      DIAGNOSIS: Rectal cancer      Minesh Pandya Jr.  was presented at the Rectal/GI Multidisciplinary Conference today.        PHYSICIAN RECOMMENDED PLAN:    -Recommend cardiac evaluation before referring the patient to colorectal surgery. If cardiac function is not well enough and based on the patient's ECOG status consider diversion procedure.   -ECHO performed on 1/8/2024 and left ventricular ejection fraction is 50%.  -This will be discussed with the patient from medical and radiation oncology. Patient is scheduled for his next follow up with Dr. Lau (medical oncology) on 3/12/2024.    Team agreed to plan.    The final treatment plan will be left to the discretion of the patient and the treating physician.     DISCLAIMERS:  TO THE TREATING PHYSICIAN:  This conference is a meeting of clinicians from various specialty areas who evaluate and discuss patients for whom a multidisciplinary treatment approach is being considered. Please note that the above opinion was a consensus of the conference attendees and is intended only to assist in quality care of the discussed patient.  The responsibility for follow up on the input given during the conference, along with any final decisions regarding plan of care, is that of the patient and the patient's provider. Accordingly, appointments have only been recommended based on this information and have NOT been scheduled unless otherwise noted.      TO THE PATIENT:  This summary is a brief record of major aspects of your cancer treatment. You may choose to share a copy with any of your doctors or nurses. However, this is not a detailed or comprehensive record of your care.      NCCN guidelines were readily available for review at this discussion

## 2024-03-05 NOTE — PROGRESS NOTES
Spoke to patient's daughter Erendira who is uncertain if patient will be getting scheduled for surgery.     Advised Erendira to contact our office back after patient see's the  On 3/12/24 if he is definitely going to be scheduled for surgery patient will need a pre-op cardiac clearance appointment.

## 2024-03-06 ENCOUNTER — TELEPHONE (OUTPATIENT)
Dept: HEMATOLOGY ONCOLOGY | Facility: CLINIC | Age: 79
End: 2024-03-06

## 2024-03-06 NOTE — TELEPHONE ENCOUNTER
I called patient primary number, no answer. Lvm on Erendira answering machine with time and date of infusion, [facility] I provided [place] infusion number if rescheduling is needed. Patient will be seeing provider next week and will also receive a print out.

## 2024-03-06 NOTE — TELEPHONE ENCOUNTER
Called patient per  request and relayed message that his treatment will be cancelled for this week.   Spoke to Erendira his daughter, verbalized understanding and stated  had discussed the possibility of this happening with his last appt. She is aware of apt for 3/12.

## 2024-03-11 DIAGNOSIS — C20 RECTAL CANCER (HCC): Primary | ICD-10-CM

## 2024-03-12 ENCOUNTER — OFFICE VISIT (OUTPATIENT)
Dept: HEMATOLOGY ONCOLOGY | Facility: CLINIC | Age: 79
End: 2024-03-12
Payer: COMMERCIAL

## 2024-03-12 ENCOUNTER — TELEPHONE (OUTPATIENT)
Dept: HEMATOLOGY ONCOLOGY | Facility: CLINIC | Age: 79
End: 2024-03-12

## 2024-03-12 VITALS
BODY MASS INDEX: 18.47 KG/M2 | HEART RATE: 68 BPM | WEIGHT: 129 LBS | SYSTOLIC BLOOD PRESSURE: 122 MMHG | OXYGEN SATURATION: 100 % | TEMPERATURE: 98.2 F | DIASTOLIC BLOOD PRESSURE: 72 MMHG | RESPIRATION RATE: 16 BRPM | HEIGHT: 70 IN

## 2024-03-12 DIAGNOSIS — Z86.73 HISTORY OF CVA (CEREBROVASCULAR ACCIDENT): Chronic | ICD-10-CM

## 2024-03-12 DIAGNOSIS — I42.9 CARDIOMYOPATHY, UNSPECIFIED TYPE (HCC): ICD-10-CM

## 2024-03-12 DIAGNOSIS — C20 RECTAL CANCER (HCC): Primary | ICD-10-CM

## 2024-03-12 DIAGNOSIS — I25.10 CAD IN NATIVE ARTERY: Chronic | ICD-10-CM

## 2024-03-12 DIAGNOSIS — I10 ESSENTIAL HYPERTENSION: Chronic | ICD-10-CM

## 2024-03-12 DIAGNOSIS — I25.5 ISCHEMIC CARDIOMYOPATHY: ICD-10-CM

## 2024-03-12 DIAGNOSIS — Z95.5 S/P PRIMARY ANGIOPLASTY WITH CORONARY STENT: ICD-10-CM

## 2024-03-12 PROCEDURE — G2211 COMPLEX E/M VISIT ADD ON: HCPCS | Performed by: INTERNAL MEDICINE

## 2024-03-12 PROCEDURE — 99214 OFFICE O/P EST MOD 30 MIN: CPT | Performed by: INTERNAL MEDICINE

## 2024-03-12 RX ORDER — DEXTROSE MONOHYDRATE 50 MG/ML
20 INJECTION, SOLUTION INTRAVENOUS ONCE
OUTPATIENT
Start: 2024-03-18

## 2024-03-12 RX ORDER — SODIUM CHLORIDE 9 MG/ML
20 INJECTION, SOLUTION INTRAVENOUS ONCE AS NEEDED
OUTPATIENT
Start: 2024-03-18

## 2024-03-12 NOTE — PROGRESS NOTES
Hematology/Oncology Outpatient Follow- up Note  Minesh Pandya Jr. 78 y.o. male MRN: @ Encounter: [id number]        Date:  3/12/2024        Assessment / Plan:    1 stage III versus stage IV rectal carcinoma  2 lung nodules which were remained stable  3 coronary artery disease no angina to see cardiology for clearance is  4 to see colorectal surgery regarding possibility of intussusception with surgery  Plan: Patient will have appointments cardiology and colorectal surgery.  He will need to get another CT scan chest abdomen pelvis/CBC/CMP/CEA level.  We like to get these done and we will call him to do so.  We will have him come back in 4 weeks for final decisions.  Prognosis quite guarded.        HPI: 78-year-old gentleman here for follow-up.  He is off chemotherapy.  He has occasional headache otherwise stable.  He is moving his bowels.  Minimal abdominal discomfort.  He is not complain of chest pain or shortness of breath.  He will need to see cardiology.  He denies of denies any other major problems.  Unfortunately he is still left with rectal cancer.  He had an abnormal CAT scan of his chest but the lung nodule as there is unchanged.  CEA level done 5/19/2023 was normal.  It was 1.7.  This case was presented at tumor board.  The consensus was to be reevaluated for rectal surgery as well as to have a cardiology consult to see if he could tolerate such surgery.  He is not having chest pain he is not short of breath feels fatigued tired he is thin but he is not losing any more weight and he is able to eat.    Interval History: Note from 2/15/2024:  1 rectal carcinoma T3 N1 question of M1 with lung nodules  2 status post 8 cycles of FOLFOX chemotherapy and radiation therapy x 5 doses to the rectum.  Plan: Patient should be represented at this point.  Scans show stable nodules since May.  2023.  The rectal area looks better although he still has the nodes there.  Original surgery was not done due to MRI which she is  now greater than 6 months since.  His blood counts are fairly stable.  CEA is not helpful as it was 1.7 at the time of his presentation.  We will speak with radiation therapy.  Will get back with him.  I would recommend holding his treatment at least 1 time to get opinions from the tumor board.  HPI: 78-year-old gentleman with the above problem.  His biggest complaint is fatigue shortness of breath and weakness.  He is doing well otherwise moving bowels no bleeding.  Blood counts are fairly stable.  He is anxious to stop treatment.  He would like to be considered for surgery interventions.  Otherwise he is doing reasonably well.Interval History: Note from 1/17/2024:   78-year-old gentleman with rectal carcinoma.  He also has CAT scan and what appears to be increasing pulmonary nodules probably consistent with metastatic disease.  Last CAT scan showed them to be stable.  He is able to eat he is moving his bowels regularly no blood in urine or stools.  He is not short of breath at rest does get dyspneic with exertion.  He initially presented with blood in his bowel movements for a week and had a CAT scan of the pelvis showing rectal mass with mesorectal lymph nodes 2 mm left lower lobe pulmonary nodule 3 mm hepatic dome nodule.  The nodules in the right and the left lobes were new compared to 2019.  His MMR was intact on the biopsy showing moderately differentiated carcinoma distal rectal area.  An MRI of the pelvis showed T3b N1 low rectal cancer.  He was seen radiation therapy for 1 week 9/25 and 9/29.  He has had some thrombocytopenia and leukopenia this is improved.  He will begin using his seventh cycle of chemotherapy next week.  He has had a reduction in oxaliplatin as well as continuous infusion for 3 days 5-fluorouracil hopefully after that he will continue to tolerate better these treatments.       Cancer Staging:  Cancer Staging   Rectal cancer (HCC)  Staging form: Colon and Rectum, AJCC 8th Edition  -  Clinical stage from 5/18/2023: Stage IIIB (cT3, cN1, cM0) - Signed by Arnaldo Mar DO on 6/28/2023  Microsatellite instability (MSI): Stable      Molecular Testing:     Previous Hematologic/ Oncologic History:    Oncology History   Rectal cancer (HCC)   5/18/2023 Initial Diagnosis    May 18, 2023 patient presented with BRBPR x1 week.  Weight stable over 8 months.  CT abdomen pelvis showed rectal mass with 2 suspicious mesorectal lymph nodes.  2 mm left lower lobe pulmonary nodule, 3 mm hepatic dome questionable lesion, 1 cm uncinate process cystic lesion.  CT chest showed 3 mm left lower lobe and right upper lobe nodules new compared to 2019.  May 20 flexible sigmoidoscopy showed distal rectal mass.  Biopsy of the mass showed moderately differentiated adenocarcinoma.  MMR intact.  June 14, 2023 MRI of the pelvis showed findings consistent with T3b, N1 low rectal cancer.  Suspicion for vascular invasion.  CBC on presentation normal WBC platelets.  Hemoglobin 11.4, MCV 77.  CMP showed albumin 3.3, otherwise normal.        Cancer Staged    Cancer Staging   No matching staging information was found for the patient.       5/18/2023 -  Cancer Staged    Staging form: Colon and Rectum, AJCC 8th Edition  - Clinical stage from 5/18/2023: Stage IIIB (cT3, cN1, cM0) - Signed by Arnaldo Mar DO on 6/28/2023  Microsatellite instability (MSI): Stable       5/20/2023 Biopsy    A. Rectum, mass, biopsy:  -Adenocarcinoma, invasive, moderately differentiated.  -Ancillary testing for mismatch repair (MMR) protein deficiency by immunohistochemical panel (MLH1, PMS2, MSH2, MSH6) is undertaken, the results will be issued in an addendum.    RESULTS OF IMMUNOHISTOCHEMICAL ANALYSIS FOR MISMATCH REPAIR PROTEIN LOSS  INTERPRETATION: NO LOSS OF NUCLEAR EXPRESSION OF MMR PROTEINS: LOW PROBABILITY OF MSI-H        RESULTS:  Antibody            Clone                 Description                     Results  MLH1                 M1                      Mismatch repair protein  Intact nuclear expression  MSH2                [id number]        Mismatch repair protein  Intact nuclear expression  MSH6                44                      Mismatch repair protein  Intact nuclear expression  PMS2                 [id number]           Mismatch repair protein  Intact nuclear expression        9/20/2023 -  Chemotherapy    alteplase (CATHFLO), 2 mg, Intracatheter, Every 1 Minute as needed, 9 of 12 cycles  pegfilgrastim (NEULASTA), 6 mg, Subcutaneous, Once, 1 of 1 cycle  Administration: 6 mg (10/20/2023)  pegfilgrastim (NEULASTA ONPRO), 6 mg, Subcutaneous, Once, 1 of 1 cycle  Administration: 6 mg (1/11/2024)  fluorouracil (ADRUCIL), 320 mg/m2 = 565 mg (80 % of original dose 400 mg/m2), Intravenous, Once, 2 of 2 cycles  Dose modification: 320 mg/m2 (original dose 400 mg/m2, Cycle 1)  Administration: 565 mg (9/20/2023), 565 mg (10/17/2023)  leucovorin calcium IVPB, 708 mg, Intravenous, Once, 9 of 12 cycles  Administration: 700 mg (9/20/2023), 700 mg (10/17/2023), 700 mg (10/31/2023), 700 mg (11/14/2023), 700 mg (11/28/2023), 688 mg (1/9/2024), 688 mg (1/29/2024), 688 mg (2/12/2024)  oxaliplatin (ELOXATIN) chemo infusion, 75 mg/m2 = 132.75 mg (88.2 % of original dose 85 mg/m2), Intravenous, Once, 9 of 12 cycles  Dose modification: 75 mg/m2 (original dose 85 mg/m2, Cycle 1, Reason: Anticipated Tolerance), 65.025 mg/m2 (76.5 % of original dose 85 mg/m2, Cycle 6, Reason: Other (see comments), Comment: Neutropenia), 65 mg/m2 (original dose 85 mg/m2, Cycle 6, Reason: Dose modified as per discussion with consulting physician, Comment: neutropenia - changing to flat 65 mg/m2)  Administration: 132.75 mg (9/20/2023), 132.75 mg (10/17/2023), 133 mg (10/31/2023), 132.75 mg (11/14/2023), 132.75 mg (11/28/2023), 111.8 mg (1/9/2024), 111.8 mg (1/29/2024), 111.8 mg (2/12/2024)  fluorouracil (ADRUCIL) ambulatory infusion Soln, 1,200 mg/m2/day = 4,250 mg, Intravenous, Over 46 hours, 9 of  12 cycles  Dose modification: 960 mg/m2/day (80 % of original dose 1,200 mg/m2/day, Cycle 6, Reason: Treatment Parameters Not Met)     9/25/2023 - 9/29/2023 Radiation    Treatments:  Course: C1  Plan ID Energy Fractions Dose per Fraction (cGy) Dose Correction (cGy) Total Dose Delivered (cGy) Elapsed Days   Pelvis 10X/6X 5 / 5 500 0 2,500 4    Treatment Dates:  9/25/2023 - 9/29/2023.          Current Hematologic/ Oncologic Treatment:       Cycle 1         Test Results: Scan from 12/21/2023:  Narrative & Impression   CTA - CHEST WITH IV CONTRAST - PULMONARY ANGIOGRAM     INDICATION:   R06.02: Shortness of breath  C20: Malignant neoplasm of rectum.     COMPARISON: Multiple prior examinations including most recent CT performed December 6, 2023     TECHNIQUE: CTA examination of the chest was performed using angiographic technique according to a protocol specifically tailored to evaluate for pulmonary embolism.  Multiplanar 2D reformatted images were created from the source data. In addition,   coronal 3D MIP postprocessing was performed on the acquisition scanner.  This examination was performed utilizing dual energy CT technique.     Radiation dose length product (DLP) for this visit:  238.01 mGy-cm .  This examination, like all CT scans performed in the [facility], was performed utilizing techniques to minimize radiation dose exposure, including the use of iterative   reconstruction and automated exposure control.     IV Contrast:  50 mL of iohexol (OMNIPAQUE)     FINDINGS:     PULMONARY ARTERIAL TREE:  No pulmonary embolus is seen.     LUNGS: Tiny 2 and 3 mm scattered pulmonary nodules are not significantly changed from December 6, 2023. Please see report of that examination for index lesion comparison on follow-up imaging. Subpleural dependent bibasilar atelectasis.     PLEURA:  Unremarkable.     HEART/GREAT VESSELS: Heavy coronary artery calcification. No thoracic aortic aneurysm.     MEDIASTINUM  "AND NANCY: Small sliding-type hiatal hernia. Mild thickening of the wall of the mid and distal esophagus probably representing reflux esophagitis but unchanged from prior examination. Borderline 10 mm right hilar node on image 139 of series   304, unchanged from most recent prior examinations. No new or enlarging mediastinal or hilar nodes.     CHEST WALL AND LOWER NECK:   Right chest port. No axillary lymphadenopathy.     VISUALIZED STRUCTURES IN THE UPPER ABDOMEN: Calcified gallstones.     OSSEOUS STRUCTURES:  No acute fracture or destructive osseous lesion.     IMPRESSION:     No pulmonary embolus.     No interval change in tiny 2 to 3 mm pulmonary nodules, and a borderline right hilar node.     Sliding-type bowel hernia with thickening of the wall of the mid to lower esophagus suggesting reflux esophagitis. Cholelithiasis.     Imaging: No results found.          Labs:   Lab Results   Component Value Date    WBC 3.91 (L) 02/23/2024    HGB 11.9 (L) 02/23/2024    HCT 36.9 02/23/2024    MCV 88 02/23/2024    PLT 85 (L) 02/23/2024     Lab Results   Component Value Date    K 3.8 02/23/2024     02/23/2024    CO2 25 02/23/2024    BUN 16 02/23/2024    CREATININE 0.84 02/23/2024    GLUF 105 (H) 08/07/2023    CALCIUM 8.7 02/23/2024    CORRECTEDCA 9.2 02/23/2024    AST 22 02/23/2024    ALT 17 02/23/2024    ALKPHOS 72 02/23/2024    EGFR 84 02/23/2024         No results found for: \"SPEP\", \"UPEP\"    Lab Results   Component Value Date    PSA 1.65 08/30/2023       Lab Results   Component Value Date    CEA 1.7 05/19/2023       No results found for: \"\"    No results found for: \"AFP\"    Lab Results   Component Value Date    IRON 20 (L) 08/07/2023    TIBC 328 08/07/2023    FERRITIN 22 (L) 08/07/2023       Lab Results   Component Value Date    MISFCWAE31 222 05/19/2023         ROS: Review of Systems   Constitutional:  Positive for fatigue.   HENT: Negative.     Eyes: Negative.    Respiratory: Negative.     Cardiovascular: " Negative.    Gastrointestinal: Negative.    Endocrine: Negative.    Genitourinary: Negative.    Musculoskeletal: Negative.    Skin: Negative.    Allergic/Immunologic: Negative.    Neurological: Negative.    Hematological: Negative.          Current Medications: Reviewed  Allergies: Reviewed  PMH/FH/SH:  Reviewed      Physical Exam:    Body surface area is 1.73 meters squared.    Wt Readings from Last 3 Encounters:   03/12/24 58.5 kg (129 lb)   02/15/24 59.2 kg (130 lb 8 oz)   02/12/24 59.2 kg (130 lb 9.6 oz)        Temp Readings from Last 3 Encounters:   03/12/24 98.2 °F (36.8 °C) (Temporal)   02/15/24 97.7 °F (36.5 °C) (Temporal)   02/12/24 (!) 96.2 °F (35.7 °C) (Temporal)        BP Readings from Last 3 Encounters:   03/12/24 122/72   02/15/24 142/72   02/12/24 143/66         Pulse Readings from Last 3 Encounters:   03/12/24 68   02/15/24 72   02/12/24 57     @LASTSAO2(3)@      Physical Exam  Constitutional:       Appearance: Normal appearance. He is normal weight.   HENT:      Head: Normocephalic and atraumatic.   Eyes:      Extraocular Movements: Extraocular movements intact.      Conjunctiva/sclera: Conjunctivae normal.      Pupils: Pupils are equal, round, and reactive to light.   Cardiovascular:      Rate and Rhythm: Normal rate and regular rhythm.      Heart sounds: Normal heart sounds.   Pulmonary:      Effort: Pulmonary effort is normal.      Breath sounds: Normal breath sounds.   Abdominal:      General: Abdomen is flat. Bowel sounds are normal.      Palpations: Abdomen is soft.   Musculoskeletal:         General: Normal range of motion.      Cervical back: Normal range of motion and neck supple.   Skin:     General: Skin is warm and dry.   Neurological:      General: No focal deficit present.      Mental Status: He is alert and oriented to person, place, and time. Mental status is at baseline.     He is thin.  No point tenderness.  No abdominal point tenderness      Goals and Barriers:  Current Goal:  Prolong Survival from Cancer.   Barriers: None.      Patient's Capacity to Self Care:  Patient is able to self care.    Code Status: [unfilled]  Advance Directive and Living Will:      Power of : Yes

## 2024-03-13 ENCOUNTER — TELEPHONE (OUTPATIENT)
Age: 79
End: 2024-03-13

## 2024-03-13 ENCOUNTER — NURSE TRIAGE (OUTPATIENT)
Age: 79
End: 2024-03-13

## 2024-03-13 ENCOUNTER — TELEPHONE (OUTPATIENT)
Dept: HEMATOLOGY ONCOLOGY | Facility: CLINIC | Age: 79
End: 2024-03-13

## 2024-03-13 NOTE — TELEPHONE ENCOUNTER
Left patient voicemail informing him of appointment change. Advised pt to return call and speak directly to the office if he is unable to make this appt.

## 2024-03-13 NOTE — TELEPHONE ENCOUNTER
Regarding: sooner appt  ----- Message from Kayla Mix sent at 3/13/2024  8:57 AM EDT -----  I spoke with his daughter and scheduled an appt for 5/20/24. She is requesting sooner due to the fact that he may need surgery for rectal cancer

## 2024-03-19 DIAGNOSIS — I25.10 CAD IN NATIVE ARTERY: Chronic | ICD-10-CM

## 2024-03-19 RX ORDER — ATORVASTATIN CALCIUM 80 MG/1
80 TABLET, FILM COATED ORAL
Qty: 90 TABLET | Refills: 3 | Status: SHIPPED | OUTPATIENT
Start: 2024-03-19

## 2024-03-19 NOTE — PROGRESS NOTES
Diagnoses and all orders for this visit:    Rectal cancer (HCC)  -     Ambulatory Referral to Colorectal Surgery    CAD in native artery  -     Ambulatory Referral to Colorectal Surgery    Cardiomyopathy, unspecified type (HCC)  -     Ambulatory Referral to Colorectal Surgery    Essential hypertension  -     Ambulatory Referral to Colorectal Surgery    Ischemic cardiomyopathy  -     Ambulatory Referral to Colorectal Surgery       Rectal cancer (HCC)  Patient presents for follow-up of rectal cancer.  This was diagnosed last year.  Patient is being evaluated for rectal bleeding and also noted to have an acute MI.  He underwent flexible sigmoidoscopy which diagnosed his low rectal cancer.  He then underwent staging workup.  Multiple small pulmonary nodules were noted.  These were concerning for metastatic disease.  Patient also had a separate liver lesion.  This was concerning to be metastatic.  Secondary to symptoms, he also underwent radiation in September of last year.  His most recent round of examinations to include CT scan and December appeared to have stable disease.  He presents today to discuss surgical evaluation for his primary site in the rectum.    We discussed that in order to restage his disease, we would use a combination of imaging as well as direct inspection.  At the time of his diagnosis secondary to his other medical issues he underwent flexible sigmoidoscopy and not colonoscopy.  I will schedule him for colonoscopy for direct evaluation of the rectum itself.  He is scheduled to undergo CT scan in the next week or 2.  If he has stable disease, and significant remnant prior, proctectomy may be reasonable if he is medically fit for this.  He is also following up with cardiology.  He will need to be off of all antiplatelet therapy if surgery is to be undertaken.  Plan at this point in time will be for colonoscopy.  Risks and benefits of colonoscopy reviewed with patient to include, but not be limited  to: anesthesia, bleeding, missed lesion and perforation requiring surgery.  Patient consents to procedure.      HPI    Minesh Pandya Jr. is referred back to us by Dr. Rm Lau for surgical evaluation regarding rectal cancer.     Last seen in the office on 8/10/2023.    Status post 8 cycles of FOLFOX chemotherapy and radiation therapy x 5 doses to the rectum.     CTA chest on 12/21/2023: No pulmonary embolus. No interval change in tiny 2 to 3 mm pulmonary nodules, and a borderline right hilar node. Sliding-type bowel hernia with thickening of the wall of the mid to lower esophagus suggesting reflux esophagitis. Cholelithiasis.    CT chest abdomen and pelvis on 12/6/2023: 1. Stable indexed pulmonary nodules as discussed above. Based on current Fleischner Society 2017 Guidelines on incidental pulmonary nodule, patients with a known malignancy are at increased risk of metastasis and should receive followup CT at intervals   appropriate for the type of cancer and its risk of pulmonary metastases. 2. Decrease in size of the indexed retroperitoneal and perirectal lymph nodes when compared to the previous CT. 3. Under distention of the rectum diminishing diagnostic assessment however there may be some circumferential wall thickening which may be related to underdistention or posttreatment changes. 4. The previously noted lesion in the posterior right hepatic dome not visualized on this exam. 5. Unchanged cystic lesion in the uncinate process of the pancreas. 6. Additional findings discussed above.    CT chest abdomen and pelvis scheduled for 4/2/2024.      Lab Results   Component Value Date    CEA 1.7 05/19/2023     Lab Results   Component Value Date    WBC 3.91 (L) 02/23/2024    HGB 11.9 (L) 02/23/2024    HCT 36.9 02/23/2024    MCV 88 02/23/2024    PLT 85 (L) 02/23/2024     Lab Results   Component Value Date    SODIUM 138 02/23/2024    K 3.8 02/23/2024     02/23/2024    CO2 25 02/23/2024    AGAP 5 02/23/2024     BUN 16 02/23/2024    CREATININE 0.84 02/23/2024    GLUC 152 (H) 02/23/2024    GLUF 105 (H) 08/07/2023    CALCIUM 8.7 02/23/2024    AST 22 02/23/2024    ALT 17 02/23/2024    ALKPHOS 72 02/23/2024    TP 6.0 (L) 02/23/2024    TBILI 0.46 02/23/2024    EGFR 84 02/23/2024     Past Medical History:   Diagnosis Date    Hyperlipidemia     Ischemic cardiomyopathy     Lacunar infarction (HCC)     Myocardial infarction (HCC)     Near syncope 11/14/2019    Non-ST elevated myocardial infarction (HCC)     NSTEMI (non-ST elevated myocardial infarction) (HCC) 10/20/2019    Pericarditis 10/24/2019    Poison ivy     Rectal cancer (HCC)     STEMI involving left anterior descending coronary artery (HCC) 05/19/2023    Stroke (HCC)      Past Surgical History:   Procedure Laterality Date    ANGIOPLASTY      CARDIAC CATHETERIZATION N/A 5/19/2023    Procedure: Cardiac pci;  Surgeon: Ted Núñez MD;  Location: [facility];  Service: Cardiology    CORONARY STENT PLACEMENT      HERNIA REPAIR Left 4/28/2021    Procedure: OPEN REPAIR HERNIA INGUINAL LEFT WITH MESH;  Surgeon: Carlos Nguyen MD;  Location: [facility];  Service: General    IR PORT PLACEMENT  8/31/2023       Current Outpatient Medications:     aspirin (ECOTRIN LOW STRENGTH) 81 mg EC tablet, Take 1 tablet (81 mg total) by mouth daily Please purchase over the counter at her local pharmacy, Disp: 30 tablet, Rfl: 1    atorvastatin (LIPITOR) 80 mg tablet, Take 1 tablet (80 mg total) by mouth daily with dinner, Disp: 90 tablet, Rfl: 3    Blood Pressure KIT, by Does not apply route daily, Disp: 1 each, Rfl: 0    diphenhydramine, lidocaine, Al/Mg hydroxide, simethicone (Magic Mouthwash) SUSP, Swish and spit 10 mL every 6 (six) hours as needed for mouth pain or discomfort, Disp: 237 mL, Rfl: 0    ferrous sulfate 324 (65 Fe) mg, Take 1 tablet (324 mg total) by mouth in the morning, Disp: 30 tablet, Rfl: 5    lisinopril (ZESTRIL) 2.5 mg tablet, take 1 tablet by mouth daily, Disp: 90  tablet, Rfl: 3    metoprolol succinate (TOPROL-XL) 25 mg 24 hr tablet, take 1 tablet by mouth daily, Disp: 90 tablet, Rfl: 3    pantoprazole (PROTONIX) 40 mg tablet, Take 1 tablet (40 mg total) by mouth daily, Disp: 30 tablet, Rfl: 0    sucralfate (CARAFATE) 1 g tablet, Take 1 tablet (1 g total) by mouth 4 (four) times a day, Disp: 120 tablet, Rfl: 3    tamsulosin (FLOMAX) 0.4 mg, Take 1 capsule (0.4 mg total) by mouth daily at bedtime, Disp: 90 capsule, Rfl: 3  Allergies as of 03/21/2024 - Reviewed 03/21/2024   Allergen Reaction Noted    Wound dressing adhesive Hives 10/06/2023     Review of Systems   Gastrointestinal:  Positive for constipation.     There were no vitals filed for this visit.  Physical Exam  Constitutional:       Appearance: Normal appearance.   HENT:      Head: Normocephalic and atraumatic.      Nose: Nose normal.      Mouth/Throat:      Mouth: Mucous membranes are moist.   Eyes:      Extraocular Movements: Extraocular movements intact.      Pupils: Pupils are equal, round, and reactive to light.   Pulmonary:      Effort: Pulmonary effort is normal.   Abdominal:      General: Abdomen is flat.   Musculoskeletal:         General: Normal range of motion.   Skin:     General: Skin is warm and dry.   Neurological:      General: No focal deficit present.      Mental Status: He is alert and oriented to person, place, and time.   Psychiatric:         Mood and Affect: Mood normal.         Behavior: Behavior normal.         Thought Content: Thought content normal.         Judgment: Judgment normal.

## 2024-03-21 ENCOUNTER — PREP FOR PROCEDURE (OUTPATIENT)
Age: 79
End: 2024-03-21

## 2024-03-21 ENCOUNTER — OFFICE VISIT (OUTPATIENT)
Age: 79
End: 2024-03-21
Payer: COMMERCIAL

## 2024-03-21 ENCOUNTER — TELEPHONE (OUTPATIENT)
Age: 79
End: 2024-03-21

## 2024-03-21 VITALS — BODY MASS INDEX: 18.96 KG/M2 | HEIGHT: 69 IN | WEIGHT: 128 LBS

## 2024-03-21 DIAGNOSIS — I10 ESSENTIAL HYPERTENSION: Chronic | ICD-10-CM

## 2024-03-21 DIAGNOSIS — I25.10 CAD IN NATIVE ARTERY: Chronic | ICD-10-CM

## 2024-03-21 DIAGNOSIS — I42.9 CARDIOMYOPATHY, UNSPECIFIED TYPE (HCC): ICD-10-CM

## 2024-03-21 DIAGNOSIS — I25.5 ISCHEMIC CARDIOMYOPATHY: ICD-10-CM

## 2024-03-21 DIAGNOSIS — C20 RECTAL CANCER (HCC): Primary | ICD-10-CM

## 2024-03-21 DIAGNOSIS — C20 RECTAL CANCER (HCC): ICD-10-CM

## 2024-03-21 PROCEDURE — 99214 OFFICE O/P EST MOD 30 MIN: CPT | Performed by: COLON & RECTAL SURGERY

## 2024-03-21 NOTE — LETTER
Minesh Pandya Jr.  [address]  [facility] [address]        ------------------------------------------------------------------------------------------------------------  FLEXIBLE COLONOSCOPY INSTRUCTIONS  PLEASE NOTE...  AS OF JUNE 1, 2014, OUR OFFICE REQUIRES 72 HOURS NOTICE OF CANCELLATION/RESCHEDULE OF A PROCEDURE TO AVOID INCURRING A MISSED APPOINTMENT FEE.    Your Colonoscopy Procedure has been scheduled at:[address] [address]     The Date of your Procedure is:  4/30/2024  The hospital facility will contact you the evening prior to your scheduled procedure with your arrival time.     Use the bowel preparation as directed.    Check with your family doctor if you are taking a blood thinner (Coumadin, Plavix, Xarelto, Pradaxa, Gingko biloba, Ginseng, Feverfew, Gracy's Wort).  We suggest stopping these for 3 days. Special instructions may be needed if you are taking aspirin or any aspirin-containing medication.  Check with your family physician.      If you are on DIABETIC MEDICATION (tablets or insulin) your doctor may make changes in your preparation.    Take all medications usual unless otherwise instructed.    As always, if you have any questions or concerns, feel free to call the office.  Our  staff will be happy to connect you with one of the nurses to answer any questions or address any concerns you have regarding your procedure.      Do not wear any jewelry the day of your procedure including wedding rings.    Arrangements must be made for a responsible party to drive you home from the procedure.  If you do not have a responsible family member or friend to drive you home, the procedure will not be done.  Vast or a taxi is not acceptable.    It is important you notify our office of any insurance changes prior to your procedure and, if necessary, supply us with referrals from your primary care physician.                COLONOSCOPY  PREPARATION INSTRUCTIONS    Purchase (prescription not required):  · 238 gram bottle of Miralax® (Glycolax®)  · 4 Dulcolax® (Bisacodyl) Laxative Tablets  · 64 oz. bottle of Gatorade® or your preference of a non-carbonated clear liquid - NOT RED OR PURPLE     One Day Prior to Colonoscopy Procedure  · Nothing to eat the day before your procedure, only clear liquids.  · It is important that you drink plenty of clear liquids throughout the day to prevent dehydration.    Clear Liquids include:  o Water/Iced Tea/Lemonade/Gatorade®/Black Coffee or tea (no milk or creamers  o Soft drinks: orange, ginger ale, cola, Pepsi®, Sprite®, 7Up®  o Robbie-Aid® (lemonade or orange flavors only)  o Strained fruit juices without pulp such as apple, white grape, white cranberry  o Jell-O®, lemon, lime or orange (no fruit or toppings)  o Popsicles, Italian Ice (No Ice Cream, sherbets, or fruit bars)  o Chicken or beef bouillon/broth  DO NOT EAT OR DRINK ANYTHING RED OR PURPLE  DO NOT DRINK ANY ALCOHOLIC BEVERAGES  DIABETIC PATIENTS: Consult your physician    At 4:00 pm, take (2) Dulcolax® (Bisacodyl) Laxative Tablets. Swallow the tablets whole with an 8 oz. glass of water. At 8:00 pm, take the additional (2) Dulcolax® (Bisacodyl) Laxative Tablets with 8 oz. of water. The package may direct you not to exceed (2) tablets at any time but for the purpose of this examination, you should take (4) total.    Mix the 238 gm of Miralax® in 64 oz. of Gatorade® and shake the solution until the Miralax® is dissolved. You will drink half (32 oz) of this solution this evening, beginning between 4 and 6 o'clock. Drink 8 oz glassfuls at your own pace. It may take several hours to drink the solution.    Remember to stay close to toilet facilities.    DAY OF COLONOSCOPY PROCEDURE    Five (5) hours before your procedure, drink the other half (32 oz) of the Miralax®/Gatorade® mixture within a two (2) hour period. This may require you to get up very early if you  are scheduled for an early procedure.    NOTHING IS TO BE TAKEN BY MOUTH 3 HOURS PRIOR TO PROCEDURE.     If you use an inhaler, please bring it with you to your procedure.

## 2024-03-21 NOTE — ASSESSMENT & PLAN NOTE
Patient presents for follow-up of rectal cancer.  This was diagnosed last year.  Patient is being evaluated for rectal bleeding and also noted to have an acute MI.  He underwent flexible sigmoidoscopy which diagnosed his low rectal cancer.  He then underwent staging workup.  Multiple small pulmonary nodules were noted.  These were concerning for metastatic disease.  Patient also had a separate liver lesion.  This was concerning to be metastatic.  Secondary to symptoms, he also underwent radiation in September of last year.  His most recent round of examinations to include CT scan and December appeared to have stable disease.  He presents today to discuss surgical evaluation for his primary site in the rectum.    We discussed that in order to restage his disease, we would use a combination of imaging as well as direct inspection.  At the time of his diagnosis secondary to his other medical issues he underwent flexible sigmoidoscopy and not colonoscopy.  I will schedule him for colonoscopy for direct evaluation of the rectum itself.  He is scheduled to undergo CT scan in the next week or 2.  If he has stable disease, and significant remnant prior, proctectomy may be reasonable if he is medically fit for this.  He is also following up with cardiology.  He will need to be off of all antiplatelet therapy if surgery is to be undertaken.  Plan at this point in time will be for colonoscopy.  Risks and benefits of colonoscopy reviewed with patient to include, but not be limited to: anesthesia, bleeding, missed lesion and perforation requiring surgery.  Patient consents to procedure.

## 2024-03-21 NOTE — TELEPHONE ENCOUNTER
OV DB Rectal cancer BMI 18, finished CA treatment    Scheduled 4/30/2024 at B w/DB    Paperwork handed to pt

## 2024-03-22 ENCOUNTER — TELEPHONE (OUTPATIENT)
Dept: NEUROLOGY | Facility: CLINIC | Age: 79
End: 2024-03-22

## 2024-03-22 NOTE — TELEPHONE ENCOUNTER
Called patient, daughter answered and helped schedule. She first asked for the [place] office but dueto wait time she asked for the [place] Office. Scheduled for the first available and placed on the waiting list. Scheduled with Ariel Duke, [facility], 3 pm, 4/25/2024.Placed on waiting list       [facility]/  [facility]  / [facility]    DC- HOME- 11/26/2023    Minesh Pandya Jr. will need follow up in in 6 weeks with general Attending, AP or resident .  He will not require outpatient neurological testing.

## 2024-03-27 ENCOUNTER — OFFICE VISIT (OUTPATIENT)
Dept: CARDIOLOGY CLINIC | Facility: CLINIC | Age: 79
End: 2024-03-27
Payer: COMMERCIAL

## 2024-03-27 VITALS
WEIGHT: 130 LBS | HEIGHT: 69 IN | BODY MASS INDEX: 19.26 KG/M2 | RESPIRATION RATE: 16 BRPM | SYSTOLIC BLOOD PRESSURE: 134 MMHG | DIASTOLIC BLOOD PRESSURE: 76 MMHG | HEART RATE: 54 BPM

## 2024-03-27 DIAGNOSIS — C20 RECTAL CANCER (HCC): ICD-10-CM

## 2024-03-27 DIAGNOSIS — I25.5 ISCHEMIC CARDIOMYOPATHY: Primary | ICD-10-CM

## 2024-03-27 DIAGNOSIS — I25.10 CAD IN NATIVE ARTERY: Chronic | ICD-10-CM

## 2024-03-27 DIAGNOSIS — I42.9 CARDIOMYOPATHY, UNSPECIFIED TYPE (HCC): ICD-10-CM

## 2024-03-27 DIAGNOSIS — I10 ESSENTIAL HYPERTENSION: Chronic | ICD-10-CM

## 2024-03-27 PROCEDURE — 93000 ELECTROCARDIOGRAM COMPLETE: CPT | Performed by: INTERNAL MEDICINE

## 2024-03-27 PROCEDURE — 99214 OFFICE O/P EST MOD 30 MIN: CPT | Performed by: INTERNAL MEDICINE

## 2024-03-27 NOTE — PROGRESS NOTES
Cardiology Office Note    Minesh Pandya Jr. 78 y.o. male MRN: [id number]    03/27/24          Assessment:  Preoperative evaluation   CAD s/p PCI to dLAD and PTCA to D1 (10/2019) and BMS to pLAD (5/19/23)   ICM with EF: 45-50%  Rectal CA   Hx of lacunar CVA  Hypertension    Plan:  He is being considered for proctectomy. He is at high risk of MACE. He denies anginal symptoms but has limited functional capacity. Will evaluate with a pharmacologic MPI.     Continue aspirin, atorvastatin, lisinopril and Toprol-XL  Ambulatory blood pressure monitoring and maintaining a low sodium diet was advised.   He was advised to notify us with the onset of cardiac symptoms.         Follow up: 4 months or sooner as needed    1. Ischemic cardiomyopathy  Ambulatory referral to Cardiology    NM myocardial perfusion spect (rx stress and/or rest)      2. Rectal cancer (HCC)  Ambulatory referral to Cardiology      3. CAD in native artery  Ambulatory referral to Cardiology    NM myocardial perfusion spect (rx stress and/or rest)      4. Cardiomyopathy, unspecified type (HCC)  Ambulatory referral to Cardiology      5. Essential hypertension  Ambulatory referral to Cardiology            HPI: Minesh Pandya Jr. is a 78 y.o. year old male with history of CAD s/p PCI, ICM with EF recovery, and history of lacunar CVA who presents for routine follow-up.    Past cardiac evaluation:  CAD s/p NSTEMI s/p PCI to the dLAD and PTCA to D1 (10/2019)  TTE 11/2019: EF 50-55%; improved from 40-45%   He presented to the ED in 9/2020 with vertigo. He was hypertensive on presentation and was started on lisinopril 2.5 mg daily which precipitated hypotension and it was subsequently discontinued.   He presented to the ED in 5/2023 with rectal bleeding and was noted to have a rectal mass. He was undergoing colonoscopy prep and had an anterior STEMI. He underwent LHC with BMS placement to the proximal LAD. TTE revealed EF: 20%. He was started on GDMT. Repeat TTE on  7/13/2023 revealed EF recovery to 45%. He established care with colorectal surgery. He was not considered a surgical candidate and is scheduled to start chemotherapy. CT revealed lesions suspicions for metastasis.    He has been doing well from a cardiac standpoint since his last evaluation.  He follows with colorectal surgery and is being considered for proctectomy.  He will be undergoing a colonoscopy first.  He denies recent anginal symptoms however has limited functional capacity.  He is currently on aspirin monotherapy.  Plavix was previously discontinued due to bleeding risk. He denies recurrent bleeding.  His blood pressure has been well-controlled on his current antihypertensive regimen.  He denies any other cardiac concerns at this time.      ECG personally reviewed: sinus bradycardia, low voltage qrs, anteroseptal infarct, lateral T wave inversions    Family History: grandfather with history of CAD          Allergies   Allergen Reactions    Wound Dressing Adhesive Hives     Noted with Plastic adhesive bandage brand Curity         Current Outpatient Medications:     aspirin (ECOTRIN LOW STRENGTH) 81 mg EC tablet, Take 1 tablet (81 mg total) by mouth daily Please purchase over the counter at her local pharmacy, Disp: 30 tablet, Rfl: 1    atorvastatin (LIPITOR) 80 mg tablet, Take 1 tablet (80 mg total) by mouth daily with dinner, Disp: 90 tablet, Rfl: 3    Blood Pressure KIT, by Does not apply route daily, Disp: 1 each, Rfl: 0    diphenhydramine, lidocaine, Al/Mg hydroxide, simethicone (Magic Mouthwash) SUSP, Swish and spit 10 mL every 6 (six) hours as needed for mouth pain or discomfort, Disp: 237 mL, Rfl: 0    ferrous sulfate 324 (65 Fe) mg, Take 1 tablet (324 mg total) by mouth in the morning, Disp: 30 tablet, Rfl: 5    lisinopril (ZESTRIL) 2.5 mg tablet, take 1 tablet by mouth daily, Disp: 90 tablet, Rfl: 3    metoprolol succinate (TOPROL-XL) 25 mg 24 hr tablet, take 1 tablet by mouth daily, Disp: 90  tablet, Rfl: 3    pantoprazole (PROTONIX) 40 mg tablet, Take 1 tablet (40 mg total) by mouth daily, Disp: 30 tablet, Rfl: 0    sucralfate (CARAFATE) 1 g tablet, Take 1 tablet (1 g total) by mouth 4 (four) times a day, Disp: 120 tablet, Rfl: 3    tamsulosin (FLOMAX) 0.4 mg, Take 1 capsule (0.4 mg total) by mouth daily at bedtime, Disp: 90 capsule, Rfl: 3    Past Medical History:   Diagnosis Date    Hyperlipidemia     Ischemic cardiomyopathy     Lacunar infarction (HCC)     Myocardial infarction (HCC)     Near syncope 11/14/2019    Non-ST elevated myocardial infarction (HCC)     NSTEMI (non-ST elevated myocardial infarction) ([facility]) 10/20/2019    Pericarditis 10/24/2019    Poison ivy     Rectal cancer ([facility])     STEMI involving left anterior descending coronary artery ([facility]) 05/19/2023    Stroke ([facility])        Family History   Problem Relation Age of Onset    Breast cancer Mother     Emphysema Father     Heart disease Maternal Grandfather     Heart attack Maternal Grandfather     Emphysema Paternal Grandfather        Past Surgical History:   Procedure Laterality Date    ANGIOPLASTY      CARDIAC CATHETERIZATION N/A 5/19/2023    Procedure: Cardiac pci;  Surgeon: Ted Núñez MD;  Location: [facility];  Service: Cardiology    CORONARY STENT PLACEMENT      HERNIA REPAIR Left 4/28/2021    Procedure: OPEN REPAIR HERNIA INGUINAL LEFT WITH MESH;  Surgeon: Carlos Nguyen MD;  Location: [facility] MAIN OR;  Service: General    IR PORT PLACEMENT  8/31/2023       Social History     Socioeconomic History    Marital status:      Spouse name: Not on file    Number of children: Not on file    Years of education: Not on file    Highest education level: Not on file   Occupational History    Not on file   Tobacco Use    Smoking status: Former     Current packs/day: 0.00     Average packs/day: 1 pack/day for 15.0 years (15.0 ttl pk-yrs)     Types: Cigarettes     Start date: 12/6/1954     Quit date: 12/6/1969     Years since  quittin.3    Smokeless tobacco: Never   Vaping Use    Vaping status: Never Used   Substance and Sexual Activity    Alcohol use: Never    Drug use: No    Sexual activity: Not Currently     Partners: Female   Other Topics Concern    Not on file   Social History Narrative    Not on file     Social Determinants of Health     Financial Resource Strain: Low Risk  (10/17/2022)    Overall Financial Resource Strain (CARDIA)     Difficulty of Paying Living Expenses: Not hard at all   Food Insecurity: No Food Insecurity (2023)    Hunger Vital Sign     Worried About Running Out of Food in the Last Year: Never true     Ran Out of Food in the Last Year: Never true   Transportation Needs: No Transportation Needs (2023)    PRAPARE - Transportation     Lack of Transportation (Medical): No     Lack of Transportation (Non-Medical): No   Physical Activity: Inactive (3/8/2021)    Exercise Vital Sign     Days of Exercise per Week: 0 days     Minutes of Exercise per Session: 0 min   Stress: No Stress Concern Present (2023)    [employer] - Occupational Stress Questionnaire     Feeling of Stress : Not at all   Social Connections: Not on file   Intimate Partner Violence: Not on file   Housing Stability: Low Risk  (2023)    Housing Stability Vital Sign     Unable to Pay for Housing in the Last Year: No     Number of Places Lived in the Last Year: 1     Unstable Housing in the Last Year: No       Review of Systems   Constitutional: Negative for diaphoresis, weight gain and weight loss.   HENT:  Negative for congestion.    Cardiovascular:  Negative for chest pain, dyspnea on exertion, irregular heartbeat, leg swelling, near-syncope, orthopnea, palpitations, paroxysmal nocturnal dyspnea and syncope.   Respiratory:  Negative for shortness of breath, sleep disturbances due to breathing and snoring.    Hematologic/Lymphatic: Does not bruise/bleed easily.   Skin:  Negative for rash.  "  Musculoskeletal:  Negative for myalgias.   Gastrointestinal:  Negative for nausea and vomiting.   Neurological:  Negative for excessive daytime sleepiness and light-headedness.   Psychiatric/Behavioral:  The patient is not nervous/anxious.        Vitals: /76 (BP Location: Left arm, Patient Position: Sitting, Cuff Size: Standard)   Pulse (!) 54   Resp 16   Ht 5' 9\" (1.753 m)   Wt 59 kg (130 lb)   BMI 19.20 kg/m²       Physical Exam:     GEN: Alert and oriented x 3, in no acute distress.  Well appearing and well nourished.   HEENT: Sclera anicteric, conjunctivae pink, mucous membranes moist. Oropharynx clear.   NECK: Supple, no carotid bruits, no significant JVD. Trachea midline, no thyromegaly.   HEART: Regular rhythm, normal S1 and S2, no murmurs, clicks, gallops or rubs. PMI nondisplaced, no thrills.   LUNGS: Clear to auscultation bilaterally; no wheezes, rales, or rhonchi. No increased work of breathing or signs of respiratory distress.   ABDOMEN: Soft, nontender, nondistended, normoactive bowel sounds.   EXTREMITIES: Skin warm and well perfused, no clubbing, cyanosis, or edema.  NEURO: No focal findings. Normal speech. Mood and affect normal.   SKIN: Normal without suspicious lesions on exposed skin.      "

## 2024-04-02 ENCOUNTER — APPOINTMENT (OUTPATIENT)
Dept: LAB | Facility: HOSPITAL | Age: 79
End: 2024-04-02
Payer: COMMERCIAL

## 2024-04-02 ENCOUNTER — HOSPITAL ENCOUNTER (OUTPATIENT)
Dept: CT IMAGING | Facility: HOSPITAL | Age: 79
Discharge: HOME | End: 2024-04-02
Attending: INTERNAL MEDICINE
Payer: COMMERCIAL

## 2024-04-02 DIAGNOSIS — I42.9 CARDIOMYOPATHY, UNSPECIFIED TYPE (HCC): ICD-10-CM

## 2024-04-02 DIAGNOSIS — I10 ESSENTIAL HYPERTENSION: Chronic | ICD-10-CM

## 2024-04-02 DIAGNOSIS — C20 RECTAL CANCER (HCC): ICD-10-CM

## 2024-04-02 DIAGNOSIS — I25.10 CAD IN NATIVE ARTERY: Chronic | ICD-10-CM

## 2024-04-02 DIAGNOSIS — I25.5 ISCHEMIC CARDIOMYOPATHY: ICD-10-CM

## 2024-04-02 LAB
ALBUMIN SERPL BCP-MCNC: 3.4 G/DL (ref 3.5–5)
ALP SERPL-CCNC: 61 U/L (ref 34–104)
ALT SERPL W P-5'-P-CCNC: 13 U/L (ref 7–52)
ANION GAP SERPL CALCULATED.3IONS-SCNC: 6 MMOL/L (ref 4–13)
AST SERPL W P-5'-P-CCNC: 17 U/L (ref 13–39)
BASOPHILS # BLD AUTO: 0.02 THOUSANDS/ÂΜL (ref 0–0.1)
BASOPHILS NFR BLD AUTO: 0 % (ref 0–1)
BILIRUB SERPL-MCNC: 0.78 MG/DL (ref 0.2–1)
BUN SERPL-MCNC: 16 MG/DL (ref 5–25)
CALCIUM ALBUM COR SERPL-MCNC: 9 MG/DL (ref 8.3–10.1)
CALCIUM SERPL-MCNC: 8.5 MG/DL (ref 8.4–10.2)
CEA SERPL-MCNC: 1.5 NG/ML (ref 0–3)
CHLORIDE SERPL-SCNC: 106 MMOL/L (ref 96–108)
CO2 SERPL-SCNC: 25 MMOL/L (ref 21–32)
CREAT SERPL-MCNC: 0.94 MG/DL (ref 0.6–1.3)
EOSINOPHIL # BLD AUTO: 0.16 THOUSAND/ÂΜL (ref 0–0.61)
EOSINOPHIL NFR BLD AUTO: 3 % (ref 0–6)
ERYTHROCYTE [DISTWIDTH] IN BLOOD BY AUTOMATED COUNT: 17.8 % (ref 11.6–15.1)
GFR SERPL CREATININE-BSD FRML MDRD: 77 ML/MIN/1.73SQ M
GLUCOSE P FAST SERPL-MCNC: 88 MG/DL (ref 65–99)
HCT VFR BLD AUTO: 38.6 % (ref 36.5–49.3)
HGB BLD-MCNC: 12.4 G/DL (ref 12–17)
IMM GRANULOCYTES # BLD AUTO: 0.03 THOUSAND/UL (ref 0–0.2)
IMM GRANULOCYTES NFR BLD AUTO: 1 % (ref 0–2)
LYMPHOCYTES # BLD AUTO: 1.13 THOUSANDS/ÂΜL (ref 0.6–4.47)
LYMPHOCYTES NFR BLD AUTO: 23 % (ref 14–44)
MCH RBC QN AUTO: 28.7 PG (ref 26.8–34.3)
MCHC RBC AUTO-ENTMCNC: 32.1 G/DL (ref 31.4–37.4)
MCV RBC AUTO: 89 FL (ref 82–98)
MONOCYTES # BLD AUTO: 0.63 THOUSAND/ÂΜL (ref 0.17–1.22)
MONOCYTES NFR BLD AUTO: 13 % (ref 4–12)
NEUTROPHILS # BLD AUTO: 2.85 THOUSANDS/ÂΜL (ref 1.85–7.62)
NEUTS SEG NFR BLD AUTO: 60 % (ref 43–75)
NRBC BLD AUTO-RTO: 0 /100 WBCS
PLATELET # BLD AUTO: 83 THOUSANDS/UL (ref 149–390)
PMV BLD AUTO: 10.5 FL (ref 8.9–12.7)
POTASSIUM SERPL-SCNC: 4.4 MMOL/L (ref 3.5–5.3)
PROT SERPL-MCNC: 5.9 G/DL (ref 6.4–8.4)
RBC # BLD AUTO: 4.32 MILLION/UL (ref 3.88–5.62)
SODIUM SERPL-SCNC: 137 MMOL/L (ref 135–147)
WBC # BLD AUTO: 4.82 THOUSAND/UL (ref 4.31–10.16)

## 2024-04-02 PROCEDURE — 82378 CARCINOEMBRYONIC ANTIGEN: CPT

## 2024-04-02 PROCEDURE — 85025 COMPLETE CBC W/AUTO DIFF WBC: CPT

## 2024-04-02 PROCEDURE — 80053 COMPREHEN METABOLIC PANEL: CPT

## 2024-04-02 PROCEDURE — 74177 CT ABD & PELVIS W/CONTRAST: CPT

## 2024-04-02 PROCEDURE — 71260 CT THORAX DX C+: CPT

## 2024-04-02 PROCEDURE — 36415 COLL VENOUS BLD VENIPUNCTURE: CPT

## 2024-04-02 RX ADMIN — IOHEXOL 100 ML: 350 INJECTION, SOLUTION INTRAVENOUS at 14:05

## 2024-04-03 ENCOUNTER — TELEPHONE (OUTPATIENT)
Dept: CARDIOLOGY CLINIC | Facility: CLINIC | Age: 79
End: 2024-04-03

## 2024-04-03 ENCOUNTER — HOSPITAL ENCOUNTER (OUTPATIENT)
Dept: NON INVASIVE DIAGNOSTICS | Facility: CLINIC | Age: 79
Discharge: HOME | End: 2024-04-03
Payer: COMMERCIAL

## 2024-04-03 VITALS
HEART RATE: 55 BPM | DIASTOLIC BLOOD PRESSURE: 76 MMHG | HEIGHT: 69 IN | WEIGHT: 130 LBS | SYSTOLIC BLOOD PRESSURE: 146 MMHG | BODY MASS INDEX: 19.26 KG/M2

## 2024-04-03 DIAGNOSIS — I25.10 CAD IN NATIVE ARTERY: Chronic | ICD-10-CM

## 2024-04-03 DIAGNOSIS — I25.5 ISCHEMIC CARDIOMYOPATHY: ICD-10-CM

## 2024-04-03 LAB
CHEST PAIN STATEMENT: NORMAL
MAX DIASTOLIC BP: 76 MMHG
MAX PREDICTED HEART RATE: 143 BPM
NUC STRESS DIASTOLIC VOLUME INDEX: 90 ML/M2
NUC STRESS EJECTION FRACTION: 50 %
NUC STRESS SYSTOLIC VOLUME INDEX: 45 ML/M2
PROTOCOL NAME: NORMAL
RATE PRESSURE PRODUCT: NORMAL
REASON FOR TERMINATION: NORMAL
SL CV REST NUCLEAR ISOTOPE DOSE: 10.84 MCI
SL CV STRESS NUCLEAR ISOTOPE DOSE: 30.1 MCI
SL CV STRESS RECOVERY HR: 82 BPM
STRESS ANGINA INDEX: 0
STRESS BASELINE BP: NORMAL MMHG
STRESS BASELINE HR: 55 BPM
STRESS PEAK HR: 120 BPM
STRESS POST EXERCISE DUR MIN: 3 MIN
STRESS POST EXERCISE DUR SEC: 0 SEC
STRESS POST PEAK BP: 146 MMHG
STRESS POST PEAK HR: 279 BPM
STRESS POST PEAK SYSTOLIC BP: 164 MMHG
STRESS/REST PERFUSION RATIO: 1.02
TARGET HR FORMULA: NORMAL

## 2024-04-03 PROCEDURE — 93016 CV STRESS TEST SUPVJ ONLY: CPT | Performed by: INTERNAL MEDICINE

## 2024-04-03 PROCEDURE — 93017 CV STRESS TEST TRACING ONLY: CPT

## 2024-04-03 PROCEDURE — A9502 TC99M TETROFOSMIN: HCPCS

## 2024-04-03 PROCEDURE — 93018 CV STRESS TEST I&R ONLY: CPT | Performed by: INTERNAL MEDICINE

## 2024-04-03 PROCEDURE — 78452 HT MUSCLE IMAGE SPECT MULT: CPT | Performed by: INTERNAL MEDICINE

## 2024-04-03 PROCEDURE — 78452 HT MUSCLE IMAGE SPECT MULT: CPT

## 2024-04-03 RX ORDER — REGADENOSON 0.08 MG/ML
0.4 INJECTION, SOLUTION INTRAVENOUS ONCE
Status: COMPLETED | OUTPATIENT
Start: 2024-04-03 | End: 2024-04-03

## 2024-04-03 RX ADMIN — REGADENOSON 0.4 MG: 0.08 INJECTION, SOLUTION INTRAVENOUS at 13:02

## 2024-04-03 NOTE — TELEPHONE ENCOUNTER
----- Message from Harry Du MD sent at 4/3/2024  4:24 PM EDT -----  Please let him know that there is no evidence of new blockage on his stress test.  We can discuss further at his next appointment.

## 2024-04-03 NOTE — TELEPHONE ENCOUNTER
Spoke with patient daughter relayed  response, patient daughter verbally understood.    Patient daughter stated that pt is scheduled to see the surgeon at the end of April 2024.    Patient daughter is inquiring will pt be cleared for surgery.    Please advise

## 2024-04-03 NOTE — RESULT ENCOUNTER NOTE
Please let him know that there is no evidence of new blockage on his stress test.  We can discuss further at his next appointment.

## 2024-04-09 ENCOUNTER — RA CDI HCC (OUTPATIENT)
Dept: OTHER | Facility: HOSPITAL | Age: 79
End: 2024-04-09

## 2024-04-09 ENCOUNTER — TELEPHONE (OUTPATIENT)
Dept: HEMATOLOGY ONCOLOGY | Facility: CLINIC | Age: 79
End: 2024-04-09

## 2024-04-11 ENCOUNTER — OFFICE VISIT (OUTPATIENT)
Dept: HEMATOLOGY ONCOLOGY | Facility: CLINIC | Age: 79
End: 2024-04-11
Payer: COMMERCIAL

## 2024-04-11 VITALS
OXYGEN SATURATION: 97 % | HEIGHT: 69 IN | HEART RATE: 76 BPM | SYSTOLIC BLOOD PRESSURE: 136 MMHG | WEIGHT: 133.5 LBS | TEMPERATURE: 97.6 F | RESPIRATION RATE: 16 BRPM | BODY MASS INDEX: 19.77 KG/M2 | DIASTOLIC BLOOD PRESSURE: 76 MMHG

## 2024-04-11 DIAGNOSIS — C20 RECTAL CANCER (HCC): ICD-10-CM

## 2024-04-11 DIAGNOSIS — I25.5 ISCHEMIC CARDIOMYOPATHY: Primary | ICD-10-CM

## 2024-04-11 DIAGNOSIS — D69.6 THROMBOCYTOPENIA (HCC): ICD-10-CM

## 2024-04-11 PROCEDURE — 99214 OFFICE O/P EST MOD 30 MIN: CPT | Performed by: INTERNAL MEDICINE

## 2024-04-11 PROCEDURE — G2211 COMPLEX E/M VISIT ADD ON: HCPCS | Performed by: INTERNAL MEDICINE

## 2024-04-11 NOTE — PROGRESS NOTES
Hematology/Oncology Outpatient Follow- up Note  Minesh Pandya Jr. 78 y.o. male MRN: @ Encounter: [id number]        Date:  4/11/2024        Assessment / Plan:    1 stage III versus stage IV rectal cancer  2 lung nodules which have been stable  3 liver nodule stable  4 coronary artery disease no angina cleared by cardiology for surgery  Plan: Patient will be considered for surgery.  He is awaiting colonoscopy at the end of month.  Will see him once more at the beginning of 5/2024 after colonoscopy.  At that point final decision remain regarding whether he should go ahead with surgery or not.  CEA level is normal CBC and CMP are stable.  We will also review his scans with radiology.  I particularly concerned about his lungs as a mention but injury and I want to see how they look.          HPI: 78-year-old gentleman with history of stage III versus stage IV rectal cancer.  His x-ray shows stability in his lung in terms of nodules and 1 nodule in the liver.  He is being considered for intervention surgically.  He apparently will have colonoscopy at the end of April by colorectal surgery to determine how he looks and winces possible ability to resect.  He was seen by cardiology who felt he could undergo the procedure and procedure and have aspirin stopped 5 days prior.  He is able to move his bowels though occasionally constipated.  He is taking iron pills which I told him to stop.  His hemoglobin is overall at 12-1/2.  He is otherwise doing fairly well.  He is not vomiting no other abdominal pain feeling reasonably well.      Interval History: Note from 3/12/2024:  Assessment / Plan:    1 stage III versus stage IV rectal carcinoma  2 lung nodules which were remained stable  3 coronary artery disease no angina to see cardiology for clearance is  4 to see colorectal surgery regarding possibility of intussusception with surgery  Plan: Patient will have appointments cardiology and colorectal surgery.  He will need to get  another CT scan chest abdomen pelvis/CBC/CMP/CEA level.  We like to get these done and we will call him to do so.  We will have him come back in 4 weeks for final decisions.  Prognosis quite guarded.   HPI: 78-year-old gentleman here for follow-up.  He is off chemotherapy.  He has occasional headache otherwise stable.  He is moving his bowels.  Minimal abdominal discomfort.  He is not complain of chest pain or shortness of breath.  He will need to see cardiology.  He denies of denies any other major problems.  Unfortunately he is still left with rectal cancer.  He had an abnormal CAT scan of his chest but the lung nodule as there is unchanged.  CEA level done 5/19/2023 was normal.  It was 1.7.  This case was presented at tumor board.  The consensus was to be reevaluated for rectal surgery as well as to have a cardiology consult to see if he could tolerate such surgery.  He is not having chest pain he is not short of breath feels fatigued tired he is thin but he is not losing any more weight and he is able to eat.  Interval History: Note from 2/15/2024:  1 rectal carcinoma T3 N1 question of M1 with lung nodules  2 status post 8 cycles of FOLFOX chemotherapy and radiation therapy x 5 doses to the rectum.  Plan: Patient should be represented at this point.  Scans show stable nodules since May.  2023.  The rectal area looks better although he still has the nodes there.  Original surgery was not done due to MRI which she is now greater than 6 months since.  His blood counts are fairly stable.  CEA is not helpful as it was 1.7 at the time of his presentation.  We will speak with radiation therapy.  Will get back with him.  I would recommend holding his treatment at least 1 time to get opinions from the tumor board.  HPI: 78-year-old gentleman with the above problem.  His biggest complaint is fatigue shortness of breath and weakness.  He is doing well otherwise moving bowels no bleeding.  Blood counts are fairly stable.   He is anxious to stop treatment.  He would like to be considered for surgery interventions.  Otherwise he is doing reasonably well.Interval History: Note from 1/17/2024:   78-year-old gentleman with rectal carcinoma.  He also has CAT scan and what appears to be increasing pulmonary nodules probably consistent with metastatic disease.  Last CAT scan showed them to be stable.  He is able to eat he is moving his bowels regularly no blood in urine or stools.  He is not short of breath at rest does get dyspneic with exertion.  He initially presented with blood in his bowel movements for a week and had a CAT scan of the pelvis showing rectal mass with mesorectal lymph nodes 2 mm left lower lobe pulmonary nodule 3 mm hepatic dome nodule.  The nodules in the right and the left lobes were new compared to 2019.  His MMR was intact on the biopsy showing moderately differentiated carcinoma distal rectal area.  An MRI of the pelvis showed T3b N1 low rectal cancer.  He was seen radiation therapy for 1 week 9/25 and 9/29.  He has had some thrombocytopenia and leukopenia this is improved.  He will begin using his seventh cycle of chemotherapy next week.  He has had a reduction in oxaliplatin as well as continuous infusion for 3 days 5-fluorouracil hopefully after that he will continue to tolerate better these treatments.       Cancer Staging:  Cancer Staging   Rectal cancer (HCC)  Staging form: Colon and Rectum, AJCC 8th Edition  - Clinical stage from 5/18/2023: Stage IIIB (cT3, cN1, cM0) - Signed by Arnaldo Mar DO on 6/28/2023  Microsatellite instability (MSI): Stable      Molecular Testing:     Previous Hematologic/ Oncologic History:    Oncology History   Rectal cancer (HCC)   5/18/2023 Initial Diagnosis    May 18, 2023 patient presented with BRBPR x1 week.  Weight stable over 8 months.  CT abdomen pelvis showed rectal mass with 2 suspicious mesorectal lymph nodes.  2 mm left lower lobe pulmonary nodule, 3 mm hepatic dome  questionable lesion, 1 cm uncinate process cystic lesion.  CT chest showed 3 mm left lower lobe and right upper lobe nodules new compared to 2019.  May 20 flexible sigmoidoscopy showed distal rectal mass.  Biopsy of the mass showed moderately differentiated adenocarcinoma.  MMR intact.  June 14, 2023 MRI of the pelvis showed findings consistent with T3b, N1 low rectal cancer.  Suspicion for vascular invasion.  CBC on presentation normal WBC platelets.  Hemoglobin 11.4, MCV 77.  CMP showed albumin 3.3, otherwise normal.        Cancer Staged    Cancer Staging   No matching staging information was found for the patient.       5/18/2023 -  Cancer Staged    Staging form: Colon and Rectum, AJCC 8th Edition  - Clinical stage from 5/18/2023: Stage IIIB (cT3, cN1, cM0) - Signed by Arnaldo Mar DO on 6/28/2023  Microsatellite instability (MSI): Stable       5/20/2023 Biopsy    A. Rectum, mass, biopsy:  -Adenocarcinoma, invasive, moderately differentiated.  -Ancillary testing for mismatch repair (MMR) protein deficiency by immunohistochemical panel (MLH1, PMS2, MSH2, MSH6) is undertaken, the results will be issued in an addendum.    RESULTS OF IMMUNOHISTOCHEMICAL ANALYSIS FOR MISMATCH REPAIR PROTEIN LOSS  INTERPRETATION: NO LOSS OF NUCLEAR EXPRESSION OF MMR PROTEINS: LOW PROBABILITY OF MSI-H        RESULTS:  Antibody            Clone                 Description                     Results  MLH1                 M1                     Mismatch repair protein  Intact nuclear expression  MSH2                [id number]        Mismatch repair protein  Intact nuclear expression  MSH6                44                      Mismatch repair protein  Intact nuclear expression  PMS2                 [id number]           Mismatch repair protein  Intact nuclear expression        9/20/2023 -  Chemotherapy    alteplase (CATHFLO), 2 mg, Intracatheter, Every 1 Minute as needed, 9 of 12 cycles  pegfilgrastim (NEULASTA), 6 mg, Subcutaneous,  Once, 1 of 1 cycle  Administration: 6 mg (10/20/2023)  pegfilgrastim (NEULASTA ONPRO), 6 mg, Subcutaneous, Once, 1 of 1 cycle  Administration: 6 mg (1/11/2024)  fluorouracil (ADRUCIL), 320 mg/m2 = 565 mg (80 % of original dose 400 mg/m2), Intravenous, Once, 2 of 2 cycles  Dose modification: 320 mg/m2 (original dose 400 mg/m2, Cycle 1)  Administration: 565 mg (9/20/2023), 565 mg (10/17/2023)  leucovorin calcium IVPB, 708 mg, Intravenous, Once, 9 of 12 cycles  Administration: 700 mg (9/20/2023), 700 mg (10/17/2023), 700 mg (10/31/2023), 700 mg (11/14/2023), 700 mg (11/28/2023), 688 mg (1/9/2024), 688 mg (1/29/2024), 688 mg (2/12/2024)  oxaliplatin (ELOXATIN) chemo infusion, 75 mg/m2 = 132.75 mg (88.2 % of original dose 85 mg/m2), Intravenous, Once, 9 of 12 cycles  Dose modification: 75 mg/m2 (original dose 85 mg/m2, Cycle 1, Reason: Anticipated Tolerance), 65.025 mg/m2 (76.5 % of original dose 85 mg/m2, Cycle 6, Reason: Other (see comments), Comment: Neutropenia), 65 mg/m2 (original dose 85 mg/m2, Cycle 6, Reason: Dose modified as per discussion with consulting physician, Comment: neutropenia - changing to flat 65 mg/m2)  Administration: 132.75 mg (9/20/2023), 132.75 mg (10/17/2023), 133 mg (10/31/2023), 132.75 mg (11/14/2023), 132.75 mg (11/28/2023), 111.8 mg (1/9/2024), 111.8 mg (1/29/2024), 111.8 mg (2/12/2024)  fluorouracil (ADRUCIL) ambulatory infusion Soln, 1,200 mg/m2/day = 4,250 mg, Intravenous, Over 46 hours, 9 of 12 cycles  Dose modification: 960 mg/m2/day (80 % of original dose 1,200 mg/m2/day, Cycle 6, Reason: Treatment Parameters Not Met)     9/25/2023 - 9/29/2023 Radiation    Treatments:  Course: C1  Plan ID Energy Fractions Dose per Fraction (cGy) Dose Correction (cGy) Total Dose Delivered (cGy) Elapsed Days   Pelvis 10X/6X 5 / 5 500 0 2,500 4    Treatment Dates:  9/25/2023 - 9/29/2023.          Current Hematologic/ Oncologic Treatment:       Cycle 1         Test Results:    Imaging: CT chest abdomen  pelvis w contrast    Result Date: 4/9/2024  Narrative: CT CHEST, ABDOMEN AND PELVIS WITH IV CONTRAST INDICATION: C20: Malignant neoplasm of rectum I25.10: Atherosclerotic heart disease of native coronary artery without angina pectoris I42.9: Cardiomyopathy, unspecified I10: Essential (primary) hypertension I25.5: Ischemic cardiomyopathy. COMPARISON: 12/21/2023; 12/6/2023; MRI from 9/26/2023; 8/29/2023; 5/1/2023; 5/23/2023 TECHNIQUE: CT examination of the chest, abdomen and pelvis was performed. Multiplanar 2D reformatted images were created from the source data. This examination, like all CT scans performed in the [facility], was performed utilizing techniques to minimize radiation dose exposure, including the use of iterative reconstruction and automated exposure control. Radiation dose length product (DLP) for this visit: 690 mGy-cm IV Contrast: 100 mL of iohexol (OMNIPAQUE) Enteric Contrast: Not administered. FINDINGS: CHEST LUNGS: Some vague groundglass opacity is noted in the right upper lobe not present on the study of December. The vague tree-in-bud opacity may also be evident. Minimal bronchiectasis with nodularity is noted in the right middle lobe, image 206 measuring 5 mm similar to the study of December with also some tree-in-bud opacity at this location. Some minimal bronchiolectasis is also present. Some tree-in-bud opacities seen posteriorly in the right upper lobe on image 161 also stable. Some minimal atelectatic changes are seen more inferiorly in the right lower lobe. Left lower lobe 5 mm nodule, image 156, stable. Right upper lobe 2 mm nodule, image 57, stable. 3 mm pleural-based nodule, image 100, series 3, stable. Mild bronchiectasis of the lower lobe bronchi are demonstrated without focal bronchial lesions seen elsewhere. PLEURA: Unremarkable. HEART/GREAT VESSELS: Coronary calcification.. The ascending aorta is ectatic measuring 4 cm at the right pulmonary artery level.  Previously 3.9 cm.. MEDIASTINUM AND NANCY: 9 mm right hilar node is unchanged. Small hiatus hernia is noted. CHEST WALL AND LOWER NECK: Small periumbilical hernia of fat is identified. ABDOMEN LIVER/BILIARY TREE: Mildly lobular contour. No focal abnormality. A segment 7 lesion was noted on the MRI from September 2023. This is not as well seen although some heterogeneous enhancement measuring proximally 6 mm on image 99 is evident in the area. This is smaller than an area noted in August 2023. GALLBLADDER: Gallstones. Mild thickening of the gallbladder wall diffusely with slight enhancement. SPLEEN: Mild splenomegaly measuring 14.3 cm. PANCREAS: 1.1 cm cyst in the uncinate process. This was noted on recent MRI and prior CTs. ADRENAL GLANDS: Unremarkable. KIDNEYS/URETERS: No hydronephrosis. Nonobstructing calculus right kidney. Large low-density renal cysts are noted as well as a few circumscribed punctate hypodensities which are too small to characterize but most likely represent cysts.. STOMACH AND BOWEL: The stomach is not well distended. No small bowel dilatation. Mild fecal stasis.. There is concentric thickening of the distal rectal region with slight infiltration of the surrounding mesorectal fat. The degree of thickening at the level of the prostate is similar to the study of December although less wall enhancement although some of this may be related to phase of injection. APPENDIX: No findings to suggest appendicitis. ABDOMINOPELVIC CAVITY: No ascites. No pneumoperitoneum. No bulky lymphadenopathy. Small portacaval and periaortic nodes are seen. 6 mm mesorectal node, image 244 similar to study of December although improved since August. Mild diffuse thickening of the mesorectal fascia is unchanged. 6 mm aortocaval node, image 161, unchanged from December but improved since August. VESSELS: Atherosclerosis without abdominal aortic aneurysm. Tiny splenic artery aneurysm near the splenic hilum coronal image 91.  Angulation of the celiac axis is probably related to median arcuate ligament. The celiac artery is slightly ectatic just distal to this area. PELVIS REPRODUCTIVE ORGANS: Unremarkable for patient's age. URINARY BLADDER: The bladder is not well distended. Some wall thickening may be evident. Some haziness of the perivesicular fat is noted. ABDOMINAL WALL/INGUINAL REGIONS left inguinal scarring is noted. Fluid collection is noted adjacent to the left inguinal canal. This measures 3.1 x 2 cm. BONES: No acute fracture or suspicious osseous lesion.     Impression: Some areas of tree-in-bud opacity in nodular consolidation with bronchiectasis is similar to the prior study.. Some groundglass opacity and subtle tree-in-bud opacity as well as lobular interstitial prominence is new in the right upper lobe. This could be inflammatory. 3-month follow-up chest CT is advised. Some other small nodules are unchanged to slightly improved. Right hilar node is stable. No liver lesion in the abdomen. No new abdominal adenopathy. Small liver lesion is more difficult to identify. Diffuse rectal thickening is again demonstrated similar to December. Small mesorectal node is unchanged. Fluid collection near the left inguinal ring appears unchanged. Cystic lesion in the uncinate process is unchanged. Splenomegaly is similar to since the prior study. Follow-up was marked in the epic system. Workstation performed: [id number]     NM myocardial perfusion spect (rx stress and/or rest)    Result Date: 4/3/2024  Narrative:   Stress ECG: The ECG was not diagnostic due to pharmacological (vasodilator) stress.   Perfusion: There is a left ventricular perfusion defect that is medium in size present in the apical anterior location(s) that is fixed.   Perfusion Defect Conclusion: The stress/rest perfusion ratio is 1.02.   Stress Function: Left ventricular function post-stress is normal. Stress ejection fraction is 50%. There is a defect in the apical  "anterior location(s). The defect has moderately reduced function. Abnormal study after pharmacologic vasodilation.  There was a medium size fixed defect of the anteroapical wall suggestive of infarct.  Left ventricular function was low normal with hypokinesis of the apical anterior wall noted.     Stress strip    Result Date: 4/3/2024  Narrative: Confirmed by LESLIE SORENSON ([id number]),  Katlyn Gupta ([id number]) on 4/3/2024 2:16:08 PM            Labs:   Lab Results   Component Value Date    WBC 4.82 04/02/2024    HGB 12.4 04/02/2024    HCT 38.6 04/02/2024    MCV 89 04/02/2024    PLT 83 (L) 04/02/2024     Lab Results   Component Value Date    K 4.4 04/02/2024     04/02/2024    CO2 25 04/02/2024    BUN 16 04/02/2024    CREATININE 0.94 04/02/2024    GLUF 88 04/02/2024    CALCIUM 8.5 04/02/2024    CORRECTEDCA 9.0 04/02/2024    AST 17 04/02/2024    ALT 13 04/02/2024    ALKPHOS 61 04/02/2024    EGFR 77 04/02/2024         No results found for: \"SPEP\", \"UPEP\"    Lab Results   Component Value Date    PSA 1.65 08/30/2023       Lab Results   Component Value Date    CEA 1.5 04/02/2024       No results found for: \"\"    No results found for: \"AFP\"    Lab Results   Component Value Date    IRON 20 (L) 08/07/2023    TIBC 328 08/07/2023    FERRITIN 22 (L) 08/07/2023       Lab Results   Component Value Date    [id number] 222 05/19/2023         ROS: Review of Systems   Constitutional: Negative.    HENT: Negative.     Eyes: Negative.    Respiratory: Negative.     Cardiovascular: Negative.    Gastrointestinal: Negative.    Endocrine: Negative.    Genitourinary: Negative.    Musculoskeletal: Negative.    Skin: Negative.    Allergic/Immunologic: Negative.    Neurological: Negative.    Hematological: Negative.          Current Medications: Reviewed  Allergies: Reviewed  PMH/FH/SH:  Reviewed      Physical Exam:    Body surface area is 1.74 meters squared.    Wt Readings from Last 3 Encounters:   04/11/24 60.6 kg (133 lb 8 oz) "   04/03/24 59 kg (130 lb)   03/27/24 59 kg (130 lb)        Temp Readings from Last 3 Encounters:   04/11/24 97.6 °F (36.4 °C) (Temporal)   03/12/24 98.2 °F (36.8 °C) (Temporal)   02/15/24 97.7 °F (36.5 °C) (Temporal)        BP Readings from Last 3 Encounters:   04/11/24 136/76   04/03/24 146/76   03/27/24 134/76         Pulse Readings from Last 3 Encounters:   04/11/24 76   04/03/24 55   03/27/24 (!) 54     @LASTSAO2(3)@      Physical Exam  Constitutional:       Appearance: Normal appearance. He is normal weight.   HENT:      Head: Normocephalic and atraumatic.   Eyes:      Extraocular Movements: Extraocular movements intact.      Conjunctiva/sclera: Conjunctivae normal.      Pupils: Pupils are equal, round, and reactive to light.   Cardiovascular:      Rate and Rhythm: Normal rate and regular rhythm.      Heart sounds: Normal heart sounds.   Pulmonary:      Effort: Pulmonary effort is normal.      Breath sounds: Normal breath sounds.   Abdominal:      General: Abdomen is flat. Bowel sounds are normal.      Palpations: Abdomen is soft.   Musculoskeletal:         General: Normal range of motion.      Cervical back: Normal range of motion and neck supple.   Skin:     General: Skin is warm and dry.   Neurological:      General: No focal deficit present.      Mental Status: He is alert and oriented to person, place, and time. Mental status is at baseline.           Goals and Barriers:  Current Goal: Prolong Survival from Cancer.   Barriers: None.      Patient's Capacity to Self Care:  Patient is able to self care.    Code Status: [unfilled]  Advance Directive and Living Will:      Power of : Yes

## 2024-04-12 ENCOUNTER — TELEPHONE (OUTPATIENT)
Dept: HEMATOLOGY ONCOLOGY | Facility: CLINIC | Age: 79
End: 2024-04-12

## 2024-04-12 NOTE — TELEPHONE ENCOUNTER
Called daughter and made her aware that Minesh will not be receiving chemotherapy until after colonoscopy and potential surgery. Daughter states she was aware.  Patient will be cancelled from his treatments scheduled and will follow up after finalization of surgery.  Will contact [facility] to have them cancel all appointments.

## 2024-04-15 ENCOUNTER — HOSPITAL ENCOUNTER (OUTPATIENT)
Dept: INFUSION CENTER | Facility: CLINIC | Age: 79
Discharge: HOME | End: 2024-04-15

## 2024-04-16 ENCOUNTER — TELEPHONE (OUTPATIENT)
Age: 79
End: 2024-04-16

## 2024-04-16 ENCOUNTER — OFFICE VISIT (OUTPATIENT)
Age: 79
End: 2024-04-16
Payer: COMMERCIAL

## 2024-04-16 VITALS
DIASTOLIC BLOOD PRESSURE: 74 MMHG | HEIGHT: 69 IN | RESPIRATION RATE: 18 BRPM | WEIGHT: 135.2 LBS | TEMPERATURE: 97.1 F | BODY MASS INDEX: 20.03 KG/M2 | HEART RATE: 69 BPM | OXYGEN SATURATION: 97 % | SYSTOLIC BLOOD PRESSURE: 118 MMHG

## 2024-04-16 DIAGNOSIS — I10 ESSENTIAL HYPERTENSION: Chronic | ICD-10-CM

## 2024-04-16 DIAGNOSIS — I25.10 CAD IN NATIVE ARTERY: Chronic | ICD-10-CM

## 2024-04-16 DIAGNOSIS — F33.42 RECURRENT MAJOR DEPRESSIVE DISORDER, IN FULL REMISSION (HCC): Primary | ICD-10-CM

## 2024-04-16 DIAGNOSIS — D69.6 THROMBOCYTOPENIA (HCC): ICD-10-CM

## 2024-04-16 DIAGNOSIS — Z00.00 MEDICARE ANNUAL WELLNESS VISIT, SUBSEQUENT: ICD-10-CM

## 2024-04-16 DIAGNOSIS — C20 RECTAL CANCER (HCC): ICD-10-CM

## 2024-04-16 PROBLEM — T45.1X5A CHEMOTHERAPY INDUCED NEUTROPENIA (HCC): Status: RESOLVED | Noted: 2023-11-21 | Resolved: 2024-04-16

## 2024-04-16 PROBLEM — D70.1 CHEMOTHERAPY INDUCED NEUTROPENIA (HCC): Status: RESOLVED | Noted: 2023-11-21 | Resolved: 2024-04-16

## 2024-04-16 PROBLEM — I42.9 CARDIOMYOPATHY, UNSPECIFIED TYPE (HCC): Status: RESOLVED | Noted: 2024-01-17 | Resolved: 2024-04-16

## 2024-04-16 PROBLEM — D64.9 ACUTE ON CHRONIC ANEMIA: Status: RESOLVED | Noted: 2023-05-18 | Resolved: 2024-04-16

## 2024-04-16 PROCEDURE — 99214 OFFICE O/P EST MOD 30 MIN: CPT | Performed by: INTERNAL MEDICINE

## 2024-04-16 PROCEDURE — G0439 PPPS, SUBSEQ VISIT: HCPCS | Performed by: INTERNAL MEDICINE

## 2024-04-16 NOTE — TELEPHONE ENCOUNTER
Contacted patient, no answer, LMOM-  I am calling from Dr Hall office. I am calling to remind you of your upcoming colonoscopy on 4/30/24, and confirm you have your bowel prep instructions. If advised to hold certain medications prior to procedure, please remember to do so.     You will still receive a confirmation call the day prior to your appointment from the facility with your arrival time.     If you need to reschedule or do not have your instructions please call our office at [phone].

## 2024-04-16 NOTE — PROGRESS NOTES
Assessment and Plan:     1. Recurrent major depressive disorder, in full remission (HCC)    Denies depression symptoms; in remission.    2. CAD in native artery    Stable without angina. Follows with cardiology. Continue current medications.    3. Rectal cancer (HCC)    Stage III vs stage IV. Recent scans reviewed. Has upcoming appointment for colonoscopy and then will be deciding on whether to have surgery.    4. Essential hypertension    BP is stable and controlled. Continue anti-HTN regimen.    5. Thrombocytopenia (HCC)    Stable and without bleeding episodes. Monitored by heme/onc.       Depression Screening and Follow-up Plan: Patient was screened for depression during today's encounter. They screened negative with a PHQ-9 score of 2.      Preventive health issues were discussed with patient, and age appropriate screening tests were ordered as noted in patient's After Visit Summary.  Personalized health advice and appropriate referrals for health education or preventive services given if needed, as noted in patient's After Visit Summary.     History of Present Illness:     History of Present Illness  The patient presents for follow-up and medicare wellness visit.    The patient recently consulted with Dr. Lau, who noted a stable condition of his liver lesion and lung lesions. He is scheduled for a colonoscopy in 05/2024 and then will decide on surgery for rectal cancer (stage III vs IV). His energy levels remain satisfactory. Denies any current cardiopulmonary symptoms.     States he is active and engages in outdoor activities such as grocery shopping. He feels he is eating enough but his weight and protein levels remain low. He doesn't track how much calories/protein he consumes daily.     Patient Care Team:  Jhoan Morin DO as PCP - General (Internal Medicine)  Sivakumar Veras MD as PCP - PCP-[facility] ([facility])  Nisha Alcocer RN as Nurse Navigator  Alva Manzanares MSW as Social  Worker Care Manager (Oncology)  Sheridan Moncada MD (Radiation Oncology)  Marlin Zaidi RD (Nutrition)     Review of Systems:     Review of Systems   Constitutional:  Positive for fatigue. Negative for chills and fever.   Respiratory: Negative.     Cardiovascular: Negative.    Gastrointestinal: Negative.       Problem List:     Patient Active Problem List   Diagnosis    CAD in native artery    S/P primary angioplasty with coronary stent    History of ischemic cardiomyopathy    History of lacunar cerebrovascular accident (CVA)    Non-recurrent unilateral inguinal hernia    Essential hypertension    Protein-calorie malnutrition, unspecified severity (HCC)    Acute on chronic anemia    Moderate protein-calorie malnutrition (HCC)    Ischemic cardiomyopathy    Renal cyst    Enlarged prostate    History of CVA (cerebrovascular accident)    Phimosis    Rectal cancer (HCC)    Depression, recurrent (HCC)    Port-A-Cath in place    Chemotherapy induced neutropenia (HCC)    Thrombocytopenia (HCC)    Cardiomyopathy, unspecified type (HCC)      Past Medical and Surgical History:     Past Medical History:   Diagnosis Date    Hyperlipidemia     Ischemic cardiomyopathy     Lacunar infarction (HCC)     Near syncope 11/14/2019    NSTEMI (non-ST elevated myocardial infarction) (HCC) 10/20/2019    Pericarditis 10/24/2019    Poison ivy     Rectal cancer (HCC)     STEMI involving left anterior descending coronary artery (HCC) 05/19/2023    Stroke (HCC)      Past Surgical History:   Procedure Laterality Date    CARDIAC CATHETERIZATION N/A 05/19/2023    Procedure: Cardiac pci;  Surgeon: Ted Núñez MD;  Location: [facility];  Service: Cardiology    CORONARY ANGIOPLASTY WITH STENT PLACEMENT  05/19/2023    BMS pLAD    CORONARY ANGIOPLASTY WITH STENT PLACEMENT  10/21/2019    LUDIVINA x 2- dLAD and oD1, balloon only- mLAD    HERNIA REPAIR Left 04/28/2021    Procedure: OPEN REPAIR HERNIA INGUINAL LEFT WITH MESH;  Surgeon: Carlos Nguyen  MD;  Location: [facility];  Service: General    IR PORT PLACEMENT  2023      Family History:     Family History   Problem Relation Age of Onset    Breast cancer Mother     Emphysema Father     Heart disease Maternal Grandfather     Heart attack Maternal Grandfather     Emphysema Paternal Grandfather       Social History:     Social History     Socioeconomic History    Marital status:      Spouse name: None    Number of children: None    Years of education: None    Highest education level: None   Occupational History    None   Tobacco Use    Smoking status: Former     Current packs/day: 0.00     Average packs/day: 1 pack/day for 15.0 years (15.0 ttl pk-yrs)     Types: Cigarettes     Start date: 1954     Quit date: 1969     Years since quittin.3    Smokeless tobacco: Never   Vaping Use    Vaping status: Never Used   Substance and Sexual Activity    Alcohol use: Never    Drug use: No    Sexual activity: Not Currently     Partners: Female   Other Topics Concern    None   Social History Narrative    None     Social Determinants of Health     Financial Resource Strain: Low Risk  (10/17/2022)    Overall Financial Resource Strain (CARDIA)     Difficulty of Paying Living Expenses: Not hard at all   Food Insecurity: No Food Insecurity (2023)    Hunger Vital Sign     Worried About Running Out of Food in the Last Year: Never true     Ran Out of Food in the Last Year: Never true   Transportation Needs: No Transportation Needs (2023)    PRAPARE - Transportation     Lack of Transportation (Medical): No     Lack of Transportation (Non-Medical): No   Physical Activity: Inactive (3/8/2021)    Exercise Vital Sign     Days of Exercise per Week: 0 days     Minutes of Exercise per Session: 0 min   Stress: No Stress Concern Present (2023)    [employer] - Occupational Stress Questionnaire     Feeling of Stress : Not at all   Social Connections: Not on file   Intimate  Partner Violence: Not on file   Housing Stability: Low Risk  (5/19/2023)    Housing Stability Vital Sign     Unable to Pay for Housing in the Last Year: No     Number of Places Lived in the Last Year: 1     Unstable Housing in the Last Year: No      Medications and Allergies:     Current Outpatient Medications   Medication Sig Dispense Refill    aspirin (ECOTRIN LOW STRENGTH) 81 mg EC tablet Take 1 tablet (81 mg total) by mouth daily Please purchase over the counter at her local pharmacy 30 tablet 1    atorvastatin (LIPITOR) 80 mg tablet Take 1 tablet (80 mg total) by mouth daily with dinner 90 tablet 3    Blood Pressure KIT by Does not apply route daily 1 each 0    diphenhydramine, lidocaine, Al/Mg hydroxide, simethicone (Magic Mouthwash) SUSP Swish and spit 10 mL every 6 (six) hours as needed for mouth pain or discomfort 237 mL 0    lisinopril (ZESTRIL) 2.5 mg tablet take 1 tablet by mouth daily 90 tablet 3    metoprolol succinate (TOPROL-XL) 25 mg 24 hr tablet take 1 tablet by mouth daily 90 tablet 3    pantoprazole (PROTONIX) 40 mg tablet Take 1 tablet (40 mg total) by mouth daily 30 tablet 0    sucralfate (CARAFATE) 1 g tablet Take 1 tablet (1 g total) by mouth 4 (four) times a day 120 tablet 3    tamsulosin (FLOMAX) 0.4 mg Take 1 capsule (0.4 mg total) by mouth daily at bedtime 90 capsule 3    ferrous sulfate 324 (65 Fe) mg Take 1 tablet (324 mg total) by mouth in the morning (Patient not taking: Reported on 4/16/2024) 30 tablet 5     No current facility-administered medications for this visit.     Allergies   Allergen Reactions    Wound Dressing Adhesive Hives     Noted with Plastic adhesive bandage brand Curity      Immunizations:     Immunization History   Administered Date(s) Administered    COVID-19 MODERNA VACC 0.5 ML IM 03/30/2021, 04/24/2021      Health Maintenance:         Topic Date Due    Hepatitis C Screening  Completed    Colorectal Cancer Screening  Discontinued         Topic Date Due    COVID-19  Vaccine (3 - Moderna risk series) 05/22/2021      Medicare Screening Tests and Risk Assessments:     Minesh is here for his Subsequent Wellness visit. Last Medicare Wellness visit information reviewed, patient interviewed and updates made to the record today.      Health Risk Assessment:   Patient rates overall health as fair. Patient feels that their physical health rating is same. Patient is satisfied with their life. Eyesight was rated as same. Hearing was rated as same. Patient feels that their emotional and mental health rating is same. Patients states they are sometimes angry. Patient states they are sometimes unusually tired/fatigued. Pain experienced in the last 7 days has been some. Patient's pain rating has been 5/10. Patient states that he has experienced no weight loss or gain in last 6 months.     Depression Screening:   PHQ-9 Score: 2      Fall Risk Screening:   In the past year, patient has experienced: no history of falling in past year      Home Safety:  Patient does not have trouble with stairs inside or outside of their home. Patient has working smoke alarms and has working carbon monoxide detector. Home safety hazards include: none.     Nutrition:   Current diet is Regular.     Medications:   Patient is not currently taking any over-the-counter supplements. Patient is able to manage medications.     Activities of Daily Living (ADLs)/Instrumental Activities of Daily Living (IADLs):   Walk and transfer into and out of bed and chair?: Yes  Dress and groom yourself?: Yes    Bathe or shower yourself?: Yes    Feed yourself? Yes  Do your laundry/housekeeping?: Yes  Manage your money, pay your bills and track your expenses?: Yes  Make your own meals?: Yes    Do your own shopping?: Yes    Previous Hospitalizations:   Any hospitalizations or ED visits within the last 12 months?: No      Advance Care Planning:   Living will: No    Durable POA for healthcare: No    Advanced directive: No    Five wishes  given: No      Cognitive Screening:   Provider or family/friend/caregiver concerned regarding cognition?: No    PREVENTIVE SCREENINGS      Cardiovascular Screening:    General: Screening Current      Diabetes Screening:     General: Screening Current      Colorectal Cancer Screening:     General: History Colorectal Cancer      Prostate Cancer Screening:    General: Screening Not Indicated      Osteoporosis Screening:    General: Screening Not Indicated      Abdominal Aortic Aneurysm (AAA) Screening:    Risk factors include: tobacco use        General: Screening Not Indicated      Lung Cancer Screening:     General: Screening Not Indicated      Hepatitis C Screening:    General: Screening Current    Screening, Brief Intervention, and Referral to Treatment (SBIRT)    Screening  Typical number of drinks in a day: 0  Typical number of drinks in a week: 0  Interpretation: Low risk drinking behavior.    AUDIT-C Screenin) How often did you have a drink containing alcohol in the past year? never  2) How many drinks did you have on a typical day when you were drinking in the past year? 0  3) How often did you have 6 or more drinks on one occasion in the past year? never    AUDIT-C Score: 0  Interpretation: Score 0-3 (male): Negative screen for alcohol misuse    Single Item Drug Screening:  How often have you used an illegal drug (including marijuana) or a prescription medication for non-medical reasons in the past year? never    Single Item Drug Screen Score: 0  Interpretation: Negative screen for possible drug use disorder    Brief Intervention  Alcohol & drug use screenings were reviewed. No concerns regarding substance use disorder identified.     Other Counseling Topics:   Car/seat belt/driving safety, skin self-exam, sunscreen and regular weightbearing exercise.      Physical Exam:     /74 (BP Location: Left arm, Patient Position: Sitting, Cuff Size: Standard)   Pulse 69   Temp (!) 97.1 °F (36.2 °C)  "(Tympanic)   Resp 18   Ht 5' 9\" (1.753 m)   Wt 61.3 kg (135 lb 3.2 oz)   SpO2 97%   BMI 19.97 kg/m²     Physical Exam  Constitutional:       General: He is not in acute distress.     Appearance: He is not ill-appearing.      Comments: underweight   Cardiovascular:      Rate and Rhythm: Normal rate and regular rhythm.      Heart sounds: No murmur heard.  Pulmonary:      Effort: Pulmonary effort is normal. No respiratory distress.      Breath sounds: No wheezing.   Abdominal:      General: Bowel sounds are normal. There is no distension.      Tenderness: There is no abdominal tenderness.   Musculoskeletal:      Right lower leg: No edema.      Left lower leg: No edema.   Neurological:      Mental Status: He is alert.        Jhoan Morin,   "

## 2024-04-16 NOTE — PATIENT INSTRUCTIONS
Medicare Preventive Visit Patient Instructions  Thank you for completing your Welcome to Medicare Visit or Medicare Annual Wellness Visit today. Your next wellness visit will be due in one year (4/17/2025).  The screening/preventive services that you may require over the next 5-10 years are detailed below. Some tests may not apply to you based off risk factors and/or age. Screening tests ordered at today's visit but not completed yet may show as past due. Also, please note that scanned in results may not display below.  Preventive Screenings:  Service Recommendations Previous Testing/Comments   Colorectal Cancer Screening  Colonoscopy    Fecal Occult Blood Test (FOBT)/Fecal Immunochemical Test (FIT)  Fecal DNA/Cologuard Test  Flexible Sigmoidoscopy Age: 45-75 years old   Colonoscopy: every 10 years (May be performed more frequently if at higher risk)  OR  FOBT/FIT: every 1 year  OR  Cologuard: every 3 years  OR  Sigmoidoscopy: every 5 years  Screening may be recommended earlier than age 45 if at higher risk for colorectal cancer. Also, an individualized decision between you and your healthcare provider will decide whether screening between the ages of 76-85 would be appropriate. Colonoscopy: Not on file  FOBT/FIT: Not on file  Cologuard: Not on file  Sigmoidoscopy: 05/20/2023    History Colorectal Cancer     Prostate Cancer Screening Individualized decision between patient and health care provider in men between ages of 55-69   Medicare will cover every 12 months beginning on the day after your 50th birthday PSA: 1.65 ng/mL     Screening Not Indicated     Hepatitis C Screening Once for adults born between 1945 and 1965  More frequently in patients at high risk for Hepatitis C Hep C Antibody: 01/29/2021    Screening Current   Diabetes Screening 1-2 times per year if you're at risk for diabetes or have pre-diabetes Fasting glucose: 88 mg/dL (4/2/2024)  A1C: 6.1 % (11/25/2023)  Screening Current   Cholesterol Screening  Once every 5 years if you don't have a lipid disorder. May order more often based on risk factors. Lipid panel: 11/25/2023  Screening Current      Other Preventive Screenings Covered by Medicare:  Abdominal Aortic Aneurysm (AAA) Screening: covered once if your at risk. You're considered to be at risk if you have a family history of AAA or a male between the age of 65-75 who smoking at least 100 cigarettes in your lifetime.  Lung Cancer Screening: covers low dose CT scan once per year if you meet all of the following conditions: (1) Age 55-77; (2) No signs or symptoms of lung cancer; (3) Current smoker or have quit smoking within the last 15 years; (4) You have a tobacco smoking history of at least 20 pack years (packs per day x number of years you smoked); (5) You get a written order from a healthcare provider.  Glaucoma Screening: covered annually if you're considered high risk: (1) You have diabetes OR (2) Family history of glaucoma OR (3)  aged 50 and older OR (4)  American aged 65 and older  Osteoporosis Screening: covered every 2 years if you meet one of the following conditions: (1) Have a vertebral abnormality; (2) On glucocorticoid therapy for more than 3 months; (3) Have primary hyperparathyroidism; (4) On osteoporosis medications and need to assess response to drug therapy.  HIV Screening: covered annually if you're between the age of 15-65. Also covered annually if you are younger than 15 and older than 65 with risk factors for HIV infection. For pregnant patients, it is covered up to 3 times per pregnancy.    Immunizations:  Immunization Recommendations   Influenza Vaccine Annual influenza vaccination during flu season is recommended for all persons aged >= 6 months who do not have contraindications   Pneumococcal Vaccine   * Pneumococcal conjugate vaccine = PCV13 (Prevnar 13), PCV15 (Vaxneuvance), PCV20 (Prevnar 20)  * Pneumococcal polysaccharide vaccine = PPSV23 (Pneumovax)  Adults 19-63 yo with certain risk factors or if 65+ yo  If never received any pneumonia vaccine: recommend Prevnar 20 (PCV20)  Give PCV20 if previously received 1 dose of PCV13 or PPSV23   Hepatitis B Vaccine 3 dose series if at intermediate or high risk (ex: diabetes, end stage renal disease, liver disease)   Respiratory syncytial virus (RSV) Vaccine - COVERED BY MEDICARE PART D  * RSVPreF3 (Arexvy) CDC recommends that adults 60 years of age and older may receive a single dose of RSV vaccine using shared clinical decision-making (SCDM)   Tetanus (Td) Vaccine - COST NOT COVERED BY MEDICARE PART B Following completion of primary series, a booster dose should be given every 10 years to maintain immunity against tetanus. Td may also be given as tetanus wound prophylaxis.   Tdap Vaccine - COST NOT COVERED BY MEDICARE PART B Recommended at least once for all adults. For pregnant patients, recommended with each pregnancy.   Shingles Vaccine (Shingrix) - COST NOT COVERED BY MEDICARE PART B  2 shot series recommended in those 19 years and older who have or will have weakened immune systems or those 50 years and older     Health Maintenance Due:      Topic Date Due    Hepatitis C Screening  Completed    Colorectal Cancer Screening  Discontinued     Immunizations Due:      Topic Date Due    COVID-19 Vaccine (3 - Moderna risk series) 05/22/2021     Advance Directives   What are advance directives?  Advance directives are legal documents that state your wishes and plans for medical care. These plans are made ahead of time in case you lose your ability to make decisions for yourself. Advance directives can apply to any medical decision, such as the treatments you want, and if you want to donate organs.   What are the types of advance directives?  There are many types of advance directives, and each state has rules about how to use them. You may choose a combination of any of the following:  Living will:  This is a written record  of the treatment you want. You can also choose which treatments you do not want, which to limit, and which to stop at a certain time. This includes surgery, medicine, IV fluid, and tube feedings.   Durable power of  for healthcare (DPAHC):  This is a written record that states who you want to make healthcare choices for you when you are unable to make them for yourself. This person, called a proxy, is usually a family member or a friend. You may choose more than 1 proxy.  Do not resuscitate (DNR) order:  A DNR order is used in case your heart stops beating or you stop breathing. It is a request not to have certain forms of treatment, such as CPR. A DNR order may be included in other types of advance directives.  Medical directive:  This covers the care that you want if you are in a coma, near death, or unable to make decisions for yourself. You can list the treatments you want for each condition. Treatment may include pain medicine, surgery, blood transfusions, dialysis, IV or tube feedings, and a ventilator (breathing machine).  Values history:  This document has questions about your views, beliefs, and how you feel and think about life. This information can help others choose the care that you would choose.  Why are advance directives important?  An advance directive helps you control your care. Although spoken wishes may be used, it is better to have your wishes written down. Spoken wishes can be misunderstood, or not followed. Treatments may be given even if you do not want them. An advance directive may make it easier for your family to make difficult choices about your care.       © Copyright [employer] 2018 Information is for End User's use only and may not be sold, redistributed or otherwise used for commercial purposes. All illustrations and images included in CareNotes® are the copyrighted property of [employer] or [employer]

## 2024-04-29 ENCOUNTER — TELEPHONE (OUTPATIENT)
Dept: GASTROENTEROLOGY | Facility: HOSPITAL | Age: 79
End: 2024-04-29

## 2024-04-29 ENCOUNTER — APPOINTMENT (OUTPATIENT)
Dept: LAB | Facility: CLINIC | Age: 79
End: 2024-04-29
Payer: COMMERCIAL

## 2024-04-29 DIAGNOSIS — C20 RECTAL CANCER (HCC): ICD-10-CM

## 2024-04-29 DIAGNOSIS — D69.6 THROMBOCYTOPENIA (HCC): ICD-10-CM

## 2024-04-29 DIAGNOSIS — I25.5 ISCHEMIC CARDIOMYOPATHY: ICD-10-CM

## 2024-04-29 LAB
ALBUMIN SERPL BCP-MCNC: 3.6 G/DL (ref 3.5–5)
ALP SERPL-CCNC: 73 U/L (ref 34–104)
ALT SERPL W P-5'-P-CCNC: 13 U/L (ref 7–52)
ANION GAP SERPL CALCULATED.3IONS-SCNC: 6 MMOL/L (ref 4–13)
ANISOCYTOSIS BLD QL SMEAR: PRESENT
AST SERPL W P-5'-P-CCNC: 19 U/L (ref 13–39)
BASOPHILS # BLD AUTO: 0.02 THOUSANDS/ÂΜL (ref 0–0.1)
BASOPHILS NFR BLD AUTO: 1 % (ref 0–1)
BILIRUB SERPL-MCNC: 0.65 MG/DL (ref 0.2–1)
BUN SERPL-MCNC: 19 MG/DL (ref 5–25)
BURR CELLS BLD QL SMEAR: PRESENT
CALCIUM SERPL-MCNC: 8.6 MG/DL (ref 8.4–10.2)
CHLORIDE SERPL-SCNC: 109 MMOL/L (ref 96–108)
CO2 SERPL-SCNC: 24 MMOL/L (ref 21–32)
CREAT SERPL-MCNC: 0.96 MG/DL (ref 0.6–1.3)
EOSINOPHIL # BLD AUTO: 0.13 THOUSAND/ÂΜL (ref 0–0.61)
EOSINOPHIL NFR BLD AUTO: 3 % (ref 0–6)
ERYTHROCYTE [DISTWIDTH] IN BLOOD BY AUTOMATED COUNT: 16.8 % (ref 11.6–15.1)
GFR SERPL CREATININE-BSD FRML MDRD: 75 ML/MIN/1.73SQ M
GLUCOSE P FAST SERPL-MCNC: 96 MG/DL (ref 65–99)
HCT VFR BLD AUTO: 39.3 % (ref 36.5–49.3)
HGB BLD-MCNC: 12.4 G/DL (ref 12–17)
IMM GRANULOCYTES # BLD AUTO: 0.01 THOUSAND/UL (ref 0–0.2)
IMM GRANULOCYTES NFR BLD AUTO: 0 % (ref 0–2)
LYMPHOCYTES # BLD AUTO: 1.15 THOUSANDS/ÂΜL (ref 0.6–4.47)
LYMPHOCYTES NFR BLD AUTO: 27 % (ref 14–44)
MCH RBC QN AUTO: 28.9 PG (ref 26.8–34.3)
MCHC RBC AUTO-ENTMCNC: 31.6 G/DL (ref 31.4–37.4)
MCV RBC AUTO: 92 FL (ref 82–98)
MONOCYTES # BLD AUTO: 0.57 THOUSAND/ÂΜL (ref 0.17–1.22)
MONOCYTES NFR BLD AUTO: 13 % (ref 4–12)
NEUTROPHILS # BLD AUTO: 2.44 THOUSANDS/ÂΜL (ref 1.85–7.62)
NEUTS SEG NFR BLD AUTO: 56 % (ref 43–75)
NRBC BLD AUTO-RTO: 0 /100 WBCS
OVALOCYTES BLD QL SMEAR: PRESENT
PLATELET # BLD AUTO: 84 THOUSANDS/UL (ref 149–390)
PLATELET BLD QL SMEAR: ABNORMAL
PMV BLD AUTO: 12.7 FL (ref 8.9–12.7)
POIKILOCYTOSIS BLD QL SMEAR: PRESENT
POTASSIUM SERPL-SCNC: 4.6 MMOL/L (ref 3.5–5.3)
PROT SERPL-MCNC: 6.1 G/DL (ref 6.4–8.4)
RBC # BLD AUTO: 4.29 MILLION/UL (ref 3.88–5.62)
RBC MORPH BLD: PRESENT
SODIUM SERPL-SCNC: 139 MMOL/L (ref 135–147)
WBC # BLD AUTO: 4.32 THOUSAND/UL (ref 4.31–10.16)

## 2024-04-29 PROCEDURE — 36415 COLL VENOUS BLD VENIPUNCTURE: CPT

## 2024-04-29 PROCEDURE — 80053 COMPREHEN METABOLIC PANEL: CPT

## 2024-04-29 PROCEDURE — 85025 COMPLETE CBC W/AUTO DIFF WBC: CPT

## 2024-04-30 ENCOUNTER — ANESTHESIA (OUTPATIENT)
Dept: GASTROENTEROLOGY | Facility: HOSPITAL | Age: 79
End: 2024-04-30

## 2024-04-30 ENCOUNTER — HOSPITAL ENCOUNTER (OUTPATIENT)
Dept: GASTROENTEROLOGY | Facility: HOSPITAL | Age: 79
Setting detail: OUTPATIENT SURGERY
Discharge: HOME | End: 2024-04-30
Attending: COLON & RECTAL SURGERY
Payer: COMMERCIAL

## 2024-04-30 ENCOUNTER — ANESTHESIA EVENT (OUTPATIENT)
Dept: GASTROENTEROLOGY | Facility: HOSPITAL | Age: 79
End: 2024-04-30

## 2024-04-30 VITALS
HEART RATE: 65 BPM | SYSTOLIC BLOOD PRESSURE: 129 MMHG | TEMPERATURE: 97.3 F | HEIGHT: 70 IN | RESPIRATION RATE: 18 BRPM | BODY MASS INDEX: 19.33 KG/M2 | WEIGHT: 135 LBS | DIASTOLIC BLOOD PRESSURE: 70 MMHG | OXYGEN SATURATION: 98 %

## 2024-04-30 DIAGNOSIS — C20 RECTAL CANCER (HCC): ICD-10-CM

## 2024-04-30 PROCEDURE — 45380 COLONOSCOPY AND BIOPSY: CPT | Performed by: COLON & RECTAL SURGERY

## 2024-04-30 PROCEDURE — 88305 TISSUE EXAM BY PATHOLOGIST: CPT | Performed by: STUDENT IN AN ORGANIZED HEALTH CARE EDUCATION/TRAINING PROGRAM

## 2024-04-30 PROCEDURE — 45385 COLONOSCOPY W/LESION REMOVAL: CPT | Performed by: COLON & RECTAL SURGERY

## 2024-04-30 RX ORDER — PHENYLEPHRINE HCL IN 0.9% NACL 1 MG/10 ML
SYRINGE (ML) INTRAVENOUS AS NEEDED
Status: DISCONTINUED | OUTPATIENT
Start: 2024-04-30 | End: 2024-04-30

## 2024-04-30 RX ORDER — PROPOFOL 10 MG/ML
INJECTION, EMULSION INTRAVENOUS AS NEEDED
Status: DISCONTINUED | OUTPATIENT
Start: 2024-04-30 | End: 2024-04-30

## 2024-04-30 RX ORDER — SODIUM CHLORIDE 9 MG/ML
INJECTION, SOLUTION INTRAVENOUS CONTINUOUS PRN
Status: DISCONTINUED | OUTPATIENT
Start: 2024-04-30 | End: 2024-04-30

## 2024-04-30 RX ORDER — LIDOCAINE HYDROCHLORIDE 10 MG/ML
INJECTION, SOLUTION EPIDURAL; INFILTRATION; INTRACAUDAL; PERINEURAL AS NEEDED
Status: DISCONTINUED | OUTPATIENT
Start: 2024-04-30 | End: 2024-04-30

## 2024-04-30 RX ADMIN — Medication 200 MCG: at 09:37

## 2024-04-30 RX ADMIN — PROPOFOL 50 MG: 10 INJECTION, EMULSION INTRAVENOUS at 09:24

## 2024-04-30 RX ADMIN — LIDOCAINE HYDROCHLORIDE 50 MG: 10 INJECTION, SOLUTION EPIDURAL; INFILTRATION; INTRACAUDAL; PERINEURAL at 09:19

## 2024-04-30 RX ADMIN — PROPOFOL 50 MG: 10 INJECTION, EMULSION INTRAVENOUS at 09:30

## 2024-04-30 RX ADMIN — SODIUM CHLORIDE: 0.9 INJECTION, SOLUTION INTRAVENOUS at 09:19

## 2024-04-30 RX ADMIN — PROPOFOL 50 MG: 10 INJECTION, EMULSION INTRAVENOUS at 09:27

## 2024-04-30 RX ADMIN — PROPOFOL 50 MG: 10 INJECTION, EMULSION INTRAVENOUS at 09:34

## 2024-04-30 RX ADMIN — PROPOFOL 80 MG: 10 INJECTION, EMULSION INTRAVENOUS at 09:19

## 2024-04-30 RX ADMIN — PROPOFOL 40 MG: 10 INJECTION, EMULSION INTRAVENOUS at 09:22

## 2024-04-30 RX ADMIN — PROPOFOL 30 MG: 10 INJECTION, EMULSION INTRAVENOUS at 09:20

## 2024-04-30 NOTE — H&P
History and Physical   Colon and Rectal Surgery   Minesh Pandya Jr. 79 y.o. male MRN: [id number]  Unit/Bed#:  Encounter: [id number]  04/30/24   @NOW    No chief complaint on file.        History of Present Illness   HPI:  Minesh Pandya Jr. is a 79 y.o. male who presents for follow-up of rectal cancer.      Historical Information   Past Medical History:   Diagnosis Date    Cardiomyopathy, unspecified type ([facility]) 01/17/2024    Hyperlipidemia     Ischemic cardiomyopathy     Lacunar infarction ([facility])     Near syncope 11/14/2019    NSTEMI (non-ST elevated myocardial infarction) ([facility]) 10/20/2019    Pericarditis 10/24/2019    Poison ivy     Rectal cancer ([facility])     Recurrent major depressive disorder, in full remission ([facility]) 08/07/2023    STEMI involving left anterior descending coronary artery ([facility]) 05/19/2023    Stroke ([facility])      Past Surgical History:   Procedure Laterality Date    CARDIAC CATHETERIZATION N/A 05/19/2023    Procedure: Cardiac pci;  Surgeon: Ted Núñez MD;  Location: [facility];  Service: Cardiology    CORONARY ANGIOPLASTY WITH STENT PLACEMENT  05/19/2023    BMS pLAD    CORONARY ANGIOPLASTY WITH STENT PLACEMENT  10/21/2019    LUDIVINA x 2- dLAD and oD1, balloon only- mLAD    HERNIA REPAIR Left 04/28/2021    Procedure: OPEN REPAIR HERNIA INGUINAL LEFT WITH MESH;  Surgeon: Carlos Nguyen MD;  Location: [facility];  Service: General    IR PORT PLACEMENT  08/31/2023       Meds/Allergies     (Not in a hospital admission)        Current Outpatient Medications:     aspirin (ECOTRIN LOW STRENGTH) 81 mg EC tablet, Take 1 tablet (81 mg total) by mouth daily Please purchase over the counter at her local pharmacy, Disp: 30 tablet, Rfl: 1    atorvastatin (LIPITOR) 80 mg tablet, Take 1 tablet (80 mg total) by mouth daily with dinner, Disp: 90 tablet, Rfl: 3    Blood Pressure KIT, by Does not apply route daily, Disp: 1 each, Rfl: 0    diphenhydramine, lidocaine, Al/Mg hydroxide, simethicone (Magic Mouthwash)  SUSP, Swish and spit 10 mL every 6 (six) hours as needed for mouth pain or discomfort, Disp: 237 mL, Rfl: 0    lisinopril (ZESTRIL) 2.5 mg tablet, take 1 tablet by mouth daily, Disp: 90 tablet, Rfl: 3    metoprolol succinate (TOPROL-XL) 25 mg 24 hr tablet, take 1 tablet by mouth daily, Disp: 90 tablet, Rfl: 3    pantoprazole (PROTONIX) 40 mg tablet, Take 1 tablet (40 mg total) by mouth daily, Disp: 30 tablet, Rfl: 0    sucralfate (CARAFATE) 1 g tablet, Take 1 tablet (1 g total) by mouth 4 (four) times a day, Disp: 120 tablet, Rfl: 3    tamsulosin (FLOMAX) 0.4 mg, Take 1 capsule (0.4 mg total) by mouth daily at bedtime, Disp: 90 capsule, Rfl: 3    Allergies   Allergen Reactions    Wound Dressing Adhesive Hives     Noted with Plastic adhesive bandage brand Curity         Social History   Social History     Substance and Sexual Activity   Alcohol Use Never     Social History     Substance and Sexual Activity   Drug Use No     Social History     Tobacco Use   Smoking Status Former    Current packs/day: 0.00    Average packs/day: 1 pack/day for 15.0 years (15.0 ttl pk-yrs)    Types: Cigarettes    Start date: 1954    Quit date: 1969    Years since quittin.4   Smokeless Tobacco Never         Family History:   Family History   Problem Relation Age of Onset    Breast cancer Mother     Emphysema Father     Heart disease Maternal Grandfather     Heart attack Maternal Grandfather     Emphysema Paternal Grandfather          Objective     Current Vitals:      No intake or output data in the 24 hours ending 24 0808    Physical Exam:  General:  Resting comfortably in bed   Eyes:Sclera anicteric  ENT: Trachea midline  Pulm:  Symmetric chest raise.  No respiratory Distress  CV:  Regular on monitor  Abdomen:  Soft NT ND  Extremities:  No clubbing/ cyanosis/ edema    Lab Results: I have personally reviewed pertinent lab results.    Imaging: I have personally reviewed pertinent reports.        ASSESSMENT:  Minesh  Ya Langston is a 79 y.o. male who presents for outpatient colonoscopy.      PLAN:  For colonoscopy    Risks/ Benefits reviewed to include but not limited to anesthesia, bleeding, missed lesions, and colonoscopic perforation requiring surgery.

## 2024-04-30 NOTE — ANESTHESIA PREPROCEDURE EVALUATION
Procedure:  COLONOSCOPY    Relevant Problems   CARDIO   (+) CAD in native artery   (+) Essential hypertension   (+) STEMI involving left anterior descending coronary artery (HCC) (Resolved)      GI/HEPATIC   (+) Rectal cancer (HCC)      /RENAL   (+) Renal cyst      HEMATOLOGY   (+) Thrombocytopenia (HCC)      Neurology/Sleep   (+) History of lacunar cerebrovascular accident (CVA)      Surgery/Wound/Pain   (+) S/P primary angioplasty with coronary stent      Cardiovascular/Peripheral Vascular   (+) Cardiomyopathy, unspecified type (HCC) (Resolved)   (+) Ischemic cardiomyopathy      Other   (+) History of ischemic cardiomyopathy   (+) Moderate protein-calorie malnutrition (HCC)   (+) Port-A-Cath in place   (+) Protein-calorie malnutrition, unspecified severity (HCC)      Stress Test 4/3/24:    Stress ECG: The ECG was not diagnostic due to pharmacological (vasodilator) stress.  •  Perfusion: There is a left ventricular perfusion defect that is medium in size present in the apical anterior location(s) that is fixed.  •  Perfusion Defect Conclusion: The stress/rest perfusion ratio is 1.02.  •  Stress Function: Left ventricular function post-stress is normal. Stress ejection fraction is 50%. There is a defect in the apical anterior location(s). The defect has moderately reduced function.     Abnormal study after pharmacologic vasodilation.    There was a medium size fixed defect of the anteroapical wall suggestive of infarct.    Left ventricular function was low normal with hypokinesis of the apical anterior wall noted.     History    CAD, STEMI, PCI (5/19/23- BMS pLAD, 10/21/19- LUDIVINA dLAD, POBA- mLAD & D1), ICM, HTN, CVA, chemotherapy (rectal cancer)     Physical Exam    Airway    Mallampati score: I  TM Distance: <3 FB  Neck ROM: full     Dental        Cardiovascular      Pulmonary      Other Findings      Anesthesia Plan  ASA Score- 3     Anesthesia Type- IV sedation with anesthesia with ASA Monitors.          Additional Monitors:     Airway Plan:            Plan Factors-Exercise tolerance (METS): >4 METS.    Chart reviewed. EKG reviewed. Imaging results reviewed.  Patient summary reviewed.    Patient is not a current smoker.  Patient did not smoke on day of surgery.            Induction- intravenous.    Postoperative Plan-     Informed Consent- Anesthetic plan and risks discussed with patient (Paper consent obtained.).  I personally reviewed this patient with the CRNA. Discussed and agreed on the Anesthesia Plan with the CRNA..

## 2024-04-30 NOTE — ANESTHESIA POSTPROCEDURE EVALUATION
Post-Op Assessment Note    CV Status:  Stable  Pain Score: 0    Pain management: adequate       Mental Status:  Sleepy   Hydration Status:  Stable   PONV Controlled:  None   Airway Patency:  Patent     Post Op Vitals Reviewed: Yes    No anethesia notable event occurred.    Staff: CRNA               BP   109/62   Temp 97.2F   Pulse 68   Resp 16   SpO2 97% RA

## 2024-05-01 PROCEDURE — 88305 TISSUE EXAM BY PATHOLOGIST: CPT | Performed by: STUDENT IN AN ORGANIZED HEALTH CARE EDUCATION/TRAINING PROGRAM

## 2024-05-02 ENCOUNTER — OFFICE VISIT (OUTPATIENT)
Dept: HEMATOLOGY ONCOLOGY | Facility: CLINIC | Age: 79
End: 2024-05-02
Payer: COMMERCIAL

## 2024-05-02 VITALS
HEART RATE: 65 BPM | DIASTOLIC BLOOD PRESSURE: 72 MMHG | TEMPERATURE: 97.6 F | HEIGHT: 70 IN | WEIGHT: 136.5 LBS | SYSTOLIC BLOOD PRESSURE: 116 MMHG | OXYGEN SATURATION: 99 % | RESPIRATION RATE: 16 BRPM | BODY MASS INDEX: 19.54 KG/M2

## 2024-05-02 DIAGNOSIS — C20 RECTAL CANCER (HCC): Primary | ICD-10-CM

## 2024-05-02 PROCEDURE — 3078F DIAST BP <80 MM HG: CPT | Performed by: INTERNAL MEDICINE

## 2024-05-02 PROCEDURE — 3074F SYST BP LT 130 MM HG: CPT | Performed by: INTERNAL MEDICINE

## 2024-05-02 PROCEDURE — 99214 OFFICE O/P EST MOD 30 MIN: CPT | Performed by: INTERNAL MEDICINE

## 2024-05-02 PROCEDURE — 1160F RVW MEDS BY RX/DR IN RCRD: CPT | Performed by: INTERNAL MEDICINE

## 2024-05-02 PROCEDURE — G2211 COMPLEX E/M VISIT ADD ON: HCPCS | Performed by: INTERNAL MEDICINE

## 2024-05-02 PROCEDURE — 1159F MED LIST DOCD IN RCRD: CPT | Performed by: INTERNAL MEDICINE

## 2024-05-02 NOTE — PROGRESS NOTES
Hematology/Oncology Outpatient Follow- up Note  Minesh Pandya Jr. 79 y.o. male MRN: @ Encounter: [id number]        Date:  5/2/2024        Assessment / Plan:    1 stage III versus stage IV rectal cancer  2 lung nodules that are stable  3 liver nodule stable not described presently.  4 coronary artery disease no angina cleared by cardiology for surgery.  5 thrombocytopenia chronic platelets running between 80 and 120,000  Plan: Patient is going to be seen by make a final decision regarding surgery.  CEA levels normal CBC and CMP are stable he did platelets can be on standby if he is needed.  No other major suggestions at this time.  He will follow-up here in 2 to 3 months.        HPI: 79-year-old gentleman here for follow-up.  He underwent recent colonoscopy that showed benign path.  However he does still remains with a significant mass in his rectal area.  He is able move his bowels he is not having nausea or vomiting is no major abdominal pain has stabilized his weight and is feeling reasonably well.  Has been seen by cardiology who felt he could be cleared for surgery.  His lungs are stable.  His liver also was stable.  No new lesions noted CEA level in normal range.      Interval History: Note from 4/11/2024:  Assessment / Plan:    1 stage III versus stage IV rectal cancer  2 lung nodules which have been stable  3 liver nodule stable  4 coronary artery disease no angina cleared by cardiology for surgery  Plan: Patient will be considered for surgery.  He is awaiting colonoscopy at the end of month.  Will see him once more at the beginning of 5/2024 after colonoscopy.  At that point final decision remain regarding whether he should go ahead with surgery or not.  CEA level is normal CBC and CMP are stable.  We will also review his scans with radiology.  I particularly concerned about his lungs as a mention but injury and I want to see how they look.  HPI: 78-year-old gentleman with history of stage III versus stage  IV rectal cancer.  His x-ray shows stability in his lung in terms of nodules and 1 nodule in the liver.  He is being considered for intervention surgically.  He apparently will have colonoscopy at the end of April by colorectal surgery to determine how he looks and winces possible ability to resect.  He was seen by cardiology who felt he could undergo the procedure and procedure and have aspirin stopped 5 days prior.  He is able to move his bowels though occasionally constipated.  He is taking iron pills which I told him to stop.  His hemoglobin is overall at 12-1/2.  He is otherwise doing fairly well.  He is not vomiting no other abdominal pain feeling reasonably well.  Interval History: Note from 3/12/2024:  Assessment / Plan:    1 stage III versus stage IV rectal carcinoma  2 lung nodules which were remained stable  3 coronary artery disease no angina to see cardiology for clearance is  4 to see colorectal surgery regarding possibility of intussusception with surgery  Plan: Patient will have appointments cardiology and colorectal surgery.  He will need to get another CT scan chest abdomen pelvis/CBC/CMP/CEA level.  We like to get these done and we will call him to do so.  We will have him come back in 4 weeks for final decisions.  Prognosis quite guarded.   HPI: 78-year-old gentleman here for follow-up.  He is off chemotherapy.  He has occasional headache otherwise stable.  He is moving his bowels.  Minimal abdominal discomfort.  He is not complain of chest pain or shortness of breath.  He will need to see cardiology.  He denies of denies any other major problems.  Unfortunately he is still left with rectal cancer.  He had an abnormal CAT scan of his chest but the lung nodule as there is unchanged.  CEA level done 5/19/2023 was normal.  It was 1.7.  This case was presented at tumor board.  The consensus was to be reevaluated for rectal surgery as well as to have a cardiology consult to see if he could tolerate  such surgery.  He is not having chest pain he is not short of breath feels fatigued tired he is thin but he is not losing any more weight and he is able to eat.  Interval History: Note from 2/15/2024:  1 rectal carcinoma T3 N1 question of M1 with lung nodules  2 status post 8 cycles of FOLFOX chemotherapy and radiation therapy x 5 doses to the rectum.  Plan: Patient should be represented at this point.  Scans show stable nodules since May.  2023.  The rectal area looks better although he still has the nodes there.  Original surgery was not done due to MRI which she is now greater than 6 months since.  His blood counts are fairly stable.  CEA is not helpful as it was 1.7 at the time of his presentation.  We will speak with radiation therapy.  Will get back with him.  I would recommend holding his treatment at least 1 time to get opinions from the tumor board.  HPI: 78-year-old gentleman with the above problem.  His biggest complaint is fatigue shortness of breath and weakness.  He is doing well otherwise moving bowels no bleeding.  Blood counts are fairly stable.  He is anxious to stop treatment.  He would like to be considered for surgery interventions.  Otherwise he is doing reasonably well.Interval History: Note from 1/17/2024:   78-year-old gentleman with rectal carcinoma.  He also has CAT scan and what appears to be increasing pulmonary nodules probably consistent with metastatic disease.  Last CAT scan showed them to be stable.  He is able to eat he is moving his bowels regularly no blood in urine or stools.  He is not short of breath at rest does get dyspneic with exertion.  He initially presented with blood in his bowel movements for a week and had a CAT scan of the pelvis showing rectal mass with mesorectal lymph nodes 2 mm left lower lobe pulmonary nodule 3 mm hepatic dome nodule.  The nodules in the right and the left lobes were new compared to 2019.  His MMR was intact on the biopsy showing moderately  differentiated carcinoma distal rectal area.  An MRI of the pelvis showed T3b N1 low rectal cancer.  He was seen radiation therapy for 1 week 9/25 and 9/29.  He has had some thrombocytopenia and leukopenia this is improved.  He will begin using his seventh cycle of chemotherapy next week.  He has had a reduction in oxaliplatin as well as continuous infusion for 3 days 5-fluorouracil hopefully after that he will continue to tolerate better these treatments.      Cancer Staging:  Cancer Staging   Rectal cancer (HCC)  Staging form: Colon and Rectum, AJCC 8th Edition  - Clinical stage from 5/18/2023: Stage IIIB (cT3, cN1, cM0) - Signed by Arnaldo Mar DO on 6/28/2023  Microsatellite instability (MSI): Stable      Molecular Testing:     Previous Hematologic/ Oncologic History:    Oncology History   Rectal cancer (HCC)   5/18/2023 Initial Diagnosis    May 18, 2023 patient presented with BRBPR x1 week.  Weight stable over 8 months.  CT abdomen pelvis showed rectal mass with 2 suspicious mesorectal lymph nodes.  2 mm left lower lobe pulmonary nodule, 3 mm hepatic dome questionable lesion, 1 cm uncinate process cystic lesion.  CT chest showed 3 mm left lower lobe and right upper lobe nodules new compared to 2019.  May 20 flexible sigmoidoscopy showed distal rectal mass.  Biopsy of the mass showed moderately differentiated adenocarcinoma.  MMR intact.  June 14, 2023 MRI of the pelvis showed findings consistent with T3b, N1 low rectal cancer.  Suspicion for vascular invasion.  CBC on presentation normal WBC platelets.  Hemoglobin 11.4, MCV 77.  CMP showed albumin 3.3, otherwise normal.        Cancer Staged    Cancer Staging   No matching staging information was found for the patient.       5/18/2023 -  Cancer Staged    Staging form: Colon and Rectum, AJCC 8th Edition  - Clinical stage from 5/18/2023: Stage IIIB (cT3, cN1, cM0) - Signed by Arnaldo Mar DO on 6/28/2023  Microsatellite instability (MSI): Stable        5/20/2023 Biopsy    A. Rectum, mass, biopsy:  -Adenocarcinoma, invasive, moderately differentiated.  -Ancillary testing for mismatch repair (MMR) protein deficiency by immunohistochemical panel (MLH1, PMS2, MSH2, MSH6) is undertaken, the results will be issued in an addendum.    RESULTS OF IMMUNOHISTOCHEMICAL ANALYSIS FOR MISMATCH REPAIR PROTEIN LOSS  INTERPRETATION: NO LOSS OF NUCLEAR EXPRESSION OF MMR PROTEINS: LOW PROBABILITY OF MSI-H        RESULTS:  Antibody            Clone                 Description                     Results  MLH1                 M1                     Mismatch repair protein  Intact nuclear expression  MSH2                [id number]        Mismatch repair protein  Intact nuclear expression  MSH6                44                      Mismatch repair protein  Intact nuclear expression  PMS2                 [id number]           Mismatch repair protein  Intact nuclear expression        9/20/2023 - 2/14/2024 Chemotherapy    alteplase (CATHFLO), 2 mg, Intracatheter, Every 1 Minute as needed, 9 of 12 cycles  pegfilgrastim (NEULASTA), 6 mg, Subcutaneous, Once, 1 of 1 cycle  Administration: 6 mg (10/20/2023)  pegfilgrastim (NEULASTA ONPRO), 6 mg, Subcutaneous, Once, 1 of 1 cycle  Administration: 6 mg (1/11/2024)  fluorouracil (ADRUCIL), 320 mg/m2 = 565 mg (80 % of original dose 400 mg/m2), Intravenous, Once, 2 of 2 cycles  Dose modification: 320 mg/m2 (original dose 400 mg/m2, Cycle 1)  Administration: 565 mg (9/20/2023), 565 mg (10/17/2023)  leucovorin calcium IVPB, 708 mg, Intravenous, Once, 9 of 12 cycles  Administration: 700 mg (9/20/2023), 700 mg (10/17/2023), 700 mg (10/31/2023), 700 mg (11/14/2023), 700 mg (11/28/2023), 688 mg (1/9/2024), 688 mg (1/29/2024), 688 mg (2/12/2024)  oxaliplatin (ELOXATIN) chemo infusion, 75 mg/m2 = 132.75 mg (88.2 % of original dose 85 mg/m2), Intravenous, Once, 9 of 12 cycles  Dose modification: 75 mg/m2 (original dose 85 mg/m2, Cycle 1, Reason: Anticipated  Tolerance), 65.025 mg/m2 (76.5 % of original dose 85 mg/m2, Cycle 6, Reason: Other (see comments), Comment: Neutropenia), 65 mg/m2 (original dose 85 mg/m2, Cycle 6, Reason: Dose modified as per discussion with consulting physician, Comment: neutropenia - changing to flat 65 mg/m2)  Administration: 132.75 mg (9/20/2023), 132.75 mg (10/17/2023), 133 mg (10/31/2023), 132.75 mg (11/14/2023), 132.75 mg (11/28/2023), 111.8 mg (1/9/2024), 111.8 mg (1/29/2024), 111.8 mg (2/12/2024)  fluorouracil (ADRUCIL) ambulatory infusion Soln, 1,200 mg/m2/day = 4,250 mg, Intravenous, Over 46 hours, 9 of 12 cycles  Dose modification: 960 mg/m2/day (80 % of original dose 1,200 mg/m2/day, Cycle 6, Reason: Treatment Parameters Not Met)     9/25/2023 - 9/29/2023 Radiation    Treatments:  Course: C1  Plan ID Energy Fractions Dose per Fraction (cGy) Dose Correction (cGy) Total Dose Delivered (cGy) Elapsed Days   Pelvis 10X/6X 5 / 5 500 0 2,500 4    Treatment Dates:  9/25/2023 - 9/29/2023.          Current Hematologic/ Oncologic Treatment:       Cycle 1   Imaging:   Show images for CT chest abdomen pelvis w contrast CT of 4/2/2024:  Study Result    Narrative & Impression   CT CHEST, ABDOMEN AND PELVIS WITH IV CONTRAST     INDICATION: C20: Malignant neoplasm of rectum  I25.10: Atherosclerotic heart disease of native coronary artery without angina pectoris  I42.9: Cardiomyopathy, unspecified  I10: Essential (primary) hypertension  I25.5: Ischemic cardiomyopathy.     COMPARISON: 12/21/2023; 12/6/2023; MRI from 9/26/2023; 8/29/2023; 5/1/2023; 5/23/2023     TECHNIQUE: CT examination of the chest, abdomen and pelvis was performed. Multiplanar 2D reformatted images were created from the source data.     This examination, like all CT scans performed in the [facility], was performed utilizing techniques to minimize radiation dose exposure, including the use of iterative reconstruction and automated exposure control. Radiation dose  length   product (DLP) for this visit: 690 mGy-cm     IV Contrast: 100 mL of iohexol (OMNIPAQUE)  Enteric Contrast: Not administered.     FINDINGS:     CHEST     LUNGS: Some vague groundglass opacity is noted in the right upper lobe not present on the study of December. The vague tree-in-bud opacity may also be evident.     Minimal bronchiectasis with nodularity is noted in the right middle lobe, image 206 measuring 5 mm similar to the study of December with also some tree-in-bud opacity at this location. Some minimal bronchiolectasis is also present.     Some tree-in-bud opacities seen posteriorly in the right upper lobe on image 161 also stable.  Some minimal atelectatic changes are seen more inferiorly in the right lower lobe.  Left lower lobe 5 mm nodule, image 156, stable.  Right upper lobe 2 mm nodule, image 57, stable.  3 mm pleural-based nodule, image 100, series 3, stable.        Mild bronchiectasis of the lower lobe bronchi are demonstrated without focal bronchial lesions seen elsewhere.     PLEURA: Unremarkable.     HEART/GREAT VESSELS: Coronary calcification.. The ascending aorta is ectatic measuring 4 cm at the right pulmonary artery level. Previously 3.9 cm..     MEDIASTINUM AND NANCY: 9 mm right hilar node is unchanged. Small hiatus hernia is noted.     CHEST WALL AND LOWER NECK: Small periumbilical hernia of fat is identified.     ABDOMEN     LIVER/BILIARY TREE: Mildly lobular contour. No focal abnormality. A segment 7 lesion was noted on the MRI from September 2023. This is not as well seen although some heterogeneous enhancement measuring proximally 6 mm on image 99 is evident in the area.   This is smaller than an area noted in August 2023.     GALLBLADDER: Gallstones. Mild thickening of the gallbladder wall diffusely with slight enhancement.     SPLEEN: Mild splenomegaly measuring 14.3 cm.     PANCREAS: 1.1 cm cyst in the uncinate process. This was noted on recent MRI and prior CTs.     ADRENAL  GLANDS: Unremarkable.     KIDNEYS/URETERS: No hydronephrosis. Nonobstructing calculus right kidney. Large low-density renal cysts are noted as well as a few circumscribed punctate hypodensities which are too small to characterize but most likely represent cysts..     STOMACH AND BOWEL: The stomach is not well distended. No small bowel dilatation.     Mild fecal stasis.. There is concentric thickening of the distal rectal region with slight infiltration of the surrounding mesorectal fat. The degree of thickening at the level of the prostate is similar to the study of December although less wall   enhancement although some of this may be related to phase of injection.     APPENDIX: No findings to suggest appendicitis.     ABDOMINOPELVIC CAVITY: No ascites. No pneumoperitoneum. No bulky lymphadenopathy.     Small portacaval and periaortic nodes are seen.  6 mm mesorectal node, image 244 similar to study of December although improved since August.  Mild diffuse thickening of the mesorectal fascia is unchanged.     6 mm aortocaval node, image 161, unchanged from December but improved since August.     VESSELS: Atherosclerosis without abdominal aortic aneurysm. Tiny splenic artery aneurysm near the splenic hilum coronal image 91. Angulation of the celiac axis is probably related to median arcuate ligament. The celiac artery is slightly ectatic just   distal to this area.     PELVIS     REPRODUCTIVE ORGANS: Unremarkable for patient's age.     URINARY BLADDER: The bladder is not well distended. Some wall thickening may be evident. Some haziness of the perivesicular fat is noted.     ABDOMINAL WALL/INGUINAL REGIONS left inguinal scarring is noted. Fluid collection is noted adjacent to the left inguinal canal. This measures 3.1 x 2 cm.     BONES: No acute fracture or suspicious osseous lesion.     IMPRESSION:     Some areas of tree-in-bud opacity in nodular consolidation with bronchiectasis is similar to the prior study..  Some groundglass opacity and subtle tree-in-bud opacity as well as lobular interstitial prominence is new in the right upper lobe. This could   be inflammatory. 3-month follow-up chest CT is advised.     Some other small nodules are unchanged to slightly improved.     Right hilar node is stable.     No liver lesion in the abdomen.     No new abdominal adenopathy. Small liver lesion is more difficult to identify.     Diffuse rectal thickening is again demonstrated similar to December.     Small mesorectal node is unchanged.     Fluid collection near the left inguinal ring appears unchanged.     Cystic lesion in the uncinate process is unchanged.     Splenomegaly is similar to since the prior study.          Test Results:    Imaging: Colonoscopy    Result Date: 4/30/2024  Narrative: Table formatting from the original result was not included. [facility] [address] [facility] [address] [phone] DATE OF SERVICE: 4/30/24 PHYSICIAN(S): Attending: Guille Hall MD Fellow: No Staff Documented INDICATION: Rectal cancer (HCC) POST-OP DIAGNOSIS: See the impression below. HISTORY: Prior colonoscopy: No prior colonoscopy. BOWEL PREPARATION: Miralax/Dulcolax PREPROCEDURE: Informed consent was obtained for the procedure, including sedation. Risks including but not limited to bleeding, infection, perforation, adverse drug reaction and aspiration were explained in detail. Also explained about less than 100% sensitivity with the exam and other alternatives. The patient was placed in the left lateral decubitus position. Procedure: Colonoscopy DETAILS OF PROCEDURE: Patient was taken to the procedure room where a time out was performed to confirm correct patient and correct procedure. The patient underwent monitored anesthesia care, which was administered by an anesthesia professional. The patient's blood pressure, heart rate, level of consciousness, oxygen, respirations, ECG and ETCO2 were  monitored throughout the procedure. A digital rectal exam was performed. The scope was introduced through the anus and advanced to the cecum. Retroflexion was performed in the rectum. Bowel prep was adequate. The patient experienced no blood loss. The procedure was not difficult. The patient tolerated the procedure well. There were no apparent adverse events. ANESTHESIA INFORMATION: ASA: III Anesthesia Type: IV Sedation with Anesthesia MEDICATIONS: No administrations occurring from 0916 to 0949 on 04/30/24 FINDINGS: Normal Mass (traversable) in the distal rectum observed during digital rectal exam, covering one quarter of the circumference; bleeding occurred after intervention; performed cold forceps biopsy with partial removal. Some small volume disease still noted in the rectum.  This is palpable on digital rectal exam just above the anorectal ring.  Easily traversable.  Biopsies taken from the polypoid/malignancy Polyp measuring 5-9 mm in the hepatic flexure; performed cold snare with complete en bloc removal and retrieved specimen; placed 1 clip successfully; hemostasis achieved EVENTS: Procedure Events Event Event Time ENDO CECUM REACHED 4/30/2024  9:26 AM ENDO SCOPE OUT TIME 4/30/2024  9:40 AM SPECIMENS: ID Type Source Tests Collected by Time Destination 1 : hepatic flexure polyp - cold snare Tissue Polyp, Colorectal TISSUE EXAM Guille Hall MD 4/30/2024 0929  2 : rectal mass bx Tissue Rectum TISSUE EXAM Guille Hall MD 4/30/2024 0940  EQUIPMENT: Colonoscope -[id number]     Impression: Normal. Mass in the distal rectum, covering one quarter of the circumference; bleeding occurred after intervention; performed cold forceps biopsy with partial removal Subcentimeter polyp in the hepatic flexure was removed with cold snare; placed 1 clip successfully; hemostasis achieved RECOMMENDATION:  Repeat colonoscopy in 1 year  Personal history of colon cancer   Follow-up pathology in 1 to 2 weeks. For  "possible low anterior resection with end colostomy  Guille Hall MD     NM myocardial perfusion spect (rx stress and/or rest)    Result Date: 4/3/2024  Narrative:   Stress ECG: The ECG was not diagnostic due to pharmacological (vasodilator) stress.   Perfusion: There is a left ventricular perfusion defect that is medium in size present in the apical anterior location(s) that is fixed.   Perfusion Defect Conclusion: The stress/rest perfusion ratio is 1.02.   Stress Function: Left ventricular function post-stress is normal. Stress ejection fraction is 50%. There is a defect in the apical anterior location(s). The defect has moderately reduced function. Abnormal study after pharmacologic vasodilation.  There was a medium size fixed defect of the anteroapical wall suggestive of infarct.  Left ventricular function was low normal with hypokinesis of the apical anterior wall noted.     Stress strip    Result Date: 4/3/2024  Narrative: Confirmed by LESLIE SORENSON ([id number]),  Katlyn Gupta ([id number]) on 4/3/2024 2:16:08 PM            Labs:   Lab Results   Component Value Date    WBC 4.32 04/29/2024    HGB 12.4 04/29/2024    HCT 39.3 04/29/2024    MCV 92 04/29/2024    PLT 84 (L) 04/29/2024     Lab Results   Component Value Date    K 4.6 04/29/2024     (H) 04/29/2024    CO2 24 04/29/2024    BUN 19 04/29/2024    CREATININE 0.96 04/29/2024    GLUF 96 04/29/2024    CALCIUM 8.6 04/29/2024    CORRECTEDCA 9.0 04/02/2024    AST 19 04/29/2024    ALT 13 04/29/2024    ALKPHOS 73 04/29/2024    EGFR 75 04/29/2024         No results found for: \"SPEP\", \"UPEP\"    Lab Results   Component Value Date    PSA 1.65 08/30/2023       Lab Results   Component Value Date    CEA 1.5 04/02/2024       No results found for: \"\"    No results found for: \"AFP\"    Lab Results   Component Value Date    IRON 20 (L) 08/07/2023    TIBC 328 08/07/2023    FERRITIN 22 (L) 08/07/2023       Lab Results   Component Value Date    [id number] 222 " 05/19/2023         ROS: Review of Systems   Constitutional: Negative.    HENT: Negative.     Eyes: Negative.    Respiratory: Negative.     Cardiovascular: Negative.    Gastrointestinal: Negative.    Endocrine: Negative.    Genitourinary: Negative.    Musculoskeletal: Negative.    Skin: Negative.    Allergic/Immunologic: Negative.    Neurological: Negative.    Hematological: Negative.          Current Medications: Reviewed  Allergies: Reviewed  PMH/FH/SH:  Reviewed      Physical Exam:    Body surface area is 1.77 meters squared.    Wt Readings from Last 3 Encounters:   05/02/24 61.9 kg (136 lb 8 oz)   04/30/24 61.2 kg (135 lb)   04/16/24 61.3 kg (135 lb 3.2 oz)        Temp Readings from Last 3 Encounters:   05/02/24 97.6 °F (36.4 °C) (Temporal)   04/30/24 (!) 97.3 °F (36.3 °C) (Tympanic)   04/16/24 (!) 97.1 °F (36.2 °C) (Tympanic)        BP Readings from Last 3 Encounters:   05/02/24 116/72   04/30/24 129/70   04/16/24 118/74         Pulse Readings from Last 3 Encounters:   05/02/24 65   04/30/24 65   04/16/24 69     @LASTSAO2(3)@      Physical Exam  Constitutional:       Appearance: Normal appearance. He is normal weight.   HENT:      Head: Normocephalic and atraumatic.   Eyes:      Extraocular Movements: Extraocular movements intact.      Conjunctiva/sclera: Conjunctivae normal.      Pupils: Pupils are equal, round, and reactive to light.   Cardiovascular:      Rate and Rhythm: Normal rate and regular rhythm.      Heart sounds: Normal heart sounds.   Pulmonary:      Effort: Pulmonary effort is normal.      Breath sounds: Normal breath sounds.   Abdominal:      General: Abdomen is flat. Bowel sounds are normal.      Palpations: Abdomen is soft.   Musculoskeletal:         General: Normal range of motion.      Cervical back: Normal range of motion and neck supple.   Skin:     General: Skin is warm and dry.   Neurological:      General: No focal deficit present.      Mental Status: He is alert and oriented to person,  place, and time. Mental status is at baseline.           Goals and Barriers:  Current Goal: Prolong Survival from Cancer.   Barriers: None.      Patient's Capacity to Self Care:  Patient is able to self care.    Code Status: [unfilled]  Advance Directive and Living Will:      Power of : Yes

## 2024-05-22 NOTE — PROGRESS NOTES
Colon and Rectal Surgery   Minesh Pandya Jr. 79 y.o. male MRN: [id number]   Encounter: [id number]  05/22/24   10:46 AM        ASSESSMENT:        PLAN:        [facility]  Minesh Pandya Jr. is a 79 y.o. male for a 2 week follow up.       Last office visit was done on 3/21/24 for a follow up to rectal cancer.     CT Chest and Abdomen done on 4/4/24     IMPRESSION:     Some areas of tree-in-bud opacity in nodular consolidation with bronchiectasis is similar to the prior study.. Some groundglass opacity and subtle tree-in-bud opacity as well as lobular interstitial prominence is new in the right upper lobe. This could   be inflammatory. 3-month follow-up chest CT is advised.     Some other small nodules are unchanged to slightly improved.     Right hilar node is stable.     No liver lesion in the abdomen.     No new abdominal adenopathy. Small liver lesion is more difficult to identify.     Diffuse rectal thickening is again demonstrated similar to December.     Small mesorectal node is unchanged.     Fluid collection near the left inguinal ring appears unchanged.     Cystic lesion in the uncinate process is unchanged.     Splenomegaly is similar to since the prior stud.                           Biopsy;       Specimens A Polyp, Colorectal, hepatic flexure polyp - cold snare B Rectum, rectal mass bx.    Last colonoscopy done 4/30/24 repeat  in 1 year.                This SmartLink has not been configured with any valid records.     Lab Results   Component Value Date    CEA 1.5 04/02/2024     Lab Results   Component Value Date    WBC 4.32 04/29/2024    HGB 12.4 04/29/2024    HCT 39.3 04/29/2024    MCV 92 04/29/2024    PLT 84 (L) 04/29/2024                        Historical Information      Past Medical History:   Diagnosis Date    Cardiomyopathy, unspecified type (HCC) 01/17/2024    Hyperlipidemia     Ischemic cardiomyopathy     Lacunar infarction (HCC)     Near syncope 11/14/2019    NSTEMI (non-ST elevated myocardial  infarction) ([facility]) 10/20/2019    Pericarditis 10/24/2019    Poison ivy     Rectal cancer ([facility])     Recurrent major depressive disorder, in full remission ([facility]) 08/07/2023    STEMI involving left anterior descending coronary artery ([facility]) 05/19/2023    Stroke ([facility])      Past Surgical History:   Procedure Laterality Date    CARDIAC CATHETERIZATION N/A 05/19/2023    Procedure: Cardiac pci;  Surgeon: Ted Núñez MD;  Location: [facility];  Service: Cardiology    CORONARY ANGIOPLASTY WITH STENT PLACEMENT  05/19/2023    BMS pLAD    CORONARY ANGIOPLASTY WITH STENT PLACEMENT  10/21/2019    LUDIVINA x 2- dLAD and oD1, balloon only- mLAD    HERNIA REPAIR Left 04/28/2021    Procedure: OPEN REPAIR HERNIA INGUINAL LEFT WITH MESH;  Surgeon: Carlos Nguyen MD;  Location: [facility];  Service: General    IR PORT PLACEMENT  08/31/2023       Meds/Allergies       Current Outpatient Medications:     aspirin (ECOTRIN LOW STRENGTH) 81 mg EC tablet, Take 1 tablet (81 mg total) by mouth daily Please purchase over the counter at her local pharmacy, Disp: 30 tablet, Rfl: 1    atorvastatin (LIPITOR) 80 mg tablet, Take 1 tablet (80 mg total) by mouth daily with dinner, Disp: 90 tablet, Rfl: 3    Blood Pressure KIT, by Does not apply route daily, Disp: 1 each, Rfl: 0    diphenhydramine, lidocaine, Al/Mg hydroxide, simethicone (Magic Mouthwash) SUSP, Swish and spit 10 mL every 6 (six) hours as needed for mouth pain or discomfort, Disp: 237 mL, Rfl: 0    lisinopril (ZESTRIL) 2.5 mg tablet, take 1 tablet by mouth daily, Disp: 90 tablet, Rfl: 3    metoprolol succinate (TOPROL-XL) 25 mg 24 hr tablet, take 1 tablet by mouth daily, Disp: 90 tablet, Rfl: 3    pantoprazole (PROTONIX) 40 mg tablet, Take 1 tablet (40 mg total) by mouth daily, Disp: 30 tablet, Rfl: 0    sucralfate (CARAFATE) 1 g tablet, Take 1 tablet (1 g total) by mouth 4 (four) times a day, Disp: 120 tablet, Rfl: 3    tamsulosin (FLOMAX) 0.4 mg, Take 1 capsule (0.4 mg total) by  mouth daily at bedtime, Disp: 90 capsule, Rfl: 3      Allergies   Allergen Reactions    Wound Dressing Adhesive Hives     Noted with Plastic adhesive bandage brand Curity         Social History   Social History     Substance and Sexual Activity   Alcohol Use Never     Social History     Substance and Sexual Activity   Drug Use No     Social History     Tobacco Use   Smoking Status Former    Current packs/day: 0.00    Average packs/day: 1 pack/day for 15.0 years (15.0 ttl pk-yrs)    Types: Cigarettes    Start date: 1954    Quit date: 1969    Years since quittin.4   Smokeless Tobacco Never         Family History:   Family History   Problem Relation Age of Onset    Breast cancer Mother     Emphysema Father     Heart disease Maternal Grandfather     Heart attack Maternal Grandfather     Emphysema Paternal Grandfather        Review of Systems    Objective     Current Vitals:   There were no vitals filed for this visit.      Physical Exam:  General:no distress  Eyes:perrla/eomi  ENT:moist mucus membranes  Neck:supple  Pulm:no increased work of breathing  CV:sinus  Abdomen:soft,nontender  Rectal:normal perianal skin/sphincter tone, no masses palpated  Extremities:no edema  Lymphatics:no neck/axillary/groin lymphadenopathy

## 2024-05-23 ENCOUNTER — OFFICE VISIT (OUTPATIENT)
Age: 79
End: 2024-05-23
Payer: COMMERCIAL

## 2024-05-23 VITALS
HEIGHT: 69 IN | WEIGHT: 137 LBS | SYSTOLIC BLOOD PRESSURE: 122 MMHG | BODY MASS INDEX: 20.29 KG/M2 | DIASTOLIC BLOOD PRESSURE: 72 MMHG

## 2024-05-23 DIAGNOSIS — C20 RECTAL CANCER (HCC): Primary | ICD-10-CM

## 2024-05-23 PROCEDURE — 99215 OFFICE O/P EST HI 40 MIN: CPT | Performed by: COLON & RECTAL SURGERY

## 2024-05-23 RX ORDER — HEPARIN SODIUM 5000 [USP'U]/ML
5000 INJECTION, SOLUTION INTRAVENOUS; SUBCUTANEOUS ONCE
OUTPATIENT
Start: 2024-05-23 | End: 2024-05-23

## 2024-05-23 NOTE — Clinical Note
Please schedule low anterior resection.  Can be laparoscopic or robotic.  Needs cardiac clearance, wound care consultation for stoma as well as surgical optimization

## 2024-05-23 NOTE — LETTER
May 23, 2024     Rm Lau MD  [address] [facility] [address]    Patient: Minesh Pandya Jr.   YOB: 1945   Date of Visit: 5/23/2024       Dear Dr. Lua:    Thank you for referring Minesh Pandya to me for evaluation. Below are my notes for this consultation.    If you have questions, please do not hesitate to call me. I look forward to following your patient along with you.         Sincerely,        Guille Hall MD        CC: No Recipients    Guille Hall MD  5/23/2024  2:00 PM  Incomplete  Diagnoses and all orders for this visit:    Rectal cancer (HCC)       Rectal cancer (HCC)  Patient presents for follow-up.  Briefly he has a history of rectal adenocarcinoma.  This was diagnosed around the time when he presented for acute cardiac event underwent stenting.  He has since undergone consolidative chemotherapy as well as chemoradiation.  Chemoradiation was done relatively early in his course that she was having bleeding.  He notes he is doing relatively well at this point in time.  His most recent repeat imaging showed stable to improved lung lesions, stable liver lesion.  Recent colonoscopy showed there was still some polypoid tissue left at his original tumor site.  This again remains essentially adjacent to his anorectal ring.  He presents today for surgical follow-up.    We discussed the role and rationale behind surgery.  We would be trying to remove his obvious remnant disease.  We discussed in someone who has had liver and lung findings this will be for questionable cure.  Surgical intervention will have higher risk in him due to his age, prior cardiac history, thrombocytopenia, general deconditioning, history of protein calorie malnutrition.  We discussed that given its position, although coloanal anastomosis may be feasible, he already has concerns from an urgency point of view and I suspect his functional concerns would only worsen with proctectomy and coloanal  operation.  This would also require him to undergo surgical resection with temporary diversion and eventual secondary operation.  Given his risks, second operation is preferred to be avoided.  We specifically discussed that this may not cure his disease.  As he has had liver and lung lesions in the past that is possible these represent metastatic disease and may Floersch in his time off of chemotherapy.  Thankfully at this point in time they have not.    With this discussion patient prefers for surgical intervention.  Laparoscopic minimally invasive and robotic techniques were all described with plan for low anterior resection with end colostomy.  He will be referred to surgical optimization.      HPI      Minesh Pandya Jr. is a 79 y.o. male for a 2 week follow up.      Last office visit was done on 3/21/24 for a follow up to rectal cancer.     Patient states that he is doing well.         CT Chest and Abdomen done on 4/4/24   IMPRESSION:     Some areas of tree-in-bud opacity in nodular consolidation with bronchiectasis is similar to the prior study.. Some groundglass opacity and subtle tree-in-bud opacity as well as lobular interstitial prominence is new in the right upper lobe. This could be inflammatory. 3-month follow-up chest CT is advised.     Some other small nodules are unchanged to slightly improved.     Right hilar node is stable.     No liver lesion in the abdomen.     No new abdominal adenopathy. Small liver lesion is more difficult to identify.     Diffuse rectal thickening is again demonstrated similar to December.     Small mesorectal node is unchanged.     Fluid collection near the left inguinal ring appears unchanged.     Cystic lesion in the uncinate process is unchanged.     Splenomegaly is similar to since the prior stud.                           Biopsy;       Specimens A Polyp, Colorectal, hepatic flexure polyp - cold snare B Rectum, rectal mass bx.    Last colonoscopy done 4/30/24 repeat  in 1  year.                  Lab Results   Component Value Date    CEA 1.5 04/02/2024     Lab Results   Component Value Date    WBC 4.32 04/29/2024    HGB 12.4 04/29/2024    HCT 39.3 04/29/2024    MCV 92 04/29/2024    PLT 84 (L) 04/29/2024                        Past Medical History:   Diagnosis Date   • Cardiomyopathy, unspecified type ([facility]) 01/17/2024   • Hyperlipidemia    • Ischemic cardiomyopathy    • Lacunar infarction ([facility])    • Near syncope 11/14/2019   • NSTEMI (non-ST elevated myocardial infarction) ([facility]) 10/20/2019   • Pericarditis 10/24/2019   • Poison ivy    • Rectal cancer ([facility])    • Recurrent major depressive disorder, in full remission ([facility]) 08/07/2023   • STEMI involving left anterior descending coronary artery ([facility]) 05/19/2023   • Stroke ([facility])      Past Surgical History:   Procedure Laterality Date   • CARDIAC CATHETERIZATION N/A 05/19/2023    Procedure: Cardiac pci;  Surgeon: Ted Núñez MD;  Location: [facility];  Service: Cardiology   • CORONARY ANGIOPLASTY WITH STENT PLACEMENT  05/19/2023    BMS pLAD   • CORONARY ANGIOPLASTY WITH STENT PLACEMENT  10/21/2019    LUDIVINA x 2- dLAD and oD1, balloon only- mLAD   • HERNIA REPAIR Left 04/28/2021    Procedure: OPEN REPAIR HERNIA INGUINAL LEFT WITH MESH;  Surgeon: Carlos Nguyen MD;  Location: [facility];  Service: General   • IR PORT PLACEMENT  08/31/2023       Current Outpatient Medications:   •  aspirin (ECOTRIN LOW STRENGTH) 81 mg EC tablet, Take 1 tablet (81 mg total) by mouth daily Please purchase over the counter at her local pharmacy, Disp: 30 tablet, Rfl: 1  •  atorvastatin (LIPITOR) 80 mg tablet, Take 1 tablet (80 mg total) by mouth daily with dinner, Disp: 90 tablet, Rfl: 3  •  Blood Pressure KIT, by Does not apply route daily, Disp: 1 each, Rfl: 0  •  diphenhydramine, lidocaine, Al/Mg hydroxide, simethicone (Magic Mouthwash) SUSP, Swish and spit 10 mL every 6 (six) hours as needed for mouth pain or discomfort, Disp: 237 mL, Rfl: 0  •   lisinopril (ZESTRIL) 2.5 mg tablet, take 1 tablet by mouth daily, Disp: 90 tablet, Rfl: 3  •  metoprolol succinate (TOPROL-XL) 25 mg 24 hr tablet, take 1 tablet by mouth daily, Disp: 90 tablet, Rfl: 3  •  pantoprazole (PROTONIX) 40 mg tablet, Take 1 tablet (40 mg total) by mouth daily, Disp: 30 tablet, Rfl: 0  •  sucralfate (CARAFATE) 1 g tablet, Take 1 tablet (1 g total) by mouth 4 (four) times a day, Disp: 120 tablet, Rfl: 3  •  tamsulosin (FLOMAX) 0.4 mg, Take 1 capsule (0.4 mg total) by mouth daily at bedtime, Disp: 90 capsule, Rfl: 3  Allergies as of 05/23/2024 - Reviewed 05/23/2024   Allergen Reaction Noted   • Wound dressing adhesive Hives 10/06/2023     Review of Systems   All other systems reviewed and are negative.    Vitals:    05/23/24 1255   BP: 122/72     Physical Exam  Constitutional:       Appearance: Normal appearance. He is underweight.   HENT:      Head: Normocephalic and atraumatic.   Eyes:      Extraocular Movements: Extraocular movements intact.      Pupils: Pupils are equal, round, and reactive to light.   Pulmonary:      Effort: Pulmonary effort is normal.   Abdominal:      General: Abdomen is flat.      Palpations: Abdomen is soft.   Musculoskeletal:         General: Normal range of motion.   Skin:     General: Skin is warm and dry.   Neurological:      General: No focal deficit present.      Mental Status: He is alert and oriented to person, place, and time.   Psychiatric:         Mood and Affect: Mood normal.         Behavior: Behavior normal.         Thought Content: Thought content normal.         Judgment: Judgment normal.

## 2024-05-23 NOTE — PROGRESS NOTES
Diagnoses and all orders for this visit:    Rectal cancer (HCC)       Rectal cancer (HCC)  Patient presents for follow-up.  Briefly he has a history of rectal adenocarcinoma.  This was diagnosed around the time when he presented for acute cardiac event underwent stenting.  He has since undergone consolidative chemotherapy as well as chemoradiation.  Chemoradiation was done relatively early in his course that she was having bleeding.  He notes he is doing relatively well at this point in time.  His most recent repeat imaging showed stable to improved lung lesions, stable liver lesion.  Recent colonoscopy showed there was still some polypoid tissue left at his original tumor site.  This again remains essentially adjacent to his anorectal ring.  He presents today for surgical follow-up.    We discussed the role and rationale behind surgery.  We would be trying to remove his obvious remnant disease.  We discussed in someone who has had liver and lung findings this will be for questionable cure.  Surgical intervention will have higher risk in him due to his age, prior cardiac history, thrombocytopenia, general deconditioning, history of protein calorie malnutrition.  We discussed that given its position, although coloanal anastomosis may be feasible, he already has concerns from an urgency point of view and I suspect his functional concerns would only worsen with proctectomy and coloanal operation.  This would also require him to undergo surgical resection with temporary diversion and eventual secondary operation.  Given his risks, second operation is preferred to be avoided.  We specifically discussed that this may not cure his disease.  As he has had liver and lung lesions in the past that is possible these represent metastatic disease and may Floersch in his time off of chemotherapy.  Thankfully at this point in time they have not.    With this discussion patient prefers for surgical intervention.  Laparoscopic  minimally invasive and robotic techniques were all described with plan for low anterior resection with end colostomy.  He will be referred to surgical optimization.      ARYAN Pandya Jr. is a 79 y.o. male for a 2 week follow up.      Last office visit was done on 3/21/24 for a follow up to rectal cancer.     Patient states that he is doing well.         CT Chest and Abdomen done on 4/4/24   IMPRESSION:     Some areas of tree-in-bud opacity in nodular consolidation with bronchiectasis is similar to the prior study.. Some groundglass opacity and subtle tree-in-bud opacity as well as lobular interstitial prominence is new in the right upper lobe. This could be inflammatory. 3-month follow-up chest CT is advised.     Some other small nodules are unchanged to slightly improved.     Right hilar node is stable.     No liver lesion in the abdomen.     No new abdominal adenopathy. Small liver lesion is more difficult to identify.     Diffuse rectal thickening is again demonstrated similar to December.     Small mesorectal node is unchanged.     Fluid collection near the left inguinal ring appears unchanged.     Cystic lesion in the uncinate process is unchanged.     Splenomegaly is similar to since the prior stud.                           Biopsy;       Specimens A Polyp, Colorectal, hepatic flexure polyp - cold snare B Rectum, rectal mass bx.    Last colonoscopy done 4/30/24 repeat  in 1 year.                  Lab Results   Component Value Date    CEA 1.5 04/02/2024     Lab Results   Component Value Date    WBC 4.32 04/29/2024    HGB 12.4 04/29/2024    HCT 39.3 04/29/2024    MCV 92 04/29/2024    PLT 84 (L) 04/29/2024                        Past Medical History:   Diagnosis Date    Cardiomyopathy, unspecified type (HCC) 01/17/2024    Hyperlipidemia     Ischemic cardiomyopathy     Lacunar infarction (HCC)     Near syncope 11/14/2019    NSTEMI (non-ST elevated myocardial infarction) (HCC) 10/20/2019    Pericarditis  10/24/2019    Poison ivy     Rectal cancer (HCC)     Recurrent major depressive disorder, in full remission (HCC) 08/07/2023    STEMI involving left anterior descending coronary artery (HCC) 05/19/2023    Stroke ([facility])      Past Surgical History:   Procedure Laterality Date    CARDIAC CATHETERIZATION N/A 05/19/2023    Procedure: Cardiac pci;  Surgeon: Ted Núñez MD;  Location: [facility];  Service: Cardiology    CORONARY ANGIOPLASTY WITH STENT PLACEMENT  05/19/2023    BMS pLAD    CORONARY ANGIOPLASTY WITH STENT PLACEMENT  10/21/2019    LUDIVINA x 2- dLAD and oD1, balloon only- mLAD    HERNIA REPAIR Left 04/28/2021    Procedure: OPEN REPAIR HERNIA INGUINAL LEFT WITH MESH;  Surgeon: Carlos Nguyen MD;  Location: [facility];  Service: General    IR PORT PLACEMENT  08/31/2023       Current Outpatient Medications:     aspirin (ECOTRIN LOW STRENGTH) 81 mg EC tablet, Take 1 tablet (81 mg total) by mouth daily Please purchase over the counter at her local pharmacy, Disp: 30 tablet, Rfl: 1    atorvastatin (LIPITOR) 80 mg tablet, Take 1 tablet (80 mg total) by mouth daily with dinner, Disp: 90 tablet, Rfl: 3    Blood Pressure KIT, by Does not apply route daily, Disp: 1 each, Rfl: 0    diphenhydramine, lidocaine, Al/Mg hydroxide, simethicone (Magic Mouthwash) SUSP, Swish and spit 10 mL every 6 (six) hours as needed for mouth pain or discomfort, Disp: 237 mL, Rfl: 0    lisinopril (ZESTRIL) 2.5 mg tablet, take 1 tablet by mouth daily, Disp: 90 tablet, Rfl: 3    metoprolol succinate (TOPROL-XL) 25 mg 24 hr tablet, take 1 tablet by mouth daily, Disp: 90 tablet, Rfl: 3    pantoprazole (PROTONIX) 40 mg tablet, Take 1 tablet (40 mg total) by mouth daily, Disp: 30 tablet, Rfl: 0    sucralfate (CARAFATE) 1 g tablet, Take 1 tablet (1 g total) by mouth 4 (four) times a day, Disp: 120 tablet, Rfl: 3    tamsulosin (FLOMAX) 0.4 mg, Take 1 capsule (0.4 mg total) by mouth daily at bedtime, Disp: 90 capsule, Rfl: 3  Allergies as of  05/23/2024 - Reviewed 05/23/2024   Allergen Reaction Noted    Wound dressing adhesive Hives 10/06/2023     Review of Systems   All other systems reviewed and are negative.    Vitals:    05/23/24 1255   BP: 122/72     Physical Exam  Constitutional:       Appearance: Normal appearance. He is underweight.   HENT:      Head: Normocephalic and atraumatic.   Eyes:      Extraocular Movements: Extraocular movements intact.      Pupils: Pupils are equal, round, and reactive to light.   Pulmonary:      Effort: Pulmonary effort is normal.   Abdominal:      General: Abdomen is flat.      Palpations: Abdomen is soft.   Musculoskeletal:         General: Normal range of motion.   Skin:     General: Skin is warm and dry.   Neurological:      General: No focal deficit present.      Mental Status: He is alert and oriented to person, place, and time.   Psychiatric:         Mood and Affect: Mood normal.         Behavior: Behavior normal.         Thought Content: Thought content normal.         Judgment: Judgment normal.

## 2024-05-23 NOTE — ASSESSMENT & PLAN NOTE
Patient presents for follow-up.  Briefly he has a history of rectal adenocarcinoma.  This was diagnosed around the time when he presented for acute cardiac event underwent stenting.  He has since undergone consolidative chemotherapy as well as chemoradiation.  Chemoradiation was done relatively early in his course that she was having bleeding.  He notes he is doing relatively well at this point in time.  His most recent repeat imaging showed stable to improved lung lesions, stable liver lesion.  Recent colonoscopy showed there was still some polypoid tissue left at his original tumor site.  This again remains essentially adjacent to his anorectal ring.  He presents today for surgical follow-up.    We discussed the role and rationale behind surgery.  We would be trying to remove his obvious remnant disease.  We discussed in someone who has had liver and lung findings this will be for questionable cure.  Surgical intervention will have higher risk in him due to his age, prior cardiac history, thrombocytopenia, general deconditioning, history of protein calorie malnutrition.  We discussed that given its position, although coloanal anastomosis may be feasible, he already has concerns from an urgency point of view and I suspect his functional concerns would only worsen with proctectomy and coloanal operation.  This would also require him to undergo surgical resection with temporary diversion and eventual secondary operation.  Given his risks, second operation is preferred to be avoided.  We specifically discussed that this may not cure his disease.  As he has had liver and lung lesions in the past that is possible these represent metastatic disease and may Floersch in his time off of chemotherapy.  Thankfully at this point in time they have not.    With this discussion patient prefers for surgical intervention.  Laparoscopic minimally invasive and robotic techniques were all described with plan for low anterior resection  with end colostomy.  He will be referred to surgical optimization.

## 2024-05-28 DIAGNOSIS — C20 RECTAL CANCER (HCC): Primary | ICD-10-CM

## 2024-05-28 RX ORDER — METRONIDAZOLE 500 MG/1
TABLET ORAL
Qty: 2 TABLET | Refills: 0 | Status: SHIPPED | OUTPATIENT
Start: 2024-06-15 | End: 2024-06-15

## 2024-05-28 RX ORDER — NEOMYCIN SULFATE 500 MG/1
TABLET ORAL
Qty: 4 TABLET | Refills: 0 | Status: SHIPPED | OUTPATIENT
Start: 2024-06-15 | End: 2024-06-15

## 2024-05-28 NOTE — TELEPHONE ENCOUNTER
----- Message from Guille Hall MD sent at 5/23/2024  2:06 PM EDT -----  Please schedule low anterior resection.  Can be laparoscopic or robotic.  Needs cardiac clearance, wound care consultation for stoma as well as surgical optimization

## 2024-05-28 NOTE — LETTER
Minesh Pandya Jr.  [address]  [place] [address]        ------------------------------------------------------------------------------------------------------------  5/28/2024    Dear Minesh Pandya Jr.,    Your surgery is scheduled for: 7/3/2024   The hospital will call the evening prior to your surgery with your expected arrival time.   Location:[address] [address]    CHECK LIST PRIOR TO INPATIENT SURGERY  It is your responsibility to obtain any/all referrals needed for your surgery if required by your insurance. Our office will contact you to discuss your insurance coverage for this procedure.    Special instructions required if you are taking any blood thinners. Please verify with the prescribing physician. Examples include Coumadin, Plavix, Xarelto, Eliquis, Pradaxa, etc.    Please check with your family physician if you are taking the following medications: Aspirin or any Aspirin containing medication, Gingko biloba, Ginseng, Feverfew, and/or [place]’s Wort. We suggest stopping these for 3 days.     The night before and the day of your surgery, wash from your neck to groin with chlorhexidine soap.  This soap is available at most retail pharmacies under such brand names as Hibiclens, Endure or Aplicare.    Pre-admission testing Required: YES x NO     If Yes, what type:  BLOOD-WORK  x EKG   CHEST X-RAY         BOWEL PREPARATION REQUIRED: YES  x NO   If yes, start date: 7/2/2024. Please see attached bowel preparation instructions.    NOTHING TO EAT OR DRINK AFTER MIDNIGHT PRIOR TO SURGERY.    Please do not hesitate to call our office with any questions regarding your surgery.                       MIRALAX/GATORADE SURGERY PREPARATION INSTRUCTIONS    Purchase:   (1) 238g bottle of MiraLax or Glycolax - no prescription needed   4 Dulcolax Laxative Tablets - no prescription needed   64 oz. bottle of Gatorade (NOT RED), Crystal Light, or another clear  liquid of  choice.   **REMEMBER** A prescription for antibiotics has been called into your pharmacy for  prior to your surgery.    One Day Prior to Surgery  Have a normal breakfast, light lunch, and clear liquids thereafter.  It is important that you drink plenty of clear liquids throughout the day to prevent dehydration.  CLEAR LIQUIDS INCLUDE:  Water, Clear Fruit Juice (Apple, Cranberry, Grape), Black Coffee, Tea, Ginger Ale, Gatorade, Robbie-Aid, Hi-C, plain Jello, Ice Pops, Clear Broth or Bouillon, Crystal Light, Lemonade, Soda (regular or diet).    No Milk Products!    At 1:00 pm, take (4) Dulcolax Tablets with 8 oz. of water.  Swallow the tablets whole with a full glass of water.  The package may direct you not to exceed (2) tablets at any time but for the purpose of this examination, you should take (4).    At 1:00 pm, take your first dose of antibiotic as prescribed.    At 1:00 pm, Mix the 238g bottle of MiraLax in 64 oz. of Gatorade and shake the solution until the MiraLax is dissolved.  Drink 8 oz. glassfuls at your own pace.  It may take 3-4 hours to drink all the solution.    At 10:00 pm, take your last dose of antibiotic as prescribed.    REMEMBER TO STAY CLOSE TO TOILET FACILITIES.    The Day of Surgery  Take nothing by mouth until after surgery.                                          Post-Operative Care Information  Abdominal Surgery  What are my post-operative instructions?   The following information and instructions are for your continued care after discharge from the hospital. Please read the information (including the After Visit Summary provided to you at the time of discharge) carefully. If you have any questions or concerns, please contact the clinical staff at [phone]    How do I take care of my incision?  Your incision(s) may have surgical glue, dissolvable sutures with white pieces of tape called steri strips, or staples.   If you have steri strips or surgical glue, do not  remove them. Steri strips will fall off naturally in 7-10 days. Surgical glue (Exofin) will fall off over a period of up to 2-3 weeks.   Do not put any topical ointments or lotions on the incisions.   Avoid tight clothing around your incision(s) or fabric that may irritate your skin such as wool, hooks and dennys.     Should I continue taking my prescribed medications?   Take medications as prescribed. Please review the After Visit Summary provided to you at the time of discharge for details.     How will I manage my pain at home?  For best pain control take your pain medication at regular intervals for the first couple of days, about every 4-6 hours. This will prevent pain build-up, which occurs when medication is taken on an as needed basis.   Use only as much prescription pain medication as you need (i.e. 1 tablet instead of 2).  Taper off the prescription pain medication as pain decreases, stopping narcotics first.   Use over-the-counter acetaminophen (Tylenolâ) if your doctor allows. When using over-the-counter medication, never use more than what is prescribed on the package directions. Do not take more than 3000mg of Tylenolâ in a 24 hour period. Do not take more than 2400mg of Ibuprofen (such as Motrinâ or Advilâ) in a 24 hour period.   While taking prescription pain medication you may not drive, work, or drink alcohol.     Are there any diet restrictions?   Continue low fiber diet up to 1 week post op.  (This allows the bowel to rest)    It is normal for bowel movements to be loose and occur more frequently post-operatively. This should improve over time.  During this time pay attention to hydration  1.5 weeks post op you may slowly begin to add fiber back into your diet.    By 2 weeks post op you may resume a normal high fiber diet.     What activity restrictions will I have?   Walk as much as tolerated.  Do not lift, pull, or push objects greater than 10 pounds for 6 weeks. This includes vacuuming,  lifting children or groceries, walking the dog, mowing lawns, snow shoveling, etc. Please discuss other specific physical activities with the doctor at your post op appointment.   Do not drive while taking pain medications. You may drive when you have no pain - usually in 1-2 weeks following surgery.   You may climb stairs in moderation but do not overtire.  Resume sexual activity after you are cleared by your doctor.     When will I receive follow-up care?   You will be scheduled to return to see your physician in the office typically in 3-4 weeks after surgery.    If you do not have a scheduled appointment at the time of discharge, please call the office at [phone].    When should I call my doctor?   Notify your doctor for any of the following signs and symptoms of possible infection:   Temperature above 101 degrees.   Increase in pain or discomfort.   Redness, swelling, drainage at incision site(s). A small amount of clear yellow or pinkish-yellow drainage is normal  If incision begins to open.     Call if you have any changes in your overall health such as having:   Nausea   Vomiting   Chills   profuse (excessive) sweating   inability to urinate or completely empty bladder   diarrhea   constipation

## 2024-05-28 NOTE — TELEPHONE ENCOUNTER
Patient scheduled for surgery 7/3/2024  at [facility] OR. Instructions and PAT's gone over with and mailed to the patient.     Per cardiology :

## 2024-06-06 ENCOUNTER — APPOINTMENT (OUTPATIENT)
Dept: LAB | Facility: CLINIC | Age: 79
End: 2024-06-06
Payer: COMMERCIAL

## 2024-06-06 ENCOUNTER — LAB REQUISITION (OUTPATIENT)
Dept: LAB | Facility: HOSPITAL | Age: 79
End: 2024-06-06
Payer: COMMERCIAL

## 2024-06-06 DIAGNOSIS — C20 RECTAL CANCER (HCC): ICD-10-CM

## 2024-06-06 DIAGNOSIS — C20 MALIGNANT NEOPLASM OF RECTUM (HCC): ICD-10-CM

## 2024-06-06 LAB
ABO GROUP BLD: NORMAL
BASOPHILS # BLD AUTO: 0.02 THOUSANDS/ÂΜL (ref 0–0.1)
BASOPHILS NFR BLD AUTO: 0 % (ref 0–1)
BLD GP AB SCN SERPL QL: NEGATIVE
EOSINOPHIL # BLD AUTO: 0.13 THOUSAND/ÂΜL (ref 0–0.61)
EOSINOPHIL NFR BLD AUTO: 2 % (ref 0–6)
ERYTHROCYTE [DISTWIDTH] IN BLOOD BY AUTOMATED COUNT: 14.6 % (ref 11.6–15.1)
EST. AVERAGE GLUCOSE BLD GHB EST-MCNC: 111 MG/DL
HBA1C MFR BLD: 5.5 %
HCT VFR BLD AUTO: 41.4 % (ref 36.5–49.3)
HGB BLD-MCNC: 13.2 G/DL (ref 12–17)
IMM GRANULOCYTES # BLD AUTO: 0.02 THOUSAND/UL (ref 0–0.2)
IMM GRANULOCYTES NFR BLD AUTO: 0 % (ref 0–2)
LYMPHOCYTES # BLD AUTO: 1.24 THOUSANDS/ÂΜL (ref 0.6–4.47)
LYMPHOCYTES NFR BLD AUTO: 21 % (ref 14–44)
MCH RBC QN AUTO: 28.6 PG (ref 26.8–34.3)
MCHC RBC AUTO-ENTMCNC: 31.9 G/DL (ref 31.4–37.4)
MCV RBC AUTO: 90 FL (ref 82–98)
MONOCYTES # BLD AUTO: 0.67 THOUSAND/ÂΜL (ref 0.17–1.22)
MONOCYTES NFR BLD AUTO: 11 % (ref 4–12)
NEUTROPHILS # BLD AUTO: 3.82 THOUSANDS/ÂΜL (ref 1.85–7.62)
NEUTS SEG NFR BLD AUTO: 66 % (ref 43–75)
NRBC BLD AUTO-RTO: 0 /100 WBCS
PLATELET # BLD AUTO: 92 THOUSANDS/UL (ref 149–390)
PMV BLD AUTO: 12.2 FL (ref 8.9–12.7)
RBC # BLD AUTO: 4.62 MILLION/UL (ref 3.88–5.62)
RH BLD: NEGATIVE
SPECIMEN EXPIRATION DATE: NORMAL
WBC # BLD AUTO: 5.9 THOUSAND/UL (ref 4.31–10.16)

## 2024-06-06 PROCEDURE — 86901 BLOOD TYPING SEROLOGIC RH(D): CPT | Performed by: COLON & RECTAL SURGERY

## 2024-06-06 PROCEDURE — 80048 BASIC METABOLIC PNL TOTAL CA: CPT

## 2024-06-06 PROCEDURE — 86900 BLOOD TYPING SEROLOGIC ABO: CPT | Performed by: COLON & RECTAL SURGERY

## 2024-06-06 PROCEDURE — 83036 HEMOGLOBIN GLYCOSYLATED A1C: CPT

## 2024-06-06 PROCEDURE — 36415 COLL VENOUS BLD VENIPUNCTURE: CPT

## 2024-06-06 PROCEDURE — 86850 RBC ANTIBODY SCREEN: CPT | Performed by: COLON & RECTAL SURGERY

## 2024-06-06 PROCEDURE — 85025 COMPLETE CBC W/AUTO DIFF WBC: CPT

## 2024-06-07 ENCOUNTER — TELEPHONE (OUTPATIENT)
Dept: ANESTHESIOLOGY | Facility: CLINIC | Age: 79
End: 2024-06-07

## 2024-06-07 LAB
ANION GAP SERPL CALCULATED.3IONS-SCNC: 9 MMOL/L (ref 4–13)
BUN SERPL-MCNC: 22 MG/DL (ref 5–25)
CALCIUM SERPL-MCNC: 8.8 MG/DL (ref 8.4–10.2)
CHLORIDE SERPL-SCNC: 106 MMOL/L (ref 96–108)
CO2 SERPL-SCNC: 25 MMOL/L (ref 21–32)
CREAT SERPL-MCNC: 1.08 MG/DL (ref 0.6–1.3)
GFR SERPL CREATININE-BSD FRML MDRD: 65 ML/MIN/1.73SQ M
GLUCOSE P FAST SERPL-MCNC: 89 MG/DL (ref 65–99)
POTASSIUM SERPL-SCNC: 4.3 MMOL/L (ref 3.5–5.3)
SODIUM SERPL-SCNC: 140 MMOL/L (ref 135–147)

## 2024-06-11 ENCOUNTER — ANESTHESIA EVENT (OUTPATIENT)
Dept: PERIOP | Facility: HOSPITAL | Age: 79
DRG: 330 | End: 2024-06-11
Payer: COMMERCIAL

## 2024-06-11 DIAGNOSIS — I25.5 ISCHEMIC CARDIOMYOPATHY: ICD-10-CM

## 2024-06-11 NOTE — PRE-PROCEDURE INSTRUCTIONS
Pre-Surgery Instructions:   Medication Instructions    aspirin (ECOTRIN LOW STRENGTH) 81 mg EC tablet Pt has not received any hold instructions. PAT to message Cards and obtained instructions and notify pt    atorvastatin (LIPITOR) 80 mg tablet Normally takes at night.      diphenhydramine, lidocaine, Al/Mg hydroxide, simethicone (Magic Mouthwash) SUSP May use day of surgery if needed      lisinopril (ZESTRIL) 2.5 mg tablet Hold day of surgery      metoprolol succinate (TOPROL-XL) 25 mg 24 hr tablet Take this medication day of surgery if normally taken in the morning.      Medication instructions for day surgery reviewed. Please use only a sip of water to take your instructed medications. Avoid all over the counter vitamins, supplements and NSAIDS for one week prior to surgery per anesthesia guidelines. Tylenol is ok to take as needed.     You will receive a call one business day prior to surgery with an arrival time and hospital directions. If your surgery is scheduled on a Monday, the hospital will be calling you on the Friday prior to your surgery. If you have not heard from anyone by 8pm, please call the hospital supervisor through the hospital  at [phone]. ([place] [phone] or [place] [phone]).    Do not eat or drink anything after midnight the night before your surgery, including candy, mints, lifesavers, or chewing gum. Do not drink alcohol 24hrs before your surgery. Try not to smoke at least 24hrs before your surgery.       Follow the pre surgery showering instructions as listed in the “My Surgical Experience Booklet” or otherwise provided by your surgeon's office. Do not use a blade to shave the surgical area 1 week before surgery. It is okay to use a clean electric clippers up to 24 hours before surgery. Do not apply any lotions, creams, including makeup, cologne, deodorant, or perfumes after showering on the day of your surgery. Do not use dry shampoo, hair spray, hair gel, or any  type of hair products.     No contact lenses, eye make-up, or artificial eyelashes. Remove nail polish, including gel polish, and any artificial, gel, or acrylic nails if possible. Remove all jewelry including rings and body piercing jewelry.     Wear causal clothing that is easy to take on and off. Consider your type of surgery.    Keep any valuables, jewelry, piercings at home. Please bring any specially ordered equipment (sling, braces) if indicated.    Arrange for a responsible person to drive you to and from the hospital on the day of your surgery. Please confirm the visitor policy for the day of your procedure when you receive your phone call with an arrival time.     Call the surgeon's office with any new illnesses, exposures, or additional questions prior to surgery.    Please reference your “My Surgical Experience Booklet” for additional information to prepare for your upcoming surgery.

## 2024-06-12 ENCOUNTER — TELEPHONE (OUTPATIENT)
Dept: CARDIOLOGY CLINIC | Facility: CLINIC | Age: 79
End: 2024-06-12

## 2024-06-12 RX ORDER — LISINOPRIL 2.5 MG/1
2.5 TABLET ORAL DAILY
Qty: 90 TABLET | Refills: 1 | Status: SHIPPED | OUTPATIENT
Start: 2024-06-12

## 2024-06-12 NOTE — TELEPHONE ENCOUNTER
Chantell Morris RN  P Cardiology Cardiac Clearance Jeanette Yadav,    Can you please provide pt with ASA 81 mg hold instructions for upcoming surgery 7/3/24 , RESECTION COLON LOW ANTERIOR LAPAROSCOPIC WITH ROBOTICS End colostomy.    Thanks,  Radha

## 2024-06-17 ENCOUNTER — TELEPHONE (OUTPATIENT)
Dept: HEMATOLOGY ONCOLOGY | Facility: CLINIC | Age: 79
End: 2024-06-17

## 2024-06-17 ENCOUNTER — TELEPHONE (OUTPATIENT)
Dept: CARDIOLOGY CLINIC | Facility: CLINIC | Age: 79
End: 2024-06-17

## 2024-06-17 NOTE — TELEPHONE ENCOUNTER
----- Message from Harry Du MD sent at 6/16/2024  1:45 PM EDT -----  Regarding: FW: Clearance  Please schedule with any provider for updated clearance.  ----- Message -----  From: Nohemi Patton MA  Sent: 6/12/2024   8:00 AM EDT  To: Harry Du MD  Subject: Clearance                                        Good morning I know he is cleared but the hosp would like it updated due to it being over 60 days      I see a telephone note from 4/3/24 stating cleared for procedure but that note will need to be updated as it is out of 60 day range. Can you please have updated.      Thank you,    Nohemi

## 2024-06-18 PROBLEM — Z01.89 ENCOUNTER FOR GERIATRIC ASSESSMENT: Status: ACTIVE | Noted: 2024-06-18

## 2024-06-18 NOTE — PROGRESS NOTES
[facility]  CONSULT: GERIATRIC SURGERY    Virtual Regular Visit    Verification of patient location:    Patient is located at Home in the following state in which I hold an active license [place]    Assessment/Plan:   79 year old male referred to [facility] for pre-surgery geriatric screening 2.2 age   Other consult concerns include:  surgical optimization & BEST program   He is scheduled on   Case: [id number] Date/Time: 07/03/24 1305   Procedure: RESECTION COLON LOW ANTERIOR LAPAROSCOPIC WITH ROBOTICS End colostomy (Abdomen)   Anesthesia type: General   Diagnosis: Rectal cancer (HCC) [C20]   Pre-op diagnosis: Rectal cancer (HCC) [C20]   Location:  OR ROOM 14 / [facility]   Surgeons: Guille Hall MD     Last anesthesia  Reports no concerns     High risk geriatric surgery patient    Today, I had a discussion with the patient and his daughter.  I discussed the risk and benefits of general anesthesia including; but not limit too, the risk of postoperative respiratory distress, postop pneumonia, postop cognitive impairment (both temporarily and/or permanent), postop urinary retention (SCOTT), postop deconditioning, and death.  The patient verbalized they are fully aware of these risks and wishes to proceed.      In preparation for general anesthesia we started our BEST protocol placing emphasis on     Breathing  Encouraged exercise lungs prior to surgery    Eating  Encouraged to increase protein prior to surgery    Self  Encouraged to prepare their home for recovery  Encouraged to sleep 8- 10 hours nightly   Encouraged to limit napping during the day   Encouraged to limit stress   Encouraged to exercise the mind daily-this can be done through completing crossword puzzles, sudoku puzzles, reading, self reflecting through journaling, adult number coloring, flash cards, board games, game shows on TV, and staying socially active    Train   Encouraged to stay  active prior to surgery  Encouraged to increase exercise prior to surgery     [facility] PRE-HAB  Goals:maintain 3/5 for surgery   Patient does not think reaching a 5/5 is realistic   Patient does not want anymore time to prepare for surgery   Started on BEST   BEST   Breathing- instructed to exercise lungs prior to surgery via deep breathing   Eat- discussed increasing protein intake prior to surgery   Sleep/stress- encouraged 8-10 sleep @ night, stress reduction, avoid sick contacts and handwashing   Train- encouraged to remain active      Pre-diagnosis energy level:5/5  Mid [place] energy level:0/5- zero energy would last 2-3 days and then recover to a 2-3/5  Current energy level:2-3/5  Goal:   work on BEST   Increase protein       Problem List Items Addressed This Visit       S/P primary angioplasty with coronary stent    History of ischemic cardiomyopathy (Chronic)    Essential hypertension - Primary (Chronic)  Stable now   Unable to take BP at home today- needs batteries   2019- 2023 MI S/P SENT PLACEMENT   METS 8.3  Active, walks , goes to stores   Denies Cp/denies SOB  Await CC ON 6.27.24  Encounter Information   Provider Department Encounter # Center   6/27/2024 10:00 AM Ray Leyva MD [facility] [address]          Protein-calorie malnutrition, unspecified severity (HCC)  Stable   Albumin stable   Bmi 20  Increase protein PRIOR   Recommend inpatient geriatric consult post-op   Recommend inpatient nutrition consult post-op    Latest Reference Range & Units 07/29/18 09:14 10/20/19 09:36 11/14/19 21:58 04/01/21 08:10 10/10/22 08:42 05/01/23 13:54 05/18/23 15:12 05/23/23 05:39 05/30/23 12:29 08/07/23 15:09 09/06/23 13:06 09/18/23 12:19 10/06/23 13:45 10/16/23 15:06 10/30/23 14:50 11/13/23 13:25 11/24/23 19:40 11/27/23 10:59 12/11/23 09:29 12/18/23 13:58 12/26/23 11:25 01/08/24 12:42 01/22/24 11:58 01/26/24 08:17 02/09/24 13:11 02/23/24 13:30 04/02/24 14:24 04/29/24 07:57   Albumin 3.5 - 5.0 g/dL 4.4 3.4 (L) 3.5  3.5 3.3 (L) 3.8 3.8 3.4 (L) 3.3 (L) 3.2 (L) 4.0 3.5 3.5 3.6 3.7 3.9 3.7 3.7 3.7 3.6 3.6 3.6 3.5 3.8 3.6 3.4 (L) 3.4 (L) 3.6   (L): Data is abnormally low        History of CVA (cerebrovascular accident) (Chronic)  Years ago   Didn't even know if was having a ministroke   Stable now   No concerns   Will have CC       Rectal cancer (HCC)  Seen for [facility]   Seen for geriatrics   Started BEST   Await CC 6.27  CBC on admit to check platelets   At risk for post-op SCOTT - NO   At risk for post-op SSI -NO   BEST   Breathing- instructed to exercise lungs prior to surgery via deep breathing   Eat- discussed increasing protein intake prior to surgery   Sleep/stress- encouraged 8-10 sleep @ night, stress reduction, avoid sick contacts and handwashing   Train- encouraged to remain active        Encounter for geriatric assessment  Recommend inpatient geriatric consult post-op this consult should not delay DC to home.      See Geriatric Assessment below...  Cognitive Assessment: no concerns on verbal exam   Falls (last 6 months): no  Roldan Total Score: 21  PHQ- 9 Depression Scale:2  Nutrition Assessment Score:12  METS: 8.33  Incentive Spirometry Level:   Health goals:  -What are your overall health goals? Be cancer free     -What brings you strength? family     -What activities are important to you? Watch sports    High risk for post-op delirium RT age, inpatient surgery  Inpatient geriatric consult ordered for post-op   Delirium precautions on admit    High risk for post- op pneumonia RT age, inpatient surgery  Instructed to start lung exercises tonight  Non smoker     High fall risk RT age, inpatient surgery  Fall precautions on admit  Standard PT eval after surgery for safe discharge    Pre- frail, RT age, inpatient surgery  Inpatient geriatric consult ordered for postop  Nutritional supplements- increase protein prior   On admission aspiration precautions, skin precautions, delirium precautions, fall precautions    High risk for  malnutrition RT age, inpatient surgery  Be your BEST pathway   Breath, eat, sleep/stress relief, and tracking exercise               Reason for visit is No chief complaint on file.       Encounter provider HUSSAIN Trimble      Recent Visits  No visits were found meeting these conditions.  Showing recent visits within past 7 days and meeting all other requirements  Future Appointments  No visits were found meeting these conditions.  Showing future appointments within next 150 days and meeting all other requirements       The patient was identified by name and date of birth. Minesh Pandya Jr. was informed that this is a telemedicine visit and that the visit is being conducted through the Epic Embedded platform. He agrees to proceed..  My office door was closed. No one else was in the room.  He acknowledged consent and understanding of privacy and security of the video platform. The patient has agreed to participate and understands they can discontinue the visit at any time.    Patient is aware this is a billable service.     Subjective  Minesh Pandya Jr. is a 79 y.o. male who was referred to [facility] for pre-surgery geriatric screening and surgery optimization.  Minesh explains that in May 2023 he noticed blood in his bowel movement.  Upon further investigation he was diagnosed with rectal cancer.  He underwent chemotherapy and radiation.  He is now electing surgery.  He is seen today for surgical optimization and his geriatric assessment.    I spoke with Minesh over video through [employer].  Picture was clear.  Audio was good.  His daughter was present with him.  Both were pleasant and a pleasure to care for.  Minesh admits to being in well health today.  Denies any new developments in his health.  His METS is 8.  Denies chest pain.  Admits to some shortness of breath but believes it is from the current heat wave are experiencing.  He does have an upcoming cardiology visit on June 27, 2024 in which he needs cardiac  clearance.  I have asked Minesh and his daughter to discuss the shortness of breath with the cardiologist.  State they will and agree to.      There were no cognitive concerns identified during my exam.  He appears a little frail.  I asked if he needed more time to prepare for surgery and he said no.  He is ready to go.  He does not want any more time to prepare for surgery.  He does agree to work on her best protocol from now until surgery.  He will increase protein on his diet.    I do recommend inpatient geriatric consult after surgery due to advanced age, having surgery, and receiving anesthesia.  This consult should not delay discharge.  I do recommend inpatient nutrition consult after surgery due to the nature of the surgery BMI of 20.    Blood work was reviewed.  He has a history of thrombocytopenia.  Platelets remain above 90 but only at 92.  Recommend checking CBC on admit to ensure they remain above 90.  Recommend to consider pre-surgery platelet infusion if needed.    He lives at home.  His support is his daughter.  He is not driving.  He is active.  He walks around.  He goes to the stores.  Denies chest pain.  METS is 8.  He is independent with all ADLs.  Surgical optimization complete.  SCOTT risk low.  SSI risk low.  [facility] anemia-no needs  Work on best protocol  As always we discussed having your BEST surgery, and BEST recovery.  Surgery goals reviewed today.      Breathing exercises   Patient was encouraged to begin lung exercises today.  This could be accomplished through deep breathing and cough exercises.      Eating/nutrition   Encouraged patient to increase oral protein intake prior to surgery.    This can be accomplished by consuming chicken, fish, tuna fish, cottage cheese, cheese, eggs, Greek yogurt, and protein shakes as needed.  I encouraged use of protein shakes such ENLIVE.  I also recommended making your own protein shakes with protein powder.   Sleep/Stress management  Patient was encouraged  to rest their body prior to surgery.  Encouraged attempting to get 8 hours of sleep at night.  Avoid stress.  Avoid sick contacts.  Encouraged to find a relaxing hobby such as reading, meditation, listening to music.  Training exercises  Patient was encouraged to remain active as possible.  Today bilateral lower extremity generic exercises were taught for muscle strengthening and balance.  All exercises to be done sitting down.         Past Medical History:   Diagnosis Date    Acid reflux     Cardiomyopathy, unspecified type ([facility]) 01/17/2024    Hyperlipidemia     Ischemic cardiomyopathy     Lacunar infarction ([facility])     Near syncope 11/14/2019    NSTEMI (non-ST elevated myocardial infarction) ([facility]) 10/20/2019    Pericarditis 10/24/2019    Poison ivy     Rectal cancer ([facility])     Recurrent major depressive disorder, in full remission ([facility]) 08/07/2023    STEMI involving left anterior descending coronary artery ([facility]) 05/19/2023    Stroke ([facility])        Past Surgical History:   Procedure Laterality Date    CARDIAC CATHETERIZATION N/A 05/19/2023    Procedure: Cardiac pci;  Surgeon: Ted Núñez MD;  Location: [facility];  Service: Cardiology    CORONARY ANGIOPLASTY WITH STENT PLACEMENT  05/19/2023    BMS pLAD    CORONARY ANGIOPLASTY WITH STENT PLACEMENT  10/21/2019    LUDIVINA x 2- dLAD and oD1, balloon only- mLAD    HERNIA REPAIR Left 04/28/2021    Procedure: OPEN REPAIR HERNIA INGUINAL LEFT WITH MESH;  Surgeon: Carlos Nguyen MD;  Location: [facility];  Service: General    IR PORT PLACEMENT  08/31/2023       Current Outpatient Medications   Medication Sig Dispense Refill    aspirin (ECOTRIN LOW STRENGTH) 81 mg EC tablet Take 1 tablet (81 mg total) by mouth daily Please purchase over the counter at her local pharmacy 30 tablet 1    atorvastatin (LIPITOR) 80 mg tablet Take 1 tablet (80 mg total) by mouth daily with dinner 90 tablet 3    Blood Pressure KIT by Does not apply route daily 1 each 0    diphenhydramine,  lidocaine, Al/Mg hydroxide, simethicone (Magic Mouthwash) SUSP Swish and spit 10 mL every 6 (six) hours as needed for mouth pain or discomfort 237 mL 0    lisinopril (ZESTRIL) 2.5 mg tablet Take 1 tablet (2.5 mg total) by mouth daily 90 tablet 1    metoprolol succinate (TOPROL-XL) 25 mg 24 hr tablet take 1 tablet by mouth daily 90 tablet 3    sucralfate (CARAFATE) 1 g tablet Take 1 tablet (1 g total) by mouth 4 (four) times a day (Patient taking differently: Take 1 g by mouth if needed) 120 tablet 3     No current facility-administered medications for this visit.        Allergies   Allergen Reactions    Wound Dressing Adhesive Hives     Noted with Plastic adhesive bandage brand Curity       Review of Systems   Constitutional:  Negative for chills, fatigue and fever.   HENT:  Negative for postnasal drip and sore throat.    Respiratory:  Negative for cough and choking.    Cardiovascular:  Negative for chest pain, palpitations and leg swelling.   Gastrointestinal:  Negative for diarrhea, nausea, rectal pain and vomiting.   Genitourinary:  Negative for difficulty urinating.   Skin:  Negative for color change, pallor, rash and wound.   Neurological:  Negative for dizziness, facial asymmetry, light-headedness and headaches.   Psychiatric/Behavioral:  Negative for confusion, decreased concentration, dysphoric mood and hallucinations.    All other systems reviewed and are negative.      Video Exam    There were no vitals filed for this visit.    Physical Exam  HENT:      Head: Normocephalic.   Pulmonary:      Effort: Pulmonary effort is normal.   Neurological:      General: No focal deficit present.      Mental Status: He is alert and oriented to person, place, and time. Mental status is at baseline.   Psychiatric:         Mood and Affect: Mood normal.         Behavior: Behavior normal.         Thought Content: Thought content normal.         Judgment: Judgment normal.          Visit Time  Total Visit Duration: 30 minutes      Patient has the ability to take their vital signs at home NO  Allergies reviewed today   PMH reviewed today   Medications reviewed today      See Geriatric Assessment below...  Cognitive Assessment:   CAM:   TUG <15 sec:  Falls (last 6 months): no  Hand  score:  -Attempt 1:  -Attempt 2:  -Attempt 3:  Roldan Total Score: 21  PHQ- 9 Depression Scale:2  Nutrition Assessment Score:12  METS: 8.33  Incentive Spirometry Level:   Health goals:  -What are your overall health goals? Be cancer free     -What brings you strength? family     -What activities are important to you? Watch sports      As always we discussed having your BEST surgery, and BEST recovery.  Surgery goals reviewed today.       Breathing exercises   Patient was encouraged to begin lung exercises today.  This could be accomplished through deep breathing and cough exercises.   Eating/nutrition   Encouraged patient to increase oral protein intake prior to surgery.  This can be accomplished by consuming chicken, fish, tuna fish, cottage cheese, cheese, eggs, Greek yogurt, and protein shakes as needed.  I encouraged use of protein shakes such ENLIVE.  I also recommended making your own protein shakes with protein powder.      Sleep/Stress management  Patient was encouraged to rest their body prior to surgery.  Encouraged attempting to get 8 hours of sleep at night.  Avoid stress.  Avoid sick contacts.  Encouraged to find a relaxing hobby such as reading, meditation, listening to music.  Training exercises  Patient was encouraged to remain active as possible.  Today bilateral lower extremity generic exercises were taught for muscle strengthening and balance.  All exercises to be done sitting down.

## 2024-06-19 ENCOUNTER — TELEMEDICINE (OUTPATIENT)
Dept: ANESTHESIOLOGY | Facility: CLINIC | Age: 79
End: 2024-06-19
Payer: COMMERCIAL

## 2024-06-19 ENCOUNTER — PATIENT OUTREACH (OUTPATIENT)
Dept: CASE MANAGEMENT | Facility: HOSPITAL | Age: 79
End: 2024-06-19

## 2024-06-19 DIAGNOSIS — Z01.89 ENCOUNTER FOR GERIATRIC ASSESSMENT: ICD-10-CM

## 2024-06-19 DIAGNOSIS — D69.6 THROMBOCYTOPENIA (HCC): ICD-10-CM

## 2024-06-19 DIAGNOSIS — Z86.73 HISTORY OF CVA (CEREBROVASCULAR ACCIDENT): Chronic | ICD-10-CM

## 2024-06-19 DIAGNOSIS — Z95.5 S/P PRIMARY ANGIOPLASTY WITH CORONARY STENT: ICD-10-CM

## 2024-06-19 DIAGNOSIS — I10 ESSENTIAL HYPERTENSION: Primary | Chronic | ICD-10-CM

## 2024-06-19 DIAGNOSIS — Z86.79 HISTORY OF ISCHEMIC CARDIOMYOPATHY: Chronic | ICD-10-CM

## 2024-06-19 DIAGNOSIS — C20 RECTAL CANCER (HCC): ICD-10-CM

## 2024-06-19 DIAGNOSIS — E46 PROTEIN-CALORIE MALNUTRITION, UNSPECIFIED SEVERITY (HCC): ICD-10-CM

## 2024-06-19 PROCEDURE — 99203 OFFICE O/P NEW LOW 30 MIN: CPT | Performed by: NURSE PRACTITIONER

## 2024-06-19 NOTE — PROGRESS NOTES
[facility]  CONSULT       Patient has the ability to take their vital signs at home NO  Allergies reviewed today   PMH reviewed today   Medications reviewed today     See Geriatric Assessment below...  Cognitive Assessment:   CAM:   TUG <15 sec:  Falls (last 6 months): no  Hand  score:  -Attempt 1:  -Attempt 2:  -Attempt 3:  Roldan Total Score: 21  PHQ- 9 Depression Scale:2  Nutrition Assessment Score:12  METS: 8.33  Incentive Spirometry Level:   Health goals:  -What are your overall health goals? Be cancer free    -What brings you strength? family    -What activities are important to you? Watch sports     As always we discussed having your BEST surgery, and BEST recovery.  Surgery goals reviewed today.      Breathing exercises   Patient was encouraged to begin lung exercises today.  This could be accomplished through deep breathing and cough exercises.   Eating/nutrition   Encouraged patient to increase oral protein intake prior to surgery.  This can be accomplished by consuming chicken, fish, tuna fish, cottage cheese, cheese, eggs, Greek yogurt, and protein shakes as needed.  I encouraged use of protein shakes such ENLIVE.  I also recommended making your own protein shakes with protein powder.     Sleep/Stress management  Patient was encouraged to rest their body prior to surgery.  Encouraged attempting to get 8 hours of sleep at night.  Avoid stress.  Avoid sick contacts.  Encouraged to find a relaxing hobby such as reading, meditation, listening to music.  Training exercises  Patient was encouraged to remain active as possible.  Today bilateral lower extremity generic exercises were taught for muscle strengthening and balance.  All exercises to be done sitting down.

## 2024-06-19 NOTE — PROGRESS NOTES
Closing OncSW episode at this time, no contact with pt or his daughter since Jan 2024.  MSW will remain available to them both as needed moving forward.

## 2024-06-26 ENCOUNTER — OFFICE VISIT (OUTPATIENT)
Facility: HOSPITAL | Age: 79
End: 2024-06-26
Payer: COMMERCIAL

## 2024-06-26 ENCOUNTER — TELEPHONE (OUTPATIENT)
Age: 79
End: 2024-06-26

## 2024-06-26 DIAGNOSIS — C20 RECTAL CANCER (HCC): Primary | ICD-10-CM

## 2024-06-26 DIAGNOSIS — Z71.89 ENCOUNTER FOR OSTOMY NURSE CONSULTATION: ICD-10-CM

## 2024-06-26 PROCEDURE — 99203 OFFICE O/P NEW LOW 30 MIN: CPT

## 2024-06-26 PROCEDURE — 99213 OFFICE O/P EST LOW 20 MIN: CPT

## 2024-06-26 NOTE — TELEPHONE ENCOUNTER
Patients GI provider:  Dr. Hall    Number to return call: [phone]    Reason for call: Pt's daughter calling to inquire which meds he can or cannot take prior to proced. Transferred to [facility] for further assistance.     Scheduled procedure/appointment date if applicable: procedure 07/03/2024

## 2024-06-26 NOTE — PATIENT INSTRUCTIONS
Stoma marking placed on the right and left lower abdomen within visual view no noted creases and on a flat portion of the abdomen . Given info patient about when the surgery is done we will follow for ostomy care and teaching .

## 2024-06-26 NOTE — PROGRESS NOTES
Patient ID: Minesh Pandya Jr. is a 79 y.o. male Date of Birth 1945       Chief Complaint   Patient presents with    New Patient Visit     Marking for a ostomy        Allergies:  Wound dressing adhesive    Diagnosis:      Diagnosis ICD-10-CM Associated Orders   1. Rectal cancer (HCC)  C20 Wound cleansing and dressings      2. Encounter for ostomy nurse consultation  Z71.89               Assessment & Plan:  Ostomy marking completed today.  Patient seen in conjunction with WOCN. Patient marked on both sides of the abdomen due to unclear ostomy type as outcome of surgery.  The patient was visualized in the sitting, standing, and laying positions to assess the contours of the abdomen. The rectus muscle was identified by patient performing crunch style maneuver. The patient was ultimately marked in the left and right lower quadrants. These areas are flat, within the rectus muscle, and are in good view of the patient. It is away from bony prominences, scars and the umbilicus. The skin was marked using a skin marker and marking was covered using a tegaderm dressing. The patient given a skin marker and additional tegaderm dressings to maintain the marking, educated to darken the marking and cover with tegaderm dressing as needed.   Education: inpatient post operative teaching expectations, 1 and 2 piece ostomy pouches, closure of pouches, and how to empty the pouch.   Educational materials provided to patient: deferred due to unclear ostomy type  Patient made aware that stoma marking is a guide, and the final location of the stoma will be determined by the surgeon.  Discussed with patient that his daughter can attend ostomy teaching session while hospitalized if she desires.   Patient verbalized understanding.  Wound care team will follow along with patient during inpatient stay for ostomy teaching and education.            Subjective:   6/26/24: Patient is a 79 year old male who presents to the ostomy clinic for ostomy  markings prior to surgery. Patient is scheduled to undergo LAR on 7/3/24 with colo-rectal service. Patient has a medical history significant for rectal cancer. Patient is currently not working/. They report no significant weight loss or gain during treatment. They wear their pants at the level of the hip. Reviewed rationale of stoma marking with patient, and they are agreeable to proceed. Patient reports he lives with his daughter who has worked has a CNA in the past who has some ostomy experience.        The following portions of the patient's history were reviewed and updated as appropriate:   Patient Active Problem List   Diagnosis    CAD in native artery    S/P primary angioplasty with coronary stent    History of ischemic cardiomyopathy    History of lacunar cerebrovascular accident (CVA)    Non-recurrent unilateral inguinal hernia    Essential hypertension    Protein-calorie malnutrition, unspecified severity (HCC)    Moderate protein-calorie malnutrition (HCC)    Ischemic cardiomyopathy    Renal cyst    Enlarged prostate    History of CVA (cerebrovascular accident)    Phimosis    Rectal cancer (HCC)    Port-A-Cath in place    Thrombocytopenia ([facility])    Encounter for geriatric assessment     Past Medical History:   Diagnosis Date    Acid reflux     Cardiomyopathy, unspecified type ([facility]) 01/17/2024    Hyperlipidemia     Ischemic cardiomyopathy     Lacunar infarction ([facility])     Near syncope 11/14/2019    NSTEMI (non-ST elevated myocardial infarction) ([facility]) 10/20/2019    Pericarditis 10/24/2019    Poison ivy     Rectal cancer ([facility])     Recurrent major depressive disorder, in full remission ([facility]) 08/07/2023    STEMI involving left anterior descending coronary artery ([facility]) 05/19/2023    Stroke ([facility])      Past Surgical History:   Procedure Laterality Date    CARDIAC CATHETERIZATION N/A 05/19/2023    Procedure: Cardiac pci;  Surgeon: Ted Núñez MD;  Location: [facility];  Service: Cardiology    CORONARY  ANGIOPLASTY WITH STENT PLACEMENT  2023    BMS pLAD    CORONARY ANGIOPLASTY WITH STENT PLACEMENT  10/21/2019    LUDIVINA x 2- dLAD and oD1, balloon only- mLAD    HERNIA REPAIR Left 2021    Procedure: OPEN REPAIR HERNIA INGUINAL LEFT WITH MESH;  Surgeon: Carlos Nguyen MD;  Location: [facility];  Service: General    IR PORT PLACEMENT  2023     Family History   Problem Relation Age of Onset    Breast cancer Mother     Emphysema Father     Heart disease Maternal Grandfather     Heart attack Maternal Grandfather     Emphysema Paternal Grandfather       Social History     Socioeconomic History    Marital status:      Spouse name: Not on file    Number of children: Not on file    Years of education: Not on file    Highest education level: Not on file   Occupational History    Not on file   Tobacco Use    Smoking status: Former     Current packs/day: 0.00     Average packs/day: 1 pack/day for 15.0 years (15.0 ttl pk-yrs)     Types: Cigarettes     Start date: 1954     Quit date: 1969     Years since quittin.5    Smokeless tobacco: Never   Vaping Use    Vaping status: Never Used   Substance and Sexual Activity    Alcohol use: Never    Drug use: No    Sexual activity: Not Currently     Partners: Female   Other Topics Concern    Not on file   Social History Narrative    Not on file     Social Determinants of Health     Financial Resource Strain: Low Risk  (10/17/2022)    Overall Financial Resource Strain (CARDIA)     Difficulty of Paying Living Expenses: Not hard at all   Food Insecurity: No Food Insecurity (2024)    Hunger Vital Sign     Worried About Running Out of Food in the Last Year: Never true     Ran Out of Food in the Last Year: Never true   Transportation Needs: No Transportation Needs (2024)    PRAPARE - Transportation     Lack of Transportation (Medical): No     Lack of Transportation (Non-Medical): No   Physical Activity: Inactive (3/8/2021)    Exercise Vital Sign      Days of Exercise per Week: 0 days     Minutes of Exercise per Session: 0 min   Stress: No Stress Concern Present (12/4/2023)    [place] [employer] [place] [employer] - Occupational Stress Questionnaire     Feeling of Stress : Not at all   Social Connections: Not on file   Intimate Partner Violence: Not on file   Housing Stability: Low Risk  (4/16/2024)    Housing Stability Vital Sign     Unable to Pay for Housing in the Last Year: No     Number of Times Moved in the Last Year: 1     Homeless in the Last Year: No        Current Outpatient Medications:     aspirin (ECOTRIN LOW STRENGTH) 81 mg EC tablet, Take 1 tablet (81 mg total) by mouth daily Please purchase over the counter at her local pharmacy, Disp: 30 tablet, Rfl: 1    atorvastatin (LIPITOR) 80 mg tablet, Take 1 tablet (80 mg total) by mouth daily with dinner, Disp: 90 tablet, Rfl: 3    Blood Pressure KIT, by Does not apply route daily, Disp: 1 each, Rfl: 0    diphenhydramine, lidocaine, Al/Mg hydroxide, simethicone (Magic Mouthwash) SUSP, Swish and spit 10 mL every 6 (six) hours as needed for mouth pain or discomfort, Disp: 237 mL, Rfl: 0    lisinopril (ZESTRIL) 2.5 mg tablet, Take 1 tablet (2.5 mg total) by mouth daily, Disp: 90 tablet, Rfl: 1    metoprolol succinate (TOPROL-XL) 25 mg 24 hr tablet, take 1 tablet by mouth daily, Disp: 90 tablet, Rfl: 3    sucralfate (CARAFATE) 1 g tablet, Take 1 tablet (1 g total) by mouth 4 (four) times a day (Patient taking differently: Take 1 g by mouth if needed), Disp: 120 tablet, Rfl: 3    Review of Systems   Constitutional:  Negative for chills and fever.   HENT:  Negative for congestion and sneezing.    Respiratory:  Negative for cough and shortness of breath.    Cardiovascular:  Negative for chest pain.   Gastrointestinal:  Negative for abdominal distention and abdominal pain.   Musculoskeletal:  Negative for gait problem.   Psychiatric/Behavioral:  Negative for agitation.        Objective:  There were no vitals  "taken for this visit.        Physical Exam  Constitutional:       General: He is awake. He is not in acute distress.     Appearance: He is not ill-appearing, toxic-appearing or diaphoretic.   HENT:      Head: Normocephalic and atraumatic.      Right Ear: External ear normal.      Left Ear: External ear normal.   Eyes:      Conjunctiva/sclera: Conjunctivae normal.   Pulmonary:      Effort: Pulmonary effort is normal. No respiratory distress.   Abdominal:      General: Abdomen is flat. There is no distension.      Palpations: Abdomen is soft.      Tenderness: There is no abdominal tenderness. There is no guarding or rebound.      Hernia: No hernia is present.      Comments: Abdomen is soft and flat without significant creasing. Wears pants low on the hip. No apparent abdominal hernia. No surgical scars or bony prominences to avoid.    Musculoskeletal:      Cervical back: Neck supple.   Skin:     General: Skin is warm and dry.   Neurological:      Mental Status: He is alert and oriented to person, place, and time.      Gait: Gait normal.   Psychiatric:         Mood and Affect: Mood normal.         Behavior: Behavior normal.                   Lab Results   Component Value Date    HGBA1C 5.5 06/06/2024       Wound Instructions:  Orders Placed This Encounter   Procedures    Wound cleansing and dressings     Stoma marking     Standing Status:   Future     Standing Expiration Date:   6/26/2024           HUSSAIN Orourke, RAZA, DARSHAN    Portions of the record may have been created with voice recognition software. Occasional wrong word or \"sound alike\" substitutions may have occurred due to the inherent limitations of voice recognition software. Read the chart carefully and recognize, using context, where substitutions have occurred.          Total time spent today:    Total time (face-to-face and non-face-to-face) spent on today's visit was 20 minutes. This includes preparation for the visits (i.e. reviewing test " results from date recent hospitalizations, ER/Urgent Care/primary care visits and recent consultant office visits- colo-rectal visit from 5/23/24), performance of a medically appropriate history and examination, and orders for medications or testing.

## 2024-06-27 ENCOUNTER — TELEPHONE (OUTPATIENT)
Dept: CARDIOLOGY CLINIC | Facility: CLINIC | Age: 79
End: 2024-06-27

## 2024-06-27 NOTE — PROGRESS NOTES
Progress Note - Cardiology Office  [facility]    Minesh Pandya Jr. 79 y.o. male MRN: [id number]  : 1945  Encounter: [id number]      ASSESSMENT:   Perioperative cardiac risk assessment for laparoscopic: Resection, end colostomy under general anesthesia on 2024 with colorectal service    Rectal cancer    CAD s/p PCI to dLAD and PTCA to D1 (10/2019) and BMS to pLAD (23)     Pharmacologic nuclear stress test, 2024  There is a left ventricular perfusion defect that is medium in size present in the apical anterior location(s) that is fixed.  EF 50%     Ischemic cardiomyopathy  TTE, 2024   EF 50%, G1 DD, mild AR and MR    Hx of lacunar CVA    Essential hypertension    Rectal cancer      RECOMMENDATIONS:  Patient's pharmacologic nuclear stress test on 2024 revealed fixed apical anterior defect with no diagnostic evidence of ischemia.  His ejection fraction on echocardiogram and nuclear stress test was 50%.  Based on above test results, there is no cardiac contraindication to proceeding with the above planned surgery.  His perioperative cardiac risk for the surgery is intermediate.  May hold aspirin for 5 days preop and then resume once cleared by the surgeon      Please call [phone] if any questions.    HPI :     Minesh Pandya Jr. is a 79 y.o. year old male who came for perioperative cardiac risk assessment for laparoscopic bowel resection.  Patient has rectal cancer.  He also has history of CAD and PCI/PTCA.  His recent pharmacologic nuclear stress test did not reveal any significant ischemia.  His ejection fraction is 50%.  He denies any chest pain or dyspnea.  Patient has intermediate perioperative cardiac risk for above planned surgery.    REVIEW OF SYSTEMS:  Denies any new or acute cardiac symptoms.  Denies any chest pain, unusual dyspnea, palpitations or syncope      Historical Information   Past Medical History:   Diagnosis Date    Acid reflux      Cardiomyopathy, unspecified type ([facility]) 2024    Hyperlipidemia     Ischemic cardiomyopathy     Lacunar infarction ([facility])     Near syncope 2019    NSTEMI (non-ST elevated myocardial infarction) ([facility]) 10/20/2019    Pericarditis 10/24/2019    Poison ivy     Rectal cancer ([facility])     Recurrent major depressive disorder, in full remission ([facility]) 2023    STEMI involving left anterior descending coronary artery ([facility]) 2023    Stroke ([facility])      Past Surgical History:   Procedure Laterality Date    CARDIAC CATHETERIZATION N/A 2023    Procedure: Cardiac pci;  Surgeon: Ted Núñez MD;  Location: [facility];  Service: Cardiology    CORONARY ANGIOPLASTY WITH STENT PLACEMENT  2023    BMS pLAD    CORONARY ANGIOPLASTY WITH STENT PLACEMENT  10/21/2019    LUDIVINA x 2- dLAD and oD1, balloon only- mLAD    HERNIA REPAIR Left 2021    Procedure: OPEN REPAIR HERNIA INGUINAL LEFT WITH MESH;  Surgeon: Carlos Nguyen MD;  Location: [facility] MAIN [facility];  Service: General    IR PORT PLACEMENT  2023     Social History     Substance and Sexual Activity   Alcohol Use Never     Social History     Substance and Sexual Activity   Drug Use No     Social History     Tobacco Use   Smoking Status Former    Current packs/day: 0.00    Average packs/day: 1 pack/day for 15.0 years (15.0 ttl pk-yrs)    Types: Cigarettes    Start date: 1954    Quit date: 1969    Years since quittin.5   Smokeless Tobacco Never     Family History:   Family History   Problem Relation Age of Onset    Breast cancer Mother     Emphysema Father     Heart disease Maternal Grandfather     Heart attack Maternal Grandfather     Emphysema Paternal Grandfather        Meds/Allergies     Allergies   Allergen Reactions    Wound Dressing Adhesive Hives     Noted with Plastic adhesive bandage brand Curity       Current Outpatient Medications:     atorvastatin (LIPITOR) 80 mg tablet, Take 1 tablet (80 mg total) by mouth daily with  "dinner, Disp: 90 tablet, Rfl: 3    Blood Pressure KIT, by Does not apply route daily, Disp: 1 each, Rfl: 0    diphenhydramine, lidocaine, Al/Mg hydroxide, simethicone (Magic Mouthwash) SUSP, Swish and spit 10 mL every 6 (six) hours as needed for mouth pain or discomfort, Disp: 237 mL, Rfl: 0    lisinopril (ZESTRIL) 2.5 mg tablet, Take 1 tablet (2.5 mg total) by mouth daily, Disp: 90 tablet, Rfl: 1    metoprolol succinate (TOPROL-XL) 25 mg 24 hr tablet, take 1 tablet by mouth daily, Disp: 90 tablet, Rfl: 3    aspirin (ECOTRIN LOW STRENGTH) 81 mg EC tablet, Take 1 tablet (81 mg total) by mouth daily Please purchase over the counter at her local pharmacy, Disp: 30 tablet, Rfl: 1    sucralfate (CARAFATE) 1 g tablet, Take 1 tablet (1 g total) by mouth 4 (four) times a day (Patient taking differently: Take 1 g by mouth if needed), Disp: 120 tablet, Rfl: 3    Vitals: Blood pressure 112/82, pulse 56, height 5' 9\" (1.753 m), weight 61.2 kg (135 lb), SpO2 99%.    Body mass index is 19.94 kg/m².  Vitals:    06/28/24 0826   Weight: 61.2 kg (135 lb)     BP Readings from Last 3 Encounters:   06/28/24 112/82   05/23/24 122/72   05/02/24 116/72       Physical Exam:  Physical Exam    Neurologic:  Alert & oriented x 3, no new focal deficits, Not in any acute distress,  Constitutional: Underweight  Eyes:  Pupil equal and reacting to light, conjunctiva normal,   HENT:  Atraumatic, oropharynx moist, Neck- normal range of motion, no tenderness,  Neck supple, No JVP, No LNP   Respiratory:  Bilateral air entry, mostly clear to auscultation  Cardiovascular: S1-S2 regular with a I/VI systolic murmur   GI:  Soft, nondistended, normal bowel sounds, nontender, no hepatosplenomegaly appreciated.  Musculoskeletal:  No tenderness, no deformities.   Skin:  Well hydrated, no rash   Lymphatic:  No lymphadenopathy noted   Extremities:  No edema and distal pulses are present        Diagnostic Studies Review Cardio:      EKG: Sinus bradycardia, heart " rate 56/min, septal infarct of undetermined age, nonspecific T wave abnormality    Cardiac testing:   Results for orders placed during the hospital encounter of 10/20/19    Echo complete with contrast if indicated    Narrative  [address]  [place] [address]  [phone]    Transthoracic Echocardiogram  2D, M-mode, Doppler, and Color Doppler    Study date:  20-Oct-2019    Patient: JORGE L ABREU  MR number: [id number]  Account number: [id number]  : 1945  Age: 74 years  Gender: Male  Status: Inpatient  Location: Bedside  Height: 70 in  Weight: 133 lb  BP: 119/ 69 mmHg    Indications: Evaluate suspected myocardial infarction.    Diagnoses: I21.4 - Non-ST elevation (NSTEMI) myocardial infarction    Sonographer:  Hannah Rodriguez RDCS  Referring Physician:  Milton Oliveira PA-C  Group:  [facility]  Interpreting Physician:  An Millard MD    SUMMARY    LEFT VENTRICLE:  Ejection fraction was estimated to be 40 %. Inferior wall,apex, apical septum and lateral wall are hypokinetic.    MITRAL VALVE:  There was trace regurgitation.    TRICUSPID VALVE:  There was trace regurgitation.    HISTORY: Symptoms: chest pain. PRIOR HISTORY: Shortness of breath, NSTEMI,    PROCEDURE: The procedure was performed at the bedside. This was a routine study. The transthoracic approach was used. The study included complete 2D imaging, M-mode, complete spectral Doppler, and color Doppler. The heart rate was 58 bpm,  at the start of the study. Images were obtained from the parasternal, apical, subcostal, and suprasternal notch acoustic windows. Echocardiographic views were limited due to low windows and lung interference. Image quality was adequate.    LEFT VENTRICLE: Size was normal. Ejection fraction was estimated to be 40 %. Inferior wall,apex, apical septum and lateral wall are hypokinetic.    RIGHT VENTRICLE: The size was normal. Systolic function was  normal. Wall thickness was normal.    LEFT ATRIUM: Size was normal.    RIGHT ATRIUM: Size was normal.    MITRAL VALVE: There was annular calcification. DOPPLER: There was trace regurgitation.    AORTIC VALVE: The valve was not well visualized. DOPPLER: There was no evidence for stenosis.    TRICUSPID VALVE: The valve structure was normal. There was normal leaflet separation. DOPPLER: The transtricuspid velocity was within the normal range. There was no evidence for stenosis. There was trace regurgitation.    PULMONIC VALVE: Leaflets exhibited normal thickness, no calcification, and normal cuspal separation. DOPPLER: The transpulmonic velocity was within the normal range. There was no regurgitation.    PERICARDIUM: There was no pericardial effusion. The pericardium was normal in appearance.    AORTA: The root exhibited upper limit of normal size.    SYSTEM MEASUREMENT TABLES    2D  %FS: 20.8 %  Ao Diam: 3.8 cm  EDV(Teich): 85.5 ml  EF(Teich): 42.6 %  ESV(Teich): 49.1 ml  IVSd: 0.8 cm  LA Area: 13.4 cm2  LA Diam: 3.5 cm  LVEDV MOD A4C: 104 ml  LVEF MOD A4C: 45.6 %  LVESV MOD A4C: 56.6 ml  LVIDd: 4.4 cm  LVIDs: 3.4 cm  LVLd A4C: 8.7 cm  LVLs A4C: 7 cm  LVPWd: 0.8 cm  RA Area: 12 cm2  RVIDd: 3.1 cm  SV MOD A4C: 47.5 ml  SV(Teich): 36.5 ml    MM  TAPSE: 1.6 cm    PW  E': 0.1 m/s  E/E': 6.6  MV A Antolin: 0.6 m/s  MV Dec Greenlee: 2.4 m/s2  MV DecT: 230.4 ms  MV E Antolin: 0.6 m/s  MV E/A Ratio: 0.9  MV PHT: 66.8 ms  MVA By PHT: 3.3 cm2    [employer] Accredited Echocardiography Laboratory    Prepared and electronically signed by    An Millard MD  Signed 20-Oct-2019 19:40:30      Results for orders placed during the hospital encounter of 01/08/24    Echo complete w/ contrast if indicated    Interpretation Summary    Left Ventricle: Left ventricular cavity size is normal. Wall thickness is normal. The left ventricular ejection fraction is 50%. Systolic function is low normal. Wall motion is normal. Diastolic  function is mildly abnormal, consistent with grade I (abnormal) relaxation.    Right Ventricle: Right ventricular cavity size is mildly dilated. Systolic function is normal.    Aortic Valve: There is mild regurgitation.    Mitral Valve: There is mild regurgitation.      Results for orders placed during the hospital encounter of 04/03/24    NM myocardial perfusion spect (rx stress and/or rest)    Interpretation Summary    Stress ECG: The ECG was not diagnostic due to pharmacological (vasodilator) stress.    Perfusion: There is a left ventricular perfusion defect that is medium in size present in the apical anterior location(s) that is fixed.    Perfusion Defect Conclusion: The stress/rest perfusion ratio is 1.02.    Stress Function: Left ventricular function post-stress is normal. Stress ejection fraction is 50%. There is a defect in the apical anterior location(s). The defect has moderately reduced function.    Abnormal study after pharmacologic vasodilation.  There was a medium size fixed defect of the anteroapical wall suggestive of infarct.  Left ventricular function was low normal with hypokinesis of the apical anterior wall noted.        Imaging:  Chest X-Ray:   XR chest pa & lateral    Result Date: 8/9/2023  Impression No acute cardiopulmonary disease. Resident: APARNA ARGUELLES I, the attending radiologist, have reviewed the images and agree with the final report above. Workstation performed: [id number]       CT-scan of the chest:     CTA chest pe study    Result Date: 12/21/2023  Impression No pulmonary embolus. No interval change in tiny 2 to 3 mm pulmonary nodules, and a borderline right hilar node. Sliding-type bowel hernia with thickening of the wall of the mid to lower esophagus suggesting reflux esophagitis. Cholelithiasis. Workstation performed: [id number]     CTA head and neck with and without contrast    Result Date: 11/24/2023  Impression No acute intracranial abnormality. Chronic lacunar infarcts  and microangiopathic changes as above Stable CTA examination of the head and neck compared to prior study dated 9/25/2020. No evidence of hemodynamically significant stenosis or large vessel occlusion within the major vessels of the [place]. Stable 2 mm probable infundibulum at the level of the right ICA terminus and at the ACOM/A2 junction. No significant stenosis of the cervical carotid or vertebral arteries. Workstation performed: [id number]     PE Study with CT abdomen & pelvis with contrast    Result Date: 9/6/2023  Impression 1.  No pulmonary embolism or other acute process in the chest. 2.  No acute findings in the abdomen or pelvis. 3.  Cholelithiasis without evidence of acute cholecystitis. 4.  Small hiatal hernia. 5.  Known rectal malignancy and suspected pulmonary metastasis, grossly stable from recent prior study. 6.  Mesorectal lymphadenopathy and borderline enlarged right hilar and aortocaval nodes are also unchanged. 7.  Unchanged indeterminate right hepatic lesion, possible additional site of metastasis. This may be further characterized by hepatic MRI if it would impact clinical management. 8.  Stable pancreatic cyst, recommendation remains for 2-year follow-up. Workstation performed: [id number]     Lab Review   Lab Results   Component Value Date    WBC 5.90 06/06/2024    HGB 13.2 06/06/2024    HCT 41.4 06/06/2024    MCV 90 06/06/2024    RDW 14.6 06/06/2024    PLT 92 (L) 06/06/2024     BMP:  Lab Results   Component Value Date    SODIUM 140 06/06/2024    K 4.3 06/06/2024     06/06/2024    CO2 25 06/06/2024    BUN 22 06/06/2024    CREATININE 1.08 06/06/2024    GLUC 152 (H) 02/23/2024    GLUF 89 06/06/2024    CALCIUM 8.8 06/06/2024    CORRECTEDCA 9.0 04/02/2024    EGFR 65 06/06/2024    MG 2.3 05/21/2023     LFT:  Lab Results   Component Value Date    AST 19 04/29/2024    ALT 13 04/29/2024    ALKPHOS 73 04/29/2024    TP 6.1 (L) 04/29/2024    ALB 3.6 04/29/2024      No components found  "for: \"TSH3\"  No results found for: \"[id number]\"  Lab Results   Component Value Date    HGBA1C 5.5 06/06/2024     Lipid Profile:   Lab Results   Component Value Date    CHOLESTEROL 84 11/25/2023    HDL 37 (L) 11/25/2023    LDLCALC 30 11/25/2023    TRIG 85 11/25/2023     Lab Results   Component Value Date    CHOLESTEROL 84 11/25/2023    CHOLESTEROL 97 08/07/2023     Lab Results   Component Value Date    TROPONINI <0.02 09/25/2020     Lab Results   Component Value Date    NTBNP 157 (H) 09/25/2020      Recent Results (from the past 672 hour(s))   Basic metabolic panel    Collection Time: 06/06/24  8:37 AM   Result Value Ref Range    Sodium 140 135 - 147 mmol/L    Potassium 4.3 3.5 - 5.3 mmol/L    Chloride 106 96 - 108 mmol/L    CO2 25 21 - 32 mmol/L    ANION GAP 9 4 - 13 mmol/L    BUN 22 5 - 25 mg/dL    Creatinine 1.08 0.60 - 1.30 mg/dL    Glucose, Fasting 89 65 - 99 mg/dL    Calcium 8.8 8.4 - 10.2 mg/dL    eGFR 65 ml/min/1.73sq m   CBC and differential    Collection Time: 06/06/24  8:37 AM   Result Value Ref Range    WBC 5.90 4.31 - 10.16 Thousand/uL    RBC 4.62 3.88 - 5.62 Million/uL    Hemoglobin 13.2 12.0 - 17.0 g/dL    Hematocrit 41.4 36.5 - 49.3 %    MCV 90 82 - 98 fL    MCH 28.6 26.8 - 34.3 pg    MCHC 31.9 31.4 - 37.4 g/dL    RDW 14.6 11.6 - 15.1 %    MPV 12.2 8.9 - 12.7 fL    Platelets 92 (L) 149 - 390 Thousands/uL    nRBC 0 /100 WBCs    Segmented % 66 43 - 75 %    Immature Grans % 0 0 - 2 %    Lymphocytes % 21 14 - 44 %    Monocytes % 11 4 - 12 %    Eosinophils Relative 2 0 - 6 %    Basophils Relative 0 0 - 1 %    Absolute Neutrophils 3.82 1.85 - 7.62 Thousands/µL    Absolute Immature Grans 0.02 0.00 - 0.20 Thousand/uL    Absolute Lymphocytes 1.24 0.60 - 4.47 Thousands/µL    Absolute Monocytes 0.67 0.17 - 1.22 Thousand/µL    Eosinophils Absolute 0.13 0.00 - 0.61 Thousand/µL    Basophils Absolute 0.02 0.00 - 0.10 Thousands/µL   Hemoglobin A1C    Collection Time: 06/06/24  8:37 AM   Result Value Ref Range    " "Hemoglobin A1C 5.5 Normal 4.0-5.6%; PreDiabetic 5.7-6.4%; Diabetic >=6.5%; Glycemic control for adults with diabetes <7.0% %     mg/dl   Type and screen    Collection Time: 06/06/24  8:37 AM   Result Value Ref Range    ABO Grouping A     Rh Factor Negative     Antibody Screen Negative     Specimen Expiration Date [id number]              Dr. Alex Nguyễn MD, [facility]      \"This note has been constructed using a voice recognition system.Therefore there may be syntax, spelling, and/or grammatical errors. Please call if you have any questions. \"  "

## 2024-06-27 NOTE — TELEPHONE ENCOUNTER
Due to inclement weather on 6/26/2024 office is close today (6/27/27).   Patient wife aware someone will call him to rescheduled his appointment with Dr Leyva.  This appointment is for cardiac clearance for surgery on 7/3/2024. If somebody is able to help this patient to rescheduled this appointment as soon as possible.  Patient wife will call the office as well.  .

## 2024-06-28 ENCOUNTER — OFFICE VISIT (OUTPATIENT)
Dept: CARDIOLOGY CLINIC | Facility: CLINIC | Age: 79
End: 2024-06-28
Payer: COMMERCIAL

## 2024-06-28 VITALS
HEIGHT: 69 IN | WEIGHT: 135 LBS | OXYGEN SATURATION: 99 % | SYSTOLIC BLOOD PRESSURE: 112 MMHG | HEART RATE: 56 BPM | DIASTOLIC BLOOD PRESSURE: 82 MMHG | BODY MASS INDEX: 19.99 KG/M2

## 2024-06-28 DIAGNOSIS — Z95.5 S/P PRIMARY ANGIOPLASTY WITH CORONARY STENT: ICD-10-CM

## 2024-06-28 DIAGNOSIS — I25.5 ISCHEMIC CARDIOMYOPATHY: ICD-10-CM

## 2024-06-28 DIAGNOSIS — I25.10 CAD IN NATIVE ARTERY: Chronic | ICD-10-CM

## 2024-06-28 DIAGNOSIS — Z01.818 PRE-OPERATIVE CLEARANCE: Primary | ICD-10-CM

## 2024-06-28 DIAGNOSIS — I10 ESSENTIAL HYPERTENSION: Chronic | ICD-10-CM

## 2024-06-28 PROCEDURE — 99214 OFFICE O/P EST MOD 30 MIN: CPT | Performed by: INTERNAL MEDICINE

## 2024-06-28 PROCEDURE — 93000 ELECTROCARDIOGRAM COMPLETE: CPT | Performed by: INTERNAL MEDICINE

## 2024-07-03 ENCOUNTER — ANESTHESIA (OUTPATIENT)
Dept: PERIOP | Facility: HOSPITAL | Age: 79
DRG: 330 | End: 2024-07-03
Payer: COMMERCIAL

## 2024-07-03 ENCOUNTER — HOSPITAL ENCOUNTER (INPATIENT)
Facility: HOSPITAL | Age: 79
LOS: 5 days | Discharge: HOME HEALTH | DRG: 330 | End: 2024-07-08
Attending: COLON & RECTAL SURGERY | Admitting: COLON & RECTAL SURGERY
Payer: COMMERCIAL

## 2024-07-03 DIAGNOSIS — Z01.89 ENCOUNTER FOR GERIATRIC ASSESSMENT: ICD-10-CM

## 2024-07-03 DIAGNOSIS — I21.02 STEMI INVOLVING LEFT ANTERIOR DESCENDING CORONARY ARTERY (HCC): ICD-10-CM

## 2024-07-03 DIAGNOSIS — I25.5 ISCHEMIC CARDIOMYOPATHY: ICD-10-CM

## 2024-07-03 DIAGNOSIS — C20 RECTAL CANCER (HCC): Primary | ICD-10-CM

## 2024-07-03 DIAGNOSIS — E46 PROTEIN-CALORIE MALNUTRITION, UNSPECIFIED SEVERITY (HCC): ICD-10-CM

## 2024-07-03 LAB — GLUCOSE SERPL-MCNC: 148 MG/DL (ref 65–140)

## 2024-07-03 PROCEDURE — 8E0W4CZ ROBOTIC ASSISTED PROCEDURE OF TRUNK REGION, PERCUTANEOUS ENDOSCOPIC APPROACH: ICD-10-PCS | Performed by: COLON & RECTAL SURGERY

## 2024-07-03 PROCEDURE — NC001 PR NO CHARGE: Performed by: COLON & RECTAL SURGERY

## 2024-07-03 PROCEDURE — 82948 REAGENT STRIP/BLOOD GLUCOSE: CPT

## 2024-07-03 PROCEDURE — 88309 TISSUE EXAM BY PATHOLOGIST: CPT | Performed by: PATHOLOGY

## 2024-07-03 PROCEDURE — NC001 PR NO CHARGE

## 2024-07-03 PROCEDURE — 0D1E4Z4 BYPASS LARGE INTESTINE TO CUTANEOUS, PERCUTANEOUS ENDOSCOPIC APPROACH: ICD-10-PCS | Performed by: COLON & RECTAL SURGERY

## 2024-07-03 PROCEDURE — 0DTP4ZZ RESECTION OF RECTUM, PERCUTANEOUS ENDOSCOPIC APPROACH: ICD-10-PCS | Performed by: COLON & RECTAL SURGERY

## 2024-07-03 PROCEDURE — 44206 LAP PART COLECTOMY W/STOMA: CPT | Performed by: COLON & RECTAL SURGERY

## 2024-07-03 PROCEDURE — S2900 ROBOTIC SURGICAL SYSTEM: HCPCS | Performed by: COLON & RECTAL SURGERY

## 2024-07-03 PROCEDURE — 0DTN4ZZ RESECTION OF SIGMOID COLON, PERCUTANEOUS ENDOSCOPIC APPROACH: ICD-10-PCS | Performed by: COLON & RECTAL SURGERY

## 2024-07-03 RX ORDER — ATORVASTATIN CALCIUM 80 MG/1
80 TABLET, FILM COATED ORAL
Status: DISCONTINUED | OUTPATIENT
Start: 2024-07-03 | End: 2024-07-08 | Stop reason: HOSPADM

## 2024-07-03 RX ORDER — SODIUM CHLORIDE, SODIUM LACTATE, POTASSIUM CHLORIDE, CALCIUM CHLORIDE 600; 310; 30; 20 MG/100ML; MG/100ML; MG/100ML; MG/100ML
INJECTION, SOLUTION INTRAVENOUS CONTINUOUS PRN
Status: DISCONTINUED | OUTPATIENT
Start: 2024-07-03 | End: 2024-07-03

## 2024-07-03 RX ORDER — FENTANYL CITRATE 50 UG/ML
INJECTION, SOLUTION INTRAMUSCULAR; INTRAVENOUS AS NEEDED
Status: DISCONTINUED | OUTPATIENT
Start: 2024-07-03 | End: 2024-07-03

## 2024-07-03 RX ORDER — FENTANYL CITRATE/PF 50 MCG/ML
25 SYRINGE (ML) INJECTION
Status: DISCONTINUED | OUTPATIENT
Start: 2024-07-03 | End: 2024-07-03 | Stop reason: HOSPADM

## 2024-07-03 RX ORDER — MAGNESIUM HYDROXIDE 1200 MG/15ML
LIQUID ORAL AS NEEDED
Status: DISCONTINUED | OUTPATIENT
Start: 2024-07-03 | End: 2024-07-03 | Stop reason: HOSPADM

## 2024-07-03 RX ORDER — SODIUM CHLORIDE 9 MG/ML
INJECTION, SOLUTION INTRAVENOUS CONTINUOUS PRN
Status: DISCONTINUED | OUTPATIENT
Start: 2024-07-03 | End: 2024-07-03

## 2024-07-03 RX ORDER — HEPARIN SODIUM 5000 [USP'U]/ML
5000 INJECTION, SOLUTION INTRAVENOUS; SUBCUTANEOUS ONCE
Status: COMPLETED | OUTPATIENT
Start: 2024-07-03 | End: 2024-07-03

## 2024-07-03 RX ORDER — GABAPENTIN 100 MG/1
100 CAPSULE ORAL 3 TIMES DAILY
Status: DISCONTINUED | OUTPATIENT
Start: 2024-07-03 | End: 2024-07-08 | Stop reason: HOSPADM

## 2024-07-03 RX ORDER — DEXAMETHASONE SODIUM PHOSPHATE 10 MG/ML
INJECTION, SOLUTION INTRAMUSCULAR; INTRAVENOUS AS NEEDED
Status: DISCONTINUED | OUTPATIENT
Start: 2024-07-03 | End: 2024-07-03

## 2024-07-03 RX ORDER — CEFAZOLIN SODIUM 2 G/50ML
2000 SOLUTION INTRAVENOUS ONCE
Status: COMPLETED | OUTPATIENT
Start: 2024-07-03 | End: 2024-07-03

## 2024-07-03 RX ORDER — ONDANSETRON 2 MG/ML
4 INJECTION INTRAMUSCULAR; INTRAVENOUS ONCE AS NEEDED
Status: DISCONTINUED | OUTPATIENT
Start: 2024-07-03 | End: 2024-07-03 | Stop reason: HOSPADM

## 2024-07-03 RX ORDER — HYDROMORPHONE HYDROCHLORIDE 2 MG/ML
INJECTION, SOLUTION INTRAMUSCULAR; INTRAVENOUS; SUBCUTANEOUS AS NEEDED
Status: DISCONTINUED | OUTPATIENT
Start: 2024-07-03 | End: 2024-07-03

## 2024-07-03 RX ORDER — METOPROLOL SUCCINATE 25 MG/1
25 TABLET, EXTENDED RELEASE ORAL DAILY
Status: DISCONTINUED | OUTPATIENT
Start: 2024-07-04 | End: 2024-07-08 | Stop reason: HOSPADM

## 2024-07-03 RX ORDER — ONDANSETRON 2 MG/ML
INJECTION INTRAMUSCULAR; INTRAVENOUS AS NEEDED
Status: DISCONTINUED | OUTPATIENT
Start: 2024-07-03 | End: 2024-07-03

## 2024-07-03 RX ORDER — METRONIDAZOLE 500 MG/100ML
500 INJECTION, SOLUTION INTRAVENOUS ONCE
Status: COMPLETED | OUTPATIENT
Start: 2024-07-03 | End: 2024-07-03

## 2024-07-03 RX ORDER — ROCURONIUM BROMIDE 10 MG/ML
INJECTION, SOLUTION INTRAVENOUS AS NEEDED
Status: DISCONTINUED | OUTPATIENT
Start: 2024-07-03 | End: 2024-07-03

## 2024-07-03 RX ORDER — INDOCYANINE GREEN AND WATER 25 MG
KIT INJECTION AS NEEDED
Status: DISCONTINUED | OUTPATIENT
Start: 2024-07-03 | End: 2024-07-03

## 2024-07-03 RX ORDER — ACETAMINOPHEN 325 MG/1
975 TABLET ORAL EVERY 6 HOURS SCHEDULED
Status: DISCONTINUED | OUTPATIENT
Start: 2024-07-03 | End: 2024-07-08 | Stop reason: HOSPADM

## 2024-07-03 RX ORDER — SODIUM CHLORIDE, SODIUM LACTATE, POTASSIUM CHLORIDE, CALCIUM CHLORIDE 600; 310; 30; 20 MG/100ML; MG/100ML; MG/100ML; MG/100ML
100 INJECTION, SOLUTION INTRAVENOUS CONTINUOUS
Status: DISCONTINUED | OUTPATIENT
Start: 2024-07-03 | End: 2024-07-04

## 2024-07-03 RX ORDER — HEPARIN SODIUM 5000 [USP'U]/ML
5000 INJECTION, SOLUTION INTRAVENOUS; SUBCUTANEOUS EVERY 8 HOURS SCHEDULED
Status: COMPLETED | OUTPATIENT
Start: 2024-07-03 | End: 2024-07-04

## 2024-07-03 RX ORDER — EPHEDRINE SULFATE 50 MG/ML
INJECTION INTRAVENOUS AS NEEDED
Status: DISCONTINUED | OUTPATIENT
Start: 2024-07-03 | End: 2024-07-03

## 2024-07-03 RX ORDER — METHOCARBAMOL 500 MG/1
500 TABLET, FILM COATED ORAL EVERY 6 HOURS SCHEDULED
Status: DISCONTINUED | OUTPATIENT
Start: 2024-07-03 | End: 2024-07-08 | Stop reason: HOSPADM

## 2024-07-03 RX ORDER — LIDOCAINE HYDROCHLORIDE 10 MG/ML
INJECTION, SOLUTION EPIDURAL; INFILTRATION; INTRACAUDAL; PERINEURAL AS NEEDED
Status: DISCONTINUED | OUTPATIENT
Start: 2024-07-03 | End: 2024-07-03

## 2024-07-03 RX ORDER — ALBUMIN, HUMAN INJ 5% 5 %
SOLUTION INTRAVENOUS CONTINUOUS PRN
Status: DISCONTINUED | OUTPATIENT
Start: 2024-07-03 | End: 2024-07-03

## 2024-07-03 RX ORDER — HYDROMORPHONE HCL/PF 1 MG/ML
0.5 SYRINGE (ML) INJECTION
Status: DISCONTINUED | OUTPATIENT
Start: 2024-07-03 | End: 2024-07-03 | Stop reason: HOSPADM

## 2024-07-03 RX ORDER — DIPHENHYDRAMINE HYDROCHLORIDE 50 MG/ML
12.5 INJECTION INTRAMUSCULAR; INTRAVENOUS
Status: DISCONTINUED | OUTPATIENT
Start: 2024-07-03 | End: 2024-07-03 | Stop reason: HOSPADM

## 2024-07-03 RX ORDER — ENOXAPARIN SODIUM 100 MG/ML
30 INJECTION SUBCUTANEOUS DAILY
Status: DISCONTINUED | OUTPATIENT
Start: 2024-07-04 | End: 2024-07-03

## 2024-07-03 RX ORDER — PROPOFOL 10 MG/ML
INJECTION, EMULSION INTRAVENOUS AS NEEDED
Status: DISCONTINUED | OUTPATIENT
Start: 2024-07-03 | End: 2024-07-03

## 2024-07-03 RX ADMIN — HYDROMORPHONE HYDROCHLORIDE 0.5 MG: 2 INJECTION, SOLUTION INTRAMUSCULAR; INTRAVENOUS; SUBCUTANEOUS at 14:20

## 2024-07-03 RX ADMIN — ROCURONIUM BROMIDE 20 MG: 10 INJECTION, SOLUTION INTRAVENOUS at 14:13

## 2024-07-03 RX ADMIN — ACETAMINOPHEN 975 MG: 325 TABLET, FILM COATED ORAL at 23:21

## 2024-07-03 RX ADMIN — SODIUM CHLORIDE: 0.9 INJECTION, SOLUTION INTRAVENOUS at 13:21

## 2024-07-03 RX ADMIN — HYDROMORPHONE HYDROCHLORIDE 0.5 MG: 2 INJECTION, SOLUTION INTRAMUSCULAR; INTRAVENOUS; SUBCUTANEOUS at 16:40

## 2024-07-03 RX ADMIN — LIDOCAINE HYDROCHLORIDE 50 MG: 10 INJECTION, SOLUTION EPIDURAL; INFILTRATION; INTRACAUDAL; PERINEURAL at 13:14

## 2024-07-03 RX ADMIN — DEXMEDETOMIDINE HYDROCHLORIDE 8 MCG: 100 INJECTION, SOLUTION INTRAVENOUS at 13:06

## 2024-07-03 RX ADMIN — CEFAZOLIN SODIUM 2000 MG: 2 SOLUTION INTRAVENOUS at 13:24

## 2024-07-03 RX ADMIN — INDOCYANINE GREEN AND WATER 2.5 MG: KIT at 15:53

## 2024-07-03 RX ADMIN — SODIUM CHLORIDE, SODIUM LACTATE, POTASSIUM CHLORIDE, AND CALCIUM CHLORIDE: .6; .31; .03; .02 INJECTION, SOLUTION INTRAVENOUS at 16:46

## 2024-07-03 RX ADMIN — HEPARIN SODIUM 5000 UNITS: 5000 INJECTION INTRAVENOUS; SUBCUTANEOUS at 18:51

## 2024-07-03 RX ADMIN — METRONIDAZOLE: 500 SOLUTION INTRAVENOUS at 13:21

## 2024-07-03 RX ADMIN — FENTANYL CITRATE 50 MCG: 50 INJECTION INTRAMUSCULAR; INTRAVENOUS at 13:14

## 2024-07-03 RX ADMIN — ATORVASTATIN CALCIUM 80 MG: 80 TABLET, FILM COATED ORAL at 18:51

## 2024-07-03 RX ADMIN — ACETAMINOPHEN 975 MG: 325 TABLET, FILM COATED ORAL at 18:51

## 2024-07-03 RX ADMIN — METHOCARBAMOL 500 MG: 500 TABLET ORAL at 23:21

## 2024-07-03 RX ADMIN — PROPOFOL 80 MG: 10 INJECTION, EMULSION INTRAVENOUS at 13:14

## 2024-07-03 RX ADMIN — ONDANSETRON 4 MG: 2 INJECTION INTRAMUSCULAR; INTRAVENOUS at 13:19

## 2024-07-03 RX ADMIN — Medication: at 17:35

## 2024-07-03 RX ADMIN — METHOCARBAMOL 500 MG: 500 TABLET ORAL at 18:51

## 2024-07-03 RX ADMIN — DEXMEDETOMIDINE HYDROCHLORIDE 8 MCG: 100 INJECTION, SOLUTION INTRAVENOUS at 13:09

## 2024-07-03 RX ADMIN — DEXAMETHASONE SODIUM PHOSPHATE 10 MG: 10 INJECTION, SOLUTION INTRAMUSCULAR; INTRAVENOUS at 13:19

## 2024-07-03 RX ADMIN — FENTANYL CITRATE 50 MCG: 50 INJECTION INTRAMUSCULAR; INTRAVENOUS at 13:44

## 2024-07-03 RX ADMIN — ROCURONIUM BROMIDE 20 MG: 10 INJECTION, SOLUTION INTRAVENOUS at 15:42

## 2024-07-03 RX ADMIN — SUGAMMADEX 250 MG: 100 INJECTION, SOLUTION INTRAVENOUS at 16:33

## 2024-07-03 RX ADMIN — EPHEDRINE SULFATE 10 MG: 50 INJECTION, SOLUTION INTRAVENOUS at 16:33

## 2024-07-03 RX ADMIN — SODIUM CHLORIDE, SODIUM LACTATE, POTASSIUM CHLORIDE, AND CALCIUM CHLORIDE 100 ML/HR: .6; .31; .03; .02 INJECTION, SOLUTION INTRAVENOUS at 18:30

## 2024-07-03 RX ADMIN — GABAPENTIN 100 MG: 100 CAPSULE ORAL at 21:07

## 2024-07-03 RX ADMIN — ROCURONIUM BROMIDE 50 MG: 10 INJECTION, SOLUTION INTRAVENOUS at 13:15

## 2024-07-03 RX ADMIN — SODIUM CHLORIDE, SODIUM LACTATE, POTASSIUM CHLORIDE, AND CALCIUM CHLORIDE: .6; .31; .03; .02 INJECTION, SOLUTION INTRAVENOUS at 12:23

## 2024-07-03 RX ADMIN — ALBUMIN (HUMAN): 12.5 INJECTION, SOLUTION INTRAVENOUS at 13:35

## 2024-07-03 RX ADMIN — DEXMEDETOMIDINE HYDROCHLORIDE 8 MCG: 100 INJECTION, SOLUTION INTRAVENOUS at 15:42

## 2024-07-03 RX ADMIN — DEXMEDETOMIDINE HYDROCHLORIDE 8 MCG: 100 INJECTION, SOLUTION INTRAVENOUS at 14:36

## 2024-07-03 RX ADMIN — HEPARIN SODIUM 5000 UNITS: 5000 INJECTION INTRAVENOUS; SUBCUTANEOUS at 11:52

## 2024-07-03 NOTE — ANESTHESIA POSTPROCEDURE EVALUATION
Post-Op Assessment Note    CV Status:  Stable  Pain Score: 0    Pain management: adequate       Mental Status:  Awake and somnolent   Hydration Status:  Stable   PONV Controlled:  None   Airway Patency:  Patent     Post Op Vitals Reviewed: Yes    No anethesia notable event occurred.    Staff: CRNA   Comments: Patient in PACU, airway patent, VSS. No c/o pain or nausea.            BP   141/70 (97)   Temp   98.4   Pulse   75   Resp   15   SpO2 100 % (07/03/24 1700)

## 2024-07-03 NOTE — PLAN OF CARE
Problem: Prexisting or High Potential for Compromised Skin Integrity  Goal: Skin integrity is maintained or improved  Description: INTERVENTIONS:  - Identify patients at risk for skin breakdown  - Assess and monitor skin integrity  - Assess and monitor nutrition and hydration status  - Monitor labs   - Assess for incontinence   - Turn and reposition patient  - Assist with mobility/ambulation  - Relieve pressure over bony prominences  - Avoid friction and shearing  - Provide appropriate hygiene as needed including keeping skin clean and dry  - Evaluate need for skin moisturizer/barrier cream  - Collaborate with interdisciplinary team   - Patient/family teaching  - Consider wound care consult   Outcome: Progressing     Problem: PAIN - ADULT  Goal: Verbalizes/displays adequate comfort level or baseline comfort level  Description: Interventions:  - Encourage patient to monitor pain and request assistance  - Assess pain using appropriate pain scale  - Administer analgesics based on type and severity of pain and evaluate response  - Implement non-pharmacological measures as appropriate and evaluate response  - Consider cultural and social influences on pain and pain management  - Notify physician/advanced practitioner if interventions unsuccessful or patient reports new pain  Outcome: Progressing     Problem: INFECTION - ADULT  Goal: Absence or prevention of progression during hospitalization  Description: INTERVENTIONS:  - Assess and monitor for signs and symptoms of infection  - Monitor lab/diagnostic results  - Monitor all insertion sites, i.e. indwelling lines, tubes, and drains  - Monitor endotracheal if appropriate and nasal secretions for changes in amount and color  - [place] appropriate cooling/warming therapies per order  - Administer medications as ordered  - Instruct and encourage patient and family to use good hand hygiene technique  - Identify and instruct in appropriate isolation precautions for  identified infection/condition  Outcome: Progressing  Goal: Absence of fever/infection during neutropenic period  Description: INTERVENTIONS:  - Monitor WBC    Outcome: Progressing     Problem: SAFETY ADULT  Goal: Patient will remain free of falls  Description: INTERVENTIONS:  - Educate patient/family on patient safety including physical limitations  - Instruct patient to call for assistance with activity   - Consult OT/PT to assist with strengthening/mobility   - Keep Call bell within reach  - Keep bed low and locked with side rails adjusted as appropriate  - Keep care items and personal belongings within reach  - Initiate and maintain comfort rounds  - Make Fall Risk Sign visible to staff  - Offer Toileting every 4 Hours, in advance of need  - Initiate/Maintain bed alarm  - Obtain necessary fall risk management equipment:   - Apply yellow socks and bracelet for high fall risk patients  - Consider moving patient to room near nurses station  Outcome: Progressing  Goal: Maintain or return to baseline ADL function  Description: INTERVENTIONS:  -  Assess patient's ability to carry out ADLs; assess patient's baseline for ADL function and identify physical deficits which impact ability to perform ADLs (bathing, care of mouth/teeth, toileting, grooming, dressing, etc.)  - Assess/evaluate cause of self-care deficits   - Assess range of motion  - Assess patient's mobility; develop plan if impaired  - Assess patient's need for assistive devices and provide as appropriate  - Encourage maximum independence but intervene and supervise when necessary  - Involve family in performance of ADLs  - Assess for home care needs following discharge   - Consider OT consult to assist with ADL evaluation and planning for discharge  - Provide patient education as appropriate  Outcome: Progressing  Goal: Maintains/Returns to pre admission functional level  Description: INTERVENTIONS:  - Perform AM-PAC 6 Click Basic Mobility/ Daily Activity  assessment daily.  - Set and communicate daily mobility goal to care team and patient/family/caregiver.   - Collaborate with rehabilitation services on mobility goals if consulted  - Perform Range of Motion 3 times a day.  - Reposition patient every 3 hours.  - Dangle patient 3 times a day  - Stand patient 3 times a day  - Ambulate patient 3 times a day  - Out of bed to chair 3 times a day   - Out of bed for meals 3 times a day  - Out of bed for toileting  - Record patient progress and toleration of activity level   Outcome: Progressing     Problem: DISCHARGE PLANNING  Goal: Discharge to home or other facility with appropriate resources  Description: INTERVENTIONS:  - Identify barriers to discharge w/patient and caregiver  - Arrange for needed discharge resources and transportation as appropriate  - Identify discharge learning needs (meds, wound care, etc.)  - Arrange for interpretive services to assist at discharge as needed  - Refer to Case Management Department for coordinating discharge planning if the patient needs post-hospital services based on physician/advanced practitioner order or complex needs related to functional status, cognitive ability, or social support system  Outcome: Progressing     Problem: Knowledge Deficit  Goal: Patient/family/caregiver demonstrates understanding of disease process, treatment plan, medications, and discharge instructions  Description: Complete learning assessment and assess knowledge base.  Interventions:  - Provide teaching at level of understanding  - Provide teaching via preferred learning methods  Outcome: Progressing

## 2024-07-03 NOTE — OP NOTE
OPERATIVE REPORT  PATIENT NAME: Minesh Pandya Jr.    :  1945  MRN: [id number]  Pt Location: [facility] OR ROOM 14    SURGERY DATE: 7/3/2024    Surgeons and Role:     * Guille Hall MD - Primary     * Elizabeth Roger PA-C - Assisting     * Kate Shannon PA-C - Assisting    No qualified resident available.  Physicians assistant was required for all robotic portions of the case to include abdominal entry, port placement, docking and undocking the robot, bedside assistance at time of surgery.    Preop Diagnosis:  Rectal cancer (HCC) [C20]    Post-Op Diagnosis Codes:     * Rectal cancer (HCC) [C20]    Procedure(s):  RESECTION COLON LOW ANTERIOR LAPAROSCOPIC WITH ROBOTICS End colostomy    Specimen(s):  ID Type Source Tests Collected by Time Destination   1 : sigmoid colon- for DMT Tissue Large Intestine, Sigmoid Colon TISSUE EXAM Guille Hall MD 7/3/2024 1625        Estimated Blood Loss:   100 mL    Drains:  Closed/Suction Drain Lateral Abdomen 10 Fr. (Active)   Number of days: 0       Colostomy LUQ (Active)   Number of days: 0       Urethral Catheter Latex 16 Fr. (Active)   Number of days: 0       Anesthesia Type:   General    Operative Indications:  Rectal cancer (HCC) [C20]      Operative Findings:  No gross metastatic disease.  Redundant sigmoid colon      Complications:   None    Procedure and Technique:  After preoperative identification in the preoperative holding area the patient was taken the operating room placed in the supine position.  After satisfactory induction of general endotracheal anesthesia appropriate placement of anesthesia monitors, patient was placed in a modified low lithotomy position.  Patient was secured to the operating room table.  Patient was prepped and draped in usual sterile fashion.  Antibiotics were given prior to incision.  Time-out was undertaken procedure begun.    Left upper quadrant incision was made.  This was taken down to level the fascia.  8 mm trocar was  placed via Optiview technique.  And was insufflated to 15 mmHg.  And was inspected for signs of injury upon entry.  None were seen.  Separate trocars were placed in the right lobe were right upper left upper quadrants.  Right lower quadrant drain was then upsized to a 12 mm port.  All other ports were 8 mm.  Inspection was performed.  No obvious peritoneal metastatic disease was noted.  There is significant redundancy of the sigmoid colon with some adhesions to the patient's prior left inguinal hernia repair.  Otherwise no other significant pathology was noted.  System port was placed in the right upper quadrant.  Robot was then docked.  I then went to the console to start dissection.    Sharply using hot scissors the sigmoid colon was dissected free of the patient's prior left inguinal hernia repair.  Left-sided dissection was undertaken.  This allowed the left ureter and retroperitoneal structures to be swept into the retroperitoneum.  Dissection was then taken down onto the rectum proper from a lateral position.  Dissection was then taken cephalad up to the splenic flexure.  With this the entire left colon was now mobilized.  We now prepared for our rectal dissection.  Colon was grasped and elevated cephalad.  Retrorectal space was entered.  There is significant scarring particularly further down at the pelvic floor from the patient's radiation that was many months ago.  Eventually able to enter the avascular plane.  Dissected posteriorly.  We dissected all the way past the tip of the coccyx.  Lateral and anterior dissections were taken down as well.  Eventually were able to completely encircle the rectum down to the level of the levator ani musculature.  Digital rectal exam was performed.  There was a small 2 cm distance between the tumor and the now dissected anal sphincter.  As such we prepared for transection.  Through the right lower quadrant a true far 45 mm blue load stapler was placed.  Using 3 separate  fires we are able to transect.  This was grossly below the tumor and gave us a very flush transection line at the anal sphincter/levator ani musculature.  Digital rectal exam revealed no remnant disease at the staple line.  Palpably, this appeared to be distinct from the staple line itself.  Examination fresh with pathology did not show any gross tumor at the staple line.  With the distal transection completed, inferior mesenteric artery was skeletonized.  Left ureter was again identified.  For mesenteric artery was divided using the vessel sealer at its base.  At the sigmoid descending junction the mesentery was transected.  ICG was given as a 2.5 mg bolus.  This noted good blood flow to the remnant segment.  As such and given the low nature of this, we prepared for colostomy.      Left-sided colostomy was created.  This was created at the previously marked site.  Muscle-splitting was performed.  Gary wound retractor was placed within this.  Colon was brought out through this.  Laparoscopy was reinstituted.  This revealed no twist in the mesentery.  Drain was placed to the right upper quadrant port into the pelvis.  This was secured using 3-0 nylon suture.  The right lower quadrant port was closed using the Cameron Barragan and a 2-0 Prolene suture.  Colostomy was then trimmed and the specimen sent off the field.  I personally took this down to pathology for gross examination.  Colostomy was then matured using 3-0 Vicryl suture.    Gown and gloves were changed.  Clean instruments used.  The remnant incisions were closed using 4-0 Monocryl with Exofin and skin glue.  All sponge and needle counts were correct.  Patient was awakened from anesthesia and transferred to recovery room in stable condition.     I was present for the entire procedure.    Patient Disposition:  PACU     Colon Resection  Operation performed with curative intent Yes   Tumor Location (select all that apply) Rectum, NOS   Extent of colon and  vascular resection (select all that apply) Other low anterior resection         SIGNATURE: Guille Hall MD  DATE: July 3, 2024  TIME: 4:45 PM

## 2024-07-03 NOTE — H&P
History and Physical   Colon and Rectal Surgery   Minesh Pandya Jr. 79 y.o. male MRN: [id number]  Unit/Bed#: OR [place] Encounter: [id number]  07/03/24   @NOW    No chief complaint on file.        History of Present Illness   HPI:  Minesh Pandya Jr. is a 79 y.o. male who presents for surgical treatment of rectal cancer.      Historical Information   Past Medical History:   Diagnosis Date    Acid reflux     Cardiomyopathy, unspecified type ([facility]) 01/17/2024    Hyperlipidemia     Ischemic cardiomyopathy     Lacunar infarction ([facility])     Near syncope 11/14/2019    NSTEMI (non-ST elevated myocardial infarction) ([facility]) 10/20/2019    Pericarditis 10/24/2019    Poison ivy     Rectal cancer ([facility])     Recurrent major depressive disorder, in full remission ([facility]) 08/07/2023    STEMI involving left anterior descending coronary artery ([facility]) 05/19/2023    Stroke ([facility])      Past Surgical History:   Procedure Laterality Date    CARDIAC CATHETERIZATION N/A 05/19/2023    Procedure: Cardiac pci;  Surgeon: Ted Núñez MD;  Location: [facility] CARD[facility]AC [facility]ATH [facility];  Service: Cardiology    CORONARY ANGIOPLASTY WITH STENT PLACEMENT  05/19/2023    BMS pLAD    CORONARY ANGIOPLASTY WITH STENT PLACEMENT  10/21/2019    LUDIVINA x 2- dLAD and oD1, balloon only- mLAD    HERNIA REPAIR Left 04/28/2021    Procedure: OPEN REPAIR HERNIA INGUINAL LEFT WITH MESH;  Surgeon: Carlos Nguyen MD;  Location: [facility] MAIN OR;  Service: General    IR PORT PLACEMENT  08/31/2023       Meds/Allergies       Medications Prior to Admission:     aspirin (ECOTRIN LOW STRENGTH) 81 mg EC tablet    atorvastatin (LIPITOR) 80 mg tablet    diphenhydramine, lidocaine, Al/Mg hydroxide, simethicone (Magic Mouthwash) SUSP    lisinopril (ZESTRIL) 2.5 mg tablet    metoprolol succinate (TOPROL-XL) 25 mg 24 hr tablet    sucralfate (CARAFATE) 1 g tablet    Blood Pressure KIT      Current Facility-Administered Medications:     metroNIDAZOLE (FLAGYL) IVPB (premix) 500 mg 100 mL, 500 mg, Intravenous, Once  "**AND** ceFAZolin (ANCEF) IVPB (premix in dextrose) 2,000 mg 50 mL, 2,000 mg, Intravenous, Once, Guille Hall MD    Allergies   Allergen Reactions    Wound Dressing Adhesive Hives     Noted with Plastic adhesive bandage brand Curity         Social History   Social History     Substance and Sexual Activity   Alcohol Use Never     Social History     Substance and Sexual Activity   Drug Use No     Social History     Tobacco Use   Smoking Status Former    Current packs/day: 0.00    Average packs/day: 1 pack/day for 15.0 years (15.0 ttl pk-yrs)    Types: Cigarettes    Start date: 1954    Quit date: 1969    Years since quittin.6   Smokeless Tobacco Never         Family History:   Family History   Problem Relation Age of Onset    Breast cancer Mother     Emphysema Father     Heart disease Maternal Grandfather     Heart attack Maternal Grandfather     Emphysema Paternal Grandfather          Objective     Current Vitals:   Blood Pressure: 141/84 (24)  Pulse: 60 (24)  Temperature: 97.7 °F (36.5 °C) (24)  Temp Source: Temporal (24)  Height: 5' 9\" (175.3 cm) (24 112)  Weight - Scale: 62.1 kg (137 lb) (24)  SpO2: 100 % (24)  No intake or output data in the 24 hours ending 24 1155    Physical Exam:  General:  Resting comfortably in bed   Eyes:Sclera anicteric  ENT: Trachea midline  Pulm:  Symmetric chest raise.  No respiratory Distress  CV:  Regular on monitor  Abdomen:  Soft NT ND  Extremities:  No clubbing/ cyanosis/ edema    Lab Results: I have personally reviewed pertinent lab results.    Imaging: I have personally reviewed pertinent reports.        ASSESSMENT:  Minesh Pandya Jr. is a 79 y.o. male who presents for surgical treatment of rectal cancer.  Patient's recent radiologic studies do not show any evidence of active disease despite the patient having history of liver and lung lesions.  As such she has been recommended " by multidisciplinary conference and hematology oncology to undergo resection.  Given his risks and he is very low tumor, low anterior resection with end colostomy has been recommended.  Plan is for robotic assisted minimally invasive low anterior resection with colostomy.  OR for Colectomy. Risks of surgery, including but not limited to bleeding, infection, anastomotic leak, need for colostomy or enterostomy, injury or dysfunction of the bowel or urinary tract, injury or need for removal of other organs, sexual dysfunction, impotence, bowel obstruction in the future, hernia formation, bowel dysfunction, fecal incontinence, thromboembolic complications (deep vein clots or clots to the lungs), heart failure, heart attack, even death were reviewed. Questions were fully answered.

## 2024-07-03 NOTE — WOUND OSTOMY CARE
Ostomy team consulted for patient for ostomy teaching. Plan to see patient tomorrow for first assessment, possible teaching if patient is up for it, if not first teaching will be Friday.         Bibiana DEUTSCHN, RN, CWOCN

## 2024-07-03 NOTE — ANESTHESIA PREPROCEDURE EVALUATION
"Procedure:  RESECTION COLON LOW ANTERIOR LAPAROSCOPIC WITH ROBOTICS End colostomy (Abdomen)    Relevant Problems   CARDIO   (+) CAD in native artery   (+) Essential hypertension      GI/HEPATIC   (+) Rectal cancer (HCC)      /RENAL   (+) Renal cyst      HEMATOLOGY   (+) Thrombocytopenia (HCC)      Neurology/Sleep   (+) History of CVA (cerebrovascular accident)   (+) History of lacunar cerebrovascular accident (CVA)      Surgery/Wound/Pain   (+) S/P primary angioplasty with coronary stent      Cardiovascular/Peripheral Vascular   (+) Ischemic cardiomyopathy      Other   (+) Moderate protein-calorie malnutrition (HCC)   (+) Port-A-Cath in place   (+) Protein-calorie malnutrition, unspecified severity (HCC)        Physical Exam    Airway    Mallampati score: III  TM Distance: >3 FB  Neck ROM: full     Dental    upper dentures and lower dentures,     Cardiovascular      Pulmonary      Other Findings    Anesthesia Plan  ASA Score- 3     Anesthesia Type- general with ASA Monitors.         Additional Monitors:     Airway Plan: ETT.           Plan Factors-    Chart reviewed. EKG reviewed. Imaging results reviewed. Existing labs reviewed. Patient summary reviewed.    Patient is not a current smoker.  Patient did not smoke on day of surgery.            Induction- intravenous.    Postoperative Plan- Plan for postoperative opioid use. Planned trial extubation    Perioperative Resuscitation Plan - Level 1 - Full Code.       Informed Consent- Anesthetic plan and risks discussed with patient.  I personally reviewed this patient with the CRNA. Discussed and agreed on the Anesthesia Plan with the CRNA..      VITALS  Ht 5' 9\" (1.753 m)   Wt 62.1 kg (137 lb)   BMI 20.23 kg/m²  BP Readings from Last 3 Encounters:   06/28/24 112/82   05/23/24 122/72   05/02/24 116/72        LABS  Results from Last 12 Months   Lab Units 06/06/24  0837 04/29/24  0757   WBC Thousand/uL 5.90 4.32   HEMOGLOBIN g/dL 13.2 12.4   HEMATOCRIT % 41.4 39.3 "   PLATELETS Thousands/uL 92* 84*     Results from Last 12 Months   Lab Units 06/06/24  0837 04/29/24  0757 04/02/24  1424 02/23/24  1330 02/09/24  1311 11/26/23  0506 11/25/23  0514   SODIUM mmol/L 140 139   < > 138 139   < > 139   POTASSIUM mmol/L 4.3 4.6   < > 3.8 3.7   < > 3.9   CHLORIDE mmol/L 106 109*   < > 108 109*   < > 110*   CO2 mmol/L 25 24   < > 25 26   < > 25   ANION GAP mmol/L 9 6   < > 5 4   < > 4   BUN mg/dL 22 19   < > 16 20   < > 19   CREATININE mg/dL 1.08 0.96   < > 0.84 0.85   < > 1.05   CALCIUM mg/dL 8.8 8.6   < > 8.7 8.6   < > 8.5   GLUCOSE RANDOM mg/dL  --   --   --  152* 126   < > 92   HEMOGLOBIN A1C % 5.5  --   --   --   --   --  6.1*    < > = values in this interval not displayed.     Results from Last 12 Months   Lab Units 11/24/23  1940 09/06/23  1306   INR  1.09 1.06   PTT seconds 29  --        ECG      ECHOCARDIOGRAPHY OR OTHER TESTING/IMAGING        Narrative    Sinus bradycardia, heart rate 56/min, septal infarct of undetermined age, nonspecific T wave abnormality   Impression    Abnormal EKG      Specimen Collected: 06/28/24 08:52 Last Resulted: 06/28/24 08:52         Interpretation Summary       •  Stress ECG: The ECG was not diagnostic due to pharmacological (vasodilator) stress.  •  Perfusion: There is a left ventricular perfusion defect that is medium in size present in the apical anterior location(s) that is fixed.  •  Perfusion Defect Conclusion: The stress/rest perfusion ratio is 1.02.  •  Stress Function: Left ventricular function post-stress is normal. Stress ejection fraction is 50%. There is a defect in the apical anterior location(s). The defect has moderately reduced function.     Abnormal study after pharmacologic vasodilation.    There was a medium size fixed defect of the anteroapical wall suggestive of infarct.    Left ventricular function was low normal with hypokinesis of the apical anterior wall noted.     History    CAD, STEMI, PCI (5/19/23- BMS pLAD, 10/21/19-  LUDIVINA dLAD, POBA- mLAD & D1), ICM, HTN, CVA, chemotherapy (rectal cancer)        ------- ANESTHESIA RISK-BENEFIT DISCUSSION -------  BENEFITS OF A SPECIALIZED ANESTHESIA TEAM INCLUDE (NBK [id number], PMID [id number]):  (1) Reduce mortality and morbidity for major surgeries/procedures. (2) The team provides analgesia/sedation/amnesia/akinesia as safely as possible. (3) The team strives to reduce discomfort as safely as possible.    RISKS, AND PLANS TO MITIGATE RISKS, INCLUDE:    - Neurologic system: IntraOp awareness (Risk is ~1:1,000 - 1:14,000; PMID 97136273), Stroke (Risk ~<0.1-2% for most cases; PMID 44296814), nerve injury, vision loss, and POCD. Since neuraxial anesthesia may be used, I also discussed risk of bleeding - including epidural hematomas, infection, hypotension, and failed or patchy neuraxial blockade. Since regional anesthesia may be used, risks of block failure, bleeding, infection, and nerve injury were discussed.  - Airway and Pulmonary system: Dental or mouth injury, throat pain, critical hypoxia, pneumothorax, prolonged intubation, post-op respiratory compromise.  Airway/Intubation risks and prior data:  Mask Ventilation: Mask ventilation not attempted (0); Brand: LMA; Size: 4; Placement Verify: Auscultation, End tidal CO2;  Major ARISCAT risk factors for pulmonary complications include: none, yielding a score of 0-1= Low risk, 1.6%.  - Cardiovascular system: Hypotension, arrhythmias, cardiac injury or arrest, blood clots, bleeding, infection, vascular injuries.  Edward's RCRI score items: Surgical risk, ICM, and CVA/TIA, yielding an RCRI Score of 3 or more= 11% risk of MACE. Discussion of exercise tolerance or stress testing is warranted: NM stress already done; shows fixed apical defect consistent w/ prior history and LVEF 50%  Are selina-op or intra-op beta blockers indicated? (PMID 88306064): no, patient has already taken recently.  - FEN/GI system: Aspiration risk (~0.5% [facility] [id number]) and PONV (10-80%  per Apfel score) especially if the patient has not fasted.  ASA NPO guideline compliance?: Yes  - Medication risk assessment: Allergic reactions, excessive bleeding with anticoagulant use, overdoses, drug-drug interactions, injury to a fetus or  in pregnant or breastfeeding patients, selina-procedural sedation including while driving/operating machinery.  Recent relevant medications: See MAR or Med Review  Personal or family history of anesthesia complications: no  Pregnancy Status: N/A  - Estimate mortality risks associated with anesthesia based on ASA-PS (PMID 21239059): ASA-PS III: 1:3,500

## 2024-07-04 LAB
ANION GAP SERPL CALCULATED.3IONS-SCNC: 8 MMOL/L (ref 4–13)
BASOPHILS # BLD AUTO: 0.01 THOUSANDS/ÂΜL (ref 0–0.1)
BASOPHILS NFR BLD AUTO: 0 % (ref 0–1)
BUN SERPL-MCNC: 20 MG/DL (ref 5–25)
CALCIUM SERPL-MCNC: 8.5 MG/DL (ref 8.4–10.2)
CHLORIDE SERPL-SCNC: 105 MMOL/L (ref 96–108)
CO2 SERPL-SCNC: 23 MMOL/L (ref 21–32)
CREAT SERPL-MCNC: 1.01 MG/DL (ref 0.6–1.3)
EOSINOPHIL # BLD AUTO: 0 THOUSAND/ÂΜL (ref 0–0.61)
EOSINOPHIL NFR BLD AUTO: 0 % (ref 0–6)
ERYTHROCYTE [DISTWIDTH] IN BLOOD BY AUTOMATED COUNT: 15.3 % (ref 11.6–15.1)
GFR SERPL CREATININE-BSD FRML MDRD: 70 ML/MIN/1.73SQ M
GLUCOSE SERPL-MCNC: 116 MG/DL (ref 65–140)
HCT VFR BLD AUTO: 36.4 % (ref 36.5–49.3)
HGB BLD-MCNC: 11.8 G/DL (ref 12–17)
IMM GRANULOCYTES # BLD AUTO: 0.04 THOUSAND/UL (ref 0–0.2)
IMM GRANULOCYTES NFR BLD AUTO: 0 % (ref 0–2)
LYMPHOCYTES # BLD AUTO: 0.93 THOUSANDS/ÂΜL (ref 0.6–4.47)
LYMPHOCYTES NFR BLD AUTO: 10 % (ref 14–44)
MAGNESIUM SERPL-MCNC: 1.8 MG/DL (ref 1.9–2.7)
MCH RBC QN AUTO: 28.9 PG (ref 26.8–34.3)
MCHC RBC AUTO-ENTMCNC: 32.4 G/DL (ref 31.4–37.4)
MCV RBC AUTO: 89 FL (ref 82–98)
MONOCYTES # BLD AUTO: 0.83 THOUSAND/ÂΜL (ref 0.17–1.22)
MONOCYTES NFR BLD AUTO: 9 % (ref 4–12)
NEUTROPHILS # BLD AUTO: 7.66 THOUSANDS/ÂΜL (ref 1.85–7.62)
NEUTS SEG NFR BLD AUTO: 81 % (ref 43–75)
NRBC BLD AUTO-RTO: 0 /100 WBCS
PHOSPHATE SERPL-MCNC: 4.5 MG/DL (ref 2.3–4.1)
PLATELET # BLD AUTO: 94 THOUSANDS/UL (ref 149–390)
PMV BLD AUTO: 11.5 FL (ref 8.9–12.7)
POTASSIUM SERPL-SCNC: 4.8 MMOL/L (ref 3.5–5.3)
RBC # BLD AUTO: 4.09 MILLION/UL (ref 3.88–5.62)
SODIUM SERPL-SCNC: 136 MMOL/L (ref 135–147)
WBC # BLD AUTO: 9.47 THOUSAND/UL (ref 4.31–10.16)

## 2024-07-04 PROCEDURE — 84100 ASSAY OF PHOSPHORUS: CPT

## 2024-07-04 PROCEDURE — 97167 OT EVAL HIGH COMPLEX 60 MIN: CPT

## 2024-07-04 PROCEDURE — 85025 COMPLETE CBC W/AUTO DIFF WBC: CPT

## 2024-07-04 PROCEDURE — 97163 PT EVAL HIGH COMPLEX 45 MIN: CPT

## 2024-07-04 PROCEDURE — 80048 BASIC METABOLIC PNL TOTAL CA: CPT

## 2024-07-04 PROCEDURE — 99024 POSTOP FOLLOW-UP VISIT: CPT | Performed by: COLON & RECTAL SURGERY

## 2024-07-04 PROCEDURE — 83735 ASSAY OF MAGNESIUM: CPT

## 2024-07-04 RX ORDER — ENOXAPARIN SODIUM 100 MG/ML
40 INJECTION SUBCUTANEOUS
Status: DISCONTINUED | OUTPATIENT
Start: 2024-07-05 | End: 2024-07-08 | Stop reason: HOSPADM

## 2024-07-04 RX ORDER — ONDANSETRON 2 MG/ML
4 INJECTION INTRAMUSCULAR; INTRAVENOUS EVERY 6 HOURS PRN
Status: DISCONTINUED | OUTPATIENT
Start: 2024-07-04 | End: 2024-07-05

## 2024-07-04 RX ORDER — HYDROMORPHONE HCL/PF 1 MG/ML
0.5 SYRINGE (ML) INJECTION EVERY 4 HOURS PRN
Status: DISCONTINUED | OUTPATIENT
Start: 2024-07-04 | End: 2024-07-06

## 2024-07-04 RX ORDER — OXYCODONE HYDROCHLORIDE 5 MG/1
5 TABLET ORAL EVERY 4 HOURS PRN
Status: DISCONTINUED | OUTPATIENT
Start: 2024-07-04 | End: 2024-07-06

## 2024-07-04 RX ORDER — DEXTROSE MONOHYDRATE, SODIUM CHLORIDE, AND POTASSIUM CHLORIDE 50; 1.49; 4.5 G/1000ML; G/1000ML; G/1000ML
75 INJECTION, SOLUTION INTRAVENOUS CONTINUOUS
Status: DISCONTINUED | OUTPATIENT
Start: 2024-07-04 | End: 2024-07-05

## 2024-07-04 RX ORDER — MAGNESIUM SULFATE HEPTAHYDRATE 40 MG/ML
4 INJECTION, SOLUTION INTRAVENOUS ONCE
Status: COMPLETED | OUTPATIENT
Start: 2024-07-04 | End: 2024-07-04

## 2024-07-04 RX ADMIN — HEPARIN SODIUM 5000 UNITS: 5000 INJECTION INTRAVENOUS; SUBCUTANEOUS at 13:53

## 2024-07-04 RX ADMIN — DEXTROSE, SODIUM CHLORIDE, AND POTASSIUM CHLORIDE 100 ML/HR: 5; .45; .15 INJECTION INTRAVENOUS at 07:15

## 2024-07-04 RX ADMIN — HEPARIN SODIUM 5000 UNITS: 5000 INJECTION INTRAVENOUS; SUBCUTANEOUS at 21:23

## 2024-07-04 RX ADMIN — HEPARIN SODIUM 5000 UNITS: 5000 INJECTION INTRAVENOUS; SUBCUTANEOUS at 05:00

## 2024-07-04 RX ADMIN — ACETAMINOPHEN 975 MG: 325 TABLET, FILM COATED ORAL at 19:09

## 2024-07-04 RX ADMIN — ATORVASTATIN CALCIUM 80 MG: 80 TABLET, FILM COATED ORAL at 17:21

## 2024-07-04 RX ADMIN — METOPROLOL SUCCINATE 25 MG: 25 TABLET, FILM COATED, EXTENDED RELEASE ORAL at 08:02

## 2024-07-04 RX ADMIN — ACETAMINOPHEN 975 MG: 325 TABLET, FILM COATED ORAL at 05:00

## 2024-07-04 RX ADMIN — DEXTROSE, SODIUM CHLORIDE, AND POTASSIUM CHLORIDE 100 ML/HR: 5; .45; .15 INJECTION INTRAVENOUS at 17:27

## 2024-07-04 RX ADMIN — METHOCARBAMOL 500 MG: 500 TABLET ORAL at 17:21

## 2024-07-04 RX ADMIN — METHOCARBAMOL 500 MG: 500 TABLET ORAL at 12:15

## 2024-07-04 RX ADMIN — GABAPENTIN 100 MG: 100 CAPSULE ORAL at 21:23

## 2024-07-04 RX ADMIN — GABAPENTIN 100 MG: 100 CAPSULE ORAL at 17:21

## 2024-07-04 RX ADMIN — ACETAMINOPHEN 975 MG: 325 TABLET, FILM COATED ORAL at 23:14

## 2024-07-04 RX ADMIN — MAGNESIUM SULFATE HEPTAHYDRATE 4 G: 40 INJECTION, SOLUTION INTRAVENOUS at 09:10

## 2024-07-04 RX ADMIN — ASPIRIN 81 MG: 81 TABLET, COATED ORAL at 08:02

## 2024-07-04 RX ADMIN — ACETAMINOPHEN 975 MG: 325 TABLET, FILM COATED ORAL at 12:15

## 2024-07-04 RX ADMIN — METHOCARBAMOL 500 MG: 500 TABLET ORAL at 05:00

## 2024-07-04 RX ADMIN — GABAPENTIN 100 MG: 100 CAPSULE ORAL at 08:02

## 2024-07-04 RX ADMIN — METHOCARBAMOL 500 MG: 500 TABLET ORAL at 23:14

## 2024-07-04 NOTE — WOUND OSTOMY CARE
Patient is POD #1 end colostomy creation. Patient is in bed, made plan with patient to commence ostomy teaching with patient and his granddaughter.        Bibiana DEUTSCHN, RN, CWOCN

## 2024-07-04 NOTE — PHYSICAL THERAPY NOTE
Physical Therapy Evaluation     Patient Name: Minesh Pandya Jr.    Today's Date: 7/4/2024     Problem List  Principal Problem:    Rectal cancer (HCC)       Past Medical History  Past Medical History:   Diagnosis Date    Acid reflux     Cardiomyopathy, unspecified type (HCC) 01/17/2024    Hyperlipidemia     Ischemic cardiomyopathy     Lacunar infarction (HCC)     Near syncope 11/14/2019    NSTEMI (non-ST elevated myocardial infarction) ([facility]) 10/20/2019    Pericarditis 10/24/2019    Poison ivy     Rectal cancer ([facility])     Recurrent major depressive disorder, in full remission ([facility]) 08/07/2023    STEMI involving left anterior descending coronary artery (HCC) 05/19/2023    Stroke ([facility])         Past Surgical History  Past Surgical History:   Procedure Laterality Date    CARDIAC CATHETERIZATION N/A 05/19/2023    Procedure: Cardiac pci;  Surgeon: Ted Núñez MD;  Location: [facility];  Service: Cardiology    CORONARY ANGIOPLASTY WITH STENT PLACEMENT  05/19/2023    BMS pLAD    CORONARY ANGIOPLASTY WITH STENT PLACEMENT  10/21/2019    LUDIVINA x 2- dLAD and oD1, balloon only- mLAD    HERNIA REPAIR Left 04/28/2021    Procedure: OPEN REPAIR HERNIA INGUINAL LEFT WITH MESH;  Surgeon: Carlos Nguyen MD;  Location: [facility] MAIN OR;  Service: General    IR PORT PLACEMENT  08/31/2023 07/04/24 1107   PT Last Visit   PT Visit Date 07/04/24   Note Type   Note type Evaluation   Pain Assessment   Pain Assessment Tool 0-10   Pain Score No Pain   Hospital Pain Intervention(s) Repositioned;Ambulation/increased activity   Restrictions/Precautions   Weight Bearing Precautions Per Order No   Other Precautions Chair Alarm;Bed Alarm;Multiple lines;Fall Risk;Pain  ([place], KAREN drain)   Home Living   Type of Home Mobile home   Home Layout One level;Performs ADLs on one level;Able to live on main level with bedroom/bathroom;Stairs to enter with rails  (3 TAI)   Bathroom Shower/Tub  Tub/shower unit   Bathroom Toilet Standard   Bathroom Equipment Other (Comment)  (denies DME)   Bathroom Accessibility Accessible   Home Equipment Other (Comment)  (no AD)   Additional Comments Pt reports living in 1 level mobile home w/ 3 TAI and was not using AD PTA   Prior Function   Level of [place] Independent with functional mobility;Independent with ADLs   Lives With Family;Daughter  (+ grandson and grandson's wife)   Receives Help From Family   IADLs Family/Friend/Other provides transportation;Family/Friend/Other provides meals;Family/Friend/Other provides medication management   Falls in the last 6 months 0   Vocational Retired   Comments (-) driving, pt reports daughter provides transportation   General   Family/Caregiver Present Yes  (Son)   Cognition   Overall Cognitive Status WFL   Arousal/Participation Alert   Attention Within functional limits   Orientation Level Oriented X4   Memory Within functional limits   Following Commands Follows one step commands without difficulty   Comments pleasant and cooperative   RLE Assessment   RLE Assessment WFL   LLE Assessment   LLE Assessment WFL   Bed Mobility   Supine to Sit Unable to assess   Sit to Supine Unable to assess   Additional Comments Pt sitting OOB in chair upon arrival. Pt returned to chair with call bell and all needs post session   Transfers   Sit to Stand 4  Minimal assistance  (CGA)   Additional items Assist x 1;Increased time required;Verbal cues   Stand to Sit 4  Minimal assistance   Additional items Assist x 1;Increased time required;Verbal cues   Additional Comments w/ HHA   Ambulation/Elevation   Gait pattern Improper Weight shift;Narrow KADEEM;Decreased foot clearance;Shuffling;Short stride;Antalgic;Excessively slow   Gait Assistance 4  Minimal assist  (CGA)   Additional items Assist x 1;Verbal cues   Assistive Device None   Distance 100   Balance   Static Sitting Fair +   Dynamic Sitting Fair   Static Standing Fair -   Dynamic Standing  Fair -   Ambulatory Poor +   Endurance Deficit   Endurance Deficit Yes   Endurance Deficit Description pain, fatigue, weakness   Activity Tolerance   Activity Tolerance Patient limited by fatigue;Patient limited by pain   Medical Staff Made Aware OT Unique   Nurse Made Aware RN cleared   Assessment   Prognosis Good   Problem List Decreased range of motion;Decreased strength;Decreased endurance;Impaired balance;Decreased mobility;Pain;Decreased skin integrity   Assessment Pt is a 79 y.o. male seen for PT evaluation s/p admit to [facility] on 7/3/2024. Pt was admitted with a primary dx of: rectal cancer and is POD#1 for Robotic sigmoid resection, end colostomy creation .  PT now consulted for assessment of mobility and d/c needs. Pt with Up and OOB as tolerated  orders.  Pts current comorbidities effecting treatment include: hx of CVA, CAD. Pts current clinical presentation is Unstable/ Unpredictable (high complexity) due to Ongoing medical management for primary dx, Increased reliance on more restrictive AD compared to baseline, Decreased activity tolerance compared to baseline, Fall risk, Increased assistance needed from caregiver at current time, Continuous pulse oximetry monitoring , KAREN drain in place at current time, s/p surgical intervention. Prior to admission, pt was living in 1SH and was IND in mobility w/o AD. Upon evaluation, pt currently is requiring CGA for transfers; CGA for ambulation 100 ft w/ no AD. Pt presents at PT eval functioning below baseline and currently w/ overall mobility deficits 2* to: BLE weakness, impaired balance, decreased endurance, gait deviations, pain, decreased activity tolerance compared to baseline, decreased functional mobility tolerance compared to baseline, fall risk, decreased skin integrity. Pt currently at a fall risk 2* to impairments listed above.  Pt will continue to benefit from skilled acute PT interventions to address stated impairments; to maximize  functional mobility; for ongoing pt/ family training; and DME needs. At conclusion of PT session pt returned back in chair, chair alarm engaged, and all needs in reach with phone and call bell within reach. Pt denies any further questions at this time. The patient's AM-PAC Basic Mobility Inpatient Short Form Raw Score is 19. A Raw score of greater than 16 suggests the patient may benefit from discharge to home. Please also refer to the recommendation of the Physical Therapist for safe discharge planning. Recommend home w/ level 3 resources pending progress upon hospital D/C.   Goals   Patient Goals to get better   STG Expiration Date 07/18/24   Short Term Goal #1 STG 1. Pt will be able to perform bed mobility tasks with mod I in order to improve overall functional mobility and assist in safe d/c. STG 3. Pt will be able to perform functional transfer with mod I in order to improve overall functional mobility and assist in safe d/c. STG 4. Pt will be able to ambulate at least 250 feet with least restrictive device with mod I A in order to improve overall functional mobility and assist in safe d/c. STG 5. Pt will improve sitting/standing static/dynamic balance 1/2 grade in order to improve functional mobility and assist in safe d/c. STG 6. Pt will improve LE strength by 1/2 grade in order to improve functional mobility and assist in safe d/c. STG 7. Pt will be able to negotiate at least 3 stairs with least restrictive device with mod I A in order to improve overall functional mobility and assist in safe d/c.   PT Treatment Day 0   Plan   Treatment/Interventions Functional transfer training;LE strengthening/ROM;Therapeutic exercise;Elevations;Endurance training;Patient/family training;Equipment eval/education;Bed mobility;Gait training;Spoke to nursing;Spoke to case management;OT   PT Frequency 2-3x/wk   Discharge Recommendation   Rehab Resource Intensity Level, PT III (Minimum Resource Intensity)  (may progress to no  needs)   AM-PAC Basic Mobility Inpatient   Turning in Flat Bed Without Bedrails 4   Lying on Back to Sitting on Edge of Flat Bed Without Bedrails 3   Moving Bed to Chair 3   Standing Up From Chair Using Arms 3   Walk in Room 3   Climb 3-5 Stairs With Railing 3   Basic Mobility Inpatient Raw Score 19   Basic Mobility Standardized Score 42.48   [facility] Highest Level Of Mobility   -HLM Goal 6: Walk 10 steps or more   JH-HLM Achieved 7: Walk 25 feet or more   Modified Sinan Scale   Modified [place] Scale 3   End of Consult   Patient Position at End of Consult Bedside chair;All needs within reach;Bed/Chair alarm activated     Marisabel Hickman PT, DPT

## 2024-07-04 NOTE — PLAN OF CARE
Problem: PHYSICAL THERAPY ADULT  Goal: Performs mobility at highest level of function for planned discharge setting.  See evaluation for individualized goals.  Description: Treatment/Interventions: Functional transfer training, LE strengthening/ROM, Therapeutic exercise, Elevations, Endurance training, Patient/family training, Equipment eval/education, Bed mobility, Gait training, Spoke to nursing, Spoke to case management, OT          See flowsheet documentation for full assessment, interventions and recommendations.  Note: Prognosis: Good  Problem List: Decreased range of motion, Decreased strength, Decreased endurance, Impaired balance, Decreased mobility, Pain, Decreased skin integrity  Assessment: Pt is a 79 y.o. male seen for PT evaluation s/p admit to [facility] on 7/3/2024. Pt was admitted with a primary dx of: rectal cancer and is POD#1 for Robotic sigmoid resection, end colostomy creation .  PT now consulted for assessment of mobility and d/c needs. Pt with Up and OOB as tolerated  orders.  Pts current comorbidities effecting treatment include: hx of CVA, CAD. Pts current clinical presentation is Unstable/ Unpredictable (high complexity) due to Ongoing medical management for primary dx, Increased reliance on more restrictive AD compared to baseline, Decreased activity tolerance compared to baseline, Fall risk, Increased assistance needed from caregiver at current time, Continuous pulse oximetry monitoring , KAREN drain in place at current time, s/p surgical intervention. Prior to admission, pt was living in 1SH and was IND in mobility w/o AD. Upon evaluation, pt currently is requiring CGA for transfers; CGA for ambulation 100 ft w/ no AD. Pt presents at PT eval functioning below baseline and currently w/ overall mobility deficits 2* to: BLE weakness, impaired balance, decreased endurance, gait deviations, pain, decreased activity tolerance compared to baseline, decreased functional mobility  tolerance compared to baseline, fall risk, decreased skin integrity. Pt currently at a fall risk 2* to impairments listed above.  Pt will continue to benefit from skilled acute PT interventions to address stated impairments; to maximize functional mobility; for ongoing pt/ family training; and DME needs. At conclusion of PT session pt returned back in chair, chair alarm engaged, and all needs in reach with phone and call bell within reach. Pt denies any further questions at this time. The patient's AM-PAC Basic Mobility Inpatient Short Form Raw Score is 19. A Raw score of greater than 16 suggests the patient may benefit from discharge to home. Please also refer to the recommendation of the Physical Therapist for safe discharge planning. Recommend home w/ level 3 resources pending progress upon hospital D/C.        Rehab Resource Intensity Level, PT: III (Minimum Resource Intensity) (may progress to no needs)    See flowsheet documentation for full assessment.

## 2024-07-04 NOTE — OCCUPATIONAL THERAPY NOTE
Occupational Therapy Evaluation     Patient Name: Minesh Pandya Jr.  Today's Date: 7/4/2024  Problem List  Active Problems:  There are no active Hospital Problems.    Past Medical History  Past Medical History:   Diagnosis Date    Acid reflux     Cardiomyopathy, unspecified type ([facility]) 01/17/2024    Hyperlipidemia     Ischemic cardiomyopathy     Lacunar infarction ([facility])     Near syncope 11/14/2019    NSTEMI (non-ST elevated myocardial infarction) ([facility]) 10/20/2019    Pericarditis 10/24/2019    Poison ivy     Rectal cancer ([facility])     Recurrent major depressive disorder, in full remission ([facility]) 08/07/2023    STEMI involving left anterior descending coronary artery ([facility]) 05/19/2023    Stroke ([facility])      Past Surgical History  Past Surgical History:   Procedure Laterality Date    CARDIAC CATHETERIZATION N/A 05/19/2023    Procedure: Cardiac pci;  Surgeon: Ted Núñez MD;  Location: [facility];  Service: Cardiology    CORONARY ANGIOPLASTY WITH STENT PLACEMENT  05/19/2023    BMS pLAD    CORONARY ANGIOPLASTY WITH STENT PLACEMENT  10/21/2019    LUDIVINA x 2- dLAD and oD1, balloon only- mLAD    HERNIA REPAIR Left 04/28/2021    Procedure: OPEN REPAIR HERNIA INGUINAL LEFT WITH MESH;  Surgeon: Carlos Nguyen MD;  Location: [facility];  Service: General    IR PORT PLACEMENT  08/31/2023 07/04/24 1117   OT Last Visit   OT Visit Date 07/04/24   Note Type   Note type Evaluation   Pain Assessment   Pain Assessment Tool 0-10   Pain Score No Pain   Hospital Pain Intervention(s) Repositioned;Ambulation/increased activity;Emotional support   Restrictions/Precautions   Weight Bearing Precautions Per Order No   Other Precautions Multiple lines;Chair Alarm;Fall Risk;Pain  (KAREN drain, [place])   Home Living   Type of Home Mobile home  (3 TAI)   Home Layout One level;Performs ADLs on one level;Able to live on main level with bedroom/bathroom;Access   Bathroom Shower/Tub Tub/shower unit   Bathroom Toilet Standard   Bathroom  "Accessibility Accessible   Home Equipment Other (Comment)  (No DME)   Additional Comments Pt reports living with his daughter, grandson, and grandson's wife in a mobile home with 3 TAI; no DME PTA   Prior Function   Level of [place] Independent with ADLs;Independent with functional mobility;Needs assistance with IADLS   Lives With Family;Daughter   Receives Help From Family   IADLs Family/Friend/Other provides meals;Family/Friend/Other provides medication management;Family/Friend/Other provides transportation   Falls in the last 6 months 0   Vocational Retired   Comments (-) driving; pt reporting that his daughter completes IADLs   Lifestyle   Autonomy PTA, pt was independent in ADLs, has assistance with IADLs, and uses no DME for functional mobility; (-) driving   Reciprocal Relationships Lives with his daughter, grandson, and grandson's wife   Service to Others Retired; reports previously working at [employer] as [employer]   Intrinsic Gratification Enjoys watching TV   General   Family/Caregiver Present Yes   Additional General Comments Pt's son present   Subjective   Subjective \"It's uncomfortable in the abdomen.\"   ADL   Where Assessed Chair   Eating Assistance 5  Supervision/Setup   Grooming Assistance 5  Supervision/Setup   UB Bathing Assistance 5  Supervision/Setup   LB Bathing Assistance 4  Minimal Assistance   UB Dressing Assistance 5  Supervision/Setup   LB Dressing Assistance 4  Minimal Assistance   Toileting Assistance  4  Minimal Assistance   Functional Assistance 4  Minimal Assistance   Bed Mobility   Supine to Sit Unable to assess   Sit to Supine Unable to assess   Additional Comments Upon arrival, pt found sitting upright in recliner; @ end of session, pt left sitting upright in recliner with all functional needs in reach with son present in room   Transfers   Sit to Stand 4  Minimal assistance  (CGA)   Additional items Assist x 1;Increased time required;Verbal cues   Stand to Sit 4  Minimal " "assistance   Additional items Assist x 1;Increased time required;Verbal cues   Additional Comments w/ HHA   Functional Mobility   Functional Mobility 4  Minimal assistance   Additional Comments Pt completed short household functional mobility distances w/ Min A x1 w/ HHA   Additional items Hand hold assistance   Balance   Static Sitting Fair +   Dynamic Sitting Fair   Static Standing Fair   Dynamic Standing Fair -   Ambulatory Poor +   Activity Tolerance   Activity Tolerance Patient limited by fatigue;Patient limited by pain   Medical Staff Made Aware CINDY Petit   Nurse Made Aware RN cleared   RUE Assessment   RUE Assessment WFL   LUE Assessment   LUE Assessment WFL   Hand Function   Gross Motor Coordination Functional   Fine Motor Coordination Functional   Cognition   Overall Cognitive Status WFL   Arousal/Participation Alert;Responsive;Arousable;Cooperative   Attention Within functional limits   Orientation Level Oriented X4   Memory Within functional limits   Following Commands Follows one step commands without difficulty   Comments Pt pleasant and cooperative   Assessment   Limitation Decreased ADL status;Decreased endurance;Decreased self-care trans;Decreased high-level ADLs   Prognosis Fair   Assessment Pt is a 80 yo male admitted to [facility] s/p \"RESECTION COLON LOW ANTERIOR LAPAROSCOPIC WITH ROBOTICS End colostomy\" on 7/3 2* rectal cancer. Pt  has a past medical history of Acid reflux, Cardiomyopathy, unspecified type ([facility]) (01/17/2024), Hyperlipidemia, Ischemic cardiomyopathy, Lacunar infarction ([facility]), Near syncope (11/14/2019), NSTEMI (non-ST elevated myocardial infarction) ([facility]) (10/20/2019), Pericarditis (10/24/2019), Poison ivy, Rectal cancer ([facility]), Recurrent major depressive disorder, in full remission ([facility]) (08/07/2023), STEMI involving left anterior descending coronary artery ([facility]) (05/19/2023), and Stroke ([facility]). Pt with active OT orders in which OT consulted to assess pt's functional status and " occupational performance to determine safe d/c needs. Pt seen with PT to increase safety, decrease fall risk, and maximize functional/occupational performance 2* medical complexity which is a regression from pt's functional baseline. Pt lives with his daughter, grandson, and grandson's wife in a mobile home with 3 TAI. PTA, pt was independent in ADLs, has assistance with IADLs, and uses no DME for functional mobility. (-) driving. Currently, pt performing functional transfers w/ CGA, functional mobility w/ Min A x1 w/ HHA, UB ADLs w/ supervision, and LB ADLs w/ Min A. Pt demonstrates the following limitations/impairments which impact the pt's ability to engage in valued occupations: balance, endurance/activity tolerance, standing tolerance, functional reach, postural/trunk control, strength, safety awareness, and pain. From an OT standpoint, recommend discharge to home with home health OT, pending continued functional progress, once medically stable. The patient's raw score on the -PAC Daily Activity Inpatient Short Form is 20. A raw score of greater than or equal to 19 suggests the patient may benefit from discharge to home. Please refer to the recommendation of the Occupational Therapist for safe discharge planning.  Pt would benefit from skilled OT services 2-3x/wk to address acute care needs and underlying performance skills to promote safety, decrease fall risk, and enhance occupational performance to return to PLOF. Goals to be met within the next 10-14 days.   Goals   Patient Goals To go home   LTG Time Frame 10-14   Long Term Goal #1 See OT goals listed below   Plan   Treatment Interventions ADL retraining;Functional transfer training;UE strengthening/ROM;Endurance training;Patient/family training;Equipment evaluation/education;Compensatory technique education;Continued evaluation;Energy conservation;Activityengagement   Goal Expiration Date 07/18/24   OT Frequency 2-3x/wk   Discharge Recommendation    Rehab Resource Intensity Level, OT III (Minimum Resource Intensity)  (Anticipate pending continued functional progress, may progres to home with no needs)   AM-PAC Daily Activity Inpatient   Lower Body Dressing 3   Bathing 3   Toileting 3   Upper Body Dressing 3   Grooming 4   Eating 4   Daily Activity Raw Score 20   Daily Activity Standardized Score (Calc for Raw Score >=11) 42.03   AM-PAC Applied Cognition Inpatient   Following a Speech/Presentation 4   Understanding Ordinary Conversation 4   Taking Medications 4   Remembering Where Things Are Placed or Put Away 4   Remembering List of 4-5 Errands 4   Taking Care of Complicated Tasks 4   Applied Cognition Raw Score 24   Applied Cognition Standardized Score 62.21   End of Consult   Education Provided Yes;Family or social support of family present for education by provider   Patient Position at End of Consult Bedside chair;All needs within reach   Nurse Communication Nurse aware of consult     OT GOALS:    Pt will improve functional mobility during ADL/IADL/leisure tasks with Mod I using AE/DME prn.    Pt will improve activity tolerance/functional endurance during ADL/IADL/leisure tasks for at least 20 minutes to improve occupational performance and engagement in valued occupations using AE/DME prn.    Pt will engage in ongoing functional/formal cognitive assessments to assist with safe d/c planning and increase safety during functional tasks.    Pt will improve dynamic standing balance for at least 15 minutes with Mod I during functional tasks to decrease fall risk and improve independence and engagement in ADL/IADL/leisure activities.    Pt will follow 100% of multi-step commands in ADL/IADL/leisure activities to improve functional cognition used in functional daily routines.    Pt will complete functional transfers on and off all surfaces used in daily routines with Mod I for safety to maximize functional/occupational performance.    Pt will complete all bed  mobility tasks with Mod I to serve as a prerequisite for EOB/OOB ADL/IADL/leisure tasks, optimize positioning/comfort, and increase functional independence.    Pt will independently demonstrate good carryover of safety precautions and education/training during ADL/IADL/leisure tasks with energy conservation techniques s/p skilled instruction without verbal cues.    Pt will complete UB ADL tasks with Mod I using AE/DME prn to increase functional independence in ADL/IADL/leisure tasks.    Pt will complete LB ADL tasks with Mod I using AE/DME prn to increase functional independence in ADL/IADL/leisure tasks.     Pt will complete toileting tasks with Mod I and good hygiene/thoroughness using AE/DME prn to increase functional independence.    Pt will independently identify and utilize 2-3 positive coping strategies to enhance overall wellbeing and engagement in valued occupations.      Unique Carey MS, OTR/L

## 2024-07-04 NOTE — QUICK NOTE
Post Op Check:    Minesh Pandya Jr. is a 79 y.o. male s/p 7/3 Robotic sigmoid resection, end colostomy creation - [place]    Rahat on RA  Ostomy pink    Saw patient at the bedside once they arrived to the floor.   Patients pain is well controlled. They denied any nausea, chest pain, or shortness of breath.     GEN: NAD, A&O  Chest: Normal work of breathing, no respiratory distress  Abd: S, ND, appropriately tender    Plan:  Clears  Strict monitoring UOP, KAREN output  PCA    Minesh Xie MD  Surgery, PGY-4

## 2024-07-04 NOTE — PLAN OF CARE
"  Problem: OCCUPATIONAL THERAPY ADULT  Goal: Performs self-care activities at highest level of function for planned discharge setting.  See evaluation for individualized goals.  Description: Treatment Interventions: ADL retraining, Functional transfer training, UE strengthening/ROM, Endurance training, Patient/family training, Equipment evaluation/education, Compensatory technique education, Continued evaluation, Energy conservation, Activityengagement          See flowsheet documentation for full assessment, interventions and recommendations.   Note: Limitation: Decreased ADL status, Decreased endurance, Decreased self-care trans, Decreased high-level ADLs  Prognosis: Fair  Assessment: Pt is a 78 yo male admitted to [facility] s/p \"RESECTION COLON LOW ANTERIOR LAPAROSCOPIC WITH ROBOTICS End colostomy\" on 7/3 2* rectal cancer. Pt  has a past medical history of Acid reflux, Cardiomyopathy, unspecified type ([facility]) (01/17/2024), Hyperlipidemia, Ischemic cardiomyopathy, Lacunar infarction ([facility]), Near syncope (11/14/2019), NSTEMI (non-ST elevated myocardial infarction) ([facility]) (10/20/2019), Pericarditis (10/24/2019), Poison ivy, Rectal cancer ([facility]), Recurrent major depressive disorder, in full remission ([facility]) (08/07/2023), STEMI involving left anterior descending coronary artery ([facility]) (05/19/2023), and Stroke ([facility]). Pt with active OT orders in which OT consulted to assess pt's functional status and occupational performance to determine safe d/c needs. Pt seen with PT to increase safety, decrease fall risk, and maximize functional/occupational performance 2* medical complexity which is a regression from pt's functional baseline. Pt lives with his daughter, grandson, and grandson's wife in a mobile home with 3 TAI. PTA, pt was independent in ADLs, has assistance with IADLs, and uses no DME for functional mobility. (-) driving. Currently, pt performing functional transfers w/ CGA, functional mobility w/ Min A x1 w/ HHA, UB ADLs w/ " supervision, and LB ADLs w/ Min A. Pt demonstrates the following limitations/impairments which impact the pt's ability to engage in valued occupations: balance, endurance/activity tolerance, standing tolerance, functional reach, postural/trunk control, strength, safety awareness, and pain. From an OT standpoint, recommend discharge to home with home health OT, pending continued functional progress, once medically stable. The patient's raw score on the -PAC Daily Activity Inpatient Short Form is 20. A raw score of greater than or equal to 19 suggests the patient may benefit from discharge to home. Please refer to the recommendation of the Occupational Therapist for safe discharge planning.  Pt would benefit from skilled OT services 2-3x/wk to address acute care needs and underlying performance skills to promote safety, decrease fall risk, and enhance occupational performance to return to PLOF. Goals to be met within the next 10-14 days.     Rehab Resource Intensity Level, OT: III (Minimum Resource Intensity) (Anticipate pending continued functional progress, may progres to home with no needs)

## 2024-07-04 NOTE — PLAN OF CARE
Problem: Prexisting or High Potential for Compromised Skin Integrity  Goal: Skin integrity is maintained or improved  Description: INTERVENTIONS:  - Identify patients at risk for skin breakdown  - Assess and monitor skin integrity  - Assess and monitor nutrition and hydration status  - Monitor labs   - Assess for incontinence   - Turn and reposition patient  - Assist with mobility/ambulation  - Relieve pressure over bony prominences  - Avoid friction and shearing  - Provide appropriate hygiene as needed including keeping skin clean and dry  - Evaluate need for skin moisturizer/barrier cream  - Collaborate with interdisciplinary team   - Patient/family teaching  - Consider wound care consult   Outcome: Progressing     Problem: PAIN - ADULT  Goal: Verbalizes/displays adequate comfort level or baseline comfort level  Description: Interventions:  - Encourage patient to monitor pain and request assistance  - Assess pain using appropriate pain scale  - Administer analgesics based on type and severity of pain and evaluate response  - Implement non-pharmacological measures as appropriate and evaluate response  - Consider cultural and social influences on pain and pain management  - Notify physician/advanced practitioner if interventions unsuccessful or patient reports new pain  Outcome: Progressing     Problem: INFECTION - ADULT  Goal: Absence or prevention of progression during hospitalization  Description: INTERVENTIONS:  - Assess and monitor for signs and symptoms of infection  - Monitor lab/diagnostic results  - Monitor all insertion sites, i.e. indwelling lines, tubes, and drains  - Monitor endotracheal if appropriate and nasal secretions for changes in amount and color  - [place] appropriate cooling/warming therapies per order  - Administer medications as ordered  - Instruct and encourage patient and family to use good hand hygiene technique  - Identify and instruct in appropriate isolation precautions for  identified infection/condition  Outcome: Progressing  Goal: Absence of fever/infection during neutropenic period  Description: INTERVENTIONS:  - Monitor WBC    Outcome: Progressing     Problem: SAFETY ADULT  Goal: Patient will remain free of falls  Description: INTERVENTIONS:  - Educate patient/family on patient safety including physical limitations  - Instruct patient to call for assistance with activity   - Consult OT/PT to assist with strengthening/mobility   - Keep Call bell within reach  - Keep bed low and locked with side rails adjusted as appropriate  - Keep care items and personal belongings within reach  - Initiate and maintain comfort rounds    Outcome: Progressing  Goal: Maintain or return to baseline ADL function  Description: INTERVENTIONS:  -  Assess patient's ability to carry out ADLs; assess patient's baseline for ADL function and identify physical deficits which impact ability to perform ADLs (bathing, care of mouth/teeth, toileting, grooming, dressing, etc.)  - Assess/evaluate cause of self-care deficits   - Assess range of motion  - Assess patient's mobility; develop plan if impaired  - Assess patient's need for assistive devices and provide as appropriate  - Encourage maximum independence but intervene and supervise when necessary  - Involve family in performance of ADLs  - Assess for home care needs following discharge   - Consider OT consult to assist with ADL evaluation and planning for discharge  - Provide patient education as appropriate  Outcome: Progressing  Goal: Maintains/Returns to pre admission functional level  Description: INTERVENTIONS:  - Perform AM-PAC 6 Click Basic Mobility/ Daily Activity assessment daily.  - Set and communicate daily mobility goal to care team and patient/family/caregiver.   - Collaborate with rehabilitation services on mobility goals if consulted  - Reposition patient every 2 hours.  - Dangle patient 3 times a day  - Stand patient 3 times a day  - Ambulate  patient 3 times a day  - Out of bed to chair 3 times a day   - Out of bed for meals 3 times a day  - Out of bed for toileting  - Record patient progress and toleration of activity level   Outcome: Progressing     Problem: DISCHARGE PLANNING  Goal: Discharge to home or other facility with appropriate resources  Description: INTERVENTIONS:  - Identify barriers to discharge w/patient and caregiver  - Arrange for needed discharge resources and transportation as appropriate  - Identify discharge learning needs (meds, wound care, etc.)  - Arrange for interpretive services to assist at discharge as needed  - Refer to Case Management Department for coordinating discharge planning if the patient needs post-hospital services based on physician/advanced practitioner order or complex needs related to functional status, cognitive ability, or social support system  Outcome: Progressing     Problem: Knowledge Deficit  Goal: Patient/family/caregiver demonstrates understanding of disease process, treatment plan, medications, and discharge instructions  Description: Complete learning assessment and assess knowledge base.  Interventions:  - Provide teaching at level of understanding  - Provide teaching via preferred learning methods  Outcome: Progressing

## 2024-07-04 NOTE — PROGRESS NOTES
"Progress Note - General Surgery   Minesh Pandya Jr. 79 y.o. male MRN: [id number]  Unit/Bed#: [facility] 805-01 Encounter: [id number]    Assessment:  79 y.o. male s/p 7/3 Robotic sigmoid resection, end colostomy creation.    POD 1, recovering well    AVSS on RA  Reports no pain, not using dPCA  Passing gas  Ostomy with sang output  Making clear yellow urine. UOP 625cc overnight  KAREN 110cc overnight, sanginous    NO nausea, vomiting.    Labs:  Phos 4.5, Mg 1.8  Hgb 11.8, PLT 94    Plan:  -CLD w/ toast+crackers  -replete lytes  -monitor UOP and KAREN  -Await adequate ostomy output  -keep donahue until 7/5  -DC dPCA  -SQH, start lovenox 7/5  -ASA 81mg     Subjective/Objective       Subjective: POD1 recovering well. Denies pain, N/V, fever. Passing gas.    Objective:     Blood pressure 120/65, pulse 62, temperature 98.2 °F (36.8 °C), temperature source Oral, resp. rate 17, height 5' 9\" (1.753 m), weight 63 kg (139 lb), SpO2 97%.,Body mass index is 20.53 kg/m².      Intake/Output Summary (Last 24 hours) at 7/4/2024 0542  Last data filed at 7/4/2024 0503  Gross per 24 hour   Intake 1900.2 ml   Output 1065 ml   Net 835.2 ml       Invasive Devices       Central Venous Catheter Line  Duration             Port A Cath Right Chest -- days              Peripheral Intravenous Line  Duration             Peripheral IV 07/03/24 Right Antecubital <1 day    Peripheral IV 07/03/24 Right Forearm <1 day              Drain  Duration             Closed/Suction Drain Lateral Abdomen 10 Fr. <1 day    Colostomy LUQ <1 day    Urethral Catheter Latex 16 Fr. <1 day                    Physical Exam: /65   Pulse 62   Temp 98.2 °F (36.8 °C) (Oral)   Resp 17   Ht 5' 9\" (1.753 m)   Wt 63 kg (139 lb)   SpO2 97%   BMI 20.53 kg/m²     General Appearance:    Alert, cooperative, no distress, appears stated age   Head:    Normocephalic, without obvious abnormality, atraumatic   Eyes:    PERRL, conjunctiva/corneas clear, EOM's intact, fundi     benign, " both eyes        Ears:    Normal TM's and external ear canals, both ears   Nose:   Nares normal, septum midline, mucosa normal, no drainage    or sinus tenderness   Throat:   Lips, mucosa, and tongue normal; teeth and gums normal   Neck:   Supple, symmetrical, trachea midline, no adenopathy;        thyroid:  No enlargement/tenderness/nodules; no carotid    bruit or JVD   Back:     Symmetric, no curvature, ROM normal, no CVA tenderness   Lungs:     Clear to auscultation bilaterally, respirations unlabored   Chest wall:    No tenderness or deformity   Heart:    Regular rate and rhythm, S1 and S2 normal, no murmur, rub   or gallop   Abdomen:     Soft, appropriately tender, bowel sounds active all four quadrants,     no masses, no organomegaly. Ostomy with pink stoma.   Genitalia:    [place] with clear yellow output. Normal male without lesion, discharge or tenderness   Rectal:    Normal tone, normal prostate, no masses or tenderness;    guaiac negative stool   Extremities:   Extremities normal, atraumatic, no cyanosis or edema   Pulses:   2+ and symmetric all extremities   Skin:   Skin color, texture, turgor normal, no rashes or lesions   Lymph nodes:   Cervical, supraclavicular, and axillary nodes normal   Neurologic:   CNII-XII intact. Normal strength, sensation and reflexes       throughout       Lab, Imaging and other studies:I have personally reviewed pertinent lab results.    VTE Pharmacologic Prophylaxis: Heparin  VTE Mechanical Prophylaxis: sequential compression device

## 2024-07-04 NOTE — PLAN OF CARE
Problem: Prexisting or High Potential for Compromised Skin Integrity  Goal: Skin integrity is maintained or improved  Description: INTERVENTIONS:  - Identify patients at risk for skin breakdown  - Assess and monitor skin integrity  - Assess and monitor nutrition and hydration status  - Monitor labs   - Assess for incontinence   - Turn and reposition patient  - Assist with mobility/ambulation  - Relieve pressure over bony prominences  - Avoid friction and shearing  - Provide appropriate hygiene as needed including keeping skin clean and dry  - Evaluate need for skin moisturizer/barrier cream  - Collaborate with interdisciplinary team   - Patient/family teaching  - Consider wound care consult   Outcome: Progressing     Problem: PAIN - ADULT  Goal: Verbalizes/displays adequate comfort level or baseline comfort level  Description: Interventions:  - Encourage patient to monitor pain and request assistance  - Assess pain using appropriate pain scale  - Administer analgesics based on type and severity of pain and evaluate response  - Implement non-pharmacological measures as appropriate and evaluate response  - Consider cultural and social influences on pain and pain management  - Notify physician/advanced practitioner if interventions unsuccessful or patient reports new pain  Outcome: Progressing     Problem: INFECTION - ADULT  Goal: Absence or prevention of progression during hospitalization  Description: INTERVENTIONS:  - Assess and monitor for signs and symptoms of infection  - Monitor lab/diagnostic results  - Monitor all insertion sites, i.e. indwelling lines, tubes, and drains  - Monitor endotracheal if appropriate and nasal secretions for changes in amount and color  - [place] appropriate cooling/warming therapies per order  - Administer medications as ordered  - Instruct and encourage patient and family to use good hand hygiene technique  - Identify and instruct in appropriate isolation precautions for  identified infection/condition  Outcome: Progressing     Problem: SAFETY ADULT  Goal: Patient will remain free of falls  Description: INTERVENTIONS:  - Educate patient/family on patient safety including physical limitations  - Instruct patient to call for assistance with activity   - Consult OT/PT to assist with strengthening/mobility   - Keep Call bell within reach  - Keep bed low and locked with side rails adjusted as appropriate  - Keep care items and personal belongings within reach  - Initiate and maintain comfort rounds    Outcome: Progressing  Goal: Maintain or return to baseline ADL function  Description: INTERVENTIONS:  -  Assess patient's ability to carry out ADLs; assess patient's baseline for ADL function and identify physical deficits which impact ability to perform ADLs (bathing, care of mouth/teeth, toileting, grooming, dressing, etc.)  - Assess/evaluate cause of self-care deficits   - Assess range of motion  - Assess patient's mobility; develop plan if impaired  - Assess patient's need for assistive devices and provide as appropriate  - Encourage maximum independence but intervene and supervise when necessary  - Involve family in performance of ADLs  - Assess for home care needs following discharge   - Consider OT consult to assist with ADL evaluation and planning for discharge  - Provide patient education as appropriate  Outcome: Progressing  Goal: Maintains/Returns to pre admission functional level  Description: INTERVENTIONS:  - Perform AM-PAC 6 Click Basic Mobility/ Daily Activity assessment daily.  - Set and communicate daily mobility goal to care team and patient/family/caregiver.   - Collaborate with rehabilitation services on mobility goals if consulted  - Reposition patient every 2 hours.  - Dangle patient 3 times a day  - Stand patient 3 times a day  - Ambulate patient 3 times a day  - Out of bed to chair 3 times a day   - Out of bed for meals 3 times a day  - Out of bed for toileting  -  Record patient progress and toleration of activity level   Outcome: Progressing     Problem: DISCHARGE PLANNING  Goal: Discharge to home or other facility with appropriate resources  Description: INTERVENTIONS:  - Identify barriers to discharge w/patient and caregiver  - Arrange for needed discharge resources and transportation as appropriate  - Identify discharge learning needs (meds, wound care, etc.)  - Arrange for interpretive services to assist at discharge as needed  - Refer to Case Management Department for coordinating discharge planning if the patient needs post-hospital services based on physician/advanced practitioner order or complex needs related to functional status, cognitive ability, or social support system  Outcome: Progressing     Problem: Knowledge Deficit  Goal: Patient/family/caregiver demonstrates understanding of disease process, treatment plan, medications, and discharge instructions  Description: Complete learning assessment and assess knowledge base.  Interventions:  - Provide teaching at level of understanding  - Provide teaching via preferred learning methods  Outcome: Progressing

## 2024-07-05 LAB
ANION GAP SERPL CALCULATED.3IONS-SCNC: 2 MMOL/L (ref 4–13)
ATRIAL RATE: 85 BPM
BASOPHILS # BLD AUTO: 0.03 THOUSANDS/ÂΜL (ref 0–0.1)
BASOPHILS NFR BLD AUTO: 0 % (ref 0–1)
BUN SERPL-MCNC: 11 MG/DL (ref 5–25)
CALCIUM SERPL-MCNC: 7.7 MG/DL (ref 8.4–10.2)
CHLORIDE SERPL-SCNC: 112 MMOL/L (ref 96–108)
CO2 SERPL-SCNC: 23 MMOL/L (ref 21–32)
CREAT SERPL-MCNC: 0.88 MG/DL (ref 0.6–1.3)
EOSINOPHIL # BLD AUTO: 0.15 THOUSAND/ÂΜL (ref 0–0.61)
EOSINOPHIL NFR BLD AUTO: 2 % (ref 0–6)
ERYTHROCYTE [DISTWIDTH] IN BLOOD BY AUTOMATED COUNT: 15.8 % (ref 11.6–15.1)
GFR SERPL CREATININE-BSD FRML MDRD: 82 ML/MIN/1.73SQ M
GLUCOSE SERPL-MCNC: 108 MG/DL (ref 65–140)
HCT VFR BLD AUTO: 34.7 % (ref 36.5–49.3)
HGB BLD-MCNC: 11.2 G/DL (ref 12–17)
IMM GRANULOCYTES # BLD AUTO: 0.05 THOUSAND/UL (ref 0–0.2)
IMM GRANULOCYTES NFR BLD AUTO: 1 % (ref 0–2)
LYMPHOCYTES # BLD AUTO: 1.27 THOUSANDS/ÂΜL (ref 0.6–4.47)
LYMPHOCYTES NFR BLD AUTO: 13 % (ref 14–44)
MAGNESIUM SERPL-MCNC: 2.2 MG/DL (ref 1.9–2.7)
MCH RBC QN AUTO: 28.9 PG (ref 26.8–34.3)
MCHC RBC AUTO-ENTMCNC: 32.3 G/DL (ref 31.4–37.4)
MCV RBC AUTO: 90 FL (ref 82–98)
MONOCYTES # BLD AUTO: 1.1 THOUSAND/ÂΜL (ref 0.17–1.22)
MONOCYTES NFR BLD AUTO: 12 % (ref 4–12)
NEUTROPHILS # BLD AUTO: 7 THOUSANDS/ÂΜL (ref 1.85–7.62)
NEUTS SEG NFR BLD AUTO: 72 % (ref 43–75)
NRBC BLD AUTO-RTO: 0 /100 WBCS
P AXIS: 44 DEGREES
PHOSPHATE SERPL-MCNC: 2.4 MG/DL (ref 2.3–4.1)
PLATELET # BLD AUTO: 95 THOUSANDS/UL (ref 149–390)
PMV BLD AUTO: 10.9 FL (ref 8.9–12.7)
POTASSIUM SERPL-SCNC: 4.4 MMOL/L (ref 3.5–5.3)
PR INTERVAL: 162 MS
QRS AXIS: 37 DEGREES
QRSD INTERVAL: 80 MS
QT INTERVAL: 350 MS
QTC INTERVAL: 416 MS
RBC # BLD AUTO: 3.87 MILLION/UL (ref 3.88–5.62)
SODIUM SERPL-SCNC: 137 MMOL/L (ref 135–147)
T WAVE AXIS: 93 DEGREES
VENTRICULAR RATE: 85 BPM
WBC # BLD AUTO: 9.6 THOUSAND/UL (ref 4.31–10.16)

## 2024-07-05 PROCEDURE — 93010 ELECTROCARDIOGRAM REPORT: CPT | Performed by: INTERNAL MEDICINE

## 2024-07-05 PROCEDURE — 85025 COMPLETE CBC W/AUTO DIFF WBC: CPT

## 2024-07-05 PROCEDURE — 80048 BASIC METABOLIC PNL TOTAL CA: CPT

## 2024-07-05 PROCEDURE — 99024 POSTOP FOLLOW-UP VISIT: CPT | Performed by: COLON & RECTAL SURGERY

## 2024-07-05 PROCEDURE — 84100 ASSAY OF PHOSPHORUS: CPT

## 2024-07-05 PROCEDURE — 83735 ASSAY OF MAGNESIUM: CPT

## 2024-07-05 PROCEDURE — 93005 ELECTROCARDIOGRAM TRACING: CPT

## 2024-07-05 PROCEDURE — 99223 1ST HOSP IP/OBS HIGH 75: CPT | Performed by: INTERNAL MEDICINE

## 2024-07-05 RX ORDER — METOCLOPRAMIDE 10 MG/1
10 TABLET ORAL 3 TIMES DAILY PRN
Status: DISCONTINUED | OUTPATIENT
Start: 2024-07-05 | End: 2024-07-05

## 2024-07-05 RX ORDER — ONDANSETRON 2 MG/ML
4 INJECTION INTRAMUSCULAR; INTRAVENOUS EVERY 4 HOURS PRN
Status: DISCONTINUED | OUTPATIENT
Start: 2024-07-05 | End: 2024-07-08 | Stop reason: HOSPADM

## 2024-07-05 RX ORDER — ONDANSETRON 2 MG/ML
4 INJECTION INTRAMUSCULAR; INTRAVENOUS EVERY 6 HOURS PRN
Status: DISCONTINUED | OUTPATIENT
Start: 2024-07-05 | End: 2024-07-05

## 2024-07-05 RX ADMIN — DEXTROSE, SODIUM CHLORIDE, AND POTASSIUM CHLORIDE 100 ML/HR: 5; .45; .15 INJECTION INTRAVENOUS at 02:23

## 2024-07-05 RX ADMIN — ACETAMINOPHEN 975 MG: 325 TABLET, FILM COATED ORAL at 17:14

## 2024-07-05 RX ADMIN — ACETAMINOPHEN 975 MG: 325 TABLET, FILM COATED ORAL at 05:16

## 2024-07-05 RX ADMIN — METOCLOPRAMIDE 10 MG: 10 TABLET ORAL at 15:44

## 2024-07-05 RX ADMIN — METHOCARBAMOL 500 MG: 500 TABLET ORAL at 23:45

## 2024-07-05 RX ADMIN — GABAPENTIN 100 MG: 100 CAPSULE ORAL at 21:12

## 2024-07-05 RX ADMIN — GABAPENTIN 100 MG: 100 CAPSULE ORAL at 17:14

## 2024-07-05 RX ADMIN — ONDANSETRON 4 MG: 2 INJECTION INTRAMUSCULAR; INTRAVENOUS at 22:23

## 2024-07-05 RX ADMIN — ASPIRIN 81 MG: 81 TABLET, COATED ORAL at 09:35

## 2024-07-05 RX ADMIN — ENOXAPARIN SODIUM 40 MG: 40 INJECTION SUBCUTANEOUS at 09:35

## 2024-07-05 RX ADMIN — ONDANSETRON 4 MG: 2 INJECTION INTRAMUSCULAR; INTRAVENOUS at 12:28

## 2024-07-05 RX ADMIN — ATORVASTATIN CALCIUM 80 MG: 80 TABLET, FILM COATED ORAL at 17:14

## 2024-07-05 RX ADMIN — ACETAMINOPHEN 975 MG: 325 TABLET, FILM COATED ORAL at 23:44

## 2024-07-05 RX ADMIN — METHOCARBAMOL 500 MG: 500 TABLET ORAL at 17:14

## 2024-07-05 RX ADMIN — GABAPENTIN 100 MG: 100 CAPSULE ORAL at 09:34

## 2024-07-05 RX ADMIN — METHOCARBAMOL 500 MG: 500 TABLET ORAL at 11:37

## 2024-07-05 RX ADMIN — METHOCARBAMOL 500 MG: 500 TABLET ORAL at 05:16

## 2024-07-05 RX ADMIN — METOPROLOL SUCCINATE 25 MG: 25 TABLET, FILM COATED, EXTENDED RELEASE ORAL at 09:34

## 2024-07-05 NOTE — PROGRESS NOTES
"Progress Note - General Surgery   Minesh Pandya Jr. 79 y.o. male MRN: [id number]  Unit/Bed#: [facility] 805-01 Encounter: [id number]    Assessment:  79 y.o. male s/p 7/3 Robotic sigmoid resection, end colostomy creation.    POD2, recovering well.    AVSS on RA  Reports no pain  Had 100cc ostomy output  Donahue making clear yellow urine; 2.9L UOP  KAREN drain: 100cc SS    Hgb 11.2 (11.8)  PLT 95 (94)    BMP PND    Plan:  -d/c donahue; void trial  -full clears, ADAT  -ostomy care teaching today with pt granddaughter   -start lovenox, d/c SQH  -monitor KAREN and ostomy output  -on ASA 81mg  -enocurage OOB/ambulation, IS    Subjective/Objective     Subjective: POD1 recovering well. Denies N/V/CP/SOB. Reports no pain. Ambulating with assist.    Objective:     Blood pressure 109/64, pulse 65, temperature 97.9 °F (36.6 °C), resp. rate 16, height 5' 9\" (1.753 m), weight 63 kg (139 lb), SpO2 96%.,Body mass index is 20.53 kg/m².      Intake/Output Summary (Last 24 hours) at 7/5/2024 0517  Last data filed at 7/5/2024 0509  Gross per 24 hour   Intake 4425 ml   Output 3180 ml   Net 1245 ml       Invasive Devices       Central Venous Catheter Line  Duration             Port A Cath Right Chest -- days              Peripheral Intravenous Line  Duration             Peripheral IV 07/03/24 Right Antecubital 1 day    Peripheral IV 07/03/24 Right Forearm 1 day              Drain  Duration             Closed/Suction Drain Lateral Abdomen 10 Fr. 1 day    Colostomy LUQ 1 day    Urethral Catheter Latex 16 Fr. 1 day                    Physical Exam:   General: No acute distress, laying comfortably in bed  Skin: Warm, dry  HEENT: Normocephalic, atraumatic  CV: Regular rate  Pulm: Normal work of breathing, No respiratory distress  Abd: Soft, no distended, minimal tenderness over incision site. Incision CDI. KAREN draining serosang. Ostomy with pink stoma and thin red-brown output.  MSK: Symmetric, no edema  Neuro: AOx3    Lab, Imaging and other studies:I have " personally reviewed pertinent lab results.    VTE Pharmacologic Prophylaxis: Enoxaparin (Lovenox)  VTE Mechanical Prophylaxis: sequential compression device

## 2024-07-05 NOTE — UTILIZATION REVIEW
Initial Clinical Review    Elective Inpatient  surgical procedure  Age/Sex: 79 y.o. male    Surgery Date: 7/3/2024    Procedure:  RESECTION COLON LOW ANTERIOR LAPAROSCOPIC WITH ROBOTICS End colostomy   Anesthesia: General     Operative Findings: No gross metastatic disease.  Redundant sigmoid colon    POD#1 Progress Note: 7/4/2024 -  No Nausea, vomiting. Rreports no pain, not using dPCA, Passing gas, ostomy with sang output,   Making clear yellow urine 325cc overnight  KAREN- 110cc overnight, sanginous   Plan; CLD w/ toast & crackers, replete lytes  Monitor UOP and KAREN  Keep Donahue  until 7/5. D/c dPCA,  SQH dvt ppx start lovenox 7/5, ASA 81 mg       POD #2 7/5 -  Recovering well, d/c donahue , voiding trial, full clears, Aadvance diet as tolerated, Start Lovenox sc, Monitor KAREN and ostomy output. On ASA 81 mg. Encourage OOB/ambulation , IS.   Ambulating with assistance,       Admission Orders: Date/Time/Statement:   Admission Orders (From admission, onward)       Ordered        07/03/24 1738  Inpatient Admission  Once                          Orders Placed This Encounter   Procedures    Inpatient Admission     Standing Status:   Standing     Number of Occurrences:   1     Order Specific Question:   Level of Care     Answer:   Med Surg [16]     Order Specific Question:   Estimated length of stay     Answer:   More than 2 Midnights     Order Specific Question:   Certification     Answer:   I certify that inpatient services are medically necessary for this patient for a duration of greater than two midnights. See H&P and MD Progress Notes for additional information about the patient's course of treatment.       Vital Signs (last 3 days)       Date/Time Temp Pulse Resp BP MAP (mmHg) SpO2 O2 Flow Rate (L/min) O2 Device Cardiac (WDL) Marci Coma Scale Score Pain    07/05/24 0516 -- -- -- -- -- -- -- -- -- -- No Pain    07/05/24 0310 -- -- -- -- -- -- -- -- -- -- No Pain    07/05/24 02:51:37 97.9 °F (36.6 °C) 65 16 109/64 79 96  % -- -- -- -- --    07/04/24 2322 -- -- -- -- -- 98 % -- None (Room air) -- -- --    07/04/24 23:14:23 98.3 °F (36.8 °C) 77 20 124/70 88 96 % -- -- -- -- --    07/04/24 2314 -- -- -- -- -- -- -- -- -- -- No Pain    07/04/24 19:18:10 98.2 °F (36.8 °C) 79 18 119/62 81 98 % -- -- -- -- --    07/04/24 1909 -- -- -- -- -- -- -- -- -- -- No Pain    07/04/24 1525 -- -- -- -- -- -- -- -- -- 15 No Pain    07/04/24 13:56:41 98.8 °F (37.1 °C) 75 -- 120/66 84 99 % -- -- -- -- --    07/04/24 1117 -- -- -- -- -- -- -- -- -- -- No Pain    07/04/24 1107 -- -- -- -- -- -- -- -- -- -- No Pain    07/04/24 0744 -- -- -- -- -- -- -- -- -- 15 No Pain    07/04/24 0717 -- -- -- -- -- -- -- -- -- -- No Pain    07/04/24 06:42:02 97.5 °F (36.4 °C) 59 17 122/62 82 99 % -- -- -- -- --    07/04/24 0257 98.2 °F (36.8 °C) 62 17 120/65 88 97 % -- None (Room air) -- -- 1 07/03/24 2321 -- -- -- -- -- 97 % -- None (Room air) -- 15 1    07/03/24 23:18:01 98 °F (36.7 °C) 69 16 127/66 86 97 % -- -- -- -- --    07/03/24 2300 -- -- -- -- -- -- -- -- -- -- 1 07/03/24 21:58:43 98.8 °F (37.1 °C) 70 16 -- -- 97 % -- -- -- -- --    07/03/24 2136 -- -- -- 136/70 92 -- -- -- -- -- --    07/03/24 20:38:40 97.9 °F (36.6 °C) 75 16 141/76 98 98 % -- -- -- -- --    07/03/24 19:58:13 97.5 °F (36.4 °C) 72 18 -- -- 98 % -- -- -- -- --    07/03/24 1935 -- -- -- 141/76 98 -- -- -- -- -- --    07/03/24 1851 -- -- -- -- -- -- -- -- -- -- 1 07/03/24 1840 -- -- -- -- -- -- -- -- -- 15 1 07/03/24 18:35:29 97.1 °F (36.2 °C) 65 18 139/76 97 98 % -- -- -- -- --    07/03/24 1815 97.7 °F (36.5 °C) 66 16 136/69 96 99 % -- None (Room air) WDL 15 No Pain    07/03/24 1800 -- 66 16 143/68 98 99 % -- -- -- -- No Pain    07/03/24 1745 -- 67 20 142/70 100 99 % -- -- -- -- No Pain    07/03/24 1730 -- 69 19 143/70 100 98 % -- -- -- 15 No Pain    07/03/24 1715 -- 76 15 142/68 97 96 % -- None (Room air) -- -- No Pain    07/03/24 1700 98.4 °F (36.9 °C) 80 14 141/70 97 100 % 6 L/min  Simple mask WDL 13 --    07/03/24 1126 97.7 °F (36.5 °C) 60 -- 141/84 -- 100 % -- None (Room air) -- -- No Pain          Weight (last 2 days)       Date/Time Weight    07/04/24 0113 63 (139)    07/03/24 1126 62.1 (137)            Pertinent Labs/Diagnostic Test Results:   Radiology:  No orders to display     Cardiology:  No orders to display     GI:  No orders to display           Results from last 7 days   Lab Units 07/05/24  0520 07/04/24  0457   WBC Thousand/uL 9.60 9.47   HEMOGLOBIN g/dL 11.2* 11.8*   HEMATOCRIT % 34.7* 36.4*   PLATELETS Thousands/uL 95* 94*   TOTAL NEUT ABS Thousands/µL 7.00 7.66*         Results from last 7 days   Lab Units 07/05/24  0520 07/04/24  0457   SODIUM mmol/L 137 136   POTASSIUM mmol/L 4.4 4.8   CHLORIDE mmol/L 112* 105   CO2 mmol/L 23 23   ANION GAP mmol/L 2* 8   BUN mg/dL 11 20   CREATININE mg/dL 0.88 1.01   EGFR ml/min/1.73sq m 82 70   CALCIUM mg/dL 7.7* 8.5   MAGNESIUM mg/dL 2.2 1.8*   PHOSPHORUS mg/dL 2.4 4.5*         Results from last 7 days   Lab Units 07/03/24  1704   POC GLUCOSE mg/dl 148*     Results from last 7 days   Lab Units 07/05/24  0520 07/04/24  0457   GLUCOSE RANDOM mg/dL 108 116         Diet: Clears   Mobility:  OOB with assistance   DVT Prophylaxis:  Lovenox sc qd    Medications/Pain Control:   Scheduled Medications:  acetaminophen, 975 mg, Oral, Q6H BEAU  aspirin, 81 mg, Oral, Daily  atorvastatin, 80 mg, Oral, Daily With Dinner  enoxaparin, 40 mg, Subcutaneous, Q24H BEAU- started on 7/5  gabapentin, 100 mg, Oral, TID  methocarbamol, 500 mg, Oral, Q6H BEAU  metoprolol succinate, 25 mg, Oral, Daily  Flagyl 500mg iv once - 7/3  Ancef 2000 mg iv once - 7/3 x 1  Heparin 5000 U sc q8- 7/3 - d/c'd 7/4  Mag sulfate 4g IVPB = 7/4 x1       Continuous IV Infusions:  dextrose 5 % and sodium chloride 0.45 % with KCl 20 mEq/L, 75 mL/hr, Intravenous, Continuous  HYDROmorphone (DILAUDID) 1 mg/mL 50 mL PCA  Continuous Rate: 0 mg/hr  PCA Dose: 0.2 mg  PCA Lock-out: 5 Minutes  One  Hour Limit: 2 mg  Freq: Continuous Route: IV  Last Dose: Stopped (07/04/24 0717)  Start: 07/03/24 1715 End: 07/04/24 0705  lactated ringers infusion  Rate: 100 mL/hr Dose: 100 mL/hr  Freq: Continuous Route: IV  Indications of Use: IV Hydration  Last Dose: Stopped (07/04/24 0715)  Start: 07/03/24 1200 End: 07/04/24 0704     PRN Meds:  HYDROmorphone, 0.5 mg, Intravenous, Q4H PRN  ondansetron, 4 mg, Intravenous, Q6H PRN  oxyCODONE, 5 mg, Oral, Q4H PRN  oxyCODONE, 2.5 mg, Oral, Q4H PRN        Network Utilization Review Department  ATTENTION: Please call with any questions or concerns to [phone] and carefully listen to the prompts so that you are directed to the right person. All voicemails are confidential.   For Discharge needs, contact Care Management DC Support Team at [phone] opt. 2  Send all requests for admission clinical reviews, approved or denied determinations and any other requests to dedicated fax number below belonging to the campus where the patient is receiving treatment. List of dedicated fax numbers for the Facilities:  FACILITY NAME UR FAX NUMBER   ADMISSION DENIALS (Administrative/Medical Necessity) [phone]   DISCHARGE SUPPORT TEAM (NETWORK) [phone]   PARENT CHILD HEALTH (Maternity/NICU/Pediatrics) [phone]   [facility] [phone]   [facility] [phone]   [facility] [phone]   [facility] [phone]   Critical access hospital [phone]   [facility] [phone]   [facility] [phone]   [facility] [phone]   [facility] [phone]   [facility] [phone]   [facility] [phone]   [facility] [phone]

## 2024-07-05 NOTE — WOUND OSTOMY CARE
Progress Note- Ostomy  Minesh Pandya Jr. 79 y.o. male  [id number]  [facility] 805-01    Assessment:  Patient seen today for ostomy teaching. Patient underwent end colostomy formation on 7/3/2024. Patient is POD #2. He reports no pain at time of teaching. He is awake and alert and agreeable for visit. His daughter, who has ostomy experience as a CNA for over two decades, present at bedside for teaching. Son, that lives in [place] was present but stepped out of the room ebfore teaching session. Daughter and granddaughter will both be able to help patient when discharged but are requesting that patient learn as well. Daughter actively watched and listened to 100% of teaching session. Patient watched/ listened to teaching session until stoma was measured- he reported nausea at this point in the teaching session. When the nausea began, patient requested that WOC RN stop explaining the process and place the pouch. Unclear how much information he retained/saw due to his nausea. Explained to patient that we would return next week to continue teaching - he stated understanding of education. He stated understanding of education.     Topics reviewed with patient and daughter: normal & abdormal stoma appearance, how and when to empty the pouch (1/3- 1/2 full) to prevent pulling/lifting of the barrier, importance & how to clean the end of the pouch to prevent odor after pouch emptying, frequency of changing of the pouch (every 3-4 days on a schedule), burping, one piece/two piece pouching systems differences, Clothing- including avoiding placing belt-line/seat belts over the stoma, bathing, selina-stomal skin care, how to measure the stoma, mold/cut the wafer to the correct size, how to close the pouch, and how to obtain supplies.     Step-by-step pouch change done using a convatec two piece pouch. Reviewed with patient how and when to measure the stoma (weekly). New pouch applied. Good seal obtained.    Patient and daughter  "verbalized understanding of education and teaching. Encouraged patient to read the educational materials provided over the weekend - \"Life after colostomy Surgery\" by [facility].     Patient gave verbal permission to order sample kits from [employer], [employer], and [employer]. Additional discharge supplies and pouches provided to patient- left at beside. Additional two piece pouches called from storeroom to be delivered to patient's room.     Stoma appearance:     Stoma: Red, oval, budded stoma with center os, peristomal skin is fragile, pink in color with scattered ruptured blisters likely from tape/swelling- recommend crusting ruptured blisters. Stoma attached to skin junction, no separation noted. Small amount of brown liquid effluent in bag. STOMA MEASUREMENT: 1.5 by 1.75 inches     Ostomy Care:  -Stoma Measurement: 1.5 inches top to bottom by 1.75 inches side to side (Measure stoma weekly for next 6-8 weeks- stoma will decrease in size due to decrease in swelling)     -Appliance Used During Bag Change: Convatec two Piece Pouch  -Accessories Used: stoma powder and 3M no sting skin prep    *Can shower with pouch on or off. Make sure to dry off pouch after shower.     Bag Change Steps:  1. Peel back pouch using push-pull method, may use non-alcohol adhesive remover. Remove pouch from top to bottom.   2. Use warm water only to cleanse skin around the stoma (selina-stomal skin).   3. Make sure all adhesive residue is removed and skin is dry and not oily.  4. Measure stoma size using measuring guide and trace correct measurements onto back of pouch.  5. Then cut or mold the backing of pouch out to correct shape/size.  6. Crust any moist, open skin around the stoma. (Crusting instructions placed below)  7.Place pouch over stoma and onto skin.  8. Use warmth of hand to apply gentle pressure to help backing of pouch to adhere well to skin.        **If the skin is open, moist or fragile, Crusting can be done prior to pouch " "application (step 6) to create dry skin and assist with pouch adherence- steps are listed below.      TIPS:  Empty pouch when 1/3 -1/2 full.  Change pouch every 4-5 days or if signs of leaking.    Crusting as needed for moist, red, open skin around the stoma: (Done prior to pouch application)   Apply stoma powder to excoriation, move excess powder away with hand  Use no sting barrier to pat area to form \"crust\"  Repeat X2 before placing new ostomy pouch        Orders listed above and wound care will continue to follow, call or Secure Chat Message with questions. Bedside nurse updated of findings and orders. Flowsheets updated with exam details and measurements.      Stefani Hamilton RN, BSN, CWOCN   "

## 2024-07-05 NOTE — PLAN OF CARE
Problem: PAIN - ADULT  Goal: Verbalizes/displays adequate comfort level or baseline comfort level  Description: Interventions:  - Encourage patient to monitor pain and request assistance  - Assess pain using appropriate pain scale  - Administer analgesics based on type and severity of pain and evaluate response  - Implement non-pharmacological measures as appropriate and evaluate response  - Consider cultural and social influences on pain and pain management  - Notify physician/advanced practitioner if interventions unsuccessful or patient reports new pain  Outcome: Progressing     Problem: INFECTION - ADULT  Goal: Absence or prevention of progression during hospitalization  Description: INTERVENTIONS:  - Assess and monitor for signs and symptoms of infection  - Monitor lab/diagnostic results  - Monitor all insertion sites, i.e. indwelling lines, tubes, and drains  - Monitor endotracheal if appropriate and nasal secretions for changes in amount and color  - [place] appropriate cooling/warming therapies per order  - Administer medications as ordered  - Instruct and encourage patient and family to use good hand hygiene technique  - Identify and instruct in appropriate isolation precautions for identified infection/condition  Outcome: Progressing     Problem: DISCHARGE PLANNING  Goal: Discharge to home or other facility with appropriate resources  Description: INTERVENTIONS:  - Identify barriers to discharge w/patient and caregiver  - Arrange for needed discharge resources and transportation as appropriate  - Identify discharge learning needs (meds, wound care, etc.)  - Arrange for interpretive services to assist at discharge as needed  - Refer to Case Management Department for coordinating discharge planning if the patient needs post-hospital services based on physician/advanced practitioner order or complex needs related to functional status, cognitive ability, or social support system  Outcome: Progressing      Problem: Knowledge Deficit  Goal: Patient/family/caregiver demonstrates understanding of disease process, treatment plan, medications, and discharge instructions  Description: Complete learning assessment and assess knowledge base.  Interventions:  - Provide teaching at level of understanding  - Provide teaching via preferred learning methods  Outcome: Progressing     Problem: Nutrition/Hydration-ADULT  Goal: Nutrient/Hydration intake appropriate for improving, restoring or maintaining nutritional needs  Description: Monitor and assess patient's nutrition/hydration status for malnutrition. Collaborate with interdisciplinary team and initiate plan and interventions as ordered.  Monitor patient's weight and dietary intake as ordered or per policy. Utilize nutrition screening tool and intervene as necessary. Determine patient's food preferences and provide high-protein, high-caloric foods as appropriate.     INTERVENTIONS:  - Monitor oral intake, urinary output, labs, and treatment plans  - Assess nutrition and hydration status and recommend course of action  - Evaluate amount of meals eaten  - Assist patient with eating if necessary   - Allow adequate time for meals  - Recommend/ encourage appropriate diets, oral nutritional supplements, and vitamin/mineral supplements  - Order, calculate, and assess calorie counts as needed  - Recommend, monitor, and adjust tube feedings and TPN/PPN based on assessed needs  - Assess need for intravenous fluids  - Provide specific nutrition/hydration education as appropriate  - Include patient/family/caregiver in decisions related to nutrition  Outcome: Progressing

## 2024-07-05 NOTE — CASE MANAGEMENT
Case Management Progress Note    Patient name Minesh Pandya Jr.  Location [facility] 805/[facility] 805-01 MRN [id number]  : 1945 Date 2024       LOS (days): 2  Geometric Mean LOS (GMLOS) (days): 5.1  Days to GMLOS:3.3        OBJECTIVE:        Current admission status: Inpatient  Preferred Pharmacy:   [id number] [place] - [address]  [facility] [address]  Phone: [phone] Fax: [phone]    [facility] - [place] - [address]  [address]  [facility] [address]  Phone: [phone] Fax: [phone]    [facility] - [place] - [address] [facility]  [address]  [place] [address]  Phone: [phone] Fax: [phone]    Primary Care Provider: Jhoan Morin DO    Primary Insurance: [facility] REP  Secondary Insurance:     PROGRESS NOTE:    CM spoke w/ pt at bedside to review DC recs. Pt provided pt choice list for available HHC agencies. Pt to review and inform CM of pt choice. CM team to continue to follow.     Addendum 3:12: CM spoke w/ pt's daughter about HHC choices. Pt and pt's daughter chose [facility]. CM reserved via [facility]. CM team to continue to follow.

## 2024-07-05 NOTE — CASE MANAGEMENT
Case Management Discharge Planning Note    Patient name Minesh Pandya Jr.  Location [facility] 805/[facility] 805-01 MRN [id number]  : 1945 Date 2024       Current Admission Date: 7/3/2024  Current Admission Diagnosis:Rectal cancer (HCC)   Patient Active Problem List    Diagnosis Date Noted Date Diagnosed    Encounter for geriatric assessment 2024     Thrombocytopenia (HCC) 2023     Port-A-Cath in place 2023     Rectal cancer (HCC) 2023     Phimosis 2023     Renal cyst 2023     Enlarged prostate 2023     History of CVA (cerebrovascular accident) 2023     Ischemic cardiomyopathy 2023     Moderate protein-calorie malnutrition (HCC) 2023     Protein-calorie malnutrition, unspecified severity (HCC) 2023     Essential hypertension 2020     Non-recurrent unilateral inguinal hernia 2020     History of lacunar cerebrovascular accident (CVA) 2019     CAD in native artery 2019     S/P primary angioplasty with coronary stent 2019     History of ischemic cardiomyopathy 2019       LOS (days): 2  Geometric Mean LOS (GMLOS) (days): 5.1  Days to GMLOS:3.2     OBJECTIVE:  Risk of Unplanned Readmission Score: 15.28         Current admission status: Inpatient   [facility]erred Pharmacy:   [id number] [place] [facility] [address] [place] [id number]  Phone: [phone] Fax: [phone]    [facility] - [place] - [address]  [address] [facility] [address]  Phone: [phone] Fax: [phone]    [facility] - [place] - [address] [facility]  [address] [facility]  [place] [address]  Phone: [phone] Fax: [phone]    Primary Care Provider: Jhoan Morin DO    [facility] Insurance: [facility]  Secondary Insurance:     DISCHARGE DETAILS:         Requested Home Health Care         [facility]  [facility]  Home Health Follow-Up Provider:: PCP  Home Health Services Needed:: Post-Op Care and Assessment  Homebound Criteria Met:: Requires the Assistance of Another Person for Safe Ambulation or to Leave the Home  Supporting Clincal Findings:: Limited Endurance, Fatigues Easliy in Short Distances

## 2024-07-05 NOTE — CONSULTS
Consultation - Geriatric Medicine   Minesh Pandya Jr. 79 y.o. male MRN: [id number]  Unit/Bed#: [facility] 805-01 Encounter: [id number]      Assessment & Plan     Rectal cancer  -s/p robotic sigmoid resection and end colostomy creation on 7/3/24  -acute pain well controled   -currently tolerating diet, no N/V  -planned for ostomy teaching with family later today  -ongoing management per colorectal surgery    Acute pain due to surgery   -currently well controlled with Geriatric pain protocol:  Tylenol 975mg scheduled  Roxicodone 2.5mg Q4H PRN moderate pain  Roxicodone 5mg Q4H PRN severe pain  Dilaudid 0.5mg Q4H PRN  -encourage addition of non-pharmacologic pain treatment including frequent repositioning as appropriate  -encourage mobilization with assist as cleared to do so to stimulate bowel motility     Cognitive screening   -alert and oriented denies memory or cognitive concerns   -reportedly independent with ADLs and iADLs at baseline   -no prior cognitive testing on record for review  -MRI brain 11/25/23 imaging personally viewed, at least moderate chronic microangiopathic changes noted  -no recent TSH on record for review, B12 somewhat low on last check at 222, consider checking both with routine labs and supplement B12 to goal at least 400  -at risk age and cardiovascular cognitive decline in setting of CAD and prior CVA, continue secondary risk factor modifications   -encourage pt remain physically, socially, and cognitively active and engaged to maintain cognitive acuity    Recurrent major depressive disorder  -in full remission   -high risk for recurrence with new cancer diagnosis, monitor closely for recurrence of symptoms  -continue close o/p f/u with PCP for ongoing monitoring and management     Impaired mastication  -Requires use of dentures- enocurage use all appropriate times   -ensure meal consistency appropriate for abilities  -continue aspiration precautions    Impaired Vision  -recommend use of  corrective lenses at all appropriate times, reports use primarily for reading   -encourage adequate lighting and encourage use of assistance with ambulation  -keep personal belongings close to person to avoid reaching  -encourage appropriate footwear at all times  -consider large font for printed materials provided to patient     Deconditioning/debility/frailty   -clinical frailty scale stage V, mildly frail, progressive  -multifactorial including age, CAD, hx of CVA and rectal cancer now s/p resection and colostomy creation in elderly individual with limited physiologic and metabolic reserves  -continue optimization of chronic medical conditions and address acute derangements as arise   -encourage well balanced nutritional intake  -encourage normal circadian rhythm   -monitor for and treat underlying anxiety/mood/depression symptoms as may impact pt response to therapies and overall sense of wellbeing and quality of life   -continue psychosocial supports of patient and caregiver     Delirium precautions  -Patient is high risk of delirium due to age, surgical procedure, anesthesia, hospital env   -Initiate delirium precautions  -maintain normal sleep/wake cycle  -minimize overnight interruptions, group overnight vitals/labs/nursing checks as medically appropriate   -dim lights, close blinds and turn off tv to minimize stimulation and encourage sleep environment in evenings  -ensure that pain is well controlled - see above   -monitor for urinary retention following donahue removal   -encourage early mobilization and ambulation with assist as cleared to safely do so  -provide frequent reorientation and redirection as indicated and appropriate   -encourage family and friends at the bedside to help calm patient if anxious  -avoid medications which may precipitate or worsen delirium such as tramadol, benzodiazepine, anticholinergics, and benadryl as possible   -encourage hydration and nutrition   -redirect unwanted  behaviors as first line    Home medication review     Aspirin 81 Mg daily  Lipitor 80 Mg daily   Carafate 1 g 4 times daily  Lisinopril 2.5 Mg daily  Metoprolol succinate 25 Mg daily    History of Present Illness   Physician Requesting Consult: Guille Hall MD  Reason for Consult / Principal Problem: Geriatric surgical optimization   Hx and PE limited by: N/A  Additional history obtained from: Chart review and patient evaluation    HPI: Minesh Pandya Jr. is a 79 y.o. year old male with moderate protein, nutrition, history of ischemic cardiomyopathy, history of CVA, hypertension, CAD s/p angioplasty and rectal cancer who is admitted to the colorectal surgical service and underwent planned robotic sigmoid resection and end colostomy creation, he is being seen in consultation by Geriatrics for routine post-op Geriatric assessment.  Minesh is seen and examined at bedside where he is sitting resting comfortably having just finished breakfast, he explains that he is feeling well and pain has been well controlled since his surgery on 7/3/24. He has been sleeping well and appetite is good, he is tolerating oral diet. He is looking forward to having donahue catheter removed later today and moving on with his recovery.     Prior to admission Minesh was residing home with his daughter and family and reports independence with ADLs and iADLs at baseline, denies memory or cognitive concerns. He denies use of assist device for ambulation at baseline or history of falls. He requires use of glasses for reading and wears dentures, does not use hearing aids.     Inpatient consult to Gerontology  Consult performed by: Cristina Fisher DO  Consult ordered by: HUSSAIN Doyle        Review of Systems   Constitutional: Negative.  Negative for appetite change, chills and fever.   HENT:  Positive for dental problem (wears dentures).    Eyes:  Positive for visual disturbance (glasses for reading).   Respiratory: Negative.  Negative  for cough and shortness of breath.    Cardiovascular: Negative.  Negative for chest pain.   Gastrointestinal: Negative.  Negative for abdominal pain.   Genitourinary:         Lobato in place    Musculoskeletal: Negative.    Skin: Negative.    Neurological: Negative.  Negative for dizziness, light-headedness, numbness and headaches.   Hematological: Negative.    Psychiatric/Behavioral: Negative.  Negative for sleep disturbance.    All other systems reviewed and are negative.    Historical Information   Past Medical History:   Diagnosis Date    Acid reflux     Cardiomyopathy, unspecified type ([facility]) 01/17/2024    Hyperlipidemia     Ischemic cardiomyopathy     Lacunar infarction ([facility])     Near syncope 11/14/2019    NSTEMI (non-ST elevated myocardial infarction) ([facility]) 10/20/2019    Pericarditis 10/24/2019    Poison ivy     Rectal cancer ([facility])     Recurrent major depressive disorder, in full remission ([facility]) 08/07/2023    STEMI involving left anterior descending coronary artery ([facility]) 05/19/2023    Stroke ([facility])      Past Surgical History:   Procedure Laterality Date    CARDIAC CATHETERIZATION N/A 05/19/2023    Procedure: Cardiac pci;  Surgeon: Ted Núñez MD;  Location: [facility] CARDIAC CATH LAB;  Service: Cardiology    CORONARY ANGIOPLASTY WITH STENT PLACEMENT  05/19/2023    BMS pLAD    CORONARY ANGIOPLASTY WITH STENT PLACEMENT  10/21/2019    LUDIVINA x 2- dLAD and oD1, balloon only- mLAD    HERNIA REPAIR Left 04/28/2021    Procedure: OPEN REPAIR HERNIA INGUINAL LEFT WITH MESH;  Surgeon: Carlos Nguyen MD;  Location: [facility] MAIN OR;  Service: General    IR PORT PLACEMENT  08/31/2023     Social History   Social History     Substance and Sexual Activity   Alcohol Use Never     Social History     Substance and Sexual Activity   Drug Use No     Social History     Tobacco Use   Smoking Status Former    Current packs/day: 0.00    Average packs/day: 1 pack/day for 15.0 years (15.0 ttl pk-yrs)    Types: Cigarettes    Start date: 12/6/1954     Quit date: 1969    Years since quittin.6   Smokeless Tobacco Never     Family History:   Family History   Problem Relation Age of Onset    Breast cancer Mother     Emphysema Father     Heart disease Maternal Grandfather     Heart attack Maternal Grandfather     Emphysema Paternal Grandfather      Meds/Allergies   all current active meds have been reviewed    Allergies   Allergen Reactions    Wound Dressing Adhesive Hives     Noted with Plastic adhesive bandage brand Curity     Objective     Intake/Output Summary (Last 24 hours) at 2024 0731  Last data filed at 2024 0533  Gross per 24 hour   Intake 3316.67 ml   Output 3165 ml   Net 151.67 ml     Invasive Devices       Central Venous Catheter Line  Duration             Port A Cath Right Chest -- days              Peripheral Intravenous Line  Duration             Peripheral IV 24 Right Antecubital 1 day    Peripheral IV 24 Right Forearm 1 day              Drain  Duration             Closed/Suction Drain Lateral Abdomen 10 Fr. 1 day    Colostomy LUQ 1 day    Urethral Catheter Latex 16 Fr. 1 day                  Physical Exam  Vitals and nursing note reviewed.   Constitutional:       General: He is not in acute distress.     Appearance: Normal appearance. He is not toxic-appearing.   HENT:      Head: Normocephalic and atraumatic.      Nose: Nose normal.      Mouth/Throat:      Mouth: Mucous membranes are moist.      Comments: Edentulous, no denture in place at time of eval  Eyes:      General: No scleral icterus.        Right eye: No discharge.         Left eye: No discharge.      Conjunctiva/sclera: Conjunctivae normal.      Comments: Not wearing glasses    Neck:      Comments: Trachea midline phonation norm   Cardiovascular:      Rate and Rhythm: Normal rate and regular rhythm.   Pulmonary:      Effort: Pulmonary effort is normal. No respiratory distress.      Breath sounds: No wheezing.      Comments: Saturating well on room air    Abdominal:      General: There is no distension.      Palpations: Abdomen is soft.      Comments: Ostomy noted    Musculoskeletal:      Cervical back: Neck supple.      Right lower leg: No edema.      Left lower leg: No edema.      Comments: Reduced overall muscle bulk and tone    Skin:     General: Skin is warm and dry.   Neurological:      Mental Status: He is alert.      Comments: Awake and alert answering ques appropriately, speech clear and fluent    Psychiatric:         Mood and Affect: Mood normal.         Behavior: Behavior normal.      Comments: Polite pleasant cooperative        Lab Results:     I have personally reviewed pertinent lab results including the followin/4/24- CBC, BMP  24- CBC, BMP    I have personally reviewed the following imaging study reports in PACS:    24- CTH  23- MRI brain w/o contrast   24- CT chest abd pelvis w contrast     Therapies:   PT: following   OT: following     VTE Prophylaxis: Enoxaparin (Lovenox)    Code Status: Level 1 - Full Code  Advance Directive and Living Will:      Power of : Yes  POLST:      Family and Social Support: family     Goals of Care: recovery from acute surgical procedure

## 2024-07-05 NOTE — DISCHARGE INSTR - OTHER ORDERS
"Ostomy Care:  -Stoma Measurement: 1.5 inches top to bottom by 1.75 inches side to side (Measure stoma weekly for next 6-8 weeks- stoma will decrease in size due to decrease in swelling)     -Appliance Used During Bag Change: Convatec two Piece Pouch  -Accessories Used: stoma powder and 3M no sting skin prep    *Can shower with pouch on or off. Make sure to dry off pouch after shower.     Bag Change Steps:  1. Peel back pouch using push-pull method, may use non-alcohol adhesive remover. Remove pouch from top to bottom.   2. Use warm water only to cleanse skin around the stoma (selina-stomal skin).   3. Make sure all adhesive residue is removed and skin is dry and not oily.  4. Measure stoma size using measuring guide and trace correct measurements onto back of pouch.  5. Then cut or mold the backing of pouch out to correct shape/size.  6. Crust any moist, open skin around the stoma. (Crusting instructions placed below)  7.Place pouch over stoma and onto skin.  8. Use warmth of hand to apply gentle pressure to help backing of pouch to adhere well to skin.        **If the skin is open, moist or fragile, Crusting can be done prior to pouch application (step 6) to create dry skin and assist with pouch adherence- steps are listed below.      TIPS:  Empty pouch when 1/3 -1/2 full.  Change pouch every 4-5 days or if signs of leaking.    Crusting as needed for moist, red, open skin around the stoma: (Done prior to pouch application)   Apply stoma powder to excoriation, move excess powder away with hand  Use no sting barrier to pat area to form \"crust\"  Repeat X2 before placing new ostomy pouch      Ostomy Online Education Resources:   Www.ostomy.org   Www.Xconomy.DITTO.com   Www.coloplast.us   Www.Orb Health.com    Ostomy Suppliers:  [employer]--[phone] OR www.Aviate.DITTO.com  [employer]--[phone] OR www.Konnecti.comcal--1-320-901-9077 OR wwwAltitude Games\    [facility]--[phone]  "

## 2024-07-05 NOTE — CASE MANAGEMENT
Case Management Assessment & Discharge Planning Note    Patient name Minesh Pandya Jr.  Location [facility] 805/[facility] 805-01 MRN [id number]  : 1945 Date 2024       Current Admission Date: 7/3/2024  Current Admission Diagnosis:Rectal cancer (HCC)   Patient Active Problem List    Diagnosis Date Noted Date Diagnosed    Encounter for geriatric assessment 2024     Thrombocytopenia (HCC) 2023     Port-A-Cath in place 2023     Rectal cancer (HCC) 2023     Phimosis 2023     Renal cyst 2023     Enlarged prostate 2023     History of CVA (cerebrovascular accident) 2023     Ischemic cardiomyopathy 2023     Moderate protein-calorie malnutrition (HCC) 2023     Protein-calorie malnutrition, unspecified severity (HCC) 2023     Essential hypertension 2020     Non-recurrent unilateral inguinal hernia 2020     History of lacunar cerebrovascular accident (CVA) 2019     CAD in native artery 2019     S/P primary angioplasty with coronary stent 2019     History of ischemic cardiomyopathy 2019       LOS (days): 2  Geometric Mean LOS (GMLOS) (days): 5.1  Days to GMLOS:3.4     OBJECTIVE:    Risk of Unplanned Readmission Score: 15.41         Current admission status: Inpatient       Preferred Pharmacy:   [id number] [address] [place] [address]  Phone: [phone] Fax: [phone]    [facility] - [place] - [address]  [address]  [facility] [address]  Phone: [phone] Fax: [phone]    [facility] - [facility]ong - [address] [facility]  [address] [facility] [address]  [place] [address]  Phone: [phone] Fax: [phone]    Primary Care Provider: Jhoan Morin DO    Primary Insurance: AARP [employer] REP  Secondary Insurance:     ASSESSMENT:  Active Health Care Proxies       [facility]  Care Representative - Daughter   Primary Phone: [phone] (Home)                           Readmission Root Cause  30 Day Readmission: No    Patient Information  Admitted from:: Home  Mental Status: Alert  During Assessment patient was accompanied by: Son, Daughter  Assessment information provided by:: Patient, Son, Daughter  Primary Caregiver: Self  Support Systems: Self, Children  County of Residence: [place]  What city do you live in?: [place]  Home entry access options. Select all that apply.: Stairs  Number of steps to enter home.: 3  Do the steps have railings?: Yes  Type of Current Residence: Travel Trailer/ Mobile Home  Living Arrangements: Lives w/ Daughter  Is patient a ?: No    Activities of Daily Living Prior to Admission  Functional Status: Independent  Completes ADLs independently?: Yes  Ambulates independently?: Yes  Does patient use assisted devices?: No  Does patient currently own DME?: No  Does patient have a history of Outpatient Therapy (PT/OT)?: No  Does the patient have a history of Short-Term Rehab?: No  Does patient have a history of HHC?: No  Does patient currently have HHC?: No         Patient Information Continued  Income Source: Pension/penitentiary  Does patient have prescription coverage?: Yes  Does patient receive dialysis treatments?: No  Does patient have a history of substance abuse?: No  Does patient have a history of Mental Health Diagnosis?: No         Means of Transportation  Means of Transport to Appts:: Family transport      Social Determinants of Health (SDOH)      Flowsheet Row Most Recent Value   Housing Stability    In the last 12 months, was there a time when you were not able to pay the mortgage or rent on time? N   In the past 12 months, how many times have you moved where you were living? 0   At any time in the past 12 months, were you homeless or living in a shelter (including now)? N   Transportation Needs    In the past 12 months, has lack of  transportation kept you from medical appointments or from getting medications? no   In the past 12 months, has lack of transportation kept you from meetings, work, or from getting things needed for daily living? No   Food Insecurity    Within the past 12 months, you worried that your food would run out before you got the money to buy more. Never true   Within the past 12 months, the food you bought just didn't last and you didn't have money to get more. Never true   Utilities    In the past 12 months has the electric, gas, oil, or water company threatened to shut off services in your home? No            DISCHARGE DETAILS:    Discharge planning discussed with:: patient, patient's daughter, patient's son  Freedom of Choice: Yes     CM contacted family/caregiver?: Yes  Were Treatment Team discharge recommendations reviewed with patient/caregiver?: Yes  Did patient/caregiver verbalize understanding of patient care needs?: Yes  Were patient/caregiver advised of the risks associated with not following Treatment Team discharge recommendations?: Yes    Contacts  Patient Contacts: Erendira Galicia (Daughter)  [phone]  Relationship to Patient:: Family  Contact Method: In Person  Reason/Outcome: Continuity of Care, Emergency Contact, Discharge Planning    Requested Home Health Care         Is the patient interested in HHC at discharge?: Yes  Home Health Discipline requested:: Nursing       Additional Comments: CM met w/ pt and pt's family at bedside to introduce self and role and complete assessment. Pt lives w/ daughter in mobile home w/ 3 STE (address [address] Pt reported being independent at baseline for PT/OT. Pt reported no HX of D&A or MH DX. Pt agreeable to Home nursing referral being sent through. CM team to continue to follow.     CM reviewed d/c planning process including the following: identifying help at home, patient preference for d/c planning needs, Discharge Lounge, [facility] Meds to Bed  program, availability of treatment team to discuss questions or concerns patient and/or family may have regarding understanding medications and recognizing signs and symptoms once discharged.  CM also encouraged patient to follow up with all recommended appointments after discharge. Patient advised of importance for patient and family to participate in managing patient’s medical well being.

## 2024-07-06 LAB
ANION GAP SERPL CALCULATED.3IONS-SCNC: 10 MMOL/L (ref 4–13)
BASOPHILS # BLD AUTO: 0.02 THOUSANDS/ÂΜL (ref 0–0.1)
BASOPHILS NFR BLD AUTO: 0 % (ref 0–1)
BUN SERPL-MCNC: 19 MG/DL (ref 5–25)
CALCIUM SERPL-MCNC: 8.8 MG/DL (ref 8.4–10.2)
CHLORIDE SERPL-SCNC: 105 MMOL/L (ref 96–108)
CO2 SERPL-SCNC: 23 MMOL/L (ref 21–32)
CREAT SERPL-MCNC: 0.99 MG/DL (ref 0.6–1.3)
EOSINOPHIL # BLD AUTO: 0.01 THOUSAND/ÂΜL (ref 0–0.61)
EOSINOPHIL NFR BLD AUTO: 0 % (ref 0–6)
ERYTHROCYTE [DISTWIDTH] IN BLOOD BY AUTOMATED COUNT: 15.8 % (ref 11.6–15.1)
GFR SERPL CREATININE-BSD FRML MDRD: 72 ML/MIN/1.73SQ M
GLUCOSE SERPL-MCNC: 163 MG/DL (ref 65–140)
HCT VFR BLD AUTO: 39.2 % (ref 36.5–49.3)
HGB BLD-MCNC: 12.7 G/DL (ref 12–17)
IMM GRANULOCYTES # BLD AUTO: 0.05 THOUSAND/UL (ref 0–0.2)
IMM GRANULOCYTES NFR BLD AUTO: 0 % (ref 0–2)
LYMPHOCYTES # BLD AUTO: 0.9 THOUSANDS/ÂΜL (ref 0.6–4.47)
LYMPHOCYTES NFR BLD AUTO: 8 % (ref 14–44)
MCH RBC QN AUTO: 29.1 PG (ref 26.8–34.3)
MCHC RBC AUTO-ENTMCNC: 32.4 G/DL (ref 31.4–37.4)
MCV RBC AUTO: 90 FL (ref 82–98)
MONOCYTES # BLD AUTO: 0.97 THOUSAND/ÂΜL (ref 0.17–1.22)
MONOCYTES NFR BLD AUTO: 8 % (ref 4–12)
NEUTROPHILS # BLD AUTO: 10.09 THOUSANDS/ÂΜL (ref 1.85–7.62)
NEUTS SEG NFR BLD AUTO: 84 % (ref 43–75)
NRBC BLD AUTO-RTO: 0 /100 WBCS
PLATELET # BLD AUTO: 125 THOUSANDS/UL (ref 149–390)
PMV BLD AUTO: 11.5 FL (ref 8.9–12.7)
POTASSIUM SERPL-SCNC: 4.7 MMOL/L (ref 3.5–5.3)
RBC # BLD AUTO: 4.37 MILLION/UL (ref 3.88–5.62)
SODIUM SERPL-SCNC: 138 MMOL/L (ref 135–147)
WBC # BLD AUTO: 12.04 THOUSAND/UL (ref 4.31–10.16)

## 2024-07-06 PROCEDURE — 80048 BASIC METABOLIC PNL TOTAL CA: CPT

## 2024-07-06 PROCEDURE — 99024 POSTOP FOLLOW-UP VISIT: CPT | Performed by: COLON & RECTAL SURGERY

## 2024-07-06 PROCEDURE — 85025 COMPLETE CBC W/AUTO DIFF WBC: CPT | Performed by: PHYSICIAN ASSISTANT

## 2024-07-06 RX ORDER — DEXTROSE MONOHYDRATE, SODIUM CHLORIDE, AND POTASSIUM CHLORIDE 50; 1.49; 4.5 G/1000ML; G/1000ML; G/1000ML
100 INJECTION, SOLUTION INTRAVENOUS CONTINUOUS
Status: DISCONTINUED | OUTPATIENT
Start: 2024-07-06 | End: 2024-07-08

## 2024-07-06 RX ORDER — HYDROMORPHONE HCL/PF 1 MG/ML
0.5 SYRINGE (ML) INJECTION ONCE
Status: COMPLETED | OUTPATIENT
Start: 2024-07-06 | End: 2024-07-06

## 2024-07-06 RX ORDER — PANTOPRAZOLE SODIUM 40 MG/10ML
40 INJECTION, POWDER, LYOPHILIZED, FOR SOLUTION INTRAVENOUS
Status: DISCONTINUED | OUTPATIENT
Start: 2024-07-07 | End: 2024-07-08 | Stop reason: HOSPADM

## 2024-07-06 RX ORDER — CALCIUM CARBONATE 500 MG/1
500 TABLET, CHEWABLE ORAL DAILY PRN
Status: DISCONTINUED | OUTPATIENT
Start: 2024-07-06 | End: 2024-07-08 | Stop reason: HOSPADM

## 2024-07-06 RX ADMIN — ACETAMINOPHEN 975 MG: 325 TABLET, FILM COATED ORAL at 06:23

## 2024-07-06 RX ADMIN — OXYCODONE HYDROCHLORIDE 5 MG: 5 TABLET ORAL at 02:21

## 2024-07-06 RX ADMIN — METHOCARBAMOL 500 MG: 500 TABLET ORAL at 17:18

## 2024-07-06 RX ADMIN — ASPIRIN 81 MG: 81 TABLET, COATED ORAL at 08:39

## 2024-07-06 RX ADMIN — ENOXAPARIN SODIUM 40 MG: 40 INJECTION SUBCUTANEOUS at 08:39

## 2024-07-06 RX ADMIN — ATORVASTATIN CALCIUM 80 MG: 80 TABLET, FILM COATED ORAL at 17:18

## 2024-07-06 RX ADMIN — METHOCARBAMOL 500 MG: 500 TABLET ORAL at 23:09

## 2024-07-06 RX ADMIN — METHOCARBAMOL 500 MG: 500 TABLET ORAL at 06:23

## 2024-07-06 RX ADMIN — HYDROMORPHONE HYDROCHLORIDE 0.5 MG: 1 INJECTION, SOLUTION INTRAMUSCULAR; INTRAVENOUS; SUBCUTANEOUS at 06:28

## 2024-07-06 RX ADMIN — TRIMETHOBENZAMIDE HYDROCHLORIDE 200 MG: 100 INJECTION INTRAMUSCULAR at 02:28

## 2024-07-06 RX ADMIN — DEXTROSE, SODIUM CHLORIDE, AND POTASSIUM CHLORIDE 75 ML/HR: 5; .45; .15 INJECTION INTRAVENOUS at 03:25

## 2024-07-06 RX ADMIN — METHOCARBAMOL 500 MG: 500 TABLET ORAL at 11:51

## 2024-07-06 RX ADMIN — ONDANSETRON 4 MG: 2 INJECTION INTRAMUSCULAR; INTRAVENOUS at 03:30

## 2024-07-06 RX ADMIN — ACETAMINOPHEN 975 MG: 325 TABLET, FILM COATED ORAL at 17:18

## 2024-07-06 RX ADMIN — ACETAMINOPHEN 975 MG: 325 TABLET, FILM COATED ORAL at 11:51

## 2024-07-06 RX ADMIN — GABAPENTIN 100 MG: 100 CAPSULE ORAL at 17:18

## 2024-07-06 RX ADMIN — METOPROLOL SUCCINATE 25 MG: 25 TABLET, FILM COATED, EXTENDED RELEASE ORAL at 08:39

## 2024-07-06 RX ADMIN — GABAPENTIN 100 MG: 100 CAPSULE ORAL at 21:45

## 2024-07-06 RX ADMIN — GABAPENTIN 100 MG: 100 CAPSULE ORAL at 08:39

## 2024-07-06 NOTE — PLAN OF CARE
Problem: Prexisting or High Potential for Compromised Skin Integrity  Goal: Skin integrity is maintained or improved  Description: INTERVENTIONS:  - Identify patients at risk for skin breakdown  - Assess and monitor skin integrity  - Assess and monitor nutrition and hydration status  - Monitor labs   - Assess for incontinence   - Turn and reposition patient  - Assist with mobility/ambulation  - Relieve pressure over bony prominences  - Avoid friction and shearing  - Provide appropriate hygiene as needed including keeping skin clean and dry  - Evaluate need for skin moisturizer/barrier cream  - Collaborate with interdisciplinary team   - Patient/family teaching  - Consider wound care consult   Outcome: Progressing     Problem: PAIN - ADULT  Goal: Verbalizes/displays adequate comfort level or baseline comfort level  Description: Interventions:  - Encourage patient to monitor pain and request assistance  - Assess pain using appropriate pain scale  - Administer analgesics based on type and severity of pain and evaluate response  - Implement non-pharmacological measures as appropriate and evaluate response  - Consider cultural and social influences on pain and pain management  - Notify physician/advanced practitioner if interventions unsuccessful or patient reports new pain  Outcome: Progressing     Problem: INFECTION - ADULT  Goal: Absence or prevention of progression during hospitalization  Description: INTERVENTIONS:  - Assess and monitor for signs and symptoms of infection  - Monitor lab/diagnostic results  - Monitor all insertion sites, i.e. indwelling lines, tubes, and drains  - Monitor endotracheal if appropriate and nasal secretions for changes in amount and color  - [place] appropriate cooling/warming therapies per order  - Administer medications as ordered  - Instruct and encourage patient and family to use good hand hygiene technique  - Identify and instruct in appropriate isolation precautions for  identified infection/condition  Outcome: Progressing     Problem: SAFETY ADULT  Goal: Patient will remain free of falls  Description: INTERVENTIONS:  - Educate patient/family on patient safety including physical limitations  - Instruct patient to call for assistance with activity   - Consult OT/PT to assist with strengthening/mobility   - Keep Call bell within reach  - Keep bed low and locked with side rails adjusted as appropriate  - Keep care items and personal belongings within reach  - Initiate and maintain comfort rounds    Outcome: Progressing  Goal: Maintain or return to baseline ADL function  Description: INTERVENTIONS:  -  Assess patient's ability to carry out ADLs; assess patient's baseline for ADL function and identify physical deficits which impact ability to perform ADLs (bathing, care of mouth/teeth, toileting, grooming, dressing, etc.)  - Assess/evaluate cause of self-care deficits   - Assess range of motion  - Assess patient's mobility; develop plan if impaired  - Assess patient's need for assistive devices and provide as appropriate  - Encourage maximum independence but intervene and supervise when necessary  - Involve family in performance of ADLs  - Assess for home care needs following discharge   - Consider OT consult to assist with ADL evaluation and planning for discharge  - Provide patient education as appropriate  Outcome: Progressing  Goal: Maintains/Returns to pre admission functional level  Description: INTERVENTIONS:  - Perform AM-PAC 6 Click Basic Mobility/ Daily Activity assessment daily.  - Set and communicate daily mobility goal to care team and patient/family/caregiver.   - Collaborate with rehabilitation services on mobility goals if consulted  - Reposition patient every 2 hours.  - Dangle patient 3 times a day  - Stand patient 3 times a day  - Ambulate patient 3 times a day  - Out of bed to chair 3 times a day   - Out of bed for meals 3 times a day  - Out of bed for toileting  -  Record patient progress and toleration of activity level   Outcome: Progressing     Problem: DISCHARGE PLANNING  Goal: Discharge to home or other facility with appropriate resources  Description: INTERVENTIONS:  - Identify barriers to discharge w/patient and caregiver  - Arrange for needed discharge resources and transportation as appropriate  - Identify discharge learning needs (meds, wound care, etc.)  - Arrange for interpretive services to assist at discharge as needed  - Refer to Case Management Department for coordinating discharge planning if the patient needs post-hospital services based on physician/advanced practitioner order or complex needs related to functional status, cognitive ability, or social support system  Outcome: Progressing     Problem: Knowledge Deficit  Goal: Patient/family/caregiver demonstrates understanding of disease process, treatment plan, medications, and discharge instructions  Description: Complete learning assessment and assess knowledge base.  Interventions:  - Provide teaching at level of understanding  - Provide teaching via preferred learning methods  Outcome: Progressing     Problem: Nutrition/Hydration-ADULT  Goal: Nutrient/Hydration intake appropriate for improving, restoring or maintaining nutritional needs  Description: Monitor and assess patient's nutrition/hydration status for malnutrition. Collaborate with interdisciplinary team and initiate plan and interventions as ordered.  Monitor patient's weight and dietary intake as ordered or per policy. Utilize nutrition screening tool and intervene as necessary. Determine patient's food preferences and provide high-protein, high-caloric foods as appropriate.     INTERVENTIONS:  - Monitor oral intake, urinary output, labs, and treatment plans  - Assess nutrition and hydration status and recommend course of action  - Evaluate amount of meals eaten  - Assist patient with eating if necessary   - Allow adequate time for meals  -  Recommend/ encourage appropriate diets, oral nutritional supplements, and vitamin/mineral supplements  - Order, calculate, and assess calorie counts as needed  - Recommend, monitor, and adjust tube feedings and TPN/PPN based on assessed needs  - Assess need for intravenous fluids  - Provide specific nutrition/hydration education as appropriate  - Include patient/family/caregiver in decisions related to nutrition  Outcome: Progressing     Problem: GASTROINTESTINAL - ADULT  Goal: Minimal or absence of nausea and/or vomiting  Description: INTERVENTIONS:  - Administer IV fluids if ordered to ensure adequate hydration  - Maintain NPO status until nausea and vomiting are resolved  - Nasogastric tube if ordered  - Administer ordered antiemetic medications as needed  - Provide nonpharmacologic comfort measures as appropriate  - Advance diet as tolerated, if ordered  - Consider nutrition services referral to assist patient with adequate nutrition and appropriate food choices  Outcome: Progressing  Goal: Maintains or returns to baseline bowel function  Description: INTERVENTIONS:  - Assess bowel function  - Encourage oral fluids to ensure adequate hydration  - Administer IV fluids if ordered to ensure adequate hydration  - Administer ordered medications as needed  - Encourage mobilization and activity  - Consider nutritional services referral to assist patient with adequate nutrition and appropriate food choices  Outcome: Progressing  Goal: Maintains adequate nutritional intake  Description: INTERVENTIONS:  - Monitor percentage of each meal consumed  - Identify factors contributing to decreased intake, treat as appropriate  - Assist with meals as needed  - Monitor I&O, weight, and lab values if indicated  - Obtain nutrition services referral as needed  Outcome: Progressing  Goal: Establish and maintain optimal ostomy function  Description: INTERVENTIONS:  - Assess bowel function  - Encourage oral fluids to ensure adequate  hydration  - Administer IV fluids if ordered to ensure adequate hydration   - Administer ordered medications as needed  - Encourage mobilization and activity  - Nutrition services referral to assist patient with appropriate food choices  - Assess stoma site  - Consider wound care consult   Outcome: Progressing  Goal: Oral mucous membranes remain intact  Description: INTERVENTIONS  - Assess oral mucosa and hygiene practices  - Implement preventative oral hygiene regimen  - Implement oral medicated treatments as ordered  - Initiate Nutrition services referral as needed  Outcome: Progressing

## 2024-07-06 NOTE — PLAN OF CARE
Problem: GASTROINTESTINAL - ADULT  Goal: Minimal or absence of nausea and/or vomiting  Description: INTERVENTIONS:  - Administer IV fluids if ordered to ensure adequate hydration  - Maintain NPO status until nausea and vomiting are resolved  - Nasogastric tube if ordered  - Administer ordered antiemetic medications as needed  - Provide nonpharmacologic comfort measures as appropriate  - Advance diet as tolerated, if ordered  - Consider nutrition services referral to assist patient with adequate nutrition and appropriate food choices  Outcome: Progressing  Goal: Maintains or returns to baseline bowel function  Description: INTERVENTIONS:  - Assess bowel function  - Encourage oral fluids to ensure adequate hydration  - Administer IV fluids if ordered to ensure adequate hydration  - Administer ordered medications as needed  - Encourage mobilization and activity  - Consider nutritional services referral to assist patient with adequate nutrition and appropriate food choices  Outcome: Progressing  Goal: Maintains adequate nutritional intake  Description: INTERVENTIONS:  - Monitor percentage of each meal consumed  - Identify factors contributing to decreased intake, treat as appropriate  - Assist with meals as needed  - Monitor I&O, weight, and lab values if indicated  - Obtain nutrition services referral as needed  Outcome: Progressing  Goal: Establish and maintain optimal ostomy function  Description: INTERVENTIONS:  - Assess bowel function  - Encourage oral fluids to ensure adequate hydration  - Administer IV fluids if ordered to ensure adequate hydration   - Administer ordered medications as needed  - Encourage mobilization and activity  - Nutrition services referral to assist patient with appropriate food choices  - Assess stoma site  - Consider wound care consult   Outcome: Progressing  Goal: Oral mucous membranes remain intact  Description: INTERVENTIONS  - Assess oral mucosa and hygiene practices  - Implement  preventative oral hygiene regimen  - Implement oral medicated treatments as ordered  - Initiate Nutrition services referral as needed  Outcome: Progressing     Problem: Nutrition/Hydration-ADULT  Goal: Nutrient/Hydration intake appropriate for improving, restoring or maintaining nutritional needs  Description: Monitor and assess patient's nutrition/hydration status for malnutrition. Collaborate with interdisciplinary team and initiate plan and interventions as ordered.  Monitor patient's weight and dietary intake as ordered or per policy. Utilize nutrition screening tool and intervene as necessary. Determine patient's food preferences and provide high-protein, high-caloric foods as appropriate.     INTERVENTIONS:  - Monitor oral intake, urinary output, labs, and treatment plans  - Assess nutrition and hydration status and recommend course of action  - Evaluate amount of meals eaten  - Assist patient with eating if necessary   - Allow adequate time for meals  - Recommend/ encourage appropriate diets, oral nutritional supplements, and vitamin/mineral supplements  - Order, calculate, and assess calorie counts as needed  - Recommend, monitor, and adjust tube feedings and TPN/PPN based on assessed needs  - Assess need for intravenous fluids  - Provide specific nutrition/hydration education as appropriate  - Include patient/family/caregiver in decisions related to nutrition  7/5/2024 2315 by Katelyn Hosler  Outcome: Progressing  7/5/2024 2315 by Katelyn Hosler  Outcome: Progressing

## 2024-07-06 NOTE — PROGRESS NOTES
"Progress Note - General Surgery   Minesh Pandya Jr. 79 y.o. male MRN: [id number]  Unit/Bed#: [facility] 805-01 Encounter: [id number]    Assessment:  79 y.o. male s/p 7/3 Robotic sigmoid resection, end colostomy creation.    POD 3, abdomen more distended and tympanic than prior exams  New onset persistent nausea that lasted most of the day. 2 episodes of emesis.    Moments of zero pain and moments of 10/10 pain    Voiding spontaneously s/p donahue removal  High output from KAREN: 490cc SS  Ostomy: thin dark brown output    WBC 12 (9.6)  Hgb stable 12.7 (11.2)    Plan:  -clears w/ toast and crackers  -Zofran and tigan PRN for nausea  -WOCN with patients granddaughter  -remove KAREN drain prior to d/c    Subjective/Objective     Subjective: Patient reporting constant nausea yesterday. Otherwise pain controlled, no CP/SOB.    Objective:     Blood pressure 138/76, pulse 91, temperature 100.1 °F (37.8 °C), resp. rate 16, height 5' 9\" (1.753 m), weight 63 kg (139 lb), SpO2 94%.,Body mass index is 20.53 kg/m².      Intake/Output Summary (Last 24 hours) at 7/6/2024 0636  Last data filed at 7/6/2024 0528  Gross per 24 hour   Intake 355 ml   Output 2065 ml   Net -1710 ml       Invasive Devices       Central Venous Catheter Line  Duration             Port A Cath Right Chest -- days              Peripheral Intravenous Line  Duration             Peripheral IV 07/03/24 Right Antecubital 2 days    Peripheral IV 07/03/24 Right Forearm 2 days              Drain  Duration             Closed/Suction Drain Lateral Abdomen 10 Fr. 2 days    Colostomy LUQ 2 days                    Physical Exam:   General: No acute distress, laying comfortably in bed  Skin: Warm, dry  HEENT: Normocephalic, atraumatic  CV: Regular rate  Pulm: Normal work of breathing, No respiratory distress  Abd: Soft, moderately distended, tympanic, minimal tenderness over incision site. Incision CDI. KAREN draining serosang. Ostomy with pink stoma and thin  dark brown output.  MSK: " Symmetric, no edema  Neuro: AOx3    Lab, Imaging and other studies:I have personally reviewed pertinent lab results.    VTE Pharmacologic Prophylaxis: Enoxaparin (Lovenox)  VTE Mechanical Prophylaxis: sequential compression device

## 2024-07-07 LAB
ANION GAP SERPL CALCULATED.3IONS-SCNC: 4 MMOL/L (ref 4–13)
BASOPHILS # BLD AUTO: 0.01 THOUSANDS/ÂΜL (ref 0–0.1)
BASOPHILS NFR BLD AUTO: 0 % (ref 0–1)
BUN SERPL-MCNC: 14 MG/DL (ref 5–25)
CALCIUM SERPL-MCNC: 8.1 MG/DL (ref 8.4–10.2)
CHLORIDE SERPL-SCNC: 110 MMOL/L (ref 96–108)
CO2 SERPL-SCNC: 24 MMOL/L (ref 21–32)
CREAT SERPL-MCNC: 0.85 MG/DL (ref 0.6–1.3)
EOSINOPHIL # BLD AUTO: 0.29 THOUSAND/ÂΜL (ref 0–0.61)
EOSINOPHIL NFR BLD AUTO: 7 % (ref 0–6)
ERYTHROCYTE [DISTWIDTH] IN BLOOD BY AUTOMATED COUNT: 15.6 % (ref 11.6–15.1)
GFR SERPL CREATININE-BSD FRML MDRD: 83 ML/MIN/1.73SQ M
GLUCOSE SERPL-MCNC: 97 MG/DL (ref 65–140)
HCT VFR BLD AUTO: 33 % (ref 36.5–49.3)
HGB BLD-MCNC: 10.8 G/DL (ref 12–17)
IMM GRANULOCYTES # BLD AUTO: 0.03 THOUSAND/UL (ref 0–0.2)
IMM GRANULOCYTES NFR BLD AUTO: 1 % (ref 0–2)
LYMPHOCYTES # BLD AUTO: 0.92 THOUSANDS/ÂΜL (ref 0.6–4.47)
LYMPHOCYTES NFR BLD AUTO: 21 % (ref 14–44)
MAGNESIUM SERPL-MCNC: 1.9 MG/DL (ref 1.9–2.7)
MCH RBC QN AUTO: 29.6 PG (ref 26.8–34.3)
MCHC RBC AUTO-ENTMCNC: 32.7 G/DL (ref 31.4–37.4)
MCV RBC AUTO: 90 FL (ref 82–98)
MONOCYTES # BLD AUTO: 0.49 THOUSAND/ÂΜL (ref 0.17–1.22)
MONOCYTES NFR BLD AUTO: 11 % (ref 4–12)
NEUTROPHILS # BLD AUTO: 2.69 THOUSANDS/ÂΜL (ref 1.85–7.62)
NEUTS SEG NFR BLD AUTO: 60 % (ref 43–75)
NRBC BLD AUTO-RTO: 0 /100 WBCS
PHOSPHATE SERPL-MCNC: 2.1 MG/DL (ref 2.3–4.1)
PLATELET # BLD AUTO: 89 THOUSANDS/UL (ref 149–390)
PMV BLD AUTO: 11.2 FL (ref 8.9–12.7)
POTASSIUM SERPL-SCNC: 4.2 MMOL/L (ref 3.5–5.3)
RBC # BLD AUTO: 3.65 MILLION/UL (ref 3.88–5.62)
SODIUM SERPL-SCNC: 138 MMOL/L (ref 135–147)
WBC # BLD AUTO: 4.43 THOUSAND/UL (ref 4.31–10.16)

## 2024-07-07 PROCEDURE — 99024 POSTOP FOLLOW-UP VISIT: CPT | Performed by: COLON & RECTAL SURGERY

## 2024-07-07 PROCEDURE — 84100 ASSAY OF PHOSPHORUS: CPT | Performed by: STUDENT IN AN ORGANIZED HEALTH CARE EDUCATION/TRAINING PROGRAM

## 2024-07-07 PROCEDURE — 83735 ASSAY OF MAGNESIUM: CPT | Performed by: STUDENT IN AN ORGANIZED HEALTH CARE EDUCATION/TRAINING PROGRAM

## 2024-07-07 PROCEDURE — 80048 BASIC METABOLIC PNL TOTAL CA: CPT | Performed by: STUDENT IN AN ORGANIZED HEALTH CARE EDUCATION/TRAINING PROGRAM

## 2024-07-07 PROCEDURE — 85025 COMPLETE CBC W/AUTO DIFF WBC: CPT | Performed by: STUDENT IN AN ORGANIZED HEALTH CARE EDUCATION/TRAINING PROGRAM

## 2024-07-07 RX ADMIN — ASPIRIN 81 MG: 81 TABLET, COATED ORAL at 09:02

## 2024-07-07 RX ADMIN — METOPROLOL SUCCINATE 25 MG: 25 TABLET, FILM COATED, EXTENDED RELEASE ORAL at 09:02

## 2024-07-07 RX ADMIN — ATORVASTATIN CALCIUM 80 MG: 80 TABLET, FILM COATED ORAL at 17:15

## 2024-07-07 RX ADMIN — METHOCARBAMOL 500 MG: 500 TABLET ORAL at 11:19

## 2024-07-07 RX ADMIN — GABAPENTIN 100 MG: 100 CAPSULE ORAL at 09:02

## 2024-07-07 RX ADMIN — DEXTROSE, SODIUM CHLORIDE, AND POTASSIUM CHLORIDE 100 ML/HR: 5; .45; .15 INJECTION INTRAVENOUS at 21:56

## 2024-07-07 RX ADMIN — DEXTROSE, SODIUM CHLORIDE, AND POTASSIUM CHLORIDE 100 ML/HR: 5; .45; .15 INJECTION INTRAVENOUS at 01:37

## 2024-07-07 RX ADMIN — POTASSIUM PHOSPHATE, MONOBASIC POTASSIUM PHOSPHATE, DIBASIC 12 MMOL: 224; 236 INJECTION, SOLUTION, CONCENTRATE INTRAVENOUS at 10:51

## 2024-07-07 RX ADMIN — ACETAMINOPHEN 975 MG: 325 TABLET, FILM COATED ORAL at 00:09

## 2024-07-07 RX ADMIN — METHOCARBAMOL 500 MG: 500 TABLET ORAL at 05:54

## 2024-07-07 RX ADMIN — ACETAMINOPHEN 975 MG: 325 TABLET, FILM COATED ORAL at 17:15

## 2024-07-07 RX ADMIN — ACETAMINOPHEN 975 MG: 325 TABLET, FILM COATED ORAL at 11:19

## 2024-07-07 RX ADMIN — GABAPENTIN 100 MG: 100 CAPSULE ORAL at 21:52

## 2024-07-07 RX ADMIN — GABAPENTIN 100 MG: 100 CAPSULE ORAL at 17:16

## 2024-07-07 RX ADMIN — ACETAMINOPHEN 975 MG: 325 TABLET, FILM COATED ORAL at 23:20

## 2024-07-07 RX ADMIN — METHOCARBAMOL 500 MG: 500 TABLET ORAL at 23:20

## 2024-07-07 RX ADMIN — METHOCARBAMOL 500 MG: 500 TABLET ORAL at 17:15

## 2024-07-07 RX ADMIN — ENOXAPARIN SODIUM 40 MG: 40 INJECTION SUBCUTANEOUS at 09:02

## 2024-07-07 RX ADMIN — PANTOPRAZOLE SODIUM 40 MG: 40 INJECTION, POWDER, FOR SOLUTION INTRAVENOUS at 09:02

## 2024-07-07 NOTE — PLAN OF CARE
Problem: GASTROINTESTINAL - ADULT  Goal: Minimal or absence of nausea and/or vomiting  Description: INTERVENTIONS:  - Administer IV fluids if ordered to ensure adequate hydration  - Maintain NPO status until nausea and vomiting are resolved  - Nasogastric tube if ordered  - Administer ordered antiemetic medications as needed  - Provide nonpharmacologic comfort measures as appropriate  - Advance diet as tolerated, if ordered  - Consider nutrition services referral to assist patient with adequate nutrition and appropriate food choices  Outcome: Progressing  Goal: Maintains or returns to baseline bowel function  Description: INTERVENTIONS:  - Assess bowel function  - Encourage oral fluids to ensure adequate hydration  - Administer IV fluids if ordered to ensure adequate hydration  - Administer ordered medications as needed  - Encourage mobilization and activity  - Consider nutritional services referral to assist patient with adequate nutrition and appropriate food choices  Outcome: Progressing  Goal: Maintains adequate nutritional intake  Description: INTERVENTIONS:  - Monitor percentage of each meal consumed  - Identify factors contributing to decreased intake, treat as appropriate  - Assist with meals as needed  - Monitor I&O, weight, and lab values if indicated  - Obtain nutrition services referral as needed  Outcome: Progressing  Goal: Establish and maintain optimal ostomy function  Description: INTERVENTIONS:  - Assess bowel function  - Encourage oral fluids to ensure adequate hydration  - Administer IV fluids if ordered to ensure adequate hydration   - Administer ordered medications as needed  - Encourage mobilization and activity  - Nutrition services referral to assist patient with appropriate food choices  - Assess stoma site  - Consider wound care consult   Outcome: Progressing     Problem: PAIN - ADULT  Goal: Verbalizes/displays adequate comfort level or baseline comfort level  Description:  Interventions:  - Encourage patient to monitor pain and request assistance  - Assess pain using appropriate pain scale  - Administer analgesics based on type and severity of pain and evaluate response  - Implement non-pharmacological measures as appropriate and evaluate response  - Consider cultural and social influences on pain and pain management  - Notify physician/advanced practitioner if interventions unsuccessful or patient reports new pain  Outcome: Progressing     Problem: INFECTION - ADULT  Goal: Absence or prevention of progression during hospitalization  Description: INTERVENTIONS:  - Assess and monitor for signs and symptoms of infection  - Monitor lab/diagnostic results  - Monitor all insertion sites, i.e. indwelling lines, tubes, and drains  - Monitor endotracheal if appropriate and nasal secretions for changes in amount and color  - [place] appropriate cooling/warming therapies per order  - Administer medications as ordered  - Instruct and encourage patient and family to use good hand hygiene technique  - Identify and instruct in appropriate isolation precautions for identified infection/condition  Outcome: Progressing     Problem: Nutrition/Hydration-ADULT  Goal: Nutrient/Hydration intake appropriate for improving, restoring or maintaining nutritional needs  Description: Monitor and assess patient's nutrition/hydration status for malnutrition. Collaborate with interdisciplinary team and initiate plan and interventions as ordered.  Monitor patient's weight and dietary intake as ordered or per policy. Utilize nutrition screening tool and intervene as necessary. Determine patient's food preferences and provide high-protein, high-caloric foods as appropriate.     INTERVENTIONS:  - Monitor oral intake, urinary output, labs, and treatment plans  - Assess nutrition and hydration status and recommend course of action  - Evaluate amount of meals eaten  - Assist patient with eating if necessary   - Allow  adequate time for meals  - Recommend/ encourage appropriate diets, oral nutritional supplements, and vitamin/mineral supplements  - Order, calculate, and assess calorie counts as needed  - Recommend, monitor, and adjust tube feedings and TPN/PPN based on assessed needs  - Assess need for intravenous fluids  - Provide specific nutrition/hydration education as appropriate  - Include patient/family/caregiver in decisions related to nutrition  Outcome: Progressing

## 2024-07-07 NOTE — PROGRESS NOTES
"Progress Note - General Surgery   Minesh Pandya Jr. 79 y.o. male MRN: [id number]  Unit/Bed#: [facility] 805-01 Encounter: [id number]    Assessment:  79 y.o. male s/p 7/3 Robotic sigmoid resection, end colostomy creation.     POD 4: abdomen still mildly distended   Made NPO yesterday d/t persistent N/V    Nausea and vomiting improved. Currently denies nausea, but had some indigestion yesterday  Incision c/d/I  Ambulating with assitance. Voiding spontaneously    KAREN: 295cc SS  UOP: 1.52L  Ostomy: 25cc liquid stool and gas. Stoma red-pink    PND AM labs    Plan:  -clears w/ toast and crackers  -Zofran and tigan PRN for nausea  -WOCN with patients granddaughter  -remove KAREN drain prior to d/c    Subjective/Objective     Subjective: Doing much better this morning. Pain controlled. Passing gas and stool from ostomy. Denies N/V    Objective:     Blood pressure 109/94, pulse 78, temperature 98.3 °F (36.8 °C), resp. rate 15, height 5' 9\" (1.753 m), weight 63 kg (139 lb), SpO2 95%.,Body mass index is 20.53 kg/m².      Intake/Output Summary (Last 24 hours) at 7/7/2024 0639  Last data filed at 7/7/2024 0616  Gross per 24 hour   Intake --   Output 1570 ml   Net -1570 ml       Invasive Devices       Central Venous Catheter Line  Duration             Port A Cath Right Chest -- days              Peripheral Intravenous Line  Duration             Peripheral IV 07/03/24 Right Forearm 3 days    Peripheral IV 07/07/24 Dorsal (posterior);Left Forearm <1 day              Drain  Duration             Closed/Suction Drain Lateral Abdomen 10 Fr. 3 days    Colostomy LUQ 3 days                    Physical Exam:   General: No acute distress, laying comfortably in bed  Skin: Warm, dry  HEENT: Normocephalic, atraumatic  CV: Regular rate  Pulm: Normal work of breathing, No respiratory distress  Abd: Soft, moderately distended, no tender. Incision CDI. KAREN draining serosang. Ostomy with pink stoma and thin dark brown output.  MSK: Symmetric, no " edema  Neuro: AOx3    Lab, Imaging and other studies:I have personally reviewed pertinent lab results.    VTE Pharmacologic Prophylaxis: Heparin  VTE Mechanical Prophylaxis: sequential compression device

## 2024-07-08 VITALS
DIASTOLIC BLOOD PRESSURE: 67 MMHG | WEIGHT: 139 LBS | OXYGEN SATURATION: 96 % | TEMPERATURE: 98.1 F | HEART RATE: 71 BPM | HEIGHT: 69 IN | BODY MASS INDEX: 20.59 KG/M2 | RESPIRATION RATE: 16 BRPM | SYSTOLIC BLOOD PRESSURE: 123 MMHG

## 2024-07-08 LAB
ANION GAP SERPL CALCULATED.3IONS-SCNC: 6 MMOL/L (ref 4–13)
BASOPHILS # BLD AUTO: 0.01 THOUSANDS/ÂΜL (ref 0–0.1)
BASOPHILS NFR BLD AUTO: 0 % (ref 0–1)
BUN SERPL-MCNC: 10 MG/DL (ref 5–25)
CALCIUM SERPL-MCNC: 8.1 MG/DL (ref 8.4–10.2)
CHLORIDE SERPL-SCNC: 110 MMOL/L (ref 96–108)
CO2 SERPL-SCNC: 23 MMOL/L (ref 21–32)
CREAT SERPL-MCNC: 0.89 MG/DL (ref 0.6–1.3)
EOSINOPHIL # BLD AUTO: 0.27 THOUSAND/ÂΜL (ref 0–0.61)
EOSINOPHIL NFR BLD AUTO: 5 % (ref 0–6)
ERYTHROCYTE [DISTWIDTH] IN BLOOD BY AUTOMATED COUNT: 15.7 % (ref 11.6–15.1)
GFR SERPL CREATININE-BSD FRML MDRD: 81 ML/MIN/1.73SQ M
GLUCOSE SERPL-MCNC: 94 MG/DL (ref 65–140)
HCT VFR BLD AUTO: 32.6 % (ref 36.5–49.3)
HGB BLD-MCNC: 10.3 G/DL (ref 12–17)
IMM GRANULOCYTES # BLD AUTO: 0.03 THOUSAND/UL (ref 0–0.2)
IMM GRANULOCYTES NFR BLD AUTO: 1 % (ref 0–2)
LYMPHOCYTES # BLD AUTO: 0.82 THOUSANDS/ÂΜL (ref 0.6–4.47)
LYMPHOCYTES NFR BLD AUTO: 15 % (ref 14–44)
MCH RBC QN AUTO: 28.8 PG (ref 26.8–34.3)
MCHC RBC AUTO-ENTMCNC: 31.6 G/DL (ref 31.4–37.4)
MCV RBC AUTO: 91 FL (ref 82–98)
MONOCYTES # BLD AUTO: 0.65 THOUSAND/ÂΜL (ref 0.17–1.22)
MONOCYTES NFR BLD AUTO: 12 % (ref 4–12)
NEUTROPHILS # BLD AUTO: 3.59 THOUSANDS/ÂΜL (ref 1.85–7.62)
NEUTS SEG NFR BLD AUTO: 67 % (ref 43–75)
NRBC BLD AUTO-RTO: 0 /100 WBCS
PHOSPHATE SERPL-MCNC: 3.2 MG/DL (ref 2.3–4.1)
PLATELET # BLD AUTO: 94 THOUSANDS/UL (ref 149–390)
PMV BLD AUTO: 11.2 FL (ref 8.9–12.7)
POTASSIUM SERPL-SCNC: 4.3 MMOL/L (ref 3.5–5.3)
RBC # BLD AUTO: 3.58 MILLION/UL (ref 3.88–5.62)
SODIUM SERPL-SCNC: 139 MMOL/L (ref 135–147)
WBC # BLD AUTO: 5.37 THOUSAND/UL (ref 4.31–10.16)

## 2024-07-08 PROCEDURE — 97530 THERAPEUTIC ACTIVITIES: CPT

## 2024-07-08 PROCEDURE — 84100 ASSAY OF PHOSPHORUS: CPT

## 2024-07-08 PROCEDURE — 99024 POSTOP FOLLOW-UP VISIT: CPT | Performed by: SURGERY

## 2024-07-08 PROCEDURE — 80048 BASIC METABOLIC PNL TOTAL CA: CPT

## 2024-07-08 PROCEDURE — NC001 PR NO CHARGE: Performed by: SURGERY

## 2024-07-08 PROCEDURE — 85025 COMPLETE CBC W/AUTO DIFF WBC: CPT

## 2024-07-08 PROCEDURE — 97116 GAIT TRAINING THERAPY: CPT

## 2024-07-08 PROCEDURE — 88309 TISSUE EXAM BY PATHOLOGIST: CPT | Performed by: PATHOLOGY

## 2024-07-08 RX ORDER — METOPROLOL SUCCINATE 25 MG/1
25 TABLET, EXTENDED RELEASE ORAL DAILY
Qty: 30 TABLET | Refills: 0 | Status: SHIPPED | OUTPATIENT
Start: 2024-07-08 | End: 2024-07-16

## 2024-07-08 RX ORDER — OXYCODONE HYDROCHLORIDE 5 MG/1
2.5 TABLET ORAL EVERY 6 HOURS PRN
Qty: 8 TABLET | Refills: 0 | Status: SHIPPED | OUTPATIENT
Start: 2024-07-08 | End: 2024-07-12

## 2024-07-08 RX ORDER — GABAPENTIN 100 MG/1
100 CAPSULE ORAL 3 TIMES DAILY
Qty: 21 CAPSULE | Refills: 0 | Status: SHIPPED | OUTPATIENT
Start: 2024-07-08 | End: 2024-07-16 | Stop reason: CLARIF

## 2024-07-08 RX ORDER — METHOCARBAMOL 500 MG/1
500 TABLET, FILM COATED ORAL EVERY 6 HOURS SCHEDULED
Qty: 28 TABLET | Refills: 0 | Status: SHIPPED | OUTPATIENT
Start: 2024-07-08 | End: 2024-07-16 | Stop reason: CLARIF

## 2024-07-08 RX ORDER — ACETAMINOPHEN 325 MG/1
975 TABLET ORAL EVERY 8 HOURS SCHEDULED
Qty: 63 TABLET | Refills: 0 | Status: SHIPPED | OUTPATIENT
Start: 2024-07-08 | End: 2024-07-16

## 2024-07-08 RX ORDER — ENOXAPARIN SODIUM 100 MG/ML
40 INJECTION SUBCUTANEOUS
Qty: 8.8 ML | Refills: 0 | Status: SHIPPED | OUTPATIENT
Start: 2024-07-09 | End: 2024-07-08

## 2024-07-08 RX ORDER — ENOXAPARIN SODIUM 100 MG/ML
40 INJECTION SUBCUTANEOUS
Qty: 8.8 ML | Refills: 0 | Status: SHIPPED | OUTPATIENT
Start: 2024-07-09 | End: 2024-07-31

## 2024-07-08 RX ORDER — LISINOPRIL 2.5 MG/1
2.5 TABLET ORAL DAILY
Qty: 30 TABLET | Refills: 0 | Status: SHIPPED | OUTPATIENT
Start: 2024-07-08 | End: 2024-07-16

## 2024-07-08 RX ADMIN — PANTOPRAZOLE SODIUM 40 MG: 40 INJECTION, POWDER, FOR SOLUTION INTRAVENOUS at 08:19

## 2024-07-08 RX ADMIN — GABAPENTIN 100 MG: 100 CAPSULE ORAL at 08:19

## 2024-07-08 RX ADMIN — ACETAMINOPHEN 975 MG: 325 TABLET, FILM COATED ORAL at 12:05

## 2024-07-08 RX ADMIN — METHOCARBAMOL 500 MG: 500 TABLET ORAL at 05:08

## 2024-07-08 RX ADMIN — ACETAMINOPHEN 975 MG: 325 TABLET, FILM COATED ORAL at 05:08

## 2024-07-08 RX ADMIN — METHOCARBAMOL 500 MG: 500 TABLET ORAL at 12:05

## 2024-07-08 RX ADMIN — ASPIRIN 81 MG: 81 TABLET, COATED ORAL at 08:19

## 2024-07-08 RX ADMIN — ENOXAPARIN SODIUM 40 MG: 40 INJECTION SUBCUTANEOUS at 08:19

## 2024-07-08 RX ADMIN — METOPROLOL SUCCINATE 25 MG: 25 TABLET, FILM COATED, EXTENDED RELEASE ORAL at 08:19

## 2024-07-08 NOTE — DISCHARGE INSTR - AVS FIRST PAGE
DISCHARGE INSTRUCTIONS    Follow Up: Follow up with Dr. Hall on 7/29 at 2:40PM  -Follow-up with your PCP in 1 week to discuss hospital stay  -Lovenox 40mg Injection daily until 7/31    Diet: Low fiber diet for 3 more days then may resume regular diet    Pain: Tylenol 975 mg every 8 hours scheduled for 7 days.  Robaxin 500 mg 3 times a day scheduled for 7 days.  Gabapentin 100 mg 3 times a day scheduled for 7 days.  Oxycodone 2.5 mg as needed for moderate/severe pain every 6 hours.    Shower: You may shower over the wound. Do not bathe or use a pool or hot tub until cleared by the physician.    Activity: As tolerated. You may go up and down stairs, walk as much as you are comfortable, but walk at least 3 times each day. Do not lift anything heavier than 15 pounds for at least 2-4 weeks, unless cleared by the doctor.    Driving: Do not drive or make any important decisions while on narcotic pain medication. Generally, you may drive when your off all narcotic pain medications.    Medications: Resume all of your previous medications, unless told otherwise by the doctor. You do not need to take the narcotic pain medications unless you are having significant pain and discomfort.    Call the office: If you are experiencing any of the following, fevers above 101.5° or chills, significant nausea or vomiting, increase in abdominal pain, if the wound develops drainage and/or is excessive redness around the wound, or if you have significant diarrhea or other worsening symptoms.

## 2024-07-08 NOTE — PLAN OF CARE
Problem: PHYSICAL THERAPY ADULT  Goal: Performs mobility at highest level of function for planned discharge setting.  See evaluation for individualized goals.  Description: Treatment/Interventions: Functional transfer training, LE strengthening/ROM, Therapeutic exercise, Elevations, Endurance training, Patient/family training, Equipment eval/education, Bed mobility, Gait training, Spoke to nursing, Spoke to case management, OT          See flowsheet documentation for full assessment, interventions and recommendations.  Outcome: Adequate for Discharge  Note: Prognosis: Good  Problem List: Decreased range of motion, Decreased strength, Decreased endurance, Impaired balance, Decreased mobility, Pain, Decreased skin integrity  Assessment: Pt seen for PT treatment session this date. Pt demonstrates significant improvement in mobility level and verbalizes feeling better since initial evaluation. Pt completes mobility with Mod I /Sup as detailed in above flowsheet, no AD used for mobility. Pt states lives with daughter who assists at home ,additionally granddaughter lives next door to him, who is supportive as well.  Pt was left back in bed at the end of PT session with all needs in reach. The patient's AM-PAC Basic Mobility Inpatient Short Form Raw Score is 22. A Raw score of greater than 16 suggests the patient may benefit from discharge to home. Please also refer to the recommendation of the Physical Therapist for safe discharge planning. Post dc recommendation is No Post Acute Rehab services. Encourage continued mobility while in hospital with staff and restorative team,no further skilled PT services indicated.        Rehab Resource Intensity Level, PT: No post-acute rehabilitation needs    See flowsheet documentation for full assessment.

## 2024-07-08 NOTE — QUICK NOTE
KAREN drain with 25 cc serosanguineous output.    KAREN drain removed without complications, patient tolerated well.    Previous drain site covered with 4 x 4 gauze secured with clear tape.    Plan:  -Nursing may change outer dressing with saturation  -Keep area clean, dry, dressings intact    Rosalee Hill

## 2024-07-08 NOTE — PLAN OF CARE
Problem: GASTROINTESTINAL - ADULT  Goal: Minimal or absence of nausea and/or vomiting  Description: INTERVENTIONS:  - Administer IV fluids if ordered to ensure adequate hydration  - Maintain NPO status until nausea and vomiting are resolved  - Nasogastric tube if ordered  - Administer ordered antiemetic medications as needed  - Provide nonpharmacologic comfort measures as appropriate  - Advance diet as tolerated, if ordered  - Consider nutrition services referral to assist patient with adequate nutrition and appropriate food choices  Outcome: Progressing  Goal: Maintains or returns to baseline bowel function  Description: INTERVENTIONS:  - Assess bowel function  - Encourage oral fluids to ensure adequate hydration  - Administer IV fluids if ordered to ensure adequate hydration  - Administer ordered medications as needed  - Encourage mobilization and activity  - Consider nutritional services referral to assist patient with adequate nutrition and appropriate food choices  Outcome: Progressing  Goal: Maintains adequate nutritional intake  Description: INTERVENTIONS:  - Monitor percentage of each meal consumed  - Identify factors contributing to decreased intake, treat as appropriate  - Assist with meals as needed  - Monitor I&O, weight, and lab values if indicated  - Obtain nutrition services referral as needed  Outcome: Progressing  Goal: Establish and maintain optimal ostomy function  Description: INTERVENTIONS:  - Assess bowel function  - Encourage oral fluids to ensure adequate hydration  - Administer IV fluids if ordered to ensure adequate hydration   - Administer ordered medications as needed  - Encourage mobilization and activity  - Nutrition services referral to assist patient with appropriate food choices  - Assess stoma site  - Consider wound care consult   Outcome: Progressing  Goal: Oral mucous membranes remain intact  Description: INTERVENTIONS  - Assess oral mucosa and hygiene practices  - Implement  preventative oral hygiene regimen  - Implement oral medicated treatments as ordered  - Initiate Nutrition services referral as needed  Outcome: Progressing

## 2024-07-08 NOTE — PROGRESS NOTES
Pt discharged.  All d/c instructions reviewed and understood. D/adrienne saline lock with cath intact

## 2024-07-08 NOTE — UTILIZATION REVIEW
NOTIFICATION OF ADMISSION DISCHARGE   This is a Notification of Discharge from [facility]. Please be advised that this patient has been discharge from our facility. Below you will find the admission and discharge date and time including the patient’s disposition.   UTILIZATION REVIEW CONTACT:  Magi Hdz  Utilization   Network Utilization Review Department  Phone: [phone] x carefully listen to the prompts. All voicemails are confidential.  Email: [email or web address]     ADMISSION INFORMATION  PRESENTATION DATE: 7/3/2024 10:55 AM  OBERVATION ADMISSION DATE: N/A  INPATIENT ADMISSION DATE: 7/3/24  5:38 PM   DISCHARGE DATE: 7/8/2024  3:57 PM   DISPOSITION:Home with Home Health Care    Network Utilization Review Department  ATTENTION: Please call with any questions or concerns to [phone] and carefully listen to the prompts so that you are directed to the right person. All voicemails are confidential.   For Discharge needs, contact Care Management DC Support Team at [phone] opt. 2  Send all requests for admission clinical reviews, approved or denied determinations and any other requests to dedicated fax number below belonging to the campus where the patient is receiving treatment. List of dedicated fax numbers for the Facilities:  FACILITY NAME UR FAX NUMBER   ADMISSION DENIALS (Administrative/Medical Necessity) [phone]   DISCHARGE SUPPORT TEAM (Health system) [phone]   PARENT CHILD HEALTH (Maternity/NICU/Pediatrics) [phone]   [facility] [phone]   [facility] [phone]   [facility] [phone]   [facility] [phone]   [facility] [phone]   [facility] [phone]   [facility] [phone]   [facility]  [phone]   [facility] [phone]   [facility] [phone]   [facility] [phone]   [facility] [phone]

## 2024-07-08 NOTE — RESTORATIVE TECHNICIAN NOTE
Restorative Technician Note      Patient Name: Minesh Pandya JrElias     Restorative Tech Visit Date: 07/08/24  Note Type: Mobility  Patient Position Upon Consult: Supine  Activity Performed: Ambulated  Assistive Device: Other (Comment) (no AD)  Education Provided: Yes  Patient Position at End of Consult: Seated edge of bed; All needs within reach    Rain Donnelly  DPT, Restorative Technician

## 2024-07-08 NOTE — CASE MANAGEMENT
Case Management Progress Note    Patient name Minesh Pandya Jr.  Location [facility] 805/[facility] 805-01 MRN [id number]  : 1945 Date 2024       LOS (days): 5  Geometric Mean LOS (GMLOS) (days): 5.1  Days to GMLOS:0.2        OBJECTIVE:        Current admission status: Inpatient  Preferred Pharmacy:   [id number] - [place] - [address]  [facility] [address]  [place] [address]  Phone: [phone] Fax: [phone]    [facility] - [place] - [address]  [address]  [place] [address]  Phone: [phone] Fax: [phone]    [facility] - [place] - [address]  [address]  [place] [address]  Phone: [phone] Fax: [phone]    [facility] [place] [facility] [place] - [address]  [address]  [place] [address]  Phone: [phone] Fax: [phone]    Primary Care Provider: Jhoan Morin DO    Primary Insurance: [employer] REP  Secondary Insurance:     PROGRESS NOTE:    Financial assistance form completed for pt for Lovenox ordered to [facility]. CM team to continue to follow.

## 2024-07-08 NOTE — PHYSICAL THERAPY NOTE
PHYSICAL THERAPY NOTE          Patient Name: Minesh Pandya Jr.  Today's Date: 7/8/2024 07/08/24 1314   PT Last Visit   PT Visit Date 07/08/24   Note Type   Note Type Treatment   Education   Education Provided Yes   Pain Assessment   Pain Assessment Tool 0-10   Pain Score No Pain   Restrictions/Precautions   Weight Bearing Precautions Per Order No   Other Precautions Multiple lines  (KAREN drain)   General   Chart Reviewed Yes   Cognition   Overall Cognitive Status WFL   Arousal/Participation Alert;Responsive   Attention Within functional limits   Orientation Level Oriented X4   Following Commands Follows all commands and directions without difficulty   Comments Pt cooperative and very pleasant during session   Subjective   Subjective Agreeable to mobility   Bed Mobility   Supine to Sit 6  Modified independent   Additional items Increased time required   Sit to Supine 7  Independent   Additional items Increased time required   Additional Comments Pt in bed upon arrival.   Transfers   Sit to Stand 5  Supervision   Additional items Increased time required   Stand to Sit 5  Supervision   Additional items Increased time required   Additional Comments no AD used   Ambulation/Elevation   Gait pattern Step to;Short stride   Gait Assistance 5  Supervision   Assistive Device None   Distance 250 ft   Stair Management Assistance 5  Supervision   Stair Management Technique One rail R;Reciprocal   Number of Stairs 3   Ambulation/Elevation Additional Comments Ambulation completed without AD, slow but steady gait .   Balance   Static Sitting Good   Dynamic Sitting Good   Static Standing Fair +   Dynamic Standing Fair +   Ambulatory Fair +   Activity Tolerance   Activity Tolerance Patient tolerated treatment well   Nurse Made Aware RN cleared pt   Assessment   Prognosis Good   Assessment Pt seen for PT treatment session this date. Pt demonstrates  significant improvement in mobility level and verbalizes feeling better since initial evaluation. Pt completes mobility with Mod I /Sup as detailed in above flowsheet, no AD used for mobility. Pt states lives with daughter who assists at home ,additionally granddaughter lives next door to him, who is supportive as well.  Pt was left back in bed at the end of PT session with all needs in reach. The patient's AM-PAC Basic Mobility Inpatient Short Form Raw Score is 22. A Raw score of greater than 16 suggests the patient may benefit from discharge to home. Please also refer to the recommendation of the Physical Therapist for safe discharge planning. Post dc recommendation is No Post Acute Rehab services. Encourage continued mobility while in hospital with staff and restorative team,no further skilled PT services indicated.   Goals   Patient Goals to go home   PT Treatment Day 1   Plan   Treatment/Interventions Spoke to case management;Spoke to nursing   Progress Discontinue PT   Discharge Recommendation   Rehab Resource Intensity Level, PT No post-acute rehabilitation needs   AM-PAC Basic Mobility Inpatient   Turning in Flat Bed Without Bedrails 4   Lying on Back to Sitting on Edge of Flat Bed Without Bedrails 4   Moving Bed to Chair 4   Standing Up From Chair Using Arms 4   Walk in Room 3   Climb 3-5 Stairs With Railing 3   Basic Mobility Inpatient Raw Score 22   Basic Mobility Standardized Score 47.4   [facility] Highest Level Of Mobility   -HLM Goal 7: Walk 25 feet or more   JH-HLM Achieved 8: Walk 250 feet ot more   Education   Education Provided Mobility training   Patient Demonstrates acceptance/verbal understanding   End of Consult   Patient Position at End of Consult All needs within reach;Supine     Mary Wagner PT DPT

## 2024-07-08 NOTE — DISCHARGE SUMMARY
Discharge Summary - Colorectal Surgery   Minesh Pandya Jr. 79 y.o. male MRN: [id number]  [facility] Encounter: [id number]    Admission Date: 7/3/2024     Discharge Date: 7/8/2024    Admitting Diagnosis: Rectal cancer (HCC) [C20]    Discharge Diagnosis: Rectal cancer status post robotic sigmoid resection, end colostomy creation by Dr. Hall    Attending and Service: Dr. Hall, colorectal surgical Services.    Consulting Physician(s): Geriatric medicine, wound care    Imaging and Procedures Performed:   7/3/2024 robotic sigmoid resection, end colostomy creation by Dr. Hall    Pathology:   Final Diagnosis   A. Sigmoid colon and rectum (low anterior resection):  - Residual adenocarcinoma (5mm largest focus) arising in a tubular adenoma   - Fifteen (15) lymph nodes negative for carcinoma (0/15)  - Margins negative for dysplasia and carcinoma     - Closest margin to carcinoma: posterior mesorectal/radial at 3mm    - Closest margin to dysplasia: distal at 5mm  - See staging synoptic (ypT3N0)     Comment:  - MMR IHC performed on the prior pre-treatment biopsy showed no loss of nuclear expression: Low probability of MSI-H.        Hospital Course: Minesh Pandya Jr. is a 79-year-old male with PMHx of GERD, ischemic cardiomyopathy, MI, pericarditis, CVA, cardiac stents x 2 in May 2023 presented for elective surgical intervention as mentioned above.    Postoperatively patient was transferred to the medical surgical floor on a clear liquid diet, IV fluids running, PCA pump for pain control, stoma in place, KAREN drain in place, Lobato in place, and was encouraged to be out of bed and ambulating as well as utilizing his incentive spirometer.  Geriatric medicine was consulted for assistance with postoperative management of patient.  Throughout his hospital stay patient's diet was slowly advanced as tolerated without nausea or vomiting.  His Lobato was removed on postoperative day 2.  Transitioned off of IV pain  control regimen and to an oral regimen throughout his hospital stay.  PT/OT cleared patient to return home with VNA services and wound care teaching.  Wound care was consulted throughout patient's stay as well to assist with wound care teaching and ostomy education.    Patient was cleared for discharge on postoperative day 5 as he was tolerating diet without nausea or vomiting, out of bed and ambulating without any assistance, urinating without any difficulties, his KAREN drain was removed prior to discharge, and his pain remained controlled on a p.o. regimen.  Patient was also utilizing his incentive spirometer at bedside.    All discharge instructions as well as postoperative follow-up appointments were discussed with the patient is bedside Lovenox injection teaching was also completed with patient at bedside.  All questions answered, patient was in agreement with plan.    On discharge, the patient is instructed to follow-up with the patient's primary care provider within the next 2 weeks to review the events of the recent hospitalization. The patient is instructed to follow-up with Dr. Hall as scheduled.  The patient is instructed to follow the provided discharge instructions.     Condition at Discharge: good     Discharge instructions/Information to patient and family:   See after visit summary for information provided to patient and family.      Provisions for Follow-Up Care:  See after visit summary for information related to follow-up care and any pertinent home health orders.      Disposition: Home with VNA    Planned Readmission: No    Discharge Statement   I spent 30 minutes discharging the patient. This time was spent on the day of discharge. I had direct contact with the patient on the day of discharge. Additional documentation is required if more than 30 minutes were spent on discharge.     Discharge Medications:  See after visit summary for reconciled discharge medications provided to patient and  family.    Rosalee Hill PA-C  7/8/2024  1:43 PM

## 2024-07-08 NOTE — PROGRESS NOTES
"Progress Note - General Surgery   Minesh Pandya Jr. 79 y.o. male MRN: [id number]  Unit/Bed#: [facility] 805-01 Encounter: [id number]    Assessment:  79 y.o. male s/p 7/3 Robotic sigmoid resection, end colostomy creation.     POD 5. Abdomen uh less distended this morning  Tolerating clears w/ toast crackers w/o nausea, vomiting. Ostomy with thicker output today    KAREN: 320cc SS  UOP: 2.75L  Ostomy: 35cc    PND AM labs     Plan:  Low fiber diet  IVF @50  Remove KAREN prior to d/c    Subjective/Objective     Subjective: patient feelings much better today. No nausea, vomiting. Tolerated CLD w/ toast and crackers.     Objective:     Blood pressure 123/66, pulse 71, temperature 98.5 °F (36.9 °C), resp. rate 16, height 5' 9\" (1.753 m), weight 63 kg (139 lb), SpO2 97%.,Body mass index is 20.53 kg/m².      Intake/Output Summary (Last 24 hours) at 7/8/2024 0234  Last data filed at 7/8/2024 0003  Gross per 24 hour   Intake 2674.66 ml   Output 3400 ml   Net -725.34 ml       Invasive Devices       Central Venous Catheter Line  Duration             Port A Cath Right Chest -- days              Peripheral Intravenous Line  Duration             Peripheral IV 07/07/24 Dorsal (posterior);Left Forearm <1 day              Drain  Duration             Closed/Suction Drain Lateral Abdomen 10 Fr. 4 days    Colostomy LUQ 4 days                    Physical Exam:   General: No acute distress, laying comfortably in bed  Skin: Warm, dry  HEENT: Normocephalic, atraumatic  CV: Regular rate  Pulm: Normal work of breathing, No respiratory distress  Abd: Soft, minimally distended, non tender. Incision CDI. KAREN draining serosang. Ostomy with pink stoma and thin dark brown output.  MSK: Symmetric, no edema  Neuro: AOx3    Lab, Imaging and other studies:I have personally reviewed pertinent lab results.    VTE Pharmacologic Prophylaxis: Enoxaparin (Lovenox)  VTE Mechanical Prophylaxis: sequential compression device  "

## 2024-07-09 ENCOUNTER — TRANSITIONAL CARE MANAGEMENT (OUTPATIENT)
Age: 79
End: 2024-07-09

## 2024-07-09 DIAGNOSIS — I21.02 STEMI INVOLVING LEFT ANTERIOR DESCENDING CORONARY ARTERY (HCC): ICD-10-CM

## 2024-07-09 DIAGNOSIS — I25.5 ISCHEMIC CARDIOMYOPATHY: ICD-10-CM

## 2024-07-09 RX ORDER — LISINOPRIL 2.5 MG/1
2.5 TABLET ORAL DAILY
Qty: 90 TABLET | Refills: 1 | Status: CANCELLED | OUTPATIENT
Start: 2024-07-09 | End: 2024-08-08

## 2024-07-09 RX ORDER — METOPROLOL SUCCINATE 25 MG/1
25 TABLET, EXTENDED RELEASE ORAL DAILY
Qty: 90 TABLET | Refills: 1 | Status: CANCELLED | OUTPATIENT
Start: 2024-07-09 | End: 2024-08-08

## 2024-07-09 NOTE — TELEPHONE ENCOUNTER
Patient recently discharged from hospital and was given only 30 days supply of these medications- pt's daughter would like 90 days supply     Reason for call:   [x] Refill   [] Prior Auth  [] Other:     Office:   [x] PCP/Provider -   [] Specialty/Provider -     Medication: Lisinopril 2.5 mg 1 tablet daily  Metoprolol 25 mg 1 tablet daily       Pharmacy: [facility]     Does the patient have enough for 3 days?   [x] Yes   [] No - Send as HP to POD

## 2024-07-16 ENCOUNTER — OFFICE VISIT (OUTPATIENT)
Age: 79
End: 2024-07-16
Payer: COMMERCIAL

## 2024-07-16 VITALS
WEIGHT: 127.8 LBS | BODY MASS INDEX: 18.93 KG/M2 | DIASTOLIC BLOOD PRESSURE: 64 MMHG | HEIGHT: 69 IN | OXYGEN SATURATION: 96 % | RESPIRATION RATE: 17 BRPM | TEMPERATURE: 96.7 F | HEART RATE: 80 BPM | SYSTOLIC BLOOD PRESSURE: 98 MMHG

## 2024-07-16 DIAGNOSIS — I10 ESSENTIAL HYPERTENSION: Chronic | ICD-10-CM

## 2024-07-16 DIAGNOSIS — Z93.3 S/P COLOSTOMY (HCC): ICD-10-CM

## 2024-07-16 DIAGNOSIS — C20 RECTAL CANCER (HCC): Primary | ICD-10-CM

## 2024-07-16 PROBLEM — Z01.89 ENCOUNTER FOR GERIATRIC ASSESSMENT: Status: RESOLVED | Noted: 2024-06-18 | Resolved: 2024-07-16

## 2024-07-16 PROCEDURE — 99495 TRANSJ CARE MGMT MOD F2F 14D: CPT | Performed by: INTERNAL MEDICINE

## 2024-07-16 RX ORDER — METOPROLOL SUCCINATE 25 MG/1
12.5 TABLET, EXTENDED RELEASE ORAL DAILY
Qty: 15 TABLET | Refills: 0 | Status: SHIPPED | OUTPATIENT
Start: 2024-07-16 | End: 2024-08-15

## 2024-07-16 NOTE — PROGRESS NOTES
Transition of Care Visit  Name: Minesh Pandya Jr.      : 1945      MRN: [id number]  Encounter Provider: Jhoan Morin DO  Encounter Date: 2024   Encounter department: [facility] [place]    Assessment & Plan   1. Rectal cancer (HCC)  2. Essential hypertension  -     metoprolol succinate (TOPROL-XL) 25 mg 24 hr tablet; Take 0.5 tablets (12.5 mg total) by mouth daily  3. S/P colostomy (HCC)    Hospital records reviewed. Follow-up with oncology and colorectal surgery. Cut down on blood pressure medication. Will stop lisinopril and go down to 12.5 mg daily of toprol-XL. Continue routine colostomy care.     Medications Discontinued During This Encounter   Medication Reason    gabapentin (NEURONTIN) 100 mg capsule Error    methocarbamol (ROBAXIN) 500 mg tablet Error    lisinopril (ZESTRIL) 2.5 mg tablet         History of Present Illness     Transitional Care Management Review:   Minesh Pandya Jr. is a 79 y.o. male here for TCM follow up.     During the TCM phone call patient stated:  TCM Call       Date and time call was made  7/10/2024 10:42 AM    Hospital care reviewed  Records reviewed    Patient was hospitialized at  [facility]    Date of Admission  24    Date of discharge  24    Diagnosis  rectal cancer> RESECTION COLON LOW ANTERIOR    Disposition  Home    Were the patients medications reviewed and updated  Yes    Current Symptoms  None          TCM Call       Post hospital issues  None    Scheduled for follow up?  Yes    Patient refusal reason  for convenience, will keep 19 appt, which is the same day as appt w/cardiology.    Patients specialists  Other (comment)    Cardiologist name  Anil Anderson DO    Cardiologist contact #  [phone]    Other specialists names  Hem/Onc-Dr. Lau  /  Dr. Manriquez    Did you obtain your prescribed medications  --  enoxaparin, gabapentin, methocarbamo, oxyCODONE    Do you need help managing your prescriptions or  "medications  No    Is transportation to your appointment needed  No    I have advised the patient to call PCP with any new or worsening symptoms  Debbie Kasper LPN    Living Arrangements  Family members    Are you recieving any outpatient services  No    What type of services  cardiac rehab, PFT, PT, Nurtrition Services    Are you recieving home care services  Yes    Types of home care services  Home PT; Nurse visit    Are you using any community resources  No    Current waiver services  No    Have you fallen in the last 12 months  No    Interperter language line needed  No    Counseling  Family    Counseling topics  patient and family education; Importance of RX compliance          Minesh presents for transition of care management. He was admitted to [facility] on 7/3/2024 and discharged on 7/8/2024. He has underlying history of rectal cancer and underwent robotic sigmoid resection and end colostomy creation by Dr. Hall.     He was seen by geriatric medicine during hospitalization and his diet was slowly advanced. He was evaluated by PT/OT and deemed safe for discharge with home VNA services. VNA would be able to provide him wound care teaching for his new ostomy.    The visiting nurse has only come out once so far. His appetite has been good.     Review of Systems   Constitutional:  Positive for fatigue.   Respiratory: Negative.     Cardiovascular: Negative.    Gastrointestinal: Negative.      Objective     BP 98/64 (BP Location: Left arm, Patient Position: Sitting, Cuff Size: Standard)   Pulse 80   Temp (!) 96.7 °F (35.9 °C) (Tympanic)   Resp 17   Ht 5' 9\" (1.753 m)   Wt 58 kg (127 lb 12.8 oz)   SpO2 96%   BMI 18.87 kg/m²     Physical Exam  Constitutional:       General: He is not in acute distress.     Appearance: He is not ill-appearing.   Cardiovascular:      Rate and Rhythm: Normal rate and regular rhythm.      Heart sounds: No murmur heard.  Pulmonary:      Effort: Pulmonary effort is " normal. No respiratory distress.      Breath sounds: No wheezing.   Abdominal:      General: Bowel sounds are normal. There is no distension.      Tenderness: There is no abdominal tenderness.      Comments: +ostomy   Neurological:      Mental Status: He is alert.       Medications have been reviewed by provider in current encounter

## 2024-07-22 ENCOUNTER — OFFICE VISIT (OUTPATIENT)
Dept: HEMATOLOGY ONCOLOGY | Facility: CLINIC | Age: 79
End: 2024-07-22
Payer: COMMERCIAL

## 2024-07-22 VITALS
OXYGEN SATURATION: 99 % | BODY MASS INDEX: 18.96 KG/M2 | SYSTOLIC BLOOD PRESSURE: 118 MMHG | WEIGHT: 128 LBS | HEIGHT: 69 IN | DIASTOLIC BLOOD PRESSURE: 70 MMHG | TEMPERATURE: 101.3 F | RESPIRATION RATE: 16 BRPM | HEART RATE: 63 BPM

## 2024-07-22 DIAGNOSIS — D69.6 THROMBOCYTOPENIA (HCC): ICD-10-CM

## 2024-07-22 DIAGNOSIS — I25.10 CAD IN NATIVE ARTERY: Chronic | ICD-10-CM

## 2024-07-22 DIAGNOSIS — C20 RECTAL CANCER (HCC): Primary | ICD-10-CM

## 2024-07-22 DIAGNOSIS — Z95.5 S/P PRIMARY ANGIOPLASTY WITH CORONARY STENT: ICD-10-CM

## 2024-07-22 PROCEDURE — G2211 COMPLEX E/M VISIT ADD ON: HCPCS | Performed by: INTERNAL MEDICINE

## 2024-07-22 PROCEDURE — 99214 OFFICE O/P EST MOD 30 MIN: CPT | Performed by: INTERNAL MEDICINE

## 2024-07-22 NOTE — PROGRESS NOTES
Hematology/Oncology Outpatient Follow- up Note  Minesh Pandya Jr. 79 y.o. male MRN: @ Encounter: [id number]        Date:  7/22/2024        Assessment / Plan:    1 stage III versus stage IV rectal cancer  2 status post low anterior resection  T3 lesion N0 M0  3 abnormality in the lung and liver previously needs to be reassess.  4 coronary artery disease no angina cleared by cardiology for surgery which he accomplished needs follow-up  5 chronic thrombocytopenia platelets run between 100,000  Plan: He is going to come back in 4 weeks.  Will get CEA CBC and CMP.  He will get a CAT scan chest abdomen pelvis.  He has done fairly well recovering from his surgical intervention.        HPI: 79-year-old gentleman with a history of rectal cancer.  He underwent radiation and then chemotherapy as well.  Subsequently he has 1 nodule in his lung with questionable abnormality in the liver.  After long discussion he went ahead with surgery as a T3 N0 lesion with 15 lymph nodes involved low anterior resection with placement of a colostomy without reanastomosis attempted.  He is doing well colostomy working well he is not short of breath.  He is quite thin.  But this is unchanged from before.  He is not having chest pain.  Not short of breath at rest.  At this point we will plan to repeat his CAT scan chest abdomen pelvis along with laboratory work.  He needs to follow-up with cardiology as well.    Interval History: Note from 5/2/2024:  Assessment / Plan:    1 stage III versus stage IV rectal cancer  2 lung nodules that are stable  3 liver nodule stable not described presently.  4 coronary artery disease no angina cleared by cardiology for surgery.  5 thrombocytopenia chronic platelets running between 80 and 120,000  Plan: Patient is going to be seen by make a final decision regarding surgery.  CEA levels normal CBC and CMP are stable he did platelets can be on standby if he is needed.  No other major suggestions at this time.  He  will follow-up here in 2 to 3 months.  HPI: 79-year-old gentleman here for follow-up.  He underwent recent colonoscopy that showed benign path.  However he does still remains with a significant mass in his rectal area.  He is able move his bowels he is not having nausea or vomiting is no major abdominal pain has stabilized his weight and is feeling reasonably well.  Has been seen by cardiology who felt he could be cleared for surgery.  His lungs are stable.  His liver also was stable.  No new lesions noted CEA level in normal range.  Interval History: Note from 4/11/2024:  Assessment / Plan:    1 stage III versus stage IV rectal cancer  2 lung nodules which have been stable  3 liver nodule stable  4 coronary artery disease no angina cleared by cardiology for surgery  Plan: Patient will be considered for surgery.  He is awaiting colonoscopy at the end of month.  Will see him once more at the beginning of 5/2024 after colonoscopy.  At that point final decision remain regarding whether he should go ahead with surgery or not.  CEA level is normal CBC and CMP are stable.  We will also review his scans with radiology.  I particularly concerned about his lungs as a mention but injury and I want to see how they look.  HPI: 78-year-old gentleman with history of stage III versus stage IV rectal cancer.  His x-ray shows stability in his lung in terms of nodules and 1 nodule in the liver.  He is being considered for intervention surgically.  He apparently will have colonoscopy at the end of April by colorectal surgery to determine how he looks and winces possible ability to resect.  He was seen by cardiology who felt he could undergo the procedure and procedure and have aspirin stopped 5 days prior.  He is able to move his bowels though occasionally constipated.  He is taking iron pills which I told him to stop.  His hemoglobin is overall at 12-1/2.  He is otherwise doing fairly well.  He is not vomiting no other abdominal pain  feeling reasonably well.  Interval History: Note from 3/12/2024:  Assessment / Plan:    1 stage III versus stage IV rectal carcinoma  2 lung nodules which were remained stable  3 coronary artery disease no angina to see cardiology for clearance is  4 to see colorectal surgery regarding possibility of intussusception with surgery  Plan: Patient will have appointments cardiology and colorectal surgery.  He will need to get another CT scan chest abdomen pelvis/CBC/CMP/CEA level.  We like to get these done and we will call him to do so.  We will have him come back in 4 weeks for final decisions.  Prognosis quite guarded.   HPI: 78-year-old gentleman here for follow-up.  He is off chemotherapy.  He has occasional headache otherwise stable.  He is moving his bowels.  Minimal abdominal discomfort.  He is not complain of chest pain or shortness of breath.  He will need to see cardiology.  He denies of denies any other major problems.  Unfortunately he is still left with rectal cancer.  He had an abnormal CAT scan of his chest but the lung nodule as there is unchanged.  CEA level done 5/19/2023 was normal.  It was 1.7.  This case was presented at tumor board.  The consensus was to be reevaluated for rectal surgery as well as to have a cardiology consult to see if he could tolerate such surgery.  He is not having chest pain he is not short of breath feels fatigued tired he is thin but he is not losing any more weight and he is able to eat.  Interval History: Note from 2/15/2024:  1 rectal carcinoma T3 N1 question of M1 with lung nodules  2 status post 8 cycles of FOLFOX chemotherapy and radiation therapy x 5 doses to the rectum.  Plan: Patient should be represented at this point.  Scans show stable nodules since May.  2023.  The rectal area looks better although he still has the nodes there.  Original surgery was not done due to MRI which she is now greater than 6 months since.  His blood counts are fairly stable.  CEA is not  helpful as it was 1.7 at the time of his presentation.  We will speak with radiation therapy.  Will get back with him.  I would recommend holding his treatment at least 1 time to get opinions from the tumor board.  HPI: 78-year-old gentleman with the above problem.  His biggest complaint is fatigue shortness of breath and weakness.  He is doing well otherwise moving bowels no bleeding.  Blood counts are fairly stable.  He is anxious to stop treatment.  He would like to be considered for surgery interventions.  Otherwise he is doing reasonably well.Interval History: Note from 1/17/2024:   78-year-old gentleman with rectal carcinoma.  He also has CAT scan and what appears to be increasing pulmonary nodules probably consistent with metastatic disease.  Last CAT scan showed them to be stable.  He is able to eat he is moving his bowels regularly no blood in urine or stools.  He is not short of breath at rest does get dyspneic with exertion.  He initially presented with blood in his bowel movements for a week and had a CAT scan of the pelvis showing rectal mass with mesorectal lymph nodes 2 mm left lower lobe pulmonary nodule 3 mm hepatic dome nodule.  The nodules in the right and the left lobes were new compared to 2019.  His MMR was intact on the biopsy showing moderately differentiated carcinoma distal rectal area.  An MRI of the pelvis showed T3b N1 low rectal cancer.  He was seen radiation therapy for 1 week 9/25 and 9/29.  He has had some thrombocytopenia and leukopenia this is improved.  He will begin using his seventh cycle of chemotherapy next week.  He has had a reduction in oxaliplatin as well as continuous infusion for 3 days 5-fluorouracil hopefully after that he will continue to tolerate better these treatments.      Cancer Staging:  Cancer Staging   Rectal cancer (HCC)  Staging form: Colon and Rectum, AJCC 8th Edition  - Clinical stage from 5/18/2023: Stage IIIB (cT3, cN1, cM0) - Signed by Arnaldo Montiel  DO Isabela on 6/28/2023  Microsatellite instability (MSI): Stable      Molecular Testing:     Previous Hematologic/ Oncologic History:    Oncology History   Rectal cancer (HCC)   5/18/2023 Initial Diagnosis    May 18, 2023 patient presented with BRBPR x1 week.  Weight stable over 8 months.  CT abdomen pelvis showed rectal mass with 2 suspicious mesorectal lymph nodes.  2 mm left lower lobe pulmonary nodule, 3 mm hepatic dome questionable lesion, 1 cm uncinate process cystic lesion.  CT chest showed 3 mm left lower lobe and right upper lobe nodules new compared to 2019.  May 20 flexible sigmoidoscopy showed distal rectal mass.  Biopsy of the mass showed moderately differentiated adenocarcinoma.  MMR intact.  June 14, 2023 MRI of the pelvis showed findings consistent with T3b, N1 low rectal cancer.  Suspicion for vascular invasion.  CBC on presentation normal WBC platelets.  Hemoglobin 11.4, MCV 77.  CMP showed albumin 3.3, otherwise normal.        Cancer Staged    Cancer Staging   No matching staging information was found for the patient.       5/18/2023 -  Cancer Staged    Staging form: Colon and Rectum, AJCC 8th Edition  - Clinical stage from 5/18/2023: Stage IIIB (cT3, cN1, cM0) - Signed by Arnaldo Mar DO on 6/28/2023  Microsatellite instability (MSI): Stable       5/20/2023 Biopsy    A. Rectum, mass, biopsy:  -Adenocarcinoma, invasive, moderately differentiated.  -Ancillary testing for mismatch repair (MMR) protein deficiency by immunohistochemical panel (MLH1, PMS2, MSH2, MSH6) is undertaken, the results will be issued in an addendum.    RESULTS OF IMMUNOHISTOCHEMICAL ANALYSIS FOR MISMATCH REPAIR PROTEIN LOSS  INTERPRETATION: NO LOSS OF NUCLEAR EXPRESSION OF MMR PROTEINS: LOW PROBABILITY OF MSI-H        RESULTS:  Antibody            Clone                 Description                     Results  MLH1                 M1                     Mismatch repair protein  Intact nuclear expression  MSH2                 [id number]        Mismatch repair protein  Intact nuclear expression  MSH6                44                      Mismatch repair protein  Intact nuclear expression  PMS2                 [id number]           Mismatch repair protein  Intact nuclear expression        9/20/2023 - 2/14/2024 Chemotherapy    alteplase (CATHFLO), 2 mg, Intracatheter, Every 1 Minute as needed, 9 of 12 cycles  pegfilgrastim (NEULASTA), 6 mg, Subcutaneous, Once, 1 of 1 cycle  Administration: 6 mg (10/20/2023)  pegfilgrastim (NEULASTA ONPRO), 6 mg, Subcutaneous, Once, 1 of 1 cycle  Administration: 6 mg (1/11/2024)  fluorouracil (ADRUCIL), 320 mg/m2 = 565 mg (80 % of original dose 400 mg/m2), Intravenous, Once, 2 of 2 cycles  Dose modification: 320 mg/m2 (original dose 400 mg/m2, Cycle 1)  Administration: 565 mg (9/20/2023), 565 mg (10/17/2023)  leucovorin calcium IVPB, 708 mg, Intravenous, Once, 9 of 12 cycles  Administration: 700 mg (9/20/2023), 700 mg (10/17/2023), 700 mg (10/31/2023), 700 mg (11/14/2023), 700 mg (11/28/2023), 688 mg (1/9/2024), 688 mg (1/29/2024), 688 mg (2/12/2024)  oxaliplatin (ELOXATIN) chemo infusion, 75 mg/m2 = 132.75 mg (88.2 % of original dose 85 mg/m2), Intravenous, Once, 9 of 12 cycles  Dose modification: 75 mg/m2 (original dose 85 mg/m2, Cycle 1, Reason: Anticipated Tolerance), 65.025 mg/m2 (76.5 % of original dose 85 mg/m2, Cycle 6, Reason: Other (see comments), Comment: Neutropenia), 65 mg/m2 (original dose 85 mg/m2, Cycle 6, Reason: Dose modified as per discussion with consulting physician, Comment: neutropenia - changing to flat 65 mg/m2)  Administration: 132.75 mg (9/20/2023), 132.75 mg (10/17/2023), 133 mg (10/31/2023), 132.75 mg (11/14/2023), 132.75 mg (11/28/2023), 111.8 mg (1/9/2024), 111.8 mg (1/29/2024), 111.8 mg (2/12/2024)  fluorouracil (ADRUCIL) ambulatory infusion Soln, 1,200 mg/m2/day = 4,250 mg, Intravenous, Over 46 hours, 9 of 12 cycles  Dose modification: 960 mg/m2/day (80 % of original dose  "1,200 mg/m2/day, Cycle 6, Reason: Treatment Parameters Not Met)     9/25/2023 - 9/29/2023 Radiation    Treatments:  Course: C1  Plan ID Energy Fractions Dose per Fraction (cGy) Dose Correction (cGy) Total Dose Delivered (cGy) Elapsed Days   Pelvis 10X/6X 5 / 5 500 0 2,500 4    Treatment Dates:  9/25/2023 - 9/29/2023.          Current Hematologic/ Oncologic Treatment:       Cycle 1         Test Results:    Imaging: No results found.          Labs:   Lab Results   Component Value Date    WBC 5.37 07/08/2024    HGB 10.3 (L) 07/08/2024    HCT 32.6 (L) 07/08/2024    MCV 91 07/08/2024    PLT 94 (L) 07/08/2024     Lab Results   Component Value Date    K 4.3 07/08/2024     (H) 07/08/2024    CO2 23 07/08/2024    BUN 10 07/08/2024    CREATININE 0.89 07/08/2024    GLUF 89 06/06/2024    CALCIUM 8.1 (L) 07/08/2024    CORRECTEDCA 9.0 04/02/2024    AST 19 04/29/2024    ALT 13 04/29/2024    ALKPHOS 73 04/29/2024    EGFR 81 07/08/2024         No results found for: \"SPEP\", \"UPEP\"    Lab Results   Component Value Date    PSA 1.65 08/30/2023       Lab Results   Component Value Date    CEA 1.5 04/02/2024       No results found for: \"\"    No results found for: \"AFP\"    Lab Results   Component Value Date    IRON 20 (L) 08/07/2023    TIBC 328 08/07/2023    FERRITIN 22 (L) 08/07/2023       Lab Results   Component Value Date    [id number] 222 05/19/2023         ROS: Review of Systems   Constitutional: Negative.    HENT: Negative.     Eyes: Negative.    Respiratory: Negative.     Cardiovascular: Negative.    Gastrointestinal: Negative.    Endocrine: Negative.    Genitourinary: Negative.    Musculoskeletal: Negative.    Skin: Negative.    Allergic/Immunologic: Negative.    Neurological: Negative.    Hematological: Negative.          Current Medications: Reviewed  Allergies: Reviewed  PMH/FH/SH:  Reviewed      Physical Exam:    Body surface area is 1.71 meters squared.    Wt Readings from Last 3 Encounters:   07/22/24 58.1 kg (128 " lb)   07/16/24 58 kg (127 lb 12.8 oz)   07/04/24 63 kg (139 lb)        Temp Readings from Last 3 Encounters:   07/22/24 (!) 101.3 °F (38.5 °C) (Temporal)   07/16/24 (!) 96.7 °F (35.9 °C) (Tympanic)   07/08/24 98.1 °F (36.7 °C)        BP Readings from Last 3 Encounters:   07/22/24 118/70   07/16/24 98/64   07/08/24 123/67         Pulse Readings from Last 3 Encounters:   07/22/24 63   07/16/24 80   07/08/24 71     @LASTSAO2(3)@      Physical Exam  Constitutional:       Appearance: Normal appearance. He is normal weight.   HENT:      Head: Normocephalic and atraumatic.   Eyes:      Extraocular Movements: Extraocular movements intact.      Conjunctiva/sclera: Conjunctivae normal.      Pupils: Pupils are equal, round, and reactive to light.   Cardiovascular:      Rate and Rhythm: Normal rate and regular rhythm.      Heart sounds: Normal heart sounds.   Pulmonary:      Effort: Pulmonary effort is normal.      Breath sounds: Normal breath sounds.   Abdominal:      General: Abdomen is flat. Bowel sounds are normal.      Palpations: Abdomen is soft.   Musculoskeletal:         General: Normal range of motion.      Cervical back: Normal range of motion and neck supple.   Skin:     General: Skin is warm and dry.   Neurological:      General: No focal deficit present.      Mental Status: He is alert and oriented to person, place, and time. Mental status is at baseline.     He has a colostomy in place and appears to be working fairly well.      Goals and Barriers:  Current Goal: Prolong Survival from Cancer.   Barriers: None.      Patient's Capacity to Self Care:  Patient is able to self care.    Code Status: [unfilled]  Advance Directive and Living Will:      Power of : Yes

## 2024-07-23 DIAGNOSIS — C18.9 ADENOCARCINOMA, COLON (HCC): Primary | ICD-10-CM

## 2024-07-25 NOTE — PROGRESS NOTES
Diagnoses and all orders for this visit:    Rectal cancer (HCC)       Rectal cancer (HCC)  Patient presents for surgical follow-up.  He is approxi-1 month status post ultralow anterior resection for rectal cancer.  He overall is doing well.  Incisions are healing nicely.  Colostomy is pink and well budded and productive.    We discussed that in terms of his surgical care, I do not have any further recommendations.  He will need continued long-term follow-up.  There is obviously been concern in the long-term of his metastatic disease with liver and lung lesions seen on prior scans.  He is due for repeat scan at the end of this month.  I suspect that he will have some degree of pelvic changes secondary to recent surgery.  Hopefully his external rectal disease is well-controlled.  I will see him back on an as-needed basis.      ARYAN Pandya Jr. is a 79 y.o. male here for a postoperative visit for low anterior colon resection laparoscopic with robotics end colostomy, 7/3/24Final Diagnosis A. Sigmoid colon and rectum (low anterior resection): - Residual adenocarcinoma (5mm largest focus) arising in a tubular adenoma - Fifteen (15) lymph nodes negative for carcinoma (0/15) - Margins negative for dysplasia and carcinoma - Closest margin to carcinoma: posterior mesorectal/radial at 3mm - Closest margin to dysplasia: distal at 5mm - See staging synoptic (ypT3N0) Comment: - MMR IHC performed on the prior pre-treatment biopsy showed no loss of nuclear expression: Low probability of MSI-H.        Cat scan scheduled for 8/22/24      Last colonoscopy done 4/30/24 with a 1 year recall.Final Diagnosis A. Polyp, colorectal, hepatic flexure, biopsy: - Sessile serrated adenoma. - Negative for high grade dysplasia and malignancy. B. Rectum, rectal mass, biopsy: - Fragments of tubular adenoma. See NOTE. - Negative for high grade dysplasia and malignancy. Note: Clinical history of biopsy proven rectal adenocarcinoma (see case  [id number]) and colonoscopic impression of distal rectal massis noted. The current biopsy does not demonstrate any evidence of malignancy. Nonetheless, clinical and colonoscopic correlation is suggested to ensure adequate sampling of the lesion.     Lab Results   Component Value Date    CEA 1.5 04/02/2024     Lab Results   Component Value Date    WBC 5.37 07/08/2024    HGB 10.3 (L) 07/08/2024    HCT 32.6 (L) 07/08/2024    MCV 91 07/08/2024    PLT 94 (L) 07/08/2024     Lab Results   Component Value Date    SODIUM 139 07/08/2024    K 4.3 07/08/2024     (H) 07/08/2024    CO2 23 07/08/2024    AGAP 6 07/08/2024    BUN 10 07/08/2024    CREATININE 0.89 07/08/2024    GLUC 94 07/08/2024    GLUF 89 06/06/2024    CALCIUM 8.1 (L) 07/08/2024    AST 19 04/29/2024    ALT 13 04/29/2024    ALKPHOS 73 04/29/2024    TP 6.1 (L) 04/29/2024    TBILI 0.65 04/29/2024    EGFR 81 07/08/2024       Past Medical History:   Diagnosis Date    Acid reflux     Cardiomyopathy, unspecified type ([facility]) 01/17/2024    Colostomy in place ([facility]) 07/2024    Hyperlipidemia     Ischemic cardiomyopathy     Lacunar infarction ([facility])     Near syncope 11/14/2019    NSTEMI (non-ST elevated myocardial infarction) ([facility]) 10/20/2019    Pericarditis 10/24/2019    Poison ivy     Rectal cancer ([facility])     Recurrent major depressive disorder, in full remission ([facility]) 08/07/2023    STEMI involving left anterior descending coronary artery ([facility]) 05/19/2023    Stroke ([facility])      Past Surgical History:   Procedure Laterality Date    CARDIAC CATHETERIZATION N/A 05/19/2023    Procedure: Cardiac pci;  Surgeon: Ted Núñez MD;  Location: [facility] CARDIAC CATH [facility];  Service: Cardiology    CORONARY ANGIOPLASTY WITH STENT PLACEMENT  05/19/2023    BMS pLAD    CORONARY ANGIOPLASTY WITH STENT PLACEMENT  10/21/2019    LUDIVINA x 2- dLAD and oD1, balloon only- mLAD    HERNIA REPAIR Left 04/28/2021    Procedure: OPEN REPAIR HERNIA INGUINAL LEFT WITH MESH;  Surgeon: Carlos Nguyen MD;  Location: [facility]  MAIN OR;  Service: General    IR PORT PLACEMENT  08/31/2023    IL LAPS COLECTOMY PRTL W/COLOPXTSTMY LW ANAST N/A 7/3/2024    Procedure: RESECTION COLON LOW ANTERIOR LAPAROSCOPIC WITH ROBOTICS End colostomy;  Surgeon: Guille Hall MD;  Location: [facility];  Service: Colorectal       Current Outpatient Medications:     aspirin (ECOTRIN LOW STRENGTH) 81 mg EC tablet, Take 1 tablet (81 mg total) by mouth daily Please purchase over the counter at her local pharmacy, Disp: 30 tablet, Rfl: 1    atorvastatin (LIPITOR) 80 mg tablet, Take 1 tablet (80 mg total) by mouth daily with dinner (Patient not taking: Reported on 7/22/2024), Disp: 90 tablet, Rfl: 3    Blood Pressure KIT, by Does not apply route daily, Disp: 1 each, Rfl: 0    diphenhydramine, lidocaine, Al/Mg hydroxide, simethicone (Magic Mouthwash) SUSP, Swish and spit 10 mL every 6 (six) hours as needed for mouth pain or discomfort, Disp: 237 mL, Rfl: 0    enoxaparin (LOVENOX) 40 mg/0.4 mL, Inject 0.4 mL (40 mg total) under the skin every 24 hours for 22 days Do not start before July 9, 2024., Disp: 8.8 mL, Rfl: 0    metoprolol succinate (TOPROL-XL) 25 mg 24 hr tablet, Take 0.5 tablets (12.5 mg total) by mouth daily (Patient taking differently: Take 12.5 mg by mouth daily Taking 1/2 a pill), Disp: 15 tablet, Rfl: 0    sucralfate (CARAFATE) 1 g tablet, Take 1 tablet (1 g total) by mouth 4 (four) times a day (Patient taking differently: Take 1 g by mouth if needed), Disp: 120 tablet, Rfl: 3  Allergies as of 07/29/2024 - Reviewed 07/29/2024   Allergen Reaction Noted    Wound dressing adhesive Hives 10/06/2023     Review of Systems   All other systems reviewed and are negative.    There were no vitals filed for this visit.  Physical Exam  Constitutional:       Appearance: Normal appearance.   HENT:      Head: Normocephalic and atraumatic.   Eyes:      Extraocular Movements: Extraocular movements intact.      Pupils: Pupils are equal, round, and reactive to  light.   Abdominal:      General: Abdomen is flat.      Palpations: Abdomen is soft.      Comments: .  Healthy colostomy.  Well-healed surgical incisions   Neurological:      Mental Status: He is alert.

## 2024-07-29 ENCOUNTER — OFFICE VISIT (OUTPATIENT)
Age: 79
End: 2024-07-29

## 2024-07-29 VITALS — BODY MASS INDEX: 18.81 KG/M2 | HEIGHT: 69 IN | WEIGHT: 127 LBS

## 2024-07-29 DIAGNOSIS — C20 RECTAL CANCER (HCC): Primary | ICD-10-CM

## 2024-07-29 PROCEDURE — 99024 POSTOP FOLLOW-UP VISIT: CPT | Performed by: COLON & RECTAL SURGERY

## 2024-07-29 NOTE — ASSESSMENT & PLAN NOTE
Patient presents for surgical follow-up.  He is approxi-1 month status post ultralow anterior resection for rectal cancer.  He overall is doing well.  Incisions are healing nicely.  Colostomy is pink and well budded and productive.    We discussed that in terms of his surgical care, I do not have any further recommendations.  He will need continued long-term follow-up.  There is obviously been concern in the long-term of his metastatic disease with liver and lung lesions seen on prior scans.  He is due for repeat scan at the end of this month.  I suspect that he will have some degree of pelvic changes secondary to recent surgery.  Hopefully his external rectal disease is well-controlled.  I will see him back on an as-needed basis.

## 2024-08-22 ENCOUNTER — HOSPITAL ENCOUNTER (OUTPATIENT)
Dept: CT IMAGING | Facility: HOSPITAL | Age: 79
End: 2024-08-22
Attending: INTERNAL MEDICINE
Payer: COMMERCIAL

## 2024-08-22 ENCOUNTER — APPOINTMENT (OUTPATIENT)
Dept: LAB | Facility: HOSPITAL | Age: 79
End: 2024-08-22
Payer: COMMERCIAL

## 2024-08-22 DIAGNOSIS — D69.6 THROMBOCYTOPENIA (HCC): ICD-10-CM

## 2024-08-22 DIAGNOSIS — Z95.5 S/P PRIMARY ANGIOPLASTY WITH CORONARY STENT: ICD-10-CM

## 2024-08-22 DIAGNOSIS — C20 RECTAL CANCER (HCC): ICD-10-CM

## 2024-08-22 DIAGNOSIS — I25.10 CAD IN NATIVE ARTERY: Chronic | ICD-10-CM

## 2024-08-22 LAB
ALBUMIN SERPL BCG-MCNC: 4 G/DL (ref 3.5–5)
ALP SERPL-CCNC: 82 U/L (ref 34–104)
ALT SERPL W P-5'-P-CCNC: 11 U/L (ref 7–52)
ANION GAP SERPL CALCULATED.3IONS-SCNC: 6 MMOL/L (ref 4–13)
AST SERPL W P-5'-P-CCNC: 18 U/L (ref 13–39)
BASOPHILS # BLD AUTO: 0.04 THOUSANDS/ÂΜL (ref 0–0.1)
BASOPHILS NFR BLD AUTO: 1 % (ref 0–1)
BILIRUB SERPL-MCNC: 0.62 MG/DL (ref 0.2–1)
BUN SERPL-MCNC: 17 MG/DL (ref 5–25)
CALCIUM SERPL-MCNC: 9.2 MG/DL (ref 8.4–10.2)
CEA SERPL-MCNC: 1.1 NG/ML (ref 0–3)
CHLORIDE SERPL-SCNC: 103 MMOL/L (ref 96–108)
CO2 SERPL-SCNC: 27 MMOL/L (ref 21–32)
CREAT SERPL-MCNC: 0.9 MG/DL (ref 0.6–1.3)
EOSINOPHIL # BLD AUTO: 0.15 THOUSAND/ÂΜL (ref 0–0.61)
EOSINOPHIL NFR BLD AUTO: 2 % (ref 0–6)
ERYTHROCYTE [DISTWIDTH] IN BLOOD BY AUTOMATED COUNT: 14.7 % (ref 11.6–15.1)
GFR SERPL CREATININE-BSD FRML MDRD: 81 ML/MIN/1.73SQ M
GLUCOSE P FAST SERPL-MCNC: 93 MG/DL (ref 65–99)
HCT VFR BLD AUTO: 37.1 % (ref 36.5–49.3)
HGB BLD-MCNC: 11.5 G/DL (ref 12–17)
IMM GRANULOCYTES # BLD AUTO: 0.02 THOUSAND/UL (ref 0–0.2)
IMM GRANULOCYTES NFR BLD AUTO: 0 % (ref 0–2)
LYMPHOCYTES # BLD AUTO: 1.1 THOUSANDS/ÂΜL (ref 0.6–4.47)
LYMPHOCYTES NFR BLD AUTO: 15 % (ref 14–44)
MCH RBC QN AUTO: 27 PG (ref 26.8–34.3)
MCHC RBC AUTO-ENTMCNC: 31 G/DL (ref 31.4–37.4)
MCV RBC AUTO: 87 FL (ref 82–98)
MONOCYTES # BLD AUTO: 0.62 THOUSAND/ÂΜL (ref 0.17–1.22)
MONOCYTES NFR BLD AUTO: 9 % (ref 4–12)
NEUTROPHILS # BLD AUTO: 5.22 THOUSANDS/ÂΜL (ref 1.85–7.62)
NEUTS SEG NFR BLD AUTO: 73 % (ref 43–75)
NRBC BLD AUTO-RTO: 0 /100 WBCS
PLATELET # BLD AUTO: 153 THOUSANDS/UL (ref 149–390)
PMV BLD AUTO: 11.8 FL (ref 8.9–12.7)
POTASSIUM SERPL-SCNC: 4.8 MMOL/L (ref 3.5–5.3)
PROT SERPL-MCNC: 6.7 G/DL (ref 6.4–8.4)
RBC # BLD AUTO: 4.26 MILLION/UL (ref 3.88–5.62)
SODIUM SERPL-SCNC: 136 MMOL/L (ref 135–147)
WBC # BLD AUTO: 7.15 THOUSAND/UL (ref 4.31–10.16)

## 2024-08-22 PROCEDURE — 85025 COMPLETE CBC W/AUTO DIFF WBC: CPT

## 2024-08-22 PROCEDURE — 36415 COLL VENOUS BLD VENIPUNCTURE: CPT

## 2024-08-22 PROCEDURE — 71260 CT THORAX DX C+: CPT

## 2024-08-22 PROCEDURE — 82378 CARCINOEMBRYONIC ANTIGEN: CPT

## 2024-08-22 PROCEDURE — 74177 CT ABD & PELVIS W/CONTRAST: CPT

## 2024-08-22 PROCEDURE — 80053 COMPREHEN METABOLIC PANEL: CPT

## 2024-08-22 RX ADMIN — IOHEXOL 100 ML: 350 INJECTION, SOLUTION INTRAVENOUS at 09:29

## 2024-08-26 DIAGNOSIS — I10 ESSENTIAL HYPERTENSION: Chronic | ICD-10-CM

## 2024-08-27 RX ORDER — METOPROLOL SUCCINATE 25 MG/1
12.5 TABLET, EXTENDED RELEASE ORAL DAILY
Qty: 15 TABLET | Refills: 5 | Status: SHIPPED | OUTPATIENT
Start: 2024-08-27 | End: 2025-02-23

## 2024-08-29 ENCOUNTER — OFFICE VISIT (OUTPATIENT)
Dept: HEMATOLOGY ONCOLOGY | Facility: CLINIC | Age: 79
End: 2024-08-29
Payer: COMMERCIAL

## 2024-08-29 ENCOUNTER — TELEPHONE (OUTPATIENT)
Age: 79
End: 2024-08-29

## 2024-08-29 VITALS
TEMPERATURE: 97.3 F | RESPIRATION RATE: 18 BRPM | OXYGEN SATURATION: 100 % | HEART RATE: 69 BPM | HEIGHT: 69 IN | SYSTOLIC BLOOD PRESSURE: 118 MMHG | WEIGHT: 129 LBS | DIASTOLIC BLOOD PRESSURE: 78 MMHG | BODY MASS INDEX: 19.11 KG/M2

## 2024-08-29 DIAGNOSIS — C20 RECTAL CANCER (HCC): Primary | ICD-10-CM

## 2024-08-29 DIAGNOSIS — R16.0 LIVER MASSES: ICD-10-CM

## 2024-08-29 PROBLEM — R91.8 LUNG NODULES: Status: ACTIVE | Noted: 2024-08-29

## 2024-08-29 PROCEDURE — 99214 OFFICE O/P EST MOD 30 MIN: CPT | Performed by: INTERNAL MEDICINE

## 2024-08-29 PROCEDURE — G2211 COMPLEX E/M VISIT ADD ON: HCPCS | Performed by: INTERNAL MEDICINE

## 2024-08-29 NOTE — PROGRESS NOTES
Hematology/Oncology Outpatient Follow- up Note  Minesh Pandya Jr. 79 y.o. male MRN: @ Encounter: [id number]        Date:  8/29/2024        Assessment / Plan:    1 presumed stage IV rectal cancer  Status post low anterior resection T3 lesion N0 M0.  3 worsening lung nodules now 5 previously only 1  4 question of new liver nodule.  5 coronary artery disease no angina cleared by cardiology for surgery now complaining of dyspnea.  Plan: Patient will come back in 3 weeks.  He has recovered from surgery but he is getting dyspnea with little exertion exertion and fatigue.  I have no new suggestions.  Till he is seen by pulmonary and cardiology.  Ultimately biopsy would be nice if he is possible and otherwise no other major changes.  Will review his films with pulmonology and radiation.  Prognosis long-term very guarded at best      HPI: 79-year-old gentleman here for follow-up.  He is complaining of increasing dyspnea.  He is very fatigued.  He does not get very far because without shortness of breath and having to stop.  He is noticing getting worse.  CAT scan of his chest now shows 5 new nodules.  The majority of the lungs are fairly clear.  Otherwise he is doing and feeling reasonably well but he does are easily fatigued colostomy is working well he does not have much of major pain in his pelvic and rectal area.  We discussed that unfortunately he appears to have worsening now of what is probably metastatic disease.  I do not think he will be easy to get a biopsy of this.  In addition his question of abnormality in his liver.  I will try and look at this with our radiologist to see if there is any possibility of biopsying.  I also spoke with the pulmonologist Dr. Mendosa.  My concern with the shortness of breath is related to lung versus cardiac problems.  He does have cardiology appointments coming up.  He will be [place] them by September 16 we will see him on September 18 and discuss what further to do.  Long-term  prognosis unfortunately is poor.  Of note his CBC CMP and CEA level are fairly stable      Interval History: Note from 7/22/2024:  Assessment / Plan:    1 stage III versus stage IV rectal cancer  2 status post low anterior resection  T3 lesion N0 M0  3 abnormality in the lung and liver previously needs to be reassess.  4 coronary artery disease no angina cleared by cardiology for surgery which he accomplished needs follow-up  5 chronic thrombocytopenia platelets run between 100,000  Plan: He is going to come back in 4 weeks.  Will get CEA CBC and CMP.  He will get a CAT scan chest abdomen pelvis.  He has done fairly well recovering from his surgical intervention.  HPI: 79-year-old gentleman with a history of rectal cancer.  He underwent radiation and then chemotherapy as well.  Subsequently he has 1 nodule in his lung with questionable abnormality in the liver.  After long discussion he went ahead with surgery as a T3 N0 lesion with 15 lymph nodes involved low anterior resection with placement of a colostomy without reanastomosis attempted.  He is doing well colostomy working well he is not short of breath.  He is quite thin.  But this is unchanged from before.  He is not having chest pain.  Not short of breath at rest.  At this point we will plan to repeat his CAT scan chest abdomen pelvis along with laboratory work.  He needs to follow-up with cardiology as well.  Interval History: Note from 5/2/2024:  Assessment / Plan:    1 stage III versus stage IV rectal cancer  2 lung nodules that are stable  3 liver nodule stable not described presently.  4 coronary artery disease no angina cleared by cardiology for surgery.  5 thrombocytopenia chronic platelets running between 80 and 120,000  Plan: Patient is going to be seen by make a final decision regarding surgery.  CEA levels normal CBC and CMP are stable he did platelets can be on standby if he is needed.  No other major suggestions at this time.  He will follow-up  here in 2 to 3 months.  HPI: 79-year-old gentleman here for follow-up.  He underwent recent colonoscopy that showed benign path.  However he does still remains with a significant mass in his rectal area.  He is able move his bowels he is not having nausea or vomiting is no major abdominal pain has stabilized his weight and is feeling reasonably well.  Has been seen by cardiology who felt he could be cleared for surgery.  His lungs are stable.  His liver also was stable.  No new lesions noted CEA level in normal range.  Interval History: Note from 4/11/2024:  Assessment / Plan:    1 stage III versus stage IV rectal cancer  2 lung nodules which have been stable  3 liver nodule stable  4 coronary artery disease no angina cleared by cardiology for surgery  Plan: Patient will be considered for surgery.  He is awaiting colonoscopy at the end of month.  Will see him once more at the beginning of 5/2024 after colonoscopy.  At that point final decision remain regarding whether he should go ahead with surgery or not.  CEA level is normal CBC and CMP are stable.  We will also review his scans with radiology.  I particularly concerned about his lungs as a mention but injury and I want to see how they look.  HPI: 78-year-old gentleman with history of stage III versus stage IV rectal cancer.  His x-ray shows stability in his lung in terms of nodules and 1 nodule in the liver.  He is being considered for intervention surgically.  He apparently will have colonoscopy at the end of April by colorectal surgery to determine how he looks and winces possible ability to resect.  He was seen by cardiology who felt he could undergo the procedure and procedure and have aspirin stopped 5 days prior.  He is able to move his bowels though occasionally constipated.  He is taking iron pills which I told him to stop.  His hemoglobin is overall at 12-1/2.  He is otherwise doing fairly well.  He is not vomiting no other abdominal pain feeling  reasonably well.  Interval History: Note from 3/12/2024:  Assessment / Plan:    1 stage III versus stage IV rectal carcinoma  2 lung nodules which were remained stable  3 coronary artery disease no angina to see cardiology for clearance is  4 to see colorectal surgery regarding possibility of intussusception with surgery  Plan: Patient will have appointments cardiology and colorectal surgery.  He will need to get another CT scan chest abdomen pelvis/CBC/CMP/CEA level.  We like to get these done and we will call him to do so.  We will have him come back in 4 weeks for final decisions.  Prognosis quite guarded.   HPI: 78-year-old gentleman here for follow-up.  He is off chemotherapy.  He has occasional headache otherwise stable.  He is moving his bowels.  Minimal abdominal discomfort.  He is not complain of chest pain or shortness of breath.  He will need to see cardiology.  He denies of denies any other major problems.  Unfortunately he is still left with rectal cancer.  He had an abnormal CAT scan of his chest but the lung nodule as there is unchanged.  CEA level done 5/19/2023 was normal.  It was 1.7.  This case was presented at tumor board.  The consensus was to be reevaluated for rectal surgery as well as to have a cardiology consult to see if he could tolerate such surgery.  He is not having chest pain he is not short of breath feels fatigued tired he is thin but he is not losing any more weight and he is able to eat.  Interval History: Note from 2/15/2024:  1 rectal carcinoma T3 N1 question of M1 with lung nodules  2 status post 8 cycles of FOLFOX chemotherapy and radiation therapy x 5 doses to the rectum.  Plan: Patient should be represented at this point.  Scans show stable nodules since May.  2023.  The rectal area looks better although he still has the nodes there.  Original surgery was not done due to MRI which she is now greater than 6 months since.  His blood counts are fairly stable.  CEA is not helpful  as it was 1.7 at the time of his presentation.  We will speak with radiation therapy.  Will get back with him.  I would recommend holding his treatment at least 1 time to get opinions from the tumor board.  HPI: 78-year-old gentleman with the above problem.  His biggest complaint is fatigue shortness of breath and weakness.  He is doing well otherwise moving bowels no bleeding.  Blood counts are fairly stable.  He is anxious to stop treatment.  He would like to be considered for surgery interventions.  Otherwise he is doing reasonably well.Interval History: Note from 1/17/2024:   78-year-old gentleman with rectal carcinoma.  He also has CAT scan and what appears to be increasing pulmonary nodules probably consistent with metastatic disease.  Last CAT scan showed them to be stable.  He is able to eat he is moving his bowels regularly no blood in urine or stools.  He is not short of breath at rest does get dyspneic with exertion.  He initially presented with blood in his bowel movements for a week and had a CAT scan of the pelvis showing rectal mass with mesorectal lymph nodes 2 mm left lower lobe pulmonary nodule 3 mm hepatic dome nodule.  The nodules in the right and the left lobes were new compared to 2019.  His MMR was intact on the biopsy showing moderately differentiated carcinoma distal rectal area.  An MRI of the pelvis showed T3b N1 low rectal cancer.  He was seen radiation therapy for 1 week 9/25 and 9/29.  He has had some thrombocytopenia and leukopenia this is improved.  He will begin using his seventh cycle of chemotherapy next week.  He has had a reduction in oxaliplatin as well as continuous infusion for 3 days 5-fluorouracil hopefully after that he will continue to tolerate better these treatments.      Cancer Staging:  Cancer Staging   Rectal cancer (HCC)  Staging form: Colon and Rectum, AJCC 8th Edition  - Clinical stage from 5/18/2023: Stage IIIB (cT3, cN1, cM0) - Signed by Arnaldo Mar DO on  6/28/2023  Microsatellite instability (MSI): Stable      Molecular Testing:     Previous Hematologic/ Oncologic History:    Oncology History   Rectal cancer (HCC)   5/18/2023 Initial Diagnosis    May 18, 2023 patient presented with BRBPR x1 week.  Weight stable over 8 months.  CT abdomen pelvis showed rectal mass with 2 suspicious mesorectal lymph nodes.  2 mm left lower lobe pulmonary nodule, 3 mm hepatic dome questionable lesion, 1 cm uncinate process cystic lesion.  CT chest showed 3 mm left lower lobe and right upper lobe nodules new compared to 2019.  May 20 flexible sigmoidoscopy showed distal rectal mass.  Biopsy of the mass showed moderately differentiated adenocarcinoma.  MMR intact.  June 14, 2023 MRI of the pelvis showed findings consistent with T3b, N1 low rectal cancer.  Suspicion for vascular invasion.  CBC on presentation normal WBC platelets.  Hemoglobin 11.4, MCV 77.  CMP showed albumin 3.3, otherwise normal.        Cancer Staged    Cancer Staging   No matching staging information was found for the patient.       5/18/2023 -  Cancer Staged    Staging form: Colon and Rectum, AJCC 8th Edition  - Clinical stage from 5/18/2023: Stage IIIB (cT3, cN1, cM0) - Signed by Arnaldo Mar DO on 6/28/2023  Microsatellite instability (MSI): Stable       5/20/2023 Biopsy    A. Rectum, mass, biopsy:  -Adenocarcinoma, invasive, moderately differentiated.  -Ancillary testing for mismatch repair (MMR) protein deficiency by immunohistochemical panel (MLH1, PMS2, MSH2, MSH6) is undertaken, the results will be issued in an addendum.    RESULTS OF IMMUNOHISTOCHEMICAL ANALYSIS FOR MISMATCH REPAIR PROTEIN LOSS  INTERPRETATION: NO LOSS OF NUCLEAR EXPRESSION OF MMR PROTEINS: LOW PROBABILITY OF MSI-H        RESULTS:  Antibody            Clone                 Description                     Results  MLH1                 M1                     Mismatch repair protein  Intact nuclear expression  MSH2                [id number]         Mismatch repair protein  Intact nuclear expression  MSH6                44                      Mismatch repair protein  Intact nuclear expression  PMS2                 [id number]           Mismatch repair protein  Intact nuclear expression        9/20/2023 - 2/14/2024 Chemotherapy    alteplase (CATHFLO), 2 mg, Intracatheter, Every 1 Minute as needed, 9 of 12 cycles  pegfilgrastim (NEULASTA), 6 mg, Subcutaneous, Once, 1 of 1 cycle  Administration: 6 mg (10/20/2023)  pegfilgrastim (NEULASTA ONPRO), 6 mg, Subcutaneous, Once, 1 of 1 cycle  Administration: 6 mg (1/11/2024)  fluorouracil (ADRUCIL), 320 mg/m2 = 565 mg (80 % of original dose 400 mg/m2), Intravenous, Once, 2 of 2 cycles  Dose modification: 320 mg/m2 (original dose 400 mg/m2, Cycle 1)  Administration: 565 mg (9/20/2023), 565 mg (10/17/2023)  leucovorin calcium IVPB, 708 mg, Intravenous, Once, 9 of 12 cycles  Administration: 700 mg (9/20/2023), 700 mg (10/17/2023), 700 mg (10/31/2023), 700 mg (11/14/2023), 700 mg (11/28/2023), 688 mg (1/9/2024), 688 mg (1/29/2024), 688 mg (2/12/2024)  oxaliplatin (ELOXATIN) chemo infusion, 75 mg/m2 = 132.75 mg (88.2 % of original dose 85 mg/m2), Intravenous, Once, 9 of 12 cycles  Dose modification: 75 mg/m2 (original dose 85 mg/m2, Cycle 1, Reason: Anticipated Tolerance), 65.025 mg/m2 (76.5 % of original dose 85 mg/m2, Cycle 6, Reason: Other (see comments), Comment: Neutropenia), 65 mg/m2 (original dose 85 mg/m2, Cycle 6, Reason: Dose modified as per discussion with consulting physician, Comment: neutropenia - changing to flat 65 mg/m2)  Administration: 132.75 mg (9/20/2023), 132.75 mg (10/17/2023), 133 mg (10/31/2023), 132.75 mg (11/14/2023), 132.75 mg (11/28/2023), 111.8 mg (1/9/2024), 111.8 mg (1/29/2024), 111.8 mg (2/12/2024)  fluorouracil (ADRUCIL) ambulatory infusion Soln, 1,200 mg/m2/day = 4,250 mg, Intravenous, Over 46 hours, 9 of 12 cycles  Dose modification: 960 mg/m2/day (80 % of original dose 1,200 mg/m2/day,  Cycle 6, Reason: Treatment Parameters Not Met)     9/25/2023 - 9/29/2023 Radiation    Treatments:  Course: C1  Plan ID Energy Fractions Dose per Fraction (cGy) Dose Correction (cGy) Total Dose Delivered (cGy) Elapsed Days   Pelvis 10X/6X 5 / 5 500 0 2,500 4    Treatment Dates:  9/25/2023 - 9/29/2023.          Current Hematologic/ Oncologic Treatment:       Cycle 1         Test Results:    Imaging: CT chest abdomen pelvis w contrast    Result Date: 8/26/2024  Narrative: CT CHEST, ABDOMEN AND PELVIS WITH IV CONTRAST INDICATION:   Malignant neoplasm of rectum. Presence of coronary angioplasty implant and graft. Thrombocytopenia, unspecified. Atherosclerotic heart disease of native coronary artery without angina pectoris. . COMPARISON: 4/2/2024. TECHNIQUE: CT examination of the chest, abdomen and pelvis was performed. Multiplanar 2D reformatted images were created from the source data. This examination, like all CT scans performed in the [facility], was performed utilizing techniques to minimize radiation dose exposure, including the use of iterative reconstruction and automated exposure control. Radiation dose length product (DLP) for this visit: IV Contrast: Enteric Contrast: Enteric contrast was not administered. FINDINGS: CHEST LUNGS: Multiple new pulmonary nodules.  There is no tracheal or endobronchial lesion. Representative pulmonary nodules: New 4 mm nodule in the left upper lobe image 59 series 3. New 3 mm nodule in the left upper lobe image 74 series 3 New 4 mm nodule in the right upper lobe image 54 series 3 New 5 mm nodule in the left upper lobe image 97 series 3 New 6 mm nodule in the left lower lobe image 188 series 3. PLEURA:  Unremarkable. HEART/GREAT VESSELS: Heart is unremarkable for patient's age. Moderate aortic atherosclerotic calcification. There is fusiform ectasia of the ascending thoracic aorta measuring up to 40 mm.  Recommendation is for follow-up low radiation dose chest CT  in one year. MEDIASTINUM AND NANCY:  Small hiatal hernia noted.  No mediastinal or hilar lymphadenopathy. CHEST WALL AND LOWER NECK:  Unremarkable. Right anterior chest wall Port-A-Cath ABDOMEN LIVER/BILIARY TREE: New low-attenuation nodule in segment 7 measures 7 mm image 115 series 2. GALLBLADDER: There are gallstone(s) within the gallbladder, without pericholecystic inflammatory changes. SPLEEN: Stable borderline splenomegaly measuring 12.8 cm. PANCREAS:  Unremarkable. ADRENAL GLANDS:  Unremarkable. KIDNEYS/URETERS:  One or more simple renal cyst(s) is noted.  Otherwise unremarkable kidneys.  No hydronephrosis. STOMACH AND BOWEL:  There is colonic diverticulosis without evidence of acute diverticulitis. Post APR with left lower quadrant colostomy APPENDIX:  No findings to suggest appendicitis. ABDOMINOPELVIC CAVITY:  No ascites.  No pneumoperitoneum. No enlarged para-aortic or pelvic lymph nodes.. VESSELS:  Atherosclerotic changes are present.  No evidence of aneurysm. PELVIS REPRODUCTIVE ORGANS:  The prostate is enlarged. URINARY BLADDER: No bladder lesion. Mild bladder wall thickening. ABDOMINAL WALL/INGUINAL REGIONS:  Unremarkable. OSSEOUS STRUCTURES:  No acute fracture or destructive osseous lesion.  Spinal degenerative changes are noted.     Impression: 1. Interval development of numerous pulmonary nodules throughout the lung largest measuring 6 mm concerning for pulmonary metastatic disease. No thoracic lymphadenopathy. No intra-abdominal lymph nodes 2. Interval development of a low-attenuation lesion measuring 7 mm in the right hepatic lobe segment 7 not seen on previous MRI or CT is concerning for possible hepatic metastatic disease. Repeat MRI abdomen recommended. 3. Post APR with left lower quadrant colostomy 4. Cholelithiasis 5. Prostate hypertrophy. Mild bladder wall thickening sequela chronic urinary retention Electronically signed: 08/24/2024 11:51 AM Kings Trevino MD            Labs:   Lab Results  "  Component Value Date    WBC 7.15 08/22/2024    HGB 11.5 (L) 08/22/2024    HCT 37.1 08/22/2024    MCV 87 08/22/2024     08/22/2024     Lab Results   Component Value Date    K 4.8 08/22/2024     08/22/2024    CO2 27 08/22/2024    BUN 17 08/22/2024    CREATININE 0.90 08/22/2024    GLUF 93 08/22/2024    CALCIUM 9.2 08/22/2024    CORRECTEDCA 9.0 04/02/2024    AST 18 08/22/2024    ALT 11 08/22/2024    ALKPHOS 82 08/22/2024    EGFR 81 08/22/2024         No results found for: \"SPEP\", \"UPEP\"    Lab Results   Component Value Date    PSA 1.65 08/30/2023       Lab Results   Component Value Date    CEA 1.1 08/22/2024       No results found for: \"\"    No results found for: \"AFP\"    Lab Results   Component Value Date    IRON 20 (L) 08/07/2023    TIBC 328 08/07/2023    FERRITIN 22 (L) 08/07/2023       Lab Results   Component Value Date    [id number] 222 05/19/2023         ROS: Review of Systems   Constitutional: Negative.    HENT: Negative.     Eyes: Negative.    Respiratory: Negative.     Cardiovascular: Negative.    Gastrointestinal: Negative.    Endocrine: Negative.    Genitourinary: Negative.    Musculoskeletal: Negative.    Skin: Negative.    Allergic/Immunologic: Negative.    Neurological: Negative.    Hematological: Negative.          Current Medications: Reviewed  Allergies: Reviewed  PMH/FH/SH:  Reviewed      Physical Exam:    Body surface area is 1.71 meters squared.    Wt Readings from Last 3 Encounters:   08/29/24 58.5 kg (129 lb)   07/29/24 57.6 kg (127 lb)   07/22/24 58.1 kg (128 lb)        Temp Readings from Last 3 Encounters:   08/29/24 (!) 97.3 °F (36.3 °C) (Temporal)   07/22/24 (!) 101.3 °F (38.5 °C) (Temporal)   07/16/24 (!) 96.7 °F (35.9 °C) (Tympanic)        BP Readings from Last 3 Encounters:   08/29/24 118/78   07/22/24 118/70   07/16/24 98/64         Pulse Readings from Last 3 Encounters:   08/29/24 69   07/22/24 63   07/16/24 80     @LASTSAO2(3)@      Physical Exam  Constitutional:       " Appearance: Normal appearance. He is normal weight. He is ill-appearing (He is a somewhat thin.).   HENT:      Head: Normocephalic and atraumatic.   Eyes:      Extraocular Movements: Extraocular movements intact.      Conjunctiva/sclera: Conjunctivae normal.      Pupils: Pupils are equal, round, and reactive to light.   Cardiovascular:      Rate and Rhythm: Normal rate and regular rhythm.      Heart sounds: Normal heart sounds.   Pulmonary:      Effort: Pulmonary effort is normal.      Breath sounds: Normal breath sounds.   Abdominal:      General: Abdomen is flat. Bowel sounds are normal.      Palpations: Abdomen is soft.   Musculoskeletal:         General: Normal range of motion.      Cervical back: Normal range of motion and neck supple.   Skin:     General: Skin is warm and dry.   Neurological:      General: No focal deficit present.      Mental Status: He is alert and oriented to person, place, and time. Mental status is at baseline.     No major leg edema      Goals and Barriers:  Current Goal: Prolong Survival from Cancer.   Barriers: None.      Patient's Capacity to Self Care:  Patient is able to self care.    Code Status: [unfilled]  Advance Directive and Living Will:      Power of : Yes

## 2024-09-03 ENCOUNTER — CONSULT (OUTPATIENT)
Dept: PULMONOLOGY | Facility: CLINIC | Age: 79
End: 2024-09-03
Payer: COMMERCIAL

## 2024-09-03 VITALS
DIASTOLIC BLOOD PRESSURE: 80 MMHG | SYSTOLIC BLOOD PRESSURE: 126 MMHG | TEMPERATURE: 97.3 F | BODY MASS INDEX: 18.96 KG/M2 | OXYGEN SATURATION: 90 % | HEIGHT: 69 IN | HEART RATE: 79 BPM | WEIGHT: 128 LBS

## 2024-09-03 DIAGNOSIS — R16.0 LIVER MASSES: ICD-10-CM

## 2024-09-03 DIAGNOSIS — C20 RECTAL CANCER (HCC): ICD-10-CM

## 2024-09-03 DIAGNOSIS — R91.8 LUNG NODULES: Primary | ICD-10-CM

## 2024-09-03 PROCEDURE — 99204 OFFICE O/P NEW MOD 45 MIN: CPT | Performed by: INTERNAL MEDICINE

## 2024-09-03 NOTE — PROGRESS NOTES
"    Consultation - Pulmonary Medicine   Minesh Pandya Jr. 79 y.o. male MRN: [id number]    Physician Requesting Consult: Rm Lau  Reason for Consult: lung nodules    Minesh Pandya Jr. is a 79 y.o. male  hx CAD, rectal ca with new lung and liver nodules, hx CVA, HFpEFwho presents for evaluation of lung nodules.     # Lung nodules  # Dyspnea  # Rectal Ca stage III vs IV  15 PY smoker  Prior Pfts in 2019 without obstruction or restriction but low gas transfer defect  Pt with rectal Ca sp chemo and radiation, surgery with residual colostomy   Fatigue and dyspnea likely multifactorial from cancer and failure to thrive.  No significant lung disease on imaging and clear airways on exam, no change with albuterol inhaler  Imaging in August with increasing pulm nodules (largest 6mm) and 7mm liver lesion, no ILD or other parenchymal disease. Compared to April. Given small size and peripheral nodules, not good options for bronchoscopic biopsy, can consider Pet and IR biopsy of either liver or lung nodule in the future    Extensively discussed options with pt and daughter. Pt says \"he just wants to be left alone\" and would likely not want chemotherapy     - PET CT vs CT in 3 months to monitor progression  --- pt prefers to hold off and talk to oncologist further before getting more imaging or biopsy (would rec CT guided biopsy w IR if proceeding)  - will discuss long term plans and pt's wished with onc Dr Lau  - pt wants to hold off on repeat PFTs    Follow up as needed    Vaccines    Immunization History   Administered Date(s) Administered    COVID-19 MODERNA VACC 0.5 ML IM 03/30/2021, 04/24/2021        I have spent a total time of 40 minutes on 09/03/24 in caring for this patient including Diagnostic results, Prognosis, Instructions for management, Patient and family education, Risk factor reductions, Impressions, Counseling / Coordination of care, Documenting in the medical record, Reviewing / ordering tests, medicine, " procedures  , Obtaining or reviewing history  , and Communicating with other healthcare professionals .   ______________________________________________________________________    HPI:    Minesh Pandya Jr. is a 79 y.o. male hx CAD, rectal ca with new lung and liver nodules, hx CVA, who presents for evaluation of lung nodules.     Hx rectal Ca sp surgery, chemo, radiation  Surveillance imaging with new lesions in lugns and liver (all < 7mm) concerning for mets  Pt reports chronic fatigue, COTTO, poor sleep, poor appetite, weight loss  No cough, fevers, chills  Does not want any further cycles of chemotherapy        Review of Systems:  Review of Systems  Aside from what is mentioned in the HPI, the review of systems otherwise negative.    Current Medications:    Current Outpatient Medications:     atorvastatin (LIPITOR) 80 mg tablet, Take 1 tablet (80 mg total) by mouth daily with dinner, Disp: 90 tablet, Rfl: 3    Blood Pressure KIT, by Does not apply route daily, Disp: 1 each, Rfl: 0    diphenhydramine, lidocaine, Al/Mg hydroxide, simethicone (Magic Mouthwash) SUSP, Swish and spit 10 mL every 6 (six) hours as needed for mouth pain or discomfort, Disp: 237 mL, Rfl: 0    metoprolol succinate (TOPROL-XL) 25 mg 24 hr tablet, Take 0.5 tablets (12.5 mg total) by mouth daily, Disp: 15 tablet, Rfl: 5    aspirin (ECOTRIN LOW STRENGTH) 81 mg EC tablet, Take 1 tablet (81 mg total) by mouth daily Please purchase over the counter at her local pharmacy, Disp: 30 tablet, Rfl: 1    enoxaparin (LOVENOX) 40 mg/0.4 mL, Inject 0.4 mL (40 mg total) under the skin every 24 hours for 22 days Do not start before July 9, 2024., Disp: 8.8 mL, Rfl: 0    sucralfate (CARAFATE) 1 g tablet, Take 1 tablet (1 g total) by mouth 4 (four) times a day (Patient taking differently: Take 1 g by mouth if needed), Disp: 120 tablet, Rfl: 3    Historical Information   Past Medical History:   Diagnosis Date    Acid reflux     Cardiomyopathy, unspecified type  "([facility]) 2024    Colostomy in place ([facility]) 2024    Hyperlipidemia     Ischemic cardiomyopathy     Lacunar infarction ([facility])     Near syncope 2019    NSTEMI (non-ST elevated myocardial infarction) ([facility]) 10/20/2019    Pericarditis 10/24/2019    Poison ivy     Rectal cancer ([facility])     Recurrent major depressive disorder, in full remission ([facility]) 2023    STEMI involving left anterior descending coronary artery ([facility]) 2023    Stroke ([facility])      Past Surgical History:   Procedure Laterality Date    CARDIAC CATHETERIZATION N/A 2023    Procedure: Cardiac pci;  Surgeon: Ted Núñez MD;  Location: [facility];  Service: Cardiology    CORONARY ANGIOPLASTY WITH STENT PLACEMENT  2023    BMS pLAD    CORONARY ANGIOPLASTY WITH STENT PLACEMENT  10/21/2019    LUDIVINA x 2- dLAD and oD1, balloon only- mLAD    HERNIA REPAIR Left 2021    Procedure: OPEN REPAIR HERNIA INGUINAL LEFT WITH MESH;  Surgeon: Carlos Nguyen MD;  Location: [facility];  Service: General    IR PORT PLACEMENT  2023    RI LAPS COLECTOMY PRTL W/COLOPXTSTMY LW ANAST N/A 7/3/2024    Procedure: RESECTION COLON LOW ANTERIOR LAPAROSCOPIC WITH ROBOTICS End colostomy;  Surgeon: Guille Hall MD;  Location:  MAIN OR;  Service: Colorectal     Social History   Social History     Tobacco Use   Smoking Status Former    Current packs/day: 0.00    Average packs/day: 1 pack/day for 15.0 years (15.0 ttl pk-yrs)    Types: Cigarettes    Start date: 1954    Quit date: 1969    Years since quittin.7   Smokeless Tobacco Never       Family History:   Family History   Problem Relation Age of Onset    Breast cancer Mother     Emphysema Father     Heart disease Maternal Grandfather     Heart attack Maternal Grandfather     Emphysema Paternal Grandfather          PhysicalExamination:  Vitals:   /80   Pulse 79   Temp (!) 97.3 °F (36.3 °C) (Tympanic)   Ht 5' 9\" (1.753 m)   Wt 58.1 kg (128 lb)   SpO2 90%   BMI " "18.90 kg/m²     Appearance -- thin, chronically ill appearing elderly man  Heart -- RRR, no murmurs  Lungs -- CTAB  Abdomen -- soft, nontender  Extremities -- WWP, no LE edema  Skin -- no rash  Neuro -- A&Ox3, wnl        Diagnostic Data:  Labs:  I personally reviewed the most recent laboratory data pertinent to today's visit    Lab Results   Component Value Date    WBC 7.15 08/22/2024    HGB 11.5 (L) 08/22/2024    HCT 37.1 08/22/2024    MCV 87 08/22/2024     08/22/2024     Lab Results   Component Value Date    CALCIUM 9.2 08/22/2024    K 4.8 08/22/2024    CO2 27 08/22/2024     08/22/2024    BUN 17 08/22/2024    CREATININE 0.90 08/22/2024     No results found for: \"IGE\"  Lab Results   Component Value Date    ALT 11 08/22/2024    AST 18 08/22/2024    ALKPHOS 82 08/22/2024       PFT results:  The most recent pulmonary function tests were reviewed.  2019: no obstrutcion or restriction, mod gas transfer defect    Imaging:  I personally reviewed the images on the PAC system pertinent to today's visit  CT Chest/A/P 4.2024:  Some areas of tree-in-bud opacity in nodular consolidation with bronchiectasis is similar to the prior study.. Some groundglass opacity and subtle tree-in-bud opacity as well as lobular interstitial prominence is new in the right upper lobe. This could   be inflammatory. 3-month follow-up chest CT is advised.  Some other small nodules are unchanged to slightly improved.  Right hilar node is stable.  No liver lesion in the abdomen.  No new abdominal adenopathy. Small liver lesion is more difficult to identify.    CT Chest/A/P 8/2024: 1. Interval development of numerous pulmonary nodules throughout the lung largest measuring 6 mm concerning for pulmonary metastatic disease. No thoracic lymphadenopathy. No intra-abdominal lymph nodes  2. Interval development of a low-attenuation lesion measuring 7 mm in the right hepatic lobe segment 7 not seen on previous MRI or CT is concerning for possible " hepatic metastatic disease. Repeat MRI abdomen recommended.  3. Post APR with left lower quadrant colostomy        Mellissa Waggoner MD  [facility]

## 2024-09-16 ENCOUNTER — OFFICE VISIT (OUTPATIENT)
Dept: CARDIOLOGY CLINIC | Facility: CLINIC | Age: 79
End: 2024-09-16
Payer: COMMERCIAL

## 2024-09-16 ENCOUNTER — APPOINTMENT (OUTPATIENT)
Dept: LAB | Facility: CLINIC | Age: 79
End: 2024-09-16
Payer: COMMERCIAL

## 2024-09-16 VITALS
SYSTOLIC BLOOD PRESSURE: 124 MMHG | WEIGHT: 129 LBS | BODY MASS INDEX: 19.11 KG/M2 | HEART RATE: 64 BPM | HEIGHT: 69 IN | DIASTOLIC BLOOD PRESSURE: 70 MMHG

## 2024-09-16 DIAGNOSIS — I25.5 ISCHEMIC CARDIOMYOPATHY: Primary | ICD-10-CM

## 2024-09-16 DIAGNOSIS — I10 ESSENTIAL HYPERTENSION: Chronic | ICD-10-CM

## 2024-09-16 DIAGNOSIS — Z95.5 S/P PRIMARY ANGIOPLASTY WITH CORONARY STENT: ICD-10-CM

## 2024-09-16 DIAGNOSIS — R06.09 DYSPNEA ON EXERTION: ICD-10-CM

## 2024-09-16 DIAGNOSIS — C20 RECTAL CANCER (HCC): ICD-10-CM

## 2024-09-16 DIAGNOSIS — R16.0 LIVER MASSES: ICD-10-CM

## 2024-09-16 LAB
ALBUMIN SERPL BCG-MCNC: 4.1 G/DL (ref 3.5–5)
ALP SERPL-CCNC: 70 U/L (ref 34–104)
ALT SERPL W P-5'-P-CCNC: 15 U/L (ref 7–52)
ANION GAP SERPL CALCULATED.3IONS-SCNC: 9 MMOL/L (ref 4–13)
AST SERPL W P-5'-P-CCNC: 21 U/L (ref 13–39)
BASOPHILS # BLD AUTO: 0.04 THOUSANDS/ΜL (ref 0–0.1)
BASOPHILS NFR BLD AUTO: 1 % (ref 0–1)
BILIRUB SERPL-MCNC: 0.66 MG/DL (ref 0.2–1)
BUN SERPL-MCNC: 22 MG/DL (ref 5–25)
CALCIUM SERPL-MCNC: 9.2 MG/DL (ref 8.4–10.2)
CHLORIDE SERPL-SCNC: 105 MMOL/L (ref 96–108)
CO2 SERPL-SCNC: 25 MMOL/L (ref 21–32)
CREAT SERPL-MCNC: 0.96 MG/DL (ref 0.6–1.3)
EOSINOPHIL # BLD AUTO: 0.2 THOUSAND/ΜL (ref 0–0.61)
EOSINOPHIL NFR BLD AUTO: 3 % (ref 0–6)
ERYTHROCYTE [DISTWIDTH] IN BLOOD BY AUTOMATED COUNT: 15.1 % (ref 11.6–15.1)
GFR SERPL CREATININE-BSD FRML MDRD: 75 ML/MIN/1.73SQ M
GLUCOSE P FAST SERPL-MCNC: 112 MG/DL (ref 65–99)
HCT VFR BLD AUTO: 38.8 % (ref 36.5–49.3)
HGB BLD-MCNC: 11.8 G/DL (ref 12–17)
IMM GRANULOCYTES # BLD AUTO: 0.05 THOUSAND/UL (ref 0–0.2)
IMM GRANULOCYTES NFR BLD AUTO: 1 % (ref 0–2)
LYMPHOCYTES # BLD AUTO: 1.18 THOUSANDS/ΜL (ref 0.6–4.47)
LYMPHOCYTES NFR BLD AUTO: 19 % (ref 14–44)
MCH RBC QN AUTO: 25.9 PG (ref 26.8–34.3)
MCHC RBC AUTO-ENTMCNC: 30.4 G/DL (ref 31.4–37.4)
MCV RBC AUTO: 85 FL (ref 82–98)
MONOCYTES # BLD AUTO: 0.65 THOUSAND/ΜL (ref 0.17–1.22)
MONOCYTES NFR BLD AUTO: 11 % (ref 4–12)
NEUTROPHILS # BLD AUTO: 4.07 THOUSANDS/ΜL (ref 1.85–7.62)
NEUTS SEG NFR BLD AUTO: 65 % (ref 43–75)
NRBC BLD AUTO-RTO: 0 /100 WBCS
PLATELET # BLD AUTO: 132 THOUSANDS/UL (ref 149–390)
PMV BLD AUTO: 13.1 FL (ref 8.9–12.7)
POTASSIUM SERPL-SCNC: 4.1 MMOL/L (ref 3.5–5.3)
PROT SERPL-MCNC: 6.7 G/DL (ref 6.4–8.4)
RBC # BLD AUTO: 4.56 MILLION/UL (ref 3.88–5.62)
SODIUM SERPL-SCNC: 139 MMOL/L (ref 135–147)
WBC # BLD AUTO: 6.19 THOUSAND/UL (ref 4.31–10.16)

## 2024-09-16 PROCEDURE — 80053 COMPREHEN METABOLIC PANEL: CPT

## 2024-09-16 PROCEDURE — 36415 COLL VENOUS BLD VENIPUNCTURE: CPT

## 2024-09-16 PROCEDURE — 85025 COMPLETE CBC W/AUTO DIFF WBC: CPT

## 2024-09-16 PROCEDURE — 99214 OFFICE O/P EST MOD 30 MIN: CPT | Performed by: INTERNAL MEDICINE

## 2024-09-16 NOTE — PROGRESS NOTES
Patient ID: Minesh Pandya Jr. is a 79 y.o. male.        Plan:      Ischemic cardiomyopathy  No evidence for decompensated CHF    S/P primary angioplasty with coronary stent  Angina free  Cont ASA and statin  Recent ischemic testing -    Essential hypertension  Controlled    Dyspnea on exertion  Likely multifactorial-deconditioning, comorbidities, heart disease, ? COPD (lung sounds diminished though not long smoking history), anemia.  Less likley we discussed is progressive CAD as he is angina free which was a hallmark of his prior cardiac events.  Reassured patient his rhythm is stable, his EF is improved from prior assessment, and there is no audible evidence for alarming valvulopathy.  ? PT/OT beneficial to consider  If sx worsen could repeat stress testing I told patient and daughter to call if this becomes the issue  I advised no med changes today       Follow up Plan/Other summary comments:  Return in about 6 months (around 3/16/2025).    HPI: Minesh is here to establish care since his prior [facility] cardiologist has left the practice.    Reviewed the prior half a dozen or so [facility] Cardiologist's notes.  He has a h/o NSTEMI in 2019 and 2023 s/p distal and proximal LAD stenting and Diag PTCA.  With his prior two cardiac events he had chest pains both times.  Not having ANY chest pains now.  No edema nor palpitations.  Since his rectal surgery this summer he has had more COTTO than usual, and fatigue.  No palps.  Jan Echo showed impropved EF to 50%, corroborated by a 4/24 NST which also showed no ischemia with a fixed Ant apical defect.    July EKG ess normal      Most recent or relevant cardiac/vascular testing:    See hpi          Past Surgical History:   Procedure Laterality Date    CARDIAC CATHETERIZATION N/A 05/19/2023    Procedure: Cardiac pci;  Surgeon: Ted Núñez MD;  Location: [facility];  Service: Cardiology    CORONARY ANGIOPLASTY WITH STENT PLACEMENT  05/19/2023    BMS pLAD    CORONARY  "ANGIOPLASTY WITH STENT PLACEMENT  10/21/2019    LUDIVINA x 2- dLAD and oD1, balloon only- mLAD    HERNIA REPAIR Left 04/28/2021    Procedure: OPEN REPAIR HERNIA INGUINAL LEFT WITH MESH;  Surgeon: Carlos Nguyen MD;  Location: [facility];  Service: General    IR PORT PLACEMENT  08/31/2023    AL LAPS COLECTOMY PRTL W/COLOPXTSTMY LW ANAST N/A 7/3/2024    Procedure: RESECTION COLON LOW ANTERIOR LAPAROSCOPIC WITH ROBOTICS End colostomy;  Surgeon: Guille Hall MD;  Location: [facility];  Service: Colorectal       Lipid Profile: Reviewed      Review of Systems   10  point ROS  was otherwise non pertinent or negative except as per HPI or as below.         Objective:     /70   Pulse 64   Ht 5' 9\" (1.753 m)   Wt 58.5 kg (129 lb)   BMI 19.05 kg/m²     PHYSICAL EXAM:    General:  Normal appearance in no distress.  Eyes:  Anicteric.  Oral mucosa:  Moist.  Neck:  No JVD. Carotid upstrokes are brisk without bruits.  No masses.  Chest:  Clear to auscultation.  Cardiac:  Regular No palpable PMI.  Normal S1 and S2.  No murmur gallop or rub.  Abdomen:  Soft and nontender. No palpable organomegaly or aortic enlargement.  Extremities:  No peripheral edema.  Musculoskeletal:  Symmetric.   Vascular:  Pedal pulses are intact.  Neuro:  Grossly symmetric.  Psych:  Alert and oriented x3.      Meds reviewed.    Past Medical History:   Diagnosis Date    Acid reflux     CAD (coronary artery disease)     s/p bare metal stent proximal LAD 5/19/2023    Cardiomyopathy, unspecified type ([facility]) 01/17/2024    Colostomy in place ([facility]) 07/2024    Hyperlipidemia     Ischemic cardiomyopathy     Lacunar infarction ([facility])     Near syncope 11/14/2019    NSTEMI (non-ST elevated myocardial infarction) ([facility]) 10/20/2019    Pericarditis 10/24/2019    Poison ivy     Rectal cancer ([facility])     Recurrent major depressive disorder, in full remission ([facility]) 08/07/2023    STEMI involving left anterior descending coronary artery ([facility]) 05/19/2023    Stroke ([facility])  "           Social History     Tobacco Use   Smoking Status Former    Current packs/day: 0.00    Average packs/day: 1 pack/day for 15.0 years (15.0 ttl pk-yrs)    Types: Cigarettes    Start date: 1954    Quit date: 1969    Years since quittin.8   Smokeless Tobacco Never            Patient ID: Minesh Pandya Jr. is a 79 y.o. male.        Plan:      Ischemic cardiomyopathy  No evidence for decompensated CHF    S/P primary angioplasty with coronary stent  Angina free  Cont ASA and statin  Recent ischemic testing -    Essential hypertension  Controlled    Dyspnea on exertion  Likely multifactorial-deconditioning, comorbidities, heart disease, ? COPD (lung sounds diminished though not long smoking history), anemia.  Less likley we discussed is progressive CAD as he is angina free which was a hallmark of his prior cardiac events.  Reassured patient his rhythm is stable, his EF is improved from prior assessment, and there is no audible evidence for alarming valvulopathy.  ? PT/OT beneficial to consider  If sx worsen could repeat stress testing I told patient and daughter to call if this becomes the issue  I advised no med changes today       Follow up Plan/Other summary comments:  Return in about 6 months (around 3/16/2025).    HPI:       No results found for this visit on 24.      Most recent or relevant cardiac/vascular testing:    See hpi      Past Surgical History:   Procedure Laterality Date    CARDIAC CATHETERIZATION N/A 2023    Procedure: Cardiac pci;  Surgeon: Ted Núñez MD;  Location: [facility];  Service: Cardiology    CORONARY ANGIOPLASTY WITH STENT PLACEMENT  2023    BMS pLAD    CORONARY ANGIOPLASTY WITH STENT PLACEMENT  10/21/2019    LUDIVINA x 2- dLAD and oD1, balloon only- mLAD    HERNIA REPAIR Left 2021    Procedure: OPEN REPAIR HERNIA INGUINAL LEFT WITH MESH;  Surgeon: Carlos Nguyen MD;  [facility]: [facility];  Service: General    IR PORT PLACEMENT  2023  "   DE LAPS COLECTOMY PRTL W/COLOPXTSTMY LW ANAST N/A 7/3/2024    Procedure: RESECTION COLON LOW ANTERIOR LAPAROSCOPIC WITH ROBOTICS End colostomy;  Surgeon: Guille Hall MD;  [facility];  Service: Colorectal       Lipid Profile: Reviewed      Review of Systems   10  point ROS  was otherwise non pertinent or negative except as per HPI or as below.         Objective:     /70   Pulse 64   Ht 5' 9\" (1.753 m)   Wt 58.5 kg (129 lb)   BMI 19.05 kg/m²     PHYSICAL EXAM:    General:  Normal appearance in no distress.  Eyes:  Anicteric.  Oral mucosa:  Moist.  Neck:  No JVD. Carotid upstrokes are brisk without bruits.  No masses.  Chest:  Clear to auscultation.  Cardiac:  No palpable PMI.  Normal S1 and S2.  No murmur gallop or rub.  Abdomen:  Soft and nontender. No palpable organomegaly or aortic enlargement.  Extremities:  No peripheral edema.  Musculoskeletal:  Symmetric.   Vascular:  Femoral pulses are brisk without bruits.  Popliteal pulses are intact bilaterally.   Pedal pulses are intact.  Neuro:  Grossly symmetric.  Psych:  Alert and oriented x3.      Meds reviewed.    Past Medical History:   Diagnosis Date    Acid reflux     CAD (coronary artery disease)     s/p bare metal stent proximal LAD 5/19/2023    Cardiomyopathy, unspecified type ([facility]) 01/17/2024    Colostomy in place ([facility]) 07/2024    Hyperlipidemia     Ischemic cardiomyopathy     Lacunar infarction ([facility])     Near syncope 11/14/2019    NSTEMI (non-ST elevated myocardial infarction) ([facility]) 10/20/2019    Pericarditis 10/24/2019    Poison ivy     Rectal cancer ([facility])     Recurrent major depressive disorder, in full remission ([facility]) 08/07/2023    STEMI involving left anterior descending coronary artery ([facility]) 05/19/2023    Stroke ([facility])            Social History     Tobacco Use   Smoking Status Former    Current packs/day: 0.00    Average packs/day: 1 pack/day for 15.0 years (15.0 ttl pk-yrs)    Types: Cigarettes    Start date: 12/6/1954    " Quit date: 1969    Years since quittin.8   Smokeless Tobacco Never       Current Outpatient Medications:     aspirin (ECOTRIN LOW STRENGTH) 81 mg EC tablet, Take 1 tablet (81 mg total) by mouth daily Please purchase over the counter at her local pharmacy, Disp: 30 tablet, Rfl: 1    atorvastatin (LIPITOR) 80 mg tablet, Take 1 tablet (80 mg total) by mouth daily with dinner, Disp: 90 tablet, Rfl: 3    Blood Pressure KIT, by Does not apply route daily, Disp: 1 each, Rfl: 0    diphenhydramine, lidocaine, Al/Mg hydroxide, simethicone (Magic Mouthwash) SUSP, Swish and spit 10 mL every 6 (six) hours as needed for mouth pain or discomfort, Disp: 237 mL, Rfl: 0    metoprolol succinate (TOPROL-XL) 25 mg 24 hr tablet, Take 0.5 tablets (12.5 mg total) by mouth daily, Disp: 15 tablet, Rfl: 5    enoxaparin (LOVENOX) 40 mg/0.4 mL, Inject 0.4 mL (40 mg total) under the skin every 24 hours for 22 days Do not start before 2024. (Patient not taking: Reported on 2024), Disp: 8.8 mL, Rfl: 0    sucralfate (CARAFATE) 1 g tablet, Take 1 tablet (1 g total) by mouth 4 (four) times a day (Patient not taking: Reported on 2024), Disp: 120 tablet, Rfl: 3

## 2024-09-16 NOTE — ASSESSMENT & PLAN NOTE
Likely multifactorial-deconditioning, comorbidities, heart disease, ? COPD (lung sounds diminished though not long smoking history), anemia.  Less likley we discussed is progressive CAD as he is angina free which was a hallmark of his prior cardiac events.  Reassured patient his rhythm is stable, his EF is improved from prior assessment, and there is no audible evidence for alarming valvulopathy.  ? PT/OT beneficial to consider  If sx worsen could repeat stress testing I told patient and daughter to call if this becomes the issue  I advised no med changes today

## 2024-09-16 NOTE — PATIENT INSTRUCTIONS
"Patient Education     Coronary artery disease   The Basics   Written by the doctors and editors at [facility]   What is coronary artery disease? -- Coronary artery disease is a condition that puts you at risk for heart attack and other forms of heart disease. In people who have coronary artery disease, the arteries that supply blood to the heart get clogged with fatty deposits (figure 1).  Other names for this disease are \"coronary heart disease\" or just \"heart disease.\"  What are the symptoms of coronary artery disease? -- Many people have no symptoms. For those who do, the most common symptoms usually happen with exercise. They can include:   Pain, pressure, or discomfort in the center of the chest - This type of chest pain is called \"angina.\"   Pain, tingling, or discomfort in other parts of the upper body - This might include the arms, back, neck, jaw, or stomach.   Feeling short of breath  What are the symptoms of a heart attack? -- The first symptom of coronary artery disease can be a heart attack (figure 2). That's why it is so important to know how to spot a heart attack.  The symptoms of a heart attack can include:   Pain, pressure, or discomfort in the center of the chest   Pain, tingling, or discomfort in other parts of the upper body, including the arms, back, neck, jaw, or stomach   Shortness of breath   Nausea, vomiting, burping, or heartburn   Sweating or cold, clammy skin   Racing or uneven heartbeat   Feeling dizzy or lightheaded  If these symptoms last more than 10 minutes or they keep coming and going, call for an ambulance right away (in the [place] and [place], call 9-1-1). Do not try to get to the hospital on your own.  Some people with coronary artery disease have chest pain even when they are not having a heart attack. This is most likely to happen when they are walking, going up stairs, or moving around. But if you have chest pain that is new or different, see a doctor right away.  Is there a test " "for coronary artery disease? -- Yes. If your doctor or nurse thinks that you might have coronary artery disease, they might order blood tests and 1 or more of these tests:   Electrocardiogram (\"ECG\") - This test measures the electrical activity in your heart.   Stress test - This is also called an exercise test. For this test, you might be asked to run or walk on a treadmill while you also have an ECG. Physical activity increases the heart's need for blood. This test helps doctors see if the heart is getting enough blood. If you cannot walk or run, your doctor might give you a medicine to make your heart pump faster.   Echocardiogram - This test uses sound waves to create an image of your heart as it beats.   Cardiac catheterization (\"cardiac cath\") - During this test, the doctor puts a thin tube into a blood vessel in your leg or arm. Then, they move the tube up to your heart. Next, the doctor puts a dye that shows up on X-ray into the tube. This part of the test is called \"coronary angiography.\" It can show whether any of the arteries in your heart are clogged.  How is coronary artery disease treated? -- The main treatments for coronary artery disease are:   Lifestyle changes - To reduce your risk of heart attack and death, you should:   Quit smoking, if you smoke. Your doctor or nurse can help with this.   Eat lots of fruits, vegetables, and foods with a lot of fiber. Avoid foods with a lot of sugar.   Walk or do some form of physical activity on most days of the week.   Try to lose weight, if you have excess body weight.   Medicines - The medicines to treat heart disease are very important. Some medicines lower your risk of heart attacks and can help you live longer. But you must take them every day, as instructed. Your doctor might prescribe:   Medicines called \"statins,\" which lower cholesterol   Medicines to lower blood pressure   Aspirin or other medicines that help prevent blood clots   Medicines to treat " "diabetes  People who have chest pain caused by coronary artery disease (called angina) can also get medicines to relive their pain. These medicines might include \"nitrates,\" \"beta blockers,\" and others.  Some people with coronary artery disease can also have:   Stenting - The doctor puts a thin plastic tube into the blocked artery, and uses a tiny balloon to open the blockage. Then, the doctor leaves a tiny mesh tube called a \"stent\" inside the artery to hold it open.   Bypass surgery - This is also known as \"coronary artery bypass grafting\" (\"CABG\"). During this surgery, the doctor removes a piece of blood vessel from another part of the body. Then, they reattach the blood vessel above and below the area that is clogged. This re-routes blood around the clog and allows it to get to the part of the heart that was not getting blood (figure 3).  If your doctor recommends stenting or bypass surgery, ask these questions:   What are the benefits of this procedure for me? Will it help me live longer? Will it reduce my chance of having a heart attack? Will I feel better if I have this procedure?   What are the risks of this procedure?   What happens if I don't have this procedure?  All topics are updated as new evidence becomes available and our peer review process is complete.  This topic retrieved from [employer] on: Apr 13, 2024.  Topic 50681 Version 33.0  Release: 32.3.2 - C32.102  © 2024 [employer] and/or its affiliates. All rights reserved.  figure 1: Coronary heart disease     In people with coronary heart disease, the coronary arteries get clogged with fatty deposits called plaques.  Graphic 82157 Version 5.0  figure 2: Heart attack symptoms     This picture shows the main symptoms of a heart attack. People who are having a heart attack often have only some of these symptoms. The pain, pressure, and discomfort caused by a heart attack mostly affect the left side of the body, but can also affect the right.  Women " "are more likely than men to have to have symptoms other than chest pain. But chest pain or discomfort is the most common symptom of a heart attack in both women and men.  If you think that you are having a heart attack, call for an ambulance (in the [place] and [place], call 9-1-1). Do not try to get yourself to the hospital.  Graphic 12489 Version 4.0  figure 3: Coronary artery bypass graft surgery     During coronary artery bypass surgery, the surgeon removes a piece of blood vessel from the leg, chest, arm, or belly. Then, the surgeon uses that piece of blood vessel (called a \"graft\") to reroute blood around the blocked artery. The surgery is called \"bypass surgery\" because it bypasses the blockage. Some people have more than 1 blocked artery bypassed. In this picture, the graft came from a vein in the leg called the \"saphenous vein.\" But grafts can come from other places, too.  Graphic 99739 Version 6.0  Consumer Information Use and Disclaimer   Disclaimer: This generalized information is a limited summary of diagnosis, treatment, and/or medication information. It is not meant to be comprehensive and should be used as a tool to help the user understand and/or assess potential diagnostic and treatment options. It does NOT include all information about conditions, treatments, medications, side effects, or risks that may apply to a specific patient. It is not intended to be medical advice or a substitute for the medical advice, diagnosis, or treatment of a health care provider based on the health care provider's examination and assessment of a patient's specific and unique circumstances. Patients must speak with a health care provider for complete information about their health, medical questions, and treatment options, including any risks or benefits regarding use of medications. This information does not endorse any treatments or medications as safe, effective, or approved for treating a specific patient. [employer], " Inc[employer] and its affiliates disclaim any warranty or liability relating to this information or the use thereof.The use of this information is governed by the Terms of Use, available at https://www.wolterskluwer.com/en/know/clinical-effectiveness-terms. 2024© [employer] and its affiliates and/or licensors. All rights reserved.  Copyright   © 2024 [employer] and/or its affiliates. All rights reserved.

## 2024-09-18 ENCOUNTER — OFFICE VISIT (OUTPATIENT)
Dept: HEMATOLOGY ONCOLOGY | Facility: CLINIC | Age: 79
End: 2024-09-18
Payer: COMMERCIAL

## 2024-09-18 VITALS
SYSTOLIC BLOOD PRESSURE: 122 MMHG | OXYGEN SATURATION: 99 % | BODY MASS INDEX: 18.81 KG/M2 | HEIGHT: 69 IN | TEMPERATURE: 97.5 F | WEIGHT: 127 LBS | DIASTOLIC BLOOD PRESSURE: 74 MMHG | RESPIRATION RATE: 18 BRPM | HEART RATE: 78 BPM

## 2024-09-18 DIAGNOSIS — R91.8 LUNG NODULES: ICD-10-CM

## 2024-09-18 DIAGNOSIS — C20 RECTAL CANCER (HCC): Primary | ICD-10-CM

## 2024-09-18 PROCEDURE — 99214 OFFICE O/P EST MOD 30 MIN: CPT | Performed by: INTERNAL MEDICINE

## 2024-09-18 PROCEDURE — G2211 COMPLEX E/M VISIT ADD ON: HCPCS | Performed by: INTERNAL MEDICINE

## 2024-09-18 NOTE — PROGRESS NOTES
Hematology/Oncology Outpatient Follow- up Note  Minesh Pandya Jr. 79 y.o. male MRN: @ Encounter: [id number]        Date:  9/18/2024        Assessment / Plan:    1 stage IV rectal cancer  2 lung metastasis  3 liver nodule.  4 colostomy in place  5 generalized weakness fatigue and shortness of breath deconditioning versus problems from pulmonary source.  Plan: Patient will undergo hospice referral.  He will come back in 4 weeks if he does not want to do hospice and we will do a CBC and CMP.  He does not want chemotherapy at this time.  We will treat him symptomatically and palliatively.        HPI: 79-year-old gentleman here for follow-up.  Unfortunately he is got stage IV rectal cancer he has new lung nodules worsening.  He also has a question of new liver nodule.  He has been weak fatigued short of breath.  He is not interested in pursuing further chemotherapy.  This is what we would offer him.  If he does not want to pursue that we discussed the possibility of hospice.  We will have them contact him.  He was seen by cardiology do not feel that this was angina or major congestive failure.  He was seen by pulmonary who noted the problems in the lungs and talked with him and he did not wish to pursue that further.  He again asked why he is referred to hospice      Interval History: Note from 8/29/2024:  Assessment / Plan:    1 presumed stage IV rectal cancer  Status post low anterior resection T3 lesion N0 M0.  3 worsening lung nodules now 5 previously only 1  4 question of new liver nodule.  5 coronary artery disease no angina cleared by cardiology for surgery now complaining of dyspnea.  Plan: Patient will come back in 3 weeks.  He has recovered from surgery but he is getting dyspnea with little exertion exertion and fatigue.  I have no new suggestions.  Till he is seen by pulmonary and cardiology.  Ultimately biopsy would be nice if he is possible and otherwise no other major changes.  Will review his films with  pulmonology and radiation.  Prognosis long-term very guarded at best  HPI: 79-year-old gentleman here for follow-up.  He is complaining of increasing dyspnea.  He is very fatigued.  He does not get very far because without shortness of breath and having to stop.  He is noticing getting worse.  CAT scan of his chest now shows 5 new nodules.  The majority of the lungs are fairly clear.  Otherwise he is doing and feeling reasonably well but he does are easily fatigued colostomy is working well he does not have much of major pain in his pelvic and rectal area.  We discussed that unfortunately he appears to have worsening now of what is probably metastatic disease.  I do not think he will be easy to get a biopsy of this.  In addition his question of abnormality in his liver.  I will try and look at this with our radiologist to see if there is any possibility of biopsying.  I also spoke with the pulmonologist Dr. Mendosa.  My concern with the shortness of breath is related to lung versus cardiac problems.  He does have cardiology appointments coming up.  He will be [place] them by September 16 we will see him on September 18 and discuss what further to do.  Long-term prognosis unfortunately is poor.  Of note his CBC CMP and CEA level are fairly stable  Interval History: Note from 7/22/2024:  Assessment / Plan:    1 stage III versus stage IV rectal cancer  2 status post low anterior resection  T3 lesion N0 M0  3 abnormality in the lung and liver previously needs to be reassess.  4 coronary artery disease no angina cleared by cardiology for surgery which he accomplished needs follow-up  5 chronic thrombocytopenia platelets run between 100,000  Plan: He is going to come back in 4 weeks.  Will get CEA CBC and CMP.  He will get a CAT scan chest abdomen pelvis.  He has done fairly well recovering from his surgical intervention.  HPI: 79-year-old gentleman with a history of rectal cancer.  He underwent radiation and then  chemotherapy as well.  Subsequently he has 1 nodule in his lung with questionable abnormality in the liver.  After long discussion he went ahead with surgery as a T3 N0 lesion with 15 lymph nodes involved low anterior resection with placement of a colostomy without reanastomosis attempted.  He is doing well colostomy working well he is not short of breath.  He is quite thin.  But this is unchanged from before.  He is not having chest pain.  Not short of breath at rest.  At this point we will plan to repeat his CAT scan chest abdomen pelvis along with laboratory work.  He needs to follow-up with cardiology as well.  Interval History: Note from 5/2/2024:  Assessment / Plan:    1 stage III versus stage IV rectal cancer  2 lung nodules that are stable  3 liver nodule stable not described presently.  4 coronary artery disease no angina cleared by cardiology for surgery.  5 thrombocytopenia chronic platelets running between 80 and 120,000  Plan: Patient is going to be seen by make a final decision regarding surgery.  CEA levels normal CBC and CMP are stable he did platelets can be on standby if he is needed.  No other major suggestions at this time.  He will follow-up here in 2 to 3 months.  HPI: 79-year-old gentleman here for follow-up.  He underwent recent colonoscopy that showed benign path.  However he does still remains with a significant mass in his rectal area.  He is able move his bowels he is not having nausea or vomiting is no major abdominal pain has stabilized his weight and is feeling reasonably well.  Has been seen by cardiology who felt he could be cleared for surgery.  His lungs are stable.  His liver also was stable.  No new lesions noted CEA level in normal range.  Interval History: Note from 4/11/2024:  Assessment / Plan:    1 stage III versus stage IV rectal cancer  2 lung nodules which have been stable  3 liver nodule stable  4 coronary artery disease no angina cleared by cardiology for surgery  Plan:  Patient will be considered for surgery.  He is awaiting colonoscopy at the end of month.  Will see him once more at the beginning of 5/2024 after colonoscopy.  At that point final decision remain regarding whether he should go ahead with surgery or not.  CEA level is normal CBC and CMP are stable.  We will also review his scans with radiology.  I particularly concerned about his lungs as a mention but injury and I want to see how they look.  HPI: 78-year-old gentleman with history of stage III versus stage IV rectal cancer.  His x-ray shows stability in his lung in terms of nodules and 1 nodule in the liver.  He is being considered for intervention surgically.  He apparently will have colonoscopy at the end of April by colorectal surgery to determine how he looks and winces possible ability to resect.  He was seen by cardiology who felt he could undergo the procedure and procedure and have aspirin stopped 5 days prior.  He is able to move his bowels though occasionally constipated.  He is taking iron pills which I told him to stop.  His hemoglobin is overall at 12-1/2.  He is otherwise doing fairly well.  He is not vomiting no other abdominal pain feeling reasonably well.  Interval History: Note from 3/12/2024:  Assessment / Plan:    1 stage III versus stage IV rectal carcinoma  2 lung nodules which were remained stable  3 coronary artery disease no angina to see cardiology for clearance is  4 to see colorectal surgery regarding possibility of intussusception with surgery  Plan: Patient will have appointments cardiology and colorectal surgery.  He will need to get another CT scan chest abdomen pelvis/CBC/CMP/CEA level.  We like to get these done and we will call him to do so.  We will have him come back in 4 weeks for final decisions.  Prognosis quite guarded.   HPI: 78-year-old gentleman here for follow-up.  He is off chemotherapy.  He has occasional headache otherwise stable.  He is moving his bowels.  Minimal  abdominal discomfort.  He is not complain of chest pain or shortness of breath.  He will need to see cardiology.  He denies of denies any other major problems.  Unfortunately he is still left with rectal cancer.  He had an abnormal CAT scan of his chest but the lung nodule as there is unchanged.  CEA level done 5/19/2023 was normal.  It was 1.7.  This case was presented at tumor board.  The consensus was to be reevaluated for rectal surgery as well as to have a cardiology consult to see if he could tolerate such surgery.  He is not having chest pain he is not short of breath feels fatigued tired he is thin but he is not losing any more weight and he is able to eat.  Interval History: Note from 2/15/2024:  1 rectal carcinoma T3 N1 question of M1 with lung nodules  2 status post 8 cycles of FOLFOX chemotherapy and radiation therapy x 5 doses to the rectum.  Plan: Patient should be represented at this point.  Scans show stable nodules since May.  2023.  The rectal area looks better although he still has the nodes there.  Original surgery was not done due to MRI which she is now greater than 6 months since.  His blood counts are fairly stable.  CEA is not helpful as it was 1.7 at the time of his presentation.  We will speak with radiation therapy.  Will get back with him.  I would recommend holding his treatment at least 1 time to get opinions from the tumor board.  HPI: 78-year-old gentleman with the above problem.  His biggest complaint is fatigue shortness of breath and weakness.  He is doing well otherwise moving bowels no bleeding.  Blood counts are fairly stable.  He is anxious to stop treatment.  He would like to be considered for surgery interventions.  Otherwise he is doing reasonably well.Interval History: Note from 1/17/2024:   78-year-old gentleman with rectal carcinoma.  He also has CAT scan and what appears to be increasing pulmonary nodules probably consistent with metastatic disease.  Last CAT scan  showed them to be stable.  He is able to eat he is moving his bowels regularly no blood in urine or stools.  He is not short of breath at rest does get dyspneic with exertion.  He initially presented with blood in his bowel movements for a week and had a CAT scan of the pelvis showing rectal mass with mesorectal lymph nodes 2 mm left lower lobe pulmonary nodule 3 mm hepatic dome nodule.  The nodules in the right and the left lobes were new compared to 2019.  His MMR was intact on the biopsy showing moderately differentiated carcinoma distal rectal area.  An MRI of the pelvis showed T3b N1 low rectal cancer.  He was seen radiation therapy for 1 week 9/25 and 9/29.  He has had some thrombocytopenia and leukopenia this is improved.  He will begin using his seventh cycle of chemotherapy next week.  He has had a reduction in oxaliplatin as well as continuous infusion for 3 days 5-fluorouracil hopefully after that he will continue to tolerate better these treatments.      Cancer Staging:  Cancer Staging   Rectal cancer (HCC)  Staging form: Colon and Rectum, AJCC 8th Edition  - Clinical stage from 5/18/2023: Stage IIIB (cT3, cN1, cM0) - Signed by Arnaldo Mar DO on 6/28/2023  Microsatellite instability (MSI): Stable      Molecular Testing:     Previous Hematologic/ Oncologic History:    Oncology History   Rectal cancer (HCC)   5/18/2023 Initial Diagnosis    May 18, 2023 patient presented with BRBPR x1 week.  Weight stable over 8 months.  CT abdomen pelvis showed rectal mass with 2 suspicious mesorectal lymph nodes.  2 mm left lower lobe pulmonary nodule, 3 mm hepatic dome questionable lesion, 1 cm uncinate process cystic lesion.  CT chest showed 3 mm left lower lobe and right upper lobe nodules new compared to 2019.  May 20 flexible sigmoidoscopy showed distal rectal mass.  Biopsy of the mass showed moderately differentiated adenocarcinoma.  MMR intact.  June 14, 2023 MRI of the pelvis showed findings consistent with  T3b, N1 low rectal cancer.  Suspicion for vascular invasion.  CBC on presentation normal WBC platelets.  Hemoglobin 11.4, MCV 77.  CMP showed albumin 3.3, otherwise normal.        Cancer Staged    Cancer Staging   No matching staging information was found for the patient.       5/18/2023 -  Cancer Staged    Staging form: Colon and Rectum, AJCC 8th Edition  - Clinical stage from 5/18/2023: Stage IIIB (cT3, cN1, cM0) - Signed by Arnaldo Mar DO on 6/28/2023  Microsatellite instability (MSI): Stable       5/20/2023 Biopsy    A. Rectum, mass, biopsy:  -Adenocarcinoma, invasive, moderately differentiated.  -Ancillary testing for mismatch repair (MMR) protein deficiency by immunohistochemical panel (MLH1, PMS2, MSH2, MSH6) is undertaken, the results will be issued in an addendum.    RESULTS OF IMMUNOHISTOCHEMICAL ANALYSIS FOR MISMATCH REPAIR PROTEIN LOSS  INTERPRETATION: NO LOSS OF NUCLEAR EXPRESSION OF MMR PROTEINS: LOW PROBABILITY OF MSI-H        RESULTS:  Antibody            Clone                 Description                     Results  MLH1                 M1                     Mismatch repair protein  Intact nuclear expression  MSH2                [id number]        Mismatch repair protein  Intact nuclear expression  MSH6                44                      Mismatch repair protein  Intact nuclear expression  PMS2                 [id number]           Mismatch repair protein  Intact nuclear expression        9/20/2023 - 2/14/2024 Chemotherapy    alteplase (CATHFLO), 2 mg, Intracatheter, Every 1 Minute as needed, 9 of 12 cycles  pegfilgrastim (NEULASTA), 6 mg, Subcutaneous, Once, 1 of 1 cycle  Administration: 6 mg (10/20/2023)  pegfilgrastim (NEULASTA ONPRO), 6 mg, Subcutaneous, Once, 1 of 1 cycle  Administration: 6 mg (1/11/2024)  fluorouracil (ADRUCIL), 320 mg/m2 = 565 mg (80 % of original dose 400 mg/m2), Intravenous, Once, 2 of 2 cycles  Dose modification: 320 mg/m2 (original dose 400 mg/m2, Cycle  1)  Administration: 565 mg (9/20/2023), 565 mg (10/17/2023)  leucovorin calcium IVPB, 708 mg, Intravenous, Once, 9 of 12 cycles  Administration: 700 mg (9/20/2023), 700 mg (10/17/2023), 700 mg (10/31/2023), 700 mg (11/14/2023), 700 mg (11/28/2023), 688 mg (1/9/2024), 688 mg (1/29/2024), 688 mg (2/12/2024)  oxaliplatin (ELOXATIN) chemo infusion, 75 mg/m2 = 132.75 mg (88.2 % of original dose 85 mg/m2), Intravenous, Once, 9 of 12 cycles  Dose modification: 75 mg/m2 (original dose 85 mg/m2, Cycle 1, Reason: Anticipated Tolerance), 65.025 mg/m2 (76.5 % of original dose 85 mg/m2, Cycle 6, Reason: Other (see comments), Comment: Neutropenia), 65 mg/m2 (original dose 85 mg/m2, Cycle 6, Reason: Dose modified as per discussion with consulting physician, Comment: neutropenia - changing to flat 65 mg/m2)  Administration: 132.75 mg (9/20/2023), 132.75 mg (10/17/2023), 133 mg (10/31/2023), 132.75 mg (11/14/2023), 132.75 mg (11/28/2023), 111.8 mg (1/9/2024), 111.8 mg (1/29/2024), 111.8 mg (2/12/2024)  fluorouracil (ADRUCIL) ambulatory infusion Soln, 1,200 mg/m2/day = 4,250 mg, Intravenous, Over 46 hours, 9 of 12 cycles  Dose modification: 960 mg/m2/day (80 % of original dose 1,200 mg/m2/day, Cycle 6, Reason: Treatment Parameters Not Met)     9/25/2023 - 9/29/2023 Radiation    Treatments:  Course: C1  Plan ID Energy Fractions Dose per Fraction (cGy) Dose Correction (cGy) Total Dose Delivered (cGy) Elapsed Days   Pelvis 10X/6X 5 / 5 500 0 2,500 4    Treatment Dates:  9/25/2023 - 9/29/2023.          Current Hematologic/ Oncologic Treatment:       Cycle 1         Test Results:    Imaging: CT chest abdomen pelvis w contrast    Result Date: 8/26/2024  Narrative: CT CHEST, ABDOMEN AND PELVIS WITH IV CONTRAST INDICATION:   Malignant neoplasm of rectum. Presence of coronary angioplasty implant and graft. Thrombocytopenia, unspecified. Atherosclerotic heart disease of native coronary artery without angina pectoris. . COMPARISON: 4/2/2024.  TECHNIQUE: CT examination of the chest, abdomen and pelvis was performed. Multiplanar 2D reformatted images were created from the source data. This examination, like all CT scans performed in the [facility], was performed utilizing techniques to minimize radiation dose exposure, including the use of iterative reconstruction and automated exposure control. Radiation dose length product (DLP) for this visit: IV Contrast: Enteric Contrast: Enteric contrast was not administered. FINDINGS: CHEST LUNGS: Multiple new pulmonary nodules.  There is no tracheal or endobronchial lesion. Representative pulmonary nodules: New 4 mm nodule in the left upper lobe image 59 series 3. New 3 mm nodule in the left upper lobe image 74 series 3 New 4 mm nodule in the right upper lobe image 54 series 3 New 5 mm nodule in the left upper lobe image 97 series 3 New 6 mm nodule in the left lower lobe image 188 series 3. PLEURA:  Unremarkable. HEART/GREAT VESSELS: Heart is unremarkable for patient's age. Moderate aortic atherosclerotic calcification. There is fusiform ectasia of the ascending thoracic aorta measuring up to 40 mm.  Recommendation is for follow-up low radiation dose chest CT in one year. MEDIASTINUM AND NANCY:  Small hiatal hernia noted.  No mediastinal or hilar lymphadenopathy. CHEST WALL AND LOWER NECK:  Unremarkable. Right anterior chest wall Port-A-Cath ABDOMEN LIVER/BILIARY TREE: New low-attenuation nodule in segment 7 measures 7 mm image 115 series 2. GALLBLADDER: There are gallstone(s) within the gallbladder, without pericholecystic inflammatory changes. SPLEEN: Stable borderline splenomegaly measuring 12.8 cm. PANCREAS:  Unremarkable. ADRENAL GLANDS:  Unremarkable. KIDNEYS/URETERS:  One or more simple renal cyst(s) is noted.  Otherwise unremarkable kidneys.  No hydronephrosis. STOMACH AND BOWEL:  There is colonic diverticulosis without evidence of acute diverticulitis. Post APR with left lower quadrant  "colostomy APPENDIX:  No findings to suggest appendicitis. ABDOMINOPELVIC CAVITY:  No ascites.  No pneumoperitoneum. No enlarged para-aortic or pelvic lymph nodes.. VESSELS:  Atherosclerotic changes are present.  No evidence of aneurysm. PELVIS REPRODUCTIVE ORGANS:  The prostate is enlarged. URINARY BLADDER: No bladder lesion. Mild bladder wall thickening. ABDOMINAL WALL/INGUINAL REGIONS:  Unremarkable. OSSEOUS STRUCTURES:  No acute fracture or destructive osseous lesion.  Spinal degenerative changes are noted.     Impression: 1. Interval development of numerous pulmonary nodules throughout the lung largest measuring 6 mm concerning for pulmonary metastatic disease. No thoracic lymphadenopathy. No intra-abdominal lymph nodes 2. Interval development of a low-attenuation lesion measuring 7 mm in the right hepatic lobe segment 7 not seen on previous MRI or CT is concerning for possible hepatic metastatic disease. Repeat MRI abdomen recommended. 3. Post APR with left lower quadrant colostomy 4. Cholelithiasis 5. Prostate hypertrophy. Mild bladder wall thickening sequela chronic urinary retention Electronically signed: 08/24/2024 11:51 AM Kings Trevino MD            Labs:   Lab Results   Component Value Date    WBC 6.19 09/16/2024    HGB 11.8 (L) 09/16/2024    HCT 38.8 09/16/2024    MCV 85 09/16/2024     (L) 09/16/2024     Lab Results   Component Value Date    K 4.1 09/16/2024     09/16/2024    CO2 25 09/16/2024    BUN 22 09/16/2024    CREATININE 0.96 09/16/2024    GLUF 112 (H) 09/16/2024    CALCIUM 9.2 09/16/2024    CORRECTEDCA 9.0 04/02/2024    AST 21 09/16/2024    ALT 15 09/16/2024    ALKPHOS 70 09/16/2024    EGFR 75 09/16/2024         No results found for: \"SPEP\", \"UPEP\"    Lab Results   Component Value Date    PSA 1.65 08/30/2023       Lab Results   Component Value Date    CEA 1.1 08/22/2024       No results found for: \"\"    No results found for: \"AFP\"    Lab Results   Component Value Date    IRON " 20 (L) 08/07/2023    TIBC 328 08/07/2023    FERRITIN 22 (L) 08/07/2023       Lab Results   Component Value Date    [id number] 222 05/19/2023         ROS: Review of Systems   Constitutional:  Positive for fatigue.   HENT: Negative.     Respiratory:  Positive for cough and shortness of breath.    Cardiovascular: Negative.    Gastrointestinal: Negative.    Endocrine: Negative.    Genitourinary: Negative.    Musculoskeletal: Negative.    Skin: Negative.    Allergic/Immunologic: Negative.    Neurological: Negative.    Hematological: Negative.          Current Medications: Reviewed  Allergies: Reviewed  PMH/FH/SH:  Reviewed      Physical Exam:    Body surface area is 1.7 meters squared.    Wt Readings from Last 3 Encounters:   09/18/24 57.6 kg (127 lb)   09/16/24 58.5 kg (129 lb)   09/03/24 58.1 kg (128 lb)        Temp Readings from Last 3 Encounters:   09/18/24 97.5 °F (36.4 °C) (Temporal)   09/03/24 (!) 97.3 °F (36.3 °C) (Tympanic)   08/29/24 (!) 97.3 °F (36.3 °C) (Temporal)        BP Readings from Last 3 Encounters:   09/18/24 122/74   09/16/24 124/70   09/03/24 126/80         Pulse Readings from Last 3 Encounters:   09/18/24 78   09/16/24 64   09/03/24 79     @LASTSAO2(3)@      Physical Exam  Constitutional:       Appearance: He is normal weight. He is ill-appearing (Chronically ill in appearance not in acute distress at rest sitting.).   HENT:      Head: Normocephalic and atraumatic.   Eyes:      Extraocular Movements: Extraocular movements intact.      Conjunctiva/sclera: Conjunctivae normal.      Pupils: Pupils are equal, round, and reactive to light.   Cardiovascular:      Rate and Rhythm: Normal rate and regular rhythm.      Heart sounds: Normal heart sounds.   Pulmonary:      Effort: Pulmonary effort is normal.      Breath sounds: Normal breath sounds.   Abdominal:      General: Abdomen is flat. Bowel sounds are normal.      Palpations: Abdomen is soft.   Musculoskeletal:         General: Normal range of motion.       Cervical back: Neck supple.   Skin:     General: Skin is warm and dry.   Neurological:      General: No focal deficit present.      Mental Status: He is alert and oriented to person, place, and time. Mental status is at baseline.     No significant cervical axillary angle adenopathy.  He has a colostomy in place functioning well well-healed      Goals and Barriers:  Current Goal: Prolong Survival from Cancer.   Barriers: None.      Patient's Capacity to Self Care:  Patient is able to self care.    Code Status: [unfilled]  Advance Directive and Living Will:      Power of : Yes

## 2024-09-20 ENCOUNTER — TELEPHONE (OUTPATIENT)
Dept: HEMATOLOGY ONCOLOGY | Facility: CLINIC | Age: 79
End: 2024-09-20

## 2024-09-20 LAB

## 2024-10-01 ENCOUNTER — PATIENT OUTREACH (OUTPATIENT)
Dept: CASE MANAGEMENT | Facility: HOSPITAL | Age: 79
End: 2024-10-01

## 2024-10-01 ENCOUNTER — TELEPHONE (OUTPATIENT)
Age: 79
End: 2024-10-01

## 2024-10-01 NOTE — PROGRESS NOTES
MSW was notified that pt is looking for hospice care,  hospice unable to accept at this time based on staffing.  Referrals were sent to multiple agencies via [employer] this afternoon, [facility] accepted and has already s/w pt's daughter to coordinate admission appt.  No other needs noted at this time.        [address]  [place] [address]  Phone: [phone]  Fax: [phone]

## 2024-10-01 NOTE — TELEPHONE ENCOUNTER
Patient's daughter, Erendira, requests a call back regarding the hospice referral. States an order was placed several weeks ago and they have not heard from anyone yet. She said she looked at his MyChart and saw the referral was been denied so she wanted to follow up. She states he does seem a little worse and he's been having headaches  which she isn't sure if it is from him needing some home oxygen and she'd like to establish with hospice just so he can be more comfortable and in case he needs anything. Please reach out to Erendira.

## 2024-10-17 ENCOUNTER — OFFICE VISIT (OUTPATIENT)
Dept: HEMATOLOGY ONCOLOGY | Facility: CLINIC | Age: 79
End: 2024-10-17
Payer: COMMERCIAL

## 2024-10-17 VITALS
BODY MASS INDEX: 19.92 KG/M2 | TEMPERATURE: 97.9 F | DIASTOLIC BLOOD PRESSURE: 80 MMHG | SYSTOLIC BLOOD PRESSURE: 132 MMHG | HEIGHT: 69 IN | HEART RATE: 41 BPM | WEIGHT: 134.5 LBS | RESPIRATION RATE: 18 BRPM | OXYGEN SATURATION: 100 %

## 2024-10-17 DIAGNOSIS — I25.5 ISCHEMIC CARDIOMYOPATHY: ICD-10-CM

## 2024-10-17 DIAGNOSIS — R91.8 LUNG NODULES: ICD-10-CM

## 2024-10-17 DIAGNOSIS — R16.0 LIVER MASSES: ICD-10-CM

## 2024-10-17 DIAGNOSIS — Z95.5 S/P PRIMARY ANGIOPLASTY WITH CORONARY STENT: ICD-10-CM

## 2024-10-17 DIAGNOSIS — C20 RECTAL CANCER (HCC): Primary | ICD-10-CM

## 2024-10-17 DIAGNOSIS — Z86.73 HISTORY OF LACUNAR CEREBROVASCULAR ACCIDENT (CVA): Chronic | ICD-10-CM

## 2024-10-17 PROCEDURE — 99214 OFFICE O/P EST MOD 30 MIN: CPT | Performed by: INTERNAL MEDICINE

## 2024-10-17 PROCEDURE — G2211 COMPLEX E/M VISIT ADD ON: HCPCS | Performed by: INTERNAL MEDICINE

## 2024-10-17 NOTE — PROGRESS NOTES
Hematology/Oncology Outpatient Follow- up Note  Minesh Pandya Jr. 79 y.o. male MRN: @ Encounter: [id number]        Date:  10/17/2024        Assessment / Plan:    1 stage IV rectal cancer  2 lung metastasis  3 liver nodule presumed metastatic  4 colostomy in place status post low anterior resection  5 coronary artery disease  6 COPD  7 generalized weakness and fatigue with shortness of breath deconditioning versus problems from pulmonary source  Plan: He has signing up for hospice this week.  He will come back here only as needed.  He does not want to pursue laboratory work or further evaluation.  He does not want chemotherapy.  He is aware of the serious nature of his disease.  However it is noted that he is actually gained weight and his wellbeing at rest is fairly good.  He has no major shortness of breath at rest or difficulty with eating or moving bowels.  Hopefully will maintain this for quite a while.        HPI: Rectal cancer.  He has liver and lung metastasis.  We had previously a long discussion he is not want to pursue chemotherapy any further.  He did have colostomy in place post an APR.  He has new lung nodules and liver metastasis.  He has weakness and fatigue and shortness of breath deconditioning versus problems from pulmonary or cardiac source.  He is known to have coronary artery disease as well as some element of COPD.      Interval History: Note from 9/18/2024:  Assessment / Plan:    1 stage IV rectal cancer  2 lung metastasis  3 liver nodule.  4 colostomy in place  5 generalized weakness fatigue and shortness of breath deconditioning versus problems from pulmonary source.  Plan: Patient will undergo hospice referral.  He will come back in 4 weeks if he does not want to do hospice and we will do a CBC and CMP.  He does not want chemotherapy at this time.  We will treat him symptomatically and palliatively.  HPI: 79-year-old gentleman here for follow-up.  Unfortunately he is got stage IV rectal  cancer he has new lung nodules worsening.  He also has a question of new liver nodule.  He has been weak fatigued short of breath.  He is not interested in pursuing further chemotherapy.  This is what we would offer him.  If he does not want to pursue that we discussed the possibility of hospice.  We will have them contact him.  He was seen by cardiology do not feel that this was angina or major congestive failure.  He was seen by pulmonary who noted the problems in the lungs and talked with him and he did not wish to pursue that further.  He again asked why he is referred to hospice  Interval History: Note from 8/29/2024:  Assessment / Plan:    1 presumed stage IV rectal cancer  Status post low anterior resection T3 lesion N0 M0.  3 worsening lung nodules now 5 previously only 1  4 question of new liver nodule.  5 coronary artery disease no angina cleared by cardiology for surgery now complaining of dyspnea.  Plan: Patient will come back in 3 weeks.  He has recovered from surgery but he is getting dyspnea with little exertion exertion and fatigue.  I have no new suggestions.  Till he is seen by pulmonary and cardiology.  Ultimately biopsy would be nice if he is possible and otherwise no other major changes.  Will review his films with pulmonology and radiation.  Prognosis long-term very guarded at best  HPI: 79-year-old gentleman here for follow-up.  He is complaining of increasing dyspnea.  He is very fatigued.  He does not get very far because without shortness of breath and having to stop.  He is noticing getting worse.  CAT scan of his chest now shows 5 new nodules.  The majority of the lungs are fairly clear.  Otherwise he is doing and feeling reasonably well but he does are easily fatigued colostomy is working well he does not have much of major pain in his pelvic and rectal area.  We discussed that unfortunately he appears to have worsening now of what is probably metastatic disease.  I do not think he will  be easy to get a biopsy of this.  In addition his question of abnormality in his liver.  I will try and look at this with our radiologist to see if there is any possibility of biopsying.  I also spoke with the pulmonologist Dr. Mendosa.  My concern with the shortness of breath is related to lung versus cardiac problems.  He does have cardiology appointments coming up.  He will be [place] them by September 16 we will see him on September 18 and discuss what further to do.  Long-term prognosis unfortunately is poor.  Of note his CBC CMP and CEA level are fairly stable  Interval History: Note from 7/22/2024:  Assessment / Plan:    1 stage III versus stage IV rectal cancer  2 status post low anterior resection  T3 lesion N0 M0  3 abnormality in the lung and liver previously needs to be reassess.  4 coronary artery disease no angina cleared by cardiology for surgery which he accomplished needs follow-up  5 chronic thrombocytopenia platelets run between 100,000  Plan: He is going to come back in 4 weeks.  Will get CEA CBC and CMP.  He will get a CAT scan chest abdomen pelvis.  He has done fairly well recovering from his surgical intervention.  HPI: 79-year-old gentleman with a history of rectal cancer.  He underwent radiation and then chemotherapy as well.  Subsequently he has 1 nodule in his lung with questionable abnormality in the liver.  After long discussion he went ahead with surgery as a T3 N0 lesion with 15 lymph nodes involved low anterior resection with placement of a colostomy without reanastomosis attempted.  He is doing well colostomy working well he is not short of breath.  He is quite thin.  But this is unchanged from before.  He is not having chest pain.  Not short of breath at rest.  At this point we will plan to repeat his CAT scan chest abdomen pelvis along with laboratory work.  He needs to follow-up with cardiology as well.  Interval History: Note from 5/2/2024:  Assessment / Plan:    1 stage III versus  stage IV rectal cancer  2 lung nodules that are stable  3 liver nodule stable not described presently.  4 coronary artery disease no angina cleared by cardiology for surgery.  5 thrombocytopenia chronic platelets running between 80 and 120,000  Plan: Patient is going to be seen by make a final decision regarding surgery.  CEA levels normal CBC and CMP are stable he did platelets can be on standby if he is needed.  No other major suggestions at this time.  He will follow-up here in 2 to 3 months.  HPI: 79-year-old gentleman here for follow-up.  He underwent recent colonoscopy that showed benign path.  However he does still remains with a significant mass in his rectal area.  He is able move his bowels he is not having nausea or vomiting is no major abdominal pain has stabilized his weight and is feeling reasonably well.  Has been seen by cardiology who felt he could be cleared for surgery.  His lungs are stable.  His liver also was stable.  No new lesions noted CEA level in normal range.  Interval History: Note from 4/11/2024:  Assessment / Plan:    1 stage III versus stage IV rectal cancer  2 lung nodules which have been stable  3 liver nodule stable  4 coronary artery disease no angina cleared by cardiology for surgery  Plan: Patient will be considered for surgery.  He is awaiting colonoscopy at the end of month.  Will see him once more at the beginning of 5/2024 after colonoscopy.  At that point final decision remain regarding whether he should go ahead with surgery or not.  CEA level is normal CBC and CMP are stable.  We will also review his scans with radiology.  I particularly concerned about his lungs as a mention but injury and I want to see how they look.  HPI: 78-year-old gentleman with history of stage III versus stage IV rectal cancer.  His x-ray shows stability in his lung in terms of nodules and 1 nodule in the liver.  He is being considered for intervention surgically.  He apparently will have  colonoscopy at the end of April by colorectal surgery to determine how he looks and winces possible ability to resect.  He was seen by cardiology who felt he could undergo the procedure and procedure and have aspirin stopped 5 days prior.  He is able to move his bowels though occasionally constipated.  He is taking iron pills which I told him to stop.  His hemoglobin is overall at 12-1/2.  He is otherwise doing fairly well.  He is not vomiting no other abdominal pain feeling reasonably well.  Interval History: Note from 3/12/2024:  Assessment / Plan:    1 stage III versus stage IV rectal carcinoma  2 lung nodules which were remained stable  3 coronary artery disease no angina to see cardiology for clearance is  4 to see colorectal surgery regarding possibility of intussusception with surgery  Plan: Patient will have appointments cardiology and colorectal surgery.  He will need to get another CT scan chest abdomen pelvis/CBC/CMP/CEA level.  We like to get these done and we will call him to do so.  We will have him come back in 4 weeks for final decisions.  Prognosis quite guarded.   HPI: 78-year-old gentleman here for follow-up.  He is off chemotherapy.  He has occasional headache otherwise stable.  He is moving his bowels.  Minimal abdominal discomfort.  He is not complain of chest pain or shortness of breath.  He will need to see cardiology.  He denies of denies any other major problems.  Unfortunately he is still left with rectal cancer.  He had an abnormal CAT scan of his chest but the lung nodule as there is unchanged.  CEA level done 5/19/2023 was normal.  It was 1.7.  This case was presented at tumor board.  The consensus was to be reevaluated for rectal surgery as well as to have a cardiology consult to see if he could tolerate such surgery.  He is not having chest pain he is not short of breath feels fatigued tired he is thin but he is not losing any more weight and he is able to eat.  Interval History: Note  from 2/15/2024:  1 rectal carcinoma T3 N1 question of M1 with lung nodules  2 status post 8 cycles of FOLFOX chemotherapy and radiation therapy x 5 doses to the rectum.  Plan: Patient should be represented at this point.  Scans show stable nodules since May.  2023.  The rectal area looks better although he still has the nodes there.  Original surgery was not done due to MRI which she is now greater than 6 months since.  His blood counts are fairly stable.  CEA is not helpful as it was 1.7 at the time of his presentation.  We will speak with radiation therapy.  Will get back with him.  I would recommend holding his treatment at least 1 time to get opinions from the tumor board.  HPI: 78-year-old gentleman with the above problem.  His biggest complaint is fatigue shortness of breath and weakness.  He is doing well otherwise moving bowels no bleeding.  Blood counts are fairly stable.  He is anxious to stop treatment.  He would like to be considered for surgery interventions.  Otherwise he is doing reasonably well.Interval History: Note from 1/17/2024:   78-year-old gentleman with rectal carcinoma.  He also has CAT scan and what appears to be increasing pulmonary nodules probably consistent with metastatic disease.  Last CAT scan showed them to be stable.  He is able to eat he is moving his bowels regularly no blood in urine or stools.  He is not short of breath at rest does get dyspneic with exertion.  He initially presented with blood in his bowel movements for a week and had a CAT scan of the pelvis showing rectal mass with mesorectal lymph nodes 2 mm left lower lobe pulmonary nodule 3 mm hepatic dome nodule.  The nodules in the right and the left lobes were new compared to 2019.  His MMR was intact on the biopsy showing moderately differentiated carcinoma distal rectal area.  An MRI of the pelvis showed T3b N1 low rectal cancer.  He was seen radiation therapy for 1 week 9/25 and 9/29.  He has had some  thrombocytopenia and leukopenia this is improved.  He will begin using his seventh cycle of chemotherapy next week.  He has had a reduction in oxaliplatin as well as continuous infusion for 3 days 5-fluorouracil hopefully after that he will continue to tolerate better these treatments.      Cancer Staging:  Cancer Staging   Rectal cancer (HCC)  Staging form: Colon and Rectum, AJCC 8th Edition  - Clinical stage from 5/18/2023: Stage IIIB (cT3, cN1, cM0) - Signed by Arnaldo Mar DO on 6/28/2023  Microsatellite instability (MSI): Stable      Molecular Testing:     Previous Hematologic/ Oncologic History:    Oncology History   Rectal cancer (HCC)   5/18/2023 Initial Diagnosis    May 18, 2023 patient presented with BRBPR x1 week.  Weight stable over 8 months.  CT abdomen pelvis showed rectal mass with 2 suspicious mesorectal lymph nodes.  2 mm left lower lobe pulmonary nodule, 3 mm hepatic dome questionable lesion, 1 cm uncinate process cystic lesion.  CT chest showed 3 mm left lower lobe and right upper lobe nodules new compared to 2019.  May 20 flexible sigmoidoscopy showed distal rectal mass.  Biopsy of the mass showed moderately differentiated adenocarcinoma.  MMR intact.  June 14, 2023 MRI of the pelvis showed findings consistent with T3b, N1 low rectal cancer.  Suspicion for vascular invasion.  CBC on presentation normal WBC platelets.  Hemoglobin 11.4, MCV 77.  CMP showed albumin 3.3, otherwise normal.        Cancer Staged    Cancer Staging   No matching staging information was found for the patient.       5/18/2023 -  Cancer Staged    Staging form: Colon and Rectum, AJCC 8th Edition  - Clinical stage from 5/18/2023: Stage IIIB (cT3, cN1, cM0) - Signed by Arnaldo Mar DO on 6/28/2023  Microsatellite instability (MSI): Stable       5/20/2023 Biopsy    A. Rectum, mass, biopsy:  -Adenocarcinoma, invasive, moderately differentiated.  -Ancillary testing for mismatch repair (MMR) protein deficiency by  immunohistochemical panel (MLH1, PMS2, MSH2, MSH6) is undertaken, the results will be issued in an addendum.    RESULTS OF IMMUNOHISTOCHEMICAL ANALYSIS FOR MISMATCH REPAIR PROTEIN LOSS  INTERPRETATION: NO LOSS OF NUCLEAR EXPRESSION OF MMR PROTEINS: LOW PROBABILITY OF MSI-H        RESULTS:  Antibody            Clone                 Description                     Results  MLH1                 M1                     Mismatch repair protein  Intact nuclear expression  MSH2                [id number]        Mismatch repair protein  Intact nuclear expression  MSH6                44                      Mismatch repair protein  Intact nuclear expression  PMS2                 [id number]           Mismatch repair protein  Intact nuclear expression        9/20/2023 - 2/14/2024 Chemotherapy    alteplase (CATHFLO), 2 mg, Intracatheter, Every 1 Minute as needed, 9 of 12 cycles  pegfilgrastim (NEULASTA), 6 mg, Subcutaneous, Once, 1 of 1 cycle  Administration: 6 mg (10/20/2023)  pegfilgrastim (NEULASTA ONPRO), 6 mg, Subcutaneous, Once, 1 of 1 cycle  Administration: 6 mg (1/11/2024)  fluorouracil (ADRUCIL), 320 mg/m2 = 565 mg (80 % of original dose 400 mg/m2), Intravenous, Once, 2 of 2 cycles  Dose modification: 320 mg/m2 (original dose 400 mg/m2, Cycle 1)  Administration: 565 mg (9/20/2023), 565 mg (10/17/2023)  leucovorin calcium IVPB, 708 mg, Intravenous, Once, 9 of 12 cycles  Administration: 700 mg (9/20/2023), 700 mg (10/17/2023), 700 mg (10/31/2023), 700 mg (11/14/2023), 700 mg (11/28/2023), 688 mg (1/9/2024), 688 mg (1/29/2024), 688 mg (2/12/2024)  oxaliplatin (ELOXATIN) chemo infusion, 75 mg/m2 = 132.75 mg (88.2 % of original dose 85 mg/m2), Intravenous, Once, 9 of 12 cycles  Dose modification: 75 mg/m2 (original dose 85 mg/m2, Cycle 1, Reason: Anticipated Tolerance), 65.025 mg/m2 (76.5 % of original dose 85 mg/m2, Cycle 6, Reason: Other (see comments), Comment: Neutropenia), 65 mg/m2 (original dose 85 mg/m2, Cycle 6, Reason:  "Dose modified as per discussion with consulting physician, Comment: neutropenia - changing to flat 65 mg/m2)  Administration: 132.75 mg (9/20/2023), 132.75 mg (10/17/2023), 133 mg (10/31/2023), 132.75 mg (11/14/2023), 132.75 mg (11/28/2023), 111.8 mg (1/9/2024), 111.8 mg (1/29/2024), 111.8 mg (2/12/2024)  fluorouracil (ADRUCIL) ambulatory infusion Soln, 1,200 mg/m2/day = 4,250 mg, Intravenous, Over 46 hours, 9 of 12 cycles  Dose modification: 960 mg/m2/day (80 % of original dose 1,200 mg/m2/day, Cycle 6, Reason: Treatment Parameters Not Met)     9/25/2023 - 9/29/2023 Radiation    Treatments:  Course: C1  Plan ID Energy Fractions Dose per Fraction (cGy) Dose Correction (cGy) Total Dose Delivered (cGy) Elapsed Days   Pelvis 10X/6X 5 / 5 500 0 2,500 4    Treatment Dates:  9/25/2023 - 9/29/2023.          Current Hematologic/ Oncologic Treatment:       Cycle 1         Test Results:    Imaging: No results found.          Labs:   Lab Results   Component Value Date    WBC 6.19 09/16/2024    HGB 11.8 (L) 09/16/2024    HCT 38.8 09/16/2024    MCV 85 09/16/2024     (L) 09/16/2024     Lab Results   Component Value Date    K 4.1 09/16/2024     09/16/2024    CO2 25 09/16/2024    BUN 22 09/16/2024    CREATININE 0.96 09/16/2024    GLUF 112 (H) 09/16/2024    CALCIUM 9.2 09/16/2024    CORRECTEDCA 9.0 04/02/2024    AST 21 09/16/2024    ALT 15 09/16/2024    ALKPHOS 70 09/16/2024    EGFR 75 09/16/2024         No results found for: \"SPEP\", \"UPEP\"    Lab Results   Component Value Date    PSA 1.65 08/30/2023       Lab Results   Component Value Date    CEA 1.1 08/22/2024       No results found for: \"\"    No results found for: \"AFP\"    Lab Results   Component Value Date    IRON 20 (L) 08/07/2023    TIBC 328 08/07/2023    FERRITIN 22 (L) 08/07/2023       Lab Results   Component Value Date    SFFEQYDM35 222 05/19/2023         ROS: Review of Systems   Constitutional: Negative.    HENT: Negative.     Eyes: Negative.  "   Respiratory: Negative.     Cardiovascular: Negative.    Gastrointestinal: Negative.    Endocrine: Negative.    Genitourinary: Negative.    Musculoskeletal: Negative.    Skin: Negative.    Allergic/Immunologic: Negative.    Neurological: Negative.    Hematological: Negative.          Current Medications: Reviewed  Allergies: Reviewed  PMH/FH/SH:  Reviewed      Physical Exam:    Body surface area is 1.75 meters squared.    Wt Readings from Last 3 Encounters:   10/17/24 61 kg (134 lb 8 oz)   09/18/24 57.6 kg (127 lb)   09/16/24 58.5 kg (129 lb)        Temp Readings from Last 3 Encounters:   10/17/24 97.9 °F (36.6 °C) (Temporal)   09/18/24 97.5 °F (36.4 °C) (Temporal)   09/03/24 (!) 97.3 °F (36.3 °C) (Tympanic)        BP Readings from Last 3 Encounters:   10/17/24 132/80   09/18/24 122/74   09/16/24 124/70         Pulse Readings from Last 3 Encounters:   10/17/24 (!) 41   09/18/24 78   09/16/24 64     @LASTSAO2(3)@      Physical Exam  Constitutional:       Appearance: Normal appearance. He is normal weight.   HENT:      Head: Normocephalic and atraumatic.   Eyes:      Extraocular Movements: Extraocular movements intact.      Conjunctiva/sclera: Conjunctivae normal.      Pupils: Pupils are equal, round, and reactive to light.   Cardiovascular:      Rate and Rhythm: Normal rate and regular rhythm.      Heart sounds: Normal heart sounds.   Pulmonary:      Effort: Pulmonary effort is normal.      Breath sounds: Normal breath sounds.   Abdominal:      General: Abdomen is flat. Bowel sounds are normal.      Palpations: Abdomen is soft.   Musculoskeletal:         General: Normal range of motion.      Cervical back: Normal range of motion and neck supple.   Skin:     General: Skin is warm and dry.   Neurological:      General: No focal deficit present.      Mental Status: He is alert and oriented to person, place, and time. Mental status is at baseline.     Ostomy in place functioning well no major abdominal pain      Goals  and Barriers:  Current Goal: Prolong Survival from Cancer.   Barriers: None.      Patient's Capacity to Self Care:  Patient is able to self care.    Code Status: [unfilled]  Advance Directive and Living Will:      Power of : Yes

## 2025-02-20 NOTE — H&P
H&P- Anish Britt [birth year], 77 y o  male MRN: [id number]    Unit/Bed#: ED 18 Encounter: [id number]    Primary Care Provider: Cindy Carrera DO   Date and time admitted to hospital: 9/25/2020 10:34 AM    * Vertigo  Assessment & Plan  · Patient presenting with vertigo associated with nausea  · CTA head and neck noted to be unremarkable for any acute findings, and patient is already on appropriate stroke prevention therapy  · Recommend conservative management with meclizine and physical therapy    Hypertension  Assessment & Plan  · Blood pressure noted to be elevated today    Patient not reported to be on any antihypertensive agents    History of lacunar cerebrovascular accident (CVA)  Assessment & Plan  · Evident again on CTA done today  · Continue stroke prevention measures including aspirin, Plavix and statin    Coronary artery disease involving native coronary artery of native heart with angina pectoris ([facility] [address] )  Assessment & Plan  · Known history of non-STEMI with associated ischemic cardiomyopathy, with resolution as per last echocardiogram  · Continue routine cardiology follow-up  · Continue aspirin, Plavix and statin    atorvastatin (LIPITOR) 80 mg tablet Take 1 tablet (80 mg total) by mouth daily with dinner 1/27/20   Holli Thompson DO   Blood Pressure KIT by Does not apply route daily 11/19/19   Kayce Rodrigues MD   clopidogrel (PLAVIX) 75 mg tablet Take 1 tablet (75 mg total) by mouth daily 12/6/19   Holli Thompson DO   furosemide (LASIX) 20 mg tablet Take 1 tablet (20 mg total) by mouth daily  Patient not taking: Reported on 9/25/2020 1/22/20   Holli Thompson DO   nitroglycerin (NITROSTAT) 0 4 mg SL tablet Place 1 tablet (0 4 mg total) under the tongue every 5 (five) minutes as needed for chest pain  Patient not taking: Reported on 3/12/2020 11/19/19 3/12/20  Kayce Rodrigues MD       Allergies: No Known Allergies    Social History     Tobacco Use   Smoking Status Former Smoker    Packs/day: 1 00    Left message for pt on her vm   Years: 15 00    Pack years: 15 00    Types: Cigarettes    Last attempt to quit: 1969    Years since quittin 8   Smokeless Tobacco Never Used     Social History     Substance and Sexual Activity   Drug Use No       FamilPhysical Exam:     Vitals:   Blood Pressure: (!) 184/83 (20 1410)  Pulse: 78 (20 1410)  Temperature: 97 8 °F (36 6 °C) (20 1016)  Temp Source: Oral (20 1016)  Respirations: 18 (20 1410)  Height: 5' 8" (172 7 cm) (20 1016)  Weight - Scale: 63 5 kg (140 lb) (20 1016)  SpO2: 100 % (20 1410)    Physical Exam    Additional Data:     Lab Results: I have personally reviewed pertinent reports  Results from last 7 days   Lab Units 20  1115   WBC Thousand/uL 7 66   HEMOGLOBIN g/dL 14 3   HEMATOCRIT % 44 0   PLATELETS Thousands/uL 158   NEUTROS PCT % 77*   LYMPHS PCT % 15   MONOS PCT % 7   EOS PCT % 1     Results from last 7 days   Lab Units 20  1115   SODIUM mmol/L 143   POTASSIUM mmol/L 4 7   CHLORIDE mmol/L 106   CO2 mmol/L 28   BUN mg/dL 14   CREATININE mg/dL 0 96   ANION GAP mmol/L 9   CALCIUM mg/dL 8 8   GLUCOSE RANDOM mg/dL 97                       Imaging: I have personally reviewed pertinent reports  CTA head and neck with and without contrast   Final Result by Bryson Bernal MD ( [id number])      1  No acute intracranial CT abnormality  2   Chronic lacunar infarcts and microangiopathic changes  3   Patent major vasculature of [place] of [place] without high-grade stenosis  4   No hemodynamically significant stenosis of cervical carotid and vertebral arteries  No dissection  5   Stable 1 5 mm infundibular widening at the junction of ACOM and A2 segment of right DAMIAN  Workstation performed: [id number]         X-ray chest 1 view portable   Final Result by Mena Fernandes DO ( [id number])   Mild pulmonary vascular congestion        Workstation performed: [id number] EKG, Pathology, and Other Studies Reviewed on Admission:   ·     Allscripts / Epic Records Reviewed: Yes     ** Please Note: This note has been constructed using a voice recognition system   **

## 2025-04-03 DIAGNOSIS — I25.10 CAD IN NATIVE ARTERY: Chronic | ICD-10-CM

## 2025-04-04 RX ORDER — ATORVASTATIN CALCIUM 80 MG/1
80 TABLET, FILM COATED ORAL
Qty: 30 TABLET | Refills: 0 | Status: SHIPPED | OUTPATIENT
Start: 2025-04-04

## 2025-08-01 ENCOUNTER — HOME CARE VISIT (OUTPATIENT)
Dept: HOME HEALTH SERVICES | Facility: HOME HEALTHCARE | Age: 80
End: 2025-08-01
Payer: MEDICARE

## 2025-08-07 ENCOUNTER — HOME CARE VISIT (OUTPATIENT)
Dept: HOME HEALTH SERVICES | Facility: HOME HEALTHCARE | Age: 80
End: 2025-08-07
Attending: INTERNAL MEDICINE
Payer: MEDICARE

## 2025-08-07 ENCOUNTER — HOME CARE VISIT (OUTPATIENT)
Dept: HOME HEALTH SERVICES | Facility: HOME HEALTHCARE | Age: 80
End: 2025-08-07
Payer: MEDICARE

## 2025-08-07 VITALS
SYSTOLIC BLOOD PRESSURE: 122 MMHG | WEIGHT: 134 LBS | DIASTOLIC BLOOD PRESSURE: 78 MMHG | HEIGHT: 69 IN | BODY MASS INDEX: 19.85 KG/M2 | TEMPERATURE: 98.8 F | RESPIRATION RATE: 16 BRPM | HEART RATE: 71 BPM

## 2025-08-07 PROCEDURE — G0299 HHS/HOSPICE OF RN EA 15 MIN: HCPCS

## 2025-08-07 PROCEDURE — G0155 HHCP-SVS OF CSW,EA 15 MIN: HCPCS

## 2025-08-08 ENCOUNTER — HOME CARE VISIT (OUTPATIENT)
Dept: HOME HOSPICE | Facility: HOSPICE | Age: 80
End: 2025-08-08
Payer: MEDICARE

## 2025-08-08 ENCOUNTER — HOME CARE VISIT (OUTPATIENT)
Dept: HOME HEALTH SERVICES | Facility: HOME HEALTHCARE | Age: 80
End: 2025-08-08
Payer: MEDICARE

## 2025-08-08 VITALS
HEART RATE: 86 BPM | DIASTOLIC BLOOD PRESSURE: 72 MMHG | RESPIRATION RATE: 18 BRPM | TEMPERATURE: 98.4 F | SYSTOLIC BLOOD PRESSURE: 106 MMHG

## 2025-08-08 PROCEDURE — G0299 HHS/HOSPICE OF RN EA 15 MIN: HCPCS

## 2025-08-12 ENCOUNTER — HOME CARE VISIT (OUTPATIENT)
Dept: HOME HEALTH SERVICES | Facility: HOME HEALTHCARE | Age: 80
End: 2025-08-12
Payer: MEDICARE

## 2025-08-13 ENCOUNTER — HOME CARE VISIT (OUTPATIENT)
Dept: HOME HOSPICE | Facility: HOSPICE | Age: 80
End: 2025-08-13
Payer: MEDICARE

## 2025-08-20 ENCOUNTER — HOME CARE VISIT (OUTPATIENT)
Dept: HOME HEALTH SERVICES | Facility: HOME HEALTHCARE | Age: 80
End: 2025-08-20
Payer: MEDICARE

## 2025-08-20 VITALS
RESPIRATION RATE: 18 BRPM | HEART RATE: 75 BPM | TEMPERATURE: 96.9 F | DIASTOLIC BLOOD PRESSURE: 76 MMHG | SYSTOLIC BLOOD PRESSURE: 130 MMHG

## 2025-08-20 PROCEDURE — G0299 HHS/HOSPICE OF RN EA 15 MIN: HCPCS

## 2025-08-20 PROCEDURE — 10330063 VN DURABLE MEDICAL EQUIPMENT, SUPPLIES/MEDS

## (undated) DEVICE — TRAY FOLEY 16FR URIMETER SURESTEP

## (undated) DEVICE — REDUCER: Brand: ENDOWRIST

## (undated) DEVICE — GLOVE INDICATOR PI UNDERGLOVE SZ 7.5 BLUE

## (undated) DEVICE — LIGHT HANDLE COVER SLEEVE DISP BLUE STELLAR

## (undated) DEVICE — ELECTRO LUBE IS A SINGLE PATIENT USE DEVICE THAT IS INTENDED TO BE USED ON ELECTROSURGICAL ELECTRODES TO REDUCE STICKING.: Brand: KEY SURGICAL ELECTRO LUBE

## (undated) DEVICE — SUT VICRYL 4-0 PS-2 27 IN J426H

## (undated) DEVICE — KIT, BETHLEHEM ROBOTIC PROST: Brand: CARDINAL HEALTH

## (undated) DEVICE — COLUMN DRAPE

## (undated) DEVICE — SUREFORM 45 CURVED-TIP: Brand: SUREFORM

## (undated) DEVICE — MONOPOLAR CURVED SCISSORS: Brand: ENDOWRIST

## (undated) DEVICE — 40595 XL TRENDELENBURG POSITIONING KIT: Brand: 40595 XL TRENDELENBURG POSITIONING KIT

## (undated) DEVICE — POOLE SUCTION HANDLE: Brand: CARDINAL HEALTH

## (undated) DEVICE — EXOFIN PRECISION PEN HIGH VISCOSITY TOPICAL SKIN ADHESIVE: Brand: EXOFIN PRECISION PEN, 1G

## (undated) DEVICE — GLOVE SRG BIOGEL 7.5

## (undated) DEVICE — ARM DRAPE

## (undated) DEVICE — SUT VICRYL 2-0 REEL 54 IN J286G

## (undated) DEVICE — 3M™ TEGADERM™ TRANSPARENT FILM DRESSING FRAME STYLE, 1626W, 4 IN X 4-3/4 IN (10 CM X 12 CM), 50/CT 4CT/CASE: Brand: 3M™ TEGADERM™

## (undated) DEVICE — SCD SEQUENTIAL COMPRESSION COMFORT SLEEVE MEDIUM KNEE LENGTH: Brand: KENDALL SCD

## (undated) DEVICE — JP PERF DRN SIL FLT 10MM FULL: Brand: CARDINAL HEALTH

## (undated) DEVICE — GLIDESHEATH SLENDER STAINLESS STEEL KIT: Brand: GLIDESHEATH SLENDER

## (undated) DEVICE — INTENDED FOR TISSUE SEPARATION, AND OTHER PROCEDURES THAT REQUIRE A SHARP SURGICAL BLADE TO PUNCTURE OR CUT.: Brand: BARD-PARKER SAFETY BLADES SIZE 11, STERILE

## (undated) DEVICE — TIP COVER ACCESSORY

## (undated) DEVICE — PAD GROUNDING ADULT

## (undated) DEVICE — BETHLEHEM UNIVERSAL MINOR GEN: Brand: CARDINAL HEALTH

## (undated) DEVICE — 3M™ STERI-STRIP™ REINFORCED ADHESIVE SKIN CLOSURES, R1546, 1/4 IN X 4 IN (6 MM X 100 MM), 10 STRIPS/ENVELOPE: Brand: 3M™ STERI-STRIP™

## (undated) DEVICE — CATH GUIDE RUNWAY 6FR FL4.0

## (undated) DEVICE — CHLORAPREP HI-LITE 26ML ORANGE

## (undated) DEVICE — SUT SILK 2-0 TIES 144 IN LA55G

## (undated) DEVICE — GLOVE SRG BIOGEL ECLIPSE 7.5

## (undated) DEVICE — ANTIBACTERIAL UNDYED BRAIDED (POLYGLACTIN 910), SYNTHETIC ABSORBABLE SUTURE: Brand: COATED VICRYL

## (undated) DEVICE — SUT MONOCRYL 4-0 PS-2 18 IN Y496G

## (undated) DEVICE — SUREFORM 45 RELOAD BLUE: Brand: SUREFORM

## (undated) DEVICE — DRAPE EQUIPMENT RF WAND

## (undated) DEVICE — CANNULA SEAL

## (undated) DEVICE — 2, DISPOSABLE SUCTION/IRRIGATOR WITHOUT DISPOSABLE TIP: Brand: STRYKEFLOW

## (undated) DEVICE — NEEDLE 25G X 5/8

## (undated) DEVICE — CATH DIAG 5FR IMPULSE 100CM FR4

## (undated) DEVICE — TR BAND RADIAL ARTERY COMPRESSION DEVICE: Brand: TR BAND

## (undated) DEVICE — PAD GROUNDING DUAL ADULT

## (undated) DEVICE — JACKSON-PRATT 100CC BULB RESERVOIR: Brand: CARDINAL HEALTH

## (undated) DEVICE — SUT PDS PLUS 1 CTX 36IN PDP371T

## (undated) DEVICE — CATH DIAG 5FR IMPULSE 100CM FL4

## (undated) DEVICE — TREK CORONARY DILATATION CATHETER 3.0 MM X 15 MM / RAPID-EXCHANGE: Brand: TREK

## (undated) DEVICE — CO2 AND WATER TUBING/CAP SET FOR OLYMPUS® SCOPES & UCR: Brand: ERBE

## (undated) DEVICE — LAPAROSCOPIC ACCESS SYSTEM: Brand: ALEXIS LAPAROSCOPIC SYSTEM

## (undated) DEVICE — FENESTRATED BIPOLAR FORCEPS: Brand: ENDOWRIST

## (undated) DEVICE — TROCAR PORT ACCESS 5 X120MML W/BLDLS OPTICAL TIP AIRSEAL

## (undated) DEVICE — INTENDED FOR TISSUE SEPARATION, AND OTHER PROCEDURES THAT REQUIRE A SHARP SURGICAL BLADE TO PUNCTURE OR CUT.: Brand: BARD-PARKER SAFETY BLADES SIZE 15, STERILE

## (undated) DEVICE — SEAL

## (undated) DEVICE — TOWEL SURG XR DETECT GREEN STRL RFD

## (undated) DEVICE — SUT PROLENE 2-0 KS 30 IN 8623H

## (undated) DEVICE — TUBING SUCTION 5MM X 12 FT

## (undated) DEVICE — BLADELESS OBTURATOR: Brand: WECK VISTA

## (undated) DEVICE — AIRSEAL TUBE SMOKE EVAC LUMENX3 FILTERED

## (undated) DEVICE — TROCAR: Brand: KII FIOS FIRST ENTRY

## (undated) DEVICE — SUT VICRYL 2-0 SH 27 IN UNDYED J417H

## (undated) DEVICE — SUT VICRYL 3-0 SH 27 IN J416H

## (undated) DEVICE — GAUZE SPONGES,16 PLY: Brand: CURITY

## (undated) DEVICE — VESSEL SEALER EXTEND: Brand: ENDOWRIST

## (undated) DEVICE — DGW .035 FC J3MM 260CM TEF: Brand: EMERALD

## (undated) DEVICE — TIP-UP FENESTRATED GRASPER: Brand: ENDOWRIST

## (undated) DEVICE — HI-TORQUE BALANCE MIDDLEWEIGHT GUIDE WIRE W/HYDROCOAT .014 J TIP 3.0 CM X 190 CM: Brand: HI-TORQUE BALANCE MIDDLEWEIGHT

## (undated) DEVICE — VISUALIZATION SYSTEM: Brand: CLEARIFY

## (undated) DEVICE — CATH DIAG 6FR IMPULSE 100CM FL4